# Patient Record
Sex: FEMALE | Race: WHITE | NOT HISPANIC OR LATINO | Employment: FULL TIME | URBAN - METROPOLITAN AREA
[De-identification: names, ages, dates, MRNs, and addresses within clinical notes are randomized per-mention and may not be internally consistent; named-entity substitution may affect disease eponyms.]

---

## 2017-07-17 ENCOUNTER — HOSPITAL ENCOUNTER (OUTPATIENT)
Dept: RADIOLOGY | Facility: HOSPITAL | Age: 32
Discharge: HOME | End: 2017-07-17
Attending: NURSE PRACTITIONER
Payer: COMMERCIAL

## 2017-07-17 DIAGNOSIS — M25.562 ACUTE PAIN OF LEFT KNEE: Primary | ICD-10-CM

## 2017-07-17 DIAGNOSIS — M25.562 ACUTE PAIN OF LEFT KNEE: ICD-10-CM

## 2017-07-17 PROCEDURE — 73562 X-RAY EXAM OF KNEE 3: CPT | Mod: TC,PO,LT

## 2017-07-17 PROCEDURE — 73562 X-RAY EXAM OF KNEE 3: CPT | Mod: 26,LT,, | Performed by: RADIOLOGY

## 2017-07-17 RX ORDER — NAPROXEN 500 MG/1
500 TABLET ORAL 2 TIMES DAILY WITH MEALS
Qty: 60 TABLET | Refills: 2 | Status: SHIPPED | OUTPATIENT
Start: 2017-07-17 | End: 2017-09-05

## 2017-08-07 ENCOUNTER — PATIENT MESSAGE (OUTPATIENT)
Dept: OBSTETRICS AND GYNECOLOGY | Facility: CLINIC | Age: 32
End: 2017-08-07

## 2017-08-07 DIAGNOSIS — N94.3 PMS (PREMENSTRUAL SYNDROME): ICD-10-CM

## 2017-08-08 ENCOUNTER — PATIENT MESSAGE (OUTPATIENT)
Dept: OBSTETRICS AND GYNECOLOGY | Facility: CLINIC | Age: 32
End: 2017-08-08

## 2017-08-08 RX ORDER — FLUOXETINE 10 MG/1
10 CAPSULE ORAL DAILY
Qty: 30 CAPSULE | Refills: 11 | Status: SHIPPED | OUTPATIENT
Start: 2017-08-08 | End: 2018-09-06 | Stop reason: SDUPTHER

## 2017-08-09 RX ORDER — FLUCONAZOLE 100 MG/1
200 TABLET ORAL ONCE
Qty: 2 TABLET | Refills: 1 | Status: SHIPPED | OUTPATIENT
Start: 2017-08-09 | End: 2017-08-09

## 2017-09-05 ENCOUNTER — INITIAL CONSULT (OUTPATIENT)
Dept: CARDIOLOGY | Facility: CLINIC | Age: 32
End: 2017-09-05
Payer: COMMERCIAL

## 2017-09-05 ENCOUNTER — LAB VISIT (OUTPATIENT)
Dept: LAB | Facility: HOSPITAL | Age: 32
End: 2017-09-05
Payer: COMMERCIAL

## 2017-09-05 VITALS
HEART RATE: 88 BPM | SYSTOLIC BLOOD PRESSURE: 129 MMHG | OXYGEN SATURATION: 98 % | WEIGHT: 173.06 LBS | HEIGHT: 61 IN | BODY MASS INDEX: 32.67 KG/M2 | DIASTOLIC BLOOD PRESSURE: 89 MMHG

## 2017-09-05 DIAGNOSIS — R53.83 FATIGUE, UNSPECIFIED TYPE: ICD-10-CM

## 2017-09-05 DIAGNOSIS — I34.1 MITRAL VALVE PROLAPSE: Primary | Chronic | ICD-10-CM

## 2017-09-05 DIAGNOSIS — R00.2 HEART PALPITATIONS: ICD-10-CM

## 2017-09-05 DIAGNOSIS — Z98.890 STATUS POST MITRAL VALVE ANNULOPLASTY: ICD-10-CM

## 2017-09-05 LAB
25(OH)D3+25(OH)D2 SERPL-MCNC: 10 NG/ML
ALBUMIN SERPL BCP-MCNC: 3.6 G/DL
ALP SERPL-CCNC: 64 U/L
ALT SERPL W/O P-5'-P-CCNC: 15 U/L
ANION GAP SERPL CALC-SCNC: 6 MMOL/L
AST SERPL-CCNC: 31 U/L
BASOPHILS # BLD AUTO: 0.02 K/UL
BASOPHILS NFR BLD: 0.3 %
BILIRUB SERPL-MCNC: 0.9 MG/DL
BUN SERPL-MCNC: 13 MG/DL
CALCIUM SERPL-MCNC: 8.9 MG/DL
CHLORIDE SERPL-SCNC: 104 MMOL/L
CO2 SERPL-SCNC: 28 MMOL/L
CREAT SERPL-MCNC: 0.7 MG/DL
DIFFERENTIAL METHOD: ABNORMAL
EOSINOPHIL # BLD AUTO: 0 K/UL
EOSINOPHIL NFR BLD: 0.4 %
ERYTHROCYTE [DISTWIDTH] IN BLOOD BY AUTOMATED COUNT: 14.6 %
EST. GFR  (AFRICAN AMERICAN): >60 ML/MIN/1.73 M^2
EST. GFR  (NON AFRICAN AMERICAN): >60 ML/MIN/1.73 M^2
FERRITIN SERPL-MCNC: 19 NG/ML
FOLATE SERPL-MCNC: 6.9 NG/ML
GLUCOSE SERPL-MCNC: 81 MG/DL
HCT VFR BLD AUTO: 36.9 %
HGB BLD-MCNC: 11.6 G/DL
IRON SERPL-MCNC: 57 UG/DL
LYMPHOCYTES # BLD AUTO: 1.9 K/UL
LYMPHOCYTES NFR BLD: 26.1 %
MCH RBC QN AUTO: 28.9 PG
MCHC RBC AUTO-ENTMCNC: 31.4 G/DL
MCV RBC AUTO: 92 FL
MONOCYTES # BLD AUTO: 0.4 K/UL
MONOCYTES NFR BLD: 5.4 %
NEUTROPHILS # BLD AUTO: 5 K/UL
NEUTROPHILS NFR BLD: 67.7 %
PLATELET # BLD AUTO: 349 K/UL
PMV BLD AUTO: 12.1 FL
POTASSIUM SERPL-SCNC: 4.2 MMOL/L
PROT SERPL-MCNC: 7 G/DL
RBC # BLD AUTO: 4.01 M/UL
SATURATED IRON: 14 %
SODIUM SERPL-SCNC: 138 MMOL/L
TOTAL IRON BINDING CAPACITY: 419 UG/DL
TRANSFERRIN SERPL-MCNC: 283 MG/DL
TSH SERPL DL<=0.005 MIU/L-ACNC: 0.91 UIU/ML
VIT B12 SERPL-MCNC: 287 PG/ML
WBC # BLD AUTO: 7.4 K/UL

## 2017-09-05 PROCEDURE — 80053 COMPREHEN METABOLIC PANEL: CPT

## 2017-09-05 PROCEDURE — 82728 ASSAY OF FERRITIN: CPT

## 2017-09-05 PROCEDURE — 36415 COLL VENOUS BLD VENIPUNCTURE: CPT

## 2017-09-05 PROCEDURE — 83540 ASSAY OF IRON: CPT

## 2017-09-05 PROCEDURE — 99244 OFF/OP CNSLTJ NEW/EST MOD 40: CPT | Mod: S$GLB,,, | Performed by: INTERNAL MEDICINE

## 2017-09-05 PROCEDURE — 99999 PR PBB SHADOW E&M-EST. PATIENT-LVL III: CPT | Mod: PBBFAC,,, | Performed by: INTERNAL MEDICINE

## 2017-09-05 PROCEDURE — 82306 VITAMIN D 25 HYDROXY: CPT

## 2017-09-05 PROCEDURE — 85025 COMPLETE CBC W/AUTO DIFF WBC: CPT

## 2017-09-05 PROCEDURE — 84443 ASSAY THYROID STIM HORMONE: CPT

## 2017-09-05 PROCEDURE — 82746 ASSAY OF FOLIC ACID SERUM: CPT

## 2017-09-05 PROCEDURE — 82607 VITAMIN B-12: CPT

## 2017-09-05 NOTE — LETTER
September 5, 2017      Nikhil Silva MD  [address]  [facility]  [place] [address]           [facility]  [address]  [facility] [address]  Phone: [phone]          Patient: King Dempsey   MR Number: [id number]   YOB: 1985   Date of Visit: 9/5/2017       Dear Dr. Nikhil Silva:    Thank you for referring King Dempsey to me for evaluation. Attached you will find relevant portions of my assessment and plan of care.    If you have questions, please do not hesitate to call me. I look forward to following King Dempsey along with you.    Sincerely,    Yosef Meyer MD    Enclosure  CC:  No Recipients    If you would like to receive this communication electronically, please contact [email or web address] or [phone] to request more information on [employer] access.    For providers and/or their staff who would like to refer a patient to [facility], please contact us through our one-stop-shop provider referral line, [facility], at [phone].    If you feel you have received this communication in error or would no longer like to receive these types of communications, please e-mail [email or web address]

## 2017-09-05 NOTE — PROGRESS NOTES
Interventional Cardiology Clinic Note  Reason for Visit: Consult for cardiac eval    HPI:   Ms. King Dempsey is a 32 y.o. woman with history of Mitral valve annuloplasty in 03' presents to the interventional cardiology clinic as a consult for cardiac evaluation for evaluation of her intermittent flutters and heart palpitations. She had recently seen Dr. Silva on 08/24/17 where he referred her back to the [facility] where she had been followed before. She had the aforementioned MV annuloplasty for severe mitral prolapse with MR in [place]. Her last echo in 2015 done here showed normal LVEF and RV function with a mean gradient of 8 mmHg. She also had biatrial enlargement and mild TR. A holter monitor was done in the past as well which showed NSR with a single PVC. Her rate was from  bpm. She also has a hx of vitamin D def and anemia (normal MCV) as well as Dr. Silva recommended checking these as well. In review, she has had a Holter monitor in 09, 08, 06 at [facility] it showed NSR and rare APC's.    She reports that she a feels heart flutter couple to four times a week and happens randomly (both with exertion and at rest). She also reports that she tries to cough and breath slower which she indicates does help some times. She denies any weakness or fatigue with the episodes. However, she says is fatigued chronically. She also has BLANK with climbing a fight of stairs and with moderate exertion. She can walk long distances without having dyspnea. She denies any chest pain with exertion. She denies claudication and leg swelling. She reports intermittent muscle cramping not related to exertion though. She denies any orthopnea and denies any REUBEN sx. She states that she has normal menstrual periods w/o heavy blood loss. Only this month it was particularly heavy.       ROS:    Review of Systems   Constitution: Negative for chills and fever.   HENT: Negative for congestion and ear pain.    Eyes: Negative for double vision  and pain.   Cardiovascular:        As per HPI   Respiratory: Positive for shortness of breath. Negative for cough.    Skin: Negative for rash.   Musculoskeletal: Negative for arthritis and back pain.   Gastrointestinal: Negative for abdominal pain, diarrhea and nausea.   Genitourinary: Negative for dysuria and hematuria.   Neurological: Negative for dizziness and light-headedness.     PMH:     Past Medical History:   Diagnosis Date    Abnormal Pap smear     Colposcopy    Menarche     Age of onset 11    Mitral valve prolapse      Past Surgical History:   Procedure Laterality Date     SECTION      MITRAL VALVE REPAIR       Allergies:     Review of patient's allergies indicates:   Allergen Reactions    Doxycycline      Abdomen pain severe    Tetanus vaccines and toxoid Rash     Medications:     Current Outpatient Prescriptions on File Prior to Visit   Medication Sig Dispense Refill    ciprofloxacin HCl (CIPRO) 500 MG tablet Take 1 tablet (500 mg total) by mouth every 12 (twelve) hours. 14 tablet 0    fluoxetine (PROZAC) 10 MG capsule Take 1 capsule (10 mg total) by mouth once daily. 30 capsule 11    Lactobacillus acidophilus (PROBIOTIC) 10 billion cell Cap Take 1 capsule by mouth once daily.      naproxen (NAPROSYN) 500 MG tablet Take 1 tablet (500 mg total) by mouth 2 (two) times daily with meals. 60 tablet 2     No current facility-administered medications on file prior to visit.      Social History:     Social History   Substance Use Topics    Smoking status: Never Smoker    Smokeless tobacco: Never Used    Alcohol use Yes      Comment: social     Family History:     Family History   Problem Relation Age of Onset    Heart disease Mother     Hypertension Mother     Hyperlipidemia Mother     Diabetes Mother     Hyperlipidemia Father     Hypertension Father     Breast cancer Neg Hx     Colon cancer Neg Hx     Ovarian cancer Neg Hx      Physical Exam:   /89 (BP Location: Left arm,  "Patient Position: Sitting, BP Method: Medium (Automatic))   Pulse 88   Ht 5' 1" (1.549 m)   Wt 78.5 kg (173 lb 1 oz)   SpO2 98%   BMI 32.70 kg/m²    Physical Exam   Constitutional: She is oriented to person, place, and time. She appears well-developed and well-nourished.   HENT:   Head: Normocephalic and atraumatic.   Eyes: EOM are normal. Pupils are equal, round, and reactive to light.   Neck: Normal range of motion. Neck supple. No JVD present.   Cardiovascular: Normal rate, regular rhythm and intact distal pulses.    Murmur (1/6 holsystolic murmur noted in the mid clavicular line) heard.  No carotid bruits  DP and PT's 2+   Pulmonary/Chest: Effort normal and breath sounds normal. She has no rales.   Abdominal: Soft. Bowel sounds are normal. There is no tenderness.   Musculoskeletal: Normal range of motion. She exhibits no edema or tenderness.   Neurological: She is alert and oriented to person, place, and time. She has normal reflexes.   Vitals reviewed.      Labs:     Lab Results   Component Value Date     2015    K 3.9 2015     2015    CO2 23 2015    BUN 8 2015    CREATININE 0.6 2015    GLUCOSE 77 2008    ANIONGAP 10 2015     No results found for: HGBA1C  Lab Results   Component Value Date     (H) 2011    Lab Results   Component Value Date    WBC 10.77 2015    HGB 10.1 (L) 2015    HCT 32.9 (L) 2015     (H) 2015    GRAN 8.8 (H) 2015    GRAN 82.4 (H) 2015     Lab Results   Component Value Date    CHOL 154 2015    HDL 44 10/30/2008    LDLCALC 100.4 10/30/2008    TRIG 63 10/30/2008          ImaginD echo with color flow 17:    1 - Normal left ventricular systolic function (EF 60-65%).     2 - Normal right ventricular systolic function .     3 - Biatrial enlargement.     4 - Mild tricuspid regurgitation.     5 - The mitral valve is s/p repair with a mean gradient of 8mm Hg at a " heart rate of 82 bpm.     EK17 Normal rate and rhythm, possible Right atrial enlargement which has been present since .   Assessment:     1. Mitral valve prolapse    2. Status post mitral valve annuloplasty    3. Heart palpitations    4. Fatigue, unspecified type      Ms. Dempsey could be having symptoms associated with a underlying arrhythmia that has not been caught by monitoring (>3 monitors negative). Will order a 30 day event monitor for this. For her BLANK and to evaluate her MV repair an 2D echo has been ordered. Will order the lab work listed below to complete workup for her anemia (which may be due to chronic blood loss from menstruation, note she has normal periods however).      Plan:   Mitral valve prolapse  -     2D Echo w/ Color Flow Doppler; Future    Status post mitral valve annuloplasty  -     2D Echo w/ Color Flow Doppler; Future    Heart palpitations  -     Comprehensive metabolic panel; Future; Expected date: 2017  -     TSH; Future; Expected date: 2017  -     Cardiac event monitor (30 day); Future    Fatigue, unspecified type  -     CBC auto differential; Future; Expected date: 2017  -     IRON AND TIBC; Future; Expected date: 2017  -     Vitamin D; Future; Expected date: 2017  -     Comprehensive metabolic panel; Future; Expected date: 2017  -     TSH; Future; Expected date: 2017  -     Ferritin; Future; Expected date: 2017  -     Folate and vitamin B12 as well.     Discussed with Dr. Meyer. RTC in 1 month.     Signed:  Jing Justin MD  2017 12:39 PM      I have personally seen, taken the history and examined the patient.  I agree with the housestaff whose note reflects my findings and plan as above.

## 2017-09-06 ENCOUNTER — TELEPHONE (OUTPATIENT)
Dept: CARDIOLOGY | Facility: CLINIC | Age: 32
End: 2017-09-06

## 2017-09-06 ENCOUNTER — HOSPITAL ENCOUNTER (OUTPATIENT)
Dept: CARDIOLOGY | Facility: CLINIC | Age: 32
Discharge: HOME | End: 2017-09-06
Payer: COMMERCIAL

## 2017-09-06 DIAGNOSIS — Z98.890 STATUS POST MITRAL VALVE ANNULOPLASTY: ICD-10-CM

## 2017-09-06 DIAGNOSIS — I34.1 MITRAL VALVE PROLAPSE: Chronic | ICD-10-CM

## 2017-09-06 PROCEDURE — 93306 TTE W/DOPPLER COMPLETE: CPT | Mod: S$GLB,,, | Performed by: INTERNAL MEDICINE

## 2017-09-06 NOTE — TELEPHONE ENCOUNTER
Notified patient of lab results. Encouraged increase in iron intake and Vitamin D. All questions answered.

## 2017-09-07 ENCOUNTER — CLINICAL SUPPORT (OUTPATIENT)
Dept: ELECTROPHYSIOLOGY | Facility: CLINIC | Age: 32
End: 2017-09-07
Payer: COMMERCIAL

## 2017-09-07 DIAGNOSIS — R00.2 HEART PALPITATIONS: ICD-10-CM

## 2017-09-07 LAB
ESTIMATED PA SYSTOLIC PRESSURE: 22.28
MITRAL VALVE MOBILITY: ABNORMAL
MITRAL VALVE REGURGITATION: ABNORMAL
MITRAL VALVE STENOSIS: ABNORMAL
RETIRED EF AND QEF - SEE NOTES: 60 (ref 55–65)
TRICUSPID VALVE REGURGITATION: ABNORMAL

## 2017-09-07 PROCEDURE — 93268 ECG RECORD/REVIEW: CPT | Mod: S$GLB,,, | Performed by: INTERNAL MEDICINE

## 2017-10-02 ENCOUNTER — PATIENT MESSAGE (OUTPATIENT)
Dept: OBSTETRICS AND GYNECOLOGY | Facility: CLINIC | Age: 32
End: 2017-10-02

## 2017-10-02 ENCOUNTER — OFFICE VISIT (OUTPATIENT)
Dept: OBSTETRICS AND GYNECOLOGY | Facility: CLINIC | Age: 32
End: 2017-10-02
Payer: COMMERCIAL

## 2017-10-02 VITALS
DIASTOLIC BLOOD PRESSURE: 90 MMHG | BODY MASS INDEX: 32.72 KG/M2 | SYSTOLIC BLOOD PRESSURE: 132 MMHG | HEIGHT: 61 IN | WEIGHT: 173.31 LBS

## 2017-10-02 DIAGNOSIS — R30.0 DYSURIA: Primary | ICD-10-CM

## 2017-10-02 DIAGNOSIS — T36.95XA ANTIBIOTIC-INDUCED YEAST INFECTION: ICD-10-CM

## 2017-10-02 DIAGNOSIS — B37.9 ANTIBIOTIC-INDUCED YEAST INFECTION: ICD-10-CM

## 2017-10-02 LAB
BILIRUB SERPL-MCNC: ABNORMAL MG/DL
BLOOD URINE, POC: ABNORMAL
COLOR, POC UA: ABNORMAL
GLUCOSE UR QL STRIP: ABNORMAL
KETONES UR QL STRIP: ABNORMAL
LEUKOCYTE ESTERASE URINE, POC: ABNORMAL
NITRITE, POC UA: ABNORMAL
PH, POC UA: ABNORMAL
PROTEIN, POC: POSITIVE
SPECIFIC GRAVITY, POC UA: ABNORMAL
UROBILINOGEN, POC UA: ABNORMAL

## 2017-10-02 PROCEDURE — 87088 URINE BACTERIA CULTURE: CPT

## 2017-10-02 PROCEDURE — 87077 CULTURE AEROBIC IDENTIFY: CPT | Mod: 59

## 2017-10-02 PROCEDURE — 87086 URINE CULTURE/COLONY COUNT: CPT

## 2017-10-02 PROCEDURE — 87186 SC STD MICRODIL/AGAR DIL: CPT

## 2017-10-02 PROCEDURE — 99999 PR PBB SHADOW E&M-EST. PATIENT-LVL II: CPT | Mod: PBBFAC,,,

## 2017-10-02 PROCEDURE — 99213 OFFICE O/P EST LOW 20 MIN: CPT | Mod: 25,S$GLB,, | Performed by: NURSE PRACTITIONER

## 2017-10-02 PROCEDURE — 81002 URINALYSIS NONAUTO W/O SCOPE: CPT | Mod: S$GLB,,, | Performed by: NURSE PRACTITIONER

## 2017-10-02 RX ORDER — PHENAZOPYRIDINE HYDROCHLORIDE 200 MG/1
200 TABLET, FILM COATED ORAL 3 TIMES DAILY PRN
Qty: 30 TABLET | Refills: 0 | Status: SHIPPED | OUTPATIENT
Start: 2017-10-02 | End: 2018-08-29

## 2017-10-02 RX ORDER — NITROFURANTOIN 25; 75 MG/1; MG/1
100 CAPSULE ORAL 2 TIMES DAILY
Qty: 14 CAPSULE | Refills: 0 | Status: SHIPPED | OUTPATIENT
Start: 2017-10-02 | End: 2017-10-09

## 2017-10-02 RX ORDER — FLUCONAZOLE 150 MG/1
150 TABLET ORAL ONCE
Qty: 2 TABLET | Refills: 1 | Status: SHIPPED | OUTPATIENT
Start: 2017-10-02 | End: 2017-10-02

## 2017-10-02 NOTE — PROGRESS NOTES
CC: Dysuria    HPI: Pt is a 32 y.o.  female who presents for evaluation of her UTI symptoms. The patient presents today with dysuria, frequency, and urgency for the past 1-2 days. The patient denies flank pain, fever, nausea, vomiting, and reports hematuria. She reports recurrent UTIs and the need for a Diflucan prescription with antibiotic therapy. Alleviating factors: none.     ROS:  GENERAL: Feeling unwell overall. Denies fever or chills.   SKIN: Denies rash or lesions.   HEAD: Denies head injury or headache.   NODES: Denies enlarged lymph nodes.   CHEST: Denies chest pain or shortness of breath.   CARDIOVASCULAR: Denies palpitations or left sided chest pain.   ABDOMEN: No abdominal pain, constipation, diarrhea, nausea, vomiting or rectal bleeding.   URINARY: + dysuria, + hematuria, + burning on urination.  REPRODUCTIVE: See HPI.   BREASTS: Denies pain, lumps, or nipple discharge.   HEMATOLOGIC: No easy bruisability or excessive bleeding.   MUSCULOSKELETAL: Denies joint pain or swelling, + lower back pain  NEUROLOGIC: Denies syncope or weakness.   PSYCHIATRIC: Denies depression, anxiety or mood swings.    PE:   APPEARANCE: Well nourished, well developed, White female in no acute distress.  PELVIS: Deferred  ABDOMEN: + suprapubic tenderness  MUSCULOSKELETAL: No CVA tenderness      Diagnosis:  1. Dysuria        Plan:     Orders Placed This Encounter    Urine culture    POCT URINE DIPSTICK WITHOUT MICROSCOPE    nitrofurantoin, macrocrystal-monohydrate, (MACROBID) 100 MG capsule    phenazopyridine (PYRIDIUM) 200 MG tablet     POCT Urine Dipstick, + blood and protein  Urine Culture, awaiting results  Macrobid  Diflucan  Pyridium    -UTI prevention including   a. perineal hygiene, wipe front to back,  b. drink water prior to intercourse and urinate afterwards and avoid certain positions which could increase likelyhood of UTIs,  c. establish regular bladder habits,   d. avoid vulvovaginal irritants,   e. increase  fluids and avoid caffeine and alcohol;  -signs of pylenephritis and to go to the ED if develops fever, chills, n/v, back pain, worsening dyuria, hematuria;   -pelvic rest until symptoms resolve;  -Macrobid use and potential side effects;  -A.C.S. Pap and pelvic exam guidelines (pap every 3 years), recomendations for yearly mammogram;  -to follow up with her PCP for other health maintenance.       Follow-up PRN no resolution of symptoms.      Laura Robertson, LESLEY-C

## 2017-10-02 NOTE — LETTER
October 2, 2017      [facility] [address]  [address]  [facility] [address]  Phone: [phone]  Fax: [phone]       Patient: King Dempsey   YOB: 1985  Date of Visit: 10/02/2017    To Whom It May Concern:    Vinnie Dempsey  was at [facility] on 10/02/2017. She may return to work/school on 10/4/17 with no restrictions. If you have any questions or concerns, or if I can be of further assistance, please do not hesitate to contact me.    Sincerely,    TAQUERIA Thorne

## 2017-10-05 LAB
BACTERIA UR CULT: NORMAL
BACTERIA UR CULT: NORMAL

## 2017-10-11 ENCOUNTER — TELEPHONE (OUTPATIENT)
Dept: CARDIOLOGY | Facility: CLINIC | Age: 32
End: 2017-10-11

## 2017-12-20 ENCOUNTER — OFFICE VISIT (OUTPATIENT)
Dept: URGENT CARE | Facility: CLINIC | Age: 32
End: 2017-12-20
Payer: COMMERCIAL

## 2017-12-20 ENCOUNTER — PATIENT MESSAGE (OUTPATIENT)
Dept: INTERNAL MEDICINE | Facility: CLINIC | Age: 32
End: 2017-12-20

## 2017-12-20 VITALS
TEMPERATURE: 98 F | HEART RATE: 85 BPM | DIASTOLIC BLOOD PRESSURE: 81 MMHG | WEIGHT: 173 LBS | SYSTOLIC BLOOD PRESSURE: 120 MMHG | OXYGEN SATURATION: 99 % | BODY MASS INDEX: 32.66 KG/M2 | RESPIRATION RATE: 18 BRPM | HEIGHT: 61 IN

## 2017-12-20 DIAGNOSIS — R50.9 FEVER, UNSPECIFIED FEVER CAUSE: ICD-10-CM

## 2017-12-20 DIAGNOSIS — J06.9 URI WITH COUGH AND CONGESTION: Primary | ICD-10-CM

## 2017-12-20 LAB
CTP QC/QA: YES
CTP QC/QA: YES
FLUAV AG NPH QL: NEGATIVE
FLUBV AG NPH QL: NEGATIVE
S PYO RRNA THROAT QL PROBE: NEGATIVE

## 2017-12-20 PROCEDURE — 96372 THER/PROPH/DIAG INJ SC/IM: CPT | Mod: S$GLB,,, | Performed by: EMERGENCY MEDICINE

## 2017-12-20 PROCEDURE — 87880 STREP A ASSAY W/OPTIC: CPT | Mod: QW,S$GLB,, | Performed by: NURSE PRACTITIONER

## 2017-12-20 PROCEDURE — 87804 INFLUENZA ASSAY W/OPTIC: CPT | Mod: QW,S$GLB,, | Performed by: NURSE PRACTITIONER

## 2017-12-20 PROCEDURE — 99214 OFFICE O/P EST MOD 30 MIN: CPT | Mod: 25,S$GLB,, | Performed by: NURSE PRACTITIONER

## 2017-12-20 RX ORDER — FLUTICASONE PROPIONATE 50 MCG
1 SPRAY, SUSPENSION (ML) NASAL 2 TIMES DAILY
Qty: 1 BOTTLE | Refills: 0 | Status: SHIPPED | OUTPATIENT
Start: 2017-12-20 | End: 2018-08-29

## 2017-12-20 RX ORDER — CODEINE PHOSPHATE AND GUAIFENESIN 10; 100 MG/5ML; MG/5ML
10 SOLUTION ORAL EVERY 6 HOURS PRN
Qty: 120 ML | Refills: 0 | Status: SHIPPED | OUTPATIENT
Start: 2017-12-20 | End: 2017-12-30

## 2017-12-20 RX ORDER — BENZONATATE 200 MG/1
200 CAPSULE ORAL 3 TIMES DAILY PRN
Qty: 30 CAPSULE | Refills: 0 | Status: SHIPPED | OUTPATIENT
Start: 2017-12-20 | End: 2017-12-30

## 2017-12-20 RX ORDER — AZELASTINE 1 MG/ML
1 SPRAY, METERED NASAL 2 TIMES DAILY
Qty: 30 ML | Refills: 0 | Status: SHIPPED | OUTPATIENT
Start: 2017-12-20 | End: 2018-08-29

## 2017-12-20 RX ORDER — BETAMETHASONE SODIUM PHOSPHATE AND BETAMETHASONE ACETATE 3; 3 MG/ML; MG/ML
6 INJECTION, SUSPENSION INTRA-ARTICULAR; INTRALESIONAL; INTRAMUSCULAR; SOFT TISSUE
Status: COMPLETED | OUTPATIENT
Start: 2017-12-20 | End: 2017-12-20

## 2017-12-20 RX ADMIN — BETAMETHASONE SODIUM PHOSPHATE AND BETAMETHASONE ACETATE 6 MG: 3; 3 INJECTION, SUSPENSION INTRA-ARTICULAR; INTRALESIONAL; INTRAMUSCULAR; SOFT TISSUE at 12:12

## 2017-12-20 NOTE — LETTER
December 20, 2017      [facility] [address], [place] [address]  Phone: [phone]  Fax: [phone]       Patient: King Dempsey   YOB: 1985  Date of Visit: 12/20/2017    To Whom It May Concern:    Vinnie Dempsey  was at [facility] on 12/20/2017. She may return to work/school on 12/22/17 with no restrictions. If you have any questions or concerns, or if I can be of further assistance, please do not hesitate to contact me.    Sincerely,        Phyllis Suarez, NP

## 2017-12-20 NOTE — PATIENT INSTRUCTIONS
"                                                         URI   If your condition worsens or fails to improve we recommend that you receive another evaluation at the ER immediately or contact your PCP to discuss your concerns or return here. You must understand that you've received an urgent care treatment only and that you may be released before all your medical problems are known or treated. You the patient will arrange for follouwp care as instructed.   If we discussed that I think your illness is viral, it will not respond to antibiotics and will last 10-14 days.   Flonase (fluticasone) is a nasal spray which is available over the counter and may help with your symptoms.   Zyrtec D, Claritin D or Allegra D can also help with symptoms of congestion and drainage.   If you have hypertension avoid using the "D" which is the decongestant   If you just have drainage you can take plain zyrtec, claritin or allegra   If you just have a congested feeling you can take pseudoephedrine (unless you have high blood pressure) which you have to sign for behind the counter. Do not buy the phenylephrine which is on the shelf as it is not effective   Rest and fluids are also important.   Tylenol or ibuprofen can also be used as directed for pain unless you have an allergy to them or medical condition such as stomach ulcers, kidney or liver disease or blood thinners etc for which you should not be taking these type of medications.   If you are flying in the next few days Afrin nose drops for the airplane flight upon take off and landing may help. Other than at those times refrain from using afrin.   If you were prescribed a narcotic do not drive or operate heavy machinery while taking these medications.       Viral Upper Respiratory Illness (Adult)  You have a viral upper respiratory illness (URI), which is another term for the common cold. This illness is contagious during the first few days. It is spread through the air by coughing " and sneezing. It may also be spread by direct contact (touching the sick person and then touching your own eyes, nose, or mouth). Frequent handwashing will decrease risk of spread. Most viral illnesses go away within 7 to 10 days with rest and simple home remedies. Sometimes the illness may last for several weeks. Antibiotics will not kill a virus, and they are generally not prescribed for this condition.    Home care  · If symptoms are severe, rest at home for the first 2 to 3 days. When you resume activity, don't let yourself get too tired.  · Avoid being exposed to cigarette smoke (yours or others).  · You may use acetaminophen or ibuprofen to control pain and fever, unless another medicine was prescribed. (Note: If you have chronic liver or kidney disease, have ever had a stomach ulcer or gastrointestinal bleeding, or are taking blood-thinning medicines, talk with your healthcare provider before using these medicines.) Aspirin should never be given to anyone under 18 years of age who is ill with a viral infection or fever. It may cause severe liver or brain damage.  · Your appetite may be poor, so a light diet is fine. Avoid dehydration by drinking 6 to 8 glasses of fluids per day (water, soft drinks, juices, tea, or soup). Extra fluids will help loosen secretions in the nose and lungs.  · Over-the-counter cold medicines will not shorten the length of time youre sick, but they may be helpful for the following symptoms: cough, sore throat, and nasal and sinus congestion. (Note: Do not use decongestants if you have high blood pressure.)  Follow-up care  Follow up with your healthcare provider, or as advised.  When to seek medical advice  Call your healthcare provider right away if any of these occur:  · Cough with lots of colored sputum (mucus)  · Severe headache; face, neck, or ear pain  · Difficulty swallowing due to throat pain  · Fever of 100.4°F (38°C)  Call 911, or get immediate medical care  Call  emergency services right away if any of these occur:  · Chest pain, shortness of breath, wheezing, or difficulty breathing  · Coughing up blood  · Inability to swallow due to throat pain  Date Last Reviewed: 9/13/2015  © 2590-1433 [employer]. [address] [place] [address], [place] [address]. All rights reserved. This information is not intended as a substitute for professional medical care. Always follow your healthcare professional's instructions.

## 2017-12-20 NOTE — PROGRESS NOTES
"Subjective:       Patient ID: King Dempsey is a 32 y.o. female.    Vitals:  height is 5' 1" (1.549 m) and weight is 78.5 kg (173 lb). Her oral temperature is 97.8 °F (36.6 °C). Her blood pressure is 120/81 and her pulse is 85. Her respiration is 18 and oxygen saturation is 99%.     Chief Complaint: URI    Pt c/o sore throat, sinus pressure, sneezing, congestion, and cough since Sunday.  No fever.  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  Pt taking Tylenol Cold with no relief of symptoms.  She did receive her flu shot this year.        URI    This is a new problem. The current episode started in the past 7 days. The problem has been gradually worsening. There has been no fever. Associated symptoms include congestion, coughing, sinus pain, sneezing and a sore throat. Pertinent negatives include no abdominal pain, chest pain, diarrhea, ear pain, headaches, joint pain, nausea, neck pain, plugged ear sensation, rash, rhinorrhea, swollen glands, vomiting or wheezing. Treatments tried: tylenol cold/ flu. The treatment provided mild relief.     Review of Systems   Constitution: Positive for chills, fever and malaise/fatigue.   HENT: Positive for congestion, sinus pain, sneezing and sore throat. Negative for ear pain, hoarse voice and rhinorrhea.    Eyes: Negative for discharge and redness.   Cardiovascular: Negative for chest pain, dyspnea on exertion and leg swelling.   Respiratory: Positive for cough and sputum production. Negative for shortness of breath and wheezing.    Hematologic/Lymphatic: Negative for adenopathy.   Skin: Negative for rash.   Musculoskeletal: Negative for joint pain, myalgias and neck pain.   Gastrointestinal: Negative for abdominal pain, diarrhea, nausea and vomiting.   Neurological: Negative for headaches.       Objective:      Physical Exam   Constitutional: She is oriented to person, place, and time. She appears well-developed and well-nourished. " She is cooperative.  Non-toxic appearance. She does not have a sickly appearance. She does not appear ill. No distress.   HENT:   Head: Normocephalic and atraumatic.   Right Ear: Hearing, tympanic membrane, external ear and ear canal normal.   Left Ear: Hearing, tympanic membrane, external ear and ear canal normal.   Nose: Mucosal edema present. No rhinorrhea or nasal deformity. No epistaxis. Right sinus exhibits no maxillary sinus tenderness and no frontal sinus tenderness. Left sinus exhibits no maxillary sinus tenderness and no frontal sinus tenderness.   Mouth/Throat: Uvula is midline and mucous membranes are normal. No trismus in the jaw. Normal dentition. No uvula swelling. Posterior oropharyngeal erythema present. No oropharyngeal exudate or posterior oropharyngeal edema. Tonsils are 1+ on the right. Tonsils are 1+ on the left. No tonsillar exudate.   Eyes: Conjunctivae and lids are normal. No scleral icterus.   Sclera clear bilat   Neck: Trachea normal, full passive range of motion without pain and phonation normal. Neck supple.   Cardiovascular: Normal rate, regular rhythm, normal heart sounds, intact distal pulses and normal pulses.    Pulmonary/Chest: Effort normal and breath sounds normal. No respiratory distress.   Abdominal: Soft. Normal appearance and bowel sounds are normal. She exhibits no distension. There is no tenderness.   Musculoskeletal: Normal range of motion. She exhibits no edema or deformity.   Lymphadenopathy:     She has no cervical adenopathy.   Neurological: She is alert and oriented to person, place, and time. She exhibits normal muscle tone. Coordination normal.   Skin: Skin is warm, dry and intact. She is not diaphoretic. No pallor.   Psychiatric: She has a normal mood and affect. Her speech is normal and behavior is normal. Judgment and thought content normal. Cognition and memory are normal.   Nursing note and vitals reviewed.      Results for orders placed or performed in visit  on 12/20/17   POCT rapid strep A   Result Value Ref Range    Rapid Strep A Screen Negative Negative     Acceptable Yes    POCT Influenza A/B   Result Value Ref Range    Rapid Influenza A Ag Negative Negative    Rapid Influenza B Ag Negative Negative     Acceptable Yes      Assessment:       1. URI with cough and congestion    2. Fever, unspecified fever cause        Plan:         URI with cough and congestion  -     azelastine (ASTELIN) 137 mcg (0.1 %) nasal spray; 1 spray (137 mcg total) by Nasal route 2 (two) times daily.  Dispense: 30 mL; Refill: 0  -     fluticasone (FLONASE) 50 mcg/actuation nasal spray; 1 spray by Each Nare route 2 (two) times daily.  Dispense: 1 Bottle; Refill: 0  -     benzonatate (TESSALON) 200 MG capsule; Take 1 capsule (200 mg total) by mouth 3 (three) times daily as needed for Cough.  Dispense: 30 capsule; Refill: 0  -     guaifenesin-codeine 100-10 mg/5 ml (TUSSI-ORGANIDIN NR)  mg/5 mL syrup; Take 10 mLs by mouth every 6 (six) hours as needed for Cough.  Dispense: 120 mL; Refill: 0  -     betamethasone acetate-betamethasone sodium phosphate injection 6 mg; Inject 1 mL (6 mg total) into the muscle one time.    Fever, unspecified fever cause  -     POCT rapid strep A  -     POCT Influenza A/B      Patient Instructions                                                            URI   If your condition worsens or fails to improve we recommend that you receive another evaluation at the ER immediately or contact your PCP to discuss your concerns or return here. You must understand that you've received an urgent care treatment only and that you may be released before all your medical problems are known or treated. You the patient will arrange for follouwp care as instructed.   If we discussed that I think your illness is viral, it will not respond to antibiotics and will last 10-14 days.   Flonase (fluticasone) is a nasal spray which is available over the  "counter and may help with your symptoms.   Zyrtec D, Claritin D or Allegra D can also help with symptoms of congestion and drainage.   If you have hypertension avoid using the "D" which is the decongestant   If you just have drainage you can take plain zyrtec, claritin or allegra   If you just have a congested feeling you can take pseudoephedrine (unless you have high blood pressure) which you have to sign for behind the counter. Do not buy the phenylephrine which is on the shelf as it is not effective   Rest and fluids are also important.   Tylenol or ibuprofen can also be used as directed for pain unless you have an allergy to them or medical condition such as stomach ulcers, kidney or liver disease or blood thinners etc for which you should not be taking these type of medications.   If you are flying in the next few days Afrin nose drops for the airplane flight upon take off and landing may help. Other than at those times refrain from using afrin.   If you were prescribed a narcotic do not drive or operate heavy machinery while taking these medications.       Viral Upper Respiratory Illness (Adult)  You have a viral upper respiratory illness (URI), which is another term for the common cold. This illness is contagious during the first few days. It is spread through the air by coughing and sneezing. It may also be spread by direct contact (touching the sick person and then touching your own eyes, nose, or mouth). Frequent handwashing will decrease risk of spread. Most viral illnesses go away within 7 to 10 days with rest and simple home remedies. Sometimes the illness may last for several weeks. Antibiotics will not kill a virus, and they are generally not prescribed for this condition.    Home care  · If symptoms are severe, rest at home for the first 2 to 3 days. When you resume activity, don't let yourself get too tired.  · Avoid being exposed to cigarette smoke (yours or others).  · You may use acetaminophen " or ibuprofen to control pain and fever, unless another medicine was prescribed. (Note: If you have chronic liver or kidney disease, have ever had a stomach ulcer or gastrointestinal bleeding, or are taking blood-thinning medicines, talk with your healthcare provider before using these medicines.) Aspirin should never be given to anyone under 18 years of age who is ill with a viral infection or fever. It may cause severe liver or brain damage.  · Your appetite may be poor, so a light diet is fine. Avoid dehydration by drinking 6 to 8 glasses of fluids per day (water, soft drinks, juices, tea, or soup). Extra fluids will help loosen secretions in the nose and lungs.  · Over-the-counter cold medicines will not shorten the length of time youre sick, but they may be helpful for the following symptoms: cough, sore throat, and nasal and sinus congestion. (Note: Do not use decongestants if you have high blood pressure.)  Follow-up care  Follow up with your healthcare provider, or as advised.  When to seek medical advice  Call your healthcare provider right away if any of these occur:  · Cough with lots of colored sputum (mucus)  · Severe headache; face, neck, or ear pain  · Difficulty swallowing due to throat pain  · Fever of 100.4°F (38°C)  Call 911, or get immediate medical care  Call emergency services right away if any of these occur:  · Chest pain, shortness of breath, wheezing, or difficulty breathing  · Coughing up blood  · Inability to swallow due to throat pain  Date Last Reviewed: 9/13/2015  © 1246-9988 [email or web address]. [address] [place] [address], [place] [address]. All rights reserved. This information is not intended as a substitute for professional medical care. Always follow your healthcare professional's instructions.

## 2018-03-27 ENCOUNTER — OFFICE VISIT (OUTPATIENT)
Dept: URGENT CARE | Facility: CLINIC | Age: 33
End: 2018-03-27
Payer: COMMERCIAL

## 2018-03-27 VITALS
WEIGHT: 173 LBS | BODY MASS INDEX: 32.69 KG/M2 | TEMPERATURE: 99 F | DIASTOLIC BLOOD PRESSURE: 80 MMHG | OXYGEN SATURATION: 97 % | HEART RATE: 96 BPM | SYSTOLIC BLOOD PRESSURE: 120 MMHG

## 2018-03-27 DIAGNOSIS — J02.9 SORE THROAT: ICD-10-CM

## 2018-03-27 DIAGNOSIS — J06.9 UPPER RESPIRATORY TRACT INFECTION, UNSPECIFIED TYPE: Primary | ICD-10-CM

## 2018-03-27 LAB
CTP QC/QA: YES
S PYO RRNA THROAT QL PROBE: NEGATIVE

## 2018-03-27 PROCEDURE — 87880 STREP A ASSAY W/OPTIC: CPT | Mod: QW,S$GLB,, | Performed by: FAMILY MEDICINE

## 2018-03-27 PROCEDURE — 96372 THER/PROPH/DIAG INJ SC/IM: CPT | Mod: S$GLB,,, | Performed by: FAMILY MEDICINE

## 2018-03-27 PROCEDURE — 99214 OFFICE O/P EST MOD 30 MIN: CPT | Mod: 25,S$GLB,, | Performed by: FAMILY MEDICINE

## 2018-03-27 RX ORDER — CETIRIZINE HYDROCHLORIDE 10 MG/1
10 TABLET ORAL DAILY
Qty: 30 TABLET | Refills: 2 | Status: SHIPPED | OUTPATIENT
Start: 2018-03-27 | End: 2018-08-29

## 2018-03-27 RX ORDER — BETAMETHASONE SODIUM PHOSPHATE AND BETAMETHASONE ACETATE 3; 3 MG/ML; MG/ML
6 INJECTION, SUSPENSION INTRA-ARTICULAR; INTRALESIONAL; INTRAMUSCULAR; SOFT TISSUE
Status: COMPLETED | OUTPATIENT
Start: 2018-03-27 | End: 2018-03-27

## 2018-03-27 RX ADMIN — BETAMETHASONE SODIUM PHOSPHATE AND BETAMETHASONE ACETATE 6 MG: 3; 3 INJECTION, SUSPENSION INTRA-ARTICULAR; INTRALESIONAL; INTRAMUSCULAR; SOFT TISSUE at 08:03

## 2018-03-27 NOTE — PROGRESS NOTES
Subjective:       Patient ID: King Dempsey is a 32 y.o. female.    Vitals:  weight is 78.5 kg (173 lb). Her temperature is 99 °F (37.2 °C). Her blood pressure is 120/80 and her pulse is 96. Her oxygen saturation is 97%.     Chief Complaint: URI    URI    This is a new problem. The current episode started in the past 7 days. The problem has been gradually worsening. The maximum temperature recorded prior to her arrival was 101 - 101.9 F. The fever has been present for less than 1 day. Associated symptoms include congestion and a sore throat. Pertinent negatives include no abdominal pain, chest pain, coughing, ear pain, headaches, nausea or wheezing. She has tried nothing for the symptoms.     Review of Systems   Constitution: Positive for fever. Negative for chills and malaise/fatigue.   HENT: Positive for congestion and sore throat. Negative for ear pain and hoarse voice.    Eyes: Negative for discharge and redness.   Cardiovascular: Negative for chest pain, dyspnea on exertion and leg swelling.   Respiratory: Negative for cough, shortness of breath, sputum production and wheezing.    Musculoskeletal: Negative for myalgias.   Gastrointestinal: Negative for abdominal pain and nausea.   Neurological: Negative for headaches.   All other systems reviewed and are negative.      Objective:      Physical Exam   Constitutional: She is oriented to person, place, and time. She appears well-developed and well-nourished.   HENT:   Head: Normocephalic and atraumatic.   Right Ear: Hearing, tympanic membrane, external ear and ear canal normal.   Left Ear: Hearing, external ear and ear canal normal. A middle ear effusion (minimal clear) is present.   Nose: Mucosal edema, rhinorrhea and sinus tenderness (minimal) present. Left sinus exhibits maxillary sinus tenderness (minimal).   Mouth/Throat: Posterior oropharyngeal erythema present.   Eyes: Conjunctivae and EOM are normal. Pupils are equal, round, and reactive to light.   Neck:  Normal range of motion.   Cardiovascular: Normal rate, regular rhythm and normal heart sounds.    Pulmonary/Chest: Effort normal and breath sounds normal. She has no wheezes. She has no rales.   Neurological: She is alert and oriented to person, place, and time.       Assessment:       1. Upper respiratory tract infection, unspecified type    2. Sore throat        Plan:         Upper respiratory tract infection, unspecified type  -     betamethasone acetate-betamethasone sodium phosphate injection 6 mg; Inject 1 mL (6 mg total) into the muscle one time.  -     cetirizine (ZYRTEC) 10 MG tablet; Take 1 tablet (10 mg total) by mouth once daily.  Dispense: 30 tablet; Refill: 2  -     (Magic mouthwash) 1:1:1 Benadryl 12.5mg/5ml liq, aluminum & magnesium hydroxide-simehticone (Maalox), lidocaine viscous 2%; Swish and spit 10 mLs every 4 (four) hours as needed. for mouth sores  Dispense: 180 mL; Refill: 0    Sore throat  -     POCT rapid strep A      Patient Instructions     Viral Upper Respiratory Illness (Adult)  You have a viral upper respiratory illness (URI), which is another term for the common cold. This illness is contagious during the first few days. It is spread through the air by coughing and sneezing. It may also be spread by direct contact (touching the sick person and then touching your own eyes, nose, or mouth). Frequent handwashing will decrease risk of spread. Most viral illnesses go away within 7 to 10 days with rest and simple home remedies. Sometimes the illness may last for several weeks. Antibiotics will not kill a virus, and they are generally not prescribed for this condition.    Home care  · If symptoms are severe, rest at home for the first 2 to 3 days. When you resume activity, don't let yourself get too tired.  · Avoid being exposed to cigarette smoke (yours or others).  · You may use acetaminophen or ibuprofen to control pain and fever, unless another medicine was prescribed. (Note: If you  have chronic liver or kidney disease, have ever had a stomach ulcer or gastrointestinal bleeding, or are taking blood-thinning medicines, talk with your healthcare provider before using these medicines.) Aspirin should never be given to anyone under 18 years of age who is ill with a viral infection or fever. It may cause severe liver or brain damage.  · Your appetite may be poor, so a light diet is fine. Avoid dehydration by drinking 6 to 8 glasses of fluids per day (water, soft drinks, juices, tea, or soup). Extra fluids will help loosen secretions in the nose and lungs.  · Over-the-counter cold medicines will not shorten the length of time youre sick, but they may be helpful for the following symptoms: cough, sore throat, and nasal and sinus congestion. (Note: Do not use decongestants if you have high blood pressure.)  Follow-up care  Follow up with your healthcare provider, or as advised.  When to seek medical advice  Call your healthcare provider right away if any of these occur:  · Cough with lots of colored sputum (mucus)  · Severe headache; face, neck, or ear pain  · Difficulty swallowing due to throat pain  · Fever of 100.4°F (38°C)  Call 911, or get immediate medical care  Call emergency services right away if any of these occur:  · Chest pain, shortness of breath, wheezing, or difficulty breathing  · Coughing up blood  · Inability to swallow due to throat pain  Date Last Reviewed: 9/13/2015  © 0747-5124 [email or web address]. [address] [place] [address], [place] [address]. All rights reserved. This information is not intended as a substitute for professional medical care. Always follow your healthcare professional's instructions.

## 2018-03-27 NOTE — LETTER
March 27, 2018      [facility] [address], [place] [address]  Phone: [phone]  Fax: [phone]       Patient: King Dempsey   YOB: 1985  Date of Visit: 03/27/2018    To Whom It May Concern:    Vinnie Dempsey  was at [facility] on 03/27/2018. She may return to work/school on 03/28/2018 with no restrictions. If you have any questions or concerns, or if I can be of further assistance, please do not hesitate to contact me.    Sincerely,        Umer Ruiz MD

## 2018-03-27 NOTE — PATIENT INSTRUCTIONS
Viral Upper Respiratory Illness (Adult)  You have a viral upper respiratory illness (URI), which is another term for the common cold. This illness is contagious during the first few days. It is spread through the air by coughing and sneezing. It may also be spread by direct contact (touching the sick person and then touching your own eyes, nose, or mouth). Frequent handwashing will decrease risk of spread. Most viral illnesses go away within 7 to 10 days with rest and simple home remedies. Sometimes the illness may last for several weeks. Antibiotics will not kill a virus, and they are generally not prescribed for this condition.    Home care  · If symptoms are severe, rest at home for the first 2 to 3 days. When you resume activity, don't let yourself get too tired.  · Avoid being exposed to cigarette smoke (yours or others).  · You may use acetaminophen or ibuprofen to control pain and fever, unless another medicine was prescribed. (Note: If you have chronic liver or kidney disease, have ever had a stomach ulcer or gastrointestinal bleeding, or are taking blood-thinning medicines, talk with your healthcare provider before using these medicines.) Aspirin should never be given to anyone under 18 years of age who is ill with a viral infection or fever. It may cause severe liver or brain damage.  · Your appetite may be poor, so a light diet is fine. Avoid dehydration by drinking 6 to 8 glasses of fluids per day (water, soft drinks, juices, tea, or soup). Extra fluids will help loosen secretions in the nose and lungs.  · Over-the-counter cold medicines will not shorten the length of time youre sick, but they may be helpful for the following symptoms: cough, sore throat, and nasal and sinus congestion. (Note: Do not use decongestants if you have high blood pressure.)  Follow-up care  Follow up with your healthcare provider, or as advised.  When to seek medical advice  Call your healthcare provider right away if any  of these occur:  · Cough with lots of colored sputum (mucus)  · Severe headache; face, neck, or ear pain  · Difficulty swallowing due to throat pain  · Fever of 100.4°F (38°C)  Call 911, or get immediate medical care  Call emergency services right away if any of these occur:  · Chest pain, shortness of breath, wheezing, or difficulty breathing  · Coughing up blood  · Inability to swallow due to throat pain  Date Last Reviewed: 9/13/2015  © 8187-4152 [employer]. [address] [place] [address]. All rights reserved. This information is not intended as a substitute for professional medical care. Always follow your healthcare professional's instructions.

## 2018-05-10 ENCOUNTER — OFFICE VISIT (OUTPATIENT)
Dept: URGENT CARE | Facility: CLINIC | Age: 33
End: 2018-05-10
Payer: COMMERCIAL

## 2018-05-10 VITALS
WEIGHT: 173 LBS | TEMPERATURE: 99 F | BODY MASS INDEX: 32.69 KG/M2 | OXYGEN SATURATION: 100 % | DIASTOLIC BLOOD PRESSURE: 103 MMHG | HEART RATE: 92 BPM | SYSTOLIC BLOOD PRESSURE: 140 MMHG

## 2018-05-10 DIAGNOSIS — Z11.3 SCREEN FOR STD (SEXUALLY TRANSMITTED DISEASE): Primary | ICD-10-CM

## 2018-05-10 LAB
B-HCG UR QL: NEGATIVE
BILIRUB UR QL STRIP: NEGATIVE
CTP QC/QA: YES
GLUCOSE UR QL STRIP: NEGATIVE
KETONES UR QL STRIP: NEGATIVE
LEUKOCYTE ESTERASE UR QL STRIP: NEGATIVE
PH, POC UA: 6.5 (ref 5–8)
POC BLOOD, URINE: NEGATIVE
POC NITRATES, URINE: NEGATIVE
PROT UR QL STRIP: NEGATIVE
SP GR UR STRIP: 1.01 (ref 1–1.03)
UROBILINOGEN UR STRIP-ACNC: NORMAL (ref 0.1–1.1)

## 2018-05-10 PROCEDURE — 99213 OFFICE O/P EST LOW 20 MIN: CPT | Mod: 25,S$GLB,, | Performed by: NURSE PRACTITIONER

## 2018-05-10 PROCEDURE — 81025 URINE PREGNANCY TEST: CPT | Mod: S$GLB,,, | Performed by: NURSE PRACTITIONER

## 2018-05-10 PROCEDURE — 81003 URINALYSIS AUTO W/O SCOPE: CPT | Mod: QW,S$GLB,, | Performed by: NURSE PRACTITIONER

## 2018-05-10 NOTE — PROGRESS NOTES
"Subjective:       Patient ID: King Dempsey is a 33 y.o. female.    Vitals:  weight is 78.5 kg (173 lb). Her oral temperature is 98.7 °F (37.1 °C). Her blood pressure is 140/103 (abnormal) and her pulse is 92. Her oxygen saturation is 100%.     Chief Complaint: Exposure to STD    Patient reports that she would like to be tested for STD's specifically Chlamydia and Gonorrhea. Patient reports that she has a new boyfriend who she is sexually active with. Reports that new boyfriend reports to her on yesterday that he tested positive for Chlamydia and/or Gonorrhea. Patient denies any fevers, chills, vaginal discharge or order, vaginal burning, or recent STD's. Patient reports "off and on" pelvic discomfort that's been going on for some time now and has an upcoming appointment with her GYN for the pelvic discomfort.       Exposure to STD    The patient's primary symptoms include pelvic pain (off and on). The patient's pertinent negatives include no dysuria. This is a new problem. The current episode started more than 1 month ago. The problem has been gradually worsening. The vaginal discharge was normal. Pertinent negatives include no abdominal pain or fever. She has tried nothing for the symptoms.     Review of Systems   Constitution: Negative for chills and fever.   Skin: Negative for itching.   Musculoskeletal: Negative for back pain.   Gastrointestinal: Negative for abdominal pain, nausea and vomiting.   Genitourinary: Positive for pelvic pain (off and on). Negative for dysuria, genital sores, hematuria, missed menses, non-menstrual bleeding and urgency.        Pt states that that her partner informed her that his ex partner had a std. Pt states that she just has pain during sex.       Objective:      Physical Exam   Constitutional: She is oriented to person, place, and time. She appears well-developed and well-nourished.   HENT:   Head: Normocephalic and atraumatic.   Right Ear: External ear normal.   Left Ear: " External ear normal.   Nose: Nose normal. No nasal deformity. No epistaxis.   Mouth/Throat: Oropharynx is clear and moist and mucous membranes are normal.   Eyes: Conjunctivae and lids are normal.   Neck: Trachea normal, normal range of motion and phonation normal. Neck supple.   Cardiovascular: Normal rate, regular rhythm, normal heart sounds and normal pulses.    Pulmonary/Chest: Effort normal and breath sounds normal.   Abdominal: Soft. Normal appearance and bowel sounds are normal. She exhibits no distension and no mass. There is no tenderness. There is no rigidity, no rebound, no guarding and no CVA tenderness.   No suprapubic tenderness. No flank tenderness.   Neurological: She is alert and oriented to person, place, and time.   Skin: Skin is warm, dry and intact.   Psychiatric: She has a normal mood and affect. Her speech is normal and behavior is normal. Cognition and memory are normal.   Nursing note and vitals reviewed.      Assessment:       1. Screen for STD (sexually transmitted disease)        Plan:         Screen for STD (sexually transmitted disease)  -     POCT Urinalysis, Dipstick, Automated, W/O Scope  -     POCT urine pregnancy  -     C. trachomatis/N. gonorrhoeae by AMP DNA Urine      Patient Instructions       Lab tests were performed today. You will be called with results in 3-5 days.  What Are Sexually Transmitted Diseases (STDs)?  A sexually transmitted disease (STD) is a disease that is spread during sex. (An STD can also be called STI for sexually transmitted infection.) You can become infected with an STD if you have sex with someone who has an STD. Any sex that involves the penis, vagina, anus, or mouth can spread disease. Some STDs spread through body fluids such as semen, vaginal fluid, or blood. Others spread through contact with affected skin.  Who is at risk?     Places on or in the body where STDs cause damage include reproductive organs, the rectum, and the mouth.   It doesnt  matter if youre straight or almanza, male or female, young or old. Any person who has sex can get an STD. Your risk increases if:  · You have more than one partner. The more partners you have, the greater your risk.  · Your partner has other partners. If your partner is exposed to an STD, you could be, too.  · You or your partner have had sex with other people in the past. Either of you might be carrying an STD from an earlier partner.  · You have an STD. The STD may cause sores or other health problems that increase your risk of new infections. Your risk will stay high unless you change the behaviors that put you at risk of the current infection.  Prevent future problems  Left untreated, certain STDs can lead to cancer or even death. Some can harm unborn babies whose mothers are infected. Others can cause you to not be able to have children (sterility) or can affect changes in behavior or your ability to think. You can prevent these problems with safer sex, regular checkups, and early treatment. Always use a latex condom when you have sex. Get tested if youre at risk. And get treated early if you have an STD.  Getting checked  The only sure way to know if you have an STD is to get checked by a healthcare provider. If you notice a change in how your body looks or feels, have it checked out. But keep in mind, STDs dont always show symptoms. So if youre at risk of STDs, get checked regularly. If you find you have an STD, be sure your partner gets treatment, too. If not, his or her health is at risk. And left untreated, your partner could pass the STD back to you, or on to others.  Common symptoms  Be alert to any changes in your body and your partners body. Symptoms may appear in or near the vagina, penis, rectum, mouth, or throat. They include:  · Unusual discharge  · Lumps, bumps, or rashes  · Sores that may be painful, itchy, or painless  · Itchy skin  · Burning with urination  · Pain in the pelvis, belly  (abdomen), or rectum  Even if you dont have symptoms  You may have an STD, even if you dont have symptoms. If you think you are at risk, get checked. Go to a clinic or to your healthcare provider. If your partner has an STD, you need to be tested too, even if you feel fine.  Vaccines to prevent disease  Vaccines (also called immunizations) are available to prevent hepatitis A and hepatitis B. These are two kinds of STDs. There is also a vaccine to prevent HPV. This is a virus that can be passed from person to person through sexual contact. Ask your healthcare provider whether any of these vaccines is right for you.   Date Last Reviewed: 11/1/2016  © 4978-0563 [employer]. [address] [place] [address], [place] [address]. All rights reserved. This information is not intended as a substitute for professional medical care. Always follow your healthcare professional's instructions.        If You Think You Have an STD  Treating a sexually transmitted disease (STD) early limits the problems they can cause. If you have an STD, get treated right away. Ask your partner to be tested, too. Then avoid sex until youve finished treatment and your healthcare provider says its OK to have sex again.    Follow your treatment plan  Treatment depends on the type of STD you have. Common treatments include injections and oral pills or liquids. Creams and gels can be applied to sores caused by certain STDs. Follow the tips below:  · Get new treatment for each new STD.  · Dont use old medicine, even for the same STD. Use medicines as directed.  · Dont share medicine unless instructed to do so by your healthcare provider or clinic.  Talk to your partner  If you have an STD, its your duty to tell all your recent partners so they can be tested and treated. This is one important way to prevent the disease from being spread. Telling a partner that you have an STD can be hard. You may be embarrassed, angry, or afraid. Its often  unclear who had the STD first. So try not to place blame. Your healthcare provider may offer some advice on how to begin.  Prevent future problems  Even after youve been treated, you can still be infected again. This is a common problem. It happens when a partner passes the STD back to you. To avoid this, your partner must be tested. He or she may also need treatment. After treatment, go to any scheduled follow-up visits. Then prevent future problems with safer sex. Limit your number of partners and always use a latex condom.  Diagnosing STDS  Your healthcare provider will take a health history and examine you. During your health history, you will be asked about your sex habits and health history. You may also be asked about drug use. Give honest answers. Your healthcare provider will then check your body for signs of STDs. He or she also may perform one or more of the following tests:  · Fluid is swabbed from any sores. Samples also may be taken from the vagina, penis, mouth, or rectum. The samples are then tested for STDs.  · Blood or urine samples may be taken. They are checked for viruses or bacteria that cause STDs.  · For women, cells from the cervix (where the vagina and uterus meet) are checked for signs of cancer. This is called a Pap smear. If cell changes are found, a magnifying scope may be used to take a closer look (colposcopy).   Date Last Reviewed: 12/1/2016  © 0355-6340 [employer]. [address] [place] [address], [place] [id number]. All rights reserved. This information is not intended as a substitute for professional medical care. Always follow your healthcare professional's instructions.    Please return here or go to the Emergency Department for any concerns or worsening of condition.  If you were prescribed antibiotics, please take them to completion.  If you were prescribed a narcotic medication, do not drive or operate heavy equipment or machinery while taking these medications.  Please  follow up with your primary care doctor or specialist as needed.    If you  smoke, please stop smoking.

## 2018-05-10 NOTE — PATIENT INSTRUCTIONS
Lab tests were performed today. You will be called with results in 3-5 days.  What Are Sexually Transmitted Diseases (STDs)?  A sexually transmitted disease (STD) is a disease that is spread during sex. (An STD can also be called STI for sexually transmitted infection.) You can become infected with an STD if you have sex with someone who has an STD. Any sex that involves the penis, vagina, anus, or mouth can spread disease. Some STDs spread through body fluids such as semen, vaginal fluid, or blood. Others spread through contact with affected skin.  Who is at risk?     Places on or in the body where STDs cause damage include reproductive organs, the rectum, and the mouth.   It doesnt matter if youre straight or almanza, male or female, young or old. Any person who has sex can get an STD. Your risk increases if:  · You have more than one partner. The more partners you have, the greater your risk.  · Your partner has other partners. If your partner is exposed to an STD, you could be, too.  · You or your partner have had sex with other people in the past. Either of you might be carrying an STD from an earlier partner.  · You have an STD. The STD may cause sores or other health problems that increase your risk of new infections. Your risk will stay high unless you change the behaviors that put you at risk of the current infection.  Prevent future problems  Left untreated, certain STDs can lead to cancer or even death. Some can harm unborn babies whose mothers are infected. Others can cause you to not be able to have children (sterility) or can affect changes in behavior or your ability to think. You can prevent these problems with safer sex, regular checkups, and early treatment. Always use a latex condom when you have sex. Get tested if youre at risk. And get treated early if you have an STD.  Getting checked  The only sure way to know if you have an STD is to get checked by a healthcare provider. If you notice a  change in how your body looks or feels, have it checked out. But keep in mind, STDs dont always show symptoms. So if youre at risk of STDs, get checked regularly. If you find you have an STD, be sure your partner gets treatment, too. If not, his or her health is at risk. And left untreated, your partner could pass the STD back to you, or on to others.  Common symptoms  Be alert to any changes in your body and your partners body. Symptoms may appear in or near the vagina, penis, rectum, mouth, or throat. They include:  · Unusual discharge  · Lumps, bumps, or rashes  · Sores that may be painful, itchy, or painless  · Itchy skin  · Burning with urination  · Pain in the pelvis, belly (abdomen), or rectum  Even if you dont have symptoms  You may have an STD, even if you dont have symptoms. If you think you are at risk, get checked. Go to a clinic or to your healthcare provider. If your partner has an STD, you need to be tested too, even if you feel fine.  Vaccines to prevent disease  Vaccines (also called immunizations) are available to prevent hepatitis A and hepatitis B. These are two kinds of STDs. There is also a vaccine to prevent HPV. This is a virus that can be passed from person to person through sexual contact. Ask your healthcare provider whether any of these vaccines is right for you.   Date Last Reviewed: 11/1/2016  © 6165-1647 [employer]. [address] [place] [address], [place] [address]. All rights reserved. This information is not intended as a substitute for professional medical care. Always follow your healthcare professional's instructions.        If You Think You Have an STD  Treating a sexually transmitted disease (STD) early limits the problems they can cause. If you have an STD, get treated right away. Ask your partner to be tested, too. Then avoid sex until youve finished treatment and your healthcare provider says its OK to have sex again.    Follow your treatment plan  Treatment  depends on the type of STD you have. Common treatments include injections and oral pills or liquids. Creams and gels can be applied to sores caused by certain STDs. Follow the tips below:  · Get new treatment for each new STD.  · Dont use old medicine, even for the same STD. Use medicines as directed.  · Dont share medicine unless instructed to do so by your healthcare provider or clinic.  Talk to your partner  If you have an STD, its your duty to tell all your recent partners so they can be tested and treated. This is one important way to prevent the disease from being spread. Telling a partner that you have an STD can be hard. You may be embarrassed, angry, or afraid. Its often unclear who had the STD first. So try not to place blame. Your healthcare provider may offer some advice on how to begin.  Prevent future problems  Even after youve been treated, you can still be infected again. This is a common problem. It happens when a partner passes the STD back to you. To avoid this, your partner must be tested. He or she may also need treatment. After treatment, go to any scheduled follow-up visits. Then prevent future problems with safer sex. Limit your number of partners and always use a latex condom.  Diagnosing STDS  Your healthcare provider will take a health history and examine you. During your health history, you will be asked about your sex habits and health history. You may also be asked about drug use. Give honest answers. Your healthcare provider will then check your body for signs of STDs. He or she also may perform one or more of the following tests:  · Fluid is swabbed from any sores. Samples also may be taken from the vagina, penis, mouth, or rectum. The samples are then tested for STDs.  · Blood or urine samples may be taken. They are checked for viruses or bacteria that cause STDs.  · For women, cells from the cervix (where the vagina and uterus meet) are checked for signs of cancer. This is  called a Pap smear. If cell changes are found, a magnifying scope may be used to take a closer look (colposcopy).   Date Last Reviewed: 12/1/2016  © 8982-5871 [employer]. [address] [place] [address], [place] [address]. All rights reserved. This information is not intended as a substitute for professional medical care. Always follow your healthcare professional's instructions.    Please return here or go to the Emergency Department for any concerns or worsening of condition.  If you were prescribed antibiotics, please take them to completion.  If you were prescribed a narcotic medication, do not drive or operate heavy equipment or machinery while taking these medications.  Please follow up with your primary care doctor or specialist as needed.    If you  smoke, please stop smoking.

## 2018-05-13 LAB
C TRACH RRNA SPEC QL NAA+PROBE: NEGATIVE
N GONORRHOEA RRNA SPEC QL NAA+PROBE: NEGATIVE

## 2018-05-30 ENCOUNTER — OFFICE VISIT (OUTPATIENT)
Dept: OBSTETRICS AND GYNECOLOGY | Facility: CLINIC | Age: 33
End: 2018-05-30
Payer: COMMERCIAL

## 2018-05-30 ENCOUNTER — TELEPHONE (OUTPATIENT)
Dept: OBSTETRICS AND GYNECOLOGY | Facility: CLINIC | Age: 33
End: 2018-05-30

## 2018-05-30 VITALS
DIASTOLIC BLOOD PRESSURE: 82 MMHG | BODY MASS INDEX: 33.49 KG/M2 | SYSTOLIC BLOOD PRESSURE: 122 MMHG | WEIGHT: 177.25 LBS

## 2018-05-30 DIAGNOSIS — Z12.4 PAP SMEAR FOR CERVICAL CANCER SCREENING: ICD-10-CM

## 2018-05-30 DIAGNOSIS — Z01.419 WELL WOMAN EXAM WITH ROUTINE GYNECOLOGICAL EXAM: Primary | ICD-10-CM

## 2018-05-30 DIAGNOSIS — N89.8 VAGINAL DISCHARGE: ICD-10-CM

## 2018-05-30 DIAGNOSIS — Z11.3 SCREEN FOR STD (SEXUALLY TRANSMITTED DISEASE): ICD-10-CM

## 2018-05-30 DIAGNOSIS — R10.2 PELVIC PAIN IN FEMALE: ICD-10-CM

## 2018-05-30 LAB
C TRACH DNA SPEC QL NAA+PROBE: NOT DETECTED
CANDIDA RRNA VAG QL PROBE: NEGATIVE
G VAGINALIS RRNA GENITAL QL PROBE: NEGATIVE
N GONORRHOEA DNA SPEC QL NAA+PROBE: NOT DETECTED
T VAGINALIS RRNA GENITAL QL PROBE: POSITIVE

## 2018-05-30 PROCEDURE — 87510 GARDNER VAG DNA DIR PROBE: CPT

## 2018-05-30 PROCEDURE — 99395 PREV VISIT EST AGE 18-39: CPT | Mod: S$GLB,,, | Performed by: OBSTETRICS & GYNECOLOGY

## 2018-05-30 PROCEDURE — 87480 CANDIDA DNA DIR PROBE: CPT

## 2018-05-30 PROCEDURE — 87491 CHLMYD TRACH DNA AMP PROBE: CPT

## 2018-05-30 PROCEDURE — 88175 CYTOPATH C/V AUTO FLUID REDO: CPT

## 2018-05-30 PROCEDURE — 99999 PR PBB SHADOW E&M-EST. PATIENT-LVL III: CPT | Mod: PBBFAC,,, | Performed by: OBSTETRICS & GYNECOLOGY

## 2018-05-30 NOTE — TELEPHONE ENCOUNTER
----- Message from Angelica Almanza MD sent at 5/30/2018  9:57 AM CDT -----  Please order Mirena for her

## 2018-05-30 NOTE — PROGRESS NOTES
Subjective:       Patient ID: King Dempsey is a 33 y.o. female.    Chief Complaint:  Well Woman      History of Present Illness  HPI  This 33 yr old P1 female is here for routine exam.  She is having pain with intercourse in pelvis for couple months with new partner.  Desires STD testing. No symptoms.  Not taking birth control and we discussed mirena again.  Has painful cycles and discussed this will help.  Has repaired MVP and doing well.  Daughter's name is Ricarda Tracey.  GYN & OB History  Patient's last menstrual period was 2018.   Date of Last Pap: 6/15/2015    OB History    Para Term  AB Living   1 1 1     1   SAB TAB Ectopic Multiple Live Births           1      # Outcome Date GA Lbr Syed/2nd Weight Sex Delivery Anes PTL Lv   1 Term 04/27/10   3.005 kg (6 lb 10 oz) F CS-LTranv EPI  ADWOA          Review of Systems  Review of Systems   Constitutional: Negative for chills and fever.   Respiratory: Negative for shortness of breath.    Cardiovascular: Negative for chest pain.   Gastrointestinal: Negative for abdominal pain, nausea and vomiting.   Genitourinary: Positive for dyspareunia. Negative for difficulty urinating, genital sores, menstrual problem, pelvic pain, vaginal bleeding, vaginal discharge and vaginal pain.   Skin: Negative for wound.   Hematological: Negative for adenopathy.           Objective:    Physical Exam:   Constitutional: She is oriented to person, place, and time. She appears well-developed and well-nourished.    HENT:   Head: Normocephalic.    Eyes: EOM are normal.    Neck: Normal range of motion.    Cardiovascular: Normal rate.     Pulmonary/Chest: Effort normal. She exhibits no mass and no tenderness. Right breast exhibits no inverted nipple, no mass, no skin change and no tenderness. Left breast exhibits no inverted nipple, no mass, no skin change and no tenderness.        Abdominal: Soft. She exhibits no distension. There is no tenderness.     Genitourinary: Vagina  normal and uterus normal. There is no rash, tenderness or lesion on the right labia. There is no rash, tenderness or lesion on the left labia. Uterus is not tender. Cervix is normal. Right adnexum displays no mass, no tenderness and no fullness. Left adnexum displays no mass, no tenderness and no fullness. Cervix exhibits no discharge.   Genitourinary Comments: Somewhat tender over uterus no rebound and no adnexal tenderness           Musculoskeletal: Normal range of motion.       Neurological: She is alert and oriented to person, place, and time.    Skin: Skin is warm and dry.    Psychiatric: She has a normal mood and affect.          Assessment:        1. Well woman exam with routine gynecological exam    2. Pap smear for cervical cancer screening    3. Screen for STD (sexually transmitted disease)    4. Pelvic pain in female               Plan:      Pap every 3 ys  Cultures today  Ultrasound for pelvic pain with intercourse  mirena in two weeks

## 2018-05-31 ENCOUNTER — PATIENT MESSAGE (OUTPATIENT)
Dept: OBSTETRICS AND GYNECOLOGY | Facility: CLINIC | Age: 33
End: 2018-05-31

## 2018-06-01 ENCOUNTER — PATIENT MESSAGE (OUTPATIENT)
Dept: OBSTETRICS AND GYNECOLOGY | Facility: CLINIC | Age: 33
End: 2018-06-01

## 2018-06-01 DIAGNOSIS — A59.9 TRICHOMONOSIS: Primary | ICD-10-CM

## 2018-06-01 RX ORDER — TINIDAZOLE 500 MG/1
2 TABLET ORAL DAILY
Qty: 8 TABLET | Refills: 2 | Status: SHIPPED | OUTPATIENT
Start: 2018-06-01 | End: 2018-06-03

## 2018-06-14 ENCOUNTER — PATIENT MESSAGE (OUTPATIENT)
Dept: OBSTETRICS AND GYNECOLOGY | Facility: CLINIC | Age: 33
End: 2018-06-14

## 2018-07-02 ENCOUNTER — PATIENT MESSAGE (OUTPATIENT)
Dept: OBSTETRICS AND GYNECOLOGY | Facility: CLINIC | Age: 33
End: 2018-07-02

## 2018-08-02 ENCOUNTER — PATIENT MESSAGE (OUTPATIENT)
Dept: OBSTETRICS AND GYNECOLOGY | Facility: CLINIC | Age: 33
End: 2018-08-02

## 2018-08-29 ENCOUNTER — PATIENT MESSAGE (OUTPATIENT)
Dept: OBSTETRICS AND GYNECOLOGY | Facility: CLINIC | Age: 33
End: 2018-08-29

## 2018-08-29 ENCOUNTER — PROCEDURE VISIT (OUTPATIENT)
Dept: OBSTETRICS AND GYNECOLOGY | Facility: CLINIC | Age: 33
End: 2018-08-29
Payer: COMMERCIAL

## 2018-08-29 VITALS
DIASTOLIC BLOOD PRESSURE: 88 MMHG | BODY MASS INDEX: 33.99 KG/M2 | HEIGHT: 61 IN | WEIGHT: 180 LBS | SYSTOLIC BLOOD PRESSURE: 128 MMHG

## 2018-08-29 DIAGNOSIS — Z97.5 CONTRACEPTION, DEVICE INTRAUTERINE: Primary | ICD-10-CM

## 2018-08-29 DIAGNOSIS — Z30.9 ENCOUNTER FOR CONTRACEPTIVE MANAGEMENT, UNSPECIFIED TYPE: ICD-10-CM

## 2018-08-29 DIAGNOSIS — N89.8 VAGINAL DISCHARGE: ICD-10-CM

## 2018-08-29 DIAGNOSIS — R10.2 PELVIC PAIN IN FEMALE: Primary | ICD-10-CM

## 2018-08-29 LAB
B-HCG UR QL: NEGATIVE
CTP QC/QA: YES

## 2018-08-29 PROCEDURE — 81025 URINE PREGNANCY TEST: CPT | Mod: S$GLB,,, | Performed by: OBSTETRICS & GYNECOLOGY

## 2018-08-29 PROCEDURE — 58300 INSERT INTRAUTERINE DEVICE: CPT | Mod: S$GLB,,, | Performed by: OBSTETRICS & GYNECOLOGY

## 2018-08-29 PROCEDURE — 87660 TRICHOMONAS VAGIN DIR PROBE: CPT

## 2018-08-29 RX ORDER — ACETAMINOPHEN AND CODEINE PHOSPHATE 120; 12 MG/5ML; MG/5ML
1 SOLUTION ORAL DAILY
Qty: 28 TABLET | Refills: 11 | Status: SHIPPED | OUTPATIENT
Start: 2018-08-29 | End: 2019-08-29

## 2018-08-29 NOTE — PROCEDURES
We attempted to place mirena IUD today.  With single tooth tenaculm on anterior cervix after neg preg and betadine swabbed, the os finder was unable to penetrate first the external os then after this was penetrated, the internal os was difficult to penetrate. A sound was used then and intrauterine cavity was entered but when attempted to insert the mirena , bent the device and pt declined further attempt due to pain and cramping.  All instruments were removed and hemostasis was ascertained.  Pt tolerated as well as possible but declined to continue.  Will not attempt again per pt and will instead start on micronor.

## 2018-08-30 LAB
CANDIDA RRNA VAG QL PROBE: NEGATIVE
G VAGINALIS RRNA GENITAL QL PROBE: NEGATIVE
T VAGINALIS RRNA GENITAL QL PROBE: NEGATIVE

## 2018-09-06 DIAGNOSIS — N94.3 PMS (PREMENSTRUAL SYNDROME): ICD-10-CM

## 2018-09-06 RX ORDER — FLUOXETINE 10 MG/1
10 CAPSULE ORAL DAILY
Qty: 30 CAPSULE | Refills: 11 | Status: SHIPPED | OUTPATIENT
Start: 2018-09-06 | End: 2019-07-02 | Stop reason: SDUPTHER

## 2018-09-12 ENCOUNTER — HOSPITAL ENCOUNTER (OUTPATIENT)
Dept: RADIOLOGY | Facility: OTHER | Age: 33
Discharge: HOME | End: 2018-09-12
Attending: OBSTETRICS & GYNECOLOGY
Payer: COMMERCIAL

## 2018-09-12 DIAGNOSIS — R10.2 PELVIC PAIN IN FEMALE: ICD-10-CM

## 2018-09-12 PROCEDURE — 76830 TRANSVAGINAL US NON-OB: CPT | Mod: 26,,, | Performed by: INTERNAL MEDICINE

## 2018-09-12 PROCEDURE — 76856 US EXAM PELVIC COMPLETE: CPT | Mod: TC

## 2018-09-12 PROCEDURE — 76856 US EXAM PELVIC COMPLETE: CPT | Mod: 26,,, | Performed by: INTERNAL MEDICINE

## 2018-09-13 ENCOUNTER — PATIENT MESSAGE (OUTPATIENT)
Dept: OBSTETRICS AND GYNECOLOGY | Facility: CLINIC | Age: 33
End: 2018-09-13

## 2018-09-27 ENCOUNTER — PATIENT MESSAGE (OUTPATIENT)
Dept: OBSTETRICS AND GYNECOLOGY | Facility: CLINIC | Age: 33
End: 2018-09-27

## 2018-10-01 ENCOUNTER — OFFICE VISIT (OUTPATIENT)
Dept: OBSTETRICS AND GYNECOLOGY | Facility: CLINIC | Age: 33
End: 2018-10-01
Attending: OBSTETRICS & GYNECOLOGY
Payer: COMMERCIAL

## 2018-10-01 DIAGNOSIS — N63.0 BREAST MASS IN FEMALE: Primary | ICD-10-CM

## 2018-10-01 PROCEDURE — 99213 OFFICE O/P EST LOW 20 MIN: CPT | Mod: S$GLB,,, | Performed by: OBSTETRICS & GYNECOLOGY

## 2018-10-01 NOTE — PROGRESS NOTES
Subjective:       Patient ID: King Dempsey is a 33 y.o. female.    Chief Complaint:  No chief complaint on file.      History of Present Illness  HPI  This 33 yr old P1 female is here for breast issues that is new.  I just saw her about month ago for IUD placement but was not able to put the IUD in as stenosis.  She has a history of C/S.  She was very concerned.  She had normal pap and normal ultrasound    GYN & OB History  No LMP recorded.   Date of Last Pap: 2018    OB History    Para Term  AB Living   1 1 1     1   SAB TAB Ectopic Multiple Live Births           1      # Outcome Date GA Lbr Syed/2nd Weight Sex Delivery Anes PTL Lv   1 Term 04/27/10   3.005 kg (6 lb 10 oz) F CS-LTranv EPI  ADWOA          Review of Systems  Review of Systems        Objective:   Physical Exam:        Pulmonary/Chest:                                   Assessment:        1. Breast mass in female               Plan:      Will get diagnostic mammogram and follow up with pt.

## 2018-10-03 ENCOUNTER — HOSPITAL ENCOUNTER (OUTPATIENT)
Dept: RADIOLOGY | Facility: HOSPITAL | Age: 33
Discharge: HOME | End: 2018-10-03
Attending: OBSTETRICS & GYNECOLOGY
Payer: COMMERCIAL

## 2018-10-03 VITALS — BODY MASS INDEX: 33.99 KG/M2 | WEIGHT: 180 LBS | HEIGHT: 61 IN

## 2018-10-03 DIAGNOSIS — N63.0 BREAST MASS IN FEMALE: ICD-10-CM

## 2018-10-03 PROCEDURE — 77066 DX MAMMO INCL CAD BI: CPT | Mod: 26,,, | Performed by: RADIOLOGY

## 2018-10-03 PROCEDURE — 77066 DX MAMMO INCL CAD BI: CPT | Mod: TC,PO

## 2018-10-03 PROCEDURE — G0279 TOMOSYNTHESIS, MAMMO: HCPCS | Mod: 26,,, | Performed by: RADIOLOGY

## 2018-10-03 PROCEDURE — 76642 ULTRASOUND BREAST LIMITED: CPT | Mod: TC,PO,LT

## 2018-10-03 PROCEDURE — 76642 ULTRASOUND BREAST LIMITED: CPT | Mod: 26,LT,, | Performed by: RADIOLOGY

## 2018-10-10 ENCOUNTER — PATIENT MESSAGE (OUTPATIENT)
Dept: OBSTETRICS AND GYNECOLOGY | Facility: CLINIC | Age: 33
End: 2018-10-10

## 2018-11-29 ENCOUNTER — HOSPITAL ENCOUNTER (OUTPATIENT)
Dept: RADIOLOGY | Facility: HOSPITAL | Age: 33
Discharge: HOME | End: 2018-11-29
Attending: NURSE PRACTITIONER
Payer: COMMERCIAL

## 2018-11-29 DIAGNOSIS — R05.9 COUGH IN ADULT: Primary | ICD-10-CM

## 2018-11-29 DIAGNOSIS — R05.9 COUGH IN ADULT: ICD-10-CM

## 2018-11-29 PROCEDURE — 71046 X-RAY EXAM CHEST 2 VIEWS: CPT | Mod: TC

## 2018-11-29 PROCEDURE — 71046 X-RAY EXAM CHEST 2 VIEWS: CPT | Mod: 26,,, | Performed by: RADIOLOGY

## 2018-12-06 ENCOUNTER — OFFICE VISIT (OUTPATIENT)
Dept: INTERNAL MEDICINE | Facility: CLINIC | Age: 33
End: 2018-12-06
Payer: COMMERCIAL

## 2018-12-06 VITALS
SYSTOLIC BLOOD PRESSURE: 118 MMHG | OXYGEN SATURATION: 99 % | WEIGHT: 180.31 LBS | HEART RATE: 89 BPM | TEMPERATURE: 98 F | HEIGHT: 61 IN | BODY MASS INDEX: 34.04 KG/M2 | DIASTOLIC BLOOD PRESSURE: 66 MMHG

## 2018-12-06 DIAGNOSIS — J40 BRONCHITIS: Primary | ICD-10-CM

## 2018-12-06 PROCEDURE — 3008F BODY MASS INDEX DOCD: CPT | Mod: CPTII,S$GLB,, | Performed by: INTERNAL MEDICINE

## 2018-12-06 PROCEDURE — 99213 OFFICE O/P EST LOW 20 MIN: CPT | Mod: S$GLB,,, | Performed by: INTERNAL MEDICINE

## 2018-12-06 PROCEDURE — 99999 PR PBB SHADOW E&M-EST. PATIENT-LVL III: CPT | Mod: PBBFAC,,, | Performed by: INTERNAL MEDICINE

## 2018-12-06 RX ORDER — PROMETHAZINE HYDROCHLORIDE AND CODEINE PHOSPHATE 6.25; 1 MG/5ML; MG/5ML
5 SOLUTION ORAL EVERY 4 HOURS PRN
Qty: 120 ML | Refills: 0 | Status: SHIPPED | OUTPATIENT
Start: 2018-12-06 | End: 2018-12-16

## 2018-12-06 RX ORDER — FLUCONAZOLE 150 MG/1
TABLET ORAL
Qty: 2 TABLET | Refills: 0 | Status: SHIPPED | OUTPATIENT
Start: 2018-12-06 | End: 2019-01-28 | Stop reason: ALTCHOICE

## 2018-12-06 RX ORDER — LEVOFLOXACIN 500 MG/1
500 TABLET, FILM COATED ORAL DAILY
Qty: 10 TABLET | Refills: 0 | Status: SHIPPED | OUTPATIENT
Start: 2018-12-06 | End: 2018-12-16

## 2018-12-07 NOTE — PROGRESS NOTES
Subjective:       Patient ID: King Dempsey is a 33 y.o. female.    Chief Complaint: URI    Generally dry cough for a month.  Beginning to have thick yellow sputum  No fever or chills      Review of Systems   Constitutional: Negative for activity change, chills, fatigue and fever.   HENT: Negative for congestion, ear pain, nosebleeds, postnasal drip, sinus pressure and sore throat.    Eyes: Negative.  Negative for visual disturbance.   Respiratory: Negative for cough, chest tightness, shortness of breath and wheezing.    Cardiovascular: Negative for chest pain.   Gastrointestinal: Negative for abdominal pain, diarrhea, nausea and vomiting.   Genitourinary: Negative for difficulty urinating, dysuria, frequency and urgency.   Musculoskeletal: Negative for arthralgias and neck stiffness.   Skin: Negative for rash.   Neurological: Negative for dizziness, weakness and headaches.   Psychiatric/Behavioral: Negative for sleep disturbance. The patient is not nervous/anxious.        Objective:      Physical Exam   Constitutional: She is oriented to person, place, and time. She appears well-developed and well-nourished.  Non-toxic appearance. No distress.   HENT:   Head: Normocephalic and atraumatic.   Right Ear: Tympanic membrane, external ear and ear canal normal.   Left Ear: Tympanic membrane, external ear and ear canal normal.   Eyes: EOM are normal. Pupils are equal, round, and reactive to light. No scleral icterus.   Neck: Normal range of motion. Neck supple. No thyromegaly present.   Cardiovascular: Normal rate, regular rhythm and normal heart sounds.   Pulmonary/Chest: Effort normal and breath sounds normal.   Abdominal: Soft. Bowel sounds are normal. She exhibits no mass. There is no tenderness. There is no rebound.   Musculoskeletal: Normal range of motion.   Lymphadenopathy:     She has no cervical adenopathy.   Neurological: She is alert and oriented to person, place, and time. She has normal reflexes. She displays  normal reflexes. No cranial nerve deficit. She exhibits normal muscle tone. Coordination normal.   Skin: Skin is warm and dry.   Psychiatric: She has a normal mood and affect. Her behavior is normal.       Assessment:       1. Bronchitis        Plan:   King was seen today for uri.    Diagnoses and all orders for this visit:    Bronchitis    Other orders  -     levoFLOXacin (LEVAQUIN) 500 MG tablet; Take 1 tablet (500 mg total) by mouth once daily. for 10 days  -     promethazine-codeine 6.25-10 mg/5 ml (PHENERGAN WITH CODEINE) 6.25-10 mg/5 mL syrup; Take 5 mLs by mouth every 4 (four) hours as needed.  -     fluconazole (DIFLUCAN) 150 MG Tab; 1 tab at beginning of antibiotics and 1 tab at the end

## 2019-01-02 ENCOUNTER — OFFICE VISIT (OUTPATIENT)
Dept: INTERNAL MEDICINE | Facility: CLINIC | Age: 34
End: 2019-01-02
Attending: FAMILY MEDICINE
Payer: COMMERCIAL

## 2019-01-02 VITALS
HEART RATE: 93 BPM | TEMPERATURE: 98 F | BODY MASS INDEX: 34.36 KG/M2 | OXYGEN SATURATION: 98 % | WEIGHT: 182 LBS | HEIGHT: 61 IN | DIASTOLIC BLOOD PRESSURE: 84 MMHG | SYSTOLIC BLOOD PRESSURE: 124 MMHG

## 2019-01-02 DIAGNOSIS — J06.9 UPPER RESPIRATORY TRACT INFECTION, UNSPECIFIED TYPE: Primary | ICD-10-CM

## 2019-01-02 PROCEDURE — 99999 PR PBB SHADOW E&M-EST. PATIENT-LVL III: ICD-10-PCS | Mod: PBBFAC,,, | Performed by: FAMILY MEDICINE

## 2019-01-02 PROCEDURE — 3008F PR BODY MASS INDEX (BMI) DOCUMENTED: ICD-10-PCS | Mod: CPTII,S$GLB,, | Performed by: FAMILY MEDICINE

## 2019-01-02 PROCEDURE — 99213 PR OFFICE/OUTPT VISIT, EST, LEVL III, 20-29 MIN: ICD-10-PCS | Mod: S$GLB,,, | Performed by: FAMILY MEDICINE

## 2019-01-02 PROCEDURE — 3008F BODY MASS INDEX DOCD: CPT | Mod: CPTII,S$GLB,, | Performed by: FAMILY MEDICINE

## 2019-01-02 PROCEDURE — 99213 OFFICE O/P EST LOW 20 MIN: CPT | Mod: S$GLB,,, | Performed by: FAMILY MEDICINE

## 2019-01-02 PROCEDURE — 99999 PR PBB SHADOW E&M-EST. PATIENT-LVL III: CPT | Mod: PBBFAC,,, | Performed by: FAMILY MEDICINE

## 2019-01-02 RX ORDER — CETIRIZINE HYDROCHLORIDE 10 MG/1
10 TABLET ORAL DAILY
Qty: 30 TABLET | Refills: 0 | COMMUNITY
Start: 2019-01-02 | End: 2019-09-19

## 2019-01-02 RX ORDER — FLUTICASONE PROPIONATE 50 MCG
2 SPRAY, SUSPENSION (ML) NASAL DAILY
Qty: 1 BOTTLE | Refills: 5 | Status: SHIPPED | OUTPATIENT
Start: 2019-01-02 | End: 2019-09-19

## 2019-01-02 RX ORDER — PREDNISONE 20 MG/1
40 TABLET ORAL DAILY
Qty: 10 TABLET | Refills: 0 | Status: SHIPPED | OUTPATIENT
Start: 2019-01-02 | End: 2019-01-07

## 2019-01-02 NOTE — PROGRESS NOTES
Subjective:       Patient ID: King Dempsey is a 33 y.o. female.    Chief Complaint: Generalized Body Aches (congestion, sneezing, couging)    HPI  Review of Systems   Constitutional: Negative for chills, fatigue, fever and unexpected weight change.   HENT: Positive for congestion and sinus pressure. Negative for trouble swallowing.    Eyes: Negative for redness and visual disturbance.   Respiratory: Positive for cough. Negative for chest tightness and shortness of breath.    Cardiovascular: Negative for chest pain, palpitations and leg swelling.   Gastrointestinal: Negative for abdominal pain and blood in stool.   Genitourinary: Negative for difficulty urinating, hematuria and menstrual problem.   Musculoskeletal: Negative for arthralgias, back pain, gait problem, joint swelling, myalgias and neck pain.   Skin: Negative for color change and rash.   Neurological: Negative for tremors, speech difficulty, weakness, numbness and headaches.   Hematological: Negative for adenopathy. Does not bruise/bleed easily.   Psychiatric/Behavioral: Negative for behavioral problems, confusion and sleep disturbance. The patient is not nervous/anxious.        Objective:      Physical Exam   Constitutional: She is oriented to person, place, and time. She appears well-developed and well-nourished. No distress.   HENT:   Head: Normocephalic.   Right Ear: Tympanic membrane, external ear and ear canal normal.   Left Ear: Tympanic membrane, external ear and ear canal normal.   Nose: Nose normal.   Mouth/Throat: Oropharynx is clear and moist. No oropharyngeal exudate.   Eyes: Conjunctivae are normal. Right eye exhibits no discharge. Left eye exhibits no discharge. No scleral icterus.   Neck: Normal range of motion. Neck supple. No thyromegaly present.   Cardiovascular: Normal rate, regular rhythm, normal heart sounds and intact distal pulses. Exam reveals no gallop and no friction rub.   No murmur heard.  Pulmonary/Chest: Effort normal and  breath sounds normal. No respiratory distress. She has no wheezes. She has no rales. She exhibits no tenderness.   Abdominal: Soft. There is no tenderness.   Musculoskeletal: She exhibits no edema.   Lymphadenopathy:     She has no cervical adenopathy.   Neurological: She is alert and oriented to person, place, and time.   Skin: Skin is warm and dry. No rash noted. She is not diaphoretic.   Nursing note and vitals reviewed.      Assessment:       1. Upper respiratory tract infection, unspecified type        Plan:   King was seen today for generalized body aches.    Diagnoses and all orders for this visit:    Upper respiratory tract infection, unspecified type    Other orders  -     predniSONE (DELTASONE) 20 MG tablet; Take 2 tablets (40 mg total) by mouth once daily. for 5 days  -     cetirizine (ZYRTEC) 10 MG tablet; Take 1 tablet (10 mg total) by mouth once daily.  -     fluticasone (FLONASE) 50 mcg/actuation nasal spray; 2 sprays (100 mcg total) by Each Nare route once daily.      See meds, orders, follow up, routing and instructions sections of encounter.  This is a patient who has been sick for several weeks.  She saw Dr. Nguyen, was   given Levaquin, had a chest x-ray.  This was all in early December.  The patient   has a history of mitral valve repair in 2003.  She states she currently feels   worse than she did and she is reporting symptoms back two months.  Risks and   benefits of provided treatment was discussed.  The patient agreed.      JAMESON  dd: 01/02/2019 17:23:34 (CST)  td: 01/03/2019 05:57:40 (CST)  Doc ID   #[id number]  Job ID #[id number]    CC:

## 2019-01-04 ENCOUNTER — PATIENT MESSAGE (OUTPATIENT)
Dept: INTERNAL MEDICINE | Facility: CLINIC | Age: 34
End: 2019-01-04

## 2019-01-16 ENCOUNTER — PATIENT MESSAGE (OUTPATIENT)
Dept: INTERNAL MEDICINE | Facility: CLINIC | Age: 34
End: 2019-01-16

## 2019-01-28 ENCOUNTER — OFFICE VISIT (OUTPATIENT)
Dept: INTERNAL MEDICINE | Facility: CLINIC | Age: 34
End: 2019-01-28
Attending: FAMILY MEDICINE
Payer: COMMERCIAL

## 2019-01-28 VITALS
SYSTOLIC BLOOD PRESSURE: 112 MMHG | TEMPERATURE: 98 F | HEIGHT: 61 IN | BODY MASS INDEX: 33.71 KG/M2 | DIASTOLIC BLOOD PRESSURE: 84 MMHG | HEART RATE: 86 BPM | WEIGHT: 178.56 LBS | OXYGEN SATURATION: 98 %

## 2019-01-28 DIAGNOSIS — Z98.890 STATUS POST MITRAL VALVE ANNULOPLASTY: ICD-10-CM

## 2019-01-28 DIAGNOSIS — R07.9 CHEST PAIN, UNSPECIFIED TYPE: Primary | ICD-10-CM

## 2019-01-28 DIAGNOSIS — Z00.00 ANNUAL PHYSICAL EXAM: Primary | ICD-10-CM

## 2019-01-28 DIAGNOSIS — R06.09 DOE (DYSPNEA ON EXERTION): ICD-10-CM

## 2019-01-28 DIAGNOSIS — I34.0 NON-RHEUMATIC MITRAL REGURGITATION: ICD-10-CM

## 2019-01-28 DIAGNOSIS — E55.9 VITAMIN D DEFICIENCY: ICD-10-CM

## 2019-01-28 DIAGNOSIS — I10 HYPERTENSION, ESSENTIAL: ICD-10-CM

## 2019-01-28 DIAGNOSIS — I34.1 MITRAL VALVE PROLAPSE: Chronic | ICD-10-CM

## 2019-01-28 PROCEDURE — 3079F DIAST BP 80-89 MM HG: CPT | Mod: CPTII,S$GLB,, | Performed by: FAMILY MEDICINE

## 2019-01-28 PROCEDURE — 3074F PR MOST RECENT SYSTOLIC BLOOD PRESSURE < 130 MM HG: ICD-10-PCS | Mod: CPTII,S$GLB,, | Performed by: FAMILY MEDICINE

## 2019-01-28 PROCEDURE — 99395 PR PREVENTIVE VISIT,EST,18-39: ICD-10-PCS | Mod: S$GLB,,, | Performed by: FAMILY MEDICINE

## 2019-01-28 PROCEDURE — 99395 PREV VISIT EST AGE 18-39: CPT | Mod: S$GLB,,, | Performed by: FAMILY MEDICINE

## 2019-01-28 PROCEDURE — 99999 PR PBB SHADOW E&M-EST. PATIENT-LVL III: CPT | Mod: PBBFAC,,, | Performed by: FAMILY MEDICINE

## 2019-01-28 PROCEDURE — 99999 PR PBB SHADOW E&M-EST. PATIENT-LVL III: ICD-10-PCS | Mod: PBBFAC,,, | Performed by: FAMILY MEDICINE

## 2019-01-28 PROCEDURE — 3079F PR MOST RECENT DIASTOLIC BLOOD PRESSURE 80-89 MM HG: ICD-10-PCS | Mod: CPTII,S$GLB,, | Performed by: FAMILY MEDICINE

## 2019-01-28 PROCEDURE — 3074F SYST BP LT 130 MM HG: CPT | Mod: CPTII,S$GLB,, | Performed by: FAMILY MEDICINE

## 2019-01-28 NOTE — PATIENT INSTRUCTIONS
See standing lab orders and please draw vitamin D, CBC, CMP and Lipids - today        Information about cholesterol, high blood pressure and healthy diet and activity recommendations can be found at the following links on the Internet:    http://www.nhlbi.nih.gov/health/health-topics/topics/hbc  http://www.nhlbi.nih.gov/health/educational/lose_wt/index.htm  Http://www.nhlbi.nih.gov/files/docs/public/heart/hbp_low.pdf  http://www.heart.org/HEARTORG/  http://diabetes.org/  https://www.cdc.gov/  Https://healthfinder.gov/

## 2019-01-28 NOTE — PROGRESS NOTES
Subjective:       Patient ID: King Dempsey is a 33 y.o. female.    Chief Complaint: Follow-up and Establish Care (re-est)    Established patient follows up for management of chronic medical illnesses with complaints today. Please see dictation and ROS for interval problems, specific complaints and disease management discussion.    P, S, Fm, Soc Hx's; Meds, allergies reviewed and reconciled.  Health maintenance file reviewed and addressed items due.    Established patient for an annual wellness check/physical exam and also chronic disease management. Specific complaints - see dictation and please see ROS.  P, S, Fm, Soc Hx's; Meds, allergies reviewed and reconciled.  Health maintenance file reviewed and addressed items due.      Review of Systems   Constitutional: Negative for chills, fatigue, fever and unexpected weight change.   HENT: Negative for congestion and trouble swallowing.    Eyes: Negative for redness and visual disturbance.   Respiratory: Negative for cough, chest tightness and shortness of breath.    Cardiovascular: Negative for chest pain, palpitations and leg swelling.   Gastrointestinal: Negative for abdominal pain and blood in stool.   Genitourinary: Negative for difficulty urinating, hematuria and menstrual problem.   Musculoskeletal: Negative for arthralgias, back pain, gait problem, joint swelling, myalgias and neck pain.   Skin: Negative for color change and rash.   Neurological: Negative for tremors, speech difficulty, weakness, numbness and headaches.   Hematological: Negative for adenopathy. Does not bruise/bleed easily.   Psychiatric/Behavioral: Negative for behavioral problems, confusion and sleep disturbance. The patient is not nervous/anxious.        Objective:      Physical Exam   Constitutional: She is oriented to person, place, and time. She appears well-developed and well-nourished. No distress.   HENT:   Head: Normocephalic.   Right Ear: Tympanic membrane, external ear and ear canal  normal.   Left Ear: Tympanic membrane, external ear and ear canal normal.   Nose: Nose normal.   Mouth/Throat: Oropharynx is clear and moist. No oropharyngeal exudate.   Eyes: Conjunctivae are normal. Right eye exhibits no discharge. Left eye exhibits no discharge. No scleral icterus.   Neck: Normal range of motion. Neck supple. No thyromegaly present.   Cardiovascular: Normal rate, regular rhythm and intact distal pulses. Exam reveals no gallop and no friction rub.   Murmur heard.  Pulmonary/Chest: Effort normal and breath sounds normal. No respiratory distress. She has no wheezes. She has no rales. She exhibits no tenderness.   Abdominal: Soft. There is no tenderness.   Musculoskeletal: She exhibits no edema.   Lymphadenopathy:     She has no cervical adenopathy.   Neurological: She is alert and oriented to person, place, and time.   Skin: Skin is warm and dry. No rash noted. She is not diaphoretic.   Nursing note and vitals reviewed.      Assessment:       1. Annual physical exam    2. Hypertension, essential    3. Mitral valve prolapse    4. Status post mitral valve annuloplasty    5. Vitamin D deficiency        Plan:   King was seen today for follow-up and establish care.    Diagnoses and all orders for this visit:    Annual physical exam  -     Comprehensive metabolic panel; Standing  -     Lipid panel; Standing  -     Vitamin D; Future  -     CBC Without Differential; Future    Hypertension, essential    Mitral valve prolapse    Status post mitral valve annuloplasty    Vitamin D deficiency  -     Vitamin D; Future      See meds, orders, follow up, routing and instructions sections of encounter.  This is a young lady who has hypertension.  She is here for a followup.  Her   blood pressure is much better today.  She does have a cardiologist on the [facility] who she saw for her hypertension and a history of mitral valve replacement.    Her blood pressure is a bit better.  They put on a low-salt diet.  I did    discuss exercise.  She is complaining of some insomnia.  Increase exercise   according to guidelines.  Links were provided.  Check laboratory today.  Follow   up in one year, keep an eye on her blood pressure, send me to my chart   notification.      JAMESON  dd: 01/28/2019 15:07:56 (CST)  td: 01/29/2019 08:03:04 (CST)  Doc ID   #[id number]  Job ID #[id number]    CC:

## 2019-01-29 ENCOUNTER — LAB VISIT (OUTPATIENT)
Dept: LAB | Facility: HOSPITAL | Age: 34
End: 2019-01-29
Attending: FAMILY MEDICINE
Payer: COMMERCIAL

## 2019-01-29 DIAGNOSIS — Z00.00 ANNUAL PHYSICAL EXAM: ICD-10-CM

## 2019-01-29 DIAGNOSIS — E55.9 VITAMIN D DEFICIENCY: ICD-10-CM

## 2019-01-29 LAB
25(OH)D3+25(OH)D2 SERPL-MCNC: 9 NG/ML
ALBUMIN SERPL BCP-MCNC: 3.8 G/DL
ALP SERPL-CCNC: 62 U/L
ALT SERPL W/O P-5'-P-CCNC: 21 U/L
ANION GAP SERPL CALC-SCNC: 8 MMOL/L
AST SERPL-CCNC: 42 U/L
BILIRUB SERPL-MCNC: 1.1 MG/DL
BUN SERPL-MCNC: 7 MG/DL
CALCIUM SERPL-MCNC: 9.5 MG/DL
CHLORIDE SERPL-SCNC: 108 MMOL/L
CHOLEST SERPL-MCNC: 160 MG/DL
CHOLEST/HDLC SERPL: 4 {RATIO}
CO2 SERPL-SCNC: 21 MMOL/L
CREAT SERPL-MCNC: 0.7 MG/DL
ERYTHROCYTE [DISTWIDTH] IN BLOOD BY AUTOMATED COUNT: 16 %
EST. GFR  (AFRICAN AMERICAN): >60 ML/MIN/1.73 M^2
EST. GFR  (NON AFRICAN AMERICAN): >60 ML/MIN/1.73 M^2
GLUCOSE SERPL-MCNC: 91 MG/DL
HCT VFR BLD AUTO: 39.8 %
HDLC SERPL-MCNC: 40 MG/DL
HDLC SERPL: 25 %
HGB BLD-MCNC: 12 G/DL
LDLC SERPL CALC-MCNC: 95.2 MG/DL
MCH RBC QN AUTO: 28.5 PG
MCHC RBC AUTO-ENTMCNC: 30.2 G/DL
MCV RBC AUTO: 95 FL
NONHDLC SERPL-MCNC: 120 MG/DL
PLATELET # BLD AUTO: 334 K/UL
PMV BLD AUTO: 11.4 FL
POTASSIUM SERPL-SCNC: 4.3 MMOL/L
PROT SERPL-MCNC: 7.3 G/DL
RBC # BLD AUTO: 4.21 M/UL
SODIUM SERPL-SCNC: 137 MMOL/L
TRIGL SERPL-MCNC: 124 MG/DL
WBC # BLD AUTO: 6.86 K/UL

## 2019-01-29 PROCEDURE — 80061 LIPID PANEL: CPT

## 2019-01-29 PROCEDURE — 80053 COMPREHEN METABOLIC PANEL: CPT

## 2019-01-29 PROCEDURE — 85027 COMPLETE CBC AUTOMATED: CPT

## 2019-01-29 PROCEDURE — 36415 COLL VENOUS BLD VENIPUNCTURE: CPT

## 2019-01-29 PROCEDURE — 82306 VITAMIN D 25 HYDROXY: CPT

## 2019-01-31 ENCOUNTER — PATIENT MESSAGE (OUTPATIENT)
Dept: INTERNAL MEDICINE | Facility: CLINIC | Age: 34
End: 2019-01-31

## 2019-02-04 ENCOUNTER — TELEPHONE (OUTPATIENT)
Dept: INTERNAL MEDICINE | Facility: CLINIC | Age: 34
End: 2019-02-04

## 2019-02-04 DIAGNOSIS — R94.5 ABNORMAL RESULTS OF LIVER FUNCTION STUDIES: ICD-10-CM

## 2019-02-04 DIAGNOSIS — E55.9 VITAMIN D DEFICIENCY: Primary | ICD-10-CM

## 2019-02-04 RX ORDER — ERGOCALCIFEROL 1.25 MG/1
50000 CAPSULE ORAL
Qty: 8 CAPSULE | Refills: 0 | Status: SHIPPED | OUTPATIENT
Start: 2019-02-04 | End: 2019-09-19

## 2019-02-04 NOTE — TELEPHONE ENCOUNTER
Good afternoon, Called and spoke with the patient she works at [employer] so she will just stop by and do the labs when she is available     Thank you

## 2019-02-04 NOTE — TELEPHONE ENCOUNTER
Called patient and discussed labs and or test results. Patient expressed understanding and had the opportunity to ask questions. Any questions were answered. See meds, orders, follow up and instructions sections of encounter.    Specific issues include:  Vit D  Mild elevation of AST    50K weekly x 8, then 2000 OTC daily    Carlos AST in month

## 2019-02-06 ENCOUNTER — HOSPITAL ENCOUNTER (OUTPATIENT)
Dept: CARDIOLOGY | Facility: CLINIC | Age: 34
Discharge: HOME | End: 2019-02-06
Attending: NURSE PRACTITIONER
Payer: COMMERCIAL

## 2019-02-06 VITALS
WEIGHT: 173 LBS | SYSTOLIC BLOOD PRESSURE: 126 MMHG | HEIGHT: 62 IN | HEART RATE: 68 BPM | DIASTOLIC BLOOD PRESSURE: 74 MMHG | BODY MASS INDEX: 31.83 KG/M2

## 2019-02-06 DIAGNOSIS — R06.09 DOE (DYSPNEA ON EXERTION): ICD-10-CM

## 2019-02-06 DIAGNOSIS — R07.9 CHEST PAIN, UNSPECIFIED TYPE: ICD-10-CM

## 2019-02-06 DIAGNOSIS — I34.0 NON-RHEUMATIC MITRAL REGURGITATION: ICD-10-CM

## 2019-02-06 DIAGNOSIS — Z98.890 STATUS POST MITRAL VALVE ANNULOPLASTY: ICD-10-CM

## 2019-02-06 LAB
ASCENDING AORTA: 2.51 CM
AV INDEX (PROSTH): 0.74
AV MEAN GRADIENT: 3.13 MMHG
AV PEAK GRADIENT: 5.95 MMHG
AV VALVE AREA: 1.89 CM2
AV VELOCITY RATIO: 0.69
BSA FOR ECHO PROCEDURE: 1.85 M2
CV ECHO LV RWT: 0.44 CM
CV STRESS BASE HR: 86 BPM
DIASTOLIC BLOOD PRESSURE: 81 MMHG
DOP CALC AO PEAK VEL: 1.22 M/S
DOP CALC AO VTI: 21.93 CM
DOP CALC LVOT AREA: 2.54 CM2
DOP CALC LVOT DIAMETER: 1.8 CM
DOP CALC LVOT PEAK VEL: 0.84 M/S
DOP CALC LVOT STROKE VOLUME: 41.43 CM3
DOP CALCLVOT PEAK VEL VTI: 16.29 CM
E WAVE DECELERATION TIME: 323.66 MSEC
E/A RATIO: 0.99
ECHO LV POSTERIOR WALL: 0.88 CM (ref 0.6–1.1)
FRACTIONAL SHORTENING: 41 % (ref 28–44)
INTERVENTRICULAR SEPTUM: 0.98 CM (ref 0.6–1.1)
LA MAJOR: 6.11 CM
LA MINOR: 6.08 CM
LA WIDTH: 4.05 CM
LEFT ATRIUM SIZE: 4.31 CM
LEFT ATRIUM VOLUME INDEX: 50.3 ML/M2
LEFT ATRIUM VOLUME: 90.43 CM3
LEFT INTERNAL DIMENSION IN SYSTOLE: 2.37 CM (ref 2.1–4)
LEFT VENTRICLE DIASTOLIC VOLUME INDEX: 35.94 ML/M2
LEFT VENTRICLE DIASTOLIC VOLUME: 64.6 ML
LEFT VENTRICLE MASS INDEX: 63.9 G/M2
LEFT VENTRICLE SYSTOLIC VOLUME INDEX: 10.9 ML/M2
LEFT VENTRICLE SYSTOLIC VOLUME: 19.57 ML
LEFT VENTRICULAR INTERNAL DIMENSION IN DIASTOLE: 4 CM (ref 3.5–6)
LEFT VENTRICULAR MASS: 114.78 G
MV PEAK A VEL: 1.4 M/S
MV PEAK E VEL: 1.38 M/S
OHS CV CPX 1 MINUTE RECOVERY HEART RATE: 104 BPM
OHS CV CPX 85 PERCENT MAX PREDICTED HEART RATE MALE: 150
OHS CV CPX ESTIMATED METS: 8
OHS CV CPX MAX PREDICTED HEART RATE: 176
OHS CV CPX PATIENT IS FEMALE: 1
OHS CV CPX PATIENT IS MALE: 0
OHS CV CPX PEAK DIASTOLIC BLOOD PRESSURE: 84 MMHG
OHS CV CPX PEAK HEAR RATE: 115 BPM
OHS CV CPX PEAK RATE PRESSURE PRODUCT: NORMAL
OHS CV CPX PEAK SYSTOLIC BLOOD PRESSURE: 140 MMHG
OHS CV CPX PERCENT MAX PREDICTED HEART RATE ACHIEVED: 65
OHS CV CPX PERCENT TARGET HEART RATE ACHIEVED: 76.67
OHS CV CPX RATE PRESSURE PRODUCT PRESENTING: 9718
OHS CV CPX TARGET HEART RATE: 150
PULM VEIN S/D RATIO: 1.5
PV PEAK D VEL: 0.26 M/S
PV PEAK S VEL: 0.39 M/S
RA MAJOR: 5.68 CM
RA PRESSURE: 3 MMHG
RA WIDTH: 3.6 CM
RIGHT VENTRICULAR END-DIASTOLIC DIMENSION: 3.25 CM
RV TISSUE DOPPLER FREE WALL SYSTOLIC VELOCITY 1 (APICAL 4 CHAMBER VIEW): 7.02 M/S
SINUS: 2.42 CM
STJ: 2.19 CM
STRESS ECHO POST EXERCISE DUR MIN: 5 MIN
STRESS ECHO POST EXERCISE DUR SEC: 20
SYSTOLIC BLOOD PRESSURE: 113 MMHG
TRICUSPID ANNULAR PLANE SYSTOLIC EXCURSION: 1.96 CM

## 2019-02-06 PROCEDURE — 93306 TRANSTHORACIC ECHO (TTE) COMPLETE (CUPID ONLY): ICD-10-PCS | Mod: S$GLB,,, | Performed by: INTERNAL MEDICINE

## 2019-02-06 PROCEDURE — 93015 CV STRESS TEST SUPVJ I&R: CPT | Mod: S$GLB,,, | Performed by: INTERNAL MEDICINE

## 2019-02-06 PROCEDURE — 93015 TREADMILL STRESS TEST (CUPID ONLY): ICD-10-PCS | Mod: S$GLB,,, | Performed by: INTERNAL MEDICINE

## 2019-02-06 PROCEDURE — 93306 TTE W/DOPPLER COMPLETE: CPT | Mod: S$GLB,,, | Performed by: INTERNAL MEDICINE

## 2019-05-03 ENCOUNTER — OFFICE VISIT (OUTPATIENT)
Dept: URGENT CARE | Facility: CLINIC | Age: 34
End: 2019-05-03
Payer: COMMERCIAL

## 2019-05-03 VITALS
TEMPERATURE: 98 F | HEART RATE: 91 BPM | SYSTOLIC BLOOD PRESSURE: 125 MMHG | DIASTOLIC BLOOD PRESSURE: 85 MMHG | WEIGHT: 173 LBS | BODY MASS INDEX: 31.83 KG/M2 | HEIGHT: 62 IN | OXYGEN SATURATION: 99 % | RESPIRATION RATE: 18 BRPM

## 2019-05-03 DIAGNOSIS — R05.9 COUGH: ICD-10-CM

## 2019-05-03 DIAGNOSIS — J02.9 EXUDATIVE PHARYNGITIS: Primary | ICD-10-CM

## 2019-05-03 DIAGNOSIS — J01.90 ACUTE NON-RECURRENT SINUSITIS, UNSPECIFIED LOCATION: ICD-10-CM

## 2019-05-03 DIAGNOSIS — R68.83 CHILLS: ICD-10-CM

## 2019-05-03 DIAGNOSIS — J02.9 SORE THROAT: ICD-10-CM

## 2019-05-03 PROCEDURE — 3079F DIAST BP 80-89 MM HG: CPT | Mod: CPTII,S$GLB,, | Performed by: PHYSICIAN ASSISTANT

## 2019-05-03 PROCEDURE — 3008F BODY MASS INDEX DOCD: CPT | Mod: CPTII,S$GLB,, | Performed by: PHYSICIAN ASSISTANT

## 2019-05-03 PROCEDURE — 99214 PR OFFICE/OUTPT VISIT, EST, LEVL IV, 30-39 MIN: ICD-10-PCS | Mod: S$GLB,,, | Performed by: PHYSICIAN ASSISTANT

## 2019-05-03 PROCEDURE — 87880 STREP A ASSAY W/OPTIC: CPT | Mod: QW,S$GLB,, | Performed by: PHYSICIAN ASSISTANT

## 2019-05-03 PROCEDURE — 87880 POCT RAPID STREP A: ICD-10-PCS | Mod: QW,S$GLB,, | Performed by: PHYSICIAN ASSISTANT

## 2019-05-03 PROCEDURE — 87804 INFLUENZA ASSAY W/OPTIC: CPT | Mod: QW,S$GLB,, | Performed by: PHYSICIAN ASSISTANT

## 2019-05-03 PROCEDURE — 3079F PR MOST RECENT DIASTOLIC BLOOD PRESSURE 80-89 MM HG: ICD-10-PCS | Mod: CPTII,S$GLB,, | Performed by: PHYSICIAN ASSISTANT

## 2019-05-03 PROCEDURE — 3074F PR MOST RECENT SYSTOLIC BLOOD PRESSURE < 130 MM HG: ICD-10-PCS | Mod: CPTII,S$GLB,, | Performed by: PHYSICIAN ASSISTANT

## 2019-05-03 PROCEDURE — 3008F PR BODY MASS INDEX (BMI) DOCUMENTED: ICD-10-PCS | Mod: CPTII,S$GLB,, | Performed by: PHYSICIAN ASSISTANT

## 2019-05-03 PROCEDURE — 99214 OFFICE O/P EST MOD 30 MIN: CPT | Mod: S$GLB,,, | Performed by: PHYSICIAN ASSISTANT

## 2019-05-03 PROCEDURE — 3074F SYST BP LT 130 MM HG: CPT | Mod: CPTII,S$GLB,, | Performed by: PHYSICIAN ASSISTANT

## 2019-05-03 PROCEDURE — 87804 POCT INFLUENZA A/B: ICD-10-PCS | Mod: QW,S$GLB,, | Performed by: PHYSICIAN ASSISTANT

## 2019-05-03 RX ORDER — BROMPHENIRAMINE MALEATE, PSEUDOEPHEDRINE HYDROCHLORIDE, AND DEXTROMETHORPHAN HYDROBROMIDE 2; 30; 10 MG/5ML; MG/5ML; MG/5ML
10 SYRUP ORAL EVERY 4 HOURS PRN
Qty: 200 ML | Refills: 0 | Status: SHIPPED | OUTPATIENT
Start: 2019-05-03 | End: 2019-05-13

## 2019-05-03 RX ORDER — AMOXICILLIN AND CLAVULANATE POTASSIUM 875; 125 MG/1; MG/1
1 TABLET, FILM COATED ORAL 2 TIMES DAILY
Qty: 20 TABLET | Refills: 0 | Status: SHIPPED | OUTPATIENT
Start: 2019-05-03 | End: 2019-05-13

## 2019-05-03 NOTE — LETTER
May 3, 2019      [facility] [address] [facility] [place] [address]  Phone: [phone]  Fax: [phone]       Patient: King Dempsey   YOB: 1985  Date of Visit: 05/03/2019    To Whom It May Concern:    Vinnie Dempsey  was at [facility] on 05/03/2019. She may return to work/school on 5/5/19 with no restrictions. If you have any questions or concerns, or if I can be of further assistance, please do not hesitate to contact me.    Sincerely,    Marisa Norris PA-C

## 2019-05-03 NOTE — PATIENT INSTRUCTIONS
Pharyngitis   If your condition worsens or fails to improve we recommend that you receive another evaluation at the ER immediately or contact your PCP to discuss your concerns or return here. You must understand that you've received an urgent care treatment only and that you may be released before all your medical problems are known or treated. You the patient will arrange for followup care as instructed.   The majority of all sore throats or tonsillitis are viral and antibiotics will not treat this.     - Complete the full course of antibiotics given  -  If you are female and on BCP and do take the antibiotics, use additional methods to prevent pregnancy while on the antibiotics and for one cycle after.     - Drink plenty of cool liquids while avoid any beverage or food that can irritate your throat (acidic, spicy or salty foods).  - Throw away your toothbrush now and when you complete your antibiotics.    If the strep culture was done and returns negative in 3-5 days and you are still having a sore throat, you may need to get a mono spot test done or repeated.   Tylenol or ibuprofen for pain may help as long as you are not allergic to these meds or have a medical condition such as stomach ulcers, liver or kidney disease or taking blood thinners etc that would   prevent you from using these medications.   Rest and fluids will help as well.     Sinusitis   If your condition worsens or fails to improve we recommend that you receive another evaluation at the ER immediately or contact your PCP to discuss your concerns or return here. You must understand that you've received an urgent care treatment only and that you may be released before all your medical problems are known or treated. You the patient will arrange for followup care as instructed.   -  Flonase (fluticasone) is a nasal spray which is available over the counter and may help with your symptoms   -  Zyrtec D, Claritin D or allegra D can also help with  "symptoms of congestion and drainage.   -  If you have hypertension avoid using the "D" which is the decongestant. Instead, you can use Coricidin HBP for your cold and cough symptoms.     -  If you just have drainage you can take plain Zyrtec, Claritin or Allegra   -  If you just have a congested feeling you can take pseudoephedrine (unless you have high blood pressure) which you have to sign for behind the counter. Do not buy the phenylephrine which is on the shelf as it is not effective   -  Rest and fluids are also important.   -  Tylenol or ibuprofen can also be used as directed for pain unless you have an allergy to them or medical condition such as stomach ulcers, kidney or liver disease or blood thinners etc for which you should not be taking these type of medications.           Sinusitis (No Antibiotics)    The sinuses are air-filled spaces within the bones of the face. They connect to the inside of the nose. Sinusitis is an inflammation of the tissue lining the sinus cavity. Sinus inflammation can occur during a cold. It can also be due to allergies to pollens and other particles in the air. It can cause symptoms such as sinus congestion, headache, sore throat, facial swelling and fullness. It may also cause a low-grade fever. No infection is present, and no antibiotic treatment is needed.  Home care  · Drink plenty of water, hot tea, and other liquids. This may help thin mucus. It also may promote sinus drainage.  · Heat may help soothe painful areas of the face. Use a towel soaked in hot water. Or,  the shower and direct the hot spray onto your face. Using a vaporizer along with a menthol rub at night may also help.   · An expectorant containing guaifenesin may help thin the mucus and promote drainage from the sinuses.  · Over-the-counter decongestants may be used unless a similar medicine was prescribed. Nasal sprays work the fastest. Use one that contains phenylephrine or oxymetazoline. First " blow the nose gently. Then use the spray. Do not use these medicines more often than directed on the label or symptoms may get worse. You may also use tablets containing pseudoephedrine. Avoid products that combine ingredients, because side effects may be increased. Read labels. You can also ask the pharmacist for help. (NOTE: Persons with high blood pressure should not use decongestants. They can raise blood pressure.)  · Over-the-counter antihistamines may help if allergies contributed to your sinusitis.    · Use acetaminophen or ibuprofen to control pain, unless another pain medicine was prescribed. (If you have chronic liver or kidney disease or ever had a stomach ulcer, talk with your doctor before using these medicines. Aspirin should never be used in anyone under 18 years of age who is ill with a fever. It may cause severe liver damage.)  · Use nasal rinses or irrigation as instructed by your health care provider.  · Don't smoke. This can worsen symptoms.  Follow-up care  Follow up with your healthcare provider or our staff if you are not improving within the next week.  When to seek medical advice  Call your healthcare provider if any of these occur:  · Green or yellow discharge from the nose or into the throat  · Facial pain or headache becoming more severe  · Stiff neck  · Unusual drowsiness or confusion  · Swelling of the forehead or eyelids  · Vision problems, including blurred or double vision  · Fever of 100.4ºF (38ºC) or higher, or as directed by your healthcare provider  · Seizure  · Breathing problems  · Symptoms not resolving within 10 days  Date Last Reviewed: 4/13/2015  © 5705-3199 [employer]. [address] [place] [address], [place] [address]. All rights reserved. This information is not intended as a substitute for professional medical care. Always follow your healthcare professional's instructions.        Self-Care for Sore Throats    Sore throats happen for many reasons, such as colds,  allergies, and infections caused by viruses or bacteria. In any case, your throat becomes red and sore. Your goal for self-care is to reduce your discomfort while giving your throat a chance to heal.  Moisten and soothe your throat  Tips include the following:  · Try a sip of water first thing after waking up.  · Keep your throat moist by drinking 6 or more glasses of clear liquids every day.  · Run a cool-air humidifier in your room overnight.  · Avoid cigarette smoke.   · Suck on throat lozenges, cough drops, hard candy, ice chips, or frozen fruit-juice bars. Use the sugar-free versions if your diet or medical condition requires them.  Gargle to ease irritation  Gargling every hour or 2 can ease irritation. Try gargling with 1 of these solutions:  · 1/4 teaspoon of salt in 1/2 cup of warm water  · An over-the-counter anesthetic gargle  Use medicine for more relief  Over-the-counter medicine can reduce sore throat symptoms. Ask your pharmacist if you have questions about which medicine to use:  · Ease pain with anesthetic sprays. Aspirin or an aspirin substitute also helps. Remember, never give aspirin to anyone 18 or younger, or if you are already taking blood thinners.   · For sore throats caused by allergies, try antihistamines to block the allergic reaction.  · Remember: unless a sore throat is caused by a bacterial infection, antibiotics wont help you.  Prevent future sore throats  Prevention tips include the following:  · Stop smoking or reduce contact with secondhand smoke. Smoke irritates the tender throat lining.  · Limit contact with pets and with allergy-causing substances, such as pollen and mold.  · When youre around someone with a sore throat or cold, wash your hands often to keep viruses or bacteria from spreading.  · Dont strain your vocal cords.  Call your healthcare provider  Contact your healthcare provider if you have:  · A temperature over 101°F (38.3°C)  · White spots on the throat  · Great  difficulty swallowing  · Trouble breathing  · A skin rash  · Recent exposure to someone else with strep bacteria  · Severe hoarseness and swollen glands in the neck or jaw   Date Last Reviewed: 8/1/2016  © 0820-1807 [employer]. [address] [place] [address], [place] [address]. All rights reserved. This information is not intended as a substitute for professional medical care. Always follow your healthcare professional's instructions.

## 2019-05-03 NOTE — PROGRESS NOTES
"Subjective:       Patient ID: King Dempsey is a 34 y.o. female.    Vitals:  height is 5' 2" (1.575 m) and weight is 78.5 kg (173 lb). Her temperature is 98.3 °F (36.8 °C). Her blood pressure is 125/85 and her pulse is 91. Her respiration is 18 and oxygen saturation is 99%.     Chief Complaint: Influenza    Patient reports Wednesday started with sore scratchy throat, states now she describes the pain as sharp. Reports it is painful to swallow. Also reports yesterday starting with flu like symptoms of chills. Body aches, and a temp of  at home, which was brought down with Advil. Reports sore throat is main complaint. Also reports mild nasal congestion, post nasal drip and cough. States she suffers from allergies so initially, she though that is what this was. No know exposure to strep or flu.     Influenza   This is a new problem. The current episode started in the past 7 days. The problem has been unchanged. Associated symptoms include chills, congestion (with clear to green nasal discharge), coughing and a sore throat. Pertinent negatives include no abdominal pain, chest pain, diaphoresis, fatigue, headaches, myalgias, nausea, neck pain, rash or vomiting. Treatments tried: dayquil, nyquil. The treatment provided no relief.       Constitution: Positive for chills. Negative for sweating and fatigue.   HENT: Positive for congestion (with clear to green nasal discharge), postnasal drip, sinus pressure and sore throat. Negative for ear pain, ear discharge, tinnitus, sinus pain, trouble swallowing and voice change.    Neck: Negative for neck pain and painful lymph nodes.   Cardiovascular: Negative for chest pain.   Eyes: Negative for eye redness.   Respiratory: Positive for cough and sputum production (in the mornings ). Negative for chest tightness, bloody sputum, COPD, shortness of breath, stridor, wheezing and asthma.    Gastrointestinal: Negative for abdominal pain, nausea, vomiting and diarrhea. "   Genitourinary: Negative for flank pain.   Musculoskeletal: Negative for muscle ache.   Skin: Negative for rash.   Allergic/Immunologic: Positive for seasonal allergies. Negative for asthma and sneezing.   Neurological: Negative for dizziness, light-headedness and headaches.   Hematologic/Lymphatic: Negative for swollen lymph nodes.       Objective:      Physical Exam   Constitutional: She is oriented to person, place, and time. She appears well-developed and well-nourished. She is cooperative.  Non-toxic appearance. She does not appear ill. No distress.   HENT:   Head: Normocephalic and atraumatic.   Right Ear: Hearing, external ear and ear canal normal. A middle ear effusion is present.   Left Ear: Hearing, external ear and ear canal normal. A middle ear effusion is present.   Nose: Mucosal edema and rhinorrhea present. No nasal deformity. No epistaxis. Right sinus exhibits maxillary sinus tenderness. Right sinus exhibits no frontal sinus tenderness. Left sinus exhibits maxillary sinus tenderness. Left sinus exhibits no frontal sinus tenderness.   Mouth/Throat: Uvula is midline and mucous membranes are normal. No trismus in the jaw. Normal dentition. No uvula swelling. Posterior oropharyngeal erythema present. No posterior oropharyngeal edema. Tonsils are 2+ on the right. Tonsils are 2+ on the left. Tonsillar exudate.   Eyes: Conjunctivae and lids are normal. No scleral icterus.   Sclera clear bilat   Neck: Trachea normal, full passive range of motion without pain and phonation normal. Neck supple.   Cardiovascular: Normal rate, regular rhythm, normal heart sounds, intact distal pulses and normal pulses.   Pulmonary/Chest: Effort normal and breath sounds normal. No respiratory distress. She has no wheezes.   Abdominal: Soft. Normal appearance and bowel sounds are normal. She exhibits no distension. There is no tenderness.   Musculoskeletal: Normal range of motion. She exhibits no edema or deformity.    Lymphadenopathy:     She has cervical adenopathy.   Neurological: She is alert and oriented to person, place, and time. She exhibits normal muscle tone. Coordination normal.   Skin: Skin is warm, dry and intact. She is not diaphoretic. No pallor.   Psychiatric: She has a normal mood and affect. Her speech is normal and behavior is normal. Judgment and thought content normal. Cognition and memory are normal.   Nursing note and vitals reviewed.      Assessment:       1. Exudative pharyngitis    2. Acute non-recurrent sinusitis, unspecified location    3. Cough    4. Sore throat    5. Chills        Plan:         Exudative pharyngitis  -     amoxicillin-clavulanate 875-125mg (AUGMENTIN) 875-125 mg per tablet; Take 1 tablet by mouth 2 (two) times daily. for 10 days  Dispense: 20 tablet; Refill: 0    Acute non-recurrent sinusitis, unspecified location    Cough  -     brompheniramine-pseudoeph-DM (BROMFED DM) 2-30-10 mg/5 mL Syrp; Take 10 mLs by mouth every 4 (four) hours as needed.  Dispense: 200 mL; Refill: 0    Sore throat  -     POCT Influenza A/B  -     POCT rapid strep A  -     Strep A culture, throat    Chills  -     POCT Influenza A/B      Patient Instructions     Pharyngitis   If your condition worsens or fails to improve we recommend that you receive another evaluation at the ER immediately or contact your PCP to discuss your concerns or return here. You must understand that you've received an urgent care treatment only and that you may be released before all your medical problems are known or treated. You the patient will arrange for followup care as instructed.   The majority of all sore throats or tonsillitis are viral and antibiotics will not treat this.     - Complete the full course of antibiotics given  -  If you are female and on BCP and do take the antibiotics, use additional methods to prevent pregnancy while on the antibiotics and for one cycle after.     - Drink plenty of cool liquids while avoid  "any beverage or food that can irritate your throat (acidic, spicy or salty foods).  - Throw away your toothbrush now and when you complete your antibiotics.    If the strep culture was done and returns negative in 3-5 days and you are still having a sore throat, you may need to get a mono spot test done or repeated.   Tylenol or ibuprofen for pain may help as long as you are not allergic to these meds or have a medical condition such as stomach ulcers, liver or kidney disease or taking blood thinners etc that would   prevent you from using these medications.   Rest and fluids will help as well.     Sinusitis   If your condition worsens or fails to improve we recommend that you receive another evaluation at the ER immediately or contact your PCP to discuss your concerns or return here. You must understand that you've received an urgent care treatment only and that you may be released before all your medical problems are known or treated. You the patient will arrange for followup care as instructed.   -  Flonase (fluticasone) is a nasal spray which is available over the counter and may help with your symptoms   -  Zyrtec D, Claritin D or allegra D can also help with symptoms of congestion and drainage.   -  If you have hypertension avoid using the "D" which is the decongestant. Instead, you can use Coricidin HBP for your cold and cough symptoms.     -  If you just have drainage you can take plain Zyrtec, Claritin or Allegra   -  If you just have a congested feeling you can take pseudoephedrine (unless you have high blood pressure) which you have to sign for behind the counter. Do not buy the phenylephrine which is on the shelf as it is not effective   -  Rest and fluids are also important.   -  Tylenol or ibuprofen can also be used as directed for pain unless you have an allergy to them or medical condition such as stomach ulcers, kidney or liver disease or blood thinners etc for which you should not be taking these " type of medications.           Sinusitis (No Antibiotics)    The sinuses are air-filled spaces within the bones of the face. They connect to the inside of the nose. Sinusitis is an inflammation of the tissue lining the sinus cavity. Sinus inflammation can occur during a cold. It can also be due to allergies to pollens and other particles in the air. It can cause symptoms such as sinus congestion, headache, sore throat, facial swelling and fullness. It may also cause a low-grade fever. No infection is present, and no antibiotic treatment is needed.  Home care  · Drink plenty of water, hot tea, and other liquids. This may help thin mucus. It also may promote sinus drainage.  · Heat may help soothe painful areas of the face. Use a towel soaked in hot water. Or,  the shower and direct the hot spray onto your face. Using a vaporizer along with a menthol rub at night may also help.   · An expectorant containing guaifenesin may help thin the mucus and promote drainage from the sinuses.  · Over-the-counter decongestants may be used unless a similar medicine was prescribed. Nasal sprays work the fastest. Use one that contains phenylephrine or oxymetazoline. First blow the nose gently. Then use the spray. Do not use these medicines more often than directed on the label or symptoms may get worse. You may also use tablets containing pseudoephedrine. Avoid products that combine ingredients, because side effects may be increased. Read labels. You can also ask the pharmacist for help. (NOTE: Persons with high blood pressure should not use decongestants. They can raise blood pressure.)  · Over-the-counter antihistamines may help if allergies contributed to your sinusitis.    · Use acetaminophen or ibuprofen to control pain, unless another pain medicine was prescribed. (If you have chronic liver or kidney disease or ever had a stomach ulcer, talk with your doctor before using these medicines. Aspirin should never be used in  anyone under 18 years of age who is ill with a fever. It may cause severe liver damage.)  · Use nasal rinses or irrigation as instructed by your health care provider.  · Don't smoke. This can worsen symptoms.  Follow-up care  Follow up with your healthcare provider or our staff if you are not improving within the next week.  When to seek medical advice  Call your healthcare provider if any of these occur:  · Green or yellow discharge from the nose or into the throat  · Facial pain or headache becoming more severe  · Stiff neck  · Unusual drowsiness or confusion  · Swelling of the forehead or eyelids  · Vision problems, including blurred or double vision  · Fever of 100.4ºF (38ºC) or higher, or as directed by your healthcare provider  · Seizure  · Breathing problems  · Symptoms not resolving within 10 days  Date Last Reviewed: 4/13/2015  © 3423-4317 [employer]. [address] [place] [address]. All rights reserved. This information is not intended as a substitute for professional medical care. Always follow your healthcare professional's instructions.        Self-Care for Sore Throats    Sore throats happen for many reasons, such as colds, allergies, and infections caused by viruses or bacteria. In any case, your throat becomes red and sore. Your goal for self-care is to reduce your discomfort while giving your throat a chance to heal.  Moisten and soothe your throat  Tips include the following:  · Try a sip of water first thing after waking up.  · Keep your throat moist by drinking 6 or more glasses of clear liquids every day.  · Run a cool-air humidifier in your room overnight.  · Avoid cigarette smoke.   · Suck on throat lozenges, cough drops, hard candy, ice chips, or frozen fruit-juice bars. Use the sugar-free versions if your diet or medical condition requires them.  Gargle to ease irritation  Gargling every hour or 2 can ease irritation. Try gargling with 1 of these  solutions:  · 1/4 teaspoon of salt in 1/2 cup of warm water  · An over-the-counter anesthetic gargle  Use medicine for more relief  Over-the-counter medicine can reduce sore throat symptoms. Ask your pharmacist if you have questions about which medicine to use:  · Ease pain with anesthetic sprays. Aspirin or an aspirin substitute also helps. Remember, never give aspirin to anyone 18 or younger, or if you are already taking blood thinners.   · For sore throats caused by allergies, try antihistamines to block the allergic reaction.  · Remember: unless a sore throat is caused by a bacterial infection, antibiotics wont help you.  Prevent future sore throats  Prevention tips include the following:  · Stop smoking or reduce contact with secondhand smoke. Smoke irritates the tender throat lining.  · Limit contact with pets and with allergy-causing substances, such as pollen and mold.  · When youre around someone with a sore throat or cold, wash your hands often to keep viruses or bacteria from spreading.  · Dont strain your vocal cords.  Call your healthcare provider  Contact your healthcare provider if you have:  · A temperature over 101°F (38.3°C)  · White spots on the throat  · Great difficulty swallowing  · Trouble breathing  · A skin rash  · Recent exposure to someone else with strep bacteria  · Severe hoarseness and swollen glands in the neck or jaw   Date Last Reviewed: 8/1/2016  © 4365-0512 [employer]. [address] [place] [address], [place] [address]. All rights reserved. This information is not intended as a substitute for professional medical care. Always follow your healthcare professional's instructions.

## 2019-05-06 LAB — S PYO THROAT QL CULT: NEGATIVE

## 2019-05-07 ENCOUNTER — TELEPHONE (OUTPATIENT)
Dept: URGENT CARE | Facility: CLINIC | Age: 34
End: 2019-05-07

## 2019-05-07 NOTE — TELEPHONE ENCOUNTER
Called patient and informed her the strep culture came back negative and to follow what the provider told her to do at her visit.    ---- Message from Kleber Suarez PA-C sent at 5/6/2019  3:59 PM CDT -----  Please call patient with negative strep culture results.  Thank you

## 2019-05-15 ENCOUNTER — TELEPHONE (OUTPATIENT)
Dept: URGENT CARE | Facility: CLINIC | Age: 34
End: 2019-05-15

## 2019-05-23 ENCOUNTER — OFFICE VISIT (OUTPATIENT)
Dept: INTERNAL MEDICINE | Facility: CLINIC | Age: 34
End: 2019-05-23
Payer: COMMERCIAL

## 2019-05-23 VITALS
DIASTOLIC BLOOD PRESSURE: 90 MMHG | BODY MASS INDEX: 34.72 KG/M2 | SYSTOLIC BLOOD PRESSURE: 124 MMHG | WEIGHT: 183.88 LBS | TEMPERATURE: 98 F | HEIGHT: 61 IN | HEART RATE: 84 BPM

## 2019-05-23 DIAGNOSIS — J02.9 PHARYNGITIS, UNSPECIFIED ETIOLOGY: Primary | ICD-10-CM

## 2019-05-23 PROCEDURE — 99213 OFFICE O/P EST LOW 20 MIN: CPT | Mod: S$GLB,,, | Performed by: INTERNAL MEDICINE

## 2019-05-23 PROCEDURE — 99999 PR PBB SHADOW E&M-EST. PATIENT-LVL III: CPT | Mod: PBBFAC,,, | Performed by: INTERNAL MEDICINE

## 2019-05-23 PROCEDURE — 3074F PR MOST RECENT SYSTOLIC BLOOD PRESSURE < 130 MM HG: ICD-10-PCS | Mod: CPTII,S$GLB,, | Performed by: INTERNAL MEDICINE

## 2019-05-23 PROCEDURE — 3080F PR MOST RECENT DIASTOLIC BLOOD PRESSURE >= 90 MM HG: ICD-10-PCS | Mod: CPTII,S$GLB,, | Performed by: INTERNAL MEDICINE

## 2019-05-23 PROCEDURE — 3008F BODY MASS INDEX DOCD: CPT | Mod: CPTII,S$GLB,, | Performed by: INTERNAL MEDICINE

## 2019-05-23 PROCEDURE — 3008F PR BODY MASS INDEX (BMI) DOCUMENTED: ICD-10-PCS | Mod: CPTII,S$GLB,, | Performed by: INTERNAL MEDICINE

## 2019-05-23 PROCEDURE — 3080F DIAST BP >= 90 MM HG: CPT | Mod: CPTII,S$GLB,, | Performed by: INTERNAL MEDICINE

## 2019-05-23 PROCEDURE — 3074F SYST BP LT 130 MM HG: CPT | Mod: CPTII,S$GLB,, | Performed by: INTERNAL MEDICINE

## 2019-05-23 PROCEDURE — 99213 PR OFFICE/OUTPT VISIT, EST, LEVL III, 20-29 MIN: ICD-10-PCS | Mod: S$GLB,,, | Performed by: INTERNAL MEDICINE

## 2019-05-23 PROCEDURE — 87070 CULTURE OTHR SPECIMN AEROBIC: CPT

## 2019-05-23 PROCEDURE — 99999 PR PBB SHADOW E&M-EST. PATIENT-LVL III: ICD-10-PCS | Mod: PBBFAC,,, | Performed by: INTERNAL MEDICINE

## 2019-05-23 NOTE — PROGRESS NOTES
Subjective:       Patient ID: King Dempsey is a 34 y.o. female.    Chief Complaint: Sore Throat    HPI   C/o 3 week h/o throat pain  cuulture neg.  Tired muicnex, zyrtec, etc but no persistent improvement.  No pnd.  No cold symptoms - but some allergic symptoms.  Ibuprofen helps.  Occas GERD.    Review of Systems   Constitutional: Negative for fever and unexpected weight change.   HENT: Negative for congestion and postnasal drip.    Eyes: Negative for pain, discharge and visual disturbance.   Respiratory: Negative for cough, chest tightness, shortness of breath and wheezing.    Cardiovascular: Negative for chest pain and leg swelling.   Gastrointestinal: Negative for abdominal pain, constipation, diarrhea and nausea.   Genitourinary: Negative for difficulty urinating, dysuria and hematuria.   Skin: Negative for rash.   Neurological: Negative for headaches.   Psychiatric/Behavioral: Negative for dysphoric mood and sleep disturbance. The patient is not nervous/anxious.        Objective:      Physical Exam   Constitutional: She is oriented to person, place, and time. She appears well-developed and well-nourished. No distress.   HENT:   Head: Normocephalic and atraumatic.   Mouth/Throat: Oropharynx is clear and moist. No oropharyngeal exudate.   Tm's clear   Neck: Neck supple.   Cardiovascular: Normal rate and regular rhythm.   Pulmonary/Chest: Effort normal and breath sounds normal. No respiratory distress. She has no wheezes. She has no rales.   Lymphadenopathy:     She has no cervical adenopathy.   Neurological: She is alert and oriented to person, place, and time.   Psychiatric: She has a normal mood and affect. Her behavior is normal.       Assessment:       1. Pharyngitis, unspecified etiology      2.     R/o LPR  Plan:       King was seen today for sore throat.    Diagnoses and all orders for this visit:    Pharyngitis, unspecified etiology  -     Throat culture       [facility] trial

## 2019-05-27 LAB — BACTERIA THROAT CULT: NORMAL

## 2019-06-20 ENCOUNTER — PATIENT MESSAGE (OUTPATIENT)
Dept: OBSTETRICS AND GYNECOLOGY | Facility: CLINIC | Age: 34
End: 2019-06-20

## 2019-07-02 ENCOUNTER — PATIENT MESSAGE (OUTPATIENT)
Dept: OBSTETRICS AND GYNECOLOGY | Facility: CLINIC | Age: 34
End: 2019-07-02

## 2019-07-02 DIAGNOSIS — N94.3 PMS (PREMENSTRUAL SYNDROME): ICD-10-CM

## 2019-07-03 RX ORDER — FLUOXETINE 10 MG/1
10 CAPSULE ORAL DAILY
Qty: 30 CAPSULE | Refills: 11 | Status: SHIPPED | OUTPATIENT
Start: 2019-07-03 | End: 2020-05-14 | Stop reason: SDUPTHER

## 2019-08-05 DIAGNOSIS — M25.572 ACUTE LEFT ANKLE PAIN: Primary | ICD-10-CM

## 2019-08-05 RX ORDER — NAPROXEN 500 MG/1
500 TABLET ORAL 2 TIMES DAILY WITH MEALS
Qty: 60 TABLET | Refills: 1 | Status: SHIPPED | OUTPATIENT
Start: 2019-08-05 | End: 2020-08-04

## 2019-09-09 ENCOUNTER — PATIENT MESSAGE (OUTPATIENT)
Dept: OBSTETRICS AND GYNECOLOGY | Facility: CLINIC | Age: 34
End: 2019-09-09

## 2019-09-09 ENCOUNTER — TELEPHONE (OUTPATIENT)
Dept: OBSTETRICS AND GYNECOLOGY | Facility: CLINIC | Age: 34
End: 2019-09-09

## 2019-09-09 DIAGNOSIS — Z36.9 ANTENATAL SCREENING ENCOUNTER: Primary | ICD-10-CM

## 2019-09-09 NOTE — TELEPHONE ENCOUNTER
----- Message from Mane Moreno MD sent at 9/9/2019 12:21 PM CDT -----  Regarding: New OB  Please call this patient to schedule IOB visit.  She will be expecting call.  Thanks.  ----- Message -----  From: Sarah Carter MD  Sent: 9/9/2019   9:08 AM  To: Mane Moreno MD      Bill:    The above patient is who I just talked to you about.   Thank you for seeing her for prenatal care.   Please have me as the consultant since she was a prior patient of mine for prenatal care.     Thank you  Sarah

## 2019-09-10 NOTE — TELEPHONE ENCOUNTER
----- Message from Arielle Rodriges sent at 9/10/2019  8:38 AM CDT -----  Contact: MARIEL PÉREZ   Would you like to call this patient or would you like me to call her?  ----- Message -----  From: Yvrose Young  Sent: 9/10/2019   8:32 AM  To: Arielle Rodriges      Can the clinic reply in [facility]:      Who Called: MARIEL PÉREZ      Date of Positive Preg Test: 09/08/2019     1st day of Last Menstrual Cycle: July 15, 2019     List Any Difficulties: nausea, light pelvic pain     What Number to Call Back: ext: [phone] ([facility] employee) [phone]

## 2019-09-11 ENCOUNTER — TELEPHONE (OUTPATIENT)
Dept: OBSTETRICS AND GYNECOLOGY | Facility: CLINIC | Age: 34
End: 2019-09-11

## 2019-09-11 NOTE — TELEPHONE ENCOUNTER
----- Message from Melecio Mcclellan sent at 9/11/2019  9:38 AM CDT -----  Contact: MARIEL PÉREZ [[id number]]  Name of Who is Calling: MARIEL PÉREZ [[id number]]      What is the request in detail: Would like to follow up on scheduling initial appointment. Please advise      Can the clinic reply by [facility]: yes      What Number to Call Back if not in [facility]: [phone]

## 2019-09-13 ENCOUNTER — PATIENT MESSAGE (OUTPATIENT)
Dept: OBSTETRICS AND GYNECOLOGY | Facility: CLINIC | Age: 34
End: 2019-09-13

## 2019-09-13 ENCOUNTER — LAB VISIT (OUTPATIENT)
Dept: LAB | Facility: HOSPITAL | Age: 34
End: 2019-09-13
Attending: FAMILY MEDICINE
Payer: COMMERCIAL

## 2019-09-13 ENCOUNTER — TELEPHONE (OUTPATIENT)
Dept: OBSTETRICS AND GYNECOLOGY | Facility: CLINIC | Age: 34
End: 2019-09-13

## 2019-09-13 DIAGNOSIS — O20.9 BLEEDING IN EARLY PREGNANCY: Primary | ICD-10-CM

## 2019-09-13 DIAGNOSIS — O20.9 BLEEDING IN EARLY PREGNANCY: ICD-10-CM

## 2019-09-13 DIAGNOSIS — R94.5 ABNORMAL RESULTS OF LIVER FUNCTION STUDIES: ICD-10-CM

## 2019-09-13 LAB
AST SERPL-CCNC: 32 U/L (ref 10–40)
HCG INTACT+B SERPL-ACNC: 5.9 MIU/ML
PROGEST SERPL-MCNC: 0.6 NG/ML

## 2019-09-13 PROCEDURE — 84702 CHORIONIC GONADOTROPIN TEST: CPT

## 2019-09-13 PROCEDURE — 36415 COLL VENOUS BLD VENIPUNCTURE: CPT

## 2019-09-13 PROCEDURE — 84144 ASSAY OF PROGESTERONE: CPT

## 2019-09-13 PROCEDURE — 80074 ACUTE HEPATITIS PANEL: CPT

## 2019-09-13 PROCEDURE — 84450 TRANSFERASE (AST) (SGOT): CPT

## 2019-09-13 NOTE — TELEPHONE ENCOUNTER
----- Message from Reggie Cobb sent at 9/13/2019  3:11 PM CDT -----  NEW OB PT HAS HER FIRST APPT ON 10/1 SHE IS HAVING SOME LIGHT PINK  SPOTTING NO CRAMPING [phone]

## 2019-09-13 NOTE — TELEPHONE ENCOUNTER
Patient denies active bleeding or cramping. Patient advised per Dr Moreno, pelvic rest, hydrate and with worsening or persistent symptoms she should go to the ED. Patient also advised labs drawn today and repeat on Monday. Patient verbalized understanding and will comply.

## 2019-09-14 ENCOUNTER — HOSPITAL ENCOUNTER (EMERGENCY)
Facility: OTHER | Age: 34
Discharge: HOME | End: 2019-09-14
Attending: EMERGENCY MEDICINE
Payer: COMMERCIAL

## 2019-09-14 VITALS
TEMPERATURE: 99 F | BODY MASS INDEX: 32.66 KG/M2 | DIASTOLIC BLOOD PRESSURE: 93 MMHG | WEIGHT: 173 LBS | OXYGEN SATURATION: 98 % | HEART RATE: 84 BPM | SYSTOLIC BLOOD PRESSURE: 134 MMHG | HEIGHT: 61 IN | RESPIRATION RATE: 17 BRPM

## 2019-09-14 DIAGNOSIS — O46.90 VAGINAL BLEEDING IN PREGNANCY: Primary | ICD-10-CM

## 2019-09-14 DIAGNOSIS — O20.0 THREATENED MISCARRIAGE: ICD-10-CM

## 2019-09-14 LAB
ABO + RH BLD: NORMAL
ALBUMIN SERPL BCP-MCNC: 3.9 G/DL (ref 3.5–5.2)
ALP SERPL-CCNC: 57 U/L (ref 55–135)
ALT SERPL W/O P-5'-P-CCNC: 15 U/L (ref 10–44)
ANION GAP SERPL CALC-SCNC: 8 MMOL/L (ref 8–16)
AST SERPL-CCNC: 31 U/L (ref 10–40)
BACTERIA GENITAL QL WET PREP: ABNORMAL
BASOPHILS # BLD AUTO: 0.04 K/UL (ref 0–0.2)
BASOPHILS NFR BLD: 0.4 % (ref 0–1.9)
BILIRUB SERPL-MCNC: 0.8 MG/DL (ref 0.1–1)
BUN SERPL-MCNC: 6 MG/DL (ref 6–20)
CALCIUM SERPL-MCNC: 9.6 MG/DL (ref 8.7–10.5)
CHLORIDE SERPL-SCNC: 107 MMOL/L (ref 95–110)
CLUE CELLS VAG QL WET PREP: ABNORMAL
CO2 SERPL-SCNC: 26 MMOL/L (ref 23–29)
CREAT SERPL-MCNC: 0.7 MG/DL (ref 0.5–1.4)
DIFFERENTIAL METHOD: ABNORMAL
EOSINOPHIL # BLD AUTO: 0 K/UL (ref 0–0.5)
EOSINOPHIL NFR BLD: 0.4 % (ref 0–8)
ERYTHROCYTE [DISTWIDTH] IN BLOOD BY AUTOMATED COUNT: 15.5 % (ref 11.5–14.5)
EST. GFR  (AFRICAN AMERICAN): >60 ML/MIN/1.73 M^2
EST. GFR  (NON AFRICAN AMERICAN): >60 ML/MIN/1.73 M^2
FILAMENT FUNGI VAG WET PREP-#/AREA: ABNORMAL
GLUCOSE SERPL-MCNC: 109 MG/DL (ref 70–110)
HCG INTACT+B SERPL-ACNC: 4.5 MIU/ML
HCT VFR BLD AUTO: 41.3 % (ref 37–48.5)
HGB BLD-MCNC: 12.8 G/DL (ref 12–16)
IMM GRANULOCYTES # BLD AUTO: 0.02 K/UL (ref 0–0.04)
IMM GRANULOCYTES NFR BLD AUTO: 0.2 % (ref 0–0.5)
LYMPHOCYTES # BLD AUTO: 1.8 K/UL (ref 1–4.8)
LYMPHOCYTES NFR BLD: 18.7 % (ref 18–48)
MCH RBC QN AUTO: 28.5 PG (ref 27–31)
MCHC RBC AUTO-ENTMCNC: 31 G/DL (ref 32–36)
MCV RBC AUTO: 92 FL (ref 82–98)
MONOCYTES # BLD AUTO: 0.4 K/UL (ref 0.3–1)
MONOCYTES NFR BLD: 4.6 % (ref 4–15)
NEUTROPHILS # BLD AUTO: 7.2 K/UL (ref 1.8–7.7)
NEUTROPHILS NFR BLD: 75.7 % (ref 38–73)
NRBC BLD-RTO: 0 /100 WBC
PLATELET # BLD AUTO: 378 K/UL (ref 150–350)
PMV BLD AUTO: 11.4 FL (ref 9.2–12.9)
POTASSIUM SERPL-SCNC: 4.4 MMOL/L (ref 3.5–5.1)
PROT SERPL-MCNC: 7.2 G/DL (ref 6–8.4)
RBC # BLD AUTO: 4.49 M/UL (ref 4–5.4)
SODIUM SERPL-SCNC: 141 MMOL/L (ref 136–145)
SPECIMEN SOURCE: ABNORMAL
T VAGINALIS GENITAL QL WET PREP: ABNORMAL
WBC # BLD AUTO: 9.52 K/UL (ref 3.9–12.7)
WBC #/AREA VAG WET PREP: ABNORMAL
YEAST GENITAL QL WET PREP: ABNORMAL

## 2019-09-14 PROCEDURE — 85025 COMPLETE CBC W/AUTO DIFF WBC: CPT

## 2019-09-14 PROCEDURE — 80053 COMPREHEN METABOLIC PANEL: CPT

## 2019-09-14 PROCEDURE — 84702 CHORIONIC GONADOTROPIN TEST: CPT

## 2019-09-14 PROCEDURE — 87491 CHLMYD TRACH DNA AMP PROBE: CPT

## 2019-09-14 PROCEDURE — 86901 BLOOD TYPING SEROLOGIC RH(D): CPT

## 2019-09-14 PROCEDURE — 99284 EMERGENCY DEPT VISIT MOD MDM: CPT | Mod: 25

## 2019-09-14 PROCEDURE — 36415 COLL VENOUS BLD VENIPUNCTURE: CPT

## 2019-09-14 PROCEDURE — 87210 SMEAR WET MOUNT SALINE/INK: CPT

## 2019-09-14 NOTE — ED TRIAGE NOTES
Patient presents to ER c/o vaginal bleeding that started around 5am.  Pt states she woke up this morning and notice she was bleeding.  Pt reports seeing a couple of clots.  Pt  states she had pink discharge on yesterday.  Pt states she had a positive pregnancy test last Sunday.

## 2019-09-14 NOTE — ED PROVIDER NOTES
Encounter Date: 2019       History     Chief Complaint   Patient presents with    Vaginal Bleeding     +since yesterday. pt had blood work done yesterday at OBGYN. pt reports waking up this morning and passing clots. pt denies abdominal pain. pt reports positive pregnancy test last       Patient is a 34-year-old female, , presenting to the emergency department for evaluation of vaginal bleeding in pregnancy.  The patient states that yesterday she noticed some mild spotting but that at 5:00 a.m. this morning, the bleeding progress to heavy or bright red blood.  She denies any associated cramping.  She denies any fever chills. She states that she has an appointment to be seen by OBGYN and establish prenatal care later this week.  She admits that she just recently had a positive pregnancy test about 1 week ago.  She denies any dysuria.  No other vaginal discharge. This is the extent of the patient's complaints at this time.       The history is provided by the patient.     Review of patient's allergies indicates:   Allergen Reactions    Doxycycline      Abdomen pain severe    Tetanus vaccines and toxoid Rash     Past Medical History:   Diagnosis Date    Abnormal Pap smear     Colposcopy    Menarche     Age of onset 11    Mitral valve prolapse      Past Surgical History:   Procedure Laterality Date     SECTION      MITRAL VALVE REPAIR       Family History   Problem Relation Age of Onset    Heart disease Mother     Hypertension Mother     Hyperlipidemia Mother     Diabetes Mother     Hyperlipidemia Father     Hypertension Father     Breast cancer Paternal Aunt     Colon cancer Neg Hx     Ovarian cancer Neg Hx      Social History     Tobacco Use    Smoking status: Never Smoker    Smokeless tobacco: Never Used   Substance Use Topics    Alcohol use: Yes     Comment: social    Drug use: No     Review of Systems   Constitutional: Negative for activity change, appetite change,  chills, fatigue and fever.   HENT: Negative for congestion, rhinorrhea and sore throat.    Eyes: Negative for photophobia and visual disturbance.   Respiratory: Negative for cough, shortness of breath and wheezing.    Cardiovascular: Negative for chest pain.   Gastrointestinal: Negative for abdominal pain, diarrhea, nausea and vomiting.   Genitourinary: Positive for vaginal bleeding. Negative for dysuria, hematuria and urgency.   Musculoskeletal: Negative for back pain, myalgias and neck pain.   Skin: Negative for color change and wound.   Neurological: Negative for weakness and headaches.   Psychiatric/Behavioral: Negative for agitation and confusion.       Physical Exam     Initial Vitals [09/14/19 1245]   BP Pulse Resp Temp SpO2   (!) 157/94 81 18 99.4 °F (37.4 °C) 100 %      MAP       --         Physical Exam    Nursing note and vitals reviewed.  Constitutional: She appears well-developed and well-nourished. She is not diaphoretic. She is cooperative.  Non-toxic appearance. She does not have a sickly appearance. No distress.   Well-appearing,  female accompanied by her  in the emergency room.  Speaking clearly full sentences.  No acute distress.   HENT:   Head: Normocephalic and atraumatic.   Right Ear: External ear normal.   Left Ear: External ear normal.   Nose: Nose normal.   Mouth/Throat: Oropharynx is clear and moist.   Eyes: Conjunctivae and EOM are normal.   Neck: Normal range of motion. Neck supple.   Cardiovascular: Normal rate, regular rhythm and normal heart sounds.   Pulmonary/Chest: Breath sounds normal. No respiratory distress. She has no wheezes.   Abdominal: Soft. Bowel sounds are normal. She exhibits no distension. There is no tenderness. There is no rebound and no guarding.   Genitourinary:   Genitourinary Comments: Chaperone present.  Normal appearance of the external genitalia, no skin lesions, erythema or masses. Pink vaginal mucosa. Cervix pink with no erythema, scant  vaginal bleeding noted with no active hemorrhaging or blood clots. Cervical os is closed. No CMT, adnexal tenderness.    Musculoskeletal: Normal range of motion.   Neurological: She is alert and oriented to person, place, and time.   Skin: Skin is warm.   Psychiatric: She has a normal mood and affect. Her behavior is normal. Judgment and thought content normal.         ED Course   Procedures  Labs Reviewed   CBC W/ AUTO DIFFERENTIAL - Abnormal; Notable for the following components:       Result Value    Mean Corpuscular Hemoglobin Conc 31.0 (*)     RDW 15.5 (*)     Platelets 378 (*)     Gran% 75.7 (*)     All other components within normal limits   VAGINAL SCREEN - Abnormal; Notable for the following components:    WBC - Vaginal Screen Occasional (*)     Bacteria - Vaginal Screen Occasional (*)     All other components within normal limits   C. TRACHOMATIS/N. GONORRHOEAE BY AMP DNA   COMPREHENSIVE METABOLIC PANEL   HCG, QUANTITATIVE, PREGNANCY   GROUP & RH             Medical Decision Making:   Initial Assessment:     Urgent evaluation a 34-year-old female, , presenting to the emergency room for evaluation of vaginal bleeding in pregnancy.  Patient is afebrile, nontoxic appearing, hemodynamically stable. Physical exam reveals soft, nontender abdomen.  Vital signs relatively unremarkable except for mild hypertension 157/94.  Reviewed medical record in epic.  Patient had a beta HCG done yesterday an outpatient lab that was 5.9.  Will plan for repeat labs and reassess.    Clinical Tests:   Lab Tests: Ordered and Reviewed  ED Management:    Vaginal exam reveals minimal blood in the vaginal vault, no active hemorrhaging or bleeding.  Cervical os is closed.  CBC shows no leukocytosis, normal H&H.  CMP is unremarkable.  Beta HCG today is 4.5.  Patient is a positive. At this level, no indication for ultrasound is likely not show anything.  This is concerning for possible miscarriage, discusses with the patient.  Advised  her on bleeding precautions, and urged her to obtain close follow up with OBGYN.  Also advised that I would send a message in month and she was seen here today.  Discharged in stable condition.The patient was instructed to follow up with a primary care provider in 2 days or to return to the emergency department for worsening symptoms. The treatment plan was discussed with the patient who demonstrated understanding and comfort with plan.      This note was created using [employer] Fluency Direct. There may be typographical errors secondary to dictation.                      ED Course as of Sep 14 1900   Sat Sep 14, 2019   1320 King Dempsey, 34 y.o.  presented to the ED complaining of vaginal bleeding in early pregnancy, beta hCG was 5.9 yesterday.     Patient seen and medically screened by myself in the Provider in Triage Sort system due to ED crowding.  Appropriate tests and/or medications ordered.  A medical screening exam has been performed.  The care will be assumed by myself or another provider when treatment space becomes available.  I am not assuming care of this patient at this time. 1:20 PM. CARYL LOPEZ        [AM]      ED Course User Index  [AM] Anna Robison PA-C     Clinical Impression:     1. Vaginal bleeding in pregnancy    2. Threatened miscarriage           Disposition:   Disposition: Discharged  Condition: Stable                        Christina Kaiser PA-C  09/14/19 1901

## 2019-09-14 NOTE — ED NOTES
2 Patient identifiers, allergies, and medications verified.    LOC: Patient is awake, alert and oriented X3. Pt aware of environment with an appropriate affect and speaking appropriate.    APPEARANCE: Patient resting comfortably, no acute distress noted, patient is clean and well groomed; clothing is properly fastened.    SKIN: Skin warm and dry, normal skin turgor and moist mucus membranes; no rashes or lesions present. Skin Intact, No breakdown noted.    Musculoskeletal:  Normal ROM noted; moves all extremeties well, No swelling or tenderness noted.    RESPIRATORY: Airway is open and patent, unlabored, spontaneous bilateral respirations; normal effort and rate.     CARDIAC: Normal rate and rhythm, no peripheral edema noted, capillary refill < 3 seconds.     ABDOMEN: Soft and non-tender upon palpation, no distention noted, no cramping    PULSES: 2+; symmetrical in all 4 extremities    NEUROLOGIC: PERRLA, symmetrical facial expression; normal sensation to all 4 extremities.      Patient complains of vaginal bleeding since 0500 this am; pt denies abdominal cramping at this time. Call light within reach, VSS, will continue to monitor.

## 2019-09-16 ENCOUNTER — TELEPHONE (OUTPATIENT)
Dept: OBSTETRICS AND GYNECOLOGY | Facility: CLINIC | Age: 34
End: 2019-09-16

## 2019-09-16 LAB
C TRACH DNA SPEC QL NAA+PROBE: NOT DETECTED
HAV IGM SERPL QL IA: NEGATIVE
HBV CORE IGM SERPL QL IA: NEGATIVE
HBV SURFACE AG SERPL QL IA: NEGATIVE
HCV AB SERPL QL IA: NEGATIVE
N GONORRHOEA DNA SPEC QL NAA+PROBE: NOT DETECTED

## 2019-09-16 NOTE — TELEPHONE ENCOUNTER
Called patient:    Discussed results of labs:    9/13/19 BHCG 5.9,  Progesterone .6    Because of heavy bleeding, she was seen in the ER 9/14/19 at which time BHCG was 4.5    Now, her bleeding has decreased to the flow of a light period.    Blood type A+    We discussed the suspected early SAB.    To come in for evaluation / discussion.

## 2019-09-17 ENCOUNTER — PATIENT OUTREACH (OUTPATIENT)
Dept: ADMINISTRATIVE | Facility: OTHER | Age: 34
End: 2019-09-17

## 2019-09-19 ENCOUNTER — OFFICE VISIT (OUTPATIENT)
Dept: OBSTETRICS AND GYNECOLOGY | Facility: CLINIC | Age: 34
End: 2019-09-19
Attending: OBSTETRICS & GYNECOLOGY
Payer: COMMERCIAL

## 2019-09-19 VITALS
SYSTOLIC BLOOD PRESSURE: 124 MMHG | BODY MASS INDEX: 34.28 KG/M2 | WEIGHT: 181.44 LBS | DIASTOLIC BLOOD PRESSURE: 92 MMHG

## 2019-09-19 DIAGNOSIS — O03.9 SAB (SPONTANEOUS ABORTION): Primary | ICD-10-CM

## 2019-09-19 PROCEDURE — 99999 PR PBB SHADOW E&M-EST. PATIENT-LVL II: CPT | Mod: PBBFAC,,, | Performed by: OBSTETRICS & GYNECOLOGY

## 2019-09-19 PROCEDURE — 99213 PR OFFICE/OUTPT VISIT, EST, LEVL III, 20-29 MIN: ICD-10-PCS | Mod: S$GLB,,, | Performed by: OBSTETRICS & GYNECOLOGY

## 2019-09-19 PROCEDURE — 3008F BODY MASS INDEX DOCD: CPT | Mod: CPTII,S$GLB,, | Performed by: OBSTETRICS & GYNECOLOGY

## 2019-09-19 PROCEDURE — 99213 OFFICE O/P EST LOW 20 MIN: CPT | Mod: S$GLB,,, | Performed by: OBSTETRICS & GYNECOLOGY

## 2019-09-19 PROCEDURE — 3008F PR BODY MASS INDEX (BMI) DOCUMENTED: ICD-10-PCS | Mod: CPTII,S$GLB,, | Performed by: OBSTETRICS & GYNECOLOGY

## 2019-09-19 PROCEDURE — 99999 PR PBB SHADOW E&M-EST. PATIENT-LVL II: ICD-10-PCS | Mod: PBBFAC,,, | Performed by: OBSTETRICS & GYNECOLOGY

## 2019-09-19 RX ORDER — ACETAMINOPHEN AND CODEINE PHOSPHATE 120; 12 MG/5ML; MG/5ML
1 SOLUTION ORAL
COMMUNITY
End: 2020-05-14 | Stop reason: ALTCHOICE

## 2019-09-19 RX ORDER — FLUOXETINE 10 MG/1
10 TABLET ORAL
COMMUNITY
End: 2021-03-16 | Stop reason: SDUPTHER

## 2019-09-19 NOTE — PROGRESS NOTES
Chief Complaint   Patient presents with    Threatened Miscarriage       HPI:  King Dempsey is a 34 y.o. female patient  who presents today for follow-up after a miscarriage.  LMP occurred about 19.  Menses generally occur every 28-30 days, lasting 4 days in duration.  On 19 BHCG was 5.9 with progesterone .6.  The following day, she began bleeding like a period and was seen in the ER at which time BHCG had declined to 4.5.  Over the next several days, her bleeding decreased and now she is not bleeding. No pelvic pain.  No fever.  No LMP recorded.     Blood type A+    BHCG 5.9 ---> 4.5  Progesterone .6    Pap 18: Negative      Past Medical History:   Diagnosis Date    Abnormal Pap smear     Colposcopy    Menarche     Age of onset 11    Mitral valve prolapse        Past Surgical History:   Procedure Laterality Date     SECTION      MITRAL VALVE REPAIR           ROS:  GENERAL: Feeling well overall.   SKIN: Denies rash or lesions.   HEAD: Denies head injury or headache.   NODES: Denies enlarged lymph nodes.   CHEST: Denies chest pain or shortness of breath.   CARDIOVASCULAR: Denies palpitations or left sided chest pain.   ABDOMEN: No abdominal pain, nausea, vomiting or rectal bleeding.   URINARY: No dysuria or hematuria.  REPRODUCTIVE: See HPI.   BREASTS: Denies pain, lumps, or nipple discharge.   HEMATOLOGIC: No easy bruisability or excessive bleeding.   MUSCULOSKELETAL: Denies joint pain or swelling.   NEUROLOGIC: Denies syncope or weakness.   PSYCHIATRIC: Denies depression.    PE:   (chaperone present during entire exam)  APPEARANCE: Well nourished, well developed, in no acute distress.  ABDOMEN: Soft. No tenderness or masses.   VULVA: No lesions. Normal female genitalia.  URETHRAL MEATUS: Normal size and location, no lesions, no prolapse.  URETHRA: No masses, tenderness, prolapse or scarring.  VAGINA: Moist and well rugated, no blood, no abnormal discharge.  CERVIX: No lesions and  discharge. Closed.  UTERUS: Normal size, regular shape, mobile, non-tender, bladder base nontender.  ADNEXA: No masses, tenderness or CDS nodularity.  ANUS PERINEUM: Normal.    Diagnosis:  1. SAB (spontaneous )          PLAN:    Orders Placed This Encounter    Progesterone       Patient was counseled today on her recent early SAB.  Her hormone level has decreased to negative.  We reviewed the various etiologies for miscarriages.  She is concerned about her progesterone level.  We discussed the fact that, most likely, her progesterone level was low secondary to a pregnancy that was not progressing properly, not a luteal phase deficiency.  She will have progesterone level checked on day 21 of an upcomming cycle.  She plans to attempt pregnancy again beginning next year.    To call us with start of her period to schedule day 21 progesterone   Follow-up for annual exam.    Total time of visit: 20 minutes (counseling >75% of time)

## 2019-11-11 ENCOUNTER — PATIENT MESSAGE (OUTPATIENT)
Dept: OBSTETRICS AND GYNECOLOGY | Facility: CLINIC | Age: 34
End: 2019-11-11

## 2019-12-02 ENCOUNTER — LAB VISIT (OUTPATIENT)
Dept: LAB | Facility: HOSPITAL | Age: 34
End: 2019-12-02
Attending: OBSTETRICS & GYNECOLOGY
Payer: COMMERCIAL

## 2019-12-02 DIAGNOSIS — O03.9 SAB (SPONTANEOUS ABORTION): ICD-10-CM

## 2019-12-02 LAB — PROGEST SERPL-MCNC: 8 NG/ML

## 2019-12-02 PROCEDURE — 84144 ASSAY OF PROGESTERONE: CPT

## 2019-12-02 PROCEDURE — 36415 COLL VENOUS BLD VENIPUNCTURE: CPT

## 2019-12-04 ENCOUNTER — PATIENT MESSAGE (OUTPATIENT)
Dept: OBSTETRICS AND GYNECOLOGY | Facility: CLINIC | Age: 34
End: 2019-12-04

## 2019-12-04 DIAGNOSIS — R79.89 LOW SERUM PROGESTERONE: Primary | ICD-10-CM

## 2019-12-04 NOTE — TELEPHONE ENCOUNTER
Called patient:    Discussed results of Day #21 progesterone: 8      REC: recheck with next cycle  - she will call us to schedule - orders entered.

## 2020-04-21 DIAGNOSIS — Z01.84 ANTIBODY RESPONSE EXAMINATION: ICD-10-CM

## 2020-05-13 ENCOUNTER — PATIENT MESSAGE (OUTPATIENT)
Dept: INTERNAL MEDICINE | Facility: CLINIC | Age: 35
End: 2020-05-13

## 2020-05-14 ENCOUNTER — LAB VISIT (OUTPATIENT)
Dept: LAB | Facility: HOSPITAL | Age: 35
End: 2020-05-14
Attending: FAMILY MEDICINE
Payer: COMMERCIAL

## 2020-05-14 ENCOUNTER — OFFICE VISIT (OUTPATIENT)
Dept: INTERNAL MEDICINE | Facility: CLINIC | Age: 35
End: 2020-05-14
Attending: FAMILY MEDICINE
Payer: COMMERCIAL

## 2020-05-14 VITALS
TEMPERATURE: 99 F | BODY MASS INDEX: 35.8 KG/M2 | SYSTOLIC BLOOD PRESSURE: 124 MMHG | HEART RATE: 88 BPM | DIASTOLIC BLOOD PRESSURE: 82 MMHG | OXYGEN SATURATION: 99 % | WEIGHT: 189.63 LBS | HEIGHT: 61 IN

## 2020-05-14 DIAGNOSIS — R00.1 BRADYCARDIA: ICD-10-CM

## 2020-05-14 DIAGNOSIS — Z00.00 ANNUAL PHYSICAL EXAM: ICD-10-CM

## 2020-05-14 DIAGNOSIS — H54.7 VISUAL LOSS: ICD-10-CM

## 2020-05-14 DIAGNOSIS — Z01.84 ANTIBODY RESPONSE EXAMINATION: ICD-10-CM

## 2020-05-14 DIAGNOSIS — Z98.890 STATUS POST MITRAL VALVE ANNULOPLASTY: ICD-10-CM

## 2020-05-14 DIAGNOSIS — R00.1 BRADYCARDIA: Primary | ICD-10-CM

## 2020-05-14 LAB
ALBUMIN SERPL BCP-MCNC: 4.2 G/DL (ref 3.5–5.2)
ALP SERPL-CCNC: 68 U/L (ref 55–135)
ALT SERPL W/O P-5'-P-CCNC: 12 U/L (ref 10–44)
ANION GAP SERPL CALC-SCNC: 10 MMOL/L (ref 8–16)
AST SERPL-CCNC: 27 U/L (ref 10–40)
BILIRUB SERPL-MCNC: 0.7 MG/DL (ref 0.1–1)
BUN SERPL-MCNC: 8 MG/DL (ref 6–20)
CALCIUM SERPL-MCNC: 9.7 MG/DL (ref 8.7–10.5)
CHLORIDE SERPL-SCNC: 106 MMOL/L (ref 95–110)
CHOLEST SERPL-MCNC: 191 MG/DL (ref 120–199)
CHOLEST/HDLC SERPL: 4 {RATIO} (ref 2–5)
CO2 SERPL-SCNC: 24 MMOL/L (ref 23–29)
CREAT SERPL-MCNC: 0.7 MG/DL (ref 0.5–1.4)
EST. GFR  (AFRICAN AMERICAN): >60 ML/MIN/1.73 M^2
EST. GFR  (NON AFRICAN AMERICAN): >60 ML/MIN/1.73 M^2
GLUCOSE SERPL-MCNC: 80 MG/DL (ref 70–110)
HDLC SERPL-MCNC: 48 MG/DL (ref 40–75)
HDLC SERPL: 25.1 % (ref 20–50)
LDLC SERPL CALC-MCNC: 114 MG/DL (ref 63–159)
NONHDLC SERPL-MCNC: 143 MG/DL
POTASSIUM SERPL-SCNC: 4.3 MMOL/L (ref 3.5–5.1)
PROT SERPL-MCNC: 7.7 G/DL (ref 6–8.4)
SARS-COV-2 IGG SERPLBLD QL IA.RAPID: NEGATIVE
SODIUM SERPL-SCNC: 140 MMOL/L (ref 136–145)
TRIGL SERPL-MCNC: 145 MG/DL (ref 30–150)
TSH SERPL DL<=0.005 MIU/L-ACNC: 0.76 UIU/ML (ref 0.4–4)

## 2020-05-14 PROCEDURE — 36415 COLL VENOUS BLD VENIPUNCTURE: CPT

## 2020-05-14 PROCEDURE — 80061 LIPID PANEL: CPT

## 2020-05-14 PROCEDURE — 80053 COMPREHEN METABOLIC PANEL: CPT

## 2020-05-14 PROCEDURE — 3079F PR MOST RECENT DIASTOLIC BLOOD PRESSURE 80-89 MM HG: ICD-10-PCS | Mod: CPTII,S$GLB,, | Performed by: FAMILY MEDICINE

## 2020-05-14 PROCEDURE — 99999 PR PBB SHADOW E&M-EST. PATIENT-LVL V: ICD-10-PCS | Mod: PBBFAC,,, | Performed by: FAMILY MEDICINE

## 2020-05-14 PROCEDURE — 99395 PREV VISIT EST AGE 18-39: CPT | Mod: S$GLB,,, | Performed by: FAMILY MEDICINE

## 2020-05-14 PROCEDURE — 3074F PR MOST RECENT SYSTOLIC BLOOD PRESSURE < 130 MM HG: ICD-10-PCS | Mod: CPTII,S$GLB,, | Performed by: FAMILY MEDICINE

## 2020-05-14 PROCEDURE — 99999 PR PBB SHADOW E&M-EST. PATIENT-LVL V: CPT | Mod: PBBFAC,,, | Performed by: FAMILY MEDICINE

## 2020-05-14 PROCEDURE — 3074F SYST BP LT 130 MM HG: CPT | Mod: CPTII,S$GLB,, | Performed by: FAMILY MEDICINE

## 2020-05-14 PROCEDURE — 84443 ASSAY THYROID STIM HORMONE: CPT

## 2020-05-14 PROCEDURE — 86769 SARS-COV-2 COVID-19 ANTIBODY: CPT

## 2020-05-14 PROCEDURE — 3079F DIAST BP 80-89 MM HG: CPT | Mod: CPTII,S$GLB,, | Performed by: FAMILY MEDICINE

## 2020-05-14 PROCEDURE — 99395 PR PREVENTIVE VISIT,EST,18-39: ICD-10-PCS | Mod: S$GLB,,, | Performed by: FAMILY MEDICINE

## 2020-05-14 NOTE — PATIENT INSTRUCTIONS
See Cardiology department orders and please call patient to schedule for ordered test. Thank you.    Please see referral orders and please call patient to schedule a consult with cardiology. Thank you.    Schedule lab orders for today.

## 2020-05-14 NOTE — PROGRESS NOTES
Subjective:       Patient ID: King Dempsey is a 35 y.o. female.    Chief Complaint: Follow-up    Established patient follows up for management of chronic medical illnesses with complaints today. Please see dictation and ROS for interval problems, specific complaints and disease management discussion.    P, S, Fm, Soc Hx's; Meds, allergies reviewed and reconciled.  Health maintenance file reviewed and addressed items due.      Established patient for an annual wellness check/physical exam and also chronic disease management. Specific complaints - see dictation and please see ROS.  P, S, Fm, Soc Hx's; Meds, allergies reviewed and reconciled.  Health maintenance file reviewed and addressed items due.    Concerns for bradycardia over the past few days.  Noticed incidentally with a pulse device she wears on her wrist.  Noted down into the 40s last night when she woke.  No associated syncope, near syncope.  Possible mild lightheadedness.  No chest pain, no dyspnea.  She has had a previous cardiac workup and is followed by an out of system cardiologist for previous valvular disorder.    Approximately 3 weeks ago noted loss of vision right eye transient.  Fully recovered.  Also has floaters.  Lasted 15 min then resolved.  No prior issues of this magnitude though may have had some previous possible migraine symptoms.    Shortness of Breath   This is a new problem. The current episode started yesterday. The problem occurs daily. The problem has been waxing and waning. The average episode lasts 10 minutes. Associated symptoms include claudication, coryza and headaches. Pertinent negatives include no abdominal pain, chest pain, ear pain, fever, hemoptysis, leg pain, leg swelling, neck pain, orthopnea, PND, rash, rhinorrhea, sore throat, sputum production, swollen glands, syncope, vomiting or wheezing. The symptoms are aggravated by any activity. The patient has no known risk factors for DVT/PE. She has tried rest for the  symptoms. Her past medical history is significant for allergies and CAD. There is no history of aspirin allergies, asthma, bronchiolitis, chronic lung disease, COPD, DVT, a heart failure, PE, pneumonia or a recent surgery.     Review of Systems   Constitutional: Negative for chills, fatigue, fever and unexpected weight change.   HENT: Negative for congestion, ear pain, rhinorrhea, sore throat and trouble swallowing.    Eyes: Positive for visual disturbance. Negative for redness.   Respiratory: Positive for shortness of breath. Negative for cough, hemoptysis, sputum production, chest tightness and wheezing.    Cardiovascular: Positive for claudication. Negative for chest pain, palpitations, orthopnea, leg swelling, syncope and PND.   Gastrointestinal: Negative for abdominal pain, blood in stool and vomiting.   Genitourinary: Negative for difficulty urinating, hematuria and menstrual problem.   Musculoskeletal: Negative for arthralgias, back pain, gait problem, joint swelling, myalgias and neck pain.   Skin: Negative for color change and rash.   Neurological: Positive for light-headedness and headaches. Negative for tremors, speech difficulty, weakness and numbness.   Hematological: Negative for adenopathy. Does not bruise/bleed easily.   Psychiatric/Behavioral: Negative for behavioral problems, confusion and sleep disturbance. The patient is not nervous/anxious.        Objective:      Physical Exam   Constitutional: She is oriented to person, place, and time. She appears well-developed and well-nourished.   HENT:   Head: Normocephalic and atraumatic.   Eyes: Conjunctivae are normal. No scleral icterus.   Neck: Normal range of motion. Neck supple.   Cardiovascular: Normal rate, normal heart sounds and intact distal pulses. Exam reveals no gallop and no friction rub.   No murmur heard.  Neg bruits   Pulmonary/Chest: Effort normal and breath sounds normal. No respiratory distress. She has no wheezes. She has no rales.    Abdominal: She exhibits no distension and no abdominal bruit. There is no tenderness.   Musculoskeletal: She exhibits no edema or deformity.   Neurological: She is alert and oriented to person, place, and time. She displays no tremor. No cranial nerve deficit. Coordination and gait normal.   Skin: Skin is warm and dry. No rash noted. She is not diaphoretic. No erythema.   Psychiatric: She has a normal mood and affect. Her behavior is normal. Judgment and thought content normal.   Nursing note and vitals reviewed.      Assessment:       1. Bradycardia    2. Status post mitral valve annuloplasty    3. Visual loss    4. Annual physical exam        Plan:     Medication List with Changes/Refills   Current Medications    FLUOXETINE 10 MG TAB    Take 10 mg by mouth.    NAPROXEN (NAPROSYN) 500 MG TABLET    Take 1 tablet (500 mg total) by mouth 2 (two) times daily with meals.   Discontinued Medications    FLUOXETINE 10 MG CAPSULE    Take 1 capsule (10 mg total) by mouth once daily.    NORETHINDRONE (MICRONOR) 0.35 MG TABLET    Take 1 tablet by mouth.     King was seen today for follow-up.    Diagnoses and all orders for this visit:    Bradycardia  -     Holter monitor - 48 hour; Future  -     Ambulatory referral/consult to Cardiology; Future  -     EKG 12-lead; Future  -     TSH; Future    Status post mitral valve annuloplasty  -     Ambulatory referral/consult to Cardiology; Future    Visual loss  -     Ambulatory referral/consult to Ophthalmology; Future    Annual physical exam  -     EKG 12-lead; Future  -     Lipid Panel; Future  -     Comprehensive metabolic panel; Future  -     TSH; Future      See meds, orders, follow up, routing and instructions sections of encounter and AVS. Discussed with patient and provided on AVS.    Given the acuity of patient's complaints, complexity and risk of acute or chronic disease states, and the necessity to perform aspects of an exam that cannot be accomplished 'virtually', I  consider patient's visit medically necessary. All precautions including masks, distancing, room cleansing, etc were employed.    Patient states she has an existing appointment with an ophthalmologist.  I recommend she keep that.  She is overdue for follow-up with her cardiologist.  Check EKG and 48 hr Holter monitor.  I suspect this is physiologic but will check.

## 2020-05-14 NOTE — Clinical Note
See Cardiology department orders and please call patient to schedule for ordered test. Thank you.Please see referral orders and please call patient to schedule a consult with cardiology. Thank you.Schedule lab orders for today.

## 2020-05-25 ENCOUNTER — PATIENT OUTREACH (OUTPATIENT)
Dept: ADMINISTRATIVE | Facility: OTHER | Age: 35
End: 2020-05-25

## 2020-05-27 ENCOUNTER — HOSPITAL ENCOUNTER (OUTPATIENT)
Dept: CARDIOLOGY | Facility: CLINIC | Age: 35
Discharge: HOME | End: 2020-05-27
Attending: FAMILY MEDICINE
Payer: COMMERCIAL

## 2020-05-27 ENCOUNTER — OFFICE VISIT (OUTPATIENT)
Dept: OPTOMETRY | Facility: CLINIC | Age: 35
End: 2020-05-27

## 2020-05-27 ENCOUNTER — CLINICAL SUPPORT (OUTPATIENT)
Dept: CARDIOLOGY | Facility: HOSPITAL | Age: 35
End: 2020-05-27
Attending: FAMILY MEDICINE
Payer: COMMERCIAL

## 2020-05-27 ENCOUNTER — OFFICE VISIT (OUTPATIENT)
Dept: OPTOMETRY | Facility: CLINIC | Age: 35
End: 2020-05-27
Payer: COMMERCIAL

## 2020-05-27 DIAGNOSIS — H54.7 VISUAL LOSS: ICD-10-CM

## 2020-05-27 DIAGNOSIS — R00.1 BRADYCARDIA: ICD-10-CM

## 2020-05-27 DIAGNOSIS — H53.10 SUBJECTIVE VISUAL DISTURBANCE, RIGHT EYE: ICD-10-CM

## 2020-05-27 DIAGNOSIS — Z00.00 ANNUAL PHYSICAL EXAM: ICD-10-CM

## 2020-05-27 DIAGNOSIS — Z04.9 DISEASE RULED OUT AFTER EXAMINATION: ICD-10-CM

## 2020-05-27 DIAGNOSIS — H52.03 HYPEROPIA, BILATERAL: Primary | ICD-10-CM

## 2020-05-27 DIAGNOSIS — Z46.0 FITTING AND ADJUSTMENT OF SPECTACLES AND CONTACT LENSES: Primary | ICD-10-CM

## 2020-05-27 PROCEDURE — 93005 ELECTROCARDIOGRAM TRACING: CPT | Mod: S$GLB,,, | Performed by: FAMILY MEDICINE

## 2020-05-27 PROCEDURE — 99999 PR PBB SHADOW E&M-EST. PATIENT-LVL II: ICD-10-PCS | Mod: PBBFAC,,, | Performed by: OPTOMETRIST

## 2020-05-27 PROCEDURE — 93225 XTRNL ECG REC<48 HRS REC: CPT

## 2020-05-27 PROCEDURE — 93010 EKG 12-LEAD: ICD-10-PCS | Mod: S$GLB,,, | Performed by: INTERNAL MEDICINE

## 2020-05-27 PROCEDURE — 92004 PR EYE EXAM, NEW PATIENT,COMPREHESV: ICD-10-PCS | Mod: S$GLB,,, | Performed by: OPTOMETRIST

## 2020-05-27 PROCEDURE — 92015 DETERMINE REFRACTIVE STATE: CPT | Mod: S$GLB,,, | Performed by: OPTOMETRIST

## 2020-05-27 PROCEDURE — 92004 COMPRE OPH EXAM NEW PT 1/>: CPT | Mod: S$GLB,,, | Performed by: OPTOMETRIST

## 2020-05-27 PROCEDURE — 93010 ELECTROCARDIOGRAM REPORT: CPT | Mod: S$GLB,,, | Performed by: INTERNAL MEDICINE

## 2020-05-27 PROCEDURE — 99499 UNLISTED E&M SERVICE: CPT | Mod: S$GLB,,, | Performed by: OPTOMETRIST

## 2020-05-27 PROCEDURE — 93227 XTRNL ECG REC<48 HR R&I: CPT | Mod: ,,, | Performed by: INTERNAL MEDICINE

## 2020-05-27 PROCEDURE — 93227 HOLTER MONITOR - 48 HOUR (CUPID ONLY): ICD-10-PCS | Mod: ,,, | Performed by: INTERNAL MEDICINE

## 2020-05-27 PROCEDURE — 99999 PR PBB SHADOW E&M-EST. PATIENT-LVL II: CPT | Mod: PBBFAC,,, | Performed by: OPTOMETRIST

## 2020-05-27 PROCEDURE — 99499 NO LOS: ICD-10-PCS | Mod: S$GLB,,, | Performed by: OPTOMETRIST

## 2020-05-27 PROCEDURE — 92015 PR REFRACTION: ICD-10-PCS | Mod: S$GLB,,, | Performed by: OPTOMETRIST

## 2020-05-27 PROCEDURE — 93005 EKG 12-LEAD: ICD-10-PCS | Mod: S$GLB,,, | Performed by: FAMILY MEDICINE

## 2020-05-27 NOTE — ADDENDUM NOTE
Addended by: GUILLAUME YOUNG on: 5/27/2020 01:13 PM     Modules accepted: Level of Service, SmartSet

## 2020-05-27 NOTE — PROGRESS NOTES
HPI     Last eye exam was 4/11/16 with     Pt states 3 weeks ago she lost complete vision OD for about 15-20 minutes.     Pt states that her VA slowly started to come back after the 20 minutes.   Has not happened since.  Pt does have floaters OU  Pt states occasionally her vision gets super fuzzy when she has migraines.     Patient denies diplopia, headaches, flashes, and pain.  No eye drops, and no eye sx.     Last edited by Tara Acevedo on 5/27/2020 12:55 PM. (History)            Assessment /Plan     For exam results, see Encounter Report.    Hyperopia, bilateral    Transient Visual loss  -  Subjective visual disturbance, right eye    Disease ruled out after examination   Good overall ocular health, monitor yearly

## 2020-05-29 LAB
OHS CV EVENT MONITOR DAY: 0
OHS CV HOLTER LENGTH DECIMAL HOURS: 48
OHS CV HOLTER LENGTH HOURS: 48
OHS CV HOLTER LENGTH MINUTES: 0

## 2020-06-03 ENCOUNTER — PATIENT OUTREACH (OUTPATIENT)
Dept: ADMINISTRATIVE | Facility: OTHER | Age: 35
End: 2020-06-03

## 2020-06-04 PROBLEM — R00.1 BRADYCARDIA: Status: ACTIVE | Noted: 2020-06-04

## 2020-06-04 NOTE — PROGRESS NOTES
Subjective:   Patient ID:  King Dempsey is a 35 y.o. female who presents for evaluation of VHD    HPI: Patient is here for valvular heart disease.I saw her years ago The patient has no chest pain, TIA, palpitations, syncope or pre-syncope.Patient does not exercise a lot.She has gained weight but has chronic SOB/BLANK but worse lately.        Review of Systems   Constitution: Negative for chills, decreased appetite, diaphoresis, fever, malaise/fatigue, night sweats, weight gain and weight loss.   HENT: Negative for congestion, hoarse voice, nosebleeds, sore throat and tinnitus.    Eyes: Negative for blurred vision, double vision, vision loss in left eye, vision loss in right eye, visual disturbance and visual halos.   Cardiovascular: Positive for dyspnea on exertion. Negative for chest pain, claudication, cyanosis, irregular heartbeat, leg swelling, near-syncope, orthopnea, palpitations, paroxysmal nocturnal dyspnea and syncope.   Respiratory: Positive for shortness of breath. Negative for cough, hemoptysis, sleep disturbances due to breathing, snoring, sputum production and wheezing.    Endocrine: Negative for cold intolerance, heat intolerance, polydipsia, polyphagia and polyuria.   Hematologic/Lymphatic: Negative for adenopathy and bleeding problem. Does not bruise/bleed easily.   Skin: Negative for color change, dry skin, flushing, itching, nail changes, poor wound healing, rash, skin cancer, suspicious lesions and unusual hair distribution.   Musculoskeletal: Negative for arthritis, back pain, falls, gout, joint pain, joint swelling, muscle cramps, muscle weakness, myalgias and stiffness.   Gastrointestinal: Negative for abdominal pain, anorexia, change in bowel habit, constipation, diarrhea, dysphagia, heartburn, hematemesis, hematochezia, melena and vomiting.   Genitourinary: Negative for decreased libido, dysuria, hematuria, hesitancy and urgency.   Neurological: Negative for excessive daytime sleepiness,  "dizziness, focal weakness, headaches, light-headedness, loss of balance, numbness, paresthesias, seizures, sensory change, tremors, vertigo and weakness.   Psychiatric/Behavioral: Negative for altered mental status, depression, hallucinations, memory loss, substance abuse and suicidal ideas. The patient does not have insomnia and is not nervous/anxious.    Allergic/Immunologic: Negative for environmental allergies and hives.       Objective: /87 (BP Location: Left arm, Patient Position: Sitting, BP Method: Large (Automatic))   Pulse 87   Ht 5' 1" (1.549 m)   Wt 86.8 kg (191 lb 5.8 oz)   BMI 36.16 kg/m²      Physical Exam   Constitutional: She is oriented to person, place, and time. She appears well-developed and well-nourished.   HENT:   Head: Normocephalic.   Eyes: Pupils are equal, round, and reactive to light. EOM are normal.   Neck: Normal range of motion. Normal carotid pulses, no hepatojugular reflux and no JVD present. Carotid bruit is not present. No thyromegaly present.   Cardiovascular: Normal rate, regular rhythm, normal heart sounds and intact distal pulses. Exam reveals no gallop and no friction rub.   No murmur heard.  Pulmonary/Chest: Effort normal and breath sounds normal. No tachypnea. No respiratory distress. She has no wheezes. She has no rales. She exhibits no tenderness.   Abdominal: Soft. Bowel sounds are normal. She exhibits no distension and no mass. There is no tenderness. There is no rebound and no guarding.   Musculoskeletal: Normal range of motion. She exhibits no edema or tenderness.   Lymphadenopathy:     She has no cervical adenopathy.   Neurological: She is alert and oriented to person, place, and time. No cranial nerve deficit. Coordination normal.   Skin: Skin is warm. No rash noted. No erythema.   Psychiatric: She has a normal mood and affect. Her behavior is normal. Judgment and thought content normal.       Assessment:     1. Status post mitral valve annuloplasty    2. " Heart palpitations    3. Mitral valve prolapse    4. Bradycardia    5. Vitamin D deficiency disease    6. Shortness of breath        Plan:   Discussed diet , achieving and maintaining ideal body weight, and exercise.   We reviewed meds in detail.  Reassured-Discussed goals, options plan  We have discussed the need for endocarditis prophylaxis-Amoxicillin 500 mg 4 pills 12 hour before dental.  Vit D 50,000 IU twice weekly for 6 weeks then weekly  D3 5000 IU daily           King was seen today for bradycardia and status post mitral valve annuloplasty.    Diagnoses and all orders for this visit:    Status post mitral valve annuloplasty  -     Ambulatory referral/consult to Cardiology  -     EKG 12-lead; Future; Expected date: 11/05/2021  -     amoxicillin (AMOXIL) 500 MG Tab; Take 1 tablet (500 mg total) by mouth every 12 (twelve) hours. for 10 days  -     Echo Color Flow Doppler? Yes; Future; Expected date: 06/06/2020    Heart palpitations  -     EKG 12-lead; Future; Expected date: 11/05/2021  -     Echo Color Flow Doppler? Yes; Future; Expected date: 06/06/2020    Mitral valve prolapse  -     EKG 12-lead; Future; Expected date: 11/05/2021    Bradycardia  -     Ambulatory referral/consult to Cardiology  -     EKG 12-lead; Future; Expected date: 11/05/2021    Vitamin D deficiency disease  -     Vitamin D; Standing  -     ergocalciferol (ERGOCALCIFEROL) 50,000 unit Cap; Take 1 capsule (50,000 Units total) by mouth every 7 days.    Shortness of breath  -     Echo Color Flow Doppler? Yes; Future; Expected date: 06/06/2020            Follow up in about 18 months (around 12/5/2021) for with ECG and D; Vit D 3 months.

## 2020-06-05 ENCOUNTER — OFFICE VISIT (OUTPATIENT)
Dept: CARDIOLOGY | Facility: CLINIC | Age: 35
End: 2020-06-05
Payer: COMMERCIAL

## 2020-06-05 VITALS
HEIGHT: 61 IN | HEART RATE: 87 BPM | SYSTOLIC BLOOD PRESSURE: 130 MMHG | BODY MASS INDEX: 36.13 KG/M2 | WEIGHT: 191.38 LBS | DIASTOLIC BLOOD PRESSURE: 87 MMHG

## 2020-06-05 DIAGNOSIS — R00.1 BRADYCARDIA: ICD-10-CM

## 2020-06-05 DIAGNOSIS — Z98.890 STATUS POST MITRAL VALVE ANNULOPLASTY: Primary | ICD-10-CM

## 2020-06-05 DIAGNOSIS — R00.2 HEART PALPITATIONS: ICD-10-CM

## 2020-06-05 DIAGNOSIS — E55.9 VITAMIN D DEFICIENCY DISEASE: ICD-10-CM

## 2020-06-05 DIAGNOSIS — R06.02 SHORTNESS OF BREATH: ICD-10-CM

## 2020-06-05 DIAGNOSIS — I34.1 MITRAL VALVE PROLAPSE: Chronic | ICD-10-CM

## 2020-06-05 PROCEDURE — 3075F SYST BP GE 130 - 139MM HG: CPT | Mod: CPTII,S$GLB,, | Performed by: INTERNAL MEDICINE

## 2020-06-05 PROCEDURE — 99999 PR PBB SHADOW E&M-EST. PATIENT-LVL IV: ICD-10-PCS | Mod: PBBFAC,,, | Performed by: INTERNAL MEDICINE

## 2020-06-05 PROCEDURE — 3008F BODY MASS INDEX DOCD: CPT | Mod: CPTII,S$GLB,, | Performed by: INTERNAL MEDICINE

## 2020-06-05 PROCEDURE — 3079F PR MOST RECENT DIASTOLIC BLOOD PRESSURE 80-89 MM HG: ICD-10-PCS | Mod: CPTII,S$GLB,, | Performed by: INTERNAL MEDICINE

## 2020-06-05 PROCEDURE — 99204 PR OFFICE/OUTPT VISIT, NEW, LEVL IV, 45-59 MIN: ICD-10-PCS | Mod: S$GLB,,, | Performed by: INTERNAL MEDICINE

## 2020-06-05 PROCEDURE — 99204 OFFICE O/P NEW MOD 45 MIN: CPT | Mod: S$GLB,,, | Performed by: INTERNAL MEDICINE

## 2020-06-05 PROCEDURE — 3075F PR MOST RECENT SYSTOLIC BLOOD PRESS GE 130-139MM HG: ICD-10-PCS | Mod: CPTII,S$GLB,, | Performed by: INTERNAL MEDICINE

## 2020-06-05 PROCEDURE — 3079F DIAST BP 80-89 MM HG: CPT | Mod: CPTII,S$GLB,, | Performed by: INTERNAL MEDICINE

## 2020-06-05 PROCEDURE — 99999 PR PBB SHADOW E&M-EST. PATIENT-LVL IV: CPT | Mod: PBBFAC,,, | Performed by: INTERNAL MEDICINE

## 2020-06-05 PROCEDURE — 3008F PR BODY MASS INDEX (BMI) DOCUMENTED: ICD-10-PCS | Mod: CPTII,S$GLB,, | Performed by: INTERNAL MEDICINE

## 2020-06-05 RX ORDER — AMOXICILLIN 500 MG/1
500 TABLET, FILM COATED ORAL EVERY 12 HOURS
Qty: 12 TABLET | Refills: 3 | Status: SHIPPED | OUTPATIENT
Start: 2020-06-05 | End: 2020-06-15

## 2020-06-05 RX ORDER — ERGOCALCIFEROL 1.25 MG/1
50000 CAPSULE ORAL
Qty: 24 CAPSULE | Refills: 4 | Status: SHIPPED | OUTPATIENT
Start: 2020-06-05 | End: 2021-06-25

## 2020-06-05 NOTE — PATIENT INSTRUCTIONS
Discussed diet , achieving and maintaining ideal body weight, and exercise.   We reviewed meds in detail.  Reassured-Discussed goals, options plan  We have discussed the need for endocarditis prophylaxi-Amoxicillin 500 mg 4 pills 12 hour before dental.  Vit D 50,000 IU twice weekly for 6 weeks then weekly  D3 5000 IU daily

## 2020-06-12 ENCOUNTER — HOSPITAL ENCOUNTER (OUTPATIENT)
Dept: CARDIOLOGY | Facility: HOSPITAL | Age: 35
Discharge: HOME | End: 2020-06-12
Attending: INTERNAL MEDICINE
Payer: COMMERCIAL

## 2020-06-12 VITALS
WEIGHT: 191 LBS | BODY MASS INDEX: 36.06 KG/M2 | HEART RATE: 70 BPM | SYSTOLIC BLOOD PRESSURE: 130 MMHG | DIASTOLIC BLOOD PRESSURE: 85 MMHG | HEIGHT: 61 IN

## 2020-06-12 DIAGNOSIS — R06.02 SHORTNESS OF BREATH: ICD-10-CM

## 2020-06-12 DIAGNOSIS — Z98.890 STATUS POST MITRAL VALVE ANNULOPLASTY: ICD-10-CM

## 2020-06-12 DIAGNOSIS — R00.2 HEART PALPITATIONS: ICD-10-CM

## 2020-06-12 LAB
ASCENDING AORTA: 2.44 CM
AV INDEX (PROSTH): 0.92
AV MEAN GRADIENT: 4 MMHG
AV PEAK GRADIENT: 8 MMHG
AV VALVE AREA: 2.74 CM2
AV VELOCITY RATIO: 0.86
BSA FOR ECHO PROCEDURE: 1.93 M2
CV ECHO LV RWT: 0.37 CM
DOP CALC AO PEAK VEL: 1.42 M/S
DOP CALC AO VTI: 23.14 CM
DOP CALC LVOT AREA: 3 CM2
DOP CALC LVOT DIAMETER: 1.95 CM
DOP CALC LVOT PEAK VEL: 1.22 M/S
DOP CALC LVOT STROKE VOLUME: 63.49 CM3
DOP CALCLVOT PEAK VEL VTI: 21.27 CM
E/A RATIO: 0.95
ECHO LV POSTERIOR WALL: 0.7 CM (ref 0.6–1.1)
FRACTIONAL SHORTENING: 37 % (ref 28–44)
INTERVENTRICULAR SEPTUM: 0.69 CM (ref 0.6–1.1)
IVRT: 74.22 MSEC
LA MAJOR: 6.63 CM
LA MINOR: 6.26 CM
LA WIDTH: 4.15 CM
LEFT ATRIUM SIZE: 4.56 CM
LEFT ATRIUM VOLUME INDEX: 55.9 ML/M2
LEFT ATRIUM VOLUME: 103.58 CM3
LEFT INTERNAL DIMENSION IN SYSTOLE: 2.36 CM (ref 2.1–4)
LEFT VENTRICLE DIASTOLIC VOLUME INDEX: 32.37 ML/M2
LEFT VENTRICLE DIASTOLIC VOLUME: 59.96 ML
LEFT VENTRICLE MASS INDEX: 38 G/M2
LEFT VENTRICLE SYSTOLIC VOLUME INDEX: 10.5 ML/M2
LEFT VENTRICLE SYSTOLIC VOLUME: 19.4 ML
LEFT VENTRICULAR INTERNAL DIMENSION IN DIASTOLE: 3.75 CM (ref 3.5–6)
LEFT VENTRICULAR MASS: 69.71 G
MV MEAN GRADIENT: 8 MMHG
MV PEAK A VEL: 1.64 M/S
MV PEAK E VEL: 1.56 M/S
MV STENOSIS PRESSURE HALF TIME: 81 MS
MV VALVE AREA P 1/2 METHOD: 2.72 CM2
PISA TR MAX VEL: 2.27 M/S
PULM VEIN S/D RATIO: 1.81
PV PEAK D VEL: 0.26 M/S
PV PEAK S VEL: 0.47 M/S
RA MAJOR: 5.86 CM
RA PRESSURE: 3 MMHG
RA WIDTH: 4.14 CM
RIGHT VENTRICULAR END-DIASTOLIC DIMENSION: 3.77 CM
RV TISSUE DOPPLER FREE WALL SYSTOLIC VELOCITY 1 (APICAL 4 CHAMBER VIEW): 7.14 CM/S
SINUS: 2.89 CM
STJ: 2.18 CM
TR MAX PG: 21 MMHG
TRICUSPID ANNULAR PLANE SYSTOLIC EXCURSION: 1.26 CM
TV REST PULMONARY ARTERY PRESSURE: 24 MMHG

## 2020-06-12 PROCEDURE — 93306 TTE W/DOPPLER COMPLETE: CPT | Mod: 26,,, | Performed by: INTERNAL MEDICINE

## 2020-06-12 PROCEDURE — 93306 ECHO (CUPID ONLY): ICD-10-PCS | Mod: 26,,, | Performed by: INTERNAL MEDICINE

## 2020-06-12 PROCEDURE — 93306 TTE W/DOPPLER COMPLETE: CPT

## 2020-07-07 ENCOUNTER — OFFICE VISIT (OUTPATIENT)
Dept: INTERNAL MEDICINE | Facility: CLINIC | Age: 35
End: 2020-07-07
Payer: COMMERCIAL

## 2020-07-07 VITALS
BODY MASS INDEX: 36.17 KG/M2 | OXYGEN SATURATION: 99 % | WEIGHT: 191.56 LBS | DIASTOLIC BLOOD PRESSURE: 75 MMHG | SYSTOLIC BLOOD PRESSURE: 125 MMHG | HEIGHT: 61 IN | HEART RATE: 86 BPM

## 2020-07-07 DIAGNOSIS — N30.00 ACUTE CYSTITIS WITHOUT HEMATURIA: Primary | ICD-10-CM

## 2020-07-07 DIAGNOSIS — B37.9 CANDIDA ALBICANS INFECTION: ICD-10-CM

## 2020-07-07 DIAGNOSIS — T63.441A BEE STING, ACCIDENTAL OR UNINTENTIONAL, INITIAL ENCOUNTER: ICD-10-CM

## 2020-07-07 PROCEDURE — 3008F BODY MASS INDEX DOCD: CPT | Mod: CPTII,S$GLB,, | Performed by: STUDENT IN AN ORGANIZED HEALTH CARE EDUCATION/TRAINING PROGRAM

## 2020-07-07 PROCEDURE — 99213 PR OFFICE/OUTPT VISIT, EST, LEVL III, 20-29 MIN: ICD-10-PCS | Mod: S$GLB,,, | Performed by: STUDENT IN AN ORGANIZED HEALTH CARE EDUCATION/TRAINING PROGRAM

## 2020-07-07 PROCEDURE — 99999 PR PBB SHADOW E&M-EST. PATIENT-LVL III: CPT | Mod: PBBFAC,,, | Performed by: STUDENT IN AN ORGANIZED HEALTH CARE EDUCATION/TRAINING PROGRAM

## 2020-07-07 PROCEDURE — 3078F PR MOST RECENT DIASTOLIC BLOOD PRESSURE < 80 MM HG: ICD-10-PCS | Mod: CPTII,S$GLB,, | Performed by: STUDENT IN AN ORGANIZED HEALTH CARE EDUCATION/TRAINING PROGRAM

## 2020-07-07 PROCEDURE — 3078F DIAST BP <80 MM HG: CPT | Mod: CPTII,S$GLB,, | Performed by: STUDENT IN AN ORGANIZED HEALTH CARE EDUCATION/TRAINING PROGRAM

## 2020-07-07 PROCEDURE — 3074F PR MOST RECENT SYSTOLIC BLOOD PRESSURE < 130 MM HG: ICD-10-PCS | Mod: CPTII,S$GLB,, | Performed by: STUDENT IN AN ORGANIZED HEALTH CARE EDUCATION/TRAINING PROGRAM

## 2020-07-07 PROCEDURE — 3008F PR BODY MASS INDEX (BMI) DOCUMENTED: ICD-10-PCS | Mod: CPTII,S$GLB,, | Performed by: STUDENT IN AN ORGANIZED HEALTH CARE EDUCATION/TRAINING PROGRAM

## 2020-07-07 PROCEDURE — 3074F SYST BP LT 130 MM HG: CPT | Mod: CPTII,S$GLB,, | Performed by: STUDENT IN AN ORGANIZED HEALTH CARE EDUCATION/TRAINING PROGRAM

## 2020-07-07 PROCEDURE — 99999 PR PBB SHADOW E&M-EST. PATIENT-LVL III: ICD-10-PCS | Mod: PBBFAC,,, | Performed by: STUDENT IN AN ORGANIZED HEALTH CARE EDUCATION/TRAINING PROGRAM

## 2020-07-07 PROCEDURE — 99213 OFFICE O/P EST LOW 20 MIN: CPT | Mod: S$GLB,,, | Performed by: STUDENT IN AN ORGANIZED HEALTH CARE EDUCATION/TRAINING PROGRAM

## 2020-07-07 RX ORDER — SULFAMETHOXAZOLE AND TRIMETHOPRIM 800; 160 MG/1; MG/1
1 TABLET ORAL DAILY
Qty: 3 TABLET | Refills: 0 | Status: CANCELLED | OUTPATIENT
Start: 2020-07-07 | End: 2020-07-10

## 2020-07-07 RX ORDER — PHENAZOPYRIDINE HYDROCHLORIDE 200 MG/1
200 TABLET, FILM COATED ORAL 3 TIMES DAILY PRN
Qty: 6 TABLET | Refills: 0 | Status: SHIPPED | OUTPATIENT
Start: 2020-07-07 | End: 2020-07-09

## 2020-07-07 RX ORDER — NITROFURANTOIN (MACROCRYSTALS) 100 MG/1
100 CAPSULE ORAL EVERY 12 HOURS
Qty: 10 CAPSULE | Refills: 0 | Status: SHIPPED | OUTPATIENT
Start: 2020-07-07 | End: 2020-07-12

## 2020-07-07 RX ORDER — FLUCONAZOLE 150 MG/1
150 TABLET ORAL DAILY
Qty: 1 TABLET | Refills: 0 | Status: SHIPPED | OUTPATIENT
Start: 2020-07-07 | End: 2020-07-08

## 2020-07-07 RX ORDER — HYDROCORTISONE 25 MG/G
CREAM TOPICAL 2 TIMES DAILY
Qty: 30 G | Refills: 0 | Status: SHIPPED | OUTPATIENT
Start: 2020-07-07 | End: 2020-08-18

## 2020-07-07 NOTE — PROGRESS NOTES
King Dempsey  1985        Subjective     Chief Complaint:    History of Present Illness:  Ms. King Dempsey is a 35 y.o. female who presents to clinic for Urgent Care visit for dysuria. Pt of Dr. Hansen. 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 Developed burning with urination and mild suprapubic pain yesterday. No urgency yet. States it feels like her previous UTIs, last one she had was 1 yr ago. Denies any hematuria or discharge. No flank pain. No hx of pyelonephritis or UTIs requiring hospitalization or IV abx. No fevers/chills. Not recently sexually active and finished menstrual cycle a few days ago. Denies any new soaps or baths but did change laundry detergent last week. Also states she frequently gets yeast infections when she takes antibiotics, asking for prn tablet of diflucan.     Also thinks she was stung by bee on way in to work today. Felt pinch and swatted away some insect, picked stinger out from neck. No hx of anaphylaxis is to anything. Allergic to Doxycycline and tetanus vaccine but no allergies to foods/stings. Has been stung by bee when she was younger without any systemic reaction. Denies light-headedness, swelling, dizziness, tongue swelling, or SOB. Feeling well. Mild burning around neck. Feels redness has already decreased since it happened.     Review of Systems   Constitutional: Negative for chills, diaphoresis, fever and malaise/fatigue.   HENT: Negative for sore throat.    Eyes: Negative for photophobia.   Respiratory: Negative for cough, shortness of breath and wheezing.    Cardiovascular: Negative for chest pain, palpitations and leg swelling.   Gastrointestinal: Positive for nausea. Negative for abdominal pain and diarrhea.   Genitourinary: Positive for dysuria and frequency. Negative for flank pain, hematuria and urgency.   Skin: Positive for itching.   Neurological: Negative for  dizziness, loss of consciousness, weakness and headaches.        PAST HISTORY:     Past Medical History:   Diagnosis Date    Abnormal Pap smear     Colposcopy    Menarche     Age of onset 11    Mitral valve prolapse        Past Surgical History:   Procedure Laterality Date     SECTION      MITRAL VALVE REPAIR         Family History   Problem Relation Age of Onset    Heart disease Mother     Hypertension Mother     Hyperlipidemia Mother     Diabetes Mother     Hyperlipidemia Father     Hypertension Father     Breast cancer Paternal Aunt     Colon cancer Neg Hx     Ovarian cancer Neg Hx        Social History     Socioeconomic History    Marital status: Single     Spouse name: Not on file    Number of children: Not on file    Years of education: Not on file    Highest education level: Not on file   Occupational History    Occupation: xray tech   Social Needs    Financial resource strain: Not hard at all    Food insecurity     Worry: Never true     Inability: Never true    Transportation needs     Medical: No     Non-medical: No   Tobacco Use    Smoking status: Never Smoker    Smokeless tobacco: Never Used   Substance and Sexual Activity    Alcohol use: Yes     Frequency: 2-4 times a month     Drinks per session: 3 or 4     Binge frequency: Less than monthly     Comment: social    Drug use: No    Sexual activity: Yes     Partners: Male     Birth control/protection: None   Lifestyle    Physical activity     Days per week: Patient refused     Minutes per session: 0 min    Stress: Rather much   Relationships    Social connections     Talks on phone: More than three times a week     Gets together: Not on file     Attends Zoroastrian service: Not on file     Active member of club or organization: No     Attends meetings of clubs or organizations: Patient refused     Relationship status: Not on file   Other Topics Concern    Not on file   Social History Narrative    Not on file  "      MEDICATIONS & ALLERGIES:     Current Outpatient Medications on File Prior to Visit   Medication Sig    ergocalciferol (ERGOCALCIFEROL) 50,000 unit Cap Take 1 capsule (50,000 Units total) by mouth every 7 days.    FLUoxetine 10 MG Tab Take 10 mg by mouth.    naproxen (NAPROSYN) 500 MG tablet Take 1 tablet (500 mg total) by mouth 2 (two) times daily with meals.     No current facility-administered medications on file prior to visit.        Review of patient's allergies indicates:   Allergen Reactions    Doxycycline      Abdomen pain severe    Tetanus vaccines and toxoid Rash       OBJECTIVE:     Vital Signs:  Vitals:    07/07/20 0818   BP: 125/75   BP Location: Right arm   Patient Position: Sitting   BP Method: Large (Manual)   Pulse: 86   SpO2: 99%   Weight: 86.9 kg (191 lb 9.3 oz)   Height: 5' 1" (1.549 m)       Body mass index is 36.2 kg/m².     Physical Exam:  Physical Exam   Constitutional: She is oriented to person, place, and time and well-developed, well-nourished, and in no distress. No distress.   HENT:   Head: Normocephalic and atraumatic.   Eyes: Conjunctivae and EOM are normal.   Neck: Normal range of motion. Neck supple.   Small, 1 cm area of redness around left neck. Induration improving while in room. By time appointment finished, redness/swelling almost completely resolved   Cardiovascular: Normal rate.   Pulmonary/Chest: Effort normal and breath sounds normal. No respiratory distress. She has no wheezes.   Abdominal: Soft. She exhibits no distension. There is no abdominal tenderness. There is no rebound and no guarding.   No CVA tenderness   Musculoskeletal: Normal range of motion.         General: No tenderness or edema.   Neurological: She is alert and oriented to person, place, and time. Gait normal.   Skin: Skin is warm. She is not diaphoretic.   Nursing note and vitals reviewed.         Laboratory  Lab Results   Component Value Date    WBC 9.52 09/14/2019    HGB 12.8 09/14/2019    HCT " 41.3 09/14/2019    MCV 92 09/14/2019     (H) 09/14/2019     Lab Results   Component Value Date    GLU 80 05/14/2020     05/14/2020    K 4.3 05/14/2020     05/14/2020    CO2 24 05/14/2020    BUN 8 05/14/2020    CREATININE 0.7 05/14/2020    CALCIUM 9.7 05/14/2020     Lab Results   Component Value Date    INR 1.1 06/10/2006     No results found for: HGBA1C  No results for input(s): POCTGLUCOSE in the last 72 hours.        Health Maintenance       Date Due Completion Date    TETANUS VACCINE 12/18/2012 12/18/2002    Influenza Vaccine (1) 09/01/2020 10/11/2019    Cervical Cancer Screening 05/30/2021 5/30/2018          ASSESSMENT & PLAN:   Ms. King Dempsey is a 35 y.o. female who was seen today in clinic for UTI and bee sting.     King was seen today for cystitis and insect bite.    Diagnoses and all orders for this visit:    Acute cystitis without hematuria  -     phenazopyridine (PYRIDIUM) 200 MG tablet; Take 1 tablet (200 mg total) by mouth 3 (three) times daily as needed for Pain for 3 days  -     nitrofurantoin (MACRODANTIN) 100 MG capsule; Take 1 capsule (100 mg total) by mouth every 12 (twelve) hours. for 5 days  -     Counseled on need to increase PO water intake, limit scented detergents/soaps, urinate following intercourse to try and limit future UTIs  -     If no improvement in next few days, pt will call and let me know. At that time, can do UA with culture to change abx + STD/STI testing    Candida albicans infection  -     fluconazole (DIFLUCAN) 150 MG Tab; Take 1 tablet (150 mg total) by mouth once daily. for 1 day  -     Only take prn if symptoms of yeast infection following antibiotics     Bee sting, accidental or unintentional, initial encounter  -     hydrocortisone 2.5 % cream; Apply topically 2 (two) times daily. Apply to bee sting daily as needed for 3 days  -     Redness almost completely resolved by the time appointment was over.   -     Ice, topical aloe gel as needed  -      Instructed to please seek care urgently in ED if any SOB, dyspnea, swelling of mouth/tongue, dizziness or light-headedness or other systemic symptoms develop. Pt acknowledged. No hx of anaphylaxis.     Other orders  -     Cancel: Urinalysis, Reflex to Urine Culture Urine, Clean Catch  -     Cancel: sulfamethoxazole-trimethoprim 800-160mg (BACTRIM DS) 800-160 mg Tab; Take 1 tablet by mouth once daily. for 3 days         RTC prn or if symptoms fail to improve       Darlene Louis MD  Internal Medicine PGY-2  [facility]  [address] [place] [id number] [phone]

## 2020-07-07 NOTE — PATIENT INSTRUCTIONS
Take Nitrofunatoin 100 mg twice a day for 5 days  Pyridium 3X/day as needed for pain    If symptoms do not go away or get worse, please let us know, can do urine test then    Take Diflucan 1 tablet if symptoms of yeast infection if needed after antibioitcs     Topical hydrocortisone cream as needed for bee sting, just use for 1-3 days. Please go to ER urgently if any shortness or breath, tongue swelling, trouble breathing, or dizziness.

## 2020-07-07 NOTE — LETTER
July 7, 2020      [facility] - Internal Medicine  [address]  [facility] [address]  Phone: [phone]  Fax: [phone]       Patient: King Dempsey   YOB: 1985  Date of Visit: 07/07/2020    To Whom It May Concern:    Vinnie Dempsey  was at [facility] on 07/07/2020.  If you have any questions or concerns, or if I can be of further assistance, please do not hesitate to contact me.    Sincerely,    Darlene Louis MD

## 2020-08-18 ENCOUNTER — OFFICE VISIT (OUTPATIENT)
Dept: INTERNAL MEDICINE | Facility: CLINIC | Age: 35
End: 2020-08-18
Payer: COMMERCIAL

## 2020-08-18 ENCOUNTER — HOSPITAL ENCOUNTER (OUTPATIENT)
Dept: RADIOLOGY | Facility: HOSPITAL | Age: 35
Discharge: HOME | End: 2020-08-18
Attending: FAMILY MEDICINE
Payer: COMMERCIAL

## 2020-08-18 VITALS
TEMPERATURE: 98 F | HEIGHT: 61 IN | OXYGEN SATURATION: 98 % | SYSTOLIC BLOOD PRESSURE: 110 MMHG | HEART RATE: 82 BPM | DIASTOLIC BLOOD PRESSURE: 80 MMHG | WEIGHT: 188.5 LBS | BODY MASS INDEX: 35.59 KG/M2

## 2020-08-18 DIAGNOSIS — M54.50 ACUTE BILATERAL LOW BACK PAIN WITHOUT SCIATICA: ICD-10-CM

## 2020-08-18 DIAGNOSIS — M54.50 ACUTE BILATERAL LOW BACK PAIN WITHOUT SCIATICA: Primary | ICD-10-CM

## 2020-08-18 PROCEDURE — 99999 PR PBB SHADOW E&M-EST. PATIENT-LVL IV: CPT | Mod: PBBFAC,,, | Performed by: FAMILY MEDICINE

## 2020-08-18 PROCEDURE — 3008F PR BODY MASS INDEX (BMI) DOCUMENTED: ICD-10-PCS | Mod: CPTII,S$GLB,, | Performed by: FAMILY MEDICINE

## 2020-08-18 PROCEDURE — 3079F DIAST BP 80-89 MM HG: CPT | Mod: CPTII,S$GLB,, | Performed by: FAMILY MEDICINE

## 2020-08-18 PROCEDURE — 99213 OFFICE O/P EST LOW 20 MIN: CPT | Mod: S$GLB,,, | Performed by: FAMILY MEDICINE

## 2020-08-18 PROCEDURE — 99213 PR OFFICE/OUTPT VISIT, EST, LEVL III, 20-29 MIN: ICD-10-PCS | Mod: S$GLB,,, | Performed by: FAMILY MEDICINE

## 2020-08-18 PROCEDURE — 72110 XR LUMBAR SPINE COMPLETE 5 VIEW: ICD-10-PCS | Mod: 26,,, | Performed by: RADIOLOGY

## 2020-08-18 PROCEDURE — 99999 PR PBB SHADOW E&M-EST. PATIENT-LVL IV: ICD-10-PCS | Mod: PBBFAC,,, | Performed by: FAMILY MEDICINE

## 2020-08-18 PROCEDURE — 72110 X-RAY EXAM L-2 SPINE 4/>VWS: CPT | Mod: TC

## 2020-08-18 PROCEDURE — 3079F PR MOST RECENT DIASTOLIC BLOOD PRESSURE 80-89 MM HG: ICD-10-PCS | Mod: CPTII,S$GLB,, | Performed by: FAMILY MEDICINE

## 2020-08-18 PROCEDURE — 3008F BODY MASS INDEX DOCD: CPT | Mod: CPTII,S$GLB,, | Performed by: FAMILY MEDICINE

## 2020-08-18 PROCEDURE — 3074F PR MOST RECENT SYSTOLIC BLOOD PRESSURE < 130 MM HG: ICD-10-PCS | Mod: CPTII,S$GLB,, | Performed by: FAMILY MEDICINE

## 2020-08-18 PROCEDURE — 3074F SYST BP LT 130 MM HG: CPT | Mod: CPTII,S$GLB,, | Performed by: FAMILY MEDICINE

## 2020-08-18 PROCEDURE — 72110 X-RAY EXAM L-2 SPINE 4/>VWS: CPT | Mod: 26,,, | Performed by: RADIOLOGY

## 2020-08-18 RX ORDER — TIZANIDINE 4 MG/1
4 TABLET ORAL NIGHTLY PRN
Qty: 30 TABLET | Refills: 0 | Status: SHIPPED | OUTPATIENT
Start: 2020-08-18 | End: 2020-08-28

## 2020-08-18 RX ORDER — AMOXICILLIN 500 MG/1
CAPSULE ORAL
Status: ON HOLD | COMMUNITY
Start: 2020-06-06 | End: 2023-04-28 | Stop reason: HOSPADM

## 2020-08-18 NOTE — PROGRESS NOTES
Subjective:      Patient ID: King Dempsey is a 35 y.o. female.    Chief Complaint: Back Pain      HPI:  King Dempsey is a 35 year old female with history of mitral valve prolapse s/p repair and vitamin D deficiency who presents to clinic today with a chief complaint of back pain.    Patient new to me; PCP is Dr. Mayco Hansen    States she woke up yesterday morning with cramping low back pain.  Bilateral.  Does not radiate down the lower extremities.  Improved with rest and staying still, worsened with movement and walking.  Pain has gradually worsened, sharp in quality.  Constant.  Denies any known trauma; states the only thing she can think of is that she picked up an ice chest with 3 dozen crabs in it; did not feel any pain when she did this heavy lifting.  Has naproxen at home, this has not provided relief.  Used heating pad last night without significant improvement.  Denies associated bowel/bladder incontinence, numbness/tingling.  Has had back pain in the past but not as severe as this.  Had to call in to work today.  Having difficulty with ambulation secondary to pain.      Was treated for UTI 19 with Macrobid.  Had dysuria at that time.  Denies dysuria currently.        Past Medical History:   Diagnosis Date    Abnormal Pap smear     Colposcopy    Menarche     Age of onset 11    Mitral valve prolapse        Past Surgical History:   Procedure Laterality Date     SECTION      MITRAL VALVE REPAIR         Family History   Problem Relation Age of Onset    Heart disease Mother     Hypertension Mother     Hyperlipidemia Mother     Diabetes Mother     Hyperlipidemia Father     Hypertension Father     Breast cancer Paternal Aunt     Colon cancer Neg Hx     Ovarian cancer Neg Hx        Social History     Socioeconomic History    Marital status: Single     Spouse name: Not on file    Number of children: Not on file    Years of education: Not on file    Highest education level: Not on  file   Occupational History    Occupation: xray tech   Social Needs    Financial resource strain: Not hard at all    Food insecurity     Worry: Never true     Inability: Never true    Transportation needs     Medical: No     Non-medical: No   Tobacco Use    Smoking status: Never Smoker    Smokeless tobacco: Never Used   Substance and Sexual Activity    Alcohol use: Yes     Frequency: 2-4 times a month     Drinks per session: 3 or 4     Binge frequency: Less than monthly     Comment: social    Drug use: No    Sexual activity: Yes     Partners: Male     Birth control/protection: None   Lifestyle    Physical activity     Days per week: Patient refused     Minutes per session: 0 min    Stress: Rather much   Relationships    Social connections     Talks on phone: More than three times a week     Gets together: Not on file     Attends Lutheran service: Not on file     Active member of club or organization: No     Attends meetings of clubs or organizations: Patient refused     Relationship status: Not on file   Other Topics Concern    Not on file   Social History Narrative    Not on file       Review of Systems   Constitutional: Negative for chills, fatigue and fever.   HENT: Negative for congestion, hearing loss, nosebleeds, rhinorrhea, sore throat and trouble swallowing.    Eyes: Negative for pain and visual disturbance.   Respiratory: Negative for cough, shortness of breath and wheezing.    Cardiovascular: Negative for chest pain and palpitations.   Gastrointestinal: Negative for abdominal distention, abdominal pain, constipation, diarrhea, nausea and vomiting.   Genitourinary: Negative for decreased urine volume, difficulty urinating, dysuria, hematuria and urgency.   Musculoskeletal: Positive for back pain. Negative for arthralgias and myalgias.   Skin: Negative for color change and rash.   Neurological: Negative for dizziness, tremors, weakness, light-headedness, numbness and headaches.  "  Psychiatric/Behavioral: Negative for agitation, behavioral problems and confusion. The patient is not nervous/anxious.      Objective:     Vitals:    08/18/20 1458   BP: 110/80   BP Location: Left arm   Patient Position: Sitting   BP Method: Medium (Manual)   Pulse: 82   Temp: 98.3 °F (36.8 °C)   TempSrc: Oral   SpO2: 98%   Weight: 85.5 kg (188 lb 7.9 oz)   Height: 5' 1" (1.549 m)       Physical Exam  Vitals signs and nursing note reviewed.   Constitutional:       Appearance: She is well-developed.   HENT:      Head: Normocephalic and atraumatic.      Right Ear: External ear normal.      Left Ear: External ear normal.      Nose: Nose normal.   Eyes:      Conjunctiva/sclera: Conjunctivae normal.   Neck:      Musculoskeletal: Neck supple.      Trachea: No tracheal deviation.   Cardiovascular:      Rate and Rhythm: Normal rate and regular rhythm.      Heart sounds: Normal heart sounds. No murmur. No friction rub. No gallop.    Pulmonary:      Effort: Pulmonary effort is normal. No respiratory distress.      Breath sounds: Normal breath sounds. No wheezing or rales.   Abdominal:      General: Bowel sounds are normal. There is no distension.      Palpations: Abdomen is soft.      Tenderness: There is no abdominal tenderness. There is no guarding or rebound.   Musculoskeletal:         General: No deformity.      Lumbar back: She exhibits tenderness and pain. She exhibits no bony tenderness, no swelling and no edema.   Skin:     General: Skin is warm and dry.   Neurological:      Mental Status: She is alert.   Psychiatric:         Behavior: Behavior normal.        Assessment:      1. Acute bilateral low back pain without sciatica      Plan:   King was seen today for back pain.    Diagnoses and all orders for this visit:    Acute bilateral low back pain without sciatica  -     X-Ray Lumbar Spine Complete 5 View; Future  -     Ambulatory referral/consult to [facility]; Future  -     Start tiZANidine (ZANAFLEX) 4 " MG tablet; Take 1 tablet (4 mg total) by mouth nightly as needed        -     Recommended OTC ibuprofen 800 mg PO Q8H PRN with food, ice packs/cold compresses alternating with heating pad, avoidance of heavy lifting.  Work note for today and tomorrow provided at patient request.  Check X-ray, consider PT pending results.

## 2020-08-19 ENCOUNTER — TELEPHONE (OUTPATIENT)
Dept: INTERNAL MEDICINE | Facility: CLINIC | Age: 35
End: 2020-08-19

## 2020-08-19 DIAGNOSIS — M54.50 ACUTE BILATERAL LOW BACK PAIN WITHOUT SCIATICA: Primary | ICD-10-CM

## 2020-08-21 ENCOUNTER — TELEPHONE (OUTPATIENT)
Dept: ORTHOPEDICS | Facility: CLINIC | Age: 35
End: 2020-08-21

## 2020-08-21 DIAGNOSIS — M51.36 DDD (DEGENERATIVE DISC DISEASE), LUMBAR: Primary | ICD-10-CM

## 2020-08-31 ENCOUNTER — PATIENT OUTREACH (OUTPATIENT)
Dept: ADMINISTRATIVE | Facility: OTHER | Age: 35
End: 2020-08-31

## 2020-08-31 NOTE — PROGRESS NOTES
Requested updates within Care Everywhere.  Patient's chart was reviewed for overdue ANDRES topics.  Immunizations reconciled.    Orders placed:n/a  Tasked appts:n/a  Labs Linked:n/a

## 2020-09-01 ENCOUNTER — CLINICAL SUPPORT (OUTPATIENT)
Dept: REHABILITATION | Facility: HOSPITAL | Age: 35
End: 2020-09-01
Payer: COMMERCIAL

## 2020-09-01 ENCOUNTER — HOSPITAL ENCOUNTER (OUTPATIENT)
Dept: RADIOLOGY | Facility: HOSPITAL | Age: 35
Discharge: HOME | End: 2020-09-01
Attending: PHYSICIAN ASSISTANT
Payer: COMMERCIAL

## 2020-09-01 DIAGNOSIS — M54.50 ACUTE BILATERAL LOW BACK PAIN WITHOUT SCIATICA: ICD-10-CM

## 2020-09-01 DIAGNOSIS — M54.50 LOW BACK PAIN WITHOUT SCIATICA, UNSPECIFIED BACK PAIN LATERALITY, UNSPECIFIED CHRONICITY: ICD-10-CM

## 2020-09-01 DIAGNOSIS — M62.81 MUSCLE WEAKNESS: ICD-10-CM

## 2020-09-01 DIAGNOSIS — M51.36 DDD (DEGENERATIVE DISC DISEASE), LUMBAR: ICD-10-CM

## 2020-09-01 PROCEDURE — 97110 THERAPEUTIC EXERCISES: CPT

## 2020-09-01 PROCEDURE — 97162 PT EVAL MOD COMPLEX 30 MIN: CPT

## 2020-09-01 PROCEDURE — 72120 X-RAY BEND ONLY L-S SPINE: CPT | Mod: 26,,, | Performed by: RADIOLOGY

## 2020-09-01 PROCEDURE — 72120 X-RAY BEND ONLY L-S SPINE: CPT | Mod: TC

## 2020-09-01 PROCEDURE — 72120 XR LUMBAR SPINE FLEXION AND EXTENSION ONLY: ICD-10-PCS | Mod: 26,,, | Performed by: RADIOLOGY

## 2020-09-01 NOTE — PLAN OF CARE
[facility]  Physical Therapy Initial Evaluation    Name: King Dempsey  Clinic Number: [id number]    Therapy Diagnosis:   Encounter Diagnoses   Name Primary?    Acute bilateral low back pain without sciatica     Low back pain without sciatica, unspecified back pain laterality, unspecified chronicity     Muscle weakness      Physician: Alonzo David MD    Physician Orders: PT Eval and Treat  Medical Diagnosis from Referral: M54.5 (ICD-10-CM) - Acute bilateral low back pain without sciatica  Evaluation Date: 9/1/2020  Authorization Period Expiration: 12/31/2020  Plan of Care Expiration: 12/31/2020  Visit # / Visits authorized: 1/ 30    Time In: 1735  Time Out: 1830  Total Billable Time: 55 minutes    Precautions: Standard, thoracic manipulation contraindicated due to prior cardiac surgery and rib removal.     Subjective     Date of onset: a few months ago   History of current condition - King reports: she woke up one morning with severe low back pain. She only can remember that she tried to move an ice chest that was a little heavy a day or so which caused next day soreness and then the following morning is when she was in severe pain causing her inability to walk/work. The pt stayed out of work for 2 days due to dysfunction and pain. Pain has been improving generally with rest but wants to ensure it doesn't happen again. Pt had a mitral valve prolapse in 2002 and due to procedure had a rib removed. The pt reports that she is easily winded due to her cardiac condition. Denies N+T, denies radiating pain      Imaging X-ray: pt reports findings are chronic and have been long standing prior to pain     Prior Therapy: None  Exercise Routine/Sport Participation: none due to time   Social History: Lives in apartment but moving to a house with fiance   Occupation: X-ray tech at [employer]   Prior Level of Function: unrestrcted  Current Level of Function: bending forward and back is painful,  lifting restriciton, long periods of standing/sitting restrcited, unable to sleep on stomach     Pain:  Current 4/10, worst 9/10, best 2/10   Location: bilateral low back around SIJ region R>L   Description: Aching, Dull, Tight and pressure   Aggravating Factors: see above   Easing Factors: rest    Pts goals: decrease pain and improve function.       Medical History:   Past Medical History:   Diagnosis Date    Abnormal Pap smear     Colposcopy    Menarche     Age of onset 11    Mitral valve prolapse        Surgical History:   King Dempsey  has a past surgical history that includes  section and Mitral valve repair.    Medications:   King has a current medication list which includes the following prescription(s): amoxicillin, ergocalciferol, fluoxetine, and ibuprofen.    Allergies:   Review of patient's allergies indicates:   Allergen Reactions    Doxycycline      Abdomen pain severe    Tetanus vaccines and toxoid Rash        Objective     Posture:  Sig lumbar lordosis and thoracic kyphosis, scap downwardly rotated, pes plantus B     Functional Movements:   Gait: Mod-severe reverse trendelenburg B, femoral ADD/IR B  OH Squat: inc knee flexion angle initially with poor hip hinge mechanics and poor depth   SLS: no pain 30s L; 20s R       Standing Thoracolumbar Range of Motion:    % Observation Pain   Flexion 75 Dec lumbar reversal  Y   Extension 50 Dec throacic ext  Y   Right Rotation 25 Dec thoracic, inc lumbar  Y R   Left Rotation 25 Dec thoracic inc lumbar  U   Right Sidebend 100 na n   Left Sidebend 100 na n       Hip Passive Range of Motion:    Right Left   Flexion 90 90   Extension 20 20   Internal Rotation 45 45   External Rotation 45 45       Ankle Passive Range of Motion:    Right Left   Dorsi Flexion 0   0   1st MTP Extension 50   50       Lower Extremity Strength:   Right  Left    Quadriceps: 5/5 5/5   Hamstring at 90 de/5 4/5   Hamstrings at 15 de/5 4/5   Iliopsoas (sitting): 5/5 5/5    Hip extension:  4/5 4-/5   PGM: 3/5 3/5       Special Tests:   Observation/Result   Repeated Flexion -   Repeated Extension -   Quadrant + R and L    Spring Test  + L5   Prone Hypermobility Test NT   Prone LE Extension + sig delay glute B      SIJ  Right  Left    Thigh Thrust + +   Compression + +   Distraction + +   Sacral Thrust + +      Right  Left    ROBYN - -   DENAE - -   Hip Scour - -   SLS Glute Med Test + +     MSI Observation    Bent Knee Fall Out +   Prone Knee Bend +   Alt March +     Neural Tension Testing:   Not warranted      Joint Mobility: inc mobility L5-S1, hypomobile thoracic spine     Palpation: ttp around lumbar paraspinals, glute max/med       Flexibility:   Ely's test: R - ; L -    Hamstrings: R - ; L  -   Wesley Test Right  Left    Iliopsoas 15 deg 5 deg   Rectus Femoris  70 70           CMS Impairment/Limitation/Restriction for FOTO low back Survey    Therapist reviewed FOTO scores for King Dempsey on 9/1/2020.   FOTO documents entered into [facility] - see Media section.    Limitation Score: na%  Category: Mobility       Treatment     Treatment Time In: 1815  Treatment Time Out: 1825  Total Treatment time separate from Evaluation: 10 minutes    King received therapeutic exercises to develop ROM for 10 minutes including:  Thoracic rotation sidelying 15x B   CTJUNX ext mob scoot backs 5x 5s holds   Paloff Press gtb 10x B     To add Next time:   Hand heel rocks   Clamshell on wall iso hold   Thoracic ext over FR   Bike completed for 10 min to increase ROM, endurance and decrease pain to improve tolerance to ADLs and age related activities.   Shuttle DL   Lat pull down with dowel   Hip hinge with dowel     Home Exercises and Patient Education Provided     Education provided:   - Incoorporating exercises into daily routine will be an important part of her rehabilitation   - Prognosis, activity modification, goals for therapy, role of therapy for care, exercises/HEP    Written Home Exercises  Provided: yes.  Exercises were reviewed and King was able to demonstrate them prior to the end of the session.   Pt received a written copy of exercises to perform at home. King demonstrated good  understanding of the education provided.     See EMR under patient instructions for exercises given.     Assessment     King is a 35 y.o. female referred to outpatient Physical Therapy with c/o chonric LBP with recent acute insidious onset of low back pain a month ago. The pt reports with improving symptoms but demonstrates hypermobility of SIJ due to underlying thoracic hypomobility, hip hypomobility and poor post sling/ant sling strength/stabiliy causing intermittent pain and dysfunction. The pt has an underlying cardiac condition and due to chronicity of the patient's pain will require more time in PT for optimal outcomes. Pt will benefit from skilled outpatient Physical Therapy to address the deficits stated above and in the chart below, provide pt/family education, and to maximize pt's level of independence. Pt prognosis is Good.     Plan of care discussed with patient: Yes  Pt's spiritual, cultural and educational needs considered and patient is agreeable to the plan of care and goals as stated below:       Anticipated Barriers for therapy: cardiac endurance       Medical Necessity is demonstrated by the following  History  Co-morbidities and personal factors that may impact the plan of care Co-morbidities:   recent cardiac procedure   High BMI     Personal Factors:   coping style  lifestyle     moderate   Examination  Body Structures and Functions, activity limitations and participation restrictions that may impact the plan of care Body Regions:   back  lower extremities  trunk    Body Systems:    ROM  strength  balance  gait  transfers  motor control  motor learning    Participation Restrictions:   Dec cardiac stress     Activity limitations:   Learning and applying knowledge  No deficit    General Tasks and  Commands  No deficit    Communication  No deficit    Mobility  lifting and carrying objects  walking  moving around using equipment (WC)  lifting and transfering patients at work     Self care  No deficit    Domestic Life  No deficit    Interactions/Relationships  No deficit    Life Areas  Lifting and mvoing patients at work   Recreational activities to improve health and wellness.     Community and Social Life  No deficit          high   Clinical Presentation evolving clinical presentation with changing clinical characteristics moderate   Decision Making/ Complexity Score: moderate     Goals:  Short Term Goals: 2-4 weeks  1. Pt will be compliant with HEP 50% of prescribed amount.   2. The pt to demo improvement in SLS without present of reverse trendelenburg to hold for at least 30s 3x without LOB   3.  The pt to demo improvement in glute MMT to demo improved motor control to support low back during activitiy     Long Term Goals: 14 weeks   1. Pt will be compliant with % of prescribed amount.   2. The pt to demonstrate being able to lift 10-30# box with proper technique and no c/o LBP for 24-48 hours   3. The pt to demonstrate pain free lumbar ROM to improve tolerance to ADLs     4. The pt will report full participation in ADLs and IADLs without restrictions related to Low back.     Plan   Plan of care Certification: 9/1/2020 to 12/31/2020.    Outpatient Physical Therapy 2 times weekly for 14 weeks to include the following interventions: Gait Training, Manual Therapy, Moist Heat/ Ice, Neuromuscular Re-ed, Patient Education, Therapeutic Activites and Therapeutic Exercise.     Charley Miles, PT , DPT, SCS, FAAMOMPT

## 2020-09-02 ENCOUNTER — OFFICE VISIT (OUTPATIENT)
Dept: ORTHOPEDICS | Facility: CLINIC | Age: 35
End: 2020-09-02
Payer: COMMERCIAL

## 2020-09-02 VITALS — HEIGHT: 61 IN | WEIGHT: 189.13 LBS | BODY MASS INDEX: 35.71 KG/M2

## 2020-09-02 DIAGNOSIS — M54.50 ACUTE BILATERAL LOW BACK PAIN WITHOUT SCIATICA: Primary | ICD-10-CM

## 2020-09-02 PROCEDURE — 99244 OFF/OP CNSLTJ NEW/EST MOD 40: CPT | Mod: S$GLB,,, | Performed by: PHYSICIAN ASSISTANT

## 2020-09-02 PROCEDURE — 99244 PR OFFICE CONSULTATION,LEVEL IV: ICD-10-PCS | Mod: S$GLB,,, | Performed by: PHYSICIAN ASSISTANT

## 2020-09-02 PROCEDURE — 99999 PR PBB SHADOW E&M-EST. PATIENT-LVL III: CPT | Mod: PBBFAC,,, | Performed by: PHYSICIAN ASSISTANT

## 2020-09-02 PROCEDURE — 99999 PR PBB SHADOW E&M-EST. PATIENT-LVL III: ICD-10-PCS | Mod: PBBFAC,,, | Performed by: PHYSICIAN ASSISTANT

## 2020-09-02 NOTE — PROGRESS NOTES
DATE: 2020  PATIENT: King Dempsey    Supervising Physician: Greg Espinoza M.D.    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  The pain in the back is what bothers her most.  The pain has been present for about 2 weeks.  She was having a crab boil and was lifting a bunch of heavy ice chests and the pain started the next day.  The patient describes the pain as fullness and throbbing.  The pain is worse with sitting and activity and improved by laying down.  The pain has progressively improved since onset.  There is no associated numbness and tingling. There is no subjective weakness. Prior treatments have included ibuprofen, muscle relaxers and she started PT yesterday, but no ESIs or surgery.    The patient denies myelopathic symptoms such as handwriting changes or difficulty with buttons/coins/keys. Denies perineal paresthesias, bowel/bladder dysfunction.    PAST MEDICAL/SURGICAL HISTORY:  Past Medical History:   Diagnosis Date    Abnormal Pap smear     Colposcopy    Menarche     Age of onset 11    Mitral valve prolapse      Past Surgical History:   Procedure Laterality Date     SECTION      MITRAL VALVE REPAIR         Medications:   Current Outpatient Medications on File Prior to Visit   Medication Sig Dispense Refill    amoxicillin (AMOXIL) 500 MG capsule TAKE FOUR CAPSULES BY MOUTH ONE HOUR BEFORE DENTAL WORK      ergocalciferol (ERGOCALCIFEROL) 50,000 unit Cap Take 1 capsule (50,000 Units total) by mouth every 7 days. 24 capsule 4    FLUoxetine 10 MG Tab Take 10 mg by mouth.      ibuprofen (ADVIL,MOTRIN) 800 MG tablet Take 1 tablet (800 mg total) by mouth every 8 (eight) hours as needed for Pain. 30 tablet 2     No current facility-administered medications on file prior to visit.        Social History:   Social History     Socioeconomic History     Marital status: Single     Spouse name: Not on file    Number of children: Not on file    Years of education: Not on file    Highest education level: Not on file   Occupational History    Occupation: xray tech   Social Needs    Financial resource strain: Not hard at all    Food insecurity     Worry: Never true     Inability: Never true    Transportation needs     Medical: No     Non-medical: No   Tobacco Use    Smoking status: Never Smoker    Smokeless tobacco: Never Used   Substance and Sexual Activity    Alcohol use: Yes     Frequency: 2-4 times a month     Drinks per session: 3 or 4     Binge frequency: Less than monthly     Comment: social    Drug use: No    Sexual activity: Yes     Partners: Male     Birth control/protection: None   Lifestyle    Physical activity     Days per week: Patient refused     Minutes per session: 0 min    Stress: Rather much   Relationships    Social connections     Talks on phone: More than three times a week     Gets together: Not on file     Attends Mosque service: Not on file     Active member of club or organization: No     Attends meetings of clubs or organizations: Patient refused     Relationship status: Not on file   Other Topics Concern    Not on file   Social History Narrative    Not on file       REVIEW OF SYSTEMS:  Constitution: Negative. Negative for chills, fever and night sweats.   Cardiovascular: Negative for chest pain and syncope.   Respiratory: Negative for cough and shortness of breath.   Gastrointestinal: See HPI. Negative for nausea/vomiting. Negative for abdominal pain.  Genitourinary: See HPI. Negative for discoloration or dysuria.  Skin: Negative for dry skin, itching and rash.   Hematologic/Lymphatic: Negative for bleeding problem. Does not bruise/bleed easily.   Musculoskeletal: Negative for falls and muscle weakness.   Neurological: See HPI. No seizures.   Endocrine: Negative for polydipsia, polyphagia and polyuria.  "  Allergic/Immunologic: Negative for hives and persistent infections.     EXAM:  Ht 5' 1" (1.549 m)   Wt 85.8 kg (189 lb 2.5 oz)   BMI 35.74 kg/m²     General: The patient is a very pleasant 35 y.o. female in no apparent distress, the patient is oriented to person, place and time.  Psych: Normal mood and affect  HEENT: Vision grossly intact, hearing intact to the spoken word.  Lungs: Respirations unlabored.  Gait: Normal station and gait, no difficulty with toe or heel walk.   Skin: Dorsal lumbar skin negative for rashes, lesions, hairy patches and surgical scars. There is minimal lumbar tenderness to palpation.  Range of motion: Lumbar range of motion is acceptable.  Spinal Balance: Global saggital and coronal spinal balance acceptable, not significant for scoliosis and kyphosis.  Musculoskeletal: No pain with the range of motion of the bilateral hips. No trochanteric tenderness to palpation.  Vascular: Bilateral lower extremities warm and well perfused, dorsalis pedis pulses 2+ bilaterally.  Neurological: Normal strength and tone in all major motor groups in the bilateral lower extremities. Normal sensation to light touch in the L2-S1 dermatomes bilaterally.  Deep tendon reflexes symmetric 2+ in the bilateral lower extremities.  Negative Babinski bilaterally. Straight leg raise negative bilaterally.    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      Body mass index is 35.74 kg/m².    No results found for: HGBA1C        ASSESSMENT/PLAN:    King was seen today for low-back pain.    Diagnoses and all orders for this visit:    Acute bilateral low back pain without sciatica        Today we discussed at length all of the different treatment options including anti-inflammatories, acetaminophen, rest, ice, heat, physical therapy including strengthening and stretching exercises, home exercises, ROM, aerobic " conditioning, aqua therapy, other modalities including ultrasound, massage, and dry needling, epidural steroid injections and finally surgical intervention.      The patient's pain is starting to improve.  She will continue with PT.  Continue ibuprofen and muscular relaxers as prescribed.  Follow up as needed.     This patient was referred by Dr. David for consult. A copy of this report will be sent electronically.

## 2020-10-26 ENCOUNTER — PATIENT MESSAGE (OUTPATIENT)
Dept: OBSTETRICS AND GYNECOLOGY | Facility: CLINIC | Age: 35
End: 2020-10-26

## 2020-10-26 DIAGNOSIS — Z32.01 POSITIVE PREGNANCY TEST: Primary | ICD-10-CM

## 2020-11-11 ENCOUNTER — PATIENT OUTREACH (OUTPATIENT)
Dept: ADMINISTRATIVE | Facility: OTHER | Age: 35
End: 2020-11-11

## 2020-11-13 ENCOUNTER — OFFICE VISIT (OUTPATIENT)
Dept: OBSTETRICS AND GYNECOLOGY | Facility: CLINIC | Age: 35
End: 2020-11-13
Attending: OBSTETRICS & GYNECOLOGY
Payer: COMMERCIAL

## 2020-11-13 ENCOUNTER — LAB VISIT (OUTPATIENT)
Dept: LAB | Facility: OTHER | Age: 35
End: 2020-11-13
Attending: OBSTETRICS & GYNECOLOGY
Payer: COMMERCIAL

## 2020-11-13 ENCOUNTER — PATIENT MESSAGE (OUTPATIENT)
Dept: OBSTETRICS AND GYNECOLOGY | Facility: CLINIC | Age: 35
End: 2020-11-13

## 2020-11-13 VITALS
BODY MASS INDEX: 35.75 KG/M2 | HEIGHT: 61 IN | DIASTOLIC BLOOD PRESSURE: 86 MMHG | SYSTOLIC BLOOD PRESSURE: 122 MMHG | WEIGHT: 189.38 LBS

## 2020-11-13 DIAGNOSIS — Z32.01 PREGNANCY TEST POSITIVE: ICD-10-CM

## 2020-11-13 DIAGNOSIS — N91.4 SECONDARY OLIGOMENORRHEA: Primary | ICD-10-CM

## 2020-11-13 DIAGNOSIS — N91.4 SECONDARY OLIGOMENORRHEA: ICD-10-CM

## 2020-11-13 DIAGNOSIS — Z12.4 PAP SMEAR FOR CERVICAL CANCER SCREENING: ICD-10-CM

## 2020-11-13 DIAGNOSIS — Z98.890 STATUS POST MITRAL VALVE ANNULOPLASTY: ICD-10-CM

## 2020-11-13 DIAGNOSIS — O34.219 PREVIOUS CESAREAN DELIVERY AFFECTING PREGNANCY, ANTEPARTUM: ICD-10-CM

## 2020-11-13 DIAGNOSIS — O09.521 MULTIGRAVIDA OF ADVANCED MATERNAL AGE IN FIRST TRIMESTER: ICD-10-CM

## 2020-11-13 DIAGNOSIS — Z32.01 POSITIVE PREGNANCY TEST: ICD-10-CM

## 2020-11-13 LAB
ABO + RH BLD: NORMAL
ANION GAP SERPL CALC-SCNC: 9 MMOL/L (ref 8–16)
BASOPHILS # BLD AUTO: 0.04 K/UL (ref 0–0.2)
BASOPHILS NFR BLD: 0.5 % (ref 0–1.9)
BLD GP AB SCN CELLS X3 SERPL QL: NORMAL
BUN SERPL-MCNC: 8 MG/DL (ref 6–20)
CALCIUM SERPL-MCNC: 9.1 MG/DL (ref 8.7–10.5)
CHLORIDE SERPL-SCNC: 107 MMOL/L (ref 95–110)
CO2 SERPL-SCNC: 20 MMOL/L (ref 23–29)
CREAT SERPL-MCNC: 0.6 MG/DL (ref 0.5–1.4)
DIFFERENTIAL METHOD: ABNORMAL
EOSINOPHIL # BLD AUTO: 0 K/UL (ref 0–0.5)
EOSINOPHIL NFR BLD: 0.2 % (ref 0–8)
ERYTHROCYTE [DISTWIDTH] IN BLOOD BY AUTOMATED COUNT: 13.9 % (ref 11.5–14.5)
EST. GFR  (AFRICAN AMERICAN): >60 ML/MIN/1.73 M^2
EST. GFR  (NON AFRICAN AMERICAN): >60 ML/MIN/1.73 M^2
GLUCOSE SERPL-MCNC: 94 MG/DL (ref 70–110)
HCT VFR BLD AUTO: 41.3 % (ref 37–48.5)
HGB BLD-MCNC: 13.1 G/DL (ref 12–16)
IMM GRANULOCYTES # BLD AUTO: 0.02 K/UL (ref 0–0.04)
IMM GRANULOCYTES NFR BLD AUTO: 0.2 % (ref 0–0.5)
LYMPHOCYTES # BLD AUTO: 2 K/UL (ref 1–4.8)
LYMPHOCYTES NFR BLD: 24.8 % (ref 18–48)
MCH RBC QN AUTO: 28.3 PG (ref 27–31)
MCHC RBC AUTO-ENTMCNC: 31.7 G/DL (ref 32–36)
MCV RBC AUTO: 89 FL (ref 82–98)
MONOCYTES # BLD AUTO: 0.6 K/UL (ref 0.3–1)
MONOCYTES NFR BLD: 7.2 % (ref 4–15)
NEUTROPHILS # BLD AUTO: 5.5 K/UL (ref 1.8–7.7)
NEUTROPHILS NFR BLD: 67.1 % (ref 38–73)
NRBC BLD-RTO: 0 /100 WBC
PLATELET # BLD AUTO: 302 K/UL (ref 150–350)
PMV BLD AUTO: 11.4 FL (ref 9.2–12.9)
POTASSIUM SERPL-SCNC: 3.9 MMOL/L (ref 3.5–5.1)
RBC # BLD AUTO: 4.63 M/UL (ref 4–5.4)
RPR SER QL: NORMAL
SODIUM SERPL-SCNC: 136 MMOL/L (ref 136–145)
WBC # BLD AUTO: 8.18 K/UL (ref 3.9–12.7)

## 2020-11-13 PROCEDURE — 3008F PR BODY MASS INDEX (BMI) DOCUMENTED: ICD-10-PCS | Mod: CPTII,S$GLB,, | Performed by: OBSTETRICS & GYNECOLOGY

## 2020-11-13 PROCEDURE — 76801 OB US < 14 WKS SINGLE FETUS: CPT | Mod: 26,S$GLB,, | Performed by: OBSTETRICS & GYNECOLOGY

## 2020-11-13 PROCEDURE — 86850 RBC ANTIBODY SCREEN: CPT

## 2020-11-13 PROCEDURE — 36415 COLL VENOUS BLD VENIPUNCTURE: CPT

## 2020-11-13 PROCEDURE — 87340 HEPATITIS B SURFACE AG IA: CPT

## 2020-11-13 PROCEDURE — 99999 PR PBB SHADOW E&M-EST. PATIENT-LVL III: CPT | Mod: PBBFAC,,, | Performed by: OBSTETRICS & GYNECOLOGY

## 2020-11-13 PROCEDURE — 99214 PR OFFICE/OUTPT VISIT, EST, LEVL IV, 30-39 MIN: ICD-10-PCS | Mod: S$GLB,,, | Performed by: OBSTETRICS & GYNECOLOGY

## 2020-11-13 PROCEDURE — 99214 OFFICE O/P EST MOD 30 MIN: CPT | Mod: S$GLB,,, | Performed by: OBSTETRICS & GYNECOLOGY

## 2020-11-13 PROCEDURE — 99999 PR PBB SHADOW E&M-EST. PATIENT-LVL III: ICD-10-PCS | Mod: PBBFAC,,, | Performed by: OBSTETRICS & GYNECOLOGY

## 2020-11-13 PROCEDURE — 76801 PR US, OB <14WKS, TRANSABD, SINGLE GESTATION: ICD-10-PCS | Mod: 26,S$GLB,, | Performed by: OBSTETRICS & GYNECOLOGY

## 2020-11-13 PROCEDURE — 1126F PR PAIN SEVERITY QUANTIFIED, NO PAIN PRESENT: ICD-10-PCS | Mod: S$GLB,,, | Performed by: OBSTETRICS & GYNECOLOGY

## 2020-11-13 PROCEDURE — 86592 SYPHILIS TEST NON-TREP QUAL: CPT

## 2020-11-13 PROCEDURE — 3008F BODY MASS INDEX DOCD: CPT | Mod: CPTII,S$GLB,, | Performed by: OBSTETRICS & GYNECOLOGY

## 2020-11-13 PROCEDURE — 87624 HPV HI-RISK TYP POOLED RSLT: CPT

## 2020-11-13 PROCEDURE — 87086 URINE CULTURE/COLONY COUNT: CPT

## 2020-11-13 PROCEDURE — 85025 COMPLETE CBC W/AUTO DIFF WBC: CPT

## 2020-11-13 PROCEDURE — 86762 RUBELLA ANTIBODY: CPT

## 2020-11-13 PROCEDURE — 1126F AMNT PAIN NOTED NONE PRSNT: CPT | Mod: S$GLB,,, | Performed by: OBSTETRICS & GYNECOLOGY

## 2020-11-13 PROCEDURE — 86703 HIV-1/HIV-2 1 RESULT ANTBDY: CPT

## 2020-11-13 PROCEDURE — 88175 CYTOPATH C/V AUTO FLUID REDO: CPT

## 2020-11-13 PROCEDURE — 80048 BASIC METABOLIC PNL TOTAL CA: CPT

## 2020-11-13 NOTE — PROGRESS NOTES
Chief Complaint   Patient presents with    Possible Pregnancy       HPI:  King Dempsey is a 35 y.o. year old  female who presents today reporting no menses for the past 8 weeks with a positive home UPT.  She reports fatigue and nausea, but no vomiting.  No bleeding.  No pain.  She has a history of a prior SAB 2019.  She has a history of mitral valve surgery, followed by Dr. Cordero.  Prior C/S x 1 (LTCS by operative note).  Patient's last menstrual period was 2020 (exact date).    Past Medical History:   Diagnosis Date    Abnormal Pap smear     Colposcopy    Menarche     Age of onset 11    Mitral valve prolapse        Past Surgical History:   Procedure Laterality Date     SECTION      MITRAL VALVE REPAIR         OB History        2    Para   1    Term   1            AB   1    Living   1       SAB   1    TAB        Ectopic        Multiple        Live Births   1                 ROS:  GENERAL: Reports fatigue.   SKIN: Denies rash or lesions.   HEAD: Denies head injury or headache.   NODES: Denies enlarged lymph nodes.   CHEST: Denies chest pain or shortness of breath.   CARDIOVASCULAR: Denies palpitations or left sided chest pain.   ABDOMEN: Reports nausea, but no vomiting.  No abdominal pain or rectal bleeding.   URINARY: No dysuria or hematuria.  REPRODUCTIVE: See HPI.   BREASTS: Denies pain, lumps, or nipple discharge.   HEMATOLOGIC: No easy bruisability or excessive bleeding.   MUSCULOSKELETAL: Denies joint pain or swelling.   NEUROLOGIC: Denies syncope or weakness.   PSYCHIATRIC: Denies depression.    PE:  (chaperone present during entire exam)  APPEARANCE: Well nourished, well developed, in no acute distress.  ABDOMEN: Flat. Soft. No tenderness or masses. No hernias. No CVA tenderness.  VULVA: No lesions. Normal female genitalia.  URETHRAL MEATUS: Normal size and location, no lesions, no prolapse.  URETHRA: No masses, tenderness, prolapse or scarring.  VAGINA: Moist and  well rugated, no abnormal discharge, no significant cystocele or rectocele.  CERVIX: No lesions and discharge. Closed. PAP done.  UTERUS: 8 week size, non-tender, bladder base nontender.  ADNEXA: No masses, tenderness or CDS nodularity.  ANUS PERINEUM: Normal.    UPT: positive    Diagnosis:  1. Secondary oligomenorrhea    2. Pregnancy test positive    3. Multigravida of advanced maternal age in first trimester    4. Previous  delivery affecting pregnancy, antepartum    LMP 20 at 8.1 weeks, EDC 21    PLAN:         Patient was counseled today on pregnancy: T1 talk.  IOB labs, sono.  We discussed AMA and testing options - she desires [id number] and will check her coverage.  We reviewed her history of a prior C/S and options of  vs repeat C/S - she desires another C/S.  We reviewed her history of mitral valve surgery- she will follow-up with Dr. Cordero.  She will also need MFM consult / fetal cardiac echo.    Follow-up in 4 weeks.

## 2020-11-14 LAB — BACTERIA UR CULT: NORMAL

## 2020-11-16 LAB
C TRACH RRNA SPEC QL NAA+PROBE: NEGATIVE
HBV SURFACE AG SERPL QL IA: NEGATIVE
HIV 1+2 AB+HIV1 P24 AG SERPL QL IA: NEGATIVE
N GONORRHOEA RRNA SPEC QL NAA+PROBE: NEGATIVE
RUBV IGG SER-ACNC: 21.3 IU/ML
RUBV IGG SER-IMP: REACTIVE

## 2020-11-17 ENCOUNTER — PATIENT MESSAGE (OUTPATIENT)
Dept: CARDIOLOGY | Facility: CLINIC | Age: 35
End: 2020-11-17

## 2020-11-18 LAB
HPV HR 12 DNA SPEC QL NAA+PROBE: NEGATIVE
HPV16 AG SPEC QL: NEGATIVE
HPV18 DNA SPEC QL NAA+PROBE: NEGATIVE

## 2020-12-06 ENCOUNTER — PATIENT MESSAGE (OUTPATIENT)
Dept: OBSTETRICS AND GYNECOLOGY | Facility: CLINIC | Age: 35
End: 2020-12-06

## 2020-12-07 ENCOUNTER — PATIENT MESSAGE (OUTPATIENT)
Dept: OBSTETRICS AND GYNECOLOGY | Facility: CLINIC | Age: 35
End: 2020-12-07

## 2020-12-07 ENCOUNTER — HOSPITAL ENCOUNTER (EMERGENCY)
Facility: OTHER | Age: 35
Discharge: HOME | End: 2020-12-07
Attending: EMERGENCY MEDICINE
Payer: COMMERCIAL

## 2020-12-07 VITALS
RESPIRATION RATE: 19 BRPM | SYSTOLIC BLOOD PRESSURE: 111 MMHG | OXYGEN SATURATION: 98 % | HEART RATE: 91 BPM | BODY MASS INDEX: 34.93 KG/M2 | HEIGHT: 61 IN | TEMPERATURE: 98 F | WEIGHT: 185 LBS | DIASTOLIC BLOOD PRESSURE: 72 MMHG

## 2020-12-07 DIAGNOSIS — O03.9 SPONTANEOUS MISCARRIAGE: Primary | ICD-10-CM

## 2020-12-07 LAB
ALBUMIN SERPL BCP-MCNC: 4.3 G/DL (ref 3.5–5.2)
ALP SERPL-CCNC: 53 U/L (ref 55–135)
ALT SERPL W/O P-5'-P-CCNC: 13 U/L (ref 10–44)
ANION GAP SERPL CALC-SCNC: 9 MMOL/L (ref 8–16)
AST SERPL-CCNC: 21 U/L (ref 10–40)
B-HCG UR QL: POSITIVE
BACTERIA #/AREA URNS HPF: ABNORMAL /HPF
BASOPHILS # BLD AUTO: 0.04 K/UL (ref 0–0.2)
BASOPHILS NFR BLD: 0.4 % (ref 0–1.9)
BILIRUB SERPL-MCNC: 0.7 MG/DL (ref 0.1–1)
BILIRUB UR QL STRIP: ABNORMAL
BUN SERPL-MCNC: 5 MG/DL (ref 6–20)
CALCIUM SERPL-MCNC: 9.2 MG/DL (ref 8.7–10.5)
CHLORIDE SERPL-SCNC: 106 MMOL/L (ref 95–110)
CLARITY UR: CLEAR
CO2 SERPL-SCNC: 20 MMOL/L (ref 23–29)
COLOR UR: ABNORMAL
CREAT SERPL-MCNC: 0.6 MG/DL (ref 0.5–1.4)
CTP QC/QA: YES
DIFFERENTIAL METHOD: ABNORMAL
EOSINOPHIL # BLD AUTO: 0 K/UL (ref 0–0.5)
EOSINOPHIL NFR BLD: 0.2 % (ref 0–8)
ERYTHROCYTE [DISTWIDTH] IN BLOOD BY AUTOMATED COUNT: 14.2 % (ref 11.5–14.5)
EST. GFR  (AFRICAN AMERICAN): >60 ML/MIN/1.73 M^2
EST. GFR  (NON AFRICAN AMERICAN): >60 ML/MIN/1.73 M^2
GLUCOSE SERPL-MCNC: 96 MG/DL (ref 70–110)
GLUCOSE UR QL STRIP: NEGATIVE
HCG INTACT+B SERPL-ACNC: 5572 MIU/ML
HCT VFR BLD AUTO: 43.3 % (ref 37–48.5)
HGB BLD-MCNC: 13.9 G/DL (ref 12–16)
HGB UR QL STRIP: ABNORMAL
HYALINE CASTS #/AREA URNS LPF: 0 /LPF
IMM GRANULOCYTES # BLD AUTO: 0.03 K/UL (ref 0–0.04)
IMM GRANULOCYTES NFR BLD AUTO: 0.3 % (ref 0–0.5)
KETONES UR QL STRIP: ABNORMAL
LEUKOCYTE ESTERASE UR QL STRIP: ABNORMAL
LYMPHOCYTES # BLD AUTO: 2.2 K/UL (ref 1–4.8)
LYMPHOCYTES NFR BLD: 20.5 % (ref 18–48)
MCH RBC QN AUTO: 27.8 PG (ref 27–31)
MCHC RBC AUTO-ENTMCNC: 32.1 G/DL (ref 32–36)
MCV RBC AUTO: 87 FL (ref 82–98)
MICROSCOPIC COMMENT: ABNORMAL
MONOCYTES # BLD AUTO: 0.6 K/UL (ref 0.3–1)
MONOCYTES NFR BLD: 5.1 % (ref 4–15)
NEUTROPHILS # BLD AUTO: 8.1 K/UL (ref 1.8–7.7)
NEUTROPHILS NFR BLD: 73.5 % (ref 38–73)
NITRITE UR QL STRIP: POSITIVE
NRBC BLD-RTO: 0 /100 WBC
PH UR STRIP: 6 [PH] (ref 5–8)
PLATELET # BLD AUTO: 327 K/UL (ref 150–350)
PMV BLD AUTO: 11.6 FL (ref 9.2–12.9)
POTASSIUM SERPL-SCNC: 3.8 MMOL/L (ref 3.5–5.1)
PROT SERPL-MCNC: 7.5 G/DL (ref 6–8.4)
PROT UR QL STRIP: ABNORMAL
RBC # BLD AUTO: 5 M/UL (ref 4–5.4)
RBC #/AREA URNS HPF: >100 /HPF (ref 0–4)
SODIUM SERPL-SCNC: 135 MMOL/L (ref 136–145)
SP GR UR STRIP: 1.02 (ref 1–1.03)
SQUAMOUS #/AREA URNS HPF: 2 /HPF
URN SPEC COLLECT METH UR: ABNORMAL
UROBILINOGEN UR STRIP-ACNC: 1 EU/DL
WBC # BLD AUTO: 10.95 K/UL (ref 3.9–12.7)
WBC #/AREA URNS HPF: 11 /HPF (ref 0–5)

## 2020-12-07 PROCEDURE — 85025 COMPLETE CBC W/AUTO DIFF WBC: CPT

## 2020-12-07 PROCEDURE — 81025 URINE PREGNANCY TEST: CPT | Performed by: EMERGENCY MEDICINE

## 2020-12-07 PROCEDURE — 81000 URINALYSIS NONAUTO W/SCOPE: CPT

## 2020-12-07 PROCEDURE — 96374 THER/PROPH/DIAG INJ IV PUSH: CPT

## 2020-12-07 PROCEDURE — 80053 COMPREHEN METABOLIC PANEL: CPT

## 2020-12-07 PROCEDURE — 96361 HYDRATE IV INFUSION ADD-ON: CPT

## 2020-12-07 PROCEDURE — 84702 CHORIONIC GONADOTROPIN TEST: CPT

## 2020-12-07 PROCEDURE — 96375 TX/PRO/DX INJ NEW DRUG ADDON: CPT

## 2020-12-07 PROCEDURE — 63600175 PHARM REV CODE 636 W HCPCS: Performed by: EMERGENCY MEDICINE

## 2020-12-07 PROCEDURE — 25000003 PHARM REV CODE 250: Performed by: PHYSICIAN ASSISTANT

## 2020-12-07 PROCEDURE — 63600175 PHARM REV CODE 636 W HCPCS: Performed by: PHYSICIAN ASSISTANT

## 2020-12-07 PROCEDURE — 87086 URINE CULTURE/COLONY COUNT: CPT

## 2020-12-07 PROCEDURE — 99285 EMERGENCY DEPT VISIT HI MDM: CPT | Mod: 25

## 2020-12-07 RX ORDER — ACETAMINOPHEN 500 MG
1000 TABLET ORAL
Status: COMPLETED | OUTPATIENT
Start: 2020-12-07 | End: 2020-12-07

## 2020-12-07 RX ORDER — IBUPROFEN 800 MG/1
800 TABLET ORAL EVERY 6 HOURS PRN
Qty: 30 TABLET | Refills: 0 | Status: SHIPPED | OUTPATIENT
Start: 2020-12-07 | End: 2021-03-16 | Stop reason: ALTCHOICE

## 2020-12-07 RX ORDER — KETOROLAC TROMETHAMINE 30 MG/ML
10 INJECTION, SOLUTION INTRAMUSCULAR; INTRAVENOUS
Status: COMPLETED | OUTPATIENT
Start: 2020-12-07 | End: 2020-12-07

## 2020-12-07 RX ORDER — ONDANSETRON 2 MG/ML
4 INJECTION INTRAMUSCULAR; INTRAVENOUS
Status: COMPLETED | OUTPATIENT
Start: 2020-12-07 | End: 2020-12-07

## 2020-12-07 RX ADMIN — ACETAMINOPHEN 1000 MG: 500 TABLET, FILM COATED ORAL at 11:12

## 2020-12-07 RX ADMIN — ONDANSETRON 4 MG: 2 INJECTION INTRAMUSCULAR; INTRAVENOUS at 11:12

## 2020-12-07 RX ADMIN — KETOROLAC TROMETHAMINE 10 MG: 30 INJECTION, SOLUTION INTRAMUSCULAR at 03:12

## 2020-12-07 RX ADMIN — SODIUM CHLORIDE 1000 ML: 0.9 INJECTION, SOLUTION INTRAVENOUS at 11:12

## 2020-12-07 NOTE — Clinical Note
"King Moraie" Roselyn was seen and treated in our emergency department on 12/7/2020.  She may return to work on 12/10/2020.       If you have any questions or concerns, please don't hesitate to call.      Yuri Singer II, MD"

## 2020-12-07 NOTE — ED PROVIDER NOTES
Encounter Date: 2020    SCRIBE #1 NOTE: I, Karrie Garcia, am scribing for, and in the presence of,  Dr. Singer. I have scribed the following portions of the note - Other sections scribed: the physical exam.       History     Chief Complaint   Patient presents with    Vaginal Bleeding     Spotting since Saturday night.  Today having abdominal cramping.  States still spotting and only notices blood with wiping.     Patient approximately 10 weeks pregnant.  Followed by Dr. Moreno of OBGYN.  States she had initial ultrasound approximately 4 weeks ago.  She began to have spotting approximately 48 hr ago and cramping yesterday which has increased in severity this morning.  She has small clots occasionally but still mostly spotting.  Deniesis vaginal charge.  Nausea but no vomiting or diarrhea.  No dysuria.  No other acute complaints.   1 spontaneous miscarriage    The history is provided by the patient.     Review of patient's allergies indicates:   Allergen Reactions    Doxycycline      Abdomen pain severe    Tetanus vaccines and toxoid Rash     Past Medical History:   Diagnosis Date    Abnormal Pap smear     Colposcopy    Menarche     Age of onset 11    Mitral valve prolapse      Past Surgical History:   Procedure Laterality Date     SECTION      MITRAL VALVE REPAIR       Family History   Problem Relation Age of Onset    Heart disease Mother     Hypertension Mother     Hyperlipidemia Mother     Diabetes Mother     Hyperlipidemia Father     Hypertension Father     Breast cancer Paternal Aunt     Colon cancer Neg Hx     Ovarian cancer Neg Hx      Social History     Tobacco Use    Smoking status: Never Smoker    Smokeless tobacco: Never Used   Substance Use Topics    Alcohol use: Yes     Frequency: 2-4 times a month     Drinks per session: 3 or 4     Binge frequency: Less than monthly     Comment: social    Drug use: No     Review of Systems   Constitutional: Negative.    Respiratory:  Negative.    Cardiovascular: Negative.    Gastrointestinal: Positive for nausea. Negative for constipation, diarrhea and vomiting.   Genitourinary: Positive for pelvic pain and vaginal bleeding. Negative for difficulty urinating, dysuria, flank pain, frequency, genital sores and vaginal discharge.   Musculoskeletal: Negative.    Neurological: Negative.    All other systems reviewed and are negative.      Physical Exam     Initial Vitals [12/07/20 1101]   BP Pulse Resp Temp SpO2   122/78 90 18 97.9 °F (36.6 °C) 98 %      MAP       --         Physical Exam    Nursing note and vitals reviewed.  Constitutional: She appears well-developed and well-nourished.   Uncomfortable appearing.  Not diaphoretic.   HENT:   Head: Normocephalic and atraumatic.   Eyes: Pupils are equal, round, and reactive to light.   No pallor or icterus   Neck: Normal range of motion. No JVD present.   Cardiovascular: Normal rate, regular rhythm and normal heart sounds.   Abdominal: Soft. Bowel sounds are normal. She exhibits no distension.   Suprapubic tenderness without rebound or guarding.  Bowel sounds normal.  Uterus not palpable above the pelvic brim.   Genitourinary:    Genitourinary Comments: Pelvic Exam: No external lesions. Moderate clots in vault. Cervix open to fingertip. No products seen. No adnexal mass.     Musculoskeletal: Normal range of motion.   Neurological: She is alert and oriented to person, place, and time.   Skin: Skin is warm and dry. Capillary refill takes less than 2 seconds. No rash noted.   Psychiatric: She has a normal mood and affect. Thought content normal.         ED Course   Procedures  Labs Reviewed   CBC W/ AUTO DIFFERENTIAL - Abnormal; Notable for the following components:       Result Value    Gran # (ANC) 8.1 (*)     Gran % 73.5 (*)     All other components within normal limits   COMPREHENSIVE METABOLIC PANEL - Abnormal; Notable for the following components:    Sodium 135 (*)     CO2 20 (*)     BUN 5 (*)      Alkaline Phosphatase 53 (*)     All other components within normal limits   URINALYSIS, REFLEX TO URINE CULTURE - Abnormal; Notable for the following components:    Color, UA Red (*)     Protein, UA 2+ (*)     Ketones, UA Trace (*)     Bilirubin (UA) 1+ (*)     Occult Blood UA 3+ (*)     Nitrite, UA Positive (*)     Leukocytes, UA Trace (*)     All other components within normal limits    Narrative:     Specimen Source->Urine   URINALYSIS MICROSCOPIC - Abnormal; Notable for the following components:    RBC, UA >100 (*)     WBC, UA 11 (*)     Bacteria Moderate (*)     All other components within normal limits    Narrative:     Specimen Source->Urine   POCT URINE PREGNANCY - Abnormal; Notable for the following components:    POC Preg Test, Ur Positive (*)     All other components within normal limits   CULTURE, URINE   HCG, QUANTITATIVE, PREGNANCY          Imaging Results          US OB <14 Wks TransAbd & TransVag, Single Gestation (XPD) (Final result)  Result time 12/07/20 14:58:45    Final result by Rishi Burden MD (12/07/20 14:58:45)                 Impression:      1. Abnormal location of gestational sac within the cervix.  2. Fetal heart tones are not appreciated at anticipated gestational age of 7 weeks and 5 days.  Given that cardiac activity would be expected at greater than 7 mm crown-rump length, findings are concerning for pregnancy failure.  Correlation with quantitative HCG and obstetrical would be recommended.      Electronically signed by: Rishi Burden  Date:    12/07/2020  Time:    14:58             Narrative:    EXAMINATION:  US OB <14 WEEKS, TRANSABDOM & TRANSVAG, SINGLE GESTATION (XPD)    CLINICAL HISTORY:  Vag Bleeding;    TECHNIQUE:  Transabdominal sonography of the pelvis was performed, followed by transvaginal sonography to better evaluate the uterus and ovaries.    COMPARISON:  None.    FINDINGS:  Note that examination was prematurely terminated due to reported cramping.    Uterus  measures 10.7 x 4.7 x 3.8 cm.  The gestational sac appears to extend into the cervix.    Intrauterine gestation(s): Single    Mean gestational sac diameter: 3.7 cm    Yolk sac: Visualized    Crown-rump length (CRL): 1.3 cm    Cardiac activity: No fetal heart tones obtained.    Subchorionic hemorrhage: None.    Right ovary: Not visualized.    Left ovary: Measures 4.7 x 4 x 4 cm.  Preserved arteriovenous flow.  Cyst measures 3.7 x 3.7 x 3.9 cm.    Miscellaneous: No Free Fluid.                                 Medical Decision Making:   History:   Old Medical Records: I decided to obtain old medical records.  Clinical Tests:   Lab Tests: Reviewed  Radiological Study: Reviewed            Scribe Attestation:   Scribe #1: I performed the above scribed service and the documentation accurately describes the services I performed. I attest to the accuracy of the note.    Attending Attestation:           Physician Attestation for Scribe:  Physician Attestation Statement for Scribe #1: I, Dr. Singer, reviewed documentation, as scribed by Karrie Garcia in my presence, and it is both accurate and complete.          Patient approximately 10 weeks pregnant, had prior ultrasound at her OB GYNs office.  She began having light spotting couple days ago, mild cramping yesterday but increased both today.  No fevers or shortness of breath.  On exam vital signs are stable in abdomen is benign.  She does have a moderate amount of blood in the vault and cervix is slightly open.  Beta HCG was sent to help follow progress.  Hemoglobin is stable.  This ultrasound does show abnormally lying gestational sac, fetal pole without cardiac activity, highly concerning for miscarriage in progress, and during observation the ER while awaiting ultrasound testing results the patient did have passage of more clots, possibly tissue but states the cramping is actually somewhat better now.  As we no longer feel there is a viable pregnancy she will be given  anti-inflammatories in addition to the Tylenol.  Case discussed with OBGYN agree with the plan for expectant management of spontaneous  in progress.  Patient encouraged to call for follow-up with her Ob G. return precautions discussed for the interim        ED Course as of Dec 08 1646   Mon Dec 07, 2020   1516 Discussed case with GYN on-call, who agrees with plan for expected management and follow up with Dr. Moreno.    [AC]      ED Course User Index  [AC] Karrie Garcia            Clinical Impression:     ICD-10-CM ICD-9-CM   1. Spontaneous miscarriage  O03.9 634.90                          ED Disposition Condition    Discharge Stable        ED Prescriptions     Medication Sig Dispense Start Date End Date Auth. Provider    ibuprofen (ADVIL,MOTRIN) 800 MG tablet Take 1 tablet (800 mg total) by mouth every 6 (six) hours as needed for Pain. 30 tablet 2020  Yuri Singer II, MD        Follow-up Information     Follow up With Specialties Details Why Contact Info    Mane Moreno MD Obstetrics, Obstetrics and Gynecology Call in 1 day  [address] [place] [phone]  [phone]                                         Yuri Singer II, MD  20 0716

## 2020-12-07 NOTE — ED NOTES
Pt AAOx4 and appropriate at this time. Respirations even and unlabored. No acute distress noted.  Lying in ED stretcher with  at BS. Awaiting further orders. Pt updated on POC. Bed is locked and in lowest position with side rails up x2. Call bell within reach and pt oriented to use of call bell. Pt on continuous pulse ox, and continuous BP cuff. Will continue to monitor.

## 2020-12-07 NOTE — ED NOTES
Pt LMP was 9/17/2020. Pt reporting lower midline pelvic cramping with vaginal bleeding x several days with increasing bleeding today. Denies SOB, weakness, coughing, fevers, chills. Pt AAOx4 and appropriate at this time. Respirations even and unlabored. No acute distress noted.

## 2020-12-08 ENCOUNTER — TELEPHONE (OUTPATIENT)
Dept: OBSTETRICS AND GYNECOLOGY | Facility: CLINIC | Age: 35
End: 2020-12-08

## 2020-12-08 LAB — BACTERIA UR CULT: NO GROWTH

## 2020-12-08 NOTE — TELEPHONE ENCOUNTER
I will have Dr Moreno review the ER report and let you know what he would like you to do. Stacia  ===View-only below this line===      ----- Message -----       From:King Dempsey       Sent:12/8/2020  9:46 AM CST         To:Mane Moreno MD    Subject:RE: Non-Urgent Medical    The ER Dr told me to get in touch with you guys today for follow up.  Just let me know.     Thanks  King       ----- Message -----       From:Med Assistant Limon       Sent:12/7/2020 10:41 AM CST         To:King Dempsey    Subject:RE: Non-Urgent Medical    Hi  Sure, see you soon. Hope you feel better.      ----- Message -----       From:King Dempsey       Sent:12/7/2020  9:24 AM CST         To:Mane Moreno MD    Subject:RE: Non-Urgent Medical    The cramping is hurting so maybe I should go, would it be best to come to [facility] ER?    Thanks       ----- Message -----       From:Med Assistant Limon       Sent:12/7/2020  9:15 AM CST         To:King Dempsey    Subject:RE: Non-Urgent Medical    Good morning,     So sorry to hear about your bleeding and cramping. Some cramping and spotting in the beginning of pregnancy can be normal. However if the bleeding persists, increases. Cramping intensifies this should be evaluated.  recommends reporting to your nearest emergency room to evaluate on today.        ----- Message -----       From:King Dempsey       Sent:12/7/2020  8:59 AM CST         To:Mane Moreno MD    Subject:Non-Urgent Medical    Good morning, I am still spotting since Saturday.  Seems to be a little more now and I am having some cramping along with it.  Should I  come in to be seen today?  Please let me know.     Thanks   King

## 2020-12-15 ENCOUNTER — TELEPHONE (OUTPATIENT)
Dept: OBSTETRICS AND GYNECOLOGY | Facility: CLINIC | Age: 35
End: 2020-12-15

## 2020-12-15 DIAGNOSIS — O03.9 SAB (SPONTANEOUS ABORTION): Primary | ICD-10-CM

## 2020-12-15 NOTE — TELEPHONE ENCOUNTER
Patient asked for follow up after review of her ED visit after her MAB. Unable to accept the appointment offered tomorrow but wants to know if she can do the follow up labs before seeing you.

## 2020-12-16 ENCOUNTER — PATIENT MESSAGE (OUTPATIENT)
Dept: OBSTETRICS AND GYNECOLOGY | Facility: CLINIC | Age: 35
End: 2020-12-16

## 2020-12-16 NOTE — TELEPHONE ENCOUNTER
Called patient:    Mail box full, unable to leave message.    Blood type: A+    [Seen in ER for SAB, will need serial BHCGs - order for labs signed.]

## 2020-12-17 ENCOUNTER — TELEPHONE (OUTPATIENT)
Dept: OBSTETRICS AND GYNECOLOGY | Facility: CLINIC | Age: 35
End: 2020-12-17

## 2020-12-17 ENCOUNTER — LAB VISIT (OUTPATIENT)
Dept: LAB | Facility: HOSPITAL | Age: 35
End: 2020-12-17
Attending: OBSTETRICS & GYNECOLOGY
Payer: COMMERCIAL

## 2020-12-17 DIAGNOSIS — O03.9 SAB (SPONTANEOUS ABORTION): Primary | ICD-10-CM

## 2020-12-17 DIAGNOSIS — O03.9 SAB (SPONTANEOUS ABORTION): ICD-10-CM

## 2020-12-17 LAB
BASOPHILS # BLD AUTO: 0.02 K/UL (ref 0–0.2)
BASOPHILS NFR BLD: 0.4 % (ref 0–1.9)
DIFFERENTIAL METHOD: ABNORMAL
EOSINOPHIL # BLD AUTO: 0.1 K/UL (ref 0–0.5)
EOSINOPHIL NFR BLD: 1.2 % (ref 0–8)
ERYTHROCYTE [DISTWIDTH] IN BLOOD BY AUTOMATED COUNT: 14.9 % (ref 11.5–14.5)
HCG INTACT+B SERPL-ACNC: 176 MIU/ML
HCT VFR BLD AUTO: 41.9 % (ref 37–48.5)
HGB BLD-MCNC: 13.4 G/DL (ref 12–16)
LYMPHOCYTES # BLD AUTO: 2.1 K/UL (ref 1–4.8)
LYMPHOCYTES NFR BLD: 42.6 % (ref 18–48)
MCH RBC QN AUTO: 28.3 PG (ref 27–31)
MCHC RBC AUTO-ENTMCNC: 32 G/DL (ref 32–36)
MCV RBC AUTO: 88 FL (ref 82–98)
MONOCYTES # BLD AUTO: 0.5 K/UL (ref 0.3–1)
MONOCYTES NFR BLD: 9.7 % (ref 4–15)
NEUTROPHILS # BLD AUTO: 2.3 K/UL (ref 1.8–7.7)
NEUTROPHILS NFR BLD: 46.1 % (ref 38–73)
PLATELET # BLD AUTO: 284 K/UL (ref 150–350)
PMV BLD AUTO: 10.9 FL (ref 9.2–12.9)
RBC # BLD AUTO: 4.74 M/UL (ref 4–5.4)
WBC # BLD AUTO: 4.95 K/UL (ref 3.9–12.7)

## 2020-12-17 PROCEDURE — 36415 COLL VENOUS BLD VENIPUNCTURE: CPT

## 2020-12-17 PROCEDURE — 84702 CHORIONIC GONADOTROPIN TEST: CPT

## 2020-12-17 PROCEDURE — 85025 COMPLETE CBC W/AUTO DIFF WBC: CPT

## 2020-12-17 NOTE — TELEPHONE ENCOUNTER
Called patient:    Recently S/P SAB.  Describes bleeding like a period.  No pain    Discussed results of labs:    BHC,572 ---> 176    Blood type A+    We discussed the need to follow BHCG to zero to confirm complete evacuation of uterus.    Repeat BHCG in 1-2 weeks.

## 2020-12-27 ENCOUNTER — PATIENT MESSAGE (OUTPATIENT)
Dept: OBSTETRICS AND GYNECOLOGY | Facility: CLINIC | Age: 35
End: 2020-12-27

## 2020-12-27 LAB
FINAL PATHOLOGIC DIAGNOSIS: NORMAL
Lab: NORMAL

## 2020-12-31 ENCOUNTER — LAB VISIT (OUTPATIENT)
Dept: LAB | Facility: HOSPITAL | Age: 35
End: 2020-12-31
Attending: INTERNAL MEDICINE
Payer: COMMERCIAL

## 2020-12-31 ENCOUNTER — PATIENT MESSAGE (OUTPATIENT)
Dept: CARDIOLOGY | Facility: CLINIC | Age: 35
End: 2020-12-31

## 2020-12-31 DIAGNOSIS — O03.9 SAB (SPONTANEOUS ABORTION): ICD-10-CM

## 2020-12-31 DIAGNOSIS — E55.9 VITAMIN D DEFICIENCY DISEASE: ICD-10-CM

## 2020-12-31 LAB
25(OH)D3+25(OH)D2 SERPL-MCNC: 16 NG/ML (ref 30–96)
HCG INTACT+B SERPL-ACNC: 20 MIU/ML

## 2020-12-31 PROCEDURE — 36415 COLL VENOUS BLD VENIPUNCTURE: CPT

## 2020-12-31 PROCEDURE — 82306 VITAMIN D 25 HYDROXY: CPT

## 2020-12-31 PROCEDURE — 84702 CHORIONIC GONADOTROPIN TEST: CPT

## 2021-01-04 ENCOUNTER — TELEPHONE (OUTPATIENT)
Dept: OBSTETRICS AND GYNECOLOGY | Facility: CLINIC | Age: 36
End: 2021-01-04

## 2021-01-04 ENCOUNTER — PATIENT MESSAGE (OUTPATIENT)
Dept: OBSTETRICS AND GYNECOLOGY | Facility: CLINIC | Age: 36
End: 2021-01-04

## 2021-01-04 ENCOUNTER — LAB VISIT (OUTPATIENT)
Dept: LAB | Facility: HOSPITAL | Age: 36
End: 2021-01-04
Attending: OBSTETRICS & GYNECOLOGY
Payer: COMMERCIAL

## 2021-01-04 DIAGNOSIS — R31.9 URINARY TRACT INFECTION WITH HEMATURIA, SITE UNSPECIFIED: Primary | ICD-10-CM

## 2021-01-04 DIAGNOSIS — O03.9 SAB (SPONTANEOUS ABORTION): ICD-10-CM

## 2021-01-04 DIAGNOSIS — N39.0 URINARY TRACT INFECTION WITH HEMATURIA, SITE UNSPECIFIED: Primary | ICD-10-CM

## 2021-01-04 LAB
BASOPHILS # BLD AUTO: 0.03 K/UL (ref 0–0.2)
BASOPHILS NFR BLD: 0.3 % (ref 0–1.9)
DIFFERENTIAL METHOD: ABNORMAL
EOSINOPHIL # BLD AUTO: 0.1 K/UL (ref 0–0.5)
EOSINOPHIL NFR BLD: 0.7 % (ref 0–8)
ERYTHROCYTE [DISTWIDTH] IN BLOOD BY AUTOMATED COUNT: 15.2 % (ref 11.5–14.5)
HCT VFR BLD AUTO: 40.5 % (ref 37–48.5)
HGB BLD-MCNC: 12.3 G/DL (ref 12–16)
LYMPHOCYTES # BLD AUTO: 2.4 K/UL (ref 1–4.8)
LYMPHOCYTES NFR BLD: 23.2 % (ref 18–48)
MCH RBC QN AUTO: 28.5 PG (ref 27–31)
MCHC RBC AUTO-ENTMCNC: 30.4 G/DL (ref 32–36)
MCV RBC AUTO: 94 FL (ref 82–98)
MONOCYTES # BLD AUTO: 0.8 K/UL (ref 0.3–1)
MONOCYTES NFR BLD: 7.6 % (ref 4–15)
NEUTROPHILS # BLD AUTO: 6.9 K/UL (ref 1.8–7.7)
NEUTROPHILS NFR BLD: 67.8 % (ref 38–73)
NRBC BLD-RTO: 0 /100 WBC
PLATELET # BLD AUTO: 260 K/UL (ref 150–350)
PMV BLD AUTO: 12.3 FL (ref 9.2–12.9)
RBC # BLD AUTO: 4.32 M/UL (ref 4–5.4)
WBC # BLD AUTO: 10.14 K/UL (ref 3.9–12.7)

## 2021-01-04 PROCEDURE — 36415 COLL VENOUS BLD VENIPUNCTURE: CPT

## 2021-01-04 PROCEDURE — 85025 COMPLETE CBC W/AUTO DIFF WBC: CPT

## 2021-01-05 ENCOUNTER — OFFICE VISIT (OUTPATIENT)
Dept: OBSTETRICS AND GYNECOLOGY | Facility: CLINIC | Age: 36
End: 2021-01-05
Attending: OBSTETRICS & GYNECOLOGY
Payer: COMMERCIAL

## 2021-01-05 VITALS
DIASTOLIC BLOOD PRESSURE: 72 MMHG | HEIGHT: 61 IN | SYSTOLIC BLOOD PRESSURE: 112 MMHG | BODY MASS INDEX: 35.88 KG/M2 | WEIGHT: 190.06 LBS

## 2021-01-05 DIAGNOSIS — O03.9 SAB (SPONTANEOUS ABORTION): Primary | ICD-10-CM

## 2021-01-05 DIAGNOSIS — R39.9 URINARY TRACT INFECTION SYMPTOMS: ICD-10-CM

## 2021-01-05 PROCEDURE — 1126F AMNT PAIN NOTED NONE PRSNT: CPT | Mod: S$GLB,,, | Performed by: OBSTETRICS & GYNECOLOGY

## 2021-01-05 PROCEDURE — 99213 OFFICE O/P EST LOW 20 MIN: CPT | Mod: S$GLB,,, | Performed by: OBSTETRICS & GYNECOLOGY

## 2021-01-05 PROCEDURE — 99213 PR OFFICE/OUTPT VISIT, EST, LEVL III, 20-29 MIN: ICD-10-PCS | Mod: S$GLB,,, | Performed by: OBSTETRICS & GYNECOLOGY

## 2021-01-05 PROCEDURE — 99999 PR PBB SHADOW E&M-EST. PATIENT-LVL III: ICD-10-PCS | Mod: PBBFAC,,, | Performed by: OBSTETRICS & GYNECOLOGY

## 2021-01-05 PROCEDURE — 3008F PR BODY MASS INDEX (BMI) DOCUMENTED: ICD-10-PCS | Mod: CPTII,S$GLB,, | Performed by: OBSTETRICS & GYNECOLOGY

## 2021-01-05 PROCEDURE — 3008F BODY MASS INDEX DOCD: CPT | Mod: CPTII,S$GLB,, | Performed by: OBSTETRICS & GYNECOLOGY

## 2021-01-05 PROCEDURE — 99999 PR PBB SHADOW E&M-EST. PATIENT-LVL III: CPT | Mod: PBBFAC,,, | Performed by: OBSTETRICS & GYNECOLOGY

## 2021-01-05 PROCEDURE — 1126F PR PAIN SEVERITY QUANTIFIED, NO PAIN PRESENT: ICD-10-PCS | Mod: S$GLB,,, | Performed by: OBSTETRICS & GYNECOLOGY

## 2021-01-05 RX ORDER — CIPROFLOXACIN 250 MG/1
250 TABLET, FILM COATED ORAL 2 TIMES DAILY
Qty: 14 TABLET | Refills: 0 | Status: SHIPPED | OUTPATIENT
Start: 2021-01-05 | End: 2021-01-12

## 2021-01-07 ENCOUNTER — TELEPHONE (OUTPATIENT)
Dept: OBSTETRICS AND GYNECOLOGY | Facility: CLINIC | Age: 36
End: 2021-01-07

## 2021-01-07 ENCOUNTER — LAB VISIT (OUTPATIENT)
Dept: LAB | Facility: HOSPITAL | Age: 36
End: 2021-01-07
Attending: OBSTETRICS & GYNECOLOGY
Payer: COMMERCIAL

## 2021-01-07 DIAGNOSIS — O03.9 SAB (SPONTANEOUS ABORTION): ICD-10-CM

## 2021-01-07 LAB — HCG INTACT+B SERPL-ACNC: 2.3 MIU/ML

## 2021-01-07 PROCEDURE — 36415 COLL VENOUS BLD VENIPUNCTURE: CPT

## 2021-01-07 PROCEDURE — 84702 CHORIONIC GONADOTROPIN TEST: CPT

## 2021-01-08 ENCOUNTER — PATIENT MESSAGE (OUTPATIENT)
Dept: OBSTETRICS AND GYNECOLOGY | Facility: CLINIC | Age: 36
End: 2021-01-08

## 2021-01-08 RX ORDER — FLUCONAZOLE 150 MG/1
150 TABLET ORAL ONCE
Qty: 1 TABLET | Refills: 0 | Status: SHIPPED | OUTPATIENT
Start: 2021-01-08 | End: 2021-01-08

## 2021-02-04 ENCOUNTER — PATIENT MESSAGE (OUTPATIENT)
Dept: OBSTETRICS AND GYNECOLOGY | Facility: CLINIC | Age: 36
End: 2021-02-04

## 2021-02-04 ENCOUNTER — TELEPHONE (OUTPATIENT)
Dept: OBSTETRICS AND GYNECOLOGY | Facility: CLINIC | Age: 36
End: 2021-02-04

## 2021-02-04 DIAGNOSIS — N93.9 ABNORMAL UTERINE BLEEDING: Primary | ICD-10-CM

## 2021-02-05 ENCOUNTER — PATIENT MESSAGE (OUTPATIENT)
Dept: OBSTETRICS AND GYNECOLOGY | Facility: CLINIC | Age: 36
End: 2021-02-05

## 2021-03-08 ENCOUNTER — PATIENT MESSAGE (OUTPATIENT)
Dept: OBSTETRICS AND GYNECOLOGY | Facility: CLINIC | Age: 36
End: 2021-03-08

## 2021-03-09 ENCOUNTER — PATIENT MESSAGE (OUTPATIENT)
Dept: CARDIOLOGY | Facility: CLINIC | Age: 36
End: 2021-03-09

## 2021-03-09 PROBLEM — E66.812 CLASS 2 OBESITY DUE TO EXCESS CALORIES WITH BODY MASS INDEX (BMI) OF 35.0 TO 35.9 IN ADULT: Status: ACTIVE | Noted: 2021-03-09

## 2021-03-09 PROBLEM — E66.09 CLASS 2 OBESITY DUE TO EXCESS CALORIES WITH BODY MASS INDEX (BMI) OF 35.0 TO 35.9 IN ADULT: Status: ACTIVE | Noted: 2021-03-09

## 2021-03-10 ENCOUNTER — PATIENT OUTREACH (OUTPATIENT)
Dept: ADMINISTRATIVE | Facility: OTHER | Age: 36
End: 2021-03-10

## 2021-03-10 ENCOUNTER — HOSPITAL ENCOUNTER (OUTPATIENT)
Dept: CARDIOLOGY | Facility: CLINIC | Age: 36
Discharge: HOME | End: 2021-03-10
Payer: COMMERCIAL

## 2021-03-10 ENCOUNTER — OFFICE VISIT (OUTPATIENT)
Dept: CARDIOLOGY | Facility: CLINIC | Age: 36
End: 2021-03-10
Payer: COMMERCIAL

## 2021-03-10 VITALS
DIASTOLIC BLOOD PRESSURE: 100 MMHG | WEIGHT: 194.69 LBS | BODY MASS INDEX: 36.76 KG/M2 | HEART RATE: 80 BPM | OXYGEN SATURATION: 99 % | HEIGHT: 61 IN | SYSTOLIC BLOOD PRESSURE: 142 MMHG

## 2021-03-10 DIAGNOSIS — R00.2 PALPITATIONS: Primary | ICD-10-CM

## 2021-03-10 DIAGNOSIS — R00.2 PALPITATIONS: ICD-10-CM

## 2021-03-10 DIAGNOSIS — R00.1 BRADYCARDIA: ICD-10-CM

## 2021-03-10 DIAGNOSIS — E66.01 CLASS 2 SEVERE OBESITY DUE TO EXCESS CALORIES WITH SERIOUS COMORBIDITY AND BODY MASS INDEX (BMI) OF 35.0 TO 35.9 IN ADULT: ICD-10-CM

## 2021-03-10 DIAGNOSIS — R00.2 HEART PALPITATIONS: ICD-10-CM

## 2021-03-10 DIAGNOSIS — H53.2 DIPLOPIA: ICD-10-CM

## 2021-03-10 DIAGNOSIS — E55.9 VITAMIN D DEFICIENCY DISEASE: ICD-10-CM

## 2021-03-10 DIAGNOSIS — Z98.890 STATUS POST MITRAL VALVE ANNULOPLASTY: Primary | ICD-10-CM

## 2021-03-10 PROCEDURE — 1126F PR PAIN SEVERITY QUANTIFIED, NO PAIN PRESENT: ICD-10-PCS | Mod: S$GLB,,, | Performed by: INTERNAL MEDICINE

## 2021-03-10 PROCEDURE — 99215 PR OFFICE/OUTPT VISIT, EST, LEVL V, 40-54 MIN: ICD-10-PCS | Mod: S$GLB,,, | Performed by: INTERNAL MEDICINE

## 2021-03-10 PROCEDURE — 93000 EKG 12-LEAD: ICD-10-PCS | Mod: S$GLB,,, | Performed by: INTERNAL MEDICINE

## 2021-03-10 PROCEDURE — 93000 ELECTROCARDIOGRAM COMPLETE: CPT | Mod: S$GLB,,, | Performed by: INTERNAL MEDICINE

## 2021-03-10 PROCEDURE — 3008F PR BODY MASS INDEX (BMI) DOCUMENTED: ICD-10-PCS | Mod: CPTII,S$GLB,, | Performed by: INTERNAL MEDICINE

## 2021-03-10 PROCEDURE — 99215 OFFICE O/P EST HI 40 MIN: CPT | Mod: S$GLB,,, | Performed by: INTERNAL MEDICINE

## 2021-03-10 PROCEDURE — 1126F AMNT PAIN NOTED NONE PRSNT: CPT | Mod: S$GLB,,, | Performed by: INTERNAL MEDICINE

## 2021-03-10 PROCEDURE — 99999 PR PBB SHADOW E&M-EST. PATIENT-LVL IV: ICD-10-PCS | Mod: PBBFAC,,, | Performed by: INTERNAL MEDICINE

## 2021-03-10 PROCEDURE — 99999 PR PBB SHADOW E&M-EST. PATIENT-LVL IV: CPT | Mod: PBBFAC,,, | Performed by: INTERNAL MEDICINE

## 2021-03-10 PROCEDURE — 3008F BODY MASS INDEX DOCD: CPT | Mod: CPTII,S$GLB,, | Performed by: INTERNAL MEDICINE

## 2021-03-10 RX ORDER — ASPIRIN 81 MG/1
81 TABLET ORAL DAILY
Qty: 30 TABLET | Refills: 0 | Status: SHIPPED | OUTPATIENT
Start: 2021-03-10 | End: 2021-06-11

## 2021-03-11 ENCOUNTER — PATIENT MESSAGE (OUTPATIENT)
Dept: CARDIOLOGY | Facility: CLINIC | Age: 36
End: 2021-03-11

## 2021-03-11 ENCOUNTER — PATIENT MESSAGE (OUTPATIENT)
Dept: INTERNAL MEDICINE | Facility: CLINIC | Age: 36
End: 2021-03-11

## 2021-03-11 RX ORDER — AMLODIPINE BESYLATE 5 MG/1
5 TABLET ORAL NIGHTLY
COMMUNITY
End: 2021-06-11

## 2021-03-12 ENCOUNTER — PATIENT MESSAGE (OUTPATIENT)
Dept: CARDIOLOGY | Facility: CLINIC | Age: 36
End: 2021-03-12

## 2021-03-12 ENCOUNTER — CLINICAL SUPPORT (OUTPATIENT)
Dept: CARDIOLOGY | Facility: HOSPITAL | Age: 36
End: 2021-03-12
Attending: INTERNAL MEDICINE
Payer: COMMERCIAL

## 2021-03-12 DIAGNOSIS — R00.2 HEART PALPITATIONS: ICD-10-CM

## 2021-03-12 PROCEDURE — 93226 XTRNL ECG REC<48 HR SCAN A/R: CPT

## 2021-03-12 PROCEDURE — 93227 HOLTER MONITOR - 48 HOUR (CUPID ONLY): ICD-10-PCS | Mod: ,,, | Performed by: INTERNAL MEDICINE

## 2021-03-12 PROCEDURE — 93227 XTRNL ECG REC<48 HR R&I: CPT | Mod: ,,, | Performed by: INTERNAL MEDICINE

## 2021-03-15 ENCOUNTER — PATIENT MESSAGE (OUTPATIENT)
Dept: CARDIOLOGY | Facility: CLINIC | Age: 36
End: 2021-03-15

## 2021-03-16 ENCOUNTER — LAB VISIT (OUTPATIENT)
Dept: LAB | Facility: HOSPITAL | Age: 36
End: 2021-03-16
Attending: FAMILY MEDICINE
Payer: COMMERCIAL

## 2021-03-16 ENCOUNTER — OFFICE VISIT (OUTPATIENT)
Dept: INTERNAL MEDICINE | Facility: CLINIC | Age: 36
End: 2021-03-16
Attending: FAMILY MEDICINE
Payer: COMMERCIAL

## 2021-03-16 VITALS
TEMPERATURE: 97 F | SYSTOLIC BLOOD PRESSURE: 140 MMHG | HEIGHT: 61 IN | BODY MASS INDEX: 36.27 KG/M2 | DIASTOLIC BLOOD PRESSURE: 99 MMHG | HEART RATE: 91 BPM | WEIGHT: 192.13 LBS | OXYGEN SATURATION: 99 % | RESPIRATION RATE: 18 BRPM

## 2021-03-16 DIAGNOSIS — Z00.00 ANNUAL PHYSICAL EXAM: ICD-10-CM

## 2021-03-16 DIAGNOSIS — I10 HYPERTENSION, ESSENTIAL: Primary | ICD-10-CM

## 2021-03-16 DIAGNOSIS — R00.2 HEART PALPITATIONS: ICD-10-CM

## 2021-03-16 PROCEDURE — 3008F BODY MASS INDEX DOCD: CPT | Mod: CPTII,S$GLB,, | Performed by: FAMILY MEDICINE

## 2021-03-16 PROCEDURE — 99214 PR OFFICE/OUTPT VISIT, EST, LEVL IV, 30-39 MIN: ICD-10-PCS | Mod: S$GLB,,, | Performed by: FAMILY MEDICINE

## 2021-03-16 PROCEDURE — 80048 BASIC METABOLIC PNL TOTAL CA: CPT | Performed by: FAMILY MEDICINE

## 2021-03-16 PROCEDURE — 36415 COLL VENOUS BLD VENIPUNCTURE: CPT | Performed by: FAMILY MEDICINE

## 2021-03-16 PROCEDURE — 84443 ASSAY THYROID STIM HORMONE: CPT | Performed by: FAMILY MEDICINE

## 2021-03-16 PROCEDURE — 1126F AMNT PAIN NOTED NONE PRSNT: CPT | Mod: S$GLB,,, | Performed by: FAMILY MEDICINE

## 2021-03-16 PROCEDURE — 3008F PR BODY MASS INDEX (BMI) DOCUMENTED: ICD-10-PCS | Mod: CPTII,S$GLB,, | Performed by: FAMILY MEDICINE

## 2021-03-16 PROCEDURE — 1126F PR PAIN SEVERITY QUANTIFIED, NO PAIN PRESENT: ICD-10-PCS | Mod: S$GLB,,, | Performed by: FAMILY MEDICINE

## 2021-03-16 PROCEDURE — 99214 OFFICE O/P EST MOD 30 MIN: CPT | Mod: S$GLB,,, | Performed by: FAMILY MEDICINE

## 2021-03-16 PROCEDURE — 99999 PR PBB SHADOW E&M-EST. PATIENT-LVL IV: ICD-10-PCS | Mod: PBBFAC,,, | Performed by: FAMILY MEDICINE

## 2021-03-16 PROCEDURE — 80061 LIPID PANEL: CPT | Performed by: FAMILY MEDICINE

## 2021-03-16 PROCEDURE — 99999 PR PBB SHADOW E&M-EST. PATIENT-LVL IV: CPT | Mod: PBBFAC,,, | Performed by: FAMILY MEDICINE

## 2021-03-16 RX ORDER — FLUOXETINE 10 MG/1
10 TABLET ORAL DAILY
Qty: 90 TABLET | Refills: 1 | Status: SHIPPED | OUTPATIENT
Start: 2021-03-16 | End: 2021-06-11

## 2021-03-16 RX ORDER — FLUOXETINE 10 MG/1
10 TABLET ORAL DAILY
Qty: 90 TABLET | Refills: 1 | Status: SHIPPED | OUTPATIENT
Start: 2021-03-16 | End: 2021-03-16 | Stop reason: SDUPTHER

## 2021-03-17 LAB
ANION GAP SERPL CALC-SCNC: 11 MMOL/L (ref 8–16)
BUN SERPL-MCNC: 9 MG/DL (ref 6–20)
CALCIUM SERPL-MCNC: 9.2 MG/DL (ref 8.7–10.5)
CHLORIDE SERPL-SCNC: 104 MMOL/L (ref 95–110)
CHOLEST SERPL-MCNC: 202 MG/DL (ref 120–199)
CHOLEST/HDLC SERPL: 3.9 {RATIO} (ref 2–5)
CO2 SERPL-SCNC: 23 MMOL/L (ref 23–29)
CREAT SERPL-MCNC: 0.6 MG/DL (ref 0.5–1.4)
EST. GFR  (AFRICAN AMERICAN): >60 ML/MIN/1.73 M^2
EST. GFR  (NON AFRICAN AMERICAN): >60 ML/MIN/1.73 M^2
GLUCOSE SERPL-MCNC: 77 MG/DL (ref 70–110)
HDLC SERPL-MCNC: 52 MG/DL (ref 40–75)
HDLC SERPL: 25.7 % (ref 20–50)
LDLC SERPL CALC-MCNC: 124.4 MG/DL (ref 63–159)
NONHDLC SERPL-MCNC: 150 MG/DL
POTASSIUM SERPL-SCNC: 3.9 MMOL/L (ref 3.5–5.1)
SODIUM SERPL-SCNC: 138 MMOL/L (ref 136–145)
TRIGL SERPL-MCNC: 128 MG/DL (ref 30–150)
TSH SERPL DL<=0.005 MIU/L-ACNC: 0.96 UIU/ML (ref 0.4–4)

## 2021-03-17 RX ORDER — METOPROLOL TARTRATE 25 MG/1
25 TABLET, FILM COATED ORAL 3 TIMES DAILY
COMMUNITY
End: 2021-03-17 | Stop reason: SDUPTHER

## 2021-03-18 ENCOUNTER — PATIENT MESSAGE (OUTPATIENT)
Dept: INTERNAL MEDICINE | Facility: CLINIC | Age: 36
End: 2021-03-18

## 2021-03-18 RX ORDER — METOPROLOL TARTRATE 25 MG/1
25 TABLET, FILM COATED ORAL 3 TIMES DAILY
Qty: 90 TABLET | Refills: 3 | Status: SHIPPED | OUTPATIENT
Start: 2021-03-18 | End: 2022-11-25

## 2021-03-26 ENCOUNTER — PATIENT MESSAGE (OUTPATIENT)
Dept: CARDIOLOGY | Facility: CLINIC | Age: 36
End: 2021-03-26

## 2021-04-01 RX ORDER — AMLODIPINE BESYLATE 10 MG/1
10 TABLET ORAL DAILY
COMMUNITY
End: 2021-04-01 | Stop reason: SDUPTHER

## 2021-04-01 RX ORDER — AMLODIPINE BESYLATE 10 MG/1
10 TABLET ORAL DAILY
Qty: 90 TABLET | Refills: 3 | Status: SHIPPED | OUTPATIENT
Start: 2021-04-01 | End: 2021-06-11

## 2021-04-15 ENCOUNTER — PATIENT MESSAGE (OUTPATIENT)
Dept: RESEARCH | Facility: HOSPITAL | Age: 36
End: 2021-04-15

## 2021-05-13 ENCOUNTER — PATIENT MESSAGE (OUTPATIENT)
Dept: OBSTETRICS AND GYNECOLOGY | Facility: CLINIC | Age: 36
End: 2021-05-13

## 2021-05-13 ENCOUNTER — CLINICAL SUPPORT (OUTPATIENT)
Dept: OTHER | Facility: CLINIC | Age: 36
End: 2021-05-13
Payer: COMMERCIAL

## 2021-05-13 DIAGNOSIS — Z00.8 ENCOUNTER FOR OTHER GENERAL EXAMINATION: ICD-10-CM

## 2021-05-14 ENCOUNTER — TELEPHONE (OUTPATIENT)
Dept: OBSTETRICS AND GYNECOLOGY | Facility: CLINIC | Age: 36
End: 2021-05-14

## 2021-05-14 ENCOUNTER — LAB VISIT (OUTPATIENT)
Dept: LAB | Facility: HOSPITAL | Age: 36
End: 2021-05-14
Attending: OBSTETRICS & GYNECOLOGY
Payer: COMMERCIAL

## 2021-05-14 ENCOUNTER — PATIENT MESSAGE (OUTPATIENT)
Dept: CARDIOLOGY | Facility: CLINIC | Age: 36
End: 2021-05-14

## 2021-05-14 ENCOUNTER — PATIENT MESSAGE (OUTPATIENT)
Dept: OBSTETRICS AND GYNECOLOGY | Facility: CLINIC | Age: 36
End: 2021-05-14

## 2021-05-14 VITALS — BODY MASS INDEX: 36.3 KG/M2 | HEIGHT: 61 IN

## 2021-05-14 DIAGNOSIS — Z36.9 ANTENATAL SCREENING ENCOUNTER: Primary | ICD-10-CM

## 2021-05-14 DIAGNOSIS — N91.4 SECONDARY OLIGOMENORRHEA: Primary | ICD-10-CM

## 2021-05-14 DIAGNOSIS — N91.4 SECONDARY OLIGOMENORRHEA: ICD-10-CM

## 2021-05-14 LAB
GLUCOSE SERPL-MCNC: 132 MG/DL (ref 60–140)
HCG INTACT+B SERPL-ACNC: 6372 MIU/ML
HDLC SERPL-MCNC: 36 MG/DL
POC CHOLESTEROL, LDL (DOCK): 112 MG/DL
POC CHOLESTEROL, TOTAL: 173 MG/DL
PROGEST SERPL-MCNC: 11.6 NG/ML
TRIGL SERPL-MCNC: 122 MG/DL

## 2021-05-14 PROCEDURE — 84144 ASSAY OF PROGESTERONE: CPT | Performed by: OBSTETRICS & GYNECOLOGY

## 2021-05-14 PROCEDURE — 84702 CHORIONIC GONADOTROPIN TEST: CPT | Performed by: OBSTETRICS & GYNECOLOGY

## 2021-05-14 PROCEDURE — 36415 COLL VENOUS BLD VENIPUNCTURE: CPT | Performed by: OBSTETRICS & GYNECOLOGY

## 2021-05-14 RX ORDER — NIFEDIPINE 30 MG/1
30 TABLET, FILM COATED, EXTENDED RELEASE ORAL DAILY
COMMUNITY
End: 2021-05-21 | Stop reason: SDUPTHER

## 2021-05-17 ENCOUNTER — LAB VISIT (OUTPATIENT)
Dept: LAB | Facility: HOSPITAL | Age: 36
End: 2021-05-17
Attending: OBSTETRICS & GYNECOLOGY
Payer: COMMERCIAL

## 2021-05-17 DIAGNOSIS — Z36.9 ANTENATAL SCREENING ENCOUNTER: ICD-10-CM

## 2021-05-17 LAB
HCG INTACT+B SERPL-ACNC: NORMAL MIU/ML
PROGEST SERPL-MCNC: 10.7 NG/ML

## 2021-05-17 PROCEDURE — 84144 ASSAY OF PROGESTERONE: CPT | Performed by: OBSTETRICS & GYNECOLOGY

## 2021-05-17 PROCEDURE — 84702 CHORIONIC GONADOTROPIN TEST: CPT | Performed by: OBSTETRICS & GYNECOLOGY

## 2021-05-17 PROCEDURE — 36415 COLL VENOUS BLD VENIPUNCTURE: CPT | Performed by: OBSTETRICS & GYNECOLOGY

## 2021-05-18 ENCOUNTER — TELEPHONE (OUTPATIENT)
Dept: OBSTETRICS AND GYNECOLOGY | Facility: CLINIC | Age: 36
End: 2021-05-18

## 2021-05-18 DIAGNOSIS — Z36.9 ANTENATAL SCREENING ENCOUNTER: Primary | ICD-10-CM

## 2021-05-21 ENCOUNTER — PATIENT MESSAGE (OUTPATIENT)
Dept: CARDIOLOGY | Facility: CLINIC | Age: 36
End: 2021-05-21

## 2021-05-21 RX ORDER — NIFEDIPINE 30 MG/1
30 TABLET, FILM COATED, EXTENDED RELEASE ORAL DAILY
Qty: 30 TABLET | Refills: 11 | Status: SHIPPED | OUTPATIENT
Start: 2021-05-21 | End: 2022-03-28 | Stop reason: ALTCHOICE

## 2021-05-28 ENCOUNTER — HOSPITAL ENCOUNTER (OUTPATIENT)
Dept: RADIOLOGY | Facility: OTHER | Age: 36
Discharge: HOME | End: 2021-05-28
Attending: OBSTETRICS & GYNECOLOGY
Payer: COMMERCIAL

## 2021-05-28 ENCOUNTER — TELEPHONE (OUTPATIENT)
Dept: OBSTETRICS AND GYNECOLOGY | Facility: CLINIC | Age: 36
End: 2021-05-28

## 2021-05-28 ENCOUNTER — PATIENT MESSAGE (OUTPATIENT)
Dept: OBSTETRICS AND GYNECOLOGY | Facility: CLINIC | Age: 36
End: 2021-05-28

## 2021-05-28 DIAGNOSIS — Z36.9 ANTENATAL SCREENING ENCOUNTER: ICD-10-CM

## 2021-05-28 PROCEDURE — 76801 OB US < 14 WKS SINGLE FETUS: CPT | Mod: 26,,, | Performed by: RADIOLOGY

## 2021-05-28 PROCEDURE — 76801 US OB <14 WEEKS, TRANSABDOM & TRANSVAG, SINGLE GESTATION (XPD): ICD-10-PCS | Mod: 26,,, | Performed by: RADIOLOGY

## 2021-05-28 PROCEDURE — 76817 TRANSVAGINAL US OBSTETRIC: CPT | Mod: 26,,, | Performed by: RADIOLOGY

## 2021-05-28 PROCEDURE — 76817 TRANSVAGINAL US OBSTETRIC: CPT | Mod: TC

## 2021-05-28 PROCEDURE — 76817 US OB <14 WEEKS, TRANSABDOM & TRANSVAG, SINGLE GESTATION (XPD): ICD-10-PCS | Mod: 26,,, | Performed by: RADIOLOGY

## 2021-06-03 ENCOUNTER — PATIENT MESSAGE (OUTPATIENT)
Dept: OBSTETRICS AND GYNECOLOGY | Facility: CLINIC | Age: 36
End: 2021-06-03

## 2021-06-11 ENCOUNTER — LAB VISIT (OUTPATIENT)
Dept: LAB | Facility: OTHER | Age: 36
End: 2021-06-11
Attending: OBSTETRICS & GYNECOLOGY
Payer: COMMERCIAL

## 2021-06-11 ENCOUNTER — OFFICE VISIT (OUTPATIENT)
Dept: OBSTETRICS AND GYNECOLOGY | Facility: CLINIC | Age: 36
End: 2021-06-11
Attending: OBSTETRICS & GYNECOLOGY
Payer: COMMERCIAL

## 2021-06-11 VITALS
BODY MASS INDEX: 36.37 KG/M2 | SYSTOLIC BLOOD PRESSURE: 118 MMHG | DIASTOLIC BLOOD PRESSURE: 76 MMHG | WEIGHT: 192.44 LBS

## 2021-06-11 DIAGNOSIS — Z32.01 PREGNANCY TEST POSITIVE: ICD-10-CM

## 2021-06-11 DIAGNOSIS — O09.521 MULTIGRAVIDA OF ADVANCED MATERNAL AGE IN FIRST TRIMESTER: ICD-10-CM

## 2021-06-11 DIAGNOSIS — N91.4 SECONDARY OLIGOMENORRHEA: Primary | ICD-10-CM

## 2021-06-11 DIAGNOSIS — O34.219 PREVIOUS CESAREAN DELIVERY AFFECTING PREGNANCY, ANTEPARTUM: ICD-10-CM

## 2021-06-11 DIAGNOSIS — O10.911 PRE-EXISTING HYPERTENSION AFFECTING PREGNANCY IN FIRST TRIMESTER: ICD-10-CM

## 2021-06-11 DIAGNOSIS — Z98.890 STATUS POST MITRAL VALVE ANNULOPLASTY: ICD-10-CM

## 2021-06-11 DIAGNOSIS — N91.4 SECONDARY OLIGOMENORRHEA: ICD-10-CM

## 2021-06-11 LAB
ABO + RH BLD: NORMAL
ALBUMIN SERPL BCP-MCNC: 3.7 G/DL (ref 3.5–5.2)
ALP SERPL-CCNC: 58 U/L (ref 55–135)
ALT SERPL W/O P-5'-P-CCNC: 16 U/L (ref 10–44)
ANION GAP SERPL CALC-SCNC: 10 MMOL/L (ref 8–16)
AST SERPL-CCNC: 21 U/L (ref 10–40)
BASOPHILS # BLD AUTO: 0.04 K/UL (ref 0–0.2)
BASOPHILS NFR BLD: 0.4 % (ref 0–1.9)
BILIRUB SERPL-MCNC: 0.5 MG/DL (ref 0.1–1)
BLD GP AB SCN CELLS X3 SERPL QL: NORMAL
BUN SERPL-MCNC: 8 MG/DL (ref 6–20)
CALCIUM SERPL-MCNC: 9.7 MG/DL (ref 8.7–10.5)
CHLORIDE SERPL-SCNC: 105 MMOL/L (ref 95–110)
CO2 SERPL-SCNC: 21 MMOL/L (ref 23–29)
CREAT SERPL-MCNC: 0.6 MG/DL (ref 0.5–1.4)
CREAT UR-MCNC: 261 MG/DL (ref 15–325)
DIFFERENTIAL METHOD: ABNORMAL
EOSINOPHIL # BLD AUTO: 0.1 K/UL (ref 0–0.5)
EOSINOPHIL NFR BLD: 0.8 % (ref 0–8)
ERYTHROCYTE [DISTWIDTH] IN BLOOD BY AUTOMATED COUNT: 14.7 % (ref 11.5–14.5)
EST. GFR  (AFRICAN AMERICAN): >60 ML/MIN/1.73 M^2
EST. GFR  (NON AFRICAN AMERICAN): >60 ML/MIN/1.73 M^2
GLUCOSE SERPL-MCNC: 75 MG/DL (ref 70–110)
HCT VFR BLD AUTO: 42 % (ref 37–48.5)
HGB BLD-MCNC: 13.6 G/DL (ref 12–16)
IMM GRANULOCYTES # BLD AUTO: 0.03 K/UL (ref 0–0.04)
IMM GRANULOCYTES NFR BLD AUTO: 0.3 % (ref 0–0.5)
LYMPHOCYTES # BLD AUTO: 2.3 K/UL (ref 1–4.8)
LYMPHOCYTES NFR BLD: 21.9 % (ref 18–48)
MCH RBC QN AUTO: 29.3 PG (ref 27–31)
MCHC RBC AUTO-ENTMCNC: 32.4 G/DL (ref 32–36)
MCV RBC AUTO: 91 FL (ref 82–98)
MONOCYTES # BLD AUTO: 0.8 K/UL (ref 0.3–1)
MONOCYTES NFR BLD: 7.4 % (ref 4–15)
NEUTROPHILS # BLD AUTO: 7.3 K/UL (ref 1.8–7.7)
NEUTROPHILS NFR BLD: 69.2 % (ref 38–73)
NRBC BLD-RTO: 0 /100 WBC
PLATELET # BLD AUTO: 314 K/UL (ref 150–450)
PMV BLD AUTO: 11.2 FL (ref 9.2–12.9)
POTASSIUM SERPL-SCNC: 3.9 MMOL/L (ref 3.5–5.1)
PROT SERPL-MCNC: 7.2 G/DL (ref 6–8.4)
PROT UR-MCNC: 12 MG/DL (ref 0–15)
PROT/CREAT UR: 0.05 MG/G{CREAT} (ref 0–0.2)
RBC # BLD AUTO: 4.64 M/UL (ref 4–5.4)
SODIUM SERPL-SCNC: 136 MMOL/L (ref 136–145)
WBC # BLD AUTO: 10.49 K/UL (ref 3.9–12.7)

## 2021-06-11 PROCEDURE — 86592 SYPHILIS TEST NON-TREP QUAL: CPT | Performed by: OBSTETRICS & GYNECOLOGY

## 2021-06-11 PROCEDURE — 3008F PR BODY MASS INDEX (BMI) DOCUMENTED: ICD-10-PCS | Mod: CPTII,S$GLB,, | Performed by: OBSTETRICS & GYNECOLOGY

## 2021-06-11 PROCEDURE — 99999 PR PBB SHADOW E&M-EST. PATIENT-LVL III: CPT | Mod: PBBFAC,,, | Performed by: OBSTETRICS & GYNECOLOGY

## 2021-06-11 PROCEDURE — 86703 HIV-1/HIV-2 1 RESULT ANTBDY: CPT | Performed by: OBSTETRICS & GYNECOLOGY

## 2021-06-11 PROCEDURE — 86900 BLOOD TYPING SEROLOGIC ABO: CPT | Performed by: OBSTETRICS & GYNECOLOGY

## 2021-06-11 PROCEDURE — 84156 ASSAY OF PROTEIN URINE: CPT | Performed by: OBSTETRICS & GYNECOLOGY

## 2021-06-11 PROCEDURE — 99999 PR PBB SHADOW E&M-EST. PATIENT-LVL III: ICD-10-PCS | Mod: PBBFAC,,, | Performed by: OBSTETRICS & GYNECOLOGY

## 2021-06-11 PROCEDURE — 85025 COMPLETE CBC W/AUTO DIFF WBC: CPT | Performed by: OBSTETRICS & GYNECOLOGY

## 2021-06-11 PROCEDURE — 3008F BODY MASS INDEX DOCD: CPT | Mod: CPTII,S$GLB,, | Performed by: OBSTETRICS & GYNECOLOGY

## 2021-06-11 PROCEDURE — 86762 RUBELLA ANTIBODY: CPT | Performed by: OBSTETRICS & GYNECOLOGY

## 2021-06-11 PROCEDURE — 87340 HEPATITIS B SURFACE AG IA: CPT | Performed by: OBSTETRICS & GYNECOLOGY

## 2021-06-11 PROCEDURE — 87591 N.GONORRHOEAE DNA AMP PROB: CPT | Performed by: OBSTETRICS & GYNECOLOGY

## 2021-06-11 PROCEDURE — 36415 COLL VENOUS BLD VENIPUNCTURE: CPT | Performed by: OBSTETRICS & GYNECOLOGY

## 2021-06-11 PROCEDURE — 99214 PR OFFICE/OUTPT VISIT, EST, LEVL IV, 30-39 MIN: ICD-10-PCS | Mod: S$GLB,,, | Performed by: OBSTETRICS & GYNECOLOGY

## 2021-06-11 PROCEDURE — 87491 CHLMYD TRACH DNA AMP PROBE: CPT | Performed by: OBSTETRICS & GYNECOLOGY

## 2021-06-11 PROCEDURE — 1126F PR PAIN SEVERITY QUANTIFIED, NO PAIN PRESENT: ICD-10-PCS | Mod: S$GLB,,, | Performed by: OBSTETRICS & GYNECOLOGY

## 2021-06-11 PROCEDURE — 80053 COMPREHEN METABOLIC PANEL: CPT | Performed by: OBSTETRICS & GYNECOLOGY

## 2021-06-11 PROCEDURE — 87086 URINE CULTURE/COLONY COUNT: CPT | Performed by: OBSTETRICS & GYNECOLOGY

## 2021-06-11 PROCEDURE — 1126F AMNT PAIN NOTED NONE PRSNT: CPT | Mod: S$GLB,,, | Performed by: OBSTETRICS & GYNECOLOGY

## 2021-06-11 PROCEDURE — 99214 OFFICE O/P EST MOD 30 MIN: CPT | Mod: S$GLB,,, | Performed by: OBSTETRICS & GYNECOLOGY

## 2021-06-13 ENCOUNTER — PATIENT MESSAGE (OUTPATIENT)
Dept: OBSTETRICS AND GYNECOLOGY | Facility: CLINIC | Age: 36
End: 2021-06-13

## 2021-06-13 LAB — BACTERIA UR CULT: NO GROWTH

## 2021-06-14 LAB
HBV SURFACE AG SERPL QL IA: NEGATIVE
HIV 1+2 AB+HIV1 P24 AG SERPL QL IA: NEGATIVE
RPR SER QL: NORMAL
RUBV IGG SER-ACNC: 22.1 IU/ML
RUBV IGG SER-IMP: REACTIVE

## 2021-06-16 ENCOUNTER — PATIENT MESSAGE (OUTPATIENT)
Dept: OBSTETRICS AND GYNECOLOGY | Facility: CLINIC | Age: 36
End: 2021-06-16

## 2021-06-16 LAB
C TRACH DNA SPEC QL NAA+PROBE: NOT DETECTED
N GONORRHOEA DNA SPEC QL NAA+PROBE: NOT DETECTED

## 2021-06-18 ENCOUNTER — PATIENT MESSAGE (OUTPATIENT)
Dept: OBSTETRICS AND GYNECOLOGY | Facility: CLINIC | Age: 36
End: 2021-06-18

## 2021-06-21 ENCOUNTER — PATIENT MESSAGE (OUTPATIENT)
Dept: OBSTETRICS AND GYNECOLOGY | Facility: CLINIC | Age: 36
End: 2021-06-21

## 2021-06-25 ENCOUNTER — INITIAL PRENATAL (OUTPATIENT)
Dept: OBSTETRICS AND GYNECOLOGY | Facility: CLINIC | Age: 36
End: 2021-06-25
Attending: OBSTETRICS & GYNECOLOGY
Payer: COMMERCIAL

## 2021-06-25 VITALS
DIASTOLIC BLOOD PRESSURE: 76 MMHG | BODY MASS INDEX: 36.53 KG/M2 | WEIGHT: 193.31 LBS | SYSTOLIC BLOOD PRESSURE: 100 MMHG

## 2021-06-25 DIAGNOSIS — O20.9 BLEEDING IN EARLY PREGNANCY: Primary | ICD-10-CM

## 2021-06-25 PROCEDURE — 0500F PR INITIAL PRENATAL CARE VISIT: ICD-10-PCS | Mod: S$GLB,,, | Performed by: OBSTETRICS & GYNECOLOGY

## 2021-06-25 PROCEDURE — 99999 PR PBB SHADOW E&M-EST. PATIENT-LVL II: ICD-10-PCS | Mod: PBBFAC,,, | Performed by: OBSTETRICS & GYNECOLOGY

## 2021-06-25 PROCEDURE — 0500F INITIAL PRENATAL CARE VISIT: CPT | Mod: S$GLB,,, | Performed by: OBSTETRICS & GYNECOLOGY

## 2021-06-25 PROCEDURE — 99999 PR PBB SHADOW E&M-EST. PATIENT-LVL II: CPT | Mod: PBBFAC,,, | Performed by: OBSTETRICS & GYNECOLOGY

## 2021-06-28 ENCOUNTER — HOSPITAL ENCOUNTER (OUTPATIENT)
Dept: RADIOLOGY | Facility: HOSPITAL | Age: 36
Discharge: HOME | End: 2021-06-28
Attending: OBSTETRICS & GYNECOLOGY
Payer: COMMERCIAL

## 2021-06-28 ENCOUNTER — TELEPHONE (OUTPATIENT)
Dept: OBSTETRICS AND GYNECOLOGY | Facility: CLINIC | Age: 36
End: 2021-06-28

## 2021-06-28 DIAGNOSIS — O02.1 MISSED AB: Primary | ICD-10-CM

## 2021-06-28 DIAGNOSIS — O20.9 BLEEDING IN EARLY PREGNANCY: ICD-10-CM

## 2021-06-28 PROCEDURE — 76801 US OB <14 WEEKS, TRANSABDOM & TRANSVAG, SINGLE GESTATION (XPD): ICD-10-PCS | Mod: 26,,, | Performed by: RADIOLOGY

## 2021-06-28 PROCEDURE — 76801 OB US < 14 WKS SINGLE FETUS: CPT | Mod: 26,,, | Performed by: RADIOLOGY

## 2021-06-28 PROCEDURE — 76817 US OB <14 WEEKS, TRANSABDOM & TRANSVAG, SINGLE GESTATION (XPD): ICD-10-PCS | Mod: 26,,, | Performed by: RADIOLOGY

## 2021-06-28 PROCEDURE — 76817 TRANSVAGINAL US OBSTETRIC: CPT | Mod: 26,,, | Performed by: RADIOLOGY

## 2021-06-28 PROCEDURE — 76817 TRANSVAGINAL US OBSTETRIC: CPT | Mod: TC

## 2021-06-29 ENCOUNTER — ROUTINE PRENATAL (OUTPATIENT)
Dept: OBSTETRICS AND GYNECOLOGY | Facility: CLINIC | Age: 36
End: 2021-06-29
Attending: OBSTETRICS & GYNECOLOGY
Payer: COMMERCIAL

## 2021-06-29 VITALS
WEIGHT: 193.31 LBS | DIASTOLIC BLOOD PRESSURE: 74 MMHG | SYSTOLIC BLOOD PRESSURE: 110 MMHG | BODY MASS INDEX: 36.53 KG/M2

## 2021-06-29 DIAGNOSIS — O02.1 MISSED AB: ICD-10-CM

## 2021-06-29 DIAGNOSIS — O02.1 MISSED AB: Primary | ICD-10-CM

## 2021-06-29 DIAGNOSIS — Z36.9 ANTENATAL SCREENING ENCOUNTER: ICD-10-CM

## 2021-06-29 PROCEDURE — 0502F PR SUBSEQUENT PRENATAL CARE: ICD-10-PCS | Mod: CPTII,S$GLB,, | Performed by: OBSTETRICS & GYNECOLOGY

## 2021-06-29 PROCEDURE — 99999 PR PBB SHADOW E&M-EST. PATIENT-LVL II: ICD-10-PCS | Mod: PBBFAC,,, | Performed by: OBSTETRICS & GYNECOLOGY

## 2021-06-29 PROCEDURE — 0502F SUBSEQUENT PRENATAL CARE: CPT | Mod: CPTII,S$GLB,, | Performed by: OBSTETRICS & GYNECOLOGY

## 2021-06-29 PROCEDURE — 76801 OB US < 14 WKS SINGLE FETUS: CPT | Mod: S$GLB,,, | Performed by: OBSTETRICS & GYNECOLOGY

## 2021-06-29 PROCEDURE — 99999 PR PBB SHADOW E&M-EST. PATIENT-LVL II: CPT | Mod: PBBFAC,,, | Performed by: OBSTETRICS & GYNECOLOGY

## 2021-06-29 PROCEDURE — 76801 PR US, OB <14WKS, TRANSABD, SINGLE GESTATION: ICD-10-PCS | Mod: S$GLB,,, | Performed by: OBSTETRICS & GYNECOLOGY

## 2021-06-29 RX ORDER — MISOPROSTOL 200 UG/1
400 TABLET ORAL ONCE
Qty: 2 TABLET | Refills: 0 | Status: ON HOLD | OUTPATIENT
Start: 2021-06-29 | End: 2021-07-01 | Stop reason: HOSPADM

## 2021-06-30 PROBLEM — O02.1 MISSED AB: Status: ACTIVE | Noted: 2021-06-30

## 2021-06-30 RX ORDER — DEXTROSE, SODIUM CHLORIDE, SODIUM LACTATE, POTASSIUM CHLORIDE, AND CALCIUM CHLORIDE 5; .6; .31; .03; .02 G/100ML; G/100ML; G/100ML; G/100ML; G/100ML
INJECTION, SOLUTION INTRAVENOUS CONTINUOUS
Status: CANCELLED | OUTPATIENT
Start: 2021-06-30

## 2021-07-01 ENCOUNTER — HOSPITAL ENCOUNTER (OUTPATIENT)
Facility: OTHER | Age: 36
Discharge: HOME | End: 2021-07-01
Attending: OBSTETRICS & GYNECOLOGY | Admitting: OBSTETRICS & GYNECOLOGY
Payer: COMMERCIAL

## 2021-07-01 ENCOUNTER — ANESTHESIA EVENT (OUTPATIENT)
Dept: SURGERY | Facility: OTHER | Age: 36
End: 2021-07-01
Payer: COMMERCIAL

## 2021-07-01 ENCOUNTER — ANESTHESIA (OUTPATIENT)
Dept: SURGERY | Facility: OTHER | Age: 36
End: 2021-07-01
Payer: COMMERCIAL

## 2021-07-01 VITALS
WEIGHT: 193 LBS | DIASTOLIC BLOOD PRESSURE: 73 MMHG | HEART RATE: 86 BPM | HEIGHT: 61 IN | BODY MASS INDEX: 36.44 KG/M2 | OXYGEN SATURATION: 98 % | SYSTOLIC BLOOD PRESSURE: 115 MMHG | TEMPERATURE: 98 F | RESPIRATION RATE: 18 BRPM

## 2021-07-01 DIAGNOSIS — O02.1 MISSED AB: ICD-10-CM

## 2021-07-01 DIAGNOSIS — Z98.890 S/P DILATION AND CURETTAGE: Primary | ICD-10-CM

## 2021-07-01 LAB — SARS-COV-2 RDRP RESP QL NAA+PROBE: NEGATIVE

## 2021-07-01 PROCEDURE — 36000704 HC OR TIME LEV I 1ST 15 MIN: Performed by: OBSTETRICS & GYNECOLOGY

## 2021-07-01 PROCEDURE — 71000015 HC POSTOP RECOV 1ST HR: Performed by: OBSTETRICS & GYNECOLOGY

## 2021-07-01 PROCEDURE — 37000008 HC ANESTHESIA 1ST 15 MINUTES: Performed by: OBSTETRICS & GYNECOLOGY

## 2021-07-01 PROCEDURE — 59820 CARE OF MISCARRIAGE: CPT | Mod: ,,, | Performed by: OBSTETRICS & GYNECOLOGY

## 2021-07-01 PROCEDURE — 37000009 HC ANESTHESIA EA ADD 15 MINS: Performed by: OBSTETRICS & GYNECOLOGY

## 2021-07-01 PROCEDURE — 88305 TISSUE EXAM BY PATHOLOGIST: CPT | Performed by: PATHOLOGY

## 2021-07-01 PROCEDURE — 25000003 PHARM REV CODE 250: Performed by: NURSE ANESTHETIST, CERTIFIED REGISTERED

## 2021-07-01 PROCEDURE — 63600175 PHARM REV CODE 636 W HCPCS: Performed by: NURSE ANESTHETIST, CERTIFIED REGISTERED

## 2021-07-01 PROCEDURE — 71000033 HC RECOVERY, INTIAL HOUR: Performed by: OBSTETRICS & GYNECOLOGY

## 2021-07-01 PROCEDURE — S0030 INJECTION, METRONIDAZOLE: HCPCS | Performed by: STUDENT IN AN ORGANIZED HEALTH CARE EDUCATION/TRAINING PROGRAM

## 2021-07-01 PROCEDURE — 88305 TISSUE EXAM BY PATHOLOGIST: ICD-10-PCS | Mod: 26,,, | Performed by: PATHOLOGY

## 2021-07-01 PROCEDURE — 88305 TISSUE EXAM BY PATHOLOGIST: CPT | Mod: 26,,, | Performed by: PATHOLOGY

## 2021-07-01 PROCEDURE — 25000003 PHARM REV CODE 250: Performed by: STUDENT IN AN ORGANIZED HEALTH CARE EDUCATION/TRAINING PROGRAM

## 2021-07-01 PROCEDURE — 36000705 HC OR TIME LEV I EA ADD 15 MIN: Performed by: OBSTETRICS & GYNECOLOGY

## 2021-07-01 PROCEDURE — 59820 PR SURG RX MISSED ABORTN,1ST TRI: ICD-10-PCS | Mod: ,,, | Performed by: OBSTETRICS & GYNECOLOGY

## 2021-07-01 PROCEDURE — 71000016 HC POSTOP RECOV ADDL HR: Performed by: OBSTETRICS & GYNECOLOGY

## 2021-07-01 PROCEDURE — U0002 COVID-19 LAB TEST NON-CDC: HCPCS | Performed by: OBSTETRICS & GYNECOLOGY

## 2021-07-01 RX ORDER — PROPOFOL 10 MG/ML
VIAL (ML) INTRAVENOUS
Status: DISCONTINUED | OUTPATIENT
Start: 2021-07-01 | End: 2021-07-01

## 2021-07-01 RX ORDER — OXYCODONE HYDROCHLORIDE 5 MG/1
5 TABLET ORAL
Status: DISCONTINUED | OUTPATIENT
Start: 2021-07-01 | End: 2021-07-01 | Stop reason: HOSPADM

## 2021-07-01 RX ORDER — DEXAMETHASONE SODIUM PHOSPHATE 4 MG/ML
INJECTION, SOLUTION INTRA-ARTICULAR; INTRALESIONAL; INTRAMUSCULAR; INTRAVENOUS; SOFT TISSUE
Status: DISCONTINUED | OUTPATIENT
Start: 2021-07-01 | End: 2021-07-01

## 2021-07-01 RX ORDER — ONDANSETRON 2 MG/ML
4 INJECTION INTRAMUSCULAR; INTRAVENOUS DAILY PRN
Status: DISCONTINUED | OUTPATIENT
Start: 2021-07-01 | End: 2021-07-01 | Stop reason: HOSPADM

## 2021-07-01 RX ORDER — SCOLOPAMINE TRANSDERMAL SYSTEM 1 MG/1
PATCH, EXTENDED RELEASE TRANSDERMAL
Status: DISCONTINUED | OUTPATIENT
Start: 2021-07-01 | End: 2021-07-01

## 2021-07-01 RX ORDER — IBUPROFEN 600 MG/1
600 TABLET ORAL EVERY 6 HOURS PRN
Qty: 30 TABLET | Refills: 0 | Status: SHIPPED | OUTPATIENT
Start: 2021-07-01 | End: 2022-03-28 | Stop reason: ALTCHOICE

## 2021-07-01 RX ORDER — MEPERIDINE HYDROCHLORIDE 25 MG/ML
12.5 INJECTION INTRAMUSCULAR; INTRAVENOUS; SUBCUTANEOUS ONCE AS NEEDED
Status: DISCONTINUED | OUTPATIENT
Start: 2021-07-01 | End: 2021-07-01 | Stop reason: HOSPADM

## 2021-07-01 RX ORDER — SODIUM CHLORIDE 0.9 % (FLUSH) 0.9 %
3 SYRINGE (ML) INJECTION
Status: DISCONTINUED | OUTPATIENT
Start: 2021-07-01 | End: 2021-07-01 | Stop reason: HOSPADM

## 2021-07-01 RX ORDER — HYDROMORPHONE HYDROCHLORIDE 2 MG/ML
0.4 INJECTION, SOLUTION INTRAMUSCULAR; INTRAVENOUS; SUBCUTANEOUS EVERY 5 MIN PRN
Status: DISCONTINUED | OUTPATIENT
Start: 2021-07-01 | End: 2021-07-01 | Stop reason: HOSPADM

## 2021-07-01 RX ORDER — KETOROLAC TROMETHAMINE 30 MG/ML
INJECTION, SOLUTION INTRAMUSCULAR; INTRAVENOUS
Status: DISCONTINUED | OUTPATIENT
Start: 2021-07-01 | End: 2021-07-01

## 2021-07-01 RX ORDER — ONDANSETRON HYDROCHLORIDE 2 MG/ML
INJECTION, SOLUTION INTRAMUSCULAR; INTRAVENOUS
Status: DISCONTINUED | OUTPATIENT
Start: 2021-07-01 | End: 2021-07-01

## 2021-07-01 RX ORDER — LIDOCAINE HCL/PF 100 MG/5ML
SYRINGE (ML) INTRAVENOUS
Status: DISCONTINUED | OUTPATIENT
Start: 2021-07-01 | End: 2021-07-01

## 2021-07-01 RX ORDER — DEXTROSE, SODIUM CHLORIDE, SODIUM LACTATE, POTASSIUM CHLORIDE, AND CALCIUM CHLORIDE 5; .6; .31; .03; .02 G/100ML; G/100ML; G/100ML; G/100ML; G/100ML
INJECTION, SOLUTION INTRAVENOUS CONTINUOUS
Status: DISCONTINUED | OUTPATIENT
Start: 2021-07-01 | End: 2021-07-01 | Stop reason: HOSPADM

## 2021-07-01 RX ORDER — HYDROCODONE BITARTRATE AND ACETAMINOPHEN 5; 325 MG/1; MG/1
1 TABLET ORAL EVERY 6 HOURS PRN
Qty: 10 TABLET | Refills: 0 | Status: SHIPPED | OUTPATIENT
Start: 2021-07-01 | End: 2021-07-16

## 2021-07-01 RX ORDER — METRONIDAZOLE 500 MG/100ML
500 INJECTION, SOLUTION INTRAVENOUS
Status: DISCONTINUED | OUTPATIENT
Start: 2021-07-01 | End: 2021-07-01 | Stop reason: HOSPADM

## 2021-07-01 RX ORDER — FENTANYL CITRATE 50 UG/ML
INJECTION, SOLUTION INTRAMUSCULAR; INTRAVENOUS
Status: DISCONTINUED | OUTPATIENT
Start: 2021-07-01 | End: 2021-07-01

## 2021-07-01 RX ADMIN — ONDANSETRON 4 MG: 2 INJECTION, SOLUTION INTRAMUSCULAR; INTRAVENOUS at 07:07

## 2021-07-01 RX ADMIN — LIDOCAINE HYDROCHLORIDE 40 MG: 20 INJECTION, SOLUTION INTRAVENOUS at 06:07

## 2021-07-01 RX ADMIN — FENTANYL CITRATE 50 MCG: 50 INJECTION, SOLUTION INTRAMUSCULAR; INTRAVENOUS at 06:07

## 2021-07-01 RX ADMIN — METRONIDAZOLE 500 MG: 500 SOLUTION INTRAVENOUS at 07:07

## 2021-07-01 RX ADMIN — PROPOFOL 180 MG: 10 INJECTION, EMULSION INTRAVENOUS at 06:07

## 2021-07-01 RX ADMIN — FENTANYL CITRATE 25 MCG: 50 INJECTION, SOLUTION INTRAMUSCULAR; INTRAVENOUS at 07:07

## 2021-07-01 RX ADMIN — KETOROLAC TROMETHAMINE 30 MG: 30 INJECTION, SOLUTION INTRAMUSCULAR; INTRAVENOUS at 07:07

## 2021-07-01 RX ADMIN — DEXAMETHASONE SODIUM PHOSPHATE 8 MG: 4 INJECTION, SOLUTION INTRAMUSCULAR; INTRAVENOUS at 07:07

## 2021-07-01 RX ADMIN — CARBOXYMETHYLCELLULOSE SODIUM 2 DROP: 2.5 SOLUTION/ DROPS OPHTHALMIC at 06:07

## 2021-07-01 RX ADMIN — GLYCOPYRROLATE 0.2 MG: 0.2 INJECTION, SOLUTION INTRAMUSCULAR; INTRAVITREAL at 07:07

## 2021-07-01 RX ADMIN — SCOPALAMINE 1 PATCH: 1 PATCH, EXTENDED RELEASE TRANSDERMAL at 06:07

## 2021-07-07 LAB
FINAL PATHOLOGIC DIAGNOSIS: NORMAL
GROSS: NORMAL
Lab: NORMAL

## 2021-07-16 ENCOUNTER — OFFICE VISIT (OUTPATIENT)
Dept: OBSTETRICS AND GYNECOLOGY | Facility: CLINIC | Age: 36
End: 2021-07-16
Attending: OBSTETRICS & GYNECOLOGY
Payer: COMMERCIAL

## 2021-07-16 VITALS
WEIGHT: 193.13 LBS | HEIGHT: 61 IN | BODY MASS INDEX: 36.46 KG/M2 | DIASTOLIC BLOOD PRESSURE: 78 MMHG | SYSTOLIC BLOOD PRESSURE: 106 MMHG

## 2021-07-16 DIAGNOSIS — Z98.890 POSTOPERATIVE STATE: Primary | ICD-10-CM

## 2021-07-16 PROCEDURE — 99999 PR PBB SHADOW E&M-EST. PATIENT-LVL III: ICD-10-PCS | Mod: PBBFAC,,, | Performed by: OBSTETRICS & GYNECOLOGY

## 2021-07-16 PROCEDURE — 3008F BODY MASS INDEX DOCD: CPT | Mod: CPTII,S$GLB,, | Performed by: OBSTETRICS & GYNECOLOGY

## 2021-07-16 PROCEDURE — 99024 PR POST-OP FOLLOW-UP VISIT: ICD-10-PCS | Mod: S$GLB,,, | Performed by: OBSTETRICS & GYNECOLOGY

## 2021-07-16 PROCEDURE — 3008F PR BODY MASS INDEX (BMI) DOCUMENTED: ICD-10-PCS | Mod: CPTII,S$GLB,, | Performed by: OBSTETRICS & GYNECOLOGY

## 2021-07-16 PROCEDURE — 1126F PR PAIN SEVERITY QUANTIFIED, NO PAIN PRESENT: ICD-10-PCS | Mod: S$GLB,,, | Performed by: OBSTETRICS & GYNECOLOGY

## 2021-07-16 PROCEDURE — 99024 POSTOP FOLLOW-UP VISIT: CPT | Mod: S$GLB,,, | Performed by: OBSTETRICS & GYNECOLOGY

## 2021-07-16 PROCEDURE — 99999 PR PBB SHADOW E&M-EST. PATIENT-LVL III: CPT | Mod: PBBFAC,,, | Performed by: OBSTETRICS & GYNECOLOGY

## 2021-07-16 PROCEDURE — 1126F AMNT PAIN NOTED NONE PRSNT: CPT | Mod: S$GLB,,, | Performed by: OBSTETRICS & GYNECOLOGY

## 2021-07-16 RX ORDER — NORETHINDRONE ACETATE AND ETHINYL ESTRADIOL, ETHINYL ESTRADIOL AND FERROUS FUMARATE 1MG-10(24)
1 KIT ORAL DAILY
Qty: 28 TABLET | Refills: 12 | Status: SHIPPED | OUTPATIENT
Start: 2021-07-16 | End: 2021-08-02

## 2021-07-19 ENCOUNTER — TELEPHONE (OUTPATIENT)
Dept: OBSTETRICS AND GYNECOLOGY | Facility: CLINIC | Age: 36
End: 2021-07-19

## 2021-07-28 RX ORDER — FLUOXETINE 10 MG/1
10 TABLET ORAL DAILY
Qty: 90 TABLET | Refills: 1 | Status: SHIPPED | OUTPATIENT
Start: 2021-07-28 | End: 2022-03-28 | Stop reason: SDUPTHER

## 2021-07-30 ENCOUNTER — TELEPHONE (OUTPATIENT)
Dept: OBSTETRICS AND GYNECOLOGY | Facility: CLINIC | Age: 36
End: 2021-07-30

## 2021-08-02 ENCOUNTER — PATIENT MESSAGE (OUTPATIENT)
Dept: OBSTETRICS AND GYNECOLOGY | Facility: CLINIC | Age: 36
End: 2021-08-02

## 2021-08-02 ENCOUNTER — TELEPHONE (OUTPATIENT)
Dept: OBSTETRICS AND GYNECOLOGY | Facility: CLINIC | Age: 36
End: 2021-08-02

## 2021-08-02 RX ORDER — NORETHINDRONE ACETATE AND ETHINYL ESTRADIOL .02; 1 MG/1; MG/1
1 TABLET ORAL DAILY
Qty: 21 TABLET | Refills: 12 | Status: SHIPPED | OUTPATIENT
Start: 2021-08-02 | End: 2022-09-29

## 2021-08-06 ENCOUNTER — PATIENT MESSAGE (OUTPATIENT)
Dept: ADMINISTRATIVE | Facility: OTHER | Age: 36
End: 2021-08-06

## 2021-08-10 ENCOUNTER — IMMUNIZATION (OUTPATIENT)
Dept: PRIMARY CARE CLINIC | Facility: CLINIC | Age: 36
End: 2021-08-10
Payer: COMMERCIAL

## 2021-08-10 DIAGNOSIS — Z23 NEED FOR VACCINATION: Primary | ICD-10-CM

## 2021-08-10 PROCEDURE — 0001A COVID-19, MRNA, LNP-S, PF, 30 MCG/0.3 ML DOSE VACCINE: CPT | Mod: CV19,S$GLB,, | Performed by: INTERNAL MEDICINE

## 2021-08-10 PROCEDURE — 91300 COVID-19, MRNA, LNP-S, PF, 30 MCG/0.3 ML DOSE VACCINE: CPT | Mod: S$GLB,,, | Performed by: INTERNAL MEDICINE

## 2021-08-10 PROCEDURE — 0001A COVID-19, MRNA, LNP-S, PF, 30 MCG/0.3 ML DOSE VACCINE: ICD-10-PCS | Mod: CV19,S$GLB,, | Performed by: INTERNAL MEDICINE

## 2021-08-10 PROCEDURE — 91300 COVID-19, MRNA, LNP-S, PF, 30 MCG/0.3 ML DOSE VACCINE: ICD-10-PCS | Mod: S$GLB,,, | Performed by: INTERNAL MEDICINE

## 2021-11-09 ENCOUNTER — OFFICE VISIT (OUTPATIENT)
Dept: INTERNAL MEDICINE | Facility: CLINIC | Age: 36
End: 2021-11-09
Payer: COMMERCIAL

## 2021-11-09 VITALS
OXYGEN SATURATION: 99 % | HEART RATE: 97 BPM | BODY MASS INDEX: 36.42 KG/M2 | HEIGHT: 61 IN | WEIGHT: 192.88 LBS | SYSTOLIC BLOOD PRESSURE: 116 MMHG | DIASTOLIC BLOOD PRESSURE: 82 MMHG

## 2021-11-09 DIAGNOSIS — J32.0 LEFT MAXILLARY SINUSITIS: Primary | ICD-10-CM

## 2021-11-09 PROBLEM — O34.219 PREVIOUS CESAREAN DELIVERY AFFECTING PREGNANCY, ANTEPARTUM: Status: RESOLVED | Noted: 2020-11-13 | Resolved: 2021-11-09

## 2021-11-09 PROCEDURE — 3079F DIAST BP 80-89 MM HG: CPT | Mod: CPTII,S$GLB,, | Performed by: INTERNAL MEDICINE

## 2021-11-09 PROCEDURE — 3074F PR MOST RECENT SYSTOLIC BLOOD PRESSURE < 130 MM HG: ICD-10-PCS | Mod: CPTII,S$GLB,, | Performed by: INTERNAL MEDICINE

## 2021-11-09 PROCEDURE — 3074F SYST BP LT 130 MM HG: CPT | Mod: CPTII,S$GLB,, | Performed by: INTERNAL MEDICINE

## 2021-11-09 PROCEDURE — 3079F PR MOST RECENT DIASTOLIC BLOOD PRESSURE 80-89 MM HG: ICD-10-PCS | Mod: CPTII,S$GLB,, | Performed by: INTERNAL MEDICINE

## 2021-11-09 PROCEDURE — 99999 PR PBB SHADOW E&M-EST. PATIENT-LVL III: ICD-10-PCS | Mod: PBBFAC,,, | Performed by: INTERNAL MEDICINE

## 2021-11-09 PROCEDURE — 99214 OFFICE O/P EST MOD 30 MIN: CPT | Mod: S$GLB,,, | Performed by: INTERNAL MEDICINE

## 2021-11-09 PROCEDURE — 1159F MED LIST DOCD IN RCRD: CPT | Mod: CPTII,S$GLB,, | Performed by: INTERNAL MEDICINE

## 2021-11-09 PROCEDURE — 99214 PR OFFICE/OUTPT VISIT, EST, LEVL IV, 30-39 MIN: ICD-10-PCS | Mod: S$GLB,,, | Performed by: INTERNAL MEDICINE

## 2021-11-09 PROCEDURE — 1159F PR MEDICATION LIST DOCUMENTED IN MEDICAL RECORD: ICD-10-PCS | Mod: CPTII,S$GLB,, | Performed by: INTERNAL MEDICINE

## 2021-11-09 PROCEDURE — 3008F PR BODY MASS INDEX (BMI) DOCUMENTED: ICD-10-PCS | Mod: CPTII,S$GLB,, | Performed by: INTERNAL MEDICINE

## 2021-11-09 PROCEDURE — 3008F BODY MASS INDEX DOCD: CPT | Mod: CPTII,S$GLB,, | Performed by: INTERNAL MEDICINE

## 2021-11-09 PROCEDURE — 99999 PR PBB SHADOW E&M-EST. PATIENT-LVL III: CPT | Mod: PBBFAC,,, | Performed by: INTERNAL MEDICINE

## 2021-11-09 RX ORDER — LEVOCETIRIZINE DIHYDROCHLORIDE 5 MG/1
5 TABLET, FILM COATED ORAL DAILY PRN
Qty: 90 TABLET | Refills: 1 | Status: SHIPPED | OUTPATIENT
Start: 2021-11-09 | End: 2022-11-25

## 2021-11-09 RX ORDER — FLUCONAZOLE 200 MG/1
200 TABLET ORAL DAILY
Qty: 1 TABLET | Refills: 0 | Status: SHIPPED | OUTPATIENT
Start: 2021-11-14 | End: 2021-11-15

## 2021-11-09 RX ORDER — AZITHROMYCIN 500 MG/1
500 TABLET, FILM COATED ORAL DAILY
Qty: 5 TABLET | Refills: 0 | Status: SHIPPED | OUTPATIENT
Start: 2021-11-09 | End: 2022-03-28 | Stop reason: ALTCHOICE

## 2021-11-09 RX ORDER — MONTELUKAST SODIUM 10 MG/1
10 TABLET ORAL NIGHTLY
Qty: 30 TABLET | Refills: 0 | Status: SHIPPED | OUTPATIENT
Start: 2021-11-09 | End: 2021-12-09 | Stop reason: SDUPTHER

## 2021-11-16 LAB
GENETIC COUNSELING?: YES
GENSO SPECIMEN TYPE: NORMAL
MISCELLANEOUS GENETIC TEST NAME: NORMAL
PARTENTAL OR SIBLING TESTING?: NORMAL
REFERENCE LAB: NORMAL
TEST RESULT: NORMAL

## 2021-12-08 ENCOUNTER — PATIENT MESSAGE (OUTPATIENT)
Dept: INTERNAL MEDICINE | Facility: CLINIC | Age: 36
End: 2021-12-08
Payer: COMMERCIAL

## 2021-12-08 DIAGNOSIS — J32.0 LEFT MAXILLARY SINUSITIS: ICD-10-CM

## 2021-12-09 RX ORDER — MONTELUKAST SODIUM 10 MG/1
10 TABLET ORAL NIGHTLY
Qty: 30 TABLET | Refills: 0 | Status: SHIPPED | OUTPATIENT
Start: 2021-12-09 | End: 2022-01-09

## 2022-02-08 ENCOUNTER — OFFICE VISIT (OUTPATIENT)
Dept: INTERNAL MEDICINE | Facility: CLINIC | Age: 37
End: 2022-02-08
Payer: COMMERCIAL

## 2022-02-08 VITALS
WEIGHT: 191.13 LBS | DIASTOLIC BLOOD PRESSURE: 80 MMHG | HEIGHT: 61 IN | SYSTOLIC BLOOD PRESSURE: 142 MMHG | OXYGEN SATURATION: 99 % | HEART RATE: 84 BPM | BODY MASS INDEX: 36.09 KG/M2

## 2022-02-08 DIAGNOSIS — R21 RASH: Primary | ICD-10-CM

## 2022-02-08 PROCEDURE — 1160F RVW MEDS BY RX/DR IN RCRD: CPT | Mod: CPTII,S$GLB,, | Performed by: INTERNAL MEDICINE

## 2022-02-08 PROCEDURE — 3008F PR BODY MASS INDEX (BMI) DOCUMENTED: ICD-10-PCS | Mod: CPTII,S$GLB,, | Performed by: INTERNAL MEDICINE

## 2022-02-08 PROCEDURE — 1159F MED LIST DOCD IN RCRD: CPT | Mod: CPTII,S$GLB,, | Performed by: INTERNAL MEDICINE

## 2022-02-08 PROCEDURE — 3077F PR MOST RECENT SYSTOLIC BLOOD PRESSURE >= 140 MM HG: ICD-10-PCS | Mod: CPTII,S$GLB,, | Performed by: INTERNAL MEDICINE

## 2022-02-08 PROCEDURE — 3077F SYST BP >= 140 MM HG: CPT | Mod: CPTII,S$GLB,, | Performed by: INTERNAL MEDICINE

## 2022-02-08 PROCEDURE — 3079F PR MOST RECENT DIASTOLIC BLOOD PRESSURE 80-89 MM HG: ICD-10-PCS | Mod: CPTII,S$GLB,, | Performed by: INTERNAL MEDICINE

## 2022-02-08 PROCEDURE — 99999 PR PBB SHADOW E&M-EST. PATIENT-LVL IV: CPT | Mod: PBBFAC,,, | Performed by: INTERNAL MEDICINE

## 2022-02-08 PROCEDURE — 99999 PR PBB SHADOW E&M-EST. PATIENT-LVL IV: ICD-10-PCS | Mod: PBBFAC,,, | Performed by: INTERNAL MEDICINE

## 2022-02-08 PROCEDURE — 1160F PR REVIEW ALL MEDS BY PRESCRIBER/CLIN PHARMACIST DOCUMENTED: ICD-10-PCS | Mod: CPTII,S$GLB,, | Performed by: INTERNAL MEDICINE

## 2022-02-08 PROCEDURE — 3008F BODY MASS INDEX DOCD: CPT | Mod: CPTII,S$GLB,, | Performed by: INTERNAL MEDICINE

## 2022-02-08 PROCEDURE — 3079F DIAST BP 80-89 MM HG: CPT | Mod: CPTII,S$GLB,, | Performed by: INTERNAL MEDICINE

## 2022-02-08 PROCEDURE — 99213 OFFICE O/P EST LOW 20 MIN: CPT | Mod: S$GLB,,, | Performed by: INTERNAL MEDICINE

## 2022-02-08 PROCEDURE — 1159F PR MEDICATION LIST DOCUMENTED IN MEDICAL RECORD: ICD-10-PCS | Mod: CPTII,S$GLB,, | Performed by: INTERNAL MEDICINE

## 2022-02-08 PROCEDURE — 99213 PR OFFICE/OUTPT VISIT, EST, LEVL III, 20-29 MIN: ICD-10-PCS | Mod: S$GLB,,, | Performed by: INTERNAL MEDICINE

## 2022-02-08 NOTE — PROGRESS NOTES
Subjective:       Patient ID: King Dempsey is a 36 y.o. female.    Chief Complaint: Rash (Rash under breast. Pt is concerned it may be a hookworm. )      Pt and problem are new to me.    King Dempsey is 36 y.o. female who presents for rash on left breast X 2 month.  Lesion has grown in size from 1 cm to 4cm.  (+) pruritis.  Pt have tried hydrocortisone and lotrimin without change in symptoms.        Review of Systems   Constitutional: Negative for chills and fever.   Respiratory: Negative for shortness of breath.    Cardiovascular: Negative for chest pain.   Gastrointestinal: Negative for abdominal pain, change in bowel habit, constipation, diarrhea, nausea, vomiting and change in bowel habit.         Objective:      Physical Exam  Vitals reviewed.   Constitutional:       General: She is awake.      Appearance: Normal appearance.   HENT:      Head: Normocephalic and atraumatic.      Mouth/Throat:      Mouth: Mucous membranes are moist.      Pharynx: Oropharynx is clear.   Eyes:      Extraocular Movements: Extraocular movements intact.      Conjunctiva/sclera: Conjunctivae normal.      Pupils: Pupils are equal, round, and reactive to light.   Cardiovascular:      Rate and Rhythm: Normal rate and regular rhythm.      Heart sounds: No murmur heard.  No friction rub. No gallop.    Pulmonary:      Effort: Pulmonary effort is normal.      Breath sounds: Normal breath sounds.   Abdominal:      General: Bowel sounds are normal. There is no distension.      Tenderness: There is no abdominal tenderness. There is no guarding or rebound.   Musculoskeletal:      Right lower leg: No edema.      Left lower leg: No edema.   Lymphadenopathy:      Cervical: No cervical adenopathy.   Skin:         Neurological:      Mental Status: She is alert and oriented to person, place, and time.   Psychiatric:         Mood and Affect: Mood normal.         Behavior: Behavior is cooperative.         Assessment:       1. Rash        Plan:      Pt with nonhealing rash unclear etiology.  Low clinical suspicion for hook worm given no exposure and no diarrhea.  Possible herald patch.  Will refer to dermatology for further evaluation and treatment.      King was seen today for rash.    Diagnoses and all orders for this visit:    Rash  -     Ambulatory referral/consult to Dermatology; Future

## 2022-02-11 ENCOUNTER — PATIENT MESSAGE (OUTPATIENT)
Dept: CARDIOLOGY | Facility: CLINIC | Age: 37
End: 2022-02-11
Payer: COMMERCIAL

## 2022-02-11 RX ORDER — CARVEDILOL 25 MG/1
25 TABLET ORAL 2 TIMES DAILY WITH MEALS
Qty: 60 TABLET | Refills: 11 | Status: SHIPPED | OUTPATIENT
Start: 2022-02-11 | End: 2022-10-27

## 2022-02-11 RX ORDER — TRIAMTERENE/HYDROCHLOROTHIAZID 37.5-25 MG
1 TABLET ORAL DAILY
COMMUNITY
End: 2022-02-11 | Stop reason: SDUPTHER

## 2022-02-11 RX ORDER — TRIAMTERENE/HYDROCHLOROTHIAZID 37.5-25 MG
1 TABLET ORAL DAILY
Qty: 30 TABLET | Refills: 11 | Status: SHIPPED | OUTPATIENT
Start: 2022-02-11 | End: 2022-10-27

## 2022-02-11 RX ORDER — CARVEDILOL 25 MG/1
25 TABLET ORAL 2 TIMES DAILY WITH MEALS
COMMUNITY
End: 2022-02-11 | Stop reason: SDUPTHER

## 2022-02-15 ENCOUNTER — OFFICE VISIT (OUTPATIENT)
Dept: INTERNAL MEDICINE | Facility: CLINIC | Age: 37
End: 2022-02-15
Payer: COMMERCIAL

## 2022-02-15 VITALS
BODY MASS INDEX: 35.84 KG/M2 | WEIGHT: 189.81 LBS | DIASTOLIC BLOOD PRESSURE: 80 MMHG | OXYGEN SATURATION: 98 % | HEART RATE: 72 BPM | HEIGHT: 61 IN | SYSTOLIC BLOOD PRESSURE: 120 MMHG

## 2022-02-15 DIAGNOSIS — J02.9 SORE THROAT: Primary | ICD-10-CM

## 2022-02-15 DIAGNOSIS — R09.81 CONGESTION OF NASAL SINUS: ICD-10-CM

## 2022-02-15 DIAGNOSIS — R52 BODY ACHES: ICD-10-CM

## 2022-02-15 LAB
GROUP A STREP, MOLECULAR: NEGATIVE
INFLUENZA A, MOLECULAR: NEGATIVE
INFLUENZA B, MOLECULAR: NEGATIVE
SARS-COV-2 RNA RESP QL NAA+PROBE: DETECTED
SARS-COV-2- CYCLE NUMBER: 30
SPECIMEN SOURCE: NORMAL

## 2022-02-15 PROCEDURE — 1159F PR MEDICATION LIST DOCUMENTED IN MEDICAL RECORD: ICD-10-PCS | Mod: CPTII,S$GLB,, | Performed by: INTERNAL MEDICINE

## 2022-02-15 PROCEDURE — 99999 PR PBB SHADOW E&M-EST. PATIENT-LVL IV: CPT | Mod: PBBFAC,,, | Performed by: INTERNAL MEDICINE

## 2022-02-15 PROCEDURE — 99213 OFFICE O/P EST LOW 20 MIN: CPT | Mod: S$GLB,,, | Performed by: INTERNAL MEDICINE

## 2022-02-15 PROCEDURE — U0003 INFECTIOUS AGENT DETECTION BY NUCLEIC ACID (DNA OR RNA); SEVERE ACUTE RESPIRATORY SYNDROME CORONAVIRUS 2 (SARS-COV-2) (CORONAVIRUS DISEASE [COVID-19]), AMPLIFIED PROBE TECHNIQUE, MAKING USE OF HIGH THROUGHPUT TECHNOLOGIES AS DESCRIBED BY CMS-2020-01-R: HCPCS | Performed by: INTERNAL MEDICINE

## 2022-02-15 PROCEDURE — 99213 PR OFFICE/OUTPT VISIT, EST, LEVL III, 20-29 MIN: ICD-10-PCS | Mod: S$GLB,,, | Performed by: INTERNAL MEDICINE

## 2022-02-15 PROCEDURE — 3074F PR MOST RECENT SYSTOLIC BLOOD PRESSURE < 130 MM HG: ICD-10-PCS | Mod: CPTII,S$GLB,, | Performed by: INTERNAL MEDICINE

## 2022-02-15 PROCEDURE — 3008F BODY MASS INDEX DOCD: CPT | Mod: CPTII,S$GLB,, | Performed by: INTERNAL MEDICINE

## 2022-02-15 PROCEDURE — 1160F RVW MEDS BY RX/DR IN RCRD: CPT | Mod: CPTII,S$GLB,, | Performed by: INTERNAL MEDICINE

## 2022-02-15 PROCEDURE — 3079F PR MOST RECENT DIASTOLIC BLOOD PRESSURE 80-89 MM HG: ICD-10-PCS | Mod: CPTII,S$GLB,, | Performed by: INTERNAL MEDICINE

## 2022-02-15 PROCEDURE — 3008F PR BODY MASS INDEX (BMI) DOCUMENTED: ICD-10-PCS | Mod: CPTII,S$GLB,, | Performed by: INTERNAL MEDICINE

## 2022-02-15 PROCEDURE — 99999 PR PBB SHADOW E&M-EST. PATIENT-LVL IV: ICD-10-PCS | Mod: PBBFAC,,, | Performed by: INTERNAL MEDICINE

## 2022-02-15 PROCEDURE — U0005 INFEC AGEN DETEC AMPLI PROBE: HCPCS | Performed by: INTERNAL MEDICINE

## 2022-02-15 PROCEDURE — 3079F DIAST BP 80-89 MM HG: CPT | Mod: CPTII,S$GLB,, | Performed by: INTERNAL MEDICINE

## 2022-02-15 PROCEDURE — 1160F PR REVIEW ALL MEDS BY PRESCRIBER/CLIN PHARMACIST DOCUMENTED: ICD-10-PCS | Mod: CPTII,S$GLB,, | Performed by: INTERNAL MEDICINE

## 2022-02-15 PROCEDURE — 3074F SYST BP LT 130 MM HG: CPT | Mod: CPTII,S$GLB,, | Performed by: INTERNAL MEDICINE

## 2022-02-15 PROCEDURE — 87502 INFLUENZA DNA AMP PROBE: CPT | Performed by: INTERNAL MEDICINE

## 2022-02-15 PROCEDURE — 87651 STREP A DNA AMP PROBE: CPT | Performed by: INTERNAL MEDICINE

## 2022-02-15 PROCEDURE — 1159F MED LIST DOCD IN RCRD: CPT | Mod: CPTII,S$GLB,, | Performed by: INTERNAL MEDICINE

## 2022-02-15 NOTE — PROGRESS NOTES
INTERNAL MEDICINE SAME DAY PRIMARY CARE VISIT NOTE    Subjective:     Chief Complaint: Cough, Nasal Congestion, Generalized Body Aches, and Sore Throat       Patient ID: King Dempsey is a 36 y.o. female with HTN, obesity, pt of Dr. Hansen, here today for focused same-day primary care visit.    Today, patient with complaint of nasal congestion c sore throat, cough and body aches x 1 day.  States she did at home COVID test which was negative.  States cough is mild and nonproductive.  Sore throat also feels mild but states she has had strep in the past c mild sx.  No fever but felt like she was sweating last night.  No chills but felt a little cold last night.  No sick contacts but works as x-ray tech in outpatient peds dept.      Past Medical History:  Past Medical History:   Diagnosis Date    Abnormal Pap smear     Colposcopy    Bradycardia 2020    Class 2 obesity due to excess calories with body mass index (BMI) of 35.0 to 35.9 in adult 3/9/2021    Hypertension, essential 3/16/2021    Menarche     Age of onset 11    Mitral valve prolapse     Previous  delivery affecting pregnancy, antepartum 2020    Status post mitral valve annuloplasty 2017       Home Medications:  Prior to Admission medications    Medication Sig Start Date End Date Taking? Authorizing Provider   amLODIPine (NORVASC) 5 MG tablet Take 1 tablet by mouth every night at bedtime 3/11/21  Yes Eugene Cordero MD   carvediloL (COREG) 25 MG tablet Take 1 tablet (25 mg total) by mouth 2 (two) times daily with meals. One twice a day  OR  as directed 22  Yes Eugene Cordero MD   FLUoxetine 10 MG Tab Take 1 tablet (10 mg total) by mouth once daily. 21  Yes Mayco Hansen MD   ibuprofen (ADVIL,MOTRIN) 600 MG tablet Take 1 tablet (600 mg total) by mouth every 6 (six) hours as needed for Pain. 21  Yes Key Barcenas MD   levocetirizine (XYZAL) 5 MG tablet Take 1 tablet (5 mg total) by mouth daily as  needed for Allergies. 11/9/21  Yes George Dillard MD   metoprolol tartrate (LOPRESSOR) 25 MG tablet Take 0.5 to 1 tablet (12.5 - 25 mg total) by mouth 3 (three) times daily or as directed - as needed for palpitations 3/18/21  Yes Eugene Cordero MD   NIFEdipine (ADALAT CC) 30 MG TbSR Take 1 tablet (30 mg total) by mouth once daily. 5/21/21  Yes Eugene Cordero MD   norethindrone-ethinyl estradiol (MICROGESTIN 1/20) 1-20 mg-mcg per tablet Take 1 tablet by mouth once daily. 8/2/21 8/2/22 Yes Mane Moreno MD   triamterene-hydrochlorothiazide 37.5-25 mg (MAXZIDE-25) 37.5-25 mg per tablet Take 1 tablet by mouth once daily. One tablet by mouth daily 2/11/22  Yes Eugene Cordero MD   amoxicillin (AMOXIL) 500 MG capsule TAKE FOUR CAPSULES BY MOUTH ONE HOUR BEFORE DENTAL WORK 6/6/20   Historical Provider   azithromycin (ZITHROMAX) 500 MG tablet Take 1 tablet (500 mg total) by mouth once daily.  Patient not taking: Reported on 2/15/2022 11/9/21   George Dillard MD       Allergies:  Review of patient's allergies indicates:   Allergen Reactions    Doxycycline      Abdomen pain severe    Tetanus vaccines and toxoid Rash       Social History:  Social History     Tobacco Use    Smoking status: Never Smoker    Smokeless tobacco: Never Used   Substance Use Topics    Alcohol use: Yes     Comment: social    Drug use: No         Review of Systems   Constitutional: Negative for chills, fatigue and fever.   HENT: Positive for congestion, sinus pressure and sore throat. Negative for postnasal drip, rhinorrhea, sinus pain and voice change.    Respiratory: Positive for cough. Negative for chest tightness, shortness of breath and wheezing.    Cardiovascular: Negative for chest pain.   Gastrointestinal: Negative for abdominal pain and blood in stool.   Genitourinary: Negative for dysuria and frequency.   Musculoskeletal: Positive for myalgias.           Objective:   /80 (BP Location: Right arm, Patient Position: Sitting, BP  "Method: Large (Manual))   Pulse 72   Ht 5' 1" (1.549 m)   Wt 86.1 kg (189 lb 13.1 oz)   LMP 02/07/2022   SpO2 98%   BMI 35.87 kg/m²        General: AAO x3, no apparent distress  HEENT: OP c peritonsillar erythema but no exudates.  Nasal turbinates boggy.  TM clear and gray.  No cervical LAD  CV: RRR, no m/r/g  Pulm: Lungs CTAB, no crackles, no wheezes      Labs:         Assessment/Plan     King was seen today for cough, nasal congestion, generalized body aches and sore throat.    Diagnoses and all orders for this visit:    Sore throat  Congestion of nasal sinus  Body aches  ddx includes flu, COVID, strep, or other viral etiology such as common cold.  Swabbed today for above 3  Advised to quarantine while awaiting results, should email [email or web address] since [employer] employee.  - COVID PCR  -     Influenza A & B by Molecular  -     Group A Strep, Molecular        RTC prn and with PCP as per routine.    Taylor Lynn MD  Department of Internal Medicine - [facility]  02/15/2022    "

## 2022-02-16 DIAGNOSIS — U07.1 COVID-19 VIRUS DETECTED: ICD-10-CM

## 2022-02-17 RX ORDER — AMLODIPINE BESYLATE 10 MG/1
10 TABLET ORAL DAILY
Qty: 30 TABLET | Refills: 11 | Status: SHIPPED | OUTPATIENT
Start: 2022-02-17 | End: 2022-11-25

## 2022-02-17 RX ORDER — AMLODIPINE BESYLATE 10 MG/1
10 TABLET ORAL DAILY
COMMUNITY
End: 2022-02-17

## 2022-03-28 ENCOUNTER — OFFICE VISIT (OUTPATIENT)
Dept: INTERNAL MEDICINE | Facility: CLINIC | Age: 37
End: 2022-03-28
Attending: FAMILY MEDICINE
Payer: COMMERCIAL

## 2022-03-28 ENCOUNTER — LAB VISIT (OUTPATIENT)
Dept: LAB | Facility: HOSPITAL | Age: 37
End: 2022-03-28
Attending: FAMILY MEDICINE
Payer: COMMERCIAL

## 2022-03-28 VITALS
HEIGHT: 61 IN | SYSTOLIC BLOOD PRESSURE: 120 MMHG | WEIGHT: 195 LBS | HEART RATE: 71 BPM | OXYGEN SATURATION: 99 % | DIASTOLIC BLOOD PRESSURE: 72 MMHG | BODY MASS INDEX: 36.82 KG/M2

## 2022-03-28 DIAGNOSIS — G47.00 INSOMNIA, UNSPECIFIED TYPE: ICD-10-CM

## 2022-03-28 DIAGNOSIS — Z00.00 ANNUAL PHYSICAL EXAM: Primary | ICD-10-CM

## 2022-03-28 DIAGNOSIS — Z00.00 ANNUAL PHYSICAL EXAM: ICD-10-CM

## 2022-03-28 DIAGNOSIS — I10 HYPERTENSION, ESSENTIAL: ICD-10-CM

## 2022-03-28 DIAGNOSIS — F32.A DEPRESSION, UNSPECIFIED DEPRESSION TYPE: ICD-10-CM

## 2022-03-28 DIAGNOSIS — Z98.890 STATUS POST MITRAL VALVE ANNULOPLASTY: ICD-10-CM

## 2022-03-28 PROBLEM — O02.1 MISSED AB: Status: RESOLVED | Noted: 2021-06-30 | Resolved: 2022-03-28

## 2022-03-28 LAB
ALBUMIN SERPL BCP-MCNC: 3.9 G/DL (ref 3.5–5.2)
ALP SERPL-CCNC: 71 U/L (ref 55–135)
ALT SERPL W/O P-5'-P-CCNC: 28 U/L (ref 10–44)
ANION GAP SERPL CALC-SCNC: 8 MMOL/L (ref 8–16)
AST SERPL-CCNC: 26 U/L (ref 10–40)
BILIRUB SERPL-MCNC: 0.3 MG/DL (ref 0.1–1)
BUN SERPL-MCNC: 10 MG/DL (ref 6–20)
CALCIUM SERPL-MCNC: 9.4 MG/DL (ref 8.7–10.5)
CHLORIDE SERPL-SCNC: 103 MMOL/L (ref 95–110)
CHOLEST SERPL-MCNC: 198 MG/DL (ref 120–199)
CHOLEST/HDLC SERPL: 4.7 {RATIO} (ref 2–5)
CO2 SERPL-SCNC: 25 MMOL/L (ref 23–29)
CREAT SERPL-MCNC: 0.7 MG/DL (ref 0.5–1.4)
EST. GFR  (AFRICAN AMERICAN): >60 ML/MIN/1.73 M^2
EST. GFR  (NON AFRICAN AMERICAN): >60 ML/MIN/1.73 M^2
GLUCOSE SERPL-MCNC: 104 MG/DL (ref 70–110)
HDLC SERPL-MCNC: 42 MG/DL (ref 40–75)
HDLC SERPL: 21.2 % (ref 20–50)
LDLC SERPL CALC-MCNC: 106.8 MG/DL (ref 63–159)
NONHDLC SERPL-MCNC: 156 MG/DL
POTASSIUM SERPL-SCNC: 3.9 MMOL/L (ref 3.5–5.1)
PROT SERPL-MCNC: 7.2 G/DL (ref 6–8.4)
SODIUM SERPL-SCNC: 136 MMOL/L (ref 136–145)
TRIGL SERPL-MCNC: 246 MG/DL (ref 30–150)

## 2022-03-28 PROCEDURE — 99999 PR PBB SHADOW E&M-EST. PATIENT-LVL III: CPT | Mod: PBBFAC,,, | Performed by: FAMILY MEDICINE

## 2022-03-28 PROCEDURE — 3008F PR BODY MASS INDEX (BMI) DOCUMENTED: ICD-10-PCS | Mod: CPTII,S$GLB,, | Performed by: FAMILY MEDICINE

## 2022-03-28 PROCEDURE — 3078F DIAST BP <80 MM HG: CPT | Mod: CPTII,S$GLB,, | Performed by: FAMILY MEDICINE

## 2022-03-28 PROCEDURE — 3078F PR MOST RECENT DIASTOLIC BLOOD PRESSURE < 80 MM HG: ICD-10-PCS | Mod: CPTII,S$GLB,, | Performed by: FAMILY MEDICINE

## 2022-03-28 PROCEDURE — 80061 LIPID PANEL: CPT | Performed by: FAMILY MEDICINE

## 2022-03-28 PROCEDURE — 1160F PR REVIEW ALL MEDS BY PRESCRIBER/CLIN PHARMACIST DOCUMENTED: ICD-10-PCS | Mod: CPTII,S$GLB,, | Performed by: FAMILY MEDICINE

## 2022-03-28 PROCEDURE — 3008F BODY MASS INDEX DOCD: CPT | Mod: CPTII,S$GLB,, | Performed by: FAMILY MEDICINE

## 2022-03-28 PROCEDURE — 1160F RVW MEDS BY RX/DR IN RCRD: CPT | Mod: CPTII,S$GLB,, | Performed by: FAMILY MEDICINE

## 2022-03-28 PROCEDURE — 99395 PR PREVENTIVE VISIT,EST,18-39: ICD-10-PCS | Mod: S$GLB,,, | Performed by: FAMILY MEDICINE

## 2022-03-28 PROCEDURE — 99395 PREV VISIT EST AGE 18-39: CPT | Mod: S$GLB,,, | Performed by: FAMILY MEDICINE

## 2022-03-28 PROCEDURE — 99999 PR PBB SHADOW E&M-EST. PATIENT-LVL III: ICD-10-PCS | Mod: PBBFAC,,, | Performed by: FAMILY MEDICINE

## 2022-03-28 PROCEDURE — 80053 COMPREHEN METABOLIC PANEL: CPT | Performed by: FAMILY MEDICINE

## 2022-03-28 PROCEDURE — 36415 COLL VENOUS BLD VENIPUNCTURE: CPT | Performed by: FAMILY MEDICINE

## 2022-03-28 PROCEDURE — 1159F MED LIST DOCD IN RCRD: CPT | Mod: CPTII,S$GLB,, | Performed by: FAMILY MEDICINE

## 2022-03-28 PROCEDURE — 3074F PR MOST RECENT SYSTOLIC BLOOD PRESSURE < 130 MM HG: ICD-10-PCS | Mod: CPTII,S$GLB,, | Performed by: FAMILY MEDICINE

## 2022-03-28 PROCEDURE — 3074F SYST BP LT 130 MM HG: CPT | Mod: CPTII,S$GLB,, | Performed by: FAMILY MEDICINE

## 2022-03-28 PROCEDURE — 1159F PR MEDICATION LIST DOCUMENTED IN MEDICAL RECORD: ICD-10-PCS | Mod: CPTII,S$GLB,, | Performed by: FAMILY MEDICINE

## 2022-03-28 RX ORDER — FLUOXETINE 10 MG/1
10 TABLET ORAL DAILY
Qty: 90 TABLET | Refills: 3 | Status: SHIPPED | OUTPATIENT
Start: 2022-03-28 | End: 2022-09-29

## 2022-03-28 RX ORDER — TRAZODONE HYDROCHLORIDE 50 MG/1
50 TABLET ORAL NIGHTLY PRN
Qty: 30 TABLET | Refills: 1 | Status: SHIPPED | OUTPATIENT
Start: 2022-03-28 | End: 2022-06-01 | Stop reason: SDUPTHER

## 2022-03-28 NOTE — PATIENT INSTRUCTIONS
Schedule lab orders for today.      Information about cholesterol, high blood pressure and healthy diet and activity recommendations can be found at the following links on the Internet:    http://www.nhlbi.nih.gov/health/health-topics/topics/hbc  http://www.nhlbi.nih.gov/health/educational/lose_wt/index.htm  Http://www.nhlbi.nih.gov/files/docs/public/heart/hbp_low.pdf  http://www.heart.org/HEARTORG/  http://diabetes.org/  https://www.cdc.gov/  Https://healthfinder.gov/  https://health.gov/dietaryguidelines/2015/guidelines/  https://health.gov/paguidelines/second-edition/pdf/Physical_Activity_Guidelines_2nd_edition.pdf

## 2022-03-28 NOTE — PROGRESS NOTES
Subjective:       Patient ID: King Dempsey is a 36 y.o. female.    Chief Complaint: Annual Exam    Established patient for an annual wellness check/physical exam and also chronic disease management. Specific complaints - see dictation and please see ROS.  P, S, Fm, Soc Hx's; Meds, allergies reviewed and reconciled.  Health maintenance file reviewed and addressed items due. Recent applicable lab, imaging and cardiovascular results reviewed.  Problem list items reviewed and modified or added entries (in the overview section) may not be transcribed into this encounter note due to note writer format.    11 yo daughter in counseling.     Mom doing OK, chemo for lung ca.    Poor sleep - unisom mostly, melatonin at times casues night terrors.    Review of Systems   Constitutional: Negative for chills, fatigue, fever and unexpected weight change.   HENT: Negative for congestion and trouble swallowing.    Eyes: Negative for redness and visual disturbance.   Respiratory: Negative for cough, chest tightness and shortness of breath.    Cardiovascular: Positive for leg swelling. Negative for chest pain and palpitations.   Gastrointestinal: Negative for abdominal pain and blood in stool.   Genitourinary: Negative for difficulty urinating, hematuria and menstrual problem.   Musculoskeletal: Negative for arthralgias, back pain, gait problem, joint swelling, myalgias and neck pain.   Skin: Negative for color change and rash.   Neurological: Negative for tremors, speech difficulty, weakness, numbness and headaches.   Hematological: Negative for adenopathy. Does not bruise/bleed easily.   Psychiatric/Behavioral: Positive for sleep disturbance. Negative for behavioral problems and confusion. The patient is not nervous/anxious.        Objective:      Physical Exam  Vitals and nursing note reviewed.   Constitutional:       Appearance: She is well-developed. She is not diaphoretic.   Eyes:      General: No scleral icterus.  Neck:       Thyroid: No thyromegaly.      Vascular: No JVD.      Trachea: No tracheal deviation.   Cardiovascular:      Rate and Rhythm: Normal rate.      Heart sounds: Normal heart sounds. No murmur heard.    No friction rub. No gallop.   Pulmonary:      Effort: Pulmonary effort is normal. No respiratory distress.      Breath sounds: Normal breath sounds. No wheezing or rales.   Abdominal:      General: There is no distension or abdominal bruit.      Palpations: Abdomen is soft. There is no mass.      Tenderness: There is no abdominal tenderness. There is no guarding or rebound.   Musculoskeletal:      Cervical back: Normal range of motion and neck supple.   Lymphadenopathy:      Cervical: No cervical adenopathy.   Skin:     General: Skin is warm and dry.      Findings: No erythema or rash.   Neurological:      Mental Status: She is alert and oriented to person, place, and time.      Cranial Nerves: No cranial nerve deficit.      Motor: No tremor.      Coordination: Coordination normal.      Gait: Gait normal.   Psychiatric:         Behavior: Behavior normal.         Thought Content: Thought content normal.         Judgment: Judgment normal.         Assessment:       1. Annual physical exam    2. Hypertension, essential    3. Status post mitral valve annuloplasty    4. Depression, unspecified depression type    5. Insomnia, unspecified type        Plan:     Medication List with Changes/Refills   New Medications    TRAZODONE (DESYREL) 50 MG TABLET    Take 1 tablet (50 mg total) by mouth nightly as needed for Insomnia.   Current Medications    AMLODIPINE (NORVASC) 10 MG TABLET    Take 1 tablet (10 mg total) by mouth once daily.    AMOXICILLIN (AMOXIL) 500 MG CAPSULE    TAKE FOUR CAPSULES BY MOUTH ONE HOUR BEFORE DENTAL WORK    CARVEDILOL (COREG) 25 MG TABLET    Take 1 tablet (25 mg total) by mouth 2 (two) times daily with meals. One twice a day  OR  as directed    LEVOCETIRIZINE (XYZAL) 5 MG TABLET    Take 1 tablet (5 mg total)  by mouth daily as needed for Allergies.    METOPROLOL TARTRATE (LOPRESSOR) 25 MG TABLET    Take 0.5 to 1 tablet (12.5 - 25 mg total) by mouth 3 (three) times daily or as directed - as needed for palpitations    NORETHINDRONE-ETHINYL ESTRADIOL (MICROGESTIN 1/20) 1-20 MG-MCG PER TABLET    Take 1 tablet by mouth once daily.    TRIAMTERENE-HYDROCHLOROTHIAZIDE 37.5-25 MG (MAXZIDE-25) 37.5-25 MG PER TABLET    Take 1 tablet by mouth once daily. One tablet by mouth daily   Changed and/or Refilled Medications    Modified Medication Previous Medication    FLUOXETINE 10 MG TAB FLUoxetine 10 MG Tab       Take 1 tablet (10 mg total) by mouth once daily.    Take 1 tablet (10 mg total) by mouth once daily.   Discontinued Medications    AZITHROMYCIN (ZITHROMAX) 500 MG TABLET    Take 1 tablet (500 mg total) by mouth once daily.    IBUPROFEN (ADVIL,MOTRIN) 600 MG TABLET    Take 1 tablet (600 mg total) by mouth every 6 (six) hours as needed for Pain.    NIFEDIPINE (ADALAT CC) 30 MG TBSR    Take 1 tablet (30 mg total) by mouth once daily.     King was seen today for annual exam.    Diagnoses and all orders for this visit:    Annual physical exam  -     Comprehensive Metabolic Panel; Future  -     Lipid Panel; Future    Hypertension, essential    Status post mitral valve annuloplasty    Depression, unspecified depression type  -     FLUoxetine 10 MG Tab; Take 1 tablet (10 mg total) by mouth once daily.    Insomnia, unspecified type  Comments:  QTc OK, add trazodone prn, cont prozac daily  Orders:  -     traZODone (DESYREL) 50 MG tablet; Take 1 tablet (50 mg total) by mouth nightly as needed for Insomnia.      See meds, orders, follow up, routing and instructions sections of encounter and AVS. Discussed with patient and provided on AVS.    Discussed diet and exercise and links provided on AVS for detailed information.    Mild edema, likely from norvasc.

## 2022-05-07 ENCOUNTER — PATIENT MESSAGE (OUTPATIENT)
Dept: INTERNAL MEDICINE | Facility: CLINIC | Age: 37
End: 2022-05-07

## 2022-05-07 ENCOUNTER — IMMUNIZATION (OUTPATIENT)
Dept: INTERNAL MEDICINE | Facility: CLINIC | Age: 37
End: 2022-05-07
Payer: COMMERCIAL

## 2022-05-07 DIAGNOSIS — Z23 NEED FOR VACCINATION: Primary | ICD-10-CM

## 2022-05-07 PROCEDURE — 91300 COVID-19, MRNA, LNP-S, PF, 30 MCG/0.3 ML DOSE VACCINE: CPT | Mod: PBBFAC | Performed by: INTERNAL MEDICINE

## 2022-05-09 RX ORDER — OSELTAMIVIR PHOSPHATE 75 MG/1
75 CAPSULE ORAL DAILY
Qty: 10 CAPSULE | Refills: 0 | Status: SHIPPED | OUTPATIENT
Start: 2022-05-09 | End: 2022-05-19

## 2022-06-01 DIAGNOSIS — G47.00 INSOMNIA, UNSPECIFIED TYPE: ICD-10-CM

## 2022-06-01 RX ORDER — TRAZODONE HYDROCHLORIDE 50 MG/1
50 TABLET ORAL NIGHTLY PRN
Qty: 90 TABLET | Refills: 1 | Status: SHIPPED | OUTPATIENT
Start: 2022-06-01 | End: 2022-09-29

## 2022-06-01 NOTE — TELEPHONE ENCOUNTER
No new care gaps identified.  [facility] Embedded Care Gaps. Reference number: [id number]. 6/01/2022   9:14:51 AM RADHAT

## 2022-06-01 NOTE — TELEPHONE ENCOUNTER
Refill Routing Note   Medication(s) are not appropriate for processing by [facility] for the following reason(s):      - Indication is outside of scope for [facility]    [facility] action(s):  Route       Medication Therapy Plan: Insomnia-OP  Medication reconciliation completed: No     Appointments  past 12m or future 3m with PCP    Date Provider   Last Visit   3/28/2022 Mayco Hansen MD   Next Visit   Visit date not found Mayco Hansen MD   ED visits in past 90 days: 0        Note composed:2:08 PM 06/01/2022

## 2022-06-27 ENCOUNTER — HOSPITAL ENCOUNTER (OUTPATIENT)
Dept: CARDIOLOGY | Facility: HOSPITAL | Age: 37
Discharge: HOME | End: 2022-06-27
Attending: INTERNAL MEDICINE
Payer: COMMERCIAL

## 2022-06-27 VITALS
WEIGHT: 195 LBS | BODY MASS INDEX: 36.82 KG/M2 | DIASTOLIC BLOOD PRESSURE: 80 MMHG | HEIGHT: 61 IN | HEART RATE: 76 BPM | SYSTOLIC BLOOD PRESSURE: 120 MMHG

## 2022-06-27 DIAGNOSIS — Z98.890 STATUS POST MITRAL VALVE ANNULOPLASTY: ICD-10-CM

## 2022-06-27 DIAGNOSIS — R00.2 HEART PALPITATIONS: ICD-10-CM

## 2022-06-27 DIAGNOSIS — R00.1 BRADYCARDIA: ICD-10-CM

## 2022-06-27 DIAGNOSIS — H53.2 DIPLOPIA: ICD-10-CM

## 2022-06-27 PROBLEM — M25.473 ANKLE SWELLING: Status: ACTIVE | Noted: 2022-06-27

## 2022-06-27 PROBLEM — R53.82 CHRONIC FATIGUE: Status: ACTIVE | Noted: 2022-06-27

## 2022-06-27 LAB
ASCENDING AORTA: 2.63 CM
AV INDEX (PROSTH): 0.87
AV MEAN GRADIENT: 4 MMHG
AV PEAK GRADIENT: 8 MMHG
AV VALVE AREA: 2.69 CM2
AV VELOCITY RATIO: 0.77
BSA FOR ECHO PROCEDURE: 1.95 M2
CV ECHO LV RWT: 0.36 CM
DOP CALC AO PEAK VEL: 1.41 M/S
DOP CALC AO VTI: 27.5 CM
DOP CALC LVOT AREA: 3.1 CM2
DOP CALC LVOT DIAMETER: 1.99 CM
DOP CALC LVOT PEAK VEL: 1.09 M/S
DOP CALC LVOT STROKE VOLUME: 73.99 CM3
DOP CALC MV VTI: 24.6 CM
DOP CALCLVOT PEAK VEL VTI: 23.8 CM
E WAVE DECELERATION TIME: 503 MSEC
E/A RATIO: 1.08
E/E' RATIO: 19.67 M/S
ECHO LV POSTERIOR WALL: 0.68 CM (ref 0.6–1.1)
EJECTION FRACTION: 65 %
FRACTIONAL SHORTENING: 43 % (ref 28–44)
INTERVENTRICULAR SEPTUM: 0.88 CM (ref 0.6–1.1)
IVC DIAMETER: 1.03 CM
IVRT: 82.78 MSEC
LA MAJOR: 6.08 CM
LA MINOR: 6.05 CM
LA WIDTH: 4.12 CM
LEFT ATRIUM SIZE: 4.51 CM
LEFT ATRIUM VOLUME INDEX MOD: 38.3 ML/M2
LEFT ATRIUM VOLUME INDEX: 51.2 ML/M2
LEFT ATRIUM VOLUME MOD: 71.56 CM3
LEFT ATRIUM VOLUME: 95.79 CM3
LEFT INTERNAL DIMENSION IN SYSTOLE: 2.15 CM (ref 2.1–4)
LEFT VENTRICLE DIASTOLIC VOLUME INDEX: 31.89 ML/M2
LEFT VENTRICLE DIASTOLIC VOLUME: 59.64 ML
LEFT VENTRICLE MASS INDEX: 43 G/M2
LEFT VENTRICLE SYSTOLIC VOLUME INDEX: 11.5 ML/M2
LEFT VENTRICLE SYSTOLIC VOLUME: 21.46 ML
LEFT VENTRICULAR INTERNAL DIMENSION IN DIASTOLE: 3.74 CM (ref 3.5–6)
LEFT VENTRICULAR MASS: 80.94 G
LV LATERAL E/E' RATIO: 13.62 M/S
LV SEPTAL E/E' RATIO: 35.4 M/S
LVOT MG: 3.06 MMHG
LVOT MV: 0.84 CM/S
MV A" WAVE DURATION": 7.71 MSEC
MV MEAN GRADIENT: 10 MMHG
MV PEAK A VEL: 1.64 M/S
MV PEAK E VEL: 1.77 M/S
MV PEAK GRADIENT: 3 MMHG
MV STENOSIS PRESSURE HALF TIME: 130.08 MS
MV VALVE AREA BY CONTINUITY EQUATION: 3.01 CM2
MV VALVE AREA P 1/2 METHOD: 1.69 CM2
PISA TR MAX VEL: 2.77 M/S
PULM VEIN S/D RATIO: 1.27
PV PEAK D VEL: 0.33 M/S
PV PEAK S VEL: 0.42 M/S
RA MAJOR: 5.2 CM
RA PRESSURE: 3 MMHG
RA WIDTH: 3.88 CM
RIGHT VENTRICULAR END-DIASTOLIC DIMENSION: 3.31 CM
RV TISSUE DOPPLER FREE WALL SYSTOLIC VELOCITY 1 (APICAL 4 CHAMBER VIEW): 9.71 CM/S
SINUS: 2.59 CM
STJ: 2.13 CM
TDI LATERAL: 0.13 M/S
TDI SEPTAL: 0.05 M/S
TDI: 0.09 M/S
TR MAX PG: 31 MMHG
TRICUSPID ANNULAR PLANE SYSTOLIC EXCURSION: 1.01 CM
TV REST PULMONARY ARTERY PRESSURE: 34 MMHG

## 2022-06-27 PROCEDURE — 93306 TTE W/DOPPLER COMPLETE: CPT | Mod: 26,,, | Performed by: INTERNAL MEDICINE

## 2022-06-27 PROCEDURE — 93306 ECHO (CUPID ONLY): ICD-10-PCS | Mod: 26,,, | Performed by: INTERNAL MEDICINE

## 2022-06-27 PROCEDURE — 93306 TTE W/DOPPLER COMPLETE: CPT

## 2022-06-27 NOTE — PROGRESS NOTES
Subjective:   Patient ID:  King Dempsey is a 37 y.o. female who presents for follow-up of fatigue and ankle swelling    HPI:Patient is here for valvular heart disease.She's been having fatigue and ankle swelling.    The patient has no chest pain, SOB, TIA, palpitations, syncope or pre-syncope.Patient does not exercise.        Review of Systems   Constitutional: Positive for malaise/fatigue. Negative for chills, decreased appetite, diaphoresis, fever, night sweats, weight gain and weight loss.   HENT: Negative for congestion, hoarse voice, nosebleeds, sore throat and tinnitus.    Eyes: Negative for blurred vision, double vision, vision loss in left eye, vision loss in right eye, visual disturbance and visual halos.   Cardiovascular: Positive for leg swelling. Negative for chest pain, claudication, cyanosis, dyspnea on exertion, irregular heartbeat, near-syncope, orthopnea, palpitations, paroxysmal nocturnal dyspnea and syncope.   Respiratory: Negative for cough, hemoptysis, shortness of breath, sleep disturbances due to breathing, snoring, sputum production and wheezing.    Endocrine: Negative for cold intolerance, heat intolerance, polydipsia, polyphagia and polyuria.   Hematologic/Lymphatic: Negative for adenopathy and bleeding problem. Does not bruise/bleed easily.   Skin: Negative for color change, dry skin, flushing, itching, nail changes, poor wound healing, rash, skin cancer, suspicious lesions and unusual hair distribution.   Musculoskeletal: Negative for arthritis, back pain, falls, gout, joint pain, joint swelling, muscle cramps, muscle weakness, myalgias and stiffness.   Gastrointestinal: Negative for abdominal pain, anorexia, change in bowel habit, constipation, diarrhea, dysphagia, heartburn, hematemesis, hematochezia, melena and vomiting.   Genitourinary: Negative for decreased libido, dysuria, hematuria, hesitancy and urgency.   Neurological: Negative for excessive daytime sleepiness, dizziness,  "focal weakness, headaches, light-headedness, loss of balance, numbness, paresthesias, seizures, sensory change, tremors, vertigo and weakness.   Psychiatric/Behavioral: Negative for altered mental status, depression, hallucinations, memory loss, substance abuse and suicidal ideas. The patient does not have insomnia and is not nervous/anxious.    Allergic/Immunologic: Negative for environmental allergies and hives.       Objective: /69 (BP Location: Left arm, Patient Position: Sitting, BP Method: Medium (Automatic))   Pulse 71   Ht 5' 1" (1.549 m)   Wt 84 kg (185 lb 3 oz)   BMI 34.99 kg/m²      Physical Exam  Constitutional:       Appearance: She is well-developed.   HENT:      Head: Normocephalic.   Eyes:      Pupils: Pupils are equal, round, and reactive to light.   Neck:      Thyroid: No thyromegaly.      Vascular: Normal carotid pulses. No carotid bruit, hepatojugular reflux or JVD.   Cardiovascular:      Rate and Rhythm: Normal rate and regular rhythm.      Pulses: Intact distal pulses.      Heart sounds: Normal heart sounds. No murmur heard.    No friction rub. No gallop.   Pulmonary:      Effort: Pulmonary effort is normal. No tachypnea or respiratory distress.      Breath sounds: Normal breath sounds. No wheezing or rales.   Chest:      Chest wall: No tenderness.   Abdominal:      General: Bowel sounds are normal. There is no distension.      Palpations: Abdomen is soft. There is no mass.      Tenderness: There is no abdominal tenderness. There is no guarding or rebound.   Musculoskeletal:         General: No tenderness. Normal range of motion.      Cervical back: Normal range of motion.   Lymphadenopathy:      Cervical: No cervical adenopathy.   Skin:     General: Skin is warm.      Findings: No erythema or rash.   Neurological:      Mental Status: She is alert and oriented to person, place, and time.      Cranial Nerves: No cranial nerve deficit.      Coordination: Coordination normal. "   Psychiatric:         Behavior: Behavior normal.         Thought Content: Thought content normal.         Judgment: Judgment normal.         Assessment:     1. Status post mitral valve annuloplasty    2. Mitral valve prolapse    3. Bradycardia    4. Vitamin D deficiency disease    5. Class 2 severe obesity due to excess calories with serious comorbidity and body mass index (BMI) of 35.0 to 35.9 in adult    6. Hypertension, essential    7. Chronic fatigue    8. Ankle swelling, unspecified laterality        Plan:   Discussed diet , achieving and maintaining ideal body weight, and exercise.   We reviewed meds in detail.  Reassured-Discussed goals, options, plan.  We have discussed the need for endocarditis prophylaxis.    Reduce Carvedilol to just half twice    We already adjusted other therapy    King was seen today for follow-up, results and annual exam.    Diagnoses and all orders for this visit:    Status post mitral valve annuloplasty  -     Lipid Panel; Future; Expected date: 08/29/2023  -     Comprehensive Metabolic Panel; Future; Expected date: 08/29/2023  -     TSH; Future; Expected date: 08/29/2023  -     CBC Auto Differential; Future; Expected date: 08/29/2023  -     EKG 12-lead; Future; Expected date: 08/29/2023  -     BNP; Future; Expected date: 08/29/2023    Mitral valve prolapse    Bradycardia  -     TSH; Future; Expected date: 08/29/2023    Vitamin D deficiency disease    Class 2 severe obesity due to excess calories with serious comorbidity and body mass index (BMI) of 35.0 to 35.9 in adult    Hypertension, essential  -     Lipid Panel; Future; Expected date: 08/29/2023  -     Comprehensive Metabolic Panel; Future; Expected date: 08/29/2023  -     TSH; Future; Expected date: 08/29/2023    Chronic fatigue    Ankle swelling, unspecified laterality  -     TSH; Future; Expected date: 08/29/2023  -     EKG 12-lead; Future; Expected date: 08/29/2023  -     BNP; Future; Expected date:  08/29/2023            Follow up in about 15 months (around 9/29/2023) for with ECG and labs.

## 2022-06-28 ENCOUNTER — LAB VISIT (OUTPATIENT)
Dept: LAB | Facility: HOSPITAL | Age: 37
End: 2022-06-28
Attending: INTERNAL MEDICINE
Payer: COMMERCIAL

## 2022-06-28 DIAGNOSIS — R00.1 BRADYCARDIA: ICD-10-CM

## 2022-06-28 DIAGNOSIS — Z98.890 STATUS POST MITRAL VALVE ANNULOPLASTY: ICD-10-CM

## 2022-06-28 DIAGNOSIS — R00.2 HEART PALPITATIONS: ICD-10-CM

## 2022-06-28 DIAGNOSIS — E66.01 CLASS 2 SEVERE OBESITY DUE TO EXCESS CALORIES WITH SERIOUS COMORBIDITY AND BODY MASS INDEX (BMI) OF 35.0 TO 35.9 IN ADULT: ICD-10-CM

## 2022-06-28 LAB
ALBUMIN SERPL BCP-MCNC: 4 G/DL (ref 3.5–5.2)
ALP SERPL-CCNC: 62 U/L (ref 55–135)
ALT SERPL W/O P-5'-P-CCNC: 15 U/L (ref 10–44)
ANION GAP SERPL CALC-SCNC: 12 MMOL/L (ref 8–16)
AST SERPL-CCNC: 18 U/L (ref 10–40)
BILIRUB SERPL-MCNC: 1.1 MG/DL (ref 0.1–1)
BUN SERPL-MCNC: 14 MG/DL (ref 6–20)
CALCIUM SERPL-MCNC: 9.6 MG/DL (ref 8.7–10.5)
CHLORIDE SERPL-SCNC: 103 MMOL/L (ref 95–110)
CHOLEST SERPL-MCNC: 199 MG/DL (ref 120–199)
CHOLEST/HDLC SERPL: 3.7 {RATIO} (ref 2–5)
CO2 SERPL-SCNC: 21 MMOL/L (ref 23–29)
CREAT SERPL-MCNC: 0.8 MG/DL (ref 0.5–1.4)
EST. GFR  (AFRICAN AMERICAN): >60 ML/MIN/1.73 M^2
EST. GFR  (NON AFRICAN AMERICAN): >60 ML/MIN/1.73 M^2
GLUCOSE SERPL-MCNC: 115 MG/DL (ref 70–110)
HDLC SERPL-MCNC: 54 MG/DL (ref 40–75)
HDLC SERPL: 27.1 % (ref 20–50)
LDLC SERPL CALC-MCNC: 124 MG/DL (ref 63–159)
NONHDLC SERPL-MCNC: 145 MG/DL
POTASSIUM SERPL-SCNC: 3.8 MMOL/L (ref 3.5–5.1)
PROT SERPL-MCNC: 7.5 G/DL (ref 6–8.4)
SODIUM SERPL-SCNC: 136 MMOL/L (ref 136–145)
TRIGL SERPL-MCNC: 105 MG/DL (ref 30–150)
TSH SERPL DL<=0.005 MIU/L-ACNC: 1.26 UIU/ML (ref 0.4–4)

## 2022-06-28 PROCEDURE — 80061 LIPID PANEL: CPT | Performed by: INTERNAL MEDICINE

## 2022-06-28 PROCEDURE — 36415 COLL VENOUS BLD VENIPUNCTURE: CPT | Performed by: INTERNAL MEDICINE

## 2022-06-28 PROCEDURE — 84443 ASSAY THYROID STIM HORMONE: CPT | Performed by: INTERNAL MEDICINE

## 2022-06-28 PROCEDURE — 80053 COMPREHEN METABOLIC PANEL: CPT | Performed by: INTERNAL MEDICINE

## 2022-06-29 ENCOUNTER — OFFICE VISIT (OUTPATIENT)
Dept: CARDIOLOGY | Facility: CLINIC | Age: 37
End: 2022-06-29
Payer: COMMERCIAL

## 2022-06-29 ENCOUNTER — HOSPITAL ENCOUNTER (OUTPATIENT)
Dept: CARDIOLOGY | Facility: CLINIC | Age: 37
Discharge: HOME | End: 2022-06-29
Payer: COMMERCIAL

## 2022-06-29 VITALS
HEIGHT: 61 IN | BODY MASS INDEX: 34.96 KG/M2 | SYSTOLIC BLOOD PRESSURE: 112 MMHG | WEIGHT: 185.19 LBS | HEART RATE: 71 BPM | DIASTOLIC BLOOD PRESSURE: 69 MMHG

## 2022-06-29 DIAGNOSIS — I34.1 MITRAL VALVE PROLAPSE: Chronic | ICD-10-CM

## 2022-06-29 DIAGNOSIS — R00.2 HEART PALPITATIONS: ICD-10-CM

## 2022-06-29 DIAGNOSIS — R00.1 BRADYCARDIA: ICD-10-CM

## 2022-06-29 DIAGNOSIS — R53.82 CHRONIC FATIGUE: ICD-10-CM

## 2022-06-29 DIAGNOSIS — I10 HYPERTENSION, ESSENTIAL: ICD-10-CM

## 2022-06-29 DIAGNOSIS — E66.01 CLASS 2 SEVERE OBESITY DUE TO EXCESS CALORIES WITH SERIOUS COMORBIDITY AND BODY MASS INDEX (BMI) OF 35.0 TO 35.9 IN ADULT: ICD-10-CM

## 2022-06-29 DIAGNOSIS — Z98.890 STATUS POST MITRAL VALVE ANNULOPLASTY: Primary | ICD-10-CM

## 2022-06-29 DIAGNOSIS — Z98.890 STATUS POST MITRAL VALVE ANNULOPLASTY: ICD-10-CM

## 2022-06-29 DIAGNOSIS — E55.9 VITAMIN D DEFICIENCY DISEASE: ICD-10-CM

## 2022-06-29 DIAGNOSIS — M25.473 ANKLE SWELLING, UNSPECIFIED LATERALITY: ICD-10-CM

## 2022-06-29 PROCEDURE — 99215 OFFICE O/P EST HI 40 MIN: CPT | Mod: S$GLB,,, | Performed by: INTERNAL MEDICINE

## 2022-06-29 PROCEDURE — 99999 PR PBB SHADOW E&M-EST. PATIENT-LVL IV: CPT | Mod: PBBFAC,,, | Performed by: INTERNAL MEDICINE

## 2022-06-29 PROCEDURE — 3074F PR MOST RECENT SYSTOLIC BLOOD PRESSURE < 130 MM HG: ICD-10-PCS | Mod: CPTII,S$GLB,, | Performed by: INTERNAL MEDICINE

## 2022-06-29 PROCEDURE — 3008F PR BODY MASS INDEX (BMI) DOCUMENTED: ICD-10-PCS | Mod: CPTII,S$GLB,, | Performed by: INTERNAL MEDICINE

## 2022-06-29 PROCEDURE — 1159F PR MEDICATION LIST DOCUMENTED IN MEDICAL RECORD: ICD-10-PCS | Mod: CPTII,S$GLB,, | Performed by: INTERNAL MEDICINE

## 2022-06-29 PROCEDURE — 3078F PR MOST RECENT DIASTOLIC BLOOD PRESSURE < 80 MM HG: ICD-10-PCS | Mod: CPTII,S$GLB,, | Performed by: INTERNAL MEDICINE

## 2022-06-29 PROCEDURE — 99999 PR PBB SHADOW E&M-EST. PATIENT-LVL IV: ICD-10-PCS | Mod: PBBFAC,,, | Performed by: INTERNAL MEDICINE

## 2022-06-29 PROCEDURE — 93010 ELECTROCARDIOGRAM REPORT: CPT | Mod: S$GLB,,, | Performed by: INTERNAL MEDICINE

## 2022-06-29 PROCEDURE — 3008F BODY MASS INDEX DOCD: CPT | Mod: CPTII,S$GLB,, | Performed by: INTERNAL MEDICINE

## 2022-06-29 PROCEDURE — 93005 ELECTROCARDIOGRAM TRACING: CPT | Mod: S$GLB,,, | Performed by: INTERNAL MEDICINE

## 2022-06-29 PROCEDURE — 99215 PR OFFICE/OUTPT VISIT, EST, LEVL V, 40-54 MIN: ICD-10-PCS | Mod: S$GLB,,, | Performed by: INTERNAL MEDICINE

## 2022-06-29 PROCEDURE — 93010 EKG 12-LEAD: ICD-10-PCS | Mod: S$GLB,,, | Performed by: INTERNAL MEDICINE

## 2022-06-29 PROCEDURE — 93005 EKG 12-LEAD: ICD-10-PCS | Mod: S$GLB,,, | Performed by: INTERNAL MEDICINE

## 2022-06-29 PROCEDURE — 3078F DIAST BP <80 MM HG: CPT | Mod: CPTII,S$GLB,, | Performed by: INTERNAL MEDICINE

## 2022-06-29 PROCEDURE — 3074F SYST BP LT 130 MM HG: CPT | Mod: CPTII,S$GLB,, | Performed by: INTERNAL MEDICINE

## 2022-06-29 PROCEDURE — 1159F MED LIST DOCD IN RCRD: CPT | Mod: CPTII,S$GLB,, | Performed by: INTERNAL MEDICINE

## 2022-06-29 NOTE — PATIENT INSTRUCTIONS
Discussed diet , achieving and maintaining ideal body weight, and exercise.   We reviewed meds in detail.  Reassured-Discussed goals, options, plan.  We have discussed the need for endocarditis prophylaxis.   Reduce Carvedilol to just half twice

## 2022-08-09 ENCOUNTER — PATIENT MESSAGE (OUTPATIENT)
Dept: INTERNAL MEDICINE | Facility: CLINIC | Age: 37
End: 2022-08-09
Payer: COMMERCIAL

## 2022-08-09 NOTE — TELEPHONE ENCOUNTER
Please schedule patient - same day - with any available provider (MD, CARYL, APRN).     Thank you.

## 2022-08-11 ENCOUNTER — OFFICE VISIT (OUTPATIENT)
Dept: INTERNAL MEDICINE | Facility: CLINIC | Age: 37
End: 2022-08-11
Payer: COMMERCIAL

## 2022-08-11 ENCOUNTER — HOSPITAL ENCOUNTER (OUTPATIENT)
Dept: RADIOLOGY | Facility: HOSPITAL | Age: 37
Discharge: HOME | End: 2022-08-11
Attending: PHYSICIAN ASSISTANT
Payer: COMMERCIAL

## 2022-08-11 ENCOUNTER — TELEPHONE (OUTPATIENT)
Dept: INTERNAL MEDICINE | Facility: CLINIC | Age: 37
End: 2022-08-11

## 2022-08-11 VITALS
BODY MASS INDEX: 35.29 KG/M2 | DIASTOLIC BLOOD PRESSURE: 70 MMHG | HEIGHT: 61 IN | TEMPERATURE: 98 F | WEIGHT: 186.94 LBS | HEART RATE: 69 BPM | OXYGEN SATURATION: 97 % | SYSTOLIC BLOOD PRESSURE: 120 MMHG

## 2022-08-11 DIAGNOSIS — R11.0 NAUSEA: ICD-10-CM

## 2022-08-11 DIAGNOSIS — I10 HYPERTENSION, ESSENTIAL: ICD-10-CM

## 2022-08-11 DIAGNOSIS — R10.11 RIGHT UPPER QUADRANT PAIN: Primary | ICD-10-CM

## 2022-08-11 DIAGNOSIS — R10.11 RIGHT UPPER QUADRANT PAIN: ICD-10-CM

## 2022-08-11 DIAGNOSIS — Z98.890 STATUS POST MITRAL VALVE ANNULOPLASTY: ICD-10-CM

## 2022-08-11 LAB
BILIRUB UR QL STRIP: NEGATIVE
CLARITY UR REFRACT.AUTO: CLEAR
COLOR UR AUTO: YELLOW
GLUCOSE UR QL STRIP: NEGATIVE
HGB UR QL STRIP: NEGATIVE
KETONES UR QL STRIP: NEGATIVE
LEUKOCYTE ESTERASE UR QL STRIP: NEGATIVE
NITRITE UR QL STRIP: NEGATIVE
PH UR STRIP: 7 [PH] (ref 5–8)
PROT UR QL STRIP: NEGATIVE
SP GR UR STRIP: 1.02 (ref 1–1.03)
URN SPEC COLLECT METH UR: NORMAL

## 2022-08-11 PROCEDURE — 99999 PR PBB SHADOW E&M-EST. PATIENT-LVL V: CPT | Mod: PBBFAC,,, | Performed by: PHYSICIAN ASSISTANT

## 2022-08-11 PROCEDURE — 1159F PR MEDICATION LIST DOCUMENTED IN MEDICAL RECORD: ICD-10-PCS | Mod: CPTII,S$GLB,, | Performed by: PHYSICIAN ASSISTANT

## 2022-08-11 PROCEDURE — 3008F PR BODY MASS INDEX (BMI) DOCUMENTED: ICD-10-PCS | Mod: CPTII,S$GLB,, | Performed by: PHYSICIAN ASSISTANT

## 2022-08-11 PROCEDURE — 3074F SYST BP LT 130 MM HG: CPT | Mod: CPTII,S$GLB,, | Performed by: PHYSICIAN ASSISTANT

## 2022-08-11 PROCEDURE — 76700 US EXAM ABDOM COMPLETE: CPT | Mod: 26,,, | Performed by: RADIOLOGY

## 2022-08-11 PROCEDURE — 3078F PR MOST RECENT DIASTOLIC BLOOD PRESSURE < 80 MM HG: ICD-10-PCS | Mod: CPTII,S$GLB,, | Performed by: PHYSICIAN ASSISTANT

## 2022-08-11 PROCEDURE — 76700 US ABDOMEN COMPLETE: ICD-10-PCS | Mod: 26,,, | Performed by: RADIOLOGY

## 2022-08-11 PROCEDURE — 99214 PR OFFICE/OUTPT VISIT, EST, LEVL IV, 30-39 MIN: ICD-10-PCS | Mod: S$GLB,,, | Performed by: PHYSICIAN ASSISTANT

## 2022-08-11 PROCEDURE — 99999 PR PBB SHADOW E&M-EST. PATIENT-LVL V: ICD-10-PCS | Mod: PBBFAC,,, | Performed by: PHYSICIAN ASSISTANT

## 2022-08-11 PROCEDURE — 3078F DIAST BP <80 MM HG: CPT | Mod: CPTII,S$GLB,, | Performed by: PHYSICIAN ASSISTANT

## 2022-08-11 PROCEDURE — 1160F PR REVIEW ALL MEDS BY PRESCRIBER/CLIN PHARMACIST DOCUMENTED: ICD-10-PCS | Mod: CPTII,S$GLB,, | Performed by: PHYSICIAN ASSISTANT

## 2022-08-11 PROCEDURE — 1159F MED LIST DOCD IN RCRD: CPT | Mod: CPTII,S$GLB,, | Performed by: PHYSICIAN ASSISTANT

## 2022-08-11 PROCEDURE — 99214 OFFICE O/P EST MOD 30 MIN: CPT | Mod: S$GLB,,, | Performed by: PHYSICIAN ASSISTANT

## 2022-08-11 PROCEDURE — 3074F PR MOST RECENT SYSTOLIC BLOOD PRESSURE < 130 MM HG: ICD-10-PCS | Mod: CPTII,S$GLB,, | Performed by: PHYSICIAN ASSISTANT

## 2022-08-11 PROCEDURE — 3008F BODY MASS INDEX DOCD: CPT | Mod: CPTII,S$GLB,, | Performed by: PHYSICIAN ASSISTANT

## 2022-08-11 PROCEDURE — 81003 URINALYSIS AUTO W/O SCOPE: CPT | Performed by: PHYSICIAN ASSISTANT

## 2022-08-11 PROCEDURE — 76700 US EXAM ABDOM COMPLETE: CPT | Mod: TC

## 2022-08-11 PROCEDURE — 1160F RVW MEDS BY RX/DR IN RCRD: CPT | Mod: CPTII,S$GLB,, | Performed by: PHYSICIAN ASSISTANT

## 2022-08-11 PROCEDURE — 87086 URINE CULTURE/COLONY COUNT: CPT | Performed by: PHYSICIAN ASSISTANT

## 2022-08-11 NOTE — PROGRESS NOTES
Subjective:       Patient ID: King Dempsey is a 37 y.o. female.        Chief Complaint: Flank Pain    King Dempsey is an established patient of Mayco Dodson MD here today for urgent care visit.    She is an x-ray tech in peds    Right side pain, not flank area, for a month, intermittent, dull, 3-4/10, seems to be occurring more frequently  Occurs off/on throughout the day, not necessarily with eating but not sure  Urination is completely normal  Now with nausea for the past week  No vomiting  2 episodes of uncontrollable diarrhea x 1 day, occurred 1 additional time, no blood in the stool or black tarry stool  No fever, chill, sweats, body aches  No chest pain or shortness of breath    HTN tx with amlodipine 10 mg 1/2 tablet daily, triamterene hct 37.5-25 mg 1/2 tablet daily, carvedilol 25 mg prn  BP Readings from Last 5 Encounters:  22 : 120/70  22 : 112/69  22 : 120/80  22 : 120/72  02/15/22 : 120/80     Depression and anxiety tx with fluoxetine 10 mg daily, mother  2 months ago, daughter doing better, 3 miscarriages in past 2 years         Review of Systems   Constitutional: Negative for chills, diaphoresis, fatigue and fever.   HENT: Negative for congestion and sore throat.    Eyes: Negative for visual disturbance.   Respiratory: Negative for cough, chest tightness and shortness of breath.    Cardiovascular: Negative for chest pain, palpitations and leg swelling.   Gastrointestinal: Positive for abdominal pain, diarrhea and nausea. Negative for blood in stool, constipation and vomiting.   Genitourinary: Negative for dysuria, frequency, hematuria and urgency.   Musculoskeletal: Negative for arthralgias and back pain.   Skin: Negative for rash.   Neurological: Negative for dizziness, syncope, weakness and headaches.   Psychiatric/Behavioral: Negative for dysphoric mood and sleep disturbance. The patient is not nervous/anxious.        Objective:      Physical Exam  Vitals  and nursing note reviewed.   Constitutional:       Appearance: Normal appearance. She is well-developed.   HENT:      Head: Normocephalic.      Right Ear: External ear normal.      Left Ear: External ear normal.   Eyes:      Pupils: Pupils are equal, round, and reactive to light.   Cardiovascular:      Rate and Rhythm: Normal rate and regular rhythm.      Heart sounds: Normal heart sounds. No murmur heard.    No friction rub. No gallop.   Pulmonary:      Effort: Pulmonary effort is normal. No respiratory distress.      Breath sounds: Normal breath sounds.   Abdominal:      Palpations: Abdomen is soft.      Tenderness: There is abdominal tenderness in the right upper quadrant.   Skin:     General: Skin is warm and dry.   Neurological:      Mental Status: She is alert.         Assessment:       1. Right upper quadrant pain    2. Nausea    3. Status post mitral valve annuloplasty    4. Hypertension, essential        Plan:       King was seen today for flank pain.    Diagnoses and all orders for this visit:    Right upper quadrant pain  -     CBC Auto Differential; Future  -     Comprehensive Metabolic Panel; Future  -     Urinalysis  -     Urine culture  -     Lipase; Future  -     US Abdomen Complete; Future    Nausea  -     CBC Auto Differential; Future  -     Comprehensive Metabolic Panel; Future  -     Lipase; Future  -     US Abdomen Complete; Future    Status post mitral valve annuloplasty    Hypertension, essential - stable and controlled  BP Readings from Last 5 Encounters:   08/11/22 120/70   06/29/22 112/69   06/27/22 120/80   03/28/22 120/72   02/15/22 120/80      Labs, urine, and US today  If unrevealing consider CT scan    Pt has been given instructions populated from [employer] database and has verbalized understanding of the after visit summary and information contained wherein.    Follow up with a primary care provider. May go to ER for acute shortness of breath, lightheadedness, fever, or any other  "emergent complaints or changes in condition.    "This note will be shared with the patient"    Future Appointments   Date Time Provider Department Center   8/11/2022  9:10 AM LAB, APPOINTMENT [facility] Misael Quigley PCW   8/11/2022  2:45 PM [facility] [id number] MASTER [facility]                 "

## 2022-08-11 NOTE — TELEPHONE ENCOUNTER
Please call patient  Let her know that the ultrasound was unrevealing and did not show any issues with the gallbladder  Let's proceed with ordering a CT scan for further evaluation  Please schedule

## 2022-08-11 NOTE — TELEPHONE ENCOUNTER
Called and discussed test results and recommendations with the pt. The pt expressed understanding.     Pt scheduled for CT

## 2022-08-12 ENCOUNTER — HOSPITAL ENCOUNTER (OUTPATIENT)
Dept: RADIOLOGY | Facility: HOSPITAL | Age: 37
Discharge: HOME | End: 2022-08-12
Attending: PHYSICIAN ASSISTANT
Payer: COMMERCIAL

## 2022-08-12 DIAGNOSIS — R11.0 NAUSEA: ICD-10-CM

## 2022-08-12 DIAGNOSIS — R10.11 RIGHT UPPER QUADRANT PAIN: ICD-10-CM

## 2022-08-12 LAB — BACTERIA UR CULT: NO GROWTH

## 2022-08-12 PROCEDURE — 25500020 PHARM REV CODE 255: Performed by: PHYSICIAN ASSISTANT

## 2022-08-12 PROCEDURE — 74177 CT ABD & PELVIS W/CONTRAST: CPT | Mod: 26,,, | Performed by: STUDENT IN AN ORGANIZED HEALTH CARE EDUCATION/TRAINING PROGRAM

## 2022-08-12 PROCEDURE — 74177 CT ABDOMEN PELVIS WITH CONTRAST: ICD-10-PCS | Mod: 26,,, | Performed by: STUDENT IN AN ORGANIZED HEALTH CARE EDUCATION/TRAINING PROGRAM

## 2022-08-12 PROCEDURE — 74177 CT ABD & PELVIS W/CONTRAST: CPT | Mod: TC

## 2022-08-12 RX ADMIN — IOHEXOL 100 ML: 350 INJECTION, SOLUTION INTRAVENOUS at 05:08

## 2022-08-30 ENCOUNTER — PATIENT MESSAGE (OUTPATIENT)
Dept: OBSTETRICS AND GYNECOLOGY | Facility: CLINIC | Age: 37
End: 2022-08-30
Payer: COMMERCIAL

## 2022-08-30 ENCOUNTER — TELEPHONE (OUTPATIENT)
Dept: OBSTETRICS AND GYNECOLOGY | Facility: CLINIC | Age: 37
End: 2022-08-30
Payer: COMMERCIAL

## 2022-08-30 DIAGNOSIS — Z34.90 PREGNANCY: Primary | ICD-10-CM

## 2022-08-30 NOTE — TELEPHONE ENCOUNTER
I will send the message to our OB coordinator to get you scheduled. Stacia  ===View-only below this line===      ----- Message -----       From:King Dempsey       Sent:8/30/2022  7:58 AM CDT         To:Mane Moreno MD    Subject:pregnant    Good morning, I took a pregnancy test last night because I have been more tired and nauseated and it came back positive.  I think I am only about 3 weeks because I took a UPT on august 11th prior to my CT scan and it was negative.  My LMP was the last week of July/early August.     Thanks  King

## 2022-09-29 ENCOUNTER — PATIENT MESSAGE (OUTPATIENT)
Dept: OBSTETRICS AND GYNECOLOGY | Facility: CLINIC | Age: 37
End: 2022-09-29

## 2022-09-29 ENCOUNTER — HOSPITAL ENCOUNTER (OUTPATIENT)
Dept: PERINATAL CARE | Facility: OTHER | Age: 37
Discharge: HOME | End: 2022-09-29
Attending: OBSTETRICS & GYNECOLOGY
Payer: COMMERCIAL

## 2022-09-29 ENCOUNTER — OFFICE VISIT (OUTPATIENT)
Dept: OBSTETRICS AND GYNECOLOGY | Facility: CLINIC | Age: 37
End: 2022-09-29
Attending: OBSTETRICS & GYNECOLOGY
Payer: COMMERCIAL

## 2022-09-29 VITALS
SYSTOLIC BLOOD PRESSURE: 112 MMHG | HEIGHT: 61 IN | WEIGHT: 189.63 LBS | DIASTOLIC BLOOD PRESSURE: 82 MMHG | BODY MASS INDEX: 35.8 KG/M2

## 2022-09-29 DIAGNOSIS — Z34.90 PREGNANCY: ICD-10-CM

## 2022-09-29 DIAGNOSIS — Z32.01 PREGNANCY TEST POSITIVE: ICD-10-CM

## 2022-09-29 DIAGNOSIS — I34.1: ICD-10-CM

## 2022-09-29 DIAGNOSIS — N91.4 SECONDARY OLIGOMENORRHEA: Primary | ICD-10-CM

## 2022-09-29 DIAGNOSIS — O09.521 MULTIGRAVIDA OF ADVANCED MATERNAL AGE IN FIRST TRIMESTER: ICD-10-CM

## 2022-09-29 DIAGNOSIS — O34.219 PREVIOUS CESAREAN DELIVERY AFFECTING PREGNANCY, ANTEPARTUM: ICD-10-CM

## 2022-09-29 DIAGNOSIS — O99.411: ICD-10-CM

## 2022-09-29 PROCEDURE — 76801 OB US < 14 WKS SINGLE FETUS: CPT

## 2022-09-29 PROCEDURE — 88175 CYTOPATH C/V AUTO FLUID REDO: CPT | Performed by: OBSTETRICS & GYNECOLOGY

## 2022-09-29 PROCEDURE — 3074F PR MOST RECENT SYSTOLIC BLOOD PRESSURE < 130 MM HG: ICD-10-PCS | Mod: CPTII,S$GLB,, | Performed by: OBSTETRICS & GYNECOLOGY

## 2022-09-29 PROCEDURE — 3074F SYST BP LT 130 MM HG: CPT | Mod: CPTII,S$GLB,, | Performed by: OBSTETRICS & GYNECOLOGY

## 2022-09-29 PROCEDURE — 3008F PR BODY MASS INDEX (BMI) DOCUMENTED: ICD-10-PCS | Mod: CPTII,S$GLB,, | Performed by: OBSTETRICS & GYNECOLOGY

## 2022-09-29 PROCEDURE — 3079F PR MOST RECENT DIASTOLIC BLOOD PRESSURE 80-89 MM HG: ICD-10-PCS | Mod: CPTII,S$GLB,, | Performed by: OBSTETRICS & GYNECOLOGY

## 2022-09-29 PROCEDURE — 87591 N.GONORRHOEAE DNA AMP PROB: CPT | Performed by: OBSTETRICS & GYNECOLOGY

## 2022-09-29 PROCEDURE — 3079F DIAST BP 80-89 MM HG: CPT | Mod: CPTII,S$GLB,, | Performed by: OBSTETRICS & GYNECOLOGY

## 2022-09-29 PROCEDURE — 99999 PR PBB SHADOW E&M-EST. PATIENT-LVL III: ICD-10-PCS | Mod: PBBFAC,,, | Performed by: OBSTETRICS & GYNECOLOGY

## 2022-09-29 PROCEDURE — 99214 OFFICE O/P EST MOD 30 MIN: CPT | Mod: S$GLB,,, | Performed by: OBSTETRICS & GYNECOLOGY

## 2022-09-29 PROCEDURE — 99214 PR OFFICE/OUTPT VISIT, EST, LEVL IV, 30-39 MIN: ICD-10-PCS | Mod: S$GLB,,, | Performed by: OBSTETRICS & GYNECOLOGY

## 2022-09-29 PROCEDURE — 99999 PR PBB SHADOW E&M-EST. PATIENT-LVL III: CPT | Mod: PBBFAC,,, | Performed by: OBSTETRICS & GYNECOLOGY

## 2022-09-29 PROCEDURE — 87491 CHLMYD TRACH DNA AMP PROBE: CPT | Performed by: OBSTETRICS & GYNECOLOGY

## 2022-09-29 PROCEDURE — 76801 US OB/GYN PROCEDURE (VIEWPOINT): ICD-10-PCS | Mod: 26,,, | Performed by: OBSTETRICS & GYNECOLOGY

## 2022-09-29 PROCEDURE — 87624 HPV HI-RISK TYP POOLED RSLT: CPT | Performed by: OBSTETRICS & GYNECOLOGY

## 2022-09-29 PROCEDURE — 3008F BODY MASS INDEX DOCD: CPT | Mod: CPTII,S$GLB,, | Performed by: OBSTETRICS & GYNECOLOGY

## 2022-09-29 PROCEDURE — 87086 URINE CULTURE/COLONY COUNT: CPT | Performed by: OBSTETRICS & GYNECOLOGY

## 2022-09-29 PROCEDURE — 76801 OB US < 14 WKS SINGLE FETUS: CPT | Mod: 26,,, | Performed by: OBSTETRICS & GYNECOLOGY

## 2022-09-29 NOTE — PROGRESS NOTES
Chief Complaint   Patient presents with    Oligomenorrhea       HPI:  King Botello is a 37 y.o. year old  female who presents today reporting no menses for the past 8 weeks with a positive home UPT.  She reports fatigue and nausea, but no vomiting.  No bleeding.  No pain.  S/P C/S  with Dr. Carter.  She has a history of MVP with repair.  Patient's last menstrual period was 2022 (exact date).    Past Medical History:   Diagnosis Date    Abnormal Pap smear     Colposcopy    Bradycardia 2020    Class 2 obesity due to excess calories with body mass index (BMI) of 35.0 to 35.9 in adult 3/9/2021    Hypertension, essential 3/16/2021    Menarche     Age of onset 11    Missed ab 2021    Mitral valve prolapse     Previous  delivery affecting pregnancy, antepartum 2020    S/P suction D&C 2021    Status post mitral valve annuloplasty 2017       Past Surgical History:   Procedure Laterality Date     SECTION      DILATION AND CURETTAGE OF UTERUS USING SUCTION N/A 2021    Procedure: DILATION AND CURETTAGE, UTERUS, USING SUCTION;  Surgeon: Mane Moreno MD;  Location: [facility] OR;  Service: OB/GYN;  Laterality: N/A;    MITRAL VALVE REPAIR         OB History          3    Para   1    Term   1            AB   1    Living   1         SAB   1    IAB        Ectopic        Multiple        Live Births   1                 ROS:  GENERAL: Reports fatigue.   SKIN: Denies rash or lesions.   HEAD: Denies head injury or headache.   NODES: Denies enlarged lymph nodes.   CHEST: Denies chest pain or shortness of breath.   CARDIOVASCULAR: Denies palpitations or left sided chest pain.   ABDOMEN: Reports nausea, but no vomiting.  No abdominal pain or rectal bleeding.   URINARY: No dysuria or hematuria.  REPRODUCTIVE: See HPI.   BREASTS: Denies pain, lumps, or nipple discharge.   HEMATOLOGIC: No easy bruisability or excessive bleeding.   MUSCULOSKELETAL: Denies joint pain  or swelling.   NEUROLOGIC: Denies syncope or weakness.   PSYCHIATRIC: Denies depression.    PE:  (chaperone present during entire exam)  APPEARANCE: Well nourished, well developed, in no acute distress.  BREASTS: Symmetrical, no skin changes or visible lesions. No palpable masses, nipple discharge or adenopathy bilaterally.  ABDOMEN: Flat. Soft. No tenderness or masses. No CVA tenderness.  VULVA: No lesions. Normal female genitalia.  URETHRAL MEATUS: Normal size and location, no lesions, no prolapse.  URETHRA: No masses, tenderness, prolapse or scarring.  VAGINA: Moist and well rugated, no abnormal discharge, no significant cystocele or rectocele.  CERVIX: No lesions and discharge. Closed. PAP done.  UTERUS: 8 week size, non-tender, bladder base nontender.  ADNEXA: No masses, tenderness or CDS nodularity.  ANUS PERINEUM: Normal.    UPT: positive    Diagnosis:  1. Secondary oligomenorrhea    2. Pregnancy test positive    3. Multigravida of advanced maternal age in first trimester    4. Previous  delivery affecting pregnancy, antepartum    5. Maternal mitral valve prolapse complicating pregnancy in first trimester    LMP 22 at 8.3 weeks, EDC 23    PLAN:    Orders Placed This Encounter    Urine culture    C. trachomatis/N. gonorrhoeae by AMP DNA [facility]; Cervix    HPV High Risk Genotypes, PCR    Hepatitis C Antibody    HIV 1/2 Ag/Ab (4th Gen)    RPR    Hepatitis B surface antigen    Rubella antibody, IgG    CBC auto differential    Basic metabolic panel    Ambulatory referral/consult to Perinatology    Type & Screen    Liquid-Based Pap Smear, Screening    US MFM Procedure ([facility])       Patient was counseled today on pregnancy: T1 talk.  IOB labs, sono.  We discussed AMA and increased risks, including testing options.  She is leaning towards [id number].  We reviewed her prior history of C/S and options of  vs repeat C/S - she desires another C/S.  We discussed her history of a repaired MVP - will  arrange MFM consult    Follow-up in 4 weeks.

## 2022-09-30 ENCOUNTER — PATIENT MESSAGE (OUTPATIENT)
Dept: OBSTETRICS AND GYNECOLOGY | Facility: CLINIC | Age: 37
End: 2022-09-30
Payer: COMMERCIAL

## 2022-09-30 ENCOUNTER — PATIENT MESSAGE (OUTPATIENT)
Dept: CARDIOLOGY | Facility: CLINIC | Age: 37
End: 2022-09-30
Payer: COMMERCIAL

## 2022-09-30 LAB
C TRACH DNA SPEC QL NAA+PROBE: NOT DETECTED
N GONORRHOEA DNA SPEC QL NAA+PROBE: NOT DETECTED

## 2022-10-01 LAB — BACTERIA UR CULT: NO GROWTH

## 2022-10-06 ENCOUNTER — PATIENT MESSAGE (OUTPATIENT)
Dept: OBSTETRICS AND GYNECOLOGY | Facility: CLINIC | Age: 37
End: 2022-10-06
Payer: COMMERCIAL

## 2022-10-06 LAB
FINAL PATHOLOGIC DIAGNOSIS: NORMAL
HPV HR 12 DNA SPEC QL NAA+PROBE: NEGATIVE
HPV16 AG SPEC QL: NEGATIVE
HPV18 DNA SPEC QL NAA+PROBE: NEGATIVE
Lab: NORMAL

## 2022-10-06 RX ORDER — LABETALOL 100 MG/1
100 TABLET, FILM COATED ORAL 2 TIMES DAILY
Qty: 180 TABLET | Refills: 3 | Status: ON HOLD | OUTPATIENT
Start: 2022-10-06 | End: 2023-04-28 | Stop reason: HOSPADM

## 2022-10-06 RX ORDER — LABETALOL 100 MG/1
100 TABLET, FILM COATED ORAL 2 TIMES DAILY
COMMUNITY
End: 2022-10-06 | Stop reason: SDUPTHER

## 2022-10-11 ENCOUNTER — PATIENT MESSAGE (OUTPATIENT)
Dept: MATERNAL FETAL MEDICINE | Facility: CLINIC | Age: 37
End: 2022-10-11
Payer: COMMERCIAL

## 2022-10-11 ENCOUNTER — PATIENT MESSAGE (OUTPATIENT)
Dept: OBSTETRICS AND GYNECOLOGY | Facility: CLINIC | Age: 37
End: 2022-10-11
Payer: COMMERCIAL

## 2022-10-21 ENCOUNTER — TELEPHONE (OUTPATIENT)
Dept: OBSTETRICS AND GYNECOLOGY | Facility: CLINIC | Age: 37
End: 2022-10-21
Payer: COMMERCIAL

## 2022-10-27 ENCOUNTER — PATIENT MESSAGE (OUTPATIENT)
Dept: ADMINISTRATIVE | Facility: OTHER | Age: 37
End: 2022-10-27
Payer: COMMERCIAL

## 2022-10-27 ENCOUNTER — INITIAL PRENATAL (OUTPATIENT)
Dept: OBSTETRICS AND GYNECOLOGY | Facility: CLINIC | Age: 37
End: 2022-10-27
Attending: OBSTETRICS & GYNECOLOGY
Payer: COMMERCIAL

## 2022-10-27 VITALS — DIASTOLIC BLOOD PRESSURE: 76 MMHG | BODY MASS INDEX: 35.7 KG/M2 | WEIGHT: 188.94 LBS | SYSTOLIC BLOOD PRESSURE: 108 MMHG

## 2022-10-27 DIAGNOSIS — Z3A.12 PREGNANCY WITH 12 COMPLETED WEEKS GESTATION: ICD-10-CM

## 2022-10-27 DIAGNOSIS — O09.521 MULTIGRAVIDA OF ADVANCED MATERNAL AGE IN FIRST TRIMESTER: Primary | ICD-10-CM

## 2022-10-27 DIAGNOSIS — O34.219 PREVIOUS CESAREAN DELIVERY AFFECTING PREGNANCY, ANTEPARTUM: ICD-10-CM

## 2022-10-27 PROCEDURE — 0500F INITIAL PRENATAL CARE VISIT: CPT | Mod: CPTII,S$GLB,, | Performed by: OBSTETRICS & GYNECOLOGY

## 2022-10-27 PROCEDURE — 99999 PR PBB SHADOW E&M-EST. PATIENT-LVL III: ICD-10-PCS | Mod: PBBFAC,,, | Performed by: OBSTETRICS & GYNECOLOGY

## 2022-10-27 PROCEDURE — 99999 PR PBB SHADOW E&M-EST. PATIENT-LVL III: CPT | Mod: PBBFAC,,, | Performed by: OBSTETRICS & GYNECOLOGY

## 2022-10-27 PROCEDURE — 0500F PR INITIAL PRENATAL CARE VISIT: ICD-10-PCS | Mod: CPTII,S$GLB,, | Performed by: OBSTETRICS & GYNECOLOGY

## 2022-10-27 NOTE — PROGRESS NOTES
IOB visit.  Still with fatigue and some nausea.  No bleeding or cramping.  +FHTs with doppler.  Reviewed IOB labs.  History of hypertension of Norvasc with normal BP.  Prior C/S x 1, for repeat C/S.  Aware of negative [id number], girl.  [facility] sono 11/23/22.  Connected Mom ordered.  Return 4 weeks.

## 2022-11-18 ENCOUNTER — PATIENT MESSAGE (OUTPATIENT)
Dept: MATERNAL FETAL MEDICINE | Facility: CLINIC | Age: 37
End: 2022-11-18
Payer: COMMERCIAL

## 2022-11-23 ENCOUNTER — PROCEDURE VISIT (OUTPATIENT)
Dept: MATERNAL FETAL MEDICINE | Facility: CLINIC | Age: 37
End: 2022-11-23
Payer: COMMERCIAL

## 2022-11-23 ENCOUNTER — OFFICE VISIT (OUTPATIENT)
Dept: MATERNAL FETAL MEDICINE | Facility: CLINIC | Age: 37
End: 2022-11-23
Attending: OBSTETRICS & GYNECOLOGY
Payer: COMMERCIAL

## 2022-11-23 VITALS
DIASTOLIC BLOOD PRESSURE: 78 MMHG | HEIGHT: 61 IN | WEIGHT: 192 LBS | BODY MASS INDEX: 36.25 KG/M2 | SYSTOLIC BLOOD PRESSURE: 118 MMHG

## 2022-11-23 DIAGNOSIS — O99.210 OBESITY IN PREGNANCY: ICD-10-CM

## 2022-11-23 DIAGNOSIS — O09.521 MULTIGRAVIDA OF ADVANCED MATERNAL AGE IN FIRST TRIMESTER: Primary | ICD-10-CM

## 2022-11-23 DIAGNOSIS — Z98.890 STATUS POST MITRAL VALVE ANNULOPLASTY: ICD-10-CM

## 2022-11-23 DIAGNOSIS — O10.912 PRE-EXISTING HYPERTENSION DURING PREGNANCY IN SECOND TRIMESTER, UNSPECIFIED PRE-EXISTING HYPERTENSION TYPE: ICD-10-CM

## 2022-11-23 DIAGNOSIS — O34.219 PREVIOUS CESAREAN DELIVERY AFFECTING PREGNANCY, ANTEPARTUM: ICD-10-CM

## 2022-11-23 DIAGNOSIS — I34.1: ICD-10-CM

## 2022-11-23 DIAGNOSIS — O10.919 CHRONIC HYPERTENSION AFFECTING PREGNANCY: ICD-10-CM

## 2022-11-23 DIAGNOSIS — Z3A.16 16 WEEKS GESTATION OF PREGNANCY: Primary | ICD-10-CM

## 2022-11-23 DIAGNOSIS — O09.522 MULTIGRAVIDA OF ADVANCED MATERNAL AGE IN SECOND TRIMESTER: ICD-10-CM

## 2022-11-23 DIAGNOSIS — O99.411: ICD-10-CM

## 2022-11-23 DIAGNOSIS — O09.521 MULTIGRAVIDA OF ADVANCED MATERNAL AGE IN FIRST TRIMESTER: ICD-10-CM

## 2022-11-23 PROCEDURE — 99999 PR PBB SHADOW E&M-EST. PATIENT-LVL III: CPT | Mod: PBBFAC,,, | Performed by: OBSTETRICS & GYNECOLOGY

## 2022-11-23 PROCEDURE — 99999 PR PBB SHADOW E&M-EST. PATIENT-LVL III: ICD-10-PCS | Mod: PBBFAC,,, | Performed by: OBSTETRICS & GYNECOLOGY

## 2022-11-23 PROCEDURE — 3078F DIAST BP <80 MM HG: CPT | Mod: CPTII,S$GLB,, | Performed by: OBSTETRICS & GYNECOLOGY

## 2022-11-23 PROCEDURE — 3078F PR MOST RECENT DIASTOLIC BLOOD PRESSURE < 80 MM HG: ICD-10-PCS | Mod: CPTII,S$GLB,, | Performed by: OBSTETRICS & GYNECOLOGY

## 2022-11-23 PROCEDURE — 99215 OFFICE O/P EST HI 40 MIN: CPT | Mod: 25,S$GLB,, | Performed by: OBSTETRICS & GYNECOLOGY

## 2022-11-23 PROCEDURE — 3074F PR MOST RECENT SYSTOLIC BLOOD PRESSURE < 130 MM HG: ICD-10-PCS | Mod: CPTII,S$GLB,, | Performed by: OBSTETRICS & GYNECOLOGY

## 2022-11-23 PROCEDURE — 3074F SYST BP LT 130 MM HG: CPT | Mod: CPTII,S$GLB,, | Performed by: OBSTETRICS & GYNECOLOGY

## 2022-11-23 PROCEDURE — 99215 PR OFFICE/OUTPT VISIT, EST, LEVL V, 40-54 MIN: ICD-10-PCS | Mod: 25,S$GLB,, | Performed by: OBSTETRICS & GYNECOLOGY

## 2022-11-23 PROCEDURE — 3008F BODY MASS INDEX DOCD: CPT | Mod: CPTII,S$GLB,, | Performed by: OBSTETRICS & GYNECOLOGY

## 2022-11-23 PROCEDURE — 76815 OB US LIMITED FETUS(S): CPT | Mod: S$GLB,,, | Performed by: OBSTETRICS & GYNECOLOGY

## 2022-11-23 PROCEDURE — 76815 PR  US,PREGNANT UTERUS,LIMITED, 1/> FETUSES: ICD-10-PCS | Mod: S$GLB,,, | Performed by: OBSTETRICS & GYNECOLOGY

## 2022-11-23 PROCEDURE — 3008F PR BODY MASS INDEX (BMI) DOCUMENTED: ICD-10-PCS | Mod: CPTII,S$GLB,, | Performed by: OBSTETRICS & GYNECOLOGY

## 2022-11-25 ENCOUNTER — ROUTINE PRENATAL (OUTPATIENT)
Dept: OBSTETRICS AND GYNECOLOGY | Facility: CLINIC | Age: 37
End: 2022-11-25
Attending: OBSTETRICS & GYNECOLOGY
Payer: COMMERCIAL

## 2022-11-25 VITALS
SYSTOLIC BLOOD PRESSURE: 122 MMHG | WEIGHT: 191.81 LBS | DIASTOLIC BLOOD PRESSURE: 80 MMHG | BODY MASS INDEX: 36.24 KG/M2

## 2022-11-25 DIAGNOSIS — O34.219 PREVIOUS CESAREAN DELIVERY AFFECTING PREGNANCY, ANTEPARTUM: ICD-10-CM

## 2022-11-25 DIAGNOSIS — O10.912 PRE-EXISTING HYPERTENSION AFFECTING PREGNANCY IN SECOND TRIMESTER: ICD-10-CM

## 2022-11-25 DIAGNOSIS — O09.521 MULTIGRAVIDA OF ADVANCED MATERNAL AGE IN FIRST TRIMESTER: Primary | ICD-10-CM

## 2022-11-25 DIAGNOSIS — O09.522 MULTIGRAVIDA OF ADVANCED MATERNAL AGE IN SECOND TRIMESTER: ICD-10-CM

## 2022-11-25 LAB
CREAT UR-MCNC: 47 MG/DL (ref 15–325)
PROT UR-MCNC: <7 MG/DL (ref 0–15)
PROT/CREAT UR: NORMAL MG/G{CREAT} (ref 0–0.2)

## 2022-11-25 PROCEDURE — 0502F PR SUBSEQUENT PRENATAL CARE: ICD-10-PCS | Mod: CPTII,S$GLB,, | Performed by: OBSTETRICS & GYNECOLOGY

## 2022-11-25 PROCEDURE — 99999 PR PBB SHADOW E&M-EST. PATIENT-LVL II: CPT | Mod: PBBFAC,,, | Performed by: OBSTETRICS & GYNECOLOGY

## 2022-11-25 PROCEDURE — 84156 ASSAY OF PROTEIN URINE: CPT | Performed by: OBSTETRICS & GYNECOLOGY

## 2022-11-25 PROCEDURE — 0502F SUBSEQUENT PRENATAL CARE: CPT | Mod: CPTII,S$GLB,, | Performed by: OBSTETRICS & GYNECOLOGY

## 2022-11-25 PROCEDURE — 99999 PR PBB SHADOW E&M-EST. PATIENT-LVL II: ICD-10-PCS | Mod: PBBFAC,,, | Performed by: OBSTETRICS & GYNECOLOGY

## 2022-11-25 RX ORDER — ASPIRIN 81 MG/1
81 TABLET ORAL DAILY
Refills: 0 | Status: ON HOLD
Start: 2022-11-25 | End: 2023-04-28 | Stop reason: HOSPADM

## 2022-11-25 NOTE — PROGRESS NOTES
Feels better with more energy.  No bleeding or cramping.  +FHTs with doppler.  CHTN with BP well controlled on Labetalol.  Taking baby aspirin daily.  [facility] sono 12/14/22.  Check p/c ratio, CMP. Return 4 weeks.

## 2022-11-29 ENCOUNTER — PATIENT MESSAGE (OUTPATIENT)
Dept: OBSTETRICS AND GYNECOLOGY | Facility: CLINIC | Age: 37
End: 2022-11-29
Payer: COMMERCIAL

## 2022-11-29 ENCOUNTER — TELEPHONE (OUTPATIENT)
Dept: OBSTETRICS AND GYNECOLOGY | Facility: CLINIC | Age: 37
End: 2022-11-29
Payer: COMMERCIAL

## 2022-11-29 ENCOUNTER — HOSPITAL ENCOUNTER (EMERGENCY)
Facility: OTHER | Age: 37
Discharge: HOME | End: 2022-11-29
Attending: OBSTETRICS & GYNECOLOGY
Payer: COMMERCIAL

## 2022-11-29 VITALS
TEMPERATURE: 98 F | HEART RATE: 96 BPM | DIASTOLIC BLOOD PRESSURE: 66 MMHG | OXYGEN SATURATION: 99 % | RESPIRATION RATE: 18 BRPM | SYSTOLIC BLOOD PRESSURE: 112 MMHG
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
## 2022-11-29 DIAGNOSIS — Z3A.17 17 WEEKS GESTATION OF PREGNANCY: ICD-10-CM

## 2022-11-29 PROCEDURE — 99283 PR EMERGENCY DEPT VISIT,LEVEL III: ICD-10-PCS | Mod: ,,, | Performed by: OBSTETRICS & GYNECOLOGY

## 2022-11-29 PROCEDURE — 99284 EMERGENCY DEPT VISIT MOD MDM: CPT | Mod: 25

## 2022-11-29 PROCEDURE — 99283 EMERGENCY DEPT VISIT LOW MDM: CPT | Mod: ,,, | Performed by: OBSTETRICS & GYNECOLOGY

## 2022-11-29 NOTE — ED PROVIDER NOTES
Encounter Date: 2022       History     Chief Complaint   Patient presents with    Fall     At 1400 today. Did not hit belly.     HPI  King Botello is a 37 y.o. [id number] at 17w2d presents after tripping and falling onto hands and knees at 1400. She did not hit her abdomen or head. Patient denies VB, LOF, contractions. Otherwise asymptomatic.    This IUP is complicated by AMA, h/o CS x1 (desires repeat CS), CHTN (labetalol), h/o MVP (s/p repair), obesity (BMI 36).      Review of patient's allergies indicates:   Allergen Reactions    Doxycycline      Abdomen pain severe    Tetanus vaccines and toxoid Rash     Past Medical History:   Diagnosis Date    Abnormal Pap smear     Colposcopy    Bradycardia 2020    Class 2 obesity due to excess calories with body mass index (BMI) of 35.0 to 35.9 in adult 3/9/2021    Hypertension, essential 3/16/2021    Menarche     Age of onset 11    Missed ab 2021    Mitral valve prolapse     Previous  delivery affecting pregnancy, antepartum 2020    S/P suction D&C 2021    Status post mitral valve annuloplasty 2017     Past Surgical History:   Procedure Laterality Date     SECTION      DILATION AND CURETTAGE OF UTERUS USING SUCTION N/A 2021    Procedure: DILATION AND CURETTAGE, UTERUS, USING SUCTION;  Surgeon: Mane Moreno MD;  Location: [facility];  Service: OB/GYN;  Laterality: N/A;    MITRAL VALVE REPAIR       Family History   Problem Relation Age of Onset    Heart disease Mother     Hypertension Mother     Hyperlipidemia Mother     Diabetes Mother     Cancer Mother     Hyperlipidemia Father     Hypertension Father     Breast cancer Paternal Aunt     Colon cancer Neg Hx     Ovarian cancer Neg Hx      Social History     Tobacco Use    Smoking status: Never    Smokeless tobacco: Never   Substance Use Topics    Alcohol use: Not Currently     Comment: not since +UPT    Drug use: No     Review of Systems   Constitutional:  Negative for  chills and fever.   Respiratory:  Negative for cough and shortness of breath.    Cardiovascular:  Negative for chest pain.   Gastrointestinal:  Negative for abdominal pain, diarrhea, nausea and vomiting.   Genitourinary:  Negative for difficulty urinating, pelvic pain, vaginal bleeding and vaginal discharge.   Musculoskeletal:  Negative for myalgias.   Skin:  Negative for wound.   Neurological:  Negative for dizziness and headaches.   Psychiatric/Behavioral:  Negative for confusion.      Physical Exam     Initial Vitals [22 1725]   BP Pulse Resp Temp SpO2   112/66 96 18 98.2 °F (36.8 °C) 99 %      MAP       --         Physical Exam    Constitutional: She appears well-developed and well-nourished. She is not diaphoretic. No distress.   HENT:   Head: Normocephalic and atraumatic.   No head bruising or injuries.   Eyes: EOM are normal.   Neck:   Normal range of motion.  Cardiovascular:  Normal rate.           Pulmonary/Chest: No respiratory distress.   Abdominal: Abdomen is soft. There is no abdominal tenderness.   Gravid uterus. No abdominal bruising or trauma noted.    Musculoskeletal:         General: Normal range of motion.      Cervical back: Normal range of motion.      Comments: Mild bilateral knee bruising, mildly tender to palpation, normal ROM, no concern for fractures. Right thumb distal MCP mild bruising, tender to palpation, mild swelling, normal ROM, no concern for fracture.      Neurological: She is alert and oriented to person, place, and time.   Skin: Skin is warm and dry.   Psychiatric: She has a normal mood and affect. Her behavior is normal. Judgment and thought content normal.       ED Course   Procedures  Labs Reviewed - No data to display       Imaging Results    None          Medications - No data to display  Medical Decision Making:    at 17w2d presenting after fall, no abdominal trauma  VSS, no acute distress  No vaginal bleeding or LOF  No abdominal bruising on exam  Patient  given ice for bruised thumb/knees  FHT 157bpm  [place] without contractions  Reassurance provided to patient  Patient discharged in stable condition  Strict ED return precautions discussed with patient  All questions answered      Cata Tong MD   OB/GYN PGY1  [facility]            Attending Attestation:   Physician Attestation Statement for Resident:  As the supervising MD   Physician Attestation Statement: I have personally seen and examined this patient.   I agree with the above history.  -:   As the supervising MD I agree with the above PE.     As the supervising MD I agree with the above treatment, course, plan, and disposition.   I was personally present during the critical portions of the procedure(s) performed by the resident and was immediately available in the ED to provide services and assistance as needed during the entire procedure.                             Clinical Impression:   Final diagnoses:  [W19.XXXA] Fall, initial encounter (Primary)  [Z3A.17] 17 weeks gestation of pregnancy             Cata Tong MD  Resident  11/29/22 [id number]       Angelica Patten MD  11/29/22 [id number]

## 2022-11-29 NOTE — TELEPHONE ENCOUNTER
Patient states she tripped over a patient's foot and fell today. Patient denies bleeding or cramping. Patient does not think she hit her abdomen mostly fell on her knees. Patient still is very concerned and wanted to let Dr Moreno know to see if he could advise her.

## 2022-11-29 NOTE — TELEPHONE ENCOUNTER
Called patient:    Pregnancy at 17.2 weeks  Reports tripping and falling to floor onto hands / knees.  Did not hit her abdomen but was jolted.  Denies bleeding and cramping / CTX.    She is concerned and would like evaluation.    REC:  To ALINE

## 2022-11-29 NOTE — DISCHARGE INSTRUCTIONS
Call clinic [phone] or L & D after hours at [phone] for vaginal bleeding, leakage of fluids, regular contractions every 5 mins for 2 hours, decreased fetal movements ( 10 kicks in 2 hours), headache not relieved by Tylenol, blurry vision, or temp of 100.4 or greater.  Begin doing fetal kick counts, at least 10 movements in 2 hours starting at 28 weeks gestation.  Keep next clinic appointment

## 2022-11-29 NOTE — TELEPHONE ENCOUNTER
----- Message from Reggie Cobb sent at 11/29/2022  3:40 PM CST -----  PLEASE CALL  17 WKS OB PT SHE FALL TODAY [phone]

## 2022-11-30 ENCOUNTER — OFFICE VISIT (OUTPATIENT)
Dept: URGENT CARE | Facility: CLINIC | Age: 37
End: 2022-11-30
Payer: COMMERCIAL

## 2022-11-30 VITALS
RESPIRATION RATE: 18 BRPM | HEART RATE: 89 BPM | DIASTOLIC BLOOD PRESSURE: 70 MMHG | TEMPERATURE: 99 F | BODY MASS INDEX: 36.25 KG/M2 | WEIGHT: 192 LBS | SYSTOLIC BLOOD PRESSURE: 102 MMHG | OXYGEN SATURATION: 98 % | HEIGHT: 61 IN

## 2022-11-30 DIAGNOSIS — S80.00XA CONTUSION OF KNEE, UNSPECIFIED LATERALITY, INITIAL ENCOUNTER: Primary | ICD-10-CM

## 2022-11-30 DIAGNOSIS — S60.211A CONTUSION OF RIGHT WRIST, INITIAL ENCOUNTER: ICD-10-CM

## 2022-11-30 DIAGNOSIS — Z34.90 PREGNANCY, UNSPECIFIED GESTATIONAL AGE: ICD-10-CM
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
## 2022-11-30 LAB
CTP QC/QA: YES
POC 10 PANEL DRUG SCREEN: NEGATIVE

## 2022-11-30 PROCEDURE — 80305 DRUG TEST PRSMV DIR OPT OBS: CPT | Mod: S$GLB,,, | Performed by: EMERGENCY MEDICINE

## 2022-11-30 PROCEDURE — 80305 POCT RAPID DRUG SCREEN 10 PANEL: ICD-10-PCS | Mod: S$GLB,,, | Performed by: EMERGENCY MEDICINE

## 2022-11-30 PROCEDURE — 99213 PR OFFICE/OUTPT VISIT, EST, LEVL III, 20-29 MIN: ICD-10-PCS | Mod: S$GLB,,, | Performed by: EMERGENCY MEDICINE

## 2022-11-30 PROCEDURE — 99213 OFFICE O/P EST LOW 20 MIN: CPT | Mod: S$GLB,,, | Performed by: EMERGENCY MEDICINE

## 2022-11-30 NOTE — LETTER
[facility]  [address] [address]  Phone: [phone]  Fax: [phone]  [facility] [employer] [facility]: [email or web address]    Pt Name: King Botello  Injury Date: 11/29/2022   Employee ID: [id number] Date of First Treatment: 11/30/2022   Company: [employer]      Appointment Time: 08:10 AM Arrived: 8:24 AM    Provider: Angus Wiley MD Time Out: 9:31 AM      Office Treatment:   1. Contusion of knee, unspecified laterality, initial encounter    2. Contusion of right wrist, initial encounter    3. Fall, initial encounter    4. Pregnancy, unspecified gestational age          Patient Instructions: Attention not to aggravate affected area (FOLLOW UP WITH OBGYN AS PLANNED)      Restrictions: Regular Duty, Discharged from Occupational Health     Return as needed. JOHN

## 2022-11-30 NOTE — PROGRESS NOTES
Subjective:       Patient ID: King Botello is a 37 y.o. female.    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 While rushing to take x-rays of a 2yr old , she tripped over the child's Mothers foot and fell to the floor on her knees. She went to her OB's ER to be evaluated - high risk pregnancy protocol. Pain score today 5/10 with complaints of intermittent Throbbing/Aching pain with standing/walking, Tenderness to the Touch, bruising, Scrape. 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  She is 17 and half weeks pregnant and has already been seen evaluated with took a dynamic monitoring by her high risk OB Dr. Moreno.  She took Tylenol and feels fine with no pain and normal range of motion of bilateral hands wrists and elbows as well as feet ankle and knees.  No swelling no bruising and no symptoms.  She states she was sore a little bit yesterday however feels much improved today. Will allow to work regular duty without restrictions.    Fall  Pertinent negatives include no abdominal pain, fever, hematuria, nausea, numbness or vomiting.   ROS    Constitution: Negative for chills, fatigue and fever.   HENT:  Negative for ear pain, sinus pain and sore throat.    Neck: Negative for neck pain and neck stiffness.   Cardiovascular:  Negative for chest pain, palpitations and sob on exertion.   Eyes:  Negative for eye pain and vision loss.   Respiratory:  Negative for cough, shortness of breath and asthma.    Gastrointestinal:  Negative for abdominal pain, nausea, vomiting and diarrhea.   Genitourinary:  Negative for dysuria, frequency and hematuria.   Musculoskeletal:  Positive for pain, trauma, joint pain, joint swelling and muscle ache. Negative for abnormal ROM of joint, back pain and muscle  cramps.   Skin:  Positive for abrasion and bruising. Negative for rash and wound.   Allergic/Immunologic: Negative for seasonal allergies and asthma.   Neurological:  Negative for dizziness, light-headedness, altered mental status, numbness and tingling.   Psychiatric/Behavioral:  Negative for altered mental status and confusion.       Objective:      Physical Exam  Vitals and nursing note reviewed.   Constitutional:       General: She is not in acute distress.     Appearance: Normal appearance. She is well-developed. She is not ill-appearing, toxic-appearing or diaphoretic.   HENT:      Head: Normocephalic and atraumatic.      Jaw: No trismus.      Right Ear: Hearing, tympanic membrane, ear canal and external ear normal.      Left Ear: Hearing, tympanic membrane, ear canal and external ear normal.      Nose: Nose normal. No nasal deformity, mucosal edema or rhinorrhea.      Right Sinus: No maxillary sinus tenderness or frontal sinus tenderness.      Left Sinus: No maxillary sinus tenderness or frontal sinus tenderness.      Mouth/Throat:      Dentition: Normal dentition.      Pharynx: Uvula midline. No posterior oropharyngeal erythema or uvula swelling.   Eyes:      General: Lids are normal. No scleral icterus.        Right eye: No discharge.         Left eye: No discharge.      Conjunctiva/sclera: Conjunctivae normal.      Comments: Sclera clear bilat   Neck:      Trachea: Trachea and phonation normal.   Cardiovascular:      Rate and Rhythm: Normal rate and regular rhythm.      Pulses: Normal pulses.      Heart sounds: Normal heart sounds.   Pulmonary:      Effort: Pulmonary effort is normal. No respiratory distress.      Breath sounds: Normal breath sounds.   Abdominal:      General: Bowel sounds are normal. There is no distension.      Palpations: Abdomen is soft. There is no mass or pulsatile mass.      Tenderness: There is no abdominal tenderness.      Comments: PREGNANT, NO TENDERNESS   Musculoskeletal:          General: No deformity. Normal range of motion.      Cervical back: Full passive range of motion without pain, normal range of motion and neck supple.      Comments: NORMAL RANGE OF MOTION OF BILATERAL HANDS WRISTS AND KNEES AND LOWER EXTREMITIES.  NO SWELLING NO BRUISING NOTED.   Skin:     General: Skin is warm and dry.      Coloration: Skin is not pale.   Neurological:      Mental Status: She is alert and oriented to person, place, and time.      Motor: No abnormal muscle tone.      Coordination: Coordination normal.   Psychiatric:         Speech: Speech normal.         Behavior: Behavior normal. Behavior is cooperative.         Thought Content: Thought content normal.         Judgment: Judgment normal.       Assessment:       1. Contusion of knee, unspecified laterality, initial encounter    2. Contusion of right wrist, initial encounter    3. Fall, initial encounter    4. 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  She is 17 and half weeks pregnant and has already been seen evaluated with took a dynamic monitoring by her high risk OB Dr. Moreno.  She took Tylenol and feels fine with no pain and normal range of motion of bilateral hands wrists and elbows as well as feet ankle and knees.  No swelling no bruising and no symptoms.  She states she was sore a little bit yesterday however feels much improved today. Will allow to work regular duty without restrictions.     Patient Instructions: Attention not to aggravate affected area (FOLLOW UP WITH OBGYN AS PLANNED)   Restrictions: Regular Duty, Discharged from Occupational Health  Follow up if symptoms worsen or fail to improve.

## 2022-11-30 NOTE — PROGRESS NOTES
See ultrasound report for details of ultrasound evaluation, consultation, and recommendations.

## 2022-12-01 ENCOUNTER — TELEPHONE (OUTPATIENT)
Dept: OBSTETRICS AND GYNECOLOGY | Facility: CLINIC | Age: 37
End: 2022-12-01
Payer: COMMERCIAL

## 2022-12-01 NOTE — TELEPHONE ENCOUNTER
"----- Message from Katie Kothari MA sent at 12/1/2022  7:59 AM CST -----  Regarding: [employer] form  Hi Stacia,     We previously completed this patient forms for continuous leave following her delivery.  She is now requesting intermittent leave for "pregnancy related issues"  and appointments. When I asked her about what issues she might be having she advised it was for any future episodes she may have related to pregnancy.  I tried to explain to the patient that the intermittent leave for episodes is based on current issues and we have to estimate episodes on dx and past occurrences.  Patient became argumentative.  Please advise if ok to amend forms for appointments and if patient does have any current issues please let me know how often she will need intermittent leave for these issues.  If this is not appropriate let me know and I will notify patient.    Katie Kothari  HIM   [facility]  Tele: [phone]  Fax: [phone]  Email: [email or web address]      "

## 2022-12-13 ENCOUNTER — TELEPHONE (OUTPATIENT)
Dept: INTENSIVE CARE | Facility: OTHER | Age: 37
End: 2022-12-13
Payer: COMMERCIAL

## 2022-12-13 ENCOUNTER — PATIENT MESSAGE (OUTPATIENT)
Dept: MATERNAL FETAL MEDICINE | Facility: CLINIC | Age: 37
End: 2022-12-13
Payer: COMMERCIAL

## 2022-12-13 NOTE — TELEPHONE ENCOUNTER
PRIME  Sponsor: [employer]  Study: Prematurity Risk Assessment combined with Clinical Interventions for Improving  OutcoMEs  IRB/Protocol #: [id number]   Principle Investigator/ Sub-Investigator: Valentin Lopez MD  Site Number: [id number]  Location: [facility]    I spoke to this pt, giving her a brief description of the study.  She said she would think about it.  I emailed her the consent and my contact information

## 2022-12-14 ENCOUNTER — OFFICE VISIT (OUTPATIENT)
Dept: MATERNAL FETAL MEDICINE | Facility: CLINIC | Age: 37
End: 2022-12-14
Attending: OBSTETRICS & GYNECOLOGY
Payer: COMMERCIAL

## 2022-12-14 ENCOUNTER — PATIENT MESSAGE (OUTPATIENT)
Dept: CARDIOLOGY | Facility: CLINIC | Age: 37
End: 2022-12-14
Payer: COMMERCIAL

## 2022-12-14 ENCOUNTER — PROCEDURE VISIT (OUTPATIENT)
Dept: MATERNAL FETAL MEDICINE | Facility: CLINIC | Age: 37
End: 2022-12-14
Payer: COMMERCIAL

## 2022-12-14 VITALS
SYSTOLIC BLOOD PRESSURE: 112 MMHG | WEIGHT: 193.13 LBS | DIASTOLIC BLOOD PRESSURE: 64 MMHG | BODY MASS INDEX: 36.46 KG/M2 | HEIGHT: 61 IN

## 2022-12-14 DIAGNOSIS — I34.1 MITRAL VALVE PROLAPSE: Chronic | ICD-10-CM

## 2022-12-14 DIAGNOSIS — O10.919 CHRONIC HYPERTENSION AFFECTING PREGNANCY: ICD-10-CM

## 2022-12-14 DIAGNOSIS — O99.210 OBESITY IN PREGNANCY: ICD-10-CM

## 2022-12-14 DIAGNOSIS — Z36.3 ANTENATAL SCREENING FOR MALFORMATION USING ULTRASONICS: ICD-10-CM

## 2022-12-14 DIAGNOSIS — Z86.79 HX OF MITRAL VALVE PROLAPSE: ICD-10-CM

## 2022-12-14 DIAGNOSIS — Z3A.19 19 WEEKS GESTATION OF PREGNANCY: ICD-10-CM

## 2022-12-14 DIAGNOSIS — O09.521 MULTIGRAVIDA OF ADVANCED MATERNAL AGE IN FIRST TRIMESTER: ICD-10-CM

## 2022-12-14 DIAGNOSIS — Z36.89 ENCOUNTER FOR ULTRASOUND TO ASSESS FETAL GROWTH: Primary | ICD-10-CM

## 2022-12-14 DIAGNOSIS — O10.912 PRE-EXISTING HYPERTENSION DURING PREGNANCY IN SECOND TRIMESTER, UNSPECIFIED PRE-EXISTING HYPERTENSION TYPE: ICD-10-CM

## 2022-12-14 PROCEDURE — 3008F PR BODY MASS INDEX (BMI) DOCUMENTED: ICD-10-PCS | Mod: CPTII,S$GLB,, | Performed by: OBSTETRICS & GYNECOLOGY

## 2022-12-14 PROCEDURE — 3074F PR MOST RECENT SYSTOLIC BLOOD PRESSURE < 130 MM HG: ICD-10-PCS | Mod: CPTII,S$GLB,, | Performed by: OBSTETRICS & GYNECOLOGY

## 2022-12-14 PROCEDURE — 3078F PR MOST RECENT DIASTOLIC BLOOD PRESSURE < 80 MM HG: ICD-10-PCS | Mod: CPTII,S$GLB,, | Performed by: OBSTETRICS & GYNECOLOGY

## 2022-12-14 PROCEDURE — 76811 PR US, OB FETAL EVAL & EXAM, TRANSABDOM,FIRST GESTATION: ICD-10-PCS | Mod: S$GLB,,, | Performed by: OBSTETRICS & GYNECOLOGY

## 2022-12-14 PROCEDURE — 99999 PR PBB SHADOW E&M-EST. PATIENT-LVL III: ICD-10-PCS | Mod: PBBFAC,,, | Performed by: OBSTETRICS & GYNECOLOGY

## 2022-12-14 PROCEDURE — 3078F DIAST BP <80 MM HG: CPT | Mod: CPTII,S$GLB,, | Performed by: OBSTETRICS & GYNECOLOGY

## 2022-12-14 PROCEDURE — 1159F MED LIST DOCD IN RCRD: CPT | Mod: CPTII,S$GLB,, | Performed by: OBSTETRICS & GYNECOLOGY

## 2022-12-14 PROCEDURE — 3008F BODY MASS INDEX DOCD: CPT | Mod: CPTII,S$GLB,, | Performed by: OBSTETRICS & GYNECOLOGY

## 2022-12-14 PROCEDURE — 99213 OFFICE O/P EST LOW 20 MIN: CPT | Mod: 25,S$GLB,, | Performed by: OBSTETRICS & GYNECOLOGY

## 2022-12-14 PROCEDURE — 1159F PR MEDICATION LIST DOCUMENTED IN MEDICAL RECORD: ICD-10-PCS | Mod: CPTII,S$GLB,, | Performed by: OBSTETRICS & GYNECOLOGY

## 2022-12-14 PROCEDURE — 3074F SYST BP LT 130 MM HG: CPT | Mod: CPTII,S$GLB,, | Performed by: OBSTETRICS & GYNECOLOGY

## 2022-12-14 PROCEDURE — 99213 PR OFFICE/OUTPT VISIT, EST, LEVL III, 20-29 MIN: ICD-10-PCS | Mod: 25,S$GLB,, | Performed by: OBSTETRICS & GYNECOLOGY

## 2022-12-14 PROCEDURE — 76811 OB US DETAILED SNGL FETUS: CPT | Mod: S$GLB,,, | Performed by: OBSTETRICS & GYNECOLOGY

## 2022-12-14 PROCEDURE — 99999 PR PBB SHADOW E&M-EST. PATIENT-LVL III: CPT | Mod: PBBFAC,,, | Performed by: OBSTETRICS & GYNECOLOGY

## 2022-12-22 ENCOUNTER — ROUTINE PRENATAL (OUTPATIENT)
Dept: OBSTETRICS AND GYNECOLOGY | Facility: CLINIC | Age: 37
End: 2022-12-22
Attending: OBSTETRICS & GYNECOLOGY
Payer: COMMERCIAL

## 2022-12-22 VITALS
DIASTOLIC BLOOD PRESSURE: 68 MMHG | WEIGHT: 193.81 LBS | SYSTOLIC BLOOD PRESSURE: 118 MMHG | BODY MASS INDEX: 37.22 KG/M2

## 2022-12-22 DIAGNOSIS — Z3A.20 PREGNANCY WITH 20 COMPLETED WEEKS GESTATION: ICD-10-CM

## 2022-12-22 DIAGNOSIS — O09.521 MULTIGRAVIDA OF ADVANCED MATERNAL AGE IN FIRST TRIMESTER: Primary | ICD-10-CM

## 2022-12-22 DIAGNOSIS — O10.912 PRE-EXISTING HYPERTENSION AFFECTING PREGNANCY IN SECOND TRIMESTER: ICD-10-CM

## 2022-12-22 PROCEDURE — 0502F SUBSEQUENT PRENATAL CARE: CPT | Mod: CPTII,S$GLB,, | Performed by: OBSTETRICS & GYNECOLOGY

## 2022-12-22 PROCEDURE — 0502F PR SUBSEQUENT PRENATAL CARE: ICD-10-PCS | Mod: CPTII,S$GLB,, | Performed by: OBSTETRICS & GYNECOLOGY

## 2022-12-22 PROCEDURE — 99999 PR PBB SHADOW E&M-EST. PATIENT-LVL III: CPT | Mod: PBBFAC,,, | Performed by: OBSTETRICS & GYNECOLOGY

## 2022-12-22 PROCEDURE — 99999 PR PBB SHADOW E&M-EST. PATIENT-LVL III: ICD-10-PCS | Mod: PBBFAC,,, | Performed by: OBSTETRICS & GYNECOLOGY

## 2022-12-22 NOTE — PROGRESS NOTES
Reports +FM.  No bleeding or cramping.  +FHTs with doppler.  Reviewed 16 and 19 week [facility] sonos - discussed recommendations.  She is currently on baby aspirin daily.  BP well controlled on Labetalol.  Fetal echo 1/11/22.  She plans to contact cardiology for follow-up.  [facility] sono 1/20/22.  Form for closer parking location signed.  Return 4 weeks.

## 2023-01-04 ENCOUNTER — PATIENT MESSAGE (OUTPATIENT)
Dept: MATERNAL FETAL MEDICINE | Facility: CLINIC | Age: 38
End: 2023-01-04
Payer: COMMERCIAL

## 2023-01-09 ENCOUNTER — PATIENT MESSAGE (OUTPATIENT)
Dept: CARDIOLOGY | Facility: CLINIC | Age: 38
End: 2023-01-09
Payer: COMMERCIAL

## 2023-01-09 ENCOUNTER — TELEPHONE (OUTPATIENT)
Dept: CARDIOLOGY | Facility: CLINIC | Age: 38
End: 2023-01-09
Payer: COMMERCIAL

## 2023-01-09 DIAGNOSIS — Z98.890 STATUS POST MITRAL VALVE ANNULOPLASTY: ICD-10-CM

## 2023-01-09 DIAGNOSIS — I10 HYPERTENSION, ESSENTIAL: ICD-10-CM

## 2023-01-09 DIAGNOSIS — I34.1 MITRAL VALVE PROLAPSE: Primary | Chronic | ICD-10-CM

## 2023-01-10 ENCOUNTER — OFFICE VISIT (OUTPATIENT)
Dept: PEDIATRIC CARDIOLOGY | Facility: CLINIC | Age: 38
End: 2023-01-10
Attending: PEDIATRICS
Payer: COMMERCIAL

## 2023-01-10 ENCOUNTER — CLINICAL SUPPORT (OUTPATIENT)
Dept: PEDIATRIC CARDIOLOGY | Facility: CLINIC | Age: 38
End: 2023-01-10
Attending: OBSTETRICS & GYNECOLOGY
Payer: COMMERCIAL

## 2023-01-10 VITALS
HEART RATE: 90 BPM | BODY MASS INDEX: 36.71 KG/M2 | HEIGHT: 61 IN | SYSTOLIC BLOOD PRESSURE: 115 MMHG | HEIGHT: 61 IN | WEIGHT: 194.44 LBS | HEART RATE: 90 BPM | DIASTOLIC BLOOD PRESSURE: 70 MMHG | BODY MASS INDEX: 36.71 KG/M2 | WEIGHT: 194.44 LBS | DIASTOLIC BLOOD PRESSURE: 70 MMHG | SYSTOLIC BLOOD PRESSURE: 115 MMHG

## 2023-01-10 DIAGNOSIS — Z98.890 STATUS POST MITRAL VALVE ANNULOPLASTY: ICD-10-CM

## 2023-01-10 DIAGNOSIS — E66.01 CLASS 2 SEVERE OBESITY DUE TO EXCESS CALORIES WITH SERIOUS COMORBIDITY AND BODY MASS INDEX (BMI) OF 35.0 TO 35.9 IN ADULT: ICD-10-CM

## 2023-01-10 DIAGNOSIS — I34.1 MITRAL VALVE PROLAPSE: Chronic | ICD-10-CM

## 2023-01-10 DIAGNOSIS — I34.1 MITRAL VALVE PROLAPSE: Primary | Chronic | ICD-10-CM

## 2023-01-10 PROCEDURE — 99203 PR OFFICE/OUTPT VISIT, NEW, LEVL III, 30-44 MIN: ICD-10-PCS | Mod: 25,S$GLB,, | Performed by: PEDIATRICS

## 2023-01-10 PROCEDURE — 93325 PR DOPPLER COLOR FLOW VELOCITY MAP: ICD-10-PCS | Mod: S$GLB,,, | Performed by: PEDIATRICS

## 2023-01-10 PROCEDURE — 76825 ECHO EXAM OF FETAL HEART: CPT | Mod: S$GLB,,, | Performed by: PEDIATRICS

## 2023-01-10 PROCEDURE — 99203 OFFICE O/P NEW LOW 30 MIN: CPT | Mod: 25,S$GLB,, | Performed by: PEDIATRICS

## 2023-01-10 PROCEDURE — 99999 PR PBB SHADOW E&M-EST. PATIENT-LVL II: ICD-10-PCS | Mod: PBBFAC,,,

## 2023-01-10 PROCEDURE — 76827 ECHO EXAM OF FETAL HEART: CPT | Mod: S$GLB,,, | Performed by: PEDIATRICS

## 2023-01-10 PROCEDURE — 93325 DOPPLER ECHO COLOR FLOW MAPG: CPT | Mod: S$GLB,,, | Performed by: PEDIATRICS

## 2023-01-10 PROCEDURE — 99999 PR PBB SHADOW E&M-EST. PATIENT-LVL II: CPT | Mod: PBBFAC,,,

## 2023-01-10 PROCEDURE — 76825 PR  SO2 FETAL HEART: ICD-10-PCS | Mod: S$GLB,,, | Performed by: PEDIATRICS

## 2023-01-10 PROCEDURE — 99999 PR PBB SHADOW E&M-EST. PATIENT-LVL III: ICD-10-PCS | Mod: PBBFAC,,, | Performed by: PEDIATRICS

## 2023-01-10 PROCEDURE — 99999 PR PBB SHADOW E&M-EST. PATIENT-LVL III: CPT | Mod: PBBFAC,,, | Performed by: PEDIATRICS

## 2023-01-10 PROCEDURE — 76827 PR  SO2 FETAL HEART DOPPLER: ICD-10-PCS | Mod: S$GLB,,, | Performed by: PEDIATRICS

## 2023-01-10 NOTE — PROGRESS NOTES
[facility] Maternal Fetal Medicine ([place]) Fetal Cardiology Clinic    Today, I had the pleasure of evaluating King Botello who is now 37 y.o. and carrying her fifth pregnancy at 23 2/7 weeks gestation with an ROBERT of 23. She was referred for evaluation of the fetal heart due to maternal history of mitral valve prolapse s/p valvuloplasty and poor views of the fetal heart on MFM evaluation.      She is carrying a female fetus, to be named Jen.  Pregnancy thus far has been otherwise complicated by maternal hypertension, obesity and AMA.    Obstetric History:    .  Her OB history is otherwise unremarkable.     Past Medical History:   Diagnosis Date    Abnormal Pap smear     Colposcopy    Bradycardia 2020    Class 2 obesity due to excess calories with body mass index (BMI) of 35.0 to 35.9 in adult 3/9/2021    Hypertension, essential 3/16/2021    Menarche     Age of onset 11    Missed ab 2021    Mitral valve prolapse     Previous  delivery affecting pregnancy, antepartum 2020    S/P suction D&C 2021    Status post mitral valve annuloplasty 2017         Current Outpatient Medications:     aspirin (ECOTRIN) 81 MG EC tablet, Take 1 tablet (81 mg total) by mouth once daily., Disp: , Rfl: 0    labetaloL (NORMODYNE) 100 MG tablet, Take 1 tablet (100 mg total) by mouth 2 (two) times daily., Disp: 180 tablet, Rfl: 3    prenatal vits62/FA/om3/dha/epa (PRENATAL GUMMY ORAL), Take by mouth., Disp: , Rfl:     amoxicillin (AMOXIL) 500 MG capsule, TAKE FOUR CAPSULES BY MOUTH ONE HOUR BEFORE DENTAL WORK, Disp: , Rfl:      Review of patient's allergies indicates:   Allergen Reactions    Doxycycline      Abdomen pain severe    Tetanus vaccines and toxoid Rash       Family History: Patient with MVP s/p valvuloplasty, no additional congenital heart disease, early coronary artery disease, sudden unexplained death, connective tissues disorders, genetic syndromes, multiple miscarriages or other  congenital anomalies.    Social History: Ms. King Botello is  to the father of the baby.  The father of the baby is involved.     FETAL ECHOCARDIOGRAM (summary):  Fetal echo at 23 2/7wga, ROBERT 23.   Technically difficult fetal echocardiogram.   Normal four chamber view and proximal outflow tracts.   No significant valvar dysfunction.   In images supplemented with color Doppler, the aortic arch appears patent.  In images supplemented with color Doppler, the fetal ductal arch is patent.  There is no pericardial effusion.  (Full report in electronic medical record)    Impression:  Single active female fetus at 23 2/7 wga.  Technically difficult fetal echocardiogram with no cardiac disease identified.     Maternal history of mitral valve prolapse and mitral regurgitation s/p valvuloplasty. The fetus's mitral valve is functioning well. Would not be able to determine MVP on fetal echo, but there is no significant mitral stenosis or insufficiency.     I discussed with her that fetal echocardiography is insufficiently sensitive to rule out all septal defects, anomalies of pulmonary and systemic veins, arch anomalies, and some valvar abnormalities, nor can it ensure that the ductus arteriosus and foramen ovale will spontaneously close.     Recommendations:  Location, timing, and mode of delivery will be determined by the obstetrical team.  She does not require further follow-up in the fetal echocardiography clinic, but I would be happy to see her again if additional questions or concerns arise.    Should there be any concerns about the baby's heart after birth, a post-lydia echocardiogram and cardiology consultation are recommended.         Phyllis Claudio MD, MSCI  Pediatric Cardiology  Pediatric Echocardiography, Fetal Echocardiography, Cardiac MRI  [facility] [address] [place]  [address]  Phone [phone], Fax [phone]      The above information was  discussed in detail including the use of diagrams, with 30 minutes of total face to face time, with greater than 50% with counseling and coordination of care.  The discussion of the diagnosis and treatment options is as described above.

## 2023-01-10 NOTE — TELEPHONE ENCOUNTER
Called pt - She scheduled Echo and EKG for 2/3 PM. Will call her if any cancels with Dr Cordero come up ( she has appt in March).

## 2023-01-15 ENCOUNTER — PATIENT MESSAGE (OUTPATIENT)
Dept: OTHER | Facility: OTHER | Age: 38
End: 2023-01-15
Payer: COMMERCIAL

## 2023-01-19 ENCOUNTER — PATIENT MESSAGE (OUTPATIENT)
Dept: MATERNAL FETAL MEDICINE | Facility: CLINIC | Age: 38
End: 2023-01-19
Payer: COMMERCIAL

## 2023-01-20 ENCOUNTER — PROCEDURE VISIT (OUTPATIENT)
Dept: MATERNAL FETAL MEDICINE | Facility: CLINIC | Age: 38
End: 2023-01-20
Payer: COMMERCIAL

## 2023-01-20 ENCOUNTER — OFFICE VISIT (OUTPATIENT)
Dept: MATERNAL FETAL MEDICINE | Facility: CLINIC | Age: 38
End: 2023-01-20
Attending: OBSTETRICS & GYNECOLOGY
Payer: COMMERCIAL

## 2023-01-20 ENCOUNTER — ROUTINE PRENATAL (OUTPATIENT)
Dept: OBSTETRICS AND GYNECOLOGY | Facility: CLINIC | Age: 38
End: 2023-01-20
Attending: OBSTETRICS & GYNECOLOGY
Payer: COMMERCIAL

## 2023-01-20 VITALS — SYSTOLIC BLOOD PRESSURE: 116 MMHG | DIASTOLIC BLOOD PRESSURE: 70 MMHG | BODY MASS INDEX: 37.25 KG/M2 | WEIGHT: 194 LBS

## 2023-01-20 VITALS
BODY MASS INDEX: 36.67 KG/M2 | SYSTOLIC BLOOD PRESSURE: 122 MMHG | HEIGHT: 61 IN | DIASTOLIC BLOOD PRESSURE: 76 MMHG | WEIGHT: 194.25 LBS

## 2023-01-20 DIAGNOSIS — Z98.890 STATUS POST MITRAL VALVE ANNULOPLASTY: Primary | ICD-10-CM

## 2023-01-20 DIAGNOSIS — Z36.89 ENCOUNTER FOR ULTRASOUND TO ASSESS FETAL GROWTH: Primary | ICD-10-CM

## 2023-01-20 DIAGNOSIS — Z3A.24 PREGNANCY WITH 24 COMPLETED WEEKS GESTATION: ICD-10-CM

## 2023-01-20 DIAGNOSIS — I34.1 MITRAL VALVE PROLAPSE: Chronic | ICD-10-CM

## 2023-01-20 DIAGNOSIS — R00.1 BRADYCARDIA: ICD-10-CM

## 2023-01-20 DIAGNOSIS — Z36.89 ENCOUNTER FOR ULTRASOUND TO ASSESS FETAL GROWTH: ICD-10-CM

## 2023-01-20 DIAGNOSIS — I10 HYPERTENSION, ESSENTIAL: ICD-10-CM

## 2023-01-20 DIAGNOSIS — Z3A.24 24 WEEKS GESTATION OF PREGNANCY: ICD-10-CM

## 2023-01-20 DIAGNOSIS — O10.012 PRE-EXISTING ESSENTIAL HYPERTENSION DURING PREGNANCY IN SECOND TRIMESTER: ICD-10-CM

## 2023-01-20 DIAGNOSIS — O09.521 MULTIGRAVIDA OF ADVANCED MATERNAL AGE IN FIRST TRIMESTER: Primary | ICD-10-CM

## 2023-01-20 DIAGNOSIS — Z36.2 ENCOUNTER FOR FOLLOW-UP ULTRASOUND OF FETAL ANATOMY: ICD-10-CM

## 2023-01-20 DIAGNOSIS — O10.912 PRE-EXISTING HYPERTENSION AFFECTING PREGNANCY IN SECOND TRIMESTER: ICD-10-CM

## 2023-01-20 DIAGNOSIS — Z98.890 S/P MITRAL VALVE REPAIR: ICD-10-CM

## 2023-01-20 PROCEDURE — 0502F PR SUBSEQUENT PRENATAL CARE: ICD-10-PCS | Mod: CPTII,S$GLB,, | Performed by: OBSTETRICS & GYNECOLOGY

## 2023-01-20 PROCEDURE — 99999 PR PBB SHADOW E&M-EST. PATIENT-LVL III: ICD-10-PCS | Mod: PBBFAC,,, | Performed by: OBSTETRICS & GYNECOLOGY

## 2023-01-20 PROCEDURE — 3008F BODY MASS INDEX DOCD: CPT | Mod: CPTII,S$GLB,, | Performed by: OBSTETRICS & GYNECOLOGY

## 2023-01-20 PROCEDURE — 0502F SUBSEQUENT PRENATAL CARE: CPT | Mod: CPTII,S$GLB,, | Performed by: OBSTETRICS & GYNECOLOGY

## 2023-01-20 PROCEDURE — 99999 PR PBB SHADOW E&M-EST. PATIENT-LVL III: CPT | Mod: PBBFAC,,, | Performed by: OBSTETRICS & GYNECOLOGY

## 2023-01-20 PROCEDURE — 3074F PR MOST RECENT SYSTOLIC BLOOD PRESSURE < 130 MM HG: ICD-10-PCS | Mod: CPTII,S$GLB,, | Performed by: OBSTETRICS & GYNECOLOGY

## 2023-01-20 PROCEDURE — 99215 OFFICE O/P EST HI 40 MIN: CPT | Mod: 25,S$GLB,, | Performed by: OBSTETRICS & GYNECOLOGY

## 2023-01-20 PROCEDURE — 99215 PR OFFICE/OUTPT VISIT, EST, LEVL V, 40-54 MIN: ICD-10-PCS | Mod: 25,S$GLB,, | Performed by: OBSTETRICS & GYNECOLOGY

## 2023-01-20 PROCEDURE — 3074F SYST BP LT 130 MM HG: CPT | Mod: CPTII,S$GLB,, | Performed by: OBSTETRICS & GYNECOLOGY

## 2023-01-20 PROCEDURE — 76816 OB US FOLLOW-UP PER FETUS: CPT | Mod: S$GLB,,, | Performed by: OBSTETRICS & GYNECOLOGY

## 2023-01-20 PROCEDURE — 76816 PR  US,PREGNANT UTERUS,F/U,TRANSABD APP: ICD-10-PCS | Mod: S$GLB,,, | Performed by: OBSTETRICS & GYNECOLOGY

## 2023-01-20 PROCEDURE — 3078F DIAST BP <80 MM HG: CPT | Mod: CPTII,S$GLB,, | Performed by: OBSTETRICS & GYNECOLOGY

## 2023-01-20 PROCEDURE — 3078F PR MOST RECENT DIASTOLIC BLOOD PRESSURE < 80 MM HG: ICD-10-PCS | Mod: CPTII,S$GLB,, | Performed by: OBSTETRICS & GYNECOLOGY

## 2023-01-20 PROCEDURE — 3008F PR BODY MASS INDEX (BMI) DOCUMENTED: ICD-10-PCS | Mod: CPTII,S$GLB,, | Performed by: OBSTETRICS & GYNECOLOGY

## 2023-01-20 NOTE — PROGRESS NOTES
No complaints.  Reports FM.  No bleeding or cramping.  BP continues to be well controlled on Labetalol.  She requests tubal ligation at the time of her C/S-  she understands that this is a permanent, irreversible method of sterilization.  NINA villegas today- report pending.  Cardiology appt 1/30/23.  DM screen, CBC, OB check in 3 weeks.

## 2023-01-24 PROBLEM — O10.012 PRE-EXISTING ESSENTIAL HYPERTENSION DURING PREGNANCY IN SECOND TRIMESTER: Status: ACTIVE | Noted: 2023-01-24

## 2023-01-25 NOTE — ASSESSMENT & PLAN NOTE
Patient without complaints  /76 mmhg    Meds:  Labetalol 100 mg bid   Aspirin 81 mg daily     Patient reports she has not seen Cardiology as of today. I counseled patient that I will reach out to Dr. Cordero (patient's Cardiologist) to see if he is able to see her for f/u evaluation/status of her cardiac/hypertension condition.

## 2023-01-25 NOTE — PROGRESS NOTES
"MATERNAL-FETAL MEDICINE   FOLLOW UP NOTE      SUBJECTIVE:     Ms. Miss King Botello is a 37 y.o.  female with IUP at 24w5d who is seen in consultation by MFM for evaluation and management of:  Problem   Pre-Existing Essential Hypertension During Pregnancy in Second Trimester   S/P Mitral Valve Repair       Patient accompanied by her     No changes to medical, surgical, family, social, or obstetric history.    Interval history since last M visit: no problems reported     Epic and Viewpoint charts reviewed     Medications:  Current Outpatient Medications   Medication Instructions    amoxicillin (AMOXIL) 500 MG capsule TAKE FOUR CAPSULES BY MOUTH ONE HOUR BEFORE DENTAL WORK    aspirin (ECOTRIN) 81 mg, Oral, Daily    labetaloL (NORMODYNE) 100 mg, Oral, 2 times daily    prenatal vits62/FA/om3/dha/epa (PRENATAL GUMMY ORAL) Oral         Objective:   /76 (BP Location: Left arm, Patient Position: Sitting)   Ht 5' 0.51" (1.537 m)   Wt 88.1 kg (194 lb 3.6 oz)   LMP 2022 (Exact Date)   BMI 37.30 kg/m²     Ultrasound performed. See viewpoint for full ultrasound report.  1. 24w 5d caicedo intrauterine pregnancy  2. Interval fetal growth wnl (EFW = 768 gms at 53% and AC 45%)  3. No fetal structural abnormalities appreciated for organs evaluated today: see above for details   4. Amniotic fluid volume wnl per MVP 4.2 cm    Labs:  22: urine protein/cr ratio too low to calculate     ASSESSMENT/PLAN:     37 y.o.  female with IUP at 24w5d gestation      Pre-existing essential hypertension during pregnancy in second trimester  Patient without complaints  /76 mmhg    Meds:  Labetalol 100 mg bid   Aspirin 81 mg daily     Patient reports she has not seen Cardiology as of today. I counseled patient that I will reach out to Dr. Cordero (patient's Cardiologist) to see if he is able to see her for f/u evaluation/status of her cardiac/hypertension condition.     Patient counseled on today's " ultrasound evaluation including interval fetal growth wnl, no fetal structural abnormalities appreciated but fetal anatomical survey remains incomplete, and amniotic fluid volume wnl. She was counseled on recommendation for f/u ultrasound in 4 wks with return MFM visit.     Recommendations:  -Maternal EKG and echocardiogram on 02-03-23    -ultrasound and RT MFM MD visit on 02-08-23    -Appt with Dr. Cordero on 02-14-23    -Recommend CMP for baseline: ordering/facilitation per Dr. Moreno     -Will see if Anesthesia antepartum consult needed with patient's     About  35-40 minutes of total time spent on the encounter, which includes face to face time and non-face to face time preparing to see the patient (eg, review of tests), obtaining and/or reviewing separately obtained history, documenting clinical information in the electronic or other health record, independently interpreting results (not separately reported) and communicating results to the patient/family/caregiver, or care coordination (not separately reported).      Sarah Carter  Maternal-Fetal Medicine    Electronically Signed by Sarah Carter January 24, 2023

## 2023-01-27 ENCOUNTER — PATIENT MESSAGE (OUTPATIENT)
Dept: OBSTETRICS AND GYNECOLOGY | Facility: CLINIC | Age: 38
End: 2023-01-27
Payer: COMMERCIAL

## 2023-01-29 ENCOUNTER — PATIENT MESSAGE (OUTPATIENT)
Dept: OTHER | Facility: OTHER | Age: 38
End: 2023-01-29
Payer: COMMERCIAL

## 2023-01-30 ENCOUNTER — PATIENT MESSAGE (OUTPATIENT)
Dept: OBSTETRICS AND GYNECOLOGY | Facility: CLINIC | Age: 38
End: 2023-01-30
Payer: COMMERCIAL

## 2023-02-03 ENCOUNTER — HOSPITAL ENCOUNTER (OUTPATIENT)
Dept: CARDIOLOGY | Facility: HOSPITAL | Age: 38
Discharge: HOME | End: 2023-02-03
Attending: INTERNAL MEDICINE
Payer: COMMERCIAL

## 2023-02-03 VITALS
HEIGHT: 60 IN | SYSTOLIC BLOOD PRESSURE: 120 MMHG | BODY MASS INDEX: 38.09 KG/M2 | DIASTOLIC BLOOD PRESSURE: 70 MMHG | HEART RATE: 80 BPM | WEIGHT: 194 LBS

## 2023-02-03 DIAGNOSIS — Z98.890 STATUS POST MITRAL VALVE ANNULOPLASTY: ICD-10-CM

## 2023-02-03 DIAGNOSIS — I34.1 MITRAL VALVE PROLAPSE: ICD-10-CM

## 2023-02-03 DIAGNOSIS — I10 HYPERTENSION, ESSENTIAL: ICD-10-CM

## 2023-02-03 LAB
ASCENDING AORTA: 2.76 CM
AV INDEX (PROSTH): 0.86
AV MEAN GRADIENT: 5 MMHG
AV PEAK GRADIENT: 10 MMHG
AV VALVE AREA: 2.68 CM2
AV VELOCITY RATIO: 0.81
BSA FOR ECHO PROCEDURE: 1.93 M2
CV ECHO LV RWT: 0.46 CM
DOP CALC AO PEAK VEL: 1.56 M/S
DOP CALC AO VTI: 25.44 CM
DOP CALC LVOT AREA: 3.1 CM2
DOP CALC LVOT DIAMETER: 1.99 CM
DOP CALC LVOT PEAK VEL: 1.26 M/S
DOP CALC LVOT STROKE VOLUME: 68.11 CM3
DOP CALC MV VTI: 53.98 CM
DOP CALCLVOT PEAK VEL VTI: 21.91 CM
E/E' RATIO: 21.53 M/S
ECHO LV POSTERIOR WALL: 0.85 CM (ref 0.6–1.1)
EJECTION FRACTION: 60 %
FRACTIONAL SHORTENING: 35 % (ref 28–44)
HR MV ECHO: 104 BPM
INTERVENTRICULAR SEPTUM: 1 CM (ref 0.6–1.1)
IVRT: 37.11 MSEC
LA MAJOR: 5.78 CM
LA MINOR: 5.98 CM
LA WIDTH: 5.18 CM
LEFT ATRIUM SIZE: 4.64 CM
LEFT ATRIUM VOLUME INDEX MOD: 64.1 ML/M2
LEFT ATRIUM VOLUME INDEX: 65.3 ML/M2
LEFT ATRIUM VOLUME MOD: 117.92 CM3
LEFT ATRIUM VOLUME: 120.09 CM3
LEFT INTERNAL DIMENSION IN SYSTOLE: 2.39 CM (ref 2.1–4)
LEFT VENTRICLE DIASTOLIC VOLUME INDEX: 31.01 ML/M2
LEFT VENTRICLE DIASTOLIC VOLUME: 57.06 ML
LEFT VENTRICLE MASS INDEX: 54 G/M2
LEFT VENTRICLE SYSTOLIC VOLUME INDEX: 10.8 ML/M2
LEFT VENTRICLE SYSTOLIC VOLUME: 19.91 ML
LEFT VENTRICULAR INTERNAL DIMENSION IN DIASTOLE: 3.67 CM (ref 3.5–6)
LEFT VENTRICULAR MASS: 99.41 G
LV LATERAL E/E' RATIO: 18.3 M/S
LV SEPTAL E/E' RATIO: 26.14 M/S
MV A" WAVE DURATION": 14.56 MSEC
MV MEAN GRADIENT: 12 MMHG
MV PEAK E VEL: 1.83 M/S
MV PEAK GRADIENT: 20 MMHG
MV VALVE AREA BY CONTINUITY EQUATION: 1.26 CM2
PISA TR MAX VEL: 2.66 M/S
PULM VEIN S/D RATIO: 1.21
PV PEAK D VEL: 0.24 M/S
PV PEAK S VEL: 0.29 M/S
RA MAJOR: 4.91 CM
RA PRESSURE: 3 MMHG
RA WIDTH: 3.51 CM
RIGHT VENTRICULAR END-DIASTOLIC DIMENSION: 3.72 CM
RV TISSUE DOPPLER FREE WALL SYSTOLIC VELOCITY 1 (APICAL 4 CHAMBER VIEW): 8.98 CM/S
SINUS: 2.82 CM
STJ: 2.4 CM
TDI LATERAL: 0.1 M/S
TDI SEPTAL: 0.07 M/S
TDI: 0.09 M/S
TR MAX PG: 28 MMHG
TRICUSPID ANNULAR PLANE SYSTOLIC EXCURSION: 2.02 CM
TV REST PULMONARY ARTERY PRESSURE: 31 MMHG

## 2023-02-03 PROCEDURE — 93306 TTE W/DOPPLER COMPLETE: CPT | Mod: 26,,, | Performed by: INTERNAL MEDICINE

## 2023-02-03 PROCEDURE — 93306 TTE W/DOPPLER COMPLETE: CPT

## 2023-02-03 PROCEDURE — 93306 ECHO (CUPID ONLY): ICD-10-PCS | Mod: 26,,, | Performed by: INTERNAL MEDICINE

## 2023-02-07 ENCOUNTER — PATIENT MESSAGE (OUTPATIENT)
Dept: MATERNAL FETAL MEDICINE | Facility: CLINIC | Age: 38
End: 2023-02-07
Payer: COMMERCIAL

## 2023-02-07 NOTE — PROGRESS NOTES
Results of recent Echo  given to patient by phone and through [facility]. Pt has appt with Dr Cordero 2/14/23.

## 2023-02-08 ENCOUNTER — OFFICE VISIT (OUTPATIENT)
Dept: MATERNAL FETAL MEDICINE | Facility: CLINIC | Age: 38
End: 2023-02-08
Attending: OBSTETRICS & GYNECOLOGY
Payer: COMMERCIAL

## 2023-02-08 ENCOUNTER — PROCEDURE VISIT (OUTPATIENT)
Dept: MATERNAL FETAL MEDICINE | Facility: CLINIC | Age: 38
End: 2023-02-08
Payer: COMMERCIAL

## 2023-02-08 VITALS
SYSTOLIC BLOOD PRESSURE: 105 MMHG | HEIGHT: 60 IN | DIASTOLIC BLOOD PRESSURE: 72 MMHG | BODY MASS INDEX: 37.69 KG/M2 | WEIGHT: 192 LBS

## 2023-02-08 DIAGNOSIS — O10.012 PRE-EXISTING ESSENTIAL HYPERTENSION DURING PREGNANCY IN SECOND TRIMESTER: ICD-10-CM

## 2023-02-08 DIAGNOSIS — Z36.89 ENCOUNTER FOR ULTRASOUND TO ASSESS FETAL GROWTH: ICD-10-CM

## 2023-02-08 DIAGNOSIS — Z36.4 ULTRASOUND FOR ANTENATAL SCREENING FOR FETAL GROWTH RESTRICTION: ICD-10-CM

## 2023-02-08 DIAGNOSIS — Z98.890 S/P MITRAL VALVE REPAIR: ICD-10-CM

## 2023-02-08 DIAGNOSIS — I34.1 MITRAL VALVE PROLAPSE: Chronic | ICD-10-CM

## 2023-02-08 DIAGNOSIS — O09.522 MULTIGRAVIDA OF ADVANCED MATERNAL AGE IN SECOND TRIMESTER: ICD-10-CM

## 2023-02-08 DIAGNOSIS — O99.210 OBESITY IN PREGNANCY: Primary | ICD-10-CM

## 2023-02-08 PROCEDURE — 1159F PR MEDICATION LIST DOCUMENTED IN MEDICAL RECORD: ICD-10-PCS | Mod: CPTII,S$GLB,, | Performed by: OBSTETRICS & GYNECOLOGY

## 2023-02-08 PROCEDURE — 99214 PR OFFICE/OUTPT VISIT, EST, LEVL IV, 30-39 MIN: ICD-10-PCS | Mod: 25,S$GLB,, | Performed by: OBSTETRICS & GYNECOLOGY

## 2023-02-08 PROCEDURE — 3078F DIAST BP <80 MM HG: CPT | Mod: CPTII,S$GLB,, | Performed by: OBSTETRICS & GYNECOLOGY

## 2023-02-08 PROCEDURE — 76816 PR  US,PREGNANT UTERUS,F/U,TRANSABD APP: ICD-10-PCS | Mod: S$GLB,,, | Performed by: OBSTETRICS & GYNECOLOGY

## 2023-02-08 PROCEDURE — 99999 PR PBB SHADOW E&M-EST. PATIENT-LVL III: ICD-10-PCS | Mod: PBBFAC,,, | Performed by: OBSTETRICS & GYNECOLOGY

## 2023-02-08 PROCEDURE — 3008F PR BODY MASS INDEX (BMI) DOCUMENTED: ICD-10-PCS | Mod: CPTII,S$GLB,, | Performed by: OBSTETRICS & GYNECOLOGY

## 2023-02-08 PROCEDURE — 76816 OB US FOLLOW-UP PER FETUS: CPT | Mod: S$GLB,,, | Performed by: OBSTETRICS & GYNECOLOGY

## 2023-02-08 PROCEDURE — 99999 PR PBB SHADOW E&M-EST. PATIENT-LVL III: CPT | Mod: PBBFAC,,, | Performed by: OBSTETRICS & GYNECOLOGY

## 2023-02-08 PROCEDURE — 3008F BODY MASS INDEX DOCD: CPT | Mod: CPTII,S$GLB,, | Performed by: OBSTETRICS & GYNECOLOGY

## 2023-02-08 PROCEDURE — 3078F PR MOST RECENT DIASTOLIC BLOOD PRESSURE < 80 MM HG: ICD-10-PCS | Mod: CPTII,S$GLB,, | Performed by: OBSTETRICS & GYNECOLOGY

## 2023-02-08 PROCEDURE — 99214 OFFICE O/P EST MOD 30 MIN: CPT | Mod: 25,S$GLB,, | Performed by: OBSTETRICS & GYNECOLOGY

## 2023-02-08 PROCEDURE — 3074F PR MOST RECENT SYSTOLIC BLOOD PRESSURE < 130 MM HG: ICD-10-PCS | Mod: CPTII,S$GLB,, | Performed by: OBSTETRICS & GYNECOLOGY

## 2023-02-08 PROCEDURE — 3074F SYST BP LT 130 MM HG: CPT | Mod: CPTII,S$GLB,, | Performed by: OBSTETRICS & GYNECOLOGY

## 2023-02-08 PROCEDURE — 1159F MED LIST DOCD IN RCRD: CPT | Mod: CPTII,S$GLB,, | Performed by: OBSTETRICS & GYNECOLOGY

## 2023-02-08 NOTE — PROGRESS NOTES
MATERNAL-FETAL MEDICINE   FOLLOW UP NOTE      SUBJECTIVE:     Ms. Miss King Botello is a 37 y.o.  female with IUP at 27w3d who is seen in consultation by MFNADIA for evaluation and management of:  Problem   Pre-Existing Essential Hypertension During Pregnancy in Second Trimester   S/P Mitral Valve Repair       Previous notes reviewed.   No changes to medical, surgical, family, social, or obstetric history.      Medications:  Current Outpatient Medications   Medication Instructions    amoxicillin (AMOXIL) 500 MG capsule TAKE FOUR CAPSULES BY MOUTH ONE HOUR BEFORE DENTAL WORK    aspirin (ECOTRIN) 81 mg, Oral, Daily    labetaloL (NORMODYNE) 100 mg, Oral, 2 times daily    prenatal vits62/FA/om3/dha/epa (PRENATAL GUMMY ORAL) Oral         Objective:   /72 (BP Location: Left arm, Patient Position: Sitting, BP Method: Large (Automatic))   Ht 5' (1.524 m)   Wt 87.1 kg (192 lb 0.3 oz)   LMP 2022 (Exact Date)   BMI 37.50 kg/m²     Ultrasound performed. See viewpoint for full ultrasound report.  1. 27w 3d caicedo intrauterine pregnancy  2. Interval fetal growth wnl (EFW = 1070 gms at 45% and AC 45%)  3. No fetal structural abnormalities appreciated for organs evaluated today:            cord insertion appears wnl; abdominal wall suboptimal secondary to fetal position   4. Breech presentation   5. Amniotic fluid volume wnl per MVP 5.0 cm    Significant labs/imaging:  TTE: 23   The left ventricle is normal in size with normal systolic function. The estimated ejection fraction is 60%.  Normal right ventricular size with normal right ventricular systolic function.  Indeterminate left ventricular diastolic function.  Severe left atrial enlargement.  The mitral valve is s/p repair with a mean diastolic gradient of at least 12mm Hg at a heart rate of 104 bpm. Peak gradient is near 20mm Hg.  Mild tricuspid regurgitation.  The estimated PA systolic pressure is 31 mmHg. This may be an underestimation given  the mitral valve gradients.  Normal central venous pressure (3 mmHg).  On the prior exam, with a heart rate of 76 bpm, the mean gradient across the mitral valve was actually near 10mm Hg. Valve area estimated near 1.8 cm2 by PHT.    ASSESSMENT/PLAN:     37 y.o.  female with IUP at 27w3d gestation     S/P mitral valve repair  Patient without complaints for cardiac concern.     /72 mmhg     MEDS:  Labetalol 100 mg bid  Aspirin 81 mg daily     Plan:  -EKG and appt with Cardiology (Dr. Cordero) on 23  -Kenisha with Cardiology Dr. Cordero on 23  -continue present dosage of Labetalol     Pre-existing essential hypertension during pregnancy in second trimester  Meds:  Labetalol 100 mg bid   Aspirin 81 mg daily     Continue present dosage of meds.     Patient counseled on today's ultrasound evaluation including fetal growth wnl, no fetal structural abnormalities appreciated but suboptimal for abdominal wall, amniotic fluid volume wnl, and placenta with lake. Patient counseled on recommendation for f/u ultrasound with RT MFM MD visit in 4-5 wks.     Patient reports some heartburn symptoms. We reviewed diet with avoidance of fried/spicy/greasy food, we discussed adjustment in timing of meals and I counseled her to discuss with Dr. Moreno possible addition of Pepcid. She was counseled she could take 1-2 Tums before the meal with the most risk for heartburn (ex. Dinner) to see if relief. Patient's questions were answered.     We reviewed her upcoming appts      FOLLOW UP:   -recommend f/u in 4-5 wks for RT MFM MD visit and ultrasound for fetal growth  -Cardiology appts with Dr. Cordero scheduled for 23 with EKG and for 23: appreciate Dr. Cordero following patient closely during pregnancy  -Next week she has her glucose screen test       About 30 minutes of total time spent on the encounter, which includes face to face time and non-face to face time preparing to see the patient (eg, review of tests),  obtaining and/or reviewing separately obtained history, documenting clinical information in the electronic or other health record, independently interpreting results (not separately reported) and communicating results to the patient/family/caregiver, or care coordination (not separately reported).      Sarah Carter  Maternal-Fetal Medicine    Electronically Signed by Sarah Carter February 8, 2023

## 2023-02-08 NOTE — ASSESSMENT & PLAN NOTE
Patient without complaints for cardiac concern.     /72 mmhg     MEDS:  Labetalol 100 mg bid  Aspirin 81 mg daily     Plan:  -EKG and appt with Cardiology (Dr. Cordero) on 02-14-23  -Kenisha with Cardiology Dr. Cordero on 03-14-23  -continue present dosage of Labetalol

## 2023-02-12 ENCOUNTER — PATIENT MESSAGE (OUTPATIENT)
Dept: OTHER | Facility: OTHER | Age: 38
End: 2023-02-12
Payer: COMMERCIAL

## 2023-02-12 PROBLEM — I34.2 NONRHEUMATIC MITRAL VALVE STENOSIS: Status: ACTIVE | Noted: 2023-02-12

## 2023-02-13 ENCOUNTER — TELEPHONE (OUTPATIENT)
Dept: OBSTETRICS AND GYNECOLOGY | Facility: CLINIC | Age: 38
End: 2023-02-13
Payer: COMMERCIAL

## 2023-02-13 ENCOUNTER — PATIENT MESSAGE (OUTPATIENT)
Dept: OBSTETRICS AND GYNECOLOGY | Facility: CLINIC | Age: 38
End: 2023-02-13
Payer: COMMERCIAL

## 2023-02-13 ENCOUNTER — HOSPITAL ENCOUNTER (EMERGENCY)
Facility: OTHER | Age: 38
Discharge: HOME | End: 2023-02-13
Attending: OBSTETRICS & GYNECOLOGY
Payer: COMMERCIAL

## 2023-02-13 VITALS
OXYGEN SATURATION: 96 % | TEMPERATURE: 99 F | SYSTOLIC BLOOD PRESSURE: 122 MMHG | DIASTOLIC BLOOD PRESSURE: 80 MMHG | RESPIRATION RATE: 16 BRPM | HEART RATE: 93 BPM

## 2023-02-13 DIAGNOSIS — Z3A.28 28 WEEKS GESTATION OF PREGNANCY: ICD-10-CM

## 2023-02-13 DIAGNOSIS — M79.604 RIGHT LEG PAIN: Primary | ICD-10-CM

## 2023-02-13 LAB
ALBUMIN SERPL BCP-MCNC: 2.9 G/DL (ref 3.5–5.2)
ALP SERPL-CCNC: 61 U/L (ref 55–135)
ALT SERPL W/O P-5'-P-CCNC: 15 U/L (ref 10–44)
ANION GAP SERPL CALC-SCNC: 8 MMOL/L (ref 8–16)
AST SERPL-CCNC: 22 U/L (ref 10–40)
BILIRUB SERPL-MCNC: 0.4 MG/DL (ref 0.1–1)
BUN SERPL-MCNC: 6 MG/DL (ref 6–20)
CALCIUM SERPL-MCNC: 9.1 MG/DL (ref 8.7–10.5)
CHLORIDE SERPL-SCNC: 108 MMOL/L (ref 95–110)
CO2 SERPL-SCNC: 21 MMOL/L (ref 23–29)
CREAT SERPL-MCNC: 0.5 MG/DL (ref 0.5–1.4)
EST. GFR  (NO RACE VARIABLE): >60 ML/MIN/1.73 M^2
GLUCOSE SERPL-MCNC: 91 MG/DL (ref 70–110)
MAGNESIUM SERPL-MCNC: 1.5 MG/DL (ref 1.6–2.6)
POTASSIUM SERPL-SCNC: 3.8 MMOL/L (ref 3.5–5.1)
PROT SERPL-MCNC: 6.2 G/DL (ref 6–8.4)
SODIUM SERPL-SCNC: 137 MMOL/L (ref 136–145)

## 2023-02-13 PROCEDURE — 99284 EMERGENCY DEPT VISIT MOD MDM: CPT | Mod: 25,,, | Performed by: OBSTETRICS & GYNECOLOGY

## 2023-02-13 PROCEDURE — 59025 FETAL NON-STRESS TEST: CPT | Mod: 26,,, | Performed by: OBSTETRICS & GYNECOLOGY

## 2023-02-13 PROCEDURE — 83735 ASSAY OF MAGNESIUM: CPT

## 2023-02-13 PROCEDURE — 59025 PR FETAL 2N-STRESS TEST: ICD-10-PCS | Mod: 26,,, | Performed by: OBSTETRICS & GYNECOLOGY

## 2023-02-13 PROCEDURE — 99284 PR EMERGENCY DEPT VISIT,LEVEL IV: ICD-10-PCS | Mod: 25,,, | Performed by: OBSTETRICS & GYNECOLOGY

## 2023-02-13 PROCEDURE — 80053 COMPREHEN METABOLIC PANEL: CPT

## 2023-02-13 PROCEDURE — 59025 FETAL NON-STRESS TEST: CPT

## 2023-02-13 PROCEDURE — 99284 EMERGENCY DEPT VISIT MOD MDM: CPT | Mod: 25

## 2023-02-13 NOTE — ED TRIAGE NOTES
Pt presents to [facility] c/o right leg cramping.  Dr. Huffman notified of pt's arrival.  No history of DVT, per patient.

## 2023-02-13 NOTE — TELEPHONE ENCOUNTER
Spoke with the patient and advised per NP, to go to the OB ER for right calf pain for 3 days. Patient verbalized understanding and will comply.

## 2023-02-13 NOTE — PROGRESS NOTES
Subjective:   Patient ID:  King Botello is a 37 y.o. female who presents for follow-up of MVD and pregnancy    HPI: She has MVD and pregnant 28 weeks, The patient has no chest pain, SOB, TIA, palpitations, syncope or pre-syncope.Patient does not exercise.        Review of Systems   Constitutional: Negative for chills, decreased appetite, diaphoresis, fever, malaise/fatigue, night sweats, weight gain and weight loss.   HENT:  Negative for congestion, hoarse voice, nosebleeds, sore throat and tinnitus.    Eyes:  Negative for blurred vision, double vision, vision loss in left eye, vision loss in right eye, visual disturbance and visual halos.   Cardiovascular:  Negative for chest pain, claudication, cyanosis, dyspnea on exertion, irregular heartbeat, leg swelling, near-syncope, orthopnea, palpitations, paroxysmal nocturnal dyspnea and syncope.   Respiratory:  Negative for cough, hemoptysis, shortness of breath, sleep disturbances due to breathing, snoring, sputum production and wheezing.    Endocrine: Negative for cold intolerance, heat intolerance, polydipsia, polyphagia and polyuria.   Hematologic/Lymphatic: Negative for adenopathy and bleeding problem. Does not bruise/bleed easily.   Skin:  Negative for color change, dry skin, flushing, itching, nail changes, poor wound healing, rash, skin cancer, suspicious lesions and unusual hair distribution.   Musculoskeletal:  Negative for arthritis, back pain, falls, gout, joint pain, joint swelling, muscle cramps, muscle weakness, myalgias and stiffness.   Gastrointestinal:  Negative for abdominal pain, anorexia, change in bowel habit, constipation, diarrhea, dysphagia, heartburn, hematemesis, hematochezia, melena and vomiting.   Genitourinary:  Negative for decreased libido, dysuria, hematuria, hesitancy and urgency.   Neurological:  Negative for excessive daytime sleepiness, dizziness, focal weakness, headaches, light-headedness, loss of balance, numbness,  paresthesias, seizures, sensory change, tremors, vertigo and weakness.   Psychiatric/Behavioral:  Negative for altered mental status, depression, hallucinations, memory loss, substance abuse and suicidal ideas. The patient does not have insomnia and is not nervous/anxious.    Allergic/Immunologic: Negative for environmental allergies and hives.     Objective: /66 (BP Location: Left arm, Patient Position: Sitting, BP Method: Medium (Automatic))   Pulse 91   Ht 5' (1.524 m)   Wt 87.5 kg (192 lb 14.4 oz)   LMP 08/01/2022 (Exact Date)   SpO2 97%   BMI 37.67 kg/m²      Physical Exam  Constitutional:       Appearance: She is well-developed.   HENT:      Head: Normocephalic.   Eyes:      Pupils: Pupils are equal, round, and reactive to light.   Neck:      Thyroid: No thyromegaly.      Vascular: Normal carotid pulses. No carotid bruit, hepatojugular reflux or JVD.   Cardiovascular:      Rate and Rhythm: Normal rate and regular rhythm.      Pulses: Intact distal pulses.      Heart sounds: Murmur heard.   Diastolic murmur is present with a grade of 1/4.     No friction rub. No gallop.   Pulmonary:      Effort: Pulmonary effort is normal. No tachypnea or respiratory distress.      Breath sounds: Normal breath sounds. No wheezing or rales.   Chest:      Chest wall: No tenderness.   Abdominal:      General: Bowel sounds are normal. There is no distension.      Palpations: Abdomen is soft. There is no mass.      Tenderness: There is no abdominal tenderness. There is no guarding or rebound.   Musculoskeletal:         General: No tenderness. Normal range of motion.      Cervical back: Normal range of motion.   Lymphadenopathy:      Cervical: No cervical adenopathy.   Skin:     General: Skin is warm.      Findings: No erythema or rash.   Neurological:      Mental Status: She is alert and oriented to person, place, and time.      Cranial Nerves: No cranial nerve deficit.      Coordination: Coordination normal.    Psychiatric:         Behavior: Behavior normal.         Thought Content: Thought content normal.         Judgment: Judgment normal.   Reviewed recent Echo and Raul notes    Assessment:     1. S/P mitral valve repair    2. Class 2 severe obesity due to excess calories with serious comorbidity and body mass index (BMI) of 35.0 to 35.9 in adult    3. Hypertension, essential    4. Pre-existing essential hypertension during pregnancy in second trimester    5. Chronic fatigue    6. Nonrheumatic mitral valve stenosis        Plan:   Discussed diet , achieving and maintaining ideal body weight, and exercise.   We reviewed meds in detail.  Reassured-Discussed goals, options, plan.  We have discussed the need for endocarditis prophylaxis.  We discussed that gradients higher but MVA may not be different gradients may be higher due to increased volume of pregnancy and higher HR.     Re check Echo when pregnancy over; during pregnancy may need diuretic if mode edema or SOB      King was seen today for establish care and follow-up.    Diagnoses and all orders for this visit:    S/P mitral valve repair    Class 2 severe obesity due to excess calories with serious comorbidity and body mass index (BMI) of 35.0 to 35.9 in adult    Hypertension, essential    Pre-existing essential hypertension during pregnancy in second trimester    Chronic fatigue    Nonrheumatic mitral valve stenosis            Follow up for She is scheduled to see me again in March she says.

## 2023-02-13 NOTE — ED PROVIDER NOTES
Encounter Date: 2023       History     Chief Complaint   Patient presents with    Leg Pain     Right leg is cramping       HPI  King Botello is a 37 y.o. [id number] at 28w1d presents complaining of muscle cramping in right leg. Patient reports having bad muscle cramps this pregnancy that usually self resolve. However, this muscle cramp has lasted about 3 days, it woke her from sleep. She denies any swelling in either leg. Still able to walk. Denies taking anything at home for the pain. Her  tried to massage the pain away yesterday and told her he felt a knot in the leg.     This IUP is complicated by h/o csX1, AMA, cHTN (labetalol 100 BID, aspirin), h/o of MVP with repair.  Patient denies contractions, denies vaginal bleeding, denies LOF.   Fetal Movement: normal.     Review of patient's allergies indicates:   Allergen Reactions    Doxycycline      Abdomen pain severe    Tetanus vaccines and toxoid Rash     Past Medical History:   Diagnosis Date    Abnormal Pap smear     Colposcopy    Bradycardia 2020    Class 2 obesity due to excess calories with body mass index (BMI) of 35.0 to 35.9 in adult 3/9/2021    Hypertension, essential 3/16/2021    Menarche     Age of onset 11    Missed ab 2021    Mitral valve prolapse     Previous  delivery affecting pregnancy, antepartum 2020    S/P suction D&C 2021    Status post mitral valve annuloplasty 2017     Past Surgical History:   Procedure Laterality Date     SECTION      DILATION AND CURETTAGE OF UTERUS USING SUCTION N/A 2021    Procedure: DILATION AND CURETTAGE, UTERUS, USING SUCTION;  Surgeon: Mane Moreno MD;  Location: [facility] OR;  Service: OB/GYN;  Laterality: N/A;    MITRAL VALVE REPAIR       Family History   Problem Relation Age of Onset    Heart disease Mother     Hypertension Mother     Hyperlipidemia Mother     Diabetes Mother     Cancer Mother     Hyperlipidemia Father     Hypertension Father     Breast  cancer Paternal Aunt     Colon cancer Neg Hx     Ovarian cancer Neg Hx      Social History     Tobacco Use    Smoking status: Never    Smokeless tobacco: Never   Substance Use Topics    Alcohol use: Not Currently     Comment: not since +UPT    Drug use: No     Review of Systems   Constitutional:  Negative for fever.   HENT:  Negative for sore throat.    Respiratory:  Negative for shortness of breath.    Cardiovascular:  Negative for chest pain.   Gastrointestinal:  Negative for diarrhea.   Genitourinary:  Negative for difficulty urinating.   Musculoskeletal:  Positive for myalgias (right calf pain x 3 days). Negative for arthralgias.   Skin:  Negative for color change.   Neurological:  Negative for light-headedness and headaches.   Psychiatric/Behavioral:  Negative for confusion.    All other systems reviewed and are negative.    Physical Exam     Initial Vitals   BP Pulse Resp Temp SpO2   02/13/23 1646 02/13/23 1645 02/13/23 1646 02/13/23 1646 02/13/23 1645   111/66 98 16 98.5 °F (36.9 °C) 97 %      MAP       --                Physical Exam    Vitals reviewed.  Constitutional: She appears well-developed and well-nourished. No distress.   HENT:   Head: Normocephalic and atraumatic.   Eyes: EOM are normal.   Cardiovascular:  Normal rate.           Pulmonary/Chest: She has no wheezes.   Abdominal: Abdomen is soft. There is no abdominal tenderness.   Musculoskeletal:         General: Normal range of motion.      Comments: No lower extremity pitting edema bilaterally. Negative Aiden's sign bilaterally. Right gastrocnemius tender to palpation.     Neurological: She is alert and oriented to person, place, and time.   Skin: Skin is warm, dry and intact. No rash noted.   Psychiatric: She has a normal mood and affect. Her speech is normal and behavior is normal.       ED Course   Fetal non-stress test    Date/Time: 2/13/2023 6:25 PM  Performed by: Cata Tong MD  Authorized by: Sharon Olvera MD     Nonstress Test:      Variability:  6-25 BPM    Decelerations:  None    Accelerations:  15 bpm    Baseline:  135    Contractions:  Not present  Labs Reviewed   COMPREHENSIVE METABOLIC PANEL - Abnormal; Notable for the following components:       Result Value    CO2 21 (*)     Albumin 2.9 (*)     All other components within normal limits   MAGNESIUM - Abnormal; Notable for the following components:    Magnesium 1.5 (*)     All other components within normal limits          Imaging Results    None          Medications - No data to display  Medical Decision Making:   Initial Assessment:   36 yo  at 28w1d presenting with 3 day history of right calf pain  Differential Diagnosis:   DVT  Pulled muscle  Clinical Tests:   Lab Tests: Ordered and Reviewed  The following lab test(s) were unremarkable: CBC and CMP       <> Summary of Lab: unremarkable  Radiological Study: Ordered and Reviewed  ED Management:   at 28w1d with leg pain  VSS and wnl, no acute distress  Exam benign, low suspicion for DVT  CMP, Mag wnl  LE doppler ultrasound negative  Most likely etiology benign leg cramping  Supportive care measures and adequate hydration discussed with patient  NST R&R and appropriate for GA  [place] without contractions  Strict ED return precautions discussed with patient  All questions answered  Patient discharged in stable condition      Cata Tong MD   OB/GYN PGY1  [facility]              Attending Attestation:   Physician Attestation Statement for Resident:  As the supervising MD   Physician Attestation Statement: I have personally seen and examined this patient.   I agree with the above history.  -:   As the supervising MD I agree with the above PE.     As the supervising MD I agree with the above treatment, course, plan, and disposition.   -: I agree with the above edited resident note. Pt seen and examined.   Lower extremity dopplers neg for DVT  Electrolytes significant for very mildly low Mg at 1.5, otherwise  unremarkable  Pt reassured of benign calf pain, advised to use tylenol, massage, warm compress, stay well hydrated.   Stable for discharge home. All questions answered.    Sharon Olvera MD  OB Hospitalist  2/13/2023    I was personally present during the critical portions of the procedure(s) performed by the resident and was immediately available in the ED to provide services and assistance as needed during the entire procedure.  I have reviewed and agree with the residents interpretation of the following: lab data.  I have reviewed the following: old records at this facility.                           Clinical Impression:   Final diagnoses:  [Z3A.28] 28 weeks gestation of pregnancy  [M79.604] Right leg pain (Primary)               Cata Tong MD  Resident  02/13/23 1825       Sharon Olvera MD  02/13/23 [id number]

## 2023-02-14 ENCOUNTER — OFFICE VISIT (OUTPATIENT)
Dept: CARDIOLOGY | Facility: CLINIC | Age: 38
End: 2023-02-14
Payer: COMMERCIAL

## 2023-02-14 ENCOUNTER — HOSPITAL ENCOUNTER (OUTPATIENT)
Dept: CARDIOLOGY | Facility: CLINIC | Age: 38
Discharge: HOME | End: 2023-02-14
Payer: COMMERCIAL

## 2023-02-14 VITALS
HEIGHT: 60 IN | DIASTOLIC BLOOD PRESSURE: 66 MMHG | WEIGHT: 192.88 LBS | SYSTOLIC BLOOD PRESSURE: 117 MMHG | OXYGEN SATURATION: 97 % | HEART RATE: 91 BPM | BODY MASS INDEX: 37.87 KG/M2

## 2023-02-14 DIAGNOSIS — R53.82 CHRONIC FATIGUE: ICD-10-CM

## 2023-02-14 DIAGNOSIS — I10 HYPERTENSION, ESSENTIAL: ICD-10-CM

## 2023-02-14 DIAGNOSIS — R00.1 BRADYCARDIA: ICD-10-CM

## 2023-02-14 DIAGNOSIS — E66.01 CLASS 2 SEVERE OBESITY DUE TO EXCESS CALORIES WITH SERIOUS COMORBIDITY AND BODY MASS INDEX (BMI) OF 35.0 TO 35.9 IN ADULT: ICD-10-CM

## 2023-02-14 DIAGNOSIS — Z98.890 S/P MITRAL VALVE REPAIR: Primary | ICD-10-CM

## 2023-02-14 DIAGNOSIS — O10.012 PRE-EXISTING ESSENTIAL HYPERTENSION DURING PREGNANCY IN SECOND TRIMESTER: ICD-10-CM

## 2023-02-14 DIAGNOSIS — I34.2 NONRHEUMATIC MITRAL VALVE STENOSIS: ICD-10-CM

## 2023-02-14 DIAGNOSIS — Z98.890 STATUS POST MITRAL VALVE ANNULOPLASTY: ICD-10-CM

## 2023-02-14 PROCEDURE — 99999 PR PBB SHADOW E&M-EST. PATIENT-LVL IV: CPT | Mod: PBBFAC,,, | Performed by: INTERNAL MEDICINE

## 2023-02-14 PROCEDURE — 3078F DIAST BP <80 MM HG: CPT | Mod: CPTII,S$GLB,, | Performed by: INTERNAL MEDICINE

## 2023-02-14 PROCEDURE — 99214 PR OFFICE/OUTPT VISIT, EST, LEVL IV, 30-39 MIN: ICD-10-PCS | Mod: S$GLB,,, | Performed by: INTERNAL MEDICINE

## 2023-02-14 PROCEDURE — 93000 EKG 12-LEAD: ICD-10-PCS | Mod: S$GLB,,, | Performed by: INTERNAL MEDICINE

## 2023-02-14 PROCEDURE — 3008F BODY MASS INDEX DOCD: CPT | Mod: CPTII,S$GLB,, | Performed by: INTERNAL MEDICINE

## 2023-02-14 PROCEDURE — 99999 PR PBB SHADOW E&M-EST. PATIENT-LVL IV: ICD-10-PCS | Mod: PBBFAC,,, | Performed by: INTERNAL MEDICINE

## 2023-02-14 PROCEDURE — 99214 OFFICE O/P EST MOD 30 MIN: CPT | Mod: S$GLB,,, | Performed by: INTERNAL MEDICINE

## 2023-02-14 PROCEDURE — 3008F PR BODY MASS INDEX (BMI) DOCUMENTED: ICD-10-PCS | Mod: CPTII,S$GLB,, | Performed by: INTERNAL MEDICINE

## 2023-02-14 PROCEDURE — 3078F PR MOST RECENT DIASTOLIC BLOOD PRESSURE < 80 MM HG: ICD-10-PCS | Mod: CPTII,S$GLB,, | Performed by: INTERNAL MEDICINE

## 2023-02-14 PROCEDURE — 1159F MED LIST DOCD IN RCRD: CPT | Mod: CPTII,S$GLB,, | Performed by: INTERNAL MEDICINE

## 2023-02-14 PROCEDURE — 93000 ELECTROCARDIOGRAM COMPLETE: CPT | Mod: S$GLB,,, | Performed by: INTERNAL MEDICINE

## 2023-02-14 PROCEDURE — 3074F PR MOST RECENT SYSTOLIC BLOOD PRESSURE < 130 MM HG: ICD-10-PCS | Mod: CPTII,S$GLB,, | Performed by: INTERNAL MEDICINE

## 2023-02-14 PROCEDURE — 3074F SYST BP LT 130 MM HG: CPT | Mod: CPTII,S$GLB,, | Performed by: INTERNAL MEDICINE

## 2023-02-14 PROCEDURE — 1159F PR MEDICATION LIST DOCUMENTED IN MEDICAL RECORD: ICD-10-PCS | Mod: CPTII,S$GLB,, | Performed by: INTERNAL MEDICINE

## 2023-02-14 NOTE — PATIENT INSTRUCTIONS
Discussed diet , achieving and maintaining ideal body weight, and exercise.   We reviewed meds in detail.  Reassured-Discussed goals, options, plan.  We have discussed the need for endocarditis prophylaxis.  We discussed that gradients higher but MVA may not be different gradients may be higher due to increased volume of pregnancy and higher HR.     Re check Echo when pregnancy over; during pregnancy may need diuretic if mode edema or SOB

## 2023-02-14 NOTE — DISCHARGE INSTRUCTIONS
Contact your primary OB or after hours at [phone] if you experience any of the following:    Contractions every 7-10 minutes for 1 or more hours.   A sudden gush or constant leaking of fluid.  Heavy vaginal bleeding.   If you experience a constant headache, blurry vision, pain underneath your right rib, or sudden swelling of your hands, feet, and face.   If you are 28 weeks pregnant or greater, you can measure kick counts with a goal of 10 or more movements within 2 hours.     Remember to stay hydrated; drink 8-10 bottles of water a day.     Maintain all follow-up appointments.

## 2023-02-17 ENCOUNTER — PATIENT MESSAGE (OUTPATIENT)
Dept: OBSTETRICS AND GYNECOLOGY | Facility: CLINIC | Age: 38
End: 2023-02-17

## 2023-02-17 ENCOUNTER — ROUTINE PRENATAL (OUTPATIENT)
Dept: OBSTETRICS AND GYNECOLOGY | Facility: CLINIC | Age: 38
End: 2023-02-17
Attending: OBSTETRICS & GYNECOLOGY
Payer: COMMERCIAL

## 2023-02-17 ENCOUNTER — TELEPHONE (OUTPATIENT)
Dept: OBSTETRICS AND GYNECOLOGY | Facility: CLINIC | Age: 38
End: 2023-02-17

## 2023-02-17 ENCOUNTER — LAB VISIT (OUTPATIENT)
Dept: LAB | Facility: OTHER | Age: 38
End: 2023-02-17
Attending: OBSTETRICS & GYNECOLOGY
Payer: COMMERCIAL

## 2023-02-17 VITALS — SYSTOLIC BLOOD PRESSURE: 104 MMHG | WEIGHT: 193.56 LBS | BODY MASS INDEX: 37.8 KG/M2 | DIASTOLIC BLOOD PRESSURE: 74 MMHG

## 2023-02-17 DIAGNOSIS — Z3A.24 PREGNANCY WITH 24 COMPLETED WEEKS GESTATION: ICD-10-CM

## 2023-02-17 DIAGNOSIS — Z3A.28 PREGNANCY WITH 28 COMPLETED WEEKS GESTATION: ICD-10-CM

## 2023-02-17 DIAGNOSIS — O10.912 PRE-EXISTING HYPERTENSION AFFECTING PREGNANCY IN SECOND TRIMESTER: ICD-10-CM

## 2023-02-17 DIAGNOSIS — O09.521 MULTIGRAVIDA OF ADVANCED MATERNAL AGE IN FIRST TRIMESTER: ICD-10-CM

## 2023-02-17 DIAGNOSIS — O09.521 MULTIGRAVIDA OF ADVANCED MATERNAL AGE IN FIRST TRIMESTER: Primary | ICD-10-CM

## 2023-02-17 DIAGNOSIS — O99.810 ABNORMAL GLUCOSE AFFECTING PREGNANCY: Primary | ICD-10-CM

## 2023-02-17 LAB
BASOPHILS # BLD AUTO: 0.02 K/UL (ref 0–0.2)
BASOPHILS NFR BLD: 0.2 % (ref 0–1.9)
DIFFERENTIAL METHOD: ABNORMAL
EOSINOPHIL # BLD AUTO: 0.1 K/UL (ref 0–0.5)
EOSINOPHIL NFR BLD: 0.4 % (ref 0–8)
ERYTHROCYTE [DISTWIDTH] IN BLOOD BY AUTOMATED COUNT: 14.3 % (ref 11.5–14.5)
GLUCOSE SERPL-MCNC: 154 MG/DL (ref 70–140)
HCT VFR BLD AUTO: 35.8 % (ref 37–48.5)
HGB BLD-MCNC: 11.9 G/DL (ref 12–16)
IMM GRANULOCYTES # BLD AUTO: 0.05 K/UL (ref 0–0.04)
IMM GRANULOCYTES NFR BLD AUTO: 0.4 % (ref 0–0.5)
LYMPHOCYTES # BLD AUTO: 1.8 K/UL (ref 1–4.8)
LYMPHOCYTES NFR BLD: 15.5 % (ref 18–48)
MCH RBC QN AUTO: 30.8 PG (ref 27–31)
MCHC RBC AUTO-ENTMCNC: 33.2 G/DL (ref 32–36)
MCV RBC AUTO: 93 FL (ref 82–98)
MONOCYTES # BLD AUTO: 0.4 K/UL (ref 0.3–1)
MONOCYTES NFR BLD: 3.7 % (ref 4–15)
NEUTROPHILS # BLD AUTO: 9 K/UL (ref 1.8–7.7)
NEUTROPHILS NFR BLD: 79.8 % (ref 38–73)
NRBC BLD-RTO: 0 /100 WBC
PLATELET # BLD AUTO: 249 K/UL (ref 150–450)
PMV BLD AUTO: 10.4 FL (ref 9.2–12.9)
RBC # BLD AUTO: 3.86 M/UL (ref 4–5.4)
WBC # BLD AUTO: 11.34 K/UL (ref 3.9–12.7)

## 2023-02-17 PROCEDURE — 99999 PR PBB SHADOW E&M-EST. PATIENT-LVL II: CPT | Mod: PBBFAC,,, | Performed by: OBSTETRICS & GYNECOLOGY

## 2023-02-17 PROCEDURE — 0502F PR SUBSEQUENT PRENATAL CARE: ICD-10-PCS | Mod: CPTII,S$GLB,, | Performed by: OBSTETRICS & GYNECOLOGY

## 2023-02-17 PROCEDURE — 36415 COLL VENOUS BLD VENIPUNCTURE: CPT | Performed by: OBSTETRICS & GYNECOLOGY

## 2023-02-17 PROCEDURE — 99999 PR PBB SHADOW E&M-EST. PATIENT-LVL II: ICD-10-PCS | Mod: PBBFAC,,, | Performed by: OBSTETRICS & GYNECOLOGY

## 2023-02-17 PROCEDURE — 82950 GLUCOSE TEST: CPT | Performed by: OBSTETRICS & GYNECOLOGY

## 2023-02-17 PROCEDURE — 85025 COMPLETE CBC W/AUTO DIFF WBC: CPT | Performed by: OBSTETRICS & GYNECOLOGY

## 2023-02-17 PROCEDURE — 0502F SUBSEQUENT PRENATAL CARE: CPT | Mod: CPTII,S$GLB,, | Performed by: OBSTETRICS & GYNECOLOGY

## 2023-02-17 NOTE — PROGRESS NOTES
Reports good FM.  Denies bleeding and cramping.  +FHTs with doppler.  BP today on Labetalol: 104/74.  Seen in [facility] 2/13/23 for evaluation of calf pain, now resolved.  Reviewed 27 week [facility] sono with EFW 45%.  DM screen, CBC today.  To begin weekly testing at 32 weeks.  [facility] sono 3/8/23.  Rx Breast pump.  Return 2 weeks.

## 2023-02-17 NOTE — TELEPHONE ENCOUNTER
Called patient:    Discussed results of DM screen: 154    Need 3 hour GTT - instructions reviewed- scheduled for 2/23/23.    Discussed mild anemia: to add in iron every other day.

## 2023-02-20 ENCOUNTER — LAB VISIT (OUTPATIENT)
Dept: LAB | Facility: OTHER | Age: 38
End: 2023-02-20
Attending: OBSTETRICS & GYNECOLOGY
Payer: COMMERCIAL

## 2023-02-20 ENCOUNTER — PATIENT MESSAGE (OUTPATIENT)
Dept: OBSTETRICS AND GYNECOLOGY | Facility: CLINIC | Age: 38
End: 2023-02-20
Payer: COMMERCIAL

## 2023-02-20 DIAGNOSIS — O99.810 ABNORMAL GLUCOSE AFFECTING PREGNANCY: ICD-10-CM

## 2023-02-20 LAB
GLUCOSE SERPL-MCNC: 114 MG/DL
GLUCOSE SERPL-MCNC: 131 MG/DL
GLUCOSE SERPL-MCNC: 187 MG/DL
GLUCOSE SERPL-MCNC: 82 MG/DL (ref 70–110)

## 2023-02-20 PROCEDURE — 82952 GTT-ADDED SAMPLES: CPT | Performed by: OBSTETRICS & GYNECOLOGY

## 2023-02-20 PROCEDURE — 36415 COLL VENOUS BLD VENIPUNCTURE: CPT | Performed by: OBSTETRICS & GYNECOLOGY

## 2023-02-20 PROCEDURE — 82951 GLUCOSE TOLERANCE TEST (GTT): CPT | Performed by: OBSTETRICS & GYNECOLOGY

## 2023-02-26 ENCOUNTER — PATIENT MESSAGE (OUTPATIENT)
Dept: OTHER | Facility: OTHER | Age: 38
End: 2023-02-26
Payer: COMMERCIAL

## 2023-03-03 ENCOUNTER — ROUTINE PRENATAL (OUTPATIENT)
Dept: OBSTETRICS AND GYNECOLOGY | Facility: CLINIC | Age: 38
End: 2023-03-03
Attending: OBSTETRICS & GYNECOLOGY
Payer: COMMERCIAL

## 2023-03-03 ENCOUNTER — PATIENT MESSAGE (OUTPATIENT)
Dept: MATERNAL FETAL MEDICINE | Facility: CLINIC | Age: 38
End: 2023-03-03
Payer: COMMERCIAL

## 2023-03-03 VITALS — WEIGHT: 194 LBS | SYSTOLIC BLOOD PRESSURE: 110 MMHG | DIASTOLIC BLOOD PRESSURE: 80 MMHG | BODY MASS INDEX: 37.89 KG/M2

## 2023-03-03 DIAGNOSIS — O09.523 MULTIGRAVIDA OF ADVANCED MATERNAL AGE IN THIRD TRIMESTER: Primary | ICD-10-CM

## 2023-03-03 PROCEDURE — 0502F PR SUBSEQUENT PRENATAL CARE: ICD-10-PCS | Mod: CPTII,S$GLB,, | Performed by: OBSTETRICS & GYNECOLOGY

## 2023-03-03 PROCEDURE — 0502F SUBSEQUENT PRENATAL CARE: CPT | Mod: CPTII,S$GLB,, | Performed by: OBSTETRICS & GYNECOLOGY

## 2023-03-03 PROCEDURE — 99999 PR PBB SHADOW E&M-EST. PATIENT-LVL II: ICD-10-PCS | Mod: PBBFAC,,, | Performed by: OBSTETRICS & GYNECOLOGY

## 2023-03-03 PROCEDURE — 99999 PR PBB SHADOW E&M-EST. PATIENT-LVL II: CPT | Mod: PBBFAC,,, | Performed by: OBSTETRICS & GYNECOLOGY

## 2023-03-03 NOTE — PROGRESS NOTES
Good FM.  Denies bleeding and CTX.  BP continues to be well controlled on Labetalol: 110/80.  Discussed one elevated value on 3 hour GTT - to reduce carb / sugar intake.  Follow-up [facility] sono 3/8/23.  To begin fetal monitoring at 32 weeks.  Return 2 weeks.  [facility] bid.

## 2023-03-05 ENCOUNTER — HOSPITAL ENCOUNTER (EMERGENCY)
Facility: OTHER | Age: 38
Discharge: HOME | End: 2023-03-05
Attending: OBSTETRICS & GYNECOLOGY
Payer: COMMERCIAL

## 2023-03-05 VITALS
HEART RATE: 94 BPM | SYSTOLIC BLOOD PRESSURE: 111 MMHG | TEMPERATURE: 98 F | OXYGEN SATURATION: 96 % | DIASTOLIC BLOOD PRESSURE: 68 MMHG | RESPIRATION RATE: 16 BRPM

## 2023-03-05 DIAGNOSIS — Z3A.31 31 WEEKS GESTATION OF PREGNANCY: ICD-10-CM

## 2023-03-05 DIAGNOSIS — N93.9 VAGINAL SPOTTING: Primary | ICD-10-CM

## 2023-03-05 PROCEDURE — 59025 FETAL NON-STRESS TEST: CPT | Mod: 26,,, | Performed by: OBSTETRICS & GYNECOLOGY

## 2023-03-05 PROCEDURE — 99284 PR EMERGENCY DEPT VISIT,LEVEL IV: ICD-10-PCS | Mod: 25,,, | Performed by: OBSTETRICS & GYNECOLOGY

## 2023-03-05 PROCEDURE — 59025 PR FETAL 2N-STRESS TEST: ICD-10-PCS | Mod: 26,,, | Performed by: OBSTETRICS & GYNECOLOGY

## 2023-03-05 PROCEDURE — 99284 EMERGENCY DEPT VISIT MOD MDM: CPT | Mod: 25,,, | Performed by: OBSTETRICS & GYNECOLOGY

## 2023-03-05 PROCEDURE — 59025 FETAL NON-STRESS TEST: CPT

## 2023-03-05 PROCEDURE — 99284 EMERGENCY DEPT VISIT MOD MDM: CPT | Mod: 25

## 2023-03-05 NOTE — DISCHARGE INSTRUCTIONS
Contact your primary OB or after hours at [phone] if you experience any of the following:    Contractions every 7-10 minutes for 1 or more hours.   A sudden gush or constant leaking of fluid.  Heavy vaginal bleeding.   If you experience a constant headache, blurry vision, pain underneath your right rib, or sudden swelling of your hands, feet, and face.   If you are 28 weeks pregnant or greater, you can measure kick counts with a goal of 10 or more movements within 2 hours.     Remember to stay hydrated; drink 8-10 bottles of water a day.     Maintain all follow-up appointments.

## 2023-03-05 NOTE — ED PROVIDER NOTES
Encounter Date: 3/5/2023       History     Chief Complaint   Patient presents with    Vaginal Bleeding     spotting     HPI    King Botello is a 37 y.o. [id number] at 31w0d presents complaining of vaginal spotting. Reports woke this AM and noticed a small amount of bright red blood on the toilet tissue when she wiped. She denies any bleeding on her underwear or in the toilet. She denies any cramping or leakage of fluid. Reports she had intercourse last night with no bleeding immediately postcoital.    This IUP is complicated by [place], undesired fertility, history of LTCS, cHTN, anemia.  Patient denies contractions, reports vaginal bleeding, denies LOF.   Fetal Movement: normal.     Review of patient's allergies indicates:   Allergen Reactions    Doxycycline      Abdomen pain severe    Tetanus vaccines and toxoid Rash     Past Medical History:   Diagnosis Date    Abnormal Pap smear     Colposcopy    Bradycardia 2020    Class 2 obesity due to excess calories with body mass index (BMI) of 35.0 to 35.9 in adult 3/9/2021    Hypertension, essential 3/16/2021    Menarche     Age of onset 11    Missed ab 2021    Mitral valve prolapse     Previous  delivery affecting pregnancy, antepartum 2020    S/P suction D&C 2021    Status post mitral valve annuloplasty 2017     Past Surgical History:   Procedure Laterality Date     SECTION      DILATION AND CURETTAGE OF UTERUS USING SUCTION N/A 2021    Procedure: DILATION AND CURETTAGE, UTERUS, USING SUCTION;  Surgeon: Mane Moreno MD;  Location: [facility];  Service: OB/GYN;  Laterality: N/A;    MITRAL VALVE REPAIR       Family History   Problem Relation Age of Onset    Heart disease Mother     Hypertension Mother     Hyperlipidemia Mother     Diabetes Mother     Cancer Mother     Hyperlipidemia Father     Hypertension Father     Breast cancer Paternal Aunt     Colon cancer Neg Hx     Ovarian cancer Neg Hx      Social History     Tobacco  Use    Smoking status: Never    Smokeless tobacco: Never   Substance Use Topics    Alcohol use: Not Currently     Comment: not since +UPT    Drug use: No     Review of Systems   Constitutional:  Negative for fever.   Respiratory:  Negative for shortness of breath.    Cardiovascular:  Negative for chest pain.   Gastrointestinal:  Negative for abdominal pain.   Genitourinary:  Positive for vaginal bleeding.   Neurological:  Negative for headaches.   Psychiatric/Behavioral:  Negative for agitation.      Physical Exam     Initial Vitals [03/05/23 0808]   BP Pulse Resp Temp SpO2   114/72 91 16 98.3 °F (36.8 °C) 97 %      MAP       --         Physical Exam    Nursing note and vitals reviewed.  Constitutional: She appears well-developed and well-nourished.   HENT:   Head: Normocephalic and atraumatic.   Eyes: Conjunctivae and EOM are normal. Pupils are equal, round, and reactive to light.   Neck: Neck supple.   Normal range of motion.  Cardiovascular:  Normal rate.           Pulmonary/Chest: No respiratory distress.   Genitourinary:    Vagina normal.     Musculoskeletal:         General: Normal range of motion.      Cervical back: Normal range of motion and neck supple.     Neurological: She is alert and oriented to person, place, and time. She has normal strength and normal reflexes.   Skin: Skin is warm and dry.   Psychiatric: She has a normal mood and affect. Her behavior is normal. Judgment and thought content normal.     OB LABOR EXAM:   Pre-Term Labor: No.   Membranes ruptured: No.   Method: Sterile vaginal exam per MD and Sterile speculum exam per MD.   Vaginal Bleeding: none present.     Dilatation: 0.   Station: -5.   Effacement: 40%.   Amniotic Fluid Color: no fluid.     Comments: No blood in the vault or on the cervix. No bleeding or leakage of fluid with valsalva.      ED Course   Obtain Fetal nonstress test (NST)    Date/Time: 3/5/2023 8:40 AM  Performed by: Phylicia Gilbert MD  Authorized by: Phylicia AKERS  MD [place]     Nonstress Test:     Variability:  6-25 BPM    Decelerations:  None    Accelerations:  15 bpm    Baseline:  140    Uterine Irritability: No      Contractions:  Not present  Biophysical Profile:     Nonstress Test Interpretation: reactive      Overall Impression:  Reassuring  Post-procedure:     Patient tolerance:  Patient tolerated the procedure well with no immediate complications  Labs Reviewed - No data to display       Imaging Results    None          Medications - No data to display  Medical Decision Making:   ED Management:  VSS   NST RR   SVE cl/th/hi  SSE: no evidence of bleeding or old blood     Discharged home in stable condition. Has follow up with MFM later this week. Strict ALINE return precautions reviewed.   Other:   I have discussed this case with another health care provider.          Attending Attestation:   Physician Attestation Statement for Resident:  As the supervising MD   Physician Attestation Statement: I have personally seen and examined this patient.   I agree with the above history.  -:   As the supervising MD I agree with the above PE.     As the supervising MD I agree with the above treatment, course, plan, and disposition.   -:   NST  I independently reviewed the fetal non-stress test with the following interpretation:  140 BPM baseline  Variability: moderate  Accelerations: present  Decelerations: absent  Contractions: none  Category 1    Clinical Interpretation:reactive    Patient evaluated and found to be stable, agree with resident's assessment and plan.  No active bleeding, bleeding after intercourse. No contractions on monitor. Cat 1, reactive tracing.  Rh+.  Reassurance provided.  R/o for PTL with closed cervix.  Bleeding precautions reviewed.    I was personally present during the critical portions of the procedure(s) performed by the resident and was immediately available in the ED to provide services and assistance as needed during the entire procedure.             140                 Clinical Impression:   Final diagnoses:  [N93.9] Vaginal spotting (Primary)  [Z3A.31] 31 weeks gestation of pregnancy        ED Disposition Condition    Discharge Stable          ED Prescriptions    None       Follow-up Information    None          Chery Bella MD  03/05/23 [id number]

## 2023-03-06 ENCOUNTER — PATIENT MESSAGE (OUTPATIENT)
Dept: OBSTETRICS AND GYNECOLOGY | Facility: CLINIC | Age: 38
End: 2023-03-06
Payer: COMMERCIAL

## 2023-03-07 ENCOUNTER — ROUTINE PRENATAL (OUTPATIENT)
Dept: OBSTETRICS AND GYNECOLOGY | Facility: CLINIC | Age: 38
End: 2023-03-07
Attending: OBSTETRICS & GYNECOLOGY
Payer: COMMERCIAL

## 2023-03-07 ENCOUNTER — PATIENT MESSAGE (OUTPATIENT)
Dept: MATERNAL FETAL MEDICINE | Facility: CLINIC | Age: 38
End: 2023-03-07
Payer: COMMERCIAL

## 2023-03-07 VITALS
DIASTOLIC BLOOD PRESSURE: 76 MMHG | WEIGHT: 192.88 LBS | SYSTOLIC BLOOD PRESSURE: 110 MMHG | BODY MASS INDEX: 37.67 KG/M2

## 2023-03-07 DIAGNOSIS — O09.523 MULTIGRAVIDA OF ADVANCED MATERNAL AGE IN THIRD TRIMESTER: Primary | ICD-10-CM

## 2023-03-07 DIAGNOSIS — N94.9 ROUND LIGAMENT PAIN: ICD-10-CM

## 2023-03-07 DIAGNOSIS — Z3A.31 PREGNANCY WITH 31 COMPLETED WEEKS GESTATION: ICD-10-CM

## 2023-03-07 DIAGNOSIS — O10.912 PRE-EXISTING HYPERTENSION AFFECTING PREGNANCY IN SECOND TRIMESTER: ICD-10-CM

## 2023-03-07 PROCEDURE — 99999 PR PBB SHADOW E&M-EST. PATIENT-LVL III: ICD-10-PCS | Mod: PBBFAC,,, | Performed by: OBSTETRICS & GYNECOLOGY

## 2023-03-07 PROCEDURE — 99999 PR PBB SHADOW E&M-EST. PATIENT-LVL III: CPT | Mod: PBBFAC,,, | Performed by: OBSTETRICS & GYNECOLOGY

## 2023-03-07 PROCEDURE — 0502F SUBSEQUENT PRENATAL CARE: CPT | Mod: CPTII,S$GLB,, | Performed by: OBSTETRICS & GYNECOLOGY

## 2023-03-07 PROCEDURE — 0502F PR SUBSEQUENT PRENATAL CARE: ICD-10-PCS | Mod: CPTII,S$GLB,, | Performed by: OBSTETRICS & GYNECOLOGY

## 2023-03-07 NOTE — PROGRESS NOTES
Reports good FM.  Reports constant ache in LLQ for past several days, radiating into groin, seems better today- consistent with round ligament discomfort.  Abdomen soft, gravid, non-tender.  Cx: closed / thick / posterior.  Seen on L&D 3/5 with spotting which resolved shortly afterwards.  BP continues to be well controlled.  M sono / consult tomorrow.  Weekly fetal monitoring.  Return 2 weeks.  [facility] bid.  Received note for work.

## 2023-03-08 ENCOUNTER — OFFICE VISIT (OUTPATIENT)
Dept: MATERNAL FETAL MEDICINE | Facility: CLINIC | Age: 38
End: 2023-03-08
Attending: OBSTETRICS & GYNECOLOGY
Payer: COMMERCIAL

## 2023-03-08 ENCOUNTER — PROCEDURE VISIT (OUTPATIENT)
Dept: MATERNAL FETAL MEDICINE | Facility: CLINIC | Age: 38
End: 2023-03-08
Payer: COMMERCIAL

## 2023-03-08 VITALS
BODY MASS INDEX: 37.53 KG/M2 | HEIGHT: 60 IN | WEIGHT: 191.13 LBS | SYSTOLIC BLOOD PRESSURE: 112 MMHG | DIASTOLIC BLOOD PRESSURE: 75 MMHG

## 2023-03-08 DIAGNOSIS — O99.210 OBESITY IN PREGNANCY: ICD-10-CM

## 2023-03-08 DIAGNOSIS — O99.891 CURRENT MATERNAL CONDITION AFFECTING PREGNANCY: Primary | ICD-10-CM

## 2023-03-08 DIAGNOSIS — O10.013 PRE-EXISTING ESSENTIAL HYPERTENSION DURING PREGNANCY IN THIRD TRIMESTER: ICD-10-CM

## 2023-03-08 DIAGNOSIS — I34.1 MITRAL VALVE PROLAPSE: Chronic | ICD-10-CM

## 2023-03-08 DIAGNOSIS — Z3A.31 31 WEEKS GESTATION OF PREGNANCY: ICD-10-CM

## 2023-03-08 DIAGNOSIS — Z36.89 ENCOUNTER FOR ULTRASOUND TO ASSESS FETAL GROWTH: Primary | ICD-10-CM

## 2023-03-08 DIAGNOSIS — Z36.89 ENCOUNTER FOR ULTRASOUND TO ASSESS FETAL GROWTH: ICD-10-CM

## 2023-03-08 DIAGNOSIS — Z98.890 S/P MITRAL VALVE REPAIR: ICD-10-CM

## 2023-03-08 DIAGNOSIS — O10.019 PRE-EXISTING ESSENTIAL HYPERTENSION DURING PREGNANCY, ANTEPARTUM: ICD-10-CM

## 2023-03-08 PROCEDURE — 3078F DIAST BP <80 MM HG: CPT | Mod: CPTII,S$GLB,, | Performed by: OBSTETRICS & GYNECOLOGY

## 2023-03-08 PROCEDURE — 99215 OFFICE O/P EST HI 40 MIN: CPT | Mod: 25,S$GLB,, | Performed by: OBSTETRICS & GYNECOLOGY

## 2023-03-08 PROCEDURE — 3078F PR MOST RECENT DIASTOLIC BLOOD PRESSURE < 80 MM HG: ICD-10-PCS | Mod: CPTII,S$GLB,, | Performed by: OBSTETRICS & GYNECOLOGY

## 2023-03-08 PROCEDURE — 76816 OB US FOLLOW-UP PER FETUS: CPT | Mod: S$GLB,,, | Performed by: OBSTETRICS & GYNECOLOGY

## 2023-03-08 PROCEDURE — 3074F SYST BP LT 130 MM HG: CPT | Mod: CPTII,S$GLB,, | Performed by: OBSTETRICS & GYNECOLOGY

## 2023-03-08 PROCEDURE — 99215 PR OFFICE/OUTPT VISIT, EST, LEVL V, 40-54 MIN: ICD-10-PCS | Mod: 25,S$GLB,, | Performed by: OBSTETRICS & GYNECOLOGY

## 2023-03-08 PROCEDURE — 3008F PR BODY MASS INDEX (BMI) DOCUMENTED: ICD-10-PCS | Mod: CPTII,S$GLB,, | Performed by: OBSTETRICS & GYNECOLOGY

## 2023-03-08 PROCEDURE — 1159F PR MEDICATION LIST DOCUMENTED IN MEDICAL RECORD: ICD-10-PCS | Mod: CPTII,S$GLB,, | Performed by: OBSTETRICS & GYNECOLOGY

## 2023-03-08 PROCEDURE — 3074F PR MOST RECENT SYSTOLIC BLOOD PRESSURE < 130 MM HG: ICD-10-PCS | Mod: CPTII,S$GLB,, | Performed by: OBSTETRICS & GYNECOLOGY

## 2023-03-08 PROCEDURE — 3008F BODY MASS INDEX DOCD: CPT | Mod: CPTII,S$GLB,, | Performed by: OBSTETRICS & GYNECOLOGY

## 2023-03-08 PROCEDURE — 99999 PR PBB SHADOW E&M-EST. PATIENT-LVL III: ICD-10-PCS | Mod: PBBFAC,,, | Performed by: OBSTETRICS & GYNECOLOGY

## 2023-03-08 PROCEDURE — 99999 PR PBB SHADOW E&M-EST. PATIENT-LVL III: CPT | Mod: PBBFAC,,, | Performed by: OBSTETRICS & GYNECOLOGY

## 2023-03-08 PROCEDURE — 1159F MED LIST DOCD IN RCRD: CPT | Mod: CPTII,S$GLB,, | Performed by: OBSTETRICS & GYNECOLOGY

## 2023-03-08 PROCEDURE — 76816 PR  US,PREGNANT UTERUS,F/U,TRANSABD APP: ICD-10-PCS | Mod: S$GLB,,, | Performed by: OBSTETRICS & GYNECOLOGY

## 2023-03-08 NOTE — ASSESSMENT & PLAN NOTE
/75 mmhg and wt 86.7 kg    Meds: Labetalol 100 mg bid, Aspirin 81 mg daily     Recs:  -continue with close monitoring of patient's BP's for adjustment in antihypertensive medication as needed: goal is to have BP's < 140/90 mhg    -recommend f/u ultrasound and RT MFM MD visit in 4 wks: interval fetal growth, BPP - MFM staff will assist patient in scheduling appt     -recommend prenatal testing starting at 32 wks getation: Primary OB to order/facilitate

## 2023-03-08 NOTE — ASSESSMENT & PLAN NOTE
Patient without complaints of SOB, chest pain     Meds: Labetalol 100mg bid and low dose aspirin daily     Plan:  Follow up visit with Cardiology (Dr. Cordero) on 03-14-23 to assess patient's cardiac status in third trimester/recommendations for delivery

## 2023-03-08 NOTE — PROGRESS NOTES
MATERNAL-FETAL MEDICINE   FOLLOW UP NOTE      SUBJECTIVE:     Ms. Miss King Botello is a 37 y.o.  female with IUP at 31w3d who is seen in consultation by MFM for evaluation and management of:  Problem   Pre-Existing Essential Hypertension During Pregnancy, Antepartum   S/P Mitral Valve Repair       Previous notes reviewed.   No changes to medical, surgical, family, social, or obstetric history.    Interval history since last MFM visit: patient had recent vaginal spotting and was evaluated in ALINE - no concerns. Has since resolved   Reports some lower groin/pelvic pain - s/p visit with Dr. Moreno and believed to be round ligament pain.     Medications:  Current Outpatient Medications   Medication Instructions    amoxicillin (AMOXIL) 500 MG capsule TAKE FOUR CAPSULES BY MOUTH ONE HOUR BEFORE DENTAL WORK    aspirin (ECOTRIN) 81 mg, Oral, Daily    labetaloL (NORMODYNE) 100 mg, Oral, 2 times daily    prenatal vits62/FA/om3/dha/epa (PRENATAL GUMMY ORAL) Oral         Objective:   /75 (BP Location: Left arm, Patient Position: Sitting, BP Method: Large (Automatic))   Ht 5' (1.524 m)   Wt 86.7 kg (191 lb 2.2 oz)   LMP 2022 (Exact Date)   BMI 37.33 kg/m²     Ultrasound performed. See viewpoint for full ultrasound report.  1. 31w 3d caicedo intrauterine pregnancy  2. Interval fetal wnl (EFW = 1806 gms at 33% and AC 48%)  3. No fetal structural abnormalities appreciated for organs evaluated today:                    abdominal wall remains suboptimal secondary to fetal position: cord insertion prev seen wnl  4. Amniotic fluid volume wnl per MVP 4.0 cm     Patient counseled on today's ultrasound evaluation as noted above and recommendations for f/u ultrasound as well as fetal testing.       ASSESSMENT/PLAN:     37 y.o.  female with IUP at 31w3d gestation    Patient accompanied by significant other     S/P mitral valve repair  Patient without complaints of SOB, chest pain     Meds: Labetalol  100mg bid and low dose aspirin daily     Plan:  Follow up visit with Cardiology (Dr. Cordero) on 03-14-23 to assess patient's cardiac status in third trimester/recommendations for delivery     Pre-existing essential hypertension during pregnancy, antepartum  /75 mmhg and wt 86.7 kg    Meds: Labetalol 100 mg bid, Aspirin 81 mg daily     Patient counseled on today's BP, medications, and recommendations including continued close monitoring of BP's for persistent less than 140/90 mmhg and adjustment in medications as clinically indicated. She was counseled on f/u ultrasound for fetal growth, fetal testing starting at 32 wks gestation and approximated timing of delivery based on medical conditions.     Recs:  -continue with close monitoring of patient's BP's for adjustment in antihypertensive medication as needed: goal is to have BP's < 140/90 mhg    -recommend f/u ultrasound and RT MFM MD visit in 4 wks: interval fetal growth, BPP - MFM staff will assist patient in scheduling appt     -recommend prenatal testing starting at 32 wks getation: Primary OB to order/facilitate     -Timing of delivery: below are gene    Delivery timing:  No medications, no comorbid conditions: 39 0/7 - 39 6/7 weeks gestation  No medications, comorbid conditions: 38 0/7 - 38 6/7 weeks gestation  Controlled on single agent, no comorbid conditions*: 38 0/7 - 38 6/7 weeks gestation  Controlled on single agent, comorbid conditions*: 37 0/7 - 38 6/7 weeks gestation  Uncontrolled or requiring ? 2 medications: 36 0/7 - 37 6/7 weeks gestation  *Comorbid conditions include BMI >= 40, diabetes, and complex medical condition associated with placental dysfunction (ie lupus or other vascular disease)  Delivery may be recommended earlier pending results of fetal growth ultrasounds, AFV assessment, or antepartum testing results.     I spoke to Dr. Lillie Shaw (Ob Anesthesia) concerning anesthesia consult and I spoke to Dr. Moreno about today's MFM  visit and recommendations.     About 40 minutes of total time spent on the encounter, which includes face to face time and non-face to face time preparing to see the patient (eg, review of tests), obtaining and/or reviewing separately obtained history, documenting clinical information in the electronic or other health record, independently interpreting results (not separately reported) and communicating results to the patient/family/caregiver, or care coordination (not separately reported).      Sarah Carter  Maternal-Fetal Medicine    Electronically Signed by Sarah Carter March 8, 2023

## 2023-03-12 ENCOUNTER — TELEPHONE (OUTPATIENT)
Dept: OBSTETRICS AND GYNECOLOGY | Facility: CLINIC | Age: 38
End: 2023-03-12
Payer: COMMERCIAL

## 2023-03-12 ENCOUNTER — PATIENT MESSAGE (OUTPATIENT)
Dept: OTHER | Facility: OTHER | Age: 38
End: 2023-03-12
Payer: COMMERCIAL

## 2023-03-12 DIAGNOSIS — O09.523 MULTIGRAVIDA OF ADVANCED MATERNAL AGE IN THIRD TRIMESTER: Primary | ICD-10-CM

## 2023-03-12 DIAGNOSIS — O10.912 PRE-EXISTING HYPERTENSION AFFECTING PREGNANCY IN SECOND TRIMESTER: ICD-10-CM

## 2023-03-12 DIAGNOSIS — O34.219 PREVIOUS CESAREAN DELIVERY AFFECTING PREGNANCY, ANTEPARTUM: ICD-10-CM

## 2023-03-13 NOTE — PROGRESS NOTES
Subjective:   Patient ID:  King Botello is a 37 y.o. female who presents for follow-up of high risk pregnancy    HPI:  Patient is here for valvular heart disease.  The patient has no chest pain, TIA, palpitations, syncope or pre-syncope.,Patient does not exercise but still active.Tired more easily and more winded but still can walk from parking lot         Review of Systems   Constitutional: Negative for chills, decreased appetite, diaphoresis, fever, malaise/fatigue, night sweats, weight gain and weight loss.   HENT:  Negative for congestion, hoarse voice, nosebleeds, sore throat and tinnitus.    Eyes:  Negative for blurred vision, double vision, vision loss in left eye, vision loss in right eye, visual disturbance and visual halos.   Cardiovascular:  Positive for dyspnea on exertion. Negative for chest pain, claudication, cyanosis, irregular heartbeat, leg swelling, near-syncope, orthopnea, palpitations, paroxysmal nocturnal dyspnea and syncope.   Respiratory:  Positive for shortness of breath. Negative for cough, hemoptysis, sleep disturbances due to breathing, snoring, sputum production and wheezing.    Endocrine: Negative for cold intolerance, heat intolerance, polydipsia, polyphagia and polyuria.   Hematologic/Lymphatic: Negative for adenopathy and bleeding problem. Does not bruise/bleed easily.   Skin:  Negative for color change, dry skin, flushing, itching, nail changes, poor wound healing, rash, skin cancer, suspicious lesions and unusual hair distribution.   Musculoskeletal:  Negative for arthritis, back pain, falls, gout, joint pain, joint swelling, muscle cramps, muscle weakness, myalgias and stiffness.   Gastrointestinal:  Negative for abdominal pain, anorexia, change in bowel habit, constipation, diarrhea, dysphagia, heartburn, hematemesis, hematochezia, melena and vomiting.   Genitourinary:  Negative for decreased libido, dysuria, hematuria, hesitancy and urgency.   Neurological:  Negative for  excessive daytime sleepiness, dizziness, focal weakness, headaches, light-headedness, loss of balance, numbness, paresthesias, seizures, sensory change, tremors, vertigo and weakness.   Psychiatric/Behavioral:  Negative for altered mental status, depression, hallucinations, memory loss, substance abuse and suicidal ideas. The patient does not have insomnia and is not nervous/anxious.    Allergic/Immunologic: Negative for environmental allergies and hives.     Objective: /79 (BP Location: Left arm, Patient Position: Sitting, BP Method: Medium (Automatic))   Pulse 99   Ht 5' (1.524 m)   Wt 88.5 kg (195 lb 1.7 oz)   LMP 08/01/2022 (Exact Date)   SpO2 97%   BMI 38.10 kg/m²      Physical Exam  Constitutional:       Appearance: She is well-developed.   HENT:      Head: Normocephalic.   Eyes:      Pupils: Pupils are equal, round, and reactive to light.   Neck:      Thyroid: No thyromegaly.      Vascular: Normal carotid pulses. No carotid bruit, hepatojugular reflux or JVD.   Cardiovascular:      Rate and Rhythm: Normal rate and regular rhythm.      Pulses: Intact distal pulses.      Heart sounds: Murmur heard.   Low-pitched rumbling crescendo presystolic murmur is present with a grade of 1/4 at the apex.     No friction rub. No gallop.   Pulmonary:      Effort: Pulmonary effort is normal. No tachypnea or respiratory distress.      Breath sounds: Normal breath sounds. No wheezing or rales.   Chest:      Chest wall: No tenderness.   Abdominal:      General: Bowel sounds are normal. There is no distension.      Palpations: Abdomen is soft. There is no mass.      Tenderness: There is no abdominal tenderness. There is no guarding or rebound.   Musculoskeletal:         General: No tenderness. Normal range of motion.      Cervical back: Normal range of motion.   Lymphadenopathy:      Cervical: No cervical adenopathy.   Skin:     General: Skin is warm.      Findings: No erythema or rash.   Neurological:      Mental  Status: She is alert and oriented to person, place, and time.      Cranial Nerves: No cranial nerve deficit.      Coordination: Coordination normal.   Psychiatric:         Behavior: Behavior normal.         Thought Content: Thought content normal.         Judgment: Judgment normal.       Assessment:     1. S/P mitral valve repair    2. Nonrheumatic mitral valve stenosis    3. Pre-existing essential hypertension during pregnancy, antepartum    4. Hypertension, essential    5. Class 2 severe obesity due to excess calories with serious comorbidity and body mass index (BMI) of 35.0 to 35.9 in adult    6. Vitamin D deficiency disease        Plan:   Discussed diet , achieving and maintaining ideal body weight, and exercise.   We reviewed meds in detail.  Reassured-Discussed goals, options, plan.  May need to give IV Lasix at time of delivery depending on symptoms etc and call cardiology if needed    King was seen today for establish care and follow-up.    Diagnoses and all orders for this visit:    S/P mitral valve repair  -     Echo; Future; Expected date: 06/27/2023    Nonrheumatic mitral valve stenosis  -     Echo; Future; Expected date: 06/27/2023    Pre-existing essential hypertension during pregnancy, antepartum    Hypertension, essential    Class 2 severe obesity due to excess calories with serious comorbidity and body mass index (BMI) of 35.0 to 35.9 in adult  -     Echo; Future; Expected date: 06/27/2023    Vitamin D deficiency disease            Follow up in about 4 months (around 7/14/2023) for with MEI Cordero to read.

## 2023-03-14 ENCOUNTER — OFFICE VISIT (OUTPATIENT)
Dept: CARDIOLOGY | Facility: CLINIC | Age: 38
End: 2023-03-14
Payer: COMMERCIAL

## 2023-03-14 VITALS
OXYGEN SATURATION: 97 % | HEIGHT: 60 IN | SYSTOLIC BLOOD PRESSURE: 128 MMHG | BODY MASS INDEX: 38.31 KG/M2 | HEART RATE: 99 BPM | DIASTOLIC BLOOD PRESSURE: 79 MMHG | WEIGHT: 195.13 LBS

## 2023-03-14 DIAGNOSIS — Z98.890 S/P MITRAL VALVE REPAIR: Primary | ICD-10-CM

## 2023-03-14 DIAGNOSIS — E66.01 CLASS 2 SEVERE OBESITY DUE TO EXCESS CALORIES WITH SERIOUS COMORBIDITY AND BODY MASS INDEX (BMI) OF 35.0 TO 35.9 IN ADULT: ICD-10-CM

## 2023-03-14 DIAGNOSIS — E55.9 VITAMIN D DEFICIENCY DISEASE: ICD-10-CM

## 2023-03-14 DIAGNOSIS — I10 HYPERTENSION, ESSENTIAL: ICD-10-CM

## 2023-03-14 DIAGNOSIS — I34.2 NONRHEUMATIC MITRAL VALVE STENOSIS: ICD-10-CM

## 2023-03-14 DIAGNOSIS — O10.019 PRE-EXISTING ESSENTIAL HYPERTENSION DURING PREGNANCY, ANTEPARTUM: ICD-10-CM

## 2023-03-14 PROCEDURE — 3008F PR BODY MASS INDEX (BMI) DOCUMENTED: ICD-10-PCS | Mod: CPTII,S$GLB,, | Performed by: INTERNAL MEDICINE

## 2023-03-14 PROCEDURE — 3074F SYST BP LT 130 MM HG: CPT | Mod: CPTII,S$GLB,, | Performed by: INTERNAL MEDICINE

## 2023-03-14 PROCEDURE — 3078F DIAST BP <80 MM HG: CPT | Mod: CPTII,S$GLB,, | Performed by: INTERNAL MEDICINE

## 2023-03-14 PROCEDURE — 99215 OFFICE O/P EST HI 40 MIN: CPT | Mod: S$GLB,,, | Performed by: INTERNAL MEDICINE

## 2023-03-14 PROCEDURE — 1159F MED LIST DOCD IN RCRD: CPT | Mod: CPTII,S$GLB,, | Performed by: INTERNAL MEDICINE

## 2023-03-14 PROCEDURE — 99999 PR PBB SHADOW E&M-EST. PATIENT-LVL IV: CPT | Mod: PBBFAC,,, | Performed by: INTERNAL MEDICINE

## 2023-03-14 PROCEDURE — 3074F PR MOST RECENT SYSTOLIC BLOOD PRESSURE < 130 MM HG: ICD-10-PCS | Mod: CPTII,S$GLB,, | Performed by: INTERNAL MEDICINE

## 2023-03-14 PROCEDURE — 99215 PR OFFICE/OUTPT VISIT, EST, LEVL V, 40-54 MIN: ICD-10-PCS | Mod: S$GLB,,, | Performed by: INTERNAL MEDICINE

## 2023-03-14 PROCEDURE — 1159F PR MEDICATION LIST DOCUMENTED IN MEDICAL RECORD: ICD-10-PCS | Mod: CPTII,S$GLB,, | Performed by: INTERNAL MEDICINE

## 2023-03-14 PROCEDURE — 99999 PR PBB SHADOW E&M-EST. PATIENT-LVL IV: ICD-10-PCS | Mod: PBBFAC,,, | Performed by: INTERNAL MEDICINE

## 2023-03-14 PROCEDURE — 3008F BODY MASS INDEX DOCD: CPT | Mod: CPTII,S$GLB,, | Performed by: INTERNAL MEDICINE

## 2023-03-14 PROCEDURE — 3078F PR MOST RECENT DIASTOLIC BLOOD PRESSURE < 80 MM HG: ICD-10-PCS | Mod: CPTII,S$GLB,, | Performed by: INTERNAL MEDICINE

## 2023-03-14 NOTE — PATIENT INSTRUCTIONS
Discussed diet , achieving and maintaining ideal body weight, and exercise.   We reviewed meds in detail.  Reassured-Discussed goals, options, plan.  May need to give IV Lasix at time of delivery depending on symptoms etc and call cardiology if needed

## 2023-03-17 ENCOUNTER — ROUTINE PRENATAL (OUTPATIENT)
Dept: OBSTETRICS AND GYNECOLOGY | Facility: CLINIC | Age: 38
End: 2023-03-17
Attending: OBSTETRICS & GYNECOLOGY
Payer: COMMERCIAL

## 2023-03-17 ENCOUNTER — HOSPITAL ENCOUNTER (OUTPATIENT)
Dept: PERINATAL CARE | Facility: OTHER | Age: 38
Discharge: HOME | End: 2023-03-17
Attending: OBSTETRICS & GYNECOLOGY
Payer: COMMERCIAL

## 2023-03-17 VITALS
BODY MASS INDEX: 37.85 KG/M2 | SYSTOLIC BLOOD PRESSURE: 112 MMHG | DIASTOLIC BLOOD PRESSURE: 74 MMHG | WEIGHT: 193.81 LBS

## 2023-03-17 DIAGNOSIS — Z3A.32 PREGNANCY WITH 32 COMPLETED WEEKS GESTATION: ICD-10-CM

## 2023-03-17 DIAGNOSIS — O34.219 PREVIOUS CESAREAN DELIVERY AFFECTING PREGNANCY, ANTEPARTUM: ICD-10-CM

## 2023-03-17 DIAGNOSIS — O09.523 MULTIGRAVIDA OF ADVANCED MATERNAL AGE IN THIRD TRIMESTER: Primary | ICD-10-CM

## 2023-03-17 DIAGNOSIS — O09.523 MULTIGRAVIDA OF ADVANCED MATERNAL AGE IN THIRD TRIMESTER: ICD-10-CM

## 2023-03-17 DIAGNOSIS — O10.912 PRE-EXISTING HYPERTENSION AFFECTING PREGNANCY IN SECOND TRIMESTER: ICD-10-CM

## 2023-03-17 PROCEDURE — 59025 FETAL NON-STRESS TEST: CPT | Mod: 26,,, | Performed by: OBSTETRICS & GYNECOLOGY

## 2023-03-17 PROCEDURE — 76815 OB US LIMITED FETUS(S): CPT

## 2023-03-17 PROCEDURE — 99999 PR PBB SHADOW E&M-EST. PATIENT-LVL III: CPT | Mod: PBBFAC,,, | Performed by: OBSTETRICS & GYNECOLOGY

## 2023-03-17 PROCEDURE — 59025 PRENATAL TESTING - NST/AFI: ICD-10-PCS | Mod: 26,,, | Performed by: OBSTETRICS & GYNECOLOGY

## 2023-03-17 PROCEDURE — 76815 PRENATAL TESTING - NST/AFI: ICD-10-PCS | Mod: 26,,, | Performed by: OBSTETRICS & GYNECOLOGY

## 2023-03-17 PROCEDURE — 99999 PR PBB SHADOW E&M-EST. PATIENT-LVL III: ICD-10-PCS | Mod: PBBFAC,,, | Performed by: OBSTETRICS & GYNECOLOGY

## 2023-03-17 PROCEDURE — 76815 OB US LIMITED FETUS(S): CPT | Mod: 26,,, | Performed by: OBSTETRICS & GYNECOLOGY

## 2023-03-17 PROCEDURE — 0502F PR SUBSEQUENT PRENATAL CARE: ICD-10-PCS | Mod: CPTII,S$GLB,, | Performed by: OBSTETRICS & GYNECOLOGY

## 2023-03-17 PROCEDURE — 0502F SUBSEQUENT PRENATAL CARE: CPT | Mod: CPTII,S$GLB,, | Performed by: OBSTETRICS & GYNECOLOGY

## 2023-03-17 PROCEDURE — 59025 FETAL NON-STRESS TEST: CPT

## 2023-03-17 NOTE — PROGRESS NOTES
Reports good FM.  Ernesto CTX.  No bleeding.  Describes ?leakage of fluid.  JULIANN normal today at PNT.  Spec: no pooling, no ferning.  BP well controlled on Labetalol.  Continue twice weekly testing.  Return 2 weeks.  C bid.

## 2023-03-22 ENCOUNTER — TELEPHONE (OUTPATIENT)
Dept: OBSTETRICS AND GYNECOLOGY | Facility: CLINIC | Age: 38
End: 2023-03-22
Payer: COMMERCIAL

## 2023-03-22 DIAGNOSIS — O09.523 MULTIGRAVIDA OF ADVANCED MATERNAL AGE IN THIRD TRIMESTER: Primary | ICD-10-CM

## 2023-03-22 DIAGNOSIS — O34.219 PREVIOUS CESAREAN DELIVERY AFFECTING PREGNANCY, ANTEPARTUM: ICD-10-CM

## 2023-03-22 DIAGNOSIS — O10.912 PRE-EXISTING HYPERTENSION AFFECTING PREGNANCY IN SECOND TRIMESTER: ICD-10-CM

## 2023-03-22 NOTE — TELEPHONE ENCOUNTER
After discussion with Dr. Carter with M, recommendation for delivery in the 38th week.    Case request for  submitted

## 2023-03-23 ENCOUNTER — PATIENT MESSAGE (OUTPATIENT)
Dept: OBSTETRICS AND GYNECOLOGY | Facility: CLINIC | Age: 38
End: 2023-03-23
Payer: COMMERCIAL

## 2023-03-24 ENCOUNTER — HOSPITAL ENCOUNTER (OUTPATIENT)
Dept: PERINATAL CARE | Facility: OTHER | Age: 38
Discharge: HOME | End: 2023-03-24
Attending: OBSTETRICS & GYNECOLOGY
Payer: COMMERCIAL

## 2023-03-24 DIAGNOSIS — O09.523 MULTIGRAVIDA OF ADVANCED MATERNAL AGE IN THIRD TRIMESTER: ICD-10-CM

## 2023-03-24 PROCEDURE — 59025 PRENATAL TESTING - NST/AFI: ICD-10-PCS | Mod: 26,,, | Performed by: OBSTETRICS & GYNECOLOGY

## 2023-03-24 PROCEDURE — 59025 FETAL NON-STRESS TEST: CPT

## 2023-03-24 PROCEDURE — 76815 OB US LIMITED FETUS(S): CPT | Mod: 26,,, | Performed by: OBSTETRICS & GYNECOLOGY

## 2023-03-24 PROCEDURE — 59025 FETAL NON-STRESS TEST: CPT | Mod: 26,,, | Performed by: OBSTETRICS & GYNECOLOGY

## 2023-03-24 PROCEDURE — 76815 OB US LIMITED FETUS(S): CPT

## 2023-03-24 PROCEDURE — 76815 PRENATAL TESTING - NST/AFI: ICD-10-PCS | Mod: 26,,, | Performed by: OBSTETRICS & GYNECOLOGY

## 2023-03-29 ENCOUNTER — PATIENT MESSAGE (OUTPATIENT)
Dept: OBSTETRICS AND GYNECOLOGY | Facility: CLINIC | Age: 38
End: 2023-03-29
Payer: COMMERCIAL

## 2023-03-31 ENCOUNTER — HOSPITAL ENCOUNTER (OUTPATIENT)
Dept: PERINATAL CARE | Facility: OTHER | Age: 38
Discharge: HOME | End: 2023-03-31
Attending: OBSTETRICS & GYNECOLOGY
Payer: COMMERCIAL

## 2023-03-31 ENCOUNTER — ROUTINE PRENATAL (OUTPATIENT)
Dept: OBSTETRICS AND GYNECOLOGY | Facility: CLINIC | Age: 38
End: 2023-03-31
Attending: OBSTETRICS & GYNECOLOGY
Payer: COMMERCIAL

## 2023-03-31 VITALS — BODY MASS INDEX: 37.89 KG/M2 | DIASTOLIC BLOOD PRESSURE: 72 MMHG | WEIGHT: 194 LBS | SYSTOLIC BLOOD PRESSURE: 114 MMHG

## 2023-03-31 DIAGNOSIS — O09.523 MULTIGRAVIDA OF ADVANCED MATERNAL AGE IN THIRD TRIMESTER: Primary | ICD-10-CM

## 2023-03-31 DIAGNOSIS — Z3A.34 PREGNANCY WITH 34 COMPLETED WEEKS GESTATION: ICD-10-CM

## 2023-03-31 DIAGNOSIS — O34.219 PREVIOUS CESAREAN DELIVERY AFFECTING PREGNANCY, ANTEPARTUM: ICD-10-CM

## 2023-03-31 DIAGNOSIS — O09.523 MULTIGRAVIDA OF ADVANCED MATERNAL AGE IN THIRD TRIMESTER: ICD-10-CM

## 2023-03-31 DIAGNOSIS — O10.912 PRE-EXISTING HYPERTENSION AFFECTING PREGNANCY IN SECOND TRIMESTER: ICD-10-CM

## 2023-03-31 PROCEDURE — 59025 FETAL NON-STRESS TEST: CPT

## 2023-03-31 PROCEDURE — 0502F PR SUBSEQUENT PRENATAL CARE: ICD-10-PCS | Mod: CPTII,S$GLB,, | Performed by: OBSTETRICS & GYNECOLOGY

## 2023-03-31 PROCEDURE — 59025 PRENATAL TESTING - NST/AFI: ICD-10-PCS | Mod: 26,,, | Performed by: OBSTETRICS & GYNECOLOGY

## 2023-03-31 PROCEDURE — 59025 FETAL NON-STRESS TEST: CPT | Mod: 26,,, | Performed by: OBSTETRICS & GYNECOLOGY

## 2023-03-31 PROCEDURE — 76815 OB US LIMITED FETUS(S): CPT

## 2023-03-31 PROCEDURE — 99999 PR PBB SHADOW E&M-EST. PATIENT-LVL III: CPT | Mod: PBBFAC,,, | Performed by: OBSTETRICS & GYNECOLOGY

## 2023-03-31 PROCEDURE — 99999 PR PBB SHADOW E&M-EST. PATIENT-LVL III: ICD-10-PCS | Mod: PBBFAC,,, | Performed by: OBSTETRICS & GYNECOLOGY

## 2023-03-31 PROCEDURE — 76815 OB US LIMITED FETUS(S): CPT | Mod: 26,,, | Performed by: OBSTETRICS & GYNECOLOGY

## 2023-03-31 PROCEDURE — 76815 PRENATAL TESTING - NST/AFI: ICD-10-PCS | Mod: 26,,, | Performed by: OBSTETRICS & GYNECOLOGY

## 2023-03-31 PROCEDURE — 0502F SUBSEQUENT PRENATAL CARE: CPT | Mod: CPTII,S$GLB,, | Performed by: OBSTETRICS & GYNECOLOGY

## 2023-03-31 NOTE — PROGRESS NOTES
Reports appropriate FM.  Denies bleeding and CTX.  Notes joint / low back discomfort as well as fatigue at work.  BP today 114/72.   NST/JULIANN today.  Scheduled for repeat C/S 4/24/23.  Continue weekly testing.  Return 1 week.  [facility] bid.

## 2023-04-02 ENCOUNTER — PATIENT MESSAGE (OUTPATIENT)
Dept: OTHER | Facility: OTHER | Age: 38
End: 2023-04-02
Payer: COMMERCIAL

## 2023-04-04 ENCOUNTER — PATIENT MESSAGE (OUTPATIENT)
Dept: MATERNAL FETAL MEDICINE | Facility: CLINIC | Age: 38
End: 2023-04-04
Payer: COMMERCIAL

## 2023-04-05 ENCOUNTER — OFFICE VISIT (OUTPATIENT)
Dept: MATERNAL FETAL MEDICINE | Facility: CLINIC | Age: 38
End: 2023-04-05
Attending: OBSTETRICS & GYNECOLOGY
Payer: COMMERCIAL

## 2023-04-05 ENCOUNTER — PROCEDURE VISIT (OUTPATIENT)
Dept: MATERNAL FETAL MEDICINE | Facility: CLINIC | Age: 38
End: 2023-04-05
Payer: COMMERCIAL

## 2023-04-05 ENCOUNTER — ROUTINE PRENATAL (OUTPATIENT)
Dept: OBSTETRICS AND GYNECOLOGY | Facility: CLINIC | Age: 38
End: 2023-04-05
Payer: COMMERCIAL

## 2023-04-05 ENCOUNTER — OFFICE VISIT (OUTPATIENT)
Dept: ANESTHESIOLOGY | Facility: OTHER | Age: 38
End: 2023-04-05
Attending: OBSTETRICS & GYNECOLOGY
Payer: COMMERCIAL

## 2023-04-05 ENCOUNTER — HOSPITAL ENCOUNTER (OUTPATIENT)
Dept: PERINATAL CARE | Facility: OTHER | Age: 38
Discharge: HOME | End: 2023-04-05
Attending: OBSTETRICS & GYNECOLOGY
Payer: COMMERCIAL

## 2023-04-05 VITALS
DIASTOLIC BLOOD PRESSURE: 82 MMHG | WEIGHT: 194.44 LBS | SYSTOLIC BLOOD PRESSURE: 108 MMHG | BODY MASS INDEX: 37.98 KG/M2

## 2023-04-05 VITALS
HEIGHT: 60 IN | BODY MASS INDEX: 38.05 KG/M2 | WEIGHT: 193.81 LBS | SYSTOLIC BLOOD PRESSURE: 128 MMHG | DIASTOLIC BLOOD PRESSURE: 82 MMHG

## 2023-04-05 DIAGNOSIS — O09.523 MULTIGRAVIDA OF ADVANCED MATERNAL AGE IN THIRD TRIMESTER: Primary | ICD-10-CM

## 2023-04-05 DIAGNOSIS — Z36.9 ENCOUNTER FOR FETAL ULTRASOUND: Primary | ICD-10-CM

## 2023-04-05 DIAGNOSIS — Z98.890 S/P MITRAL VALVE REPAIR: ICD-10-CM

## 2023-04-05 DIAGNOSIS — O10.912 PRE-EXISTING HYPERTENSION AFFECTING PREGNANCY IN SECOND TRIMESTER: ICD-10-CM

## 2023-04-05 DIAGNOSIS — Z3A.35 35 WEEKS GESTATION OF PREGNANCY: ICD-10-CM

## 2023-04-05 DIAGNOSIS — Z36.4 ULTRASOUND FOR ANTENATAL SCREENING FOR FETAL GROWTH RESTRICTION: ICD-10-CM

## 2023-04-05 DIAGNOSIS — Z36.89 ENCOUNTER FOR ULTRASOUND TO ASSESS FETAL GROWTH: ICD-10-CM

## 2023-04-05 DIAGNOSIS — O09.523 MULTIGRAVIDA OF ADVANCED MATERNAL AGE IN THIRD TRIMESTER: ICD-10-CM

## 2023-04-05 DIAGNOSIS — O99.413 CARDIAC CONDITION AFFECTING PREGNANCY IN THIRD TRIMESTER, ANTEPARTUM: Primary | ICD-10-CM

## 2023-04-05 DIAGNOSIS — O34.219 PREVIOUS CESAREAN DELIVERY AFFECTING PREGNANCY, ANTEPARTUM: ICD-10-CM

## 2023-04-05 DIAGNOSIS — I51.9 CARDIAC CONDITION AFFECTING PREGNANCY IN THIRD TRIMESTER, ANTEPARTUM: Primary | ICD-10-CM

## 2023-04-05 DIAGNOSIS — Z36.2 ENCOUNTER FOR FOLLOW-UP ULTRASOUND OF FETAL ANATOMY: ICD-10-CM

## 2023-04-05 PROCEDURE — 99999 PR PBB SHADOW E&M-EST. PATIENT-LVL III: ICD-10-PCS | Mod: PBBFAC,,, | Performed by: OBSTETRICS & GYNECOLOGY

## 2023-04-05 PROCEDURE — 76816 OB US FOLLOW-UP PER FETUS: CPT | Mod: S$GLB,,, | Performed by: OBSTETRICS & GYNECOLOGY

## 2023-04-05 PROCEDURE — 3079F PR MOST RECENT DIASTOLIC BLOOD PRESSURE 80-89 MM HG: ICD-10-PCS | Mod: CPTII,S$GLB,, | Performed by: OBSTETRICS & GYNECOLOGY

## 2023-04-05 PROCEDURE — 76816 PR  US,PREGNANT UTERUS,F/U,TRANSABD APP: ICD-10-PCS | Mod: S$GLB,,, | Performed by: OBSTETRICS & GYNECOLOGY

## 2023-04-05 PROCEDURE — 76819 PR US, OB, FETAL BIOPHYSICAL, W/O NST: ICD-10-PCS | Mod: S$GLB,,, | Performed by: OBSTETRICS & GYNECOLOGY

## 2023-04-05 PROCEDURE — 99999 PR PBB SHADOW E&M-EST. PATIENT-LVL II: CPT | Mod: PBBFAC,,, | Performed by: OBSTETRICS & GYNECOLOGY

## 2023-04-05 PROCEDURE — 99999 PR PBB SHADOW E&M-EST. PATIENT-LVL II: ICD-10-PCS | Mod: PBBFAC,,, | Performed by: OBSTETRICS & GYNECOLOGY

## 2023-04-05 PROCEDURE — 3079F DIAST BP 80-89 MM HG: CPT | Mod: CPTII,S$GLB,, | Performed by: OBSTETRICS & GYNECOLOGY

## 2023-04-05 PROCEDURE — 99999 PR PBB SHADOW E&M-EST. PATIENT-LVL III: CPT | Mod: PBBFAC,,, | Performed by: OBSTETRICS & GYNECOLOGY

## 2023-04-05 PROCEDURE — 76819 FETAL BIOPHYS PROFIL W/O NST: CPT | Mod: S$GLB,,, | Performed by: OBSTETRICS & GYNECOLOGY

## 2023-04-05 PROCEDURE — 3074F SYST BP LT 130 MM HG: CPT | Mod: CPTII,S$GLB,, | Performed by: OBSTETRICS & GYNECOLOGY

## 2023-04-05 PROCEDURE — 59025 FETAL NON-STRESS TEST: CPT

## 2023-04-05 PROCEDURE — 3008F PR BODY MASS INDEX (BMI) DOCUMENTED: ICD-10-PCS | Mod: CPTII,S$GLB,, | Performed by: OBSTETRICS & GYNECOLOGY

## 2023-04-05 PROCEDURE — 3008F BODY MASS INDEX DOCD: CPT | Mod: CPTII,S$GLB,, | Performed by: OBSTETRICS & GYNECOLOGY

## 2023-04-05 PROCEDURE — 99214 OFFICE O/P EST MOD 30 MIN: CPT | Mod: 25,S$GLB,, | Performed by: OBSTETRICS & GYNECOLOGY

## 2023-04-05 PROCEDURE — 0502F SUBSEQUENT PRENATAL CARE: CPT | Mod: CPTII,S$GLB,, | Performed by: OBSTETRICS & GYNECOLOGY

## 2023-04-05 PROCEDURE — 0502F PR SUBSEQUENT PRENATAL CARE: ICD-10-PCS | Mod: CPTII,S$GLB,, | Performed by: OBSTETRICS & GYNECOLOGY

## 2023-04-05 PROCEDURE — 59025 PRENATAL TESTING - NST/AFI: ICD-10-PCS | Mod: 26,,, | Performed by: OBSTETRICS & GYNECOLOGY

## 2023-04-05 PROCEDURE — 3074F PR MOST RECENT SYSTOLIC BLOOD PRESSURE < 130 MM HG: ICD-10-PCS | Mod: CPTII,S$GLB,, | Performed by: OBSTETRICS & GYNECOLOGY

## 2023-04-05 PROCEDURE — 99214 PR OFFICE/OUTPT VISIT, EST, LEVL IV, 30-39 MIN: ICD-10-PCS | Mod: 25,S$GLB,, | Performed by: OBSTETRICS & GYNECOLOGY

## 2023-04-05 PROCEDURE — 59025 FETAL NON-STRESS TEST: CPT | Mod: 26,,, | Performed by: OBSTETRICS & GYNECOLOGY

## 2023-04-05 NOTE — PROGRESS NOTES
Reports appropriate FM.  Denies bleeding.  Reports occasional cramping.  BP continues to be well controlled: 108/82.  With work, she has noted significant fatigue, low back discomfort, and pelvic pressure.  [facility] sono / consult today - Dr. Carter's recommendation is to increase to twice weekly fetal monitoring next week.  Precautions / warning signs reviewed. Repeat C/S 4/24/23.  Return 1 week.  [facility] bid.

## 2023-04-05 NOTE — CONSULTS
Consults    Outpatient OB Anesthesia Consult      Date and Time: 2023 11:29 AM     Request from: Dr. Moreno    CC requesting physician or midwife: Dr. Moreno    Reason for Consult: Anesthetic recommendations for delivery    Initial Consult?: Yes    Chief Complaint: history of mitral valve repair    HPI: Patient is a 37 y.o. year old woman, G 5 P 1 presenting with history of mitral valve repair.     Past medical history:    Past Medical History:   Diagnosis Date    Abnormal Pap smear     Colposcopy    Bradycardia 2020    Class 2 obesity due to excess calories with body mass index (BMI) of 35.0 to 35.9 in adult 3/9/2021    Hypertension, essential 3/16/2021    Menarche     Age of onset 11    Missed ab 2021    Mitral valve prolapse     Previous  delivery affecting pregnancy, antepartum 2020    S/P suction D&C 2021    Status post mitral valve annuloplasty 2017       Past surgical history:    Past Surgical History:   Procedure Laterality Date     SECTION      DILATION AND CURETTAGE OF UTERUS USING SUCTION N/A 2021    Procedure: DILATION AND CURETTAGE, UTERUS, USING SUCTION;  Surgeon: Mane Moreno MD;  Location: [facility];  Service: OB/GYN;  Laterality: N/A;    MITRAL VALVE REPAIR         Family history:    Family History   Problem Relation Age of Onset    Heart disease Mother     Hypertension Mother     Hyperlipidemia Mother     Diabetes Mother     Cancer Mother     Hyperlipidemia Father     Hypertension Father     Breast cancer Paternal Aunt     Colon cancer Neg Hx     Ovarian cancer Neg Hx        Social History:    Social History     Socioeconomic History    Marital status:    Occupational History    Occupation: xray tech   Tobacco Use    Smoking status: Never    Smokeless tobacco: Never   Substance and Sexual Activity    Alcohol use: Not Currently     Comment: not since +UPT    Drug use: No    Sexual activity: Yes     Partners: Male     Birth  control/protection: None     Social Determinants of Health     Financial Resource Strain: Low Risk     Difficulty of Paying Living Expenses: Not hard at all   Food Insecurity: No Food Insecurity    Worried About Running Out of Food in the Last Year: Never true    Ran Out of Food in the Last Year: Never true   Transportation Needs: No Transportation Needs    Lack of Transportation (Medical): No    Lack of Transportation (Non-Medical): No   Physical Activity: Unknown    Days of Exercise per Week: Patient refused    Minutes of Exercise per Session: 10 min   Stress: Stress Concern Present    Feeling of Stress : To some extent   Social Connections: Unknown    Frequency of Communication with Friends and Family: More than three times a week    Frequency of Social Gatherings with Friends and Family: Twice a week    Active Member of Clubs or Organizations: Yes    Attends Club or Organization Meetings: More than 4 times per year    Marital Status:    Housing Stability: Low Risk     Unable to Pay for Housing in the Last Year: No    Number of Places Lived in the Last Year: 1    Unstable Housing in the Last Year: No       Medication:    Current Outpatient Medications on File Prior to Visit   Medication Sig Dispense Refill    amoxicillin (AMOXIL) 500 MG capsule TAKE FOUR CAPSULES BY MOUTH ONE HOUR BEFORE DENTAL WORK      aspirin (ECOTRIN) 81 MG EC tablet Take 1 tablet (81 mg total) by mouth once daily.  0    labetaloL (NORMODYNE) 100 MG tablet Take 1 tablet (100 mg total) by mouth 2 (two) times daily. 180 tablet 3    prenatal vits62/FA/om3/dha/epa (PRENATAL GUMMY ORAL) Take by mouth.       No current facility-administered medications on file prior to visit.       Allergies:    Doxycycline and Tetanus vaccines and toxoid    Family or personal history of anesthetic complications: No    Diagnostic Studies: I have reviewed the following relevant findings as noted below:  CBC:  Lab Results   Component Value Date    WBC 11.34  02/17/2023    HGB 11.9 (L) 02/17/2023    HCT 35.8 (L) 02/17/2023    MCV 93 02/17/2023     02/17/2023      CMP:  Sodium   Date Value Ref Range Status   02/13/2023 137 136 - 145 mmol/L Final     Potassium   Date Value Ref Range Status   02/13/2023 3.8 3.5 - 5.1 mmol/L Final     Chloride   Date Value Ref Range Status   02/13/2023 108 95 - 110 mmol/L Final     CO2   Date Value Ref Range Status   02/13/2023 21 (L) 23 - 29 mmol/L Final     Glucose   Date Value Ref Range Status   02/13/2023 91 70 - 110 mg/dL Final     BUN   Date Value Ref Range Status   02/13/2023 6 6 - 20 mg/dL Final     Creatinine   Date Value Ref Range Status   02/13/2023 0.5 0.5 - 1.4 mg/dL Final     Calcium   Date Value Ref Range Status   02/13/2023 9.1 8.7 - 10.5 mg/dL Final     Total Protein   Date Value Ref Range Status   02/13/2023 6.2 6.0 - 8.4 g/dL Final     Albumin   Date Value Ref Range Status   02/13/2023 2.9 (L) 3.5 - 5.2 g/dL Final     Total Bilirubin   Date Value Ref Range Status   02/13/2023 0.4 0.1 - 1.0 mg/dL Final     Comment:     For infants and newborns, interpretation of results should be based  on gestational age, weight and in agreement with clinical  observations.    Premature Infant recommended reference ranges:  Up to 24 hours.............<8.0 mg/dL  Up to 48 hours............<12.0 mg/dL  3-5 days..................<15.0 mg/dL  6-29 days.................<15.0 mg/dL       Alkaline Phosphatase   Date Value Ref Range Status   02/13/2023 61 55 - 135 U/L Final     AST   Date Value Ref Range Status   02/13/2023 22 10 - 40 U/L Final     ALT   Date Value Ref Range Status   02/13/2023 15 10 - 44 U/L Final     Anion Gap   Date Value Ref Range Status   02/13/2023 8 8 - 16 mmol/L Final     eGFR if    Date Value Ref Range Status   06/28/2022 >60.0 >60 mL/min/1.73 m^2 Final     eGFR if non    Date Value Ref Range Status   06/28/2022 >60.0 >60 mL/min/1.73 m^2 Final     Comment:     Calculation used to  obtain the estimated glomerular filtration  rate (eGFR) is the CKD-EPI equation.        BMP:   Lab Results   Component Value Date     02/13/2023    K 3.8 02/13/2023     02/13/2023    CO2 21 (L) 02/13/2023    BUN 6 02/13/2023    CREATININE 0.5 02/13/2023    CALCIUM 9.1 02/13/2023    ANIONGAP 8 02/13/2023    ESTGFRAFRICA >60.0 06/28/2022    EGFRNONAA >60.0 06/28/2022     Coagulation studies:   Lab Results   Component Value Date    INR 1.1 06/10/2006    APTT 27.2 06/10/2006     EKG:   Normal sinus rhythm   Possible Left atrial enlargement   Borderline Abnormal ECG   When compared with ECG of 29-JUN-2022 09:29,   No significant change was found   Confirmed by Lizbeth George MDma ([id number]) on 2/14/2023 12:32:26 PM     Echo:    Summary from 2/3/23    The left ventricle is normal in size with normal systolic function. The estimated ejection fraction is 60%.  Normal right ventricular size with normal right ventricular systolic function.  Indeterminate left ventricular diastolic function.  Severe left atrial enlargement.  The mitral valve is s/p repair with a mean diastolic gradient of at least 12mm Hg at a heart rate of 104 bpm. Peak gradient is near 20mm Hg.  Mild tricuspid regurgitation.  The estimated PA systolic pressure is 31 mmHg. This may be an underestimation given the mitral valve gradients.  Normal central venous pressure (3 mmHg).  On the prior exam, with a heart rate of 76 bpm, the mean gradient across the mitral valve was actually near 10mm Hg. Valve area estimated near 1.8 cm2 by PHT.    Review of Systems:   Constitutional: Negative for fever and chills; well appearing female  Eyes: no visual changes  Ear, nose, mouth and throat: no loose or broken teeth  Cardiovascular: no chest pain  Respiratory: shortness of breath with exertion  Gastrointestinal: no nausea or vomiting  Genitourinary: no dysuria  Musculoskeletal: no joint pain  Skin: warm and dry, no rashes  Neurologic: no seizures  Psychiatric: no  "anxiety or depression  Endocrinology: no heat or cold intolerance  Hematologic: does not bruise or bleed easily      Physical Exam:  Vitals: see epic flowsheet  Constitutional: alert, no distress  Eyes: normal lids, pupils symmetric  Ear, nose, mouth and throat: Mallampati I, normal thyromental distance, normal lips, teeth, gums and tongue   Cardiovascular: tachycardia, no murmurs  Respiratory: normal effort, no wheezes  Musculoskeletal: normal gait, normal range of motion  Skin: no rashes, no induration  Neurologic: normal reflexes and sensation  Psychiatric: oriented to person, place, time; normal affect    ASA Score: 3    Diagnosis: history of mitral valve repair    Assessment and Plan:  Mrs. Botello is a 37 year old  with a history of 1 prior elective C section and a mitral valve repair for mitral stenosis in  at the age of 17. She had 1 elective primary C section in  with no complications. She had a spinal anesthetic for that surgery and did well. She feels tired and has normal pregnancy complaints but is not experiencing chest pain, increased shortness of breath or palpitations. She has seen Dr. Cordero from cardiology. From his note: "May need to give IV Lasix at time of delivery depending on symptoms etc and call cardiology if needed"  We discussed the plan, including a spinal/epidural. We discussed that she likely does not need an arterial line or ICU stay after delivery. She did not have any problems and did not go to the ICU after her first C section. She is planning for a repeat .    Complexity: Moderate    Entertained and answered all questions to the patient's satisfaction.      Cathryn Kimball MD      "

## 2023-04-09 ENCOUNTER — HOSPITAL ENCOUNTER (EMERGENCY)
Facility: OTHER | Age: 38
Discharge: HOME | End: 2023-04-09
Payer: COMMERCIAL

## 2023-04-09 VITALS
TEMPERATURE: 98 F | RESPIRATION RATE: 16 BRPM | DIASTOLIC BLOOD PRESSURE: 76 MMHG | HEART RATE: 99 BPM | OXYGEN SATURATION: 97 % | SYSTOLIC BLOOD PRESSURE: 128 MMHG

## 2023-04-09 DIAGNOSIS — Z3A.36 36 WEEKS GESTATION OF PREGNANCY: ICD-10-CM

## 2023-04-09 DIAGNOSIS — O36.8120 DECREASED FETAL MOVEMENTS IN SECOND TRIMESTER, SINGLE OR UNSPECIFIED FETUS: Primary | ICD-10-CM

## 2023-04-09 PROCEDURE — 59025 PR FETAL 2N-STRESS TEST: ICD-10-PCS | Mod: 26,,, | Performed by: OBSTETRICS & GYNECOLOGY

## 2023-04-09 PROCEDURE — 76815 PR  US,PREGNANT UTERUS,LIMITED, 1/> FETUSES: ICD-10-PCS | Mod: 26,,, | Performed by: OBSTETRICS & GYNECOLOGY

## 2023-04-09 PROCEDURE — 59025 FETAL NON-STRESS TEST: CPT | Mod: 26,,, | Performed by: OBSTETRICS & GYNECOLOGY

## 2023-04-09 PROCEDURE — 59025 FETAL NON-STRESS TEST: CPT

## 2023-04-09 PROCEDURE — 76815 OB US LIMITED FETUS(S): CPT | Mod: 26,,, | Performed by: OBSTETRICS & GYNECOLOGY

## 2023-04-09 PROCEDURE — 99283 EMERGENCY DEPT VISIT LOW MDM: CPT | Mod: 25,,, | Performed by: OBSTETRICS & GYNECOLOGY

## 2023-04-09 PROCEDURE — 99283 PR EMERGENCY DEPT VISIT,LEVEL III: ICD-10-PCS | Mod: 25,,, | Performed by: OBSTETRICS & GYNECOLOGY

## 2023-04-09 PROCEDURE — 99284 EMERGENCY DEPT VISIT MOD MDM: CPT | Mod: 25

## 2023-04-10 NOTE — ED PROVIDER NOTES
Encounter Date: 2023       History     Chief Complaint   Patient presents with    Decreased Fetal Movement     HPI  King Botello is a 37 y.o. [id number] at 36w0d presents complaining of decreased fetal movement. Patient reports doing kick counts today and getting less than 10 in 2 hours.    This IUP is complicated by cHTN (labetalol 100mg BID), h/o Csx1, h/o of Mvp with repair, AMA, and undesired fertility (Desires tubal).  Patient denies contractions, denies vaginal bleeding, denies LOF.   Fetal Movement: normal.     Review of patient's allergies indicates:   Allergen Reactions    Doxycycline      Abdomen pain severe    Tetanus vaccines and toxoid Rash     Past Medical History:   Diagnosis Date    Abnormal Pap smear     Colposcopy    Bradycardia 2020    Class 2 obesity due to excess calories with body mass index (BMI) of 35.0 to 35.9 in adult 3/9/2021    Hypertension, essential 3/16/2021    Menarche     Age of onset 11    Missed ab 2021    Mitral valve prolapse     Previous  delivery affecting pregnancy, antepartum 2020    S/P suction D&C 2021    Status post mitral valve annuloplasty 2017     Past Surgical History:   Procedure Laterality Date     SECTION      DILATION AND CURETTAGE OF UTERUS USING SUCTION N/A 2021    Procedure: DILATION AND CURETTAGE, UTERUS, USING SUCTION;  Surgeon: Mane Moreno MD;  Location: [facility];  Service: OB/GYN;  Laterality: N/A;    MITRAL VALVE REPAIR       Family History   Problem Relation Age of Onset    Heart disease Mother     Hypertension Mother     Hyperlipidemia Mother     Diabetes Mother     Cancer Mother     Hyperlipidemia Father     Hypertension Father     Breast cancer Paternal Aunt     Colon cancer Neg Hx     Ovarian cancer Neg Hx      Social History     Tobacco Use    Smoking status: Never    Smokeless tobacco: Never   Substance Use Topics    Alcohol use: Not Currently     Comment: not since +UPT    Drug use: No      Review of Systems   Constitutional:  Negative for fever.   HENT:  Negative for nosebleeds and sore throat.    Respiratory:  Negative for shortness of breath.    Cardiovascular:  Negative for chest pain.   Gastrointestinal:  Negative for diarrhea.   Genitourinary:  Negative for difficulty urinating, dysuria, pelvic pain and vaginal bleeding.   Skin:  Negative for color change.   Neurological:  Negative for light-headedness and headaches.   Psychiatric/Behavioral:  Negative for confusion.    All other systems reviewed and are negative.    Physical Exam     Initial Vitals   BP Pulse Resp Temp SpO2   04/09/23 1911 04/09/23 1910 04/09/23 1911 04/09/23 1911 04/09/23 1912   139/82 101 16 98 °F (36.7 °C) 97 %      MAP       --                Physical Exam    Constitutional: She appears well-developed and well-nourished. No distress.   HENT:   Head: Normocephalic and atraumatic.   Eyes: EOM are normal.   Cardiovascular:  Normal rate.           Pulmonary/Chest: She has no wheezes.   Abdominal: Abdomen is soft. There is no abdominal tenderness.   Genitourinary:    Genitourinary Comments: Pelvic exam deferred     Musculoskeletal:         General: Normal range of motion.     Neurological: She is alert and oriented to person, place, and time.   Skin: Skin is warm, dry and intact. No rash noted.   Psychiatric: She has a normal mood and affect. Her speech is normal and behavior is normal.       ED Course   Fetal non-stress test    Date/Time: 4/9/2023 7:25 PM  Performed by: Meche Huffman MD  Authorized by: Meche Huffman MD     Nonstress Test:     Variability:  6-25 BPM    Decelerations:  None    Accelerations:  15 bpm    Baseline:  130    Uterine Irritability: No      Contractions:  Not present  Biophysical Profile:     MVP (Maximal Vertical Pocket):  3    Nonstress Test Interpretation: reactive      Overall Impression:  Reassuring  Post-procedure:     Patient tolerance:  Patient tolerated the procedure well with no  immediate complications  Labs Reviewed - No data to display       Imaging Results    None            Medications - No data to display  Medical Decision Making:   ED Management:  37 y.o.  at 36w0d here for decreased fetal movement   - VSS, afebrile   - NST reactive and reassuring as above   - [place] quiet   - MVP 3.01 with numerous gross movements on BSUS  - Return precautions discussed  - Routine prenatal care per primary OB          Attending Attestation:   Physician Attestation Statement for Resident:  As the supervising MD   Physician Attestation Statement: I have personally seen and examined this patient.   I agree with the above history.  -:   As the supervising MD I agree with the above PE.     As the supervising MD I agree with the above treatment, course, plan, and disposition.   I was personally present during the critical portions of the procedure(s) performed by the resident and was immediately available in the ED to provide services and assistance as needed during the entire procedure.                             Clinical Impression:   Final diagnoses:  [O36.8120] Decreased fetal movements in second trimester, single or unspecified fetus (Primary)  [Z3A.36] 36 weeks gestation of pregnancy        ED Disposition Condition    Discharge Stable          ED Prescriptions    None       Follow-up Information    None          Meche Huffman MD  Resident  23 [id number]       Angelica Patten MD  [phone]

## 2023-04-11 ENCOUNTER — PATIENT MESSAGE (OUTPATIENT)
Dept: OBSTETRICS AND GYNECOLOGY | Facility: CLINIC | Age: 38
End: 2023-04-11
Payer: COMMERCIAL

## 2023-04-11 ENCOUNTER — HOSPITAL ENCOUNTER (OUTPATIENT)
Dept: PERINATAL CARE | Facility: OTHER | Age: 38
Discharge: HOME | End: 2023-04-11
Attending: OBSTETRICS & GYNECOLOGY
Payer: COMMERCIAL

## 2023-04-11 DIAGNOSIS — O09.523 MULTIGRAVIDA OF ADVANCED MATERNAL AGE IN THIRD TRIMESTER: ICD-10-CM

## 2023-04-11 PROCEDURE — 59025 FETAL NON-STRESS TEST: CPT

## 2023-04-11 PROCEDURE — 59025 FETAL NON-STRESS TEST: CPT | Mod: 26,,, | Performed by: OBSTETRICS & GYNECOLOGY

## 2023-04-11 PROCEDURE — 59025 PRENATAL TESTING - NST/AFI: ICD-10-PCS | Mod: 26,,, | Performed by: OBSTETRICS & GYNECOLOGY

## 2023-04-13 ENCOUNTER — PATIENT MESSAGE (OUTPATIENT)
Dept: MATERNAL FETAL MEDICINE | Facility: CLINIC | Age: 38
End: 2023-04-13
Payer: COMMERCIAL

## 2023-04-14 ENCOUNTER — PROCEDURE VISIT (OUTPATIENT)
Dept: MATERNAL FETAL MEDICINE | Facility: CLINIC | Age: 38
End: 2023-04-14
Payer: COMMERCIAL

## 2023-04-14 ENCOUNTER — HOSPITAL ENCOUNTER (OUTPATIENT)
Dept: PERINATAL CARE | Facility: OTHER | Age: 38
Discharge: HOME | End: 2023-04-14
Attending: OBSTETRICS & GYNECOLOGY
Payer: COMMERCIAL

## 2023-04-14 ENCOUNTER — OFFICE VISIT (OUTPATIENT)
Dept: MATERNAL FETAL MEDICINE | Facility: CLINIC | Age: 38
End: 2023-04-14
Attending: OBSTETRICS & GYNECOLOGY
Payer: COMMERCIAL

## 2023-04-14 ENCOUNTER — ROUTINE PRENATAL (OUTPATIENT)
Dept: OBSTETRICS AND GYNECOLOGY | Facility: CLINIC | Age: 38
End: 2023-04-14
Attending: OBSTETRICS & GYNECOLOGY
Payer: COMMERCIAL

## 2023-04-14 VITALS
SYSTOLIC BLOOD PRESSURE: 116 MMHG | DIASTOLIC BLOOD PRESSURE: 82 MMHG | BODY MASS INDEX: 38.32 KG/M2 | WEIGHT: 196.19 LBS

## 2023-04-14 VITALS
BODY MASS INDEX: 38.15 KG/M2 | SYSTOLIC BLOOD PRESSURE: 120 MMHG | DIASTOLIC BLOOD PRESSURE: 78 MMHG | WEIGHT: 195.31 LBS

## 2023-04-14 DIAGNOSIS — O10.912 PRE-EXISTING HYPERTENSION AFFECTING PREGNANCY IN SECOND TRIMESTER: ICD-10-CM

## 2023-04-14 DIAGNOSIS — O09.523 MULTIGRAVIDA OF ADVANCED MATERNAL AGE IN THIRD TRIMESTER: Primary | ICD-10-CM

## 2023-04-14 DIAGNOSIS — O09.523 MULTIGRAVIDA OF ADVANCED MATERNAL AGE IN THIRD TRIMESTER: ICD-10-CM

## 2023-04-14 DIAGNOSIS — Z98.890 S/P MITRAL VALVE REPAIR: ICD-10-CM

## 2023-04-14 DIAGNOSIS — O99.210 OBESITY IN PREGNANCY: Primary | ICD-10-CM

## 2023-04-14 DIAGNOSIS — O10.019 PRE-EXISTING ESSENTIAL HYPERTENSION DURING PREGNANCY, ANTEPARTUM: ICD-10-CM

## 2023-04-14 DIAGNOSIS — Z3A.36 PREGNANCY WITH 36 COMPLETED WEEKS GESTATION: ICD-10-CM

## 2023-04-14 DIAGNOSIS — Z36.9 ENCOUNTER FOR FETAL ULTRASOUND: ICD-10-CM

## 2023-04-14 DIAGNOSIS — Z3A.36 36 WEEKS GESTATION OF PREGNANCY: ICD-10-CM

## 2023-04-14 PROCEDURE — 3074F SYST BP LT 130 MM HG: CPT | Mod: CPTII,S$GLB,, | Performed by: OBSTETRICS & GYNECOLOGY

## 2023-04-14 PROCEDURE — 3078F DIAST BP <80 MM HG: CPT | Mod: CPTII,S$GLB,, | Performed by: OBSTETRICS & GYNECOLOGY

## 2023-04-14 PROCEDURE — 99999 PR PBB SHADOW E&M-EST. PATIENT-LVL III: CPT | Mod: PBBFAC,,, | Performed by: OBSTETRICS & GYNECOLOGY

## 2023-04-14 PROCEDURE — 99214 OFFICE O/P EST MOD 30 MIN: CPT | Mod: 25,S$GLB,, | Performed by: OBSTETRICS & GYNECOLOGY

## 2023-04-14 PROCEDURE — 76819 FETAL BIOPHYS PROFIL W/O NST: CPT | Mod: 26,S$GLB,, | Performed by: OBSTETRICS & GYNECOLOGY

## 2023-04-14 PROCEDURE — 76819 PR US, OB, FETAL BIOPHYSICAL, W/O NST: ICD-10-PCS | Mod: 26,S$GLB,, | Performed by: OBSTETRICS & GYNECOLOGY

## 2023-04-14 PROCEDURE — 59025 FETAL NON-STRESS TEST: CPT

## 2023-04-14 PROCEDURE — 99214 PR OFFICE/OUTPT VISIT, EST, LEVL IV, 30-39 MIN: ICD-10-PCS | Mod: 25,S$GLB,, | Performed by: OBSTETRICS & GYNECOLOGY

## 2023-04-14 PROCEDURE — 3008F PR BODY MASS INDEX (BMI) DOCUMENTED: ICD-10-PCS | Mod: CPTII,S$GLB,, | Performed by: OBSTETRICS & GYNECOLOGY

## 2023-04-14 PROCEDURE — 0502F SUBSEQUENT PRENATAL CARE: CPT | Mod: CPTII,S$GLB,, | Performed by: OBSTETRICS & GYNECOLOGY

## 2023-04-14 PROCEDURE — 99999 PR PBB SHADOW E&M-EST. PATIENT-LVL III: ICD-10-PCS | Mod: PBBFAC,,, | Performed by: OBSTETRICS & GYNECOLOGY

## 2023-04-14 PROCEDURE — 87147 CULTURE TYPE IMMUNOLOGIC: CPT | Performed by: OBSTETRICS & GYNECOLOGY

## 2023-04-14 PROCEDURE — 59025 PRENATAL TESTING - NST/AFI: ICD-10-PCS | Mod: 26,,, | Performed by: OBSTETRICS & GYNECOLOGY

## 2023-04-14 PROCEDURE — 3078F PR MOST RECENT DIASTOLIC BLOOD PRESSURE < 80 MM HG: ICD-10-PCS | Mod: CPTII,S$GLB,, | Performed by: OBSTETRICS & GYNECOLOGY

## 2023-04-14 PROCEDURE — 3074F PR MOST RECENT SYSTOLIC BLOOD PRESSURE < 130 MM HG: ICD-10-PCS | Mod: CPTII,S$GLB,, | Performed by: OBSTETRICS & GYNECOLOGY

## 2023-04-14 PROCEDURE — 59025 FETAL NON-STRESS TEST: CPT | Mod: 26,,, | Performed by: OBSTETRICS & GYNECOLOGY

## 2023-04-14 PROCEDURE — 0502F PR SUBSEQUENT PRENATAL CARE: ICD-10-PCS | Mod: CPTII,S$GLB,, | Performed by: OBSTETRICS & GYNECOLOGY

## 2023-04-14 PROCEDURE — 87081 CULTURE SCREEN ONLY: CPT | Performed by: OBSTETRICS & GYNECOLOGY

## 2023-04-14 PROCEDURE — 3008F BODY MASS INDEX DOCD: CPT | Mod: CPTII,S$GLB,, | Performed by: OBSTETRICS & GYNECOLOGY

## 2023-04-14 NOTE — PROGRESS NOTES
Reports appropriate FM this afternoon.  Denies bleeding and CTX.  GBBS performed.  Cx: FT / thick / posterior.  BP continues to be well controlled on Labetalol.  No proteinuria.  BPP / NST today.  Reports extreme low back pain and fatigue with work- she now feels that she can no longer perform her work duties - given note to begin maternity leave.  Scheduled for repeat C/S with BTL 4/24/23.  T3 labs today.  Continue twice weekly fetal monitoring.  Return 1 week.  FMC bid emphasized.

## 2023-04-16 NOTE — ASSESSMENT & PLAN NOTE
S/p visit with Cardiology Dr. Cordero on 03-14-23  Recommendations from Dr. Cordero: patient may need Lasix at time of delivery if symptomatic and recommends Cardiology consultation at time of delivery if needed

## 2023-04-16 NOTE — PROGRESS NOTES
MATERNAL-FETAL MEDICINE   FOLLOW UP NOTE      SUBJECTIVE:     Ms. Miss King Botello is a 37 y.o.  female with IUP at 36w5d who is seen in consultation by M for evaluation and management of:  Problem   Pre-Existing Essential Hypertension During Pregnancy, Antepartum   S/P Mitral Valve Repair       Previous notes reviewed.   No changes to medical, surgical, family, social, or obstetric history.    Medications:  Current Outpatient Medications   Medication Instructions    amoxicillin (AMOXIL) 500 MG capsule TAKE FOUR CAPSULES BY MOUTH ONE HOUR BEFORE DENTAL WORK    aspirin (ECOTRIN) 81 mg, Oral, Daily    labetaloL (NORMODYNE) 100 mg, Oral, 2 times daily    prenatal vits62/FA/om3/dha/epa (PRENATAL GUMMY ORAL) Oral         Objective:   /78 (BP Location: Left arm, Patient Position: Sitting)   Wt 88.6 kg (195 lb 5.2 oz)   LMP 2022 (Exact Date)   BMI 38.15 kg/m²     Ultrasound performed. See viewpoint for full ultrasound report.  BPP without NST = 8/8  Amniotic fluid volume wnl per MVP 4.2 cm    Patient counseled on today's ultrasound evaluation and recommendation to continue with fetal testing until delivery (twice a week): ordering/facilitation per Dr. Moreno     ASSESSMENT/PLAN:     37 y.o.  female with IUP at 36w5d gestation     Pre-existing essential hypertension during pregnancy, antepartum  /78 mmhg  MEds: Labetalol 100 mg bid, aspirin 81 mg daily     S/P mitral valve repair  S/p visit with Cardiology Dr. Cordero on 23  Recommendations from Dr. Cordero: patient may need Lasix at time of delivery if symptomatic and recommends Cardiology consultation at time of delivery if needed     Patient given fetal movement . Patient's questions were answered. She reports will discuss with Dr. Moreno today about maternity leave starting at this time.     Of note: after MFM visit, I discussed case with one of my [facility] colleagues (Dr. Lopez) including patient's complaints' of  decreased fetal movement (fetal testing reassuring). Recommendation is fetal movement , continue with fetal testing, and delivery at 38 wks gestation (earlier as clinically indicate)    I discussed today's MFM visit with Dr. Moreno since he has an appt with patient today: prenatal care     FOLLOW UP:   -fetal movement    -recommend continue with twice a week fetal testing in prenatal testing center   -consider OB anesthesia consult antepartum.   -scheduled for repeat C/S - BTL on 04-24-23 (earlier as clinically indicated)    25 minutes of total time spent on the encounter, which includes face to face time and non-face to face time preparing to see the patient (eg, review of tests), obtaining and/or reviewing separately obtained history, documenting clinical information in the electronic or other health record, independently interpreting results (not separately reported) and communicating results to the patient/family/caregiver, or care coordination (not separately reported).      Sarah Carter  Maternal-Fetal Medicine    Electronically Signed by Sarah Carter April 15, 2023

## 2023-04-17 LAB — BACTERIA SPEC AEROBE CULT: ABNORMAL

## 2023-04-18 ENCOUNTER — HOSPITAL ENCOUNTER (OUTPATIENT)
Dept: PERINATAL CARE | Facility: OTHER | Age: 38
Discharge: HOME | End: 2023-04-18
Attending: OBSTETRICS & GYNECOLOGY
Payer: COMMERCIAL

## 2023-04-18 DIAGNOSIS — O09.523 MULTIGRAVIDA OF ADVANCED MATERNAL AGE IN THIRD TRIMESTER: ICD-10-CM

## 2023-04-18 PROCEDURE — 76815 PRENATAL TESTING - NST/AFI: ICD-10-PCS | Mod: 26,,, | Performed by: OBSTETRICS & GYNECOLOGY

## 2023-04-18 PROCEDURE — 59025 FETAL NON-STRESS TEST: CPT

## 2023-04-18 PROCEDURE — 76815 OB US LIMITED FETUS(S): CPT | Mod: 26,,, | Performed by: OBSTETRICS & GYNECOLOGY

## 2023-04-18 PROCEDURE — 59025 FETAL NON-STRESS TEST: CPT | Mod: 26,,, | Performed by: OBSTETRICS & GYNECOLOGY

## 2023-04-18 PROCEDURE — 59025 PRENATAL TESTING - NST/AFI: ICD-10-PCS | Mod: 26,,, | Performed by: OBSTETRICS & GYNECOLOGY

## 2023-04-18 PROCEDURE — 76815 OB US LIMITED FETUS(S): CPT

## 2023-04-21 ENCOUNTER — CLINICAL SUPPORT (OUTPATIENT)
Dept: OBSTETRICS AND GYNECOLOGY | Facility: CLINIC | Age: 38
End: 2023-04-21
Payer: COMMERCIAL

## 2023-04-21 ENCOUNTER — HOSPITAL ENCOUNTER (OUTPATIENT)
Dept: PERINATAL CARE | Facility: OTHER | Age: 38
Discharge: HOME | End: 2023-04-21
Attending: OBSTETRICS & GYNECOLOGY
Payer: COMMERCIAL

## 2023-04-21 ENCOUNTER — ANESTHESIA EVENT (OUTPATIENT)
Dept: OBSTETRICS AND GYNECOLOGY | Facility: OTHER | Age: 38
End: 2023-04-21
Payer: COMMERCIAL

## 2023-04-21 ENCOUNTER — ROUTINE PRENATAL (OUTPATIENT)
Dept: OBSTETRICS AND GYNECOLOGY | Facility: CLINIC | Age: 38
End: 2023-04-21
Attending: OBSTETRICS & GYNECOLOGY
Payer: COMMERCIAL

## 2023-04-21 VITALS
SYSTOLIC BLOOD PRESSURE: 110 MMHG | DIASTOLIC BLOOD PRESSURE: 68 MMHG | WEIGHT: 195.31 LBS | BODY MASS INDEX: 38.15 KG/M2

## 2023-04-21 DIAGNOSIS — Z23 NEED FOR TDAP VACCINATION: Primary | ICD-10-CM

## 2023-04-21 DIAGNOSIS — O09.523 MULTIGRAVIDA OF ADVANCED MATERNAL AGE IN THIRD TRIMESTER: Primary | ICD-10-CM

## 2023-04-21 DIAGNOSIS — Z3A.37 PREGNANCY WITH 37 WEEKS COMPLETED GESTATION: ICD-10-CM

## 2023-04-21 DIAGNOSIS — O10.912 PRE-EXISTING HYPERTENSION AFFECTING PREGNANCY IN SECOND TRIMESTER: ICD-10-CM

## 2023-04-21 DIAGNOSIS — O09.523 MULTIGRAVIDA OF ADVANCED MATERNAL AGE IN THIRD TRIMESTER: ICD-10-CM

## 2023-04-21 PROCEDURE — 99999 PR PBB SHADOW E&M-EST. PATIENT-LVL III: CPT | Mod: PBBFAC,,, | Performed by: OBSTETRICS & GYNECOLOGY

## 2023-04-21 PROCEDURE — 59025 FETAL NON-STRESS TEST: CPT

## 2023-04-21 PROCEDURE — 0502F PR SUBSEQUENT PRENATAL CARE: ICD-10-PCS | Mod: CPTII,S$GLB,, | Performed by: OBSTETRICS & GYNECOLOGY

## 2023-04-21 PROCEDURE — 99999 PR PBB SHADOW E&M-EST. PATIENT-LVL I: CPT | Mod: PBBFAC,,,

## 2023-04-21 PROCEDURE — 59025 FETAL NON-STRESS TEST: CPT | Mod: 26,,, | Performed by: OBSTETRICS & GYNECOLOGY

## 2023-04-21 PROCEDURE — 99999 PR PBB SHADOW E&M-EST. PATIENT-LVL I: ICD-10-PCS | Mod: PBBFAC,,,

## 2023-04-21 PROCEDURE — 90471 IMMUNIZATION ADMIN: CPT | Mod: S$GLB,,, | Performed by: OBSTETRICS & GYNECOLOGY

## 2023-04-21 PROCEDURE — 59025 PRENATAL TESTING - NST/AFI: ICD-10-PCS | Mod: 26,,, | Performed by: OBSTETRICS & GYNECOLOGY

## 2023-04-21 PROCEDURE — 90715 TDAP VACCINE GREATER THAN OR EQUAL TO 7YO IM: ICD-10-PCS | Mod: S$GLB,,, | Performed by: OBSTETRICS & GYNECOLOGY

## 2023-04-21 PROCEDURE — 90715 TDAP VACCINE 7 YRS/> IM: CPT | Mod: S$GLB,,, | Performed by: OBSTETRICS & GYNECOLOGY

## 2023-04-21 PROCEDURE — 90471 TDAP VACCINE GREATER THAN OR EQUAL TO 7YO IM: ICD-10-PCS | Mod: S$GLB,,, | Performed by: OBSTETRICS & GYNECOLOGY

## 2023-04-21 PROCEDURE — 0502F SUBSEQUENT PRENATAL CARE: CPT | Mod: CPTII,S$GLB,, | Performed by: OBSTETRICS & GYNECOLOGY

## 2023-04-21 PROCEDURE — 99999 PR PBB SHADOW E&M-EST. PATIENT-LVL III: ICD-10-PCS | Mod: PBBFAC,,, | Performed by: OBSTETRICS & GYNECOLOGY

## 2023-04-21 NOTE — PROGRESS NOTES
No complaints.  Reports good FM this week.  Denies bleeding and CTX.  Cx: unchanged- FT / soft / posterior.  BP continues to be well controlled on Labetalol: 110/68.  Discussed GBBS positive status.  Scheduled for repeat C/S, BTL on 4/24/23 at 7am.  She is sure that she wants tubal at the time of C/S.   Fetal monitoring today.  Pre-op instructions reviewed.  Labor precautions.  FMC bid.

## 2023-04-23 NOTE — H&P
HISTORY AND PHYSICAL                                                OBSTETRICS          Subjective:       King Botello is a 37 y.o.  female with IUP at 38w1d weeks gestation, with a history of chronic hypertension, who presents for a scheduled repeat C/S with BTL.  Her pregnancy has been remarkable for AMA with a negative [id number], previous C/S, chronic hypertension on Labetalol, and history of MVP with repair.      This IUP is complicated by   Advanced maternal age  Previous C/S   CHTN - Labetalol 100 bid  History of MVP with repair     One elevated value GTT  Undesired Fertility   GBBS positive        PMHx:   Past Medical History:   Diagnosis Date    Abnormal Pap smear     Colposcopy    Bradycardia 2020    Class 2 obesity due to excess calories with body mass index (BMI) of 35.0 to 35.9 in adult 3/9/2021    Hypertension, essential 3/16/2021    Menarche     Age of onset 11    Missed ab 2021    Mitral valve prolapse     Previous  delivery affecting pregnancy, antepartum 2020    S/P suction D&C 2021    Status post mitral valve annuloplasty 2017       PSHx:   Past Surgical History:   Procedure Laterality Date     SECTION      DILATION AND CURETTAGE OF UTERUS USING SUCTION N/A 2021    Procedure: DILATION AND CURETTAGE, UTERUS, USING SUCTION;  Surgeon: Mane Moreno MD;  Location: [facility];  Service: OB/GYN;  Laterality: N/A;    MITRAL VALVE REPAIR         All:   Review of patient's allergies indicates:   Allergen Reactions    Doxycycline      Abdomen pain severe       Meds:   No medications prior to admission.       SH:   Social History     Socioeconomic History    Marital status:    Occupational History    Occupation: xray tech   Tobacco Use    Smoking status: Never    Smokeless tobacco: Never   Substance and Sexual Activity    Alcohol use: Not Currently     Comment: not since +UPT    Drug use: No    Sexual activity: Yes     Partners: Male      Birth control/protection: None     Social Determinants of Health     Financial Resource Strain: Low Risk     Difficulty of Paying Living Expenses: Not hard at all   Food Insecurity: No Food Insecurity    Worried About Running Out of Food in the Last Year: Never true    Ran Out of Food in the Last Year: Never true   Transportation Needs: No Transportation Needs    Lack of Transportation (Medical): No    Lack of Transportation (Non-Medical): No   Physical Activity: Unknown    Days of Exercise per Week: Patient refused    Minutes of Exercise per Session: 10 min   Stress: Stress Concern Present    Feeling of Stress : To some extent   Social Connections: Unknown    Frequency of Communication with Friends and Family: More than three times a week    Frequency of Social Gatherings with Friends and Family: Twice a week    Active Member of Clubs or Organizations: Yes    Attends Club or Organization Meetings: More than 4 times per year    Marital Status:    Housing Stability: Low Risk     Unable to Pay for Housing in the Last Year: No    Number of Places Lived in the Last Year: 1    Unstable Housing in the Last Year: No       FH:   Family History   Problem Relation Age of Onset    Heart disease Mother     Hypertension Mother     Hyperlipidemia Mother     Diabetes Mother     Cancer Mother     Hyperlipidemia Father     Hypertension Father     Breast cancer Paternal Aunt     Colon cancer Neg Hx     Ovarian cancer Neg Hx        OBHx:   OB History    Para Term  AB Living   5 1 1 0 3 1   SAB IAB Ectopic Multiple Live Births   3 0 0 0 1      # Outcome Date GA Lbr Ysed/2nd Weight Sex Delivery Anes PTL Lv   5 Current            4 2019 4w0d          3 Term 04/27/10 39w0d  3.005 kg (6 lb 10 oz) F CS-LTranv EPI  ADWOA      Birth Comments: Elective C/S      Name: Ricarda Stevenson SAB            1 SAB                Objective:       LMP 2022 (Exact Date)       General:   alert, appears stated age and cooperative, no  apparent distress   HENT:  normocephalic, atraumatic   Eyes:  extraocular movements and conjunctivae normal   Neck:  supple, range of motion normal, no thyromegaly   Lungs:   no respiratory distress   Heart:   regular rate   Abdomen:  soft, non-tender, non-distended but gravid, no rebound or guarding    Extremities negative edema, negative erythema               Cervix:     Dilation: .5    Effacement: 30    Station:  -4    Consistency: medium    Position: middle       EFW by Leopold's: 6.5 lbs    Recent Growth Scan: 35 weeks with EFW 23%    Lab Review  Blood Type A POS  GBBS: positive  Rubella: Immune  RPR: negative  HIV: negative  HepB: negative       Assessment:     38w1d weeks gestation with chronic hypertension, prior C/S, AMA, MVP repair, and undesired fertility for repeat C/S, BTL         Plan:      Risks, benefits, alternatives and possible complications have been discussed in detail with the patient.   - Consents signed and to chart  - Admit to Labor and Delivery unit  - Epidural per Anesthesia  - Draw CBC, T&S    Post-Partum Hemorrhage risk - medium

## 2023-04-24 ENCOUNTER — ANESTHESIA (OUTPATIENT)
Dept: OBSTETRICS AND GYNECOLOGY | Facility: OTHER | Age: 38
End: 2023-04-24
Payer: COMMERCIAL

## 2023-04-24 ENCOUNTER — HOSPITAL ENCOUNTER (INPATIENT)
Facility: OTHER | Age: 38
LOS: 4 days | Discharge: HOME | End: 2023-04-28
Attending: OBSTETRICS & GYNECOLOGY | Admitting: OBSTETRICS & GYNECOLOGY
Payer: COMMERCIAL

## 2023-04-24 DIAGNOSIS — Z98.891 S/P CESAREAN SECTION: ICD-10-CM

## 2023-04-24 DIAGNOSIS — I34.2 NONRHEUMATIC MITRAL VALVE STENOSIS: ICD-10-CM

## 2023-04-24 DIAGNOSIS — O10.019 PRE-EXISTING ESSENTIAL HYPERTENSION DURING PREGNANCY, ANTEPARTUM: Primary | ICD-10-CM

## 2023-04-24 DIAGNOSIS — Z3A.38 38 WEEKS GESTATION OF PREGNANCY: ICD-10-CM

## 2023-04-24 DIAGNOSIS — Z98.891 HISTORY OF CESAREAN DELIVERY: ICD-10-CM

## 2023-04-24 LAB
ABO + RH BLD: NORMAL
ALBUMIN SERPL BCP-MCNC: 2.9 G/DL (ref 3.5–5.2)
ALP SERPL-CCNC: 137 U/L (ref 55–135)
ALT SERPL W/O P-5'-P-CCNC: 11 U/L (ref 10–44)
ANION GAP SERPL CALC-SCNC: 6 MMOL/L (ref 8–16)
ANION GAP SERPL CALC-SCNC: 7 MMOL/L (ref 8–16)
AST SERPL-CCNC: 25 U/L (ref 10–40)
BASOPHILS # BLD AUTO: 0.03 K/UL (ref 0–0.2)
BASOPHILS # BLD AUTO: 0.03 K/UL (ref 0–0.2)
BASOPHILS # BLD AUTO: 0.04 K/UL (ref 0–0.2)
BASOPHILS NFR BLD: 0.2 % (ref 0–1.9)
BASOPHILS NFR BLD: 0.2 % (ref 0–1.9)
BASOPHILS NFR BLD: 0.3 % (ref 0–1.9)
BILIRUB SERPL-MCNC: 0.5 MG/DL (ref 0.1–1)
BLD GP AB SCN CELLS X3 SERPL QL: NORMAL
BUN SERPL-MCNC: 8 MG/DL (ref 6–20)
BUN SERPL-MCNC: 8 MG/DL (ref 6–20)
CALCIUM SERPL-MCNC: 8.9 MG/DL (ref 8.7–10.5)
CALCIUM SERPL-MCNC: 9 MG/DL (ref 8.7–10.5)
CHLORIDE SERPL-SCNC: 105 MMOL/L (ref 95–110)
CHLORIDE SERPL-SCNC: 107 MMOL/L (ref 95–110)
CO2 SERPL-SCNC: 21 MMOL/L (ref 23–29)
CO2 SERPL-SCNC: 24 MMOL/L (ref 23–29)
CREAT SERPL-MCNC: 0.6 MG/DL (ref 0.5–1.4)
CREAT SERPL-MCNC: 0.6 MG/DL (ref 0.5–1.4)
DIFFERENTIAL METHOD: ABNORMAL
EOSINOPHIL # BLD AUTO: 0 K/UL (ref 0–0.5)
EOSINOPHIL # BLD AUTO: 0 K/UL (ref 0–0.5)
EOSINOPHIL # BLD AUTO: 0.1 K/UL (ref 0–0.5)
EOSINOPHIL NFR BLD: 0.1 % (ref 0–8)
EOSINOPHIL NFR BLD: 0.2 % (ref 0–8)
EOSINOPHIL NFR BLD: 0.6 % (ref 0–8)
ERYTHROCYTE [DISTWIDTH] IN BLOOD BY AUTOMATED COUNT: 14.3 % (ref 11.5–14.5)
ERYTHROCYTE [DISTWIDTH] IN BLOOD BY AUTOMATED COUNT: 14.4 % (ref 11.5–14.5)
ERYTHROCYTE [DISTWIDTH] IN BLOOD BY AUTOMATED COUNT: 14.6 % (ref 11.5–14.5)
EST. GFR  (NO RACE VARIABLE): >60 ML/MIN/1.73 M^2
EST. GFR  (NO RACE VARIABLE): >60 ML/MIN/1.73 M^2
GLUCOSE SERPL-MCNC: 92 MG/DL (ref 70–110)
GLUCOSE SERPL-MCNC: 95 MG/DL (ref 70–110)
HCT VFR BLD AUTO: 26.6 % (ref 37–48.5)
HCT VFR BLD AUTO: 31.4 % (ref 37–48.5)
HCT VFR BLD AUTO: 37.1 % (ref 37–48.5)
HGB BLD-MCNC: 10.3 G/DL (ref 12–16)
HGB BLD-MCNC: 12 G/DL (ref 12–16)
HGB BLD-MCNC: 8.7 G/DL (ref 12–16)
IMM GRANULOCYTES # BLD AUTO: 0.07 K/UL (ref 0–0.04)
IMM GRANULOCYTES # BLD AUTO: 0.07 K/UL (ref 0–0.04)
IMM GRANULOCYTES # BLD AUTO: 0.09 K/UL (ref 0–0.04)
IMM GRANULOCYTES NFR BLD AUTO: 0.4 % (ref 0–0.5)
IMM GRANULOCYTES NFR BLD AUTO: 0.5 % (ref 0–0.5)
IMM GRANULOCYTES NFR BLD AUTO: 0.7 % (ref 0–0.5)
LYMPHOCYTES # BLD AUTO: 1.4 K/UL (ref 1–4.8)
LYMPHOCYTES # BLD AUTO: 1.9 K/UL (ref 1–4.8)
LYMPHOCYTES # BLD AUTO: 1.9 K/UL (ref 1–4.8)
LYMPHOCYTES NFR BLD: 11.8 % (ref 18–48)
LYMPHOCYTES NFR BLD: 17.9 % (ref 18–48)
LYMPHOCYTES NFR BLD: 6.9 % (ref 18–48)
MCH RBC QN AUTO: 30.2 PG (ref 27–31)
MCH RBC QN AUTO: 30.9 PG (ref 27–31)
MCH RBC QN AUTO: 31.4 PG (ref 27–31)
MCHC RBC AUTO-ENTMCNC: 32.3 G/DL (ref 32–36)
MCHC RBC AUTO-ENTMCNC: 32.7 G/DL (ref 32–36)
MCHC RBC AUTO-ENTMCNC: 32.8 G/DL (ref 32–36)
MCV RBC AUTO: 93 FL (ref 82–98)
MCV RBC AUTO: 94 FL (ref 82–98)
MCV RBC AUTO: 96 FL (ref 82–98)
MONOCYTES # BLD AUTO: 0.6 K/UL (ref 0.3–1)
MONOCYTES # BLD AUTO: 1 K/UL (ref 0.3–1)
MONOCYTES # BLD AUTO: 1.1 K/UL (ref 0.3–1)
MONOCYTES NFR BLD: 4.9 % (ref 4–15)
MONOCYTES NFR BLD: 6.1 % (ref 4–15)
MONOCYTES NFR BLD: 6.7 % (ref 4–15)
NEUTROPHILS # BLD AUTO: 12.8 K/UL (ref 1.8–7.7)
NEUTROPHILS # BLD AUTO: 17.3 K/UL (ref 1.8–7.7)
NEUTROPHILS # BLD AUTO: 7.9 K/UL (ref 1.8–7.7)
NEUTROPHILS NFR BLD: 74.4 % (ref 38–73)
NEUTROPHILS NFR BLD: 80.8 % (ref 38–73)
NEUTROPHILS NFR BLD: 87.3 % (ref 38–73)
NRBC BLD-RTO: 0 /100 WBC
PLATELET # BLD AUTO: 214 K/UL (ref 150–450)
PLATELET # BLD AUTO: 235 K/UL (ref 150–450)
PLATELET # BLD AUTO: 263 K/UL (ref 150–450)
PMV BLD AUTO: 10.5 FL (ref 9.2–12.9)
PMV BLD AUTO: 11 FL (ref 9.2–12.9)
PMV BLD AUTO: 11.1 FL (ref 9.2–12.9)
POTASSIUM SERPL-SCNC: 3.9 MMOL/L (ref 3.5–5.1)
POTASSIUM SERPL-SCNC: 4.6 MMOL/L (ref 3.5–5.1)
PROT SERPL-MCNC: 6.6 G/DL (ref 6–8.4)
RBC # BLD AUTO: 2.82 M/UL (ref 4–5.4)
RBC # BLD AUTO: 3.28 M/UL (ref 4–5.4)
RBC # BLD AUTO: 3.98 M/UL (ref 4–5.4)
SODIUM SERPL-SCNC: 135 MMOL/L (ref 136–145)
SODIUM SERPL-SCNC: 135 MMOL/L (ref 136–145)
SPECIMEN OUTDATE: NORMAL
WBC # BLD AUTO: 10.53 K/UL (ref 3.9–12.7)
WBC # BLD AUTO: 15.8 K/UL (ref 3.9–12.7)
WBC # BLD AUTO: 19.77 K/UL (ref 3.9–12.7)

## 2023-04-24 PROCEDURE — 36415 COLL VENOUS BLD VENIPUNCTURE: CPT | Performed by: OBSTETRICS & GYNECOLOGY

## 2023-04-24 PROCEDURE — 59510 CESAREAN DELIVERY: CPT | Mod: AT,,, | Performed by: OBSTETRICS & GYNECOLOGY

## 2023-04-24 PROCEDURE — 88305 TISSUE EXAM BY PATHOLOGIST: ICD-10-PCS | Mod: 26,,, | Performed by: STUDENT IN AN ORGANIZED HEALTH CARE EDUCATION/TRAINING PROGRAM

## 2023-04-24 PROCEDURE — 71000033 HC RECOVERY, INTIAL HOUR: Performed by: OBSTETRICS & GYNECOLOGY

## 2023-04-24 PROCEDURE — 59514 CESAREAN DELIVERY ONLY: CPT | Mod: ,,, | Performed by: ANESTHESIOLOGY

## 2023-04-24 PROCEDURE — 63600175 PHARM REV CODE 636 W HCPCS: Performed by: OBSTETRICS & GYNECOLOGY

## 2023-04-24 PROCEDURE — 85025 COMPLETE CBC W/AUTO DIFF WBC: CPT

## 2023-04-24 PROCEDURE — 63600175 PHARM REV CODE 636 W HCPCS

## 2023-04-24 PROCEDURE — 80048 BASIC METABOLIC PNL TOTAL CA: CPT | Mod: XB | Performed by: OBSTETRICS & GYNECOLOGY

## 2023-04-24 PROCEDURE — 25000003 PHARM REV CODE 250: Performed by: STUDENT IN AN ORGANIZED HEALTH CARE EDUCATION/TRAINING PROGRAM

## 2023-04-24 PROCEDURE — 71000039 HC RECOVERY, EACH ADD'L HOUR: Performed by: OBSTETRICS & GYNECOLOGY

## 2023-04-24 PROCEDURE — 85025 COMPLETE CBC W/AUTO DIFF WBC: CPT | Mod: 91 | Performed by: OBSTETRICS & GYNECOLOGY

## 2023-04-24 PROCEDURE — 25000003 PHARM REV CODE 250

## 2023-04-24 PROCEDURE — 25000003 PHARM REV CODE 250: Performed by: OBSTETRICS & GYNECOLOGY

## 2023-04-24 PROCEDURE — 37000008 HC ANESTHESIA 1ST 15 MINUTES: Performed by: OBSTETRICS & GYNECOLOGY

## 2023-04-24 PROCEDURE — 27201108 HC SURGICEL

## 2023-04-24 PROCEDURE — 88305 TISSUE EXAM BY PATHOLOGIST: CPT | Mod: 26,,, | Performed by: STUDENT IN AN ORGANIZED HEALTH CARE EDUCATION/TRAINING PROGRAM

## 2023-04-24 PROCEDURE — 51702 INSERT TEMP BLADDER CATH: CPT

## 2023-04-24 PROCEDURE — 63600175 PHARM REV CODE 636 W HCPCS: Performed by: STUDENT IN AN ORGANIZED HEALTH CARE EDUCATION/TRAINING PROGRAM

## 2023-04-24 PROCEDURE — 85025 COMPLETE CBC W/AUTO DIFF WBC: CPT | Mod: 91 | Performed by: STUDENT IN AN ORGANIZED HEALTH CARE EDUCATION/TRAINING PROGRAM

## 2023-04-24 PROCEDURE — 58925 REMOVAL OF OVARIAN CYST(S): CPT | Mod: 59,,, | Performed by: OBSTETRICS & GYNECOLOGY

## 2023-04-24 PROCEDURE — 88302 PR  SURG PATH,LEVEL II: ICD-10-PCS | Mod: 26,,, | Performed by: STUDENT IN AN ORGANIZED HEALTH CARE EDUCATION/TRAINING PROGRAM

## 2023-04-24 PROCEDURE — 80053 COMPREHEN METABOLIC PANEL: CPT | Performed by: STUDENT IN AN ORGANIZED HEALTH CARE EDUCATION/TRAINING PROGRAM

## 2023-04-24 PROCEDURE — 59510 PR FULL ROUT OBSTE CARE,CESAREAN DELIV: ICD-10-PCS | Mod: AT,,, | Performed by: OBSTETRICS & GYNECOLOGY

## 2023-04-24 PROCEDURE — 36004725 HC OB OR TIME LEV III - EA ADD 15 MIN: Performed by: OBSTETRICS & GYNECOLOGY

## 2023-04-24 PROCEDURE — 58611 LIGATE OVIDUCT(S) ADD-ON: CPT | Mod: ,,, | Performed by: OBSTETRICS & GYNECOLOGY

## 2023-04-24 PROCEDURE — 88305 TISSUE EXAM BY PATHOLOGIST: CPT | Performed by: STUDENT IN AN ORGANIZED HEALTH CARE EDUCATION/TRAINING PROGRAM

## 2023-04-24 PROCEDURE — 88302 TISSUE EXAM BY PATHOLOGIST: CPT | Mod: 59 | Performed by: STUDENT IN AN ORGANIZED HEALTH CARE EDUCATION/TRAINING PROGRAM

## 2023-04-24 PROCEDURE — 37000009 HC ANESTHESIA EA ADD 15 MINS: Performed by: OBSTETRICS & GYNECOLOGY

## 2023-04-24 PROCEDURE — 88302 TISSUE EXAM BY PATHOLOGIST: CPT | Mod: 26,,, | Performed by: STUDENT IN AN ORGANIZED HEALTH CARE EDUCATION/TRAINING PROGRAM

## 2023-04-24 PROCEDURE — 86900 BLOOD TYPING SEROLOGIC ABO: CPT

## 2023-04-24 PROCEDURE — 11000001 HC ACUTE MED/SURG PRIVATE ROOM

## 2023-04-24 PROCEDURE — 36004724 HC OB OR TIME LEV III - 1ST 15 MIN: Performed by: OBSTETRICS & GYNECOLOGY

## 2023-04-24 PROCEDURE — 58925 PR REMOVAL OF OVARIAN CYST(S): ICD-10-PCS | Mod: 59,,, | Performed by: OBSTETRICS & GYNECOLOGY

## 2023-04-24 PROCEDURE — 58611 PR LIGATION,FALLOPIAN TUBE W/C-SECTION: ICD-10-PCS | Mod: ,,, | Performed by: OBSTETRICS & GYNECOLOGY

## 2023-04-24 PROCEDURE — 36415 COLL VENOUS BLD VENIPUNCTURE: CPT | Performed by: STUDENT IN AN ORGANIZED HEALTH CARE EDUCATION/TRAINING PROGRAM

## 2023-04-24 PROCEDURE — 72100002 HC LABOR CARE, 1ST 8 HOURS

## 2023-04-24 PROCEDURE — 59514 PRA REAN DELIVERY ONLY: ICD-10-PCS | Mod: ,,, | Performed by: ANESTHESIOLOGY

## 2023-04-24 RX ORDER — FAMOTIDINE 10 MG/ML
20 INJECTION INTRAVENOUS
Status: DISCONTINUED | OUTPATIENT
Start: 2023-04-24 | End: 2023-04-28 | Stop reason: HOSPADM

## 2023-04-24 RX ORDER — ONDANSETRON 8 MG/1
8 TABLET, ORALLY DISINTEGRATING ORAL EVERY 8 HOURS PRN
Status: DISCONTINUED | OUTPATIENT
Start: 2023-04-24 | End: 2023-04-28 | Stop reason: HOSPADM

## 2023-04-24 RX ORDER — PRENATAL WITH FERROUS FUM AND FOLIC ACID 3080; 920; 120; 400; 22; 1.84; 3; 20; 10; 1; 12; 200; 27; 25; 2 [IU]/1; [IU]/1; MG/1; [IU]/1; MG/1; MG/1; MG/1; MG/1; MG/1; MG/1; UG/1; MG/1; MG/1; MG/1; MG/1
1 TABLET ORAL DAILY
Status: DISCONTINUED | OUTPATIENT
Start: 2023-04-24 | End: 2023-04-28 | Stop reason: HOSPADM

## 2023-04-24 RX ORDER — OXYCODONE HYDROCHLORIDE 5 MG/1
10 TABLET ORAL EVERY 4 HOURS PRN
Status: DISCONTINUED | OUTPATIENT
Start: 2023-04-24 | End: 2023-04-28 | Stop reason: HOSPADM

## 2023-04-24 RX ORDER — ACETAMINOPHEN 500 MG
1000 TABLET ORAL ONCE
Status: COMPLETED | OUTPATIENT
Start: 2023-04-24 | End: 2023-04-24

## 2023-04-24 RX ORDER — CEFAZOLIN SODIUM 2 G/50ML
2 SOLUTION INTRAVENOUS
Status: CANCELLED | OUTPATIENT
Start: 2023-04-24

## 2023-04-24 RX ORDER — LABETALOL 100 MG/1
100 TABLET, FILM COATED ORAL 2 TIMES DAILY
Status: DISCONTINUED | OUTPATIENT
Start: 2023-04-24 | End: 2023-04-27

## 2023-04-24 RX ORDER — ACETAMINOPHEN 500 MG
1000 TABLET ORAL
Status: CANCELLED | OUTPATIENT
Start: 2023-04-24

## 2023-04-24 RX ORDER — MISOPROSTOL 200 UG/1
800 TABLET ORAL
Status: DISCONTINUED | OUTPATIENT
Start: 2023-04-24 | End: 2023-04-28 | Stop reason: HOSPADM

## 2023-04-24 RX ORDER — OXYTOCIN/RINGER'S LACTATE 30/500 ML
95 PLASTIC BAG, INJECTION (ML) INTRAVENOUS ONCE
Status: DISCONTINUED | OUTPATIENT
Start: 2023-04-24 | End: 2023-04-28 | Stop reason: HOSPADM

## 2023-04-24 RX ORDER — SODIUM CHLORIDE, SODIUM LACTATE, POTASSIUM CHLORIDE, CALCIUM CHLORIDE 600; 310; 30; 20 MG/100ML; MG/100ML; MG/100ML; MG/100ML
INJECTION, SOLUTION INTRAVENOUS CONTINUOUS
Status: CANCELLED | OUTPATIENT
Start: 2023-04-24

## 2023-04-24 RX ORDER — IBUPROFEN 400 MG/1
800 TABLET ORAL
Status: DISCONTINUED | OUTPATIENT
Start: 2023-04-25 | End: 2023-04-24

## 2023-04-24 RX ORDER — CARBOPROST TROMETHAMINE 250 UG/ML
250 INJECTION, SOLUTION INTRAMUSCULAR
Status: DISCONTINUED | OUTPATIENT
Start: 2023-04-24 | End: 2023-04-28 | Stop reason: HOSPADM

## 2023-04-24 RX ORDER — SODIUM CITRATE AND CITRIC ACID MONOHYDRATE 334; 500 MG/5ML; MG/5ML
30 SOLUTION ORAL
Status: DISCONTINUED | OUTPATIENT
Start: 2023-04-24 | End: 2023-04-28 | Stop reason: HOSPADM

## 2023-04-24 RX ORDER — DEXAMETHASONE SODIUM PHOSPHATE 4 MG/ML
INJECTION, SOLUTION INTRA-ARTICULAR; INTRALESIONAL; INTRAMUSCULAR; INTRAVENOUS; SOFT TISSUE
Status: DISCONTINUED | OUTPATIENT
Start: 2023-04-24 | End: 2023-04-24

## 2023-04-24 RX ORDER — AMOXICILLIN 250 MG
1 CAPSULE ORAL NIGHTLY PRN
Status: DISCONTINUED | OUTPATIENT
Start: 2023-04-24 | End: 2023-04-28 | Stop reason: HOSPADM

## 2023-04-24 RX ORDER — KETOROLAC TROMETHAMINE 30 MG/ML
30 INJECTION, SOLUTION INTRAMUSCULAR; INTRAVENOUS
Status: COMPLETED | OUTPATIENT
Start: 2023-04-24 | End: 2023-04-25

## 2023-04-24 RX ORDER — OXYCODONE HYDROCHLORIDE 5 MG/1
5 TABLET ORAL EVERY 4 HOURS PRN
Status: DISCONTINUED | OUTPATIENT
Start: 2023-04-24 | End: 2023-04-28 | Stop reason: HOSPADM

## 2023-04-24 RX ORDER — ACETAMINOPHEN 325 MG/1
650 TABLET ORAL
Status: DISCONTINUED | OUTPATIENT
Start: 2023-04-24 | End: 2023-04-28 | Stop reason: HOSPADM

## 2023-04-24 RX ORDER — OXYTOCIN/RINGER'S LACTATE 30/500 ML
334 PLASTIC BAG, INJECTION (ML) INTRAVENOUS ONCE
Status: DISCONTINUED | OUTPATIENT
Start: 2023-04-24 | End: 2023-04-28 | Stop reason: HOSPADM

## 2023-04-24 RX ORDER — DOCUSATE SODIUM 100 MG/1
200 CAPSULE, LIQUID FILLED ORAL 2 TIMES DAILY
Status: DISCONTINUED | OUTPATIENT
Start: 2023-04-24 | End: 2023-04-28 | Stop reason: HOSPADM

## 2023-04-24 RX ORDER — DIPHENHYDRAMINE HYDROCHLORIDE 50 MG/ML
12.5 INJECTION INTRAMUSCULAR; INTRAVENOUS
Status: DISCONTINUED | OUTPATIENT
Start: 2023-04-24 | End: 2023-04-28 | Stop reason: HOSPADM

## 2023-04-24 RX ORDER — KETOROLAC TROMETHAMINE 30 MG/ML
30 INJECTION, SOLUTION INTRAMUSCULAR; INTRAVENOUS
Status: DISCONTINUED | OUTPATIENT
Start: 2023-04-24 | End: 2023-04-24

## 2023-04-24 RX ORDER — PROCHLORPERAZINE EDISYLATE 5 MG/ML
5 INJECTION INTRAMUSCULAR; INTRAVENOUS EVERY 6 HOURS PRN
Status: DISCONTINUED | OUTPATIENT
Start: 2023-04-24 | End: 2023-04-28 | Stop reason: HOSPADM

## 2023-04-24 RX ORDER — SODIUM CITRATE AND CITRIC ACID MONOHYDRATE 334; 500 MG/5ML; MG/5ML
30 SOLUTION ORAL
Status: CANCELLED | OUTPATIENT
Start: 2023-04-24

## 2023-04-24 RX ORDER — SIMETHICONE 80 MG
1 TABLET,CHEWABLE ORAL EVERY 6 HOURS PRN
Status: DISCONTINUED | OUTPATIENT
Start: 2023-04-24 | End: 2023-04-28 | Stop reason: HOSPADM

## 2023-04-24 RX ORDER — MISOPROSTOL 200 UG/1
800 TABLET ORAL ONCE AS NEEDED
Status: DISCONTINUED | OUTPATIENT
Start: 2023-04-24 | End: 2023-04-28 | Stop reason: HOSPADM

## 2023-04-24 RX ORDER — SODIUM CHLORIDE, SODIUM LACTATE, POTASSIUM CHLORIDE, CALCIUM CHLORIDE 600; 310; 30; 20 MG/100ML; MG/100ML; MG/100ML; MG/100ML
INJECTION, SOLUTION INTRAVENOUS CONTINUOUS PRN
Status: DISCONTINUED | OUTPATIENT
Start: 2023-04-24 | End: 2023-04-24

## 2023-04-24 RX ORDER — METHYLERGONOVINE MALEATE 0.2 MG/ML
200 INJECTION INTRAVENOUS
Status: DISCONTINUED | OUTPATIENT
Start: 2023-04-24 | End: 2023-04-28 | Stop reason: HOSPADM

## 2023-04-24 RX ORDER — CEFAZOLIN SODIUM 2 G/50ML
2 SOLUTION INTRAVENOUS
Status: DISCONTINUED | OUTPATIENT
Start: 2023-04-24 | End: 2023-04-28 | Stop reason: HOSPADM

## 2023-04-24 RX ORDER — ONDANSETRON 2 MG/ML
INJECTION INTRAMUSCULAR; INTRAVENOUS
Status: DISCONTINUED | OUTPATIENT
Start: 2023-04-24 | End: 2023-04-24

## 2023-04-24 RX ORDER — FENTANYL CITRATE 50 UG/ML
INJECTION, SOLUTION INTRAMUSCULAR; INTRAVENOUS
Status: DISCONTINUED | OUTPATIENT
Start: 2023-04-24 | End: 2023-04-24

## 2023-04-24 RX ORDER — KETOROLAC TROMETHAMINE 30 MG/ML
INJECTION, SOLUTION INTRAMUSCULAR; INTRAVENOUS
Status: DISCONTINUED | OUTPATIENT
Start: 2023-04-24 | End: 2023-04-24

## 2023-04-24 RX ORDER — ONDANSETRON 2 MG/ML
4 INJECTION INTRAMUSCULAR; INTRAVENOUS EVERY 6 HOURS PRN
Status: DISCONTINUED | OUTPATIENT
Start: 2023-04-24 | End: 2023-04-28 | Stop reason: HOSPADM

## 2023-04-24 RX ORDER — HYDROCORTISONE 25 MG/G
CREAM TOPICAL 3 TIMES DAILY PRN
Status: DISCONTINUED | OUTPATIENT
Start: 2023-04-24 | End: 2023-04-28 | Stop reason: HOSPADM

## 2023-04-24 RX ORDER — PHENYLEPHRINE HYDROCHLORIDE 10 MG/ML
INJECTION INTRAVENOUS CONTINUOUS PRN
Status: DISCONTINUED | OUTPATIENT
Start: 2023-04-24 | End: 2023-04-24

## 2023-04-24 RX ORDER — OXYTOCIN/RINGER'S LACTATE 30/500 ML
95 PLASTIC BAG, INJECTION (ML) INTRAVENOUS CONTINUOUS
Status: DISCONTINUED | OUTPATIENT
Start: 2023-04-24 | End: 2023-04-28 | Stop reason: HOSPADM

## 2023-04-24 RX ORDER — METHYLERGONOVINE MALEATE 0.2 MG/ML
200 INJECTION INTRAVENOUS ONCE AS NEEDED
Status: DISCONTINUED | OUTPATIENT
Start: 2023-04-24 | End: 2023-04-28 | Stop reason: HOSPADM

## 2023-04-24 RX ORDER — NALBUPHINE HYDROCHLORIDE 10 MG/ML
5 INJECTION, SOLUTION INTRAMUSCULAR; INTRAVENOUS; SUBCUTANEOUS ONCE AS NEEDED
Status: DISCONTINUED | OUTPATIENT
Start: 2023-04-24 | End: 2023-04-28 | Stop reason: HOSPADM

## 2023-04-24 RX ORDER — DIPHENHYDRAMINE HCL 25 MG
25 CAPSULE ORAL EVERY 6 HOURS PRN
Status: DISCONTINUED | OUTPATIENT
Start: 2023-04-24 | End: 2023-04-28 | Stop reason: HOSPADM

## 2023-04-24 RX ORDER — SODIUM CHLORIDE, SODIUM LACTATE, POTASSIUM CHLORIDE, CALCIUM CHLORIDE 600; 310; 30; 20 MG/100ML; MG/100ML; MG/100ML; MG/100ML
INJECTION, SOLUTION INTRAVENOUS CONTINUOUS
Status: DISCONTINUED | OUTPATIENT
Start: 2023-04-24 | End: 2023-04-28 | Stop reason: HOSPADM

## 2023-04-24 RX ORDER — IBUPROFEN 400 MG/1
800 TABLET ORAL
Status: DISCONTINUED | OUTPATIENT
Start: 2023-04-25 | End: 2023-04-28 | Stop reason: HOSPADM

## 2023-04-24 RX ORDER — PHENYLEPHRINE HCL IN 0.9% NACL 1 MG/10 ML
SYRINGE (ML) INTRAVENOUS
Status: DISCONTINUED | OUTPATIENT
Start: 2023-04-24 | End: 2023-04-24

## 2023-04-24 RX ORDER — OXYTOCIN/RINGER'S LACTATE 30/500 ML
PLASTIC BAG, INJECTION (ML) INTRAVENOUS
Status: DISCONTINUED | OUTPATIENT
Start: 2023-04-24 | End: 2023-04-24

## 2023-04-24 RX ORDER — MORPHINE SULFATE 0.5 MG/ML
INJECTION, SOLUTION EPIDURAL; INTRATHECAL; INTRAVENOUS
Status: DISCONTINUED | OUTPATIENT
Start: 2023-04-24 | End: 2023-04-24

## 2023-04-24 RX ORDER — FAMOTIDINE 10 MG/ML
20 INJECTION INTRAVENOUS
Status: CANCELLED | OUTPATIENT
Start: 2023-04-24

## 2023-04-24 RX ORDER — TRANEXAMIC ACID 10 MG/ML
1000 INJECTION, SOLUTION INTRAVENOUS ONCE AS NEEDED
Status: DISCONTINUED | OUTPATIENT
Start: 2023-04-24 | End: 2023-04-28 | Stop reason: HOSPADM

## 2023-04-24 RX ORDER — BUPIVACAINE HYDROCHLORIDE 7.5 MG/ML
INJECTION, SOLUTION INTRASPINAL
Status: DISCONTINUED | OUTPATIENT
Start: 2023-04-24 | End: 2023-04-24

## 2023-04-24 RX ADMIN — DOCUSATE SODIUM 200 MG: 100 CAPSULE, LIQUID FILLED ORAL at 09:04

## 2023-04-24 RX ADMIN — Medication 100 MCG: at 06:04

## 2023-04-24 RX ADMIN — ACETAMINOPHEN 650 MG: 325 TABLET, FILM COATED ORAL at 07:04

## 2023-04-24 RX ADMIN — KETOROLAC TROMETHAMINE 30 MG: 30 INJECTION, SOLUTION INTRAMUSCULAR; INTRAVENOUS at 07:04

## 2023-04-24 RX ADMIN — Medication 3 UNITS: at 07:04

## 2023-04-24 RX ADMIN — DEXTROSE 2 G: 50 INJECTION, SOLUTION INTRAVENOUS at 06:04

## 2023-04-24 RX ADMIN — SODIUM CHLORIDE, SODIUM LACTATE, POTASSIUM CHLORIDE, AND CALCIUM CHLORIDE: 600; 310; 30; 20 INJECTION, SOLUTION INTRAVENOUS at 06:04

## 2023-04-24 RX ADMIN — Medication 95 MILLI-UNITS/MIN: at 09:04

## 2023-04-24 RX ADMIN — DEXAMETHASONE SODIUM PHOSPHATE 4 MG: 4 INJECTION, SOLUTION INTRA-ARTICULAR; INTRALESIONAL; INTRAMUSCULAR; INTRAVENOUS; SOFT TISSUE at 06:04

## 2023-04-24 RX ADMIN — KETOROLAC TROMETHAMINE 30 MG: 30 INJECTION, SOLUTION INTRAMUSCULAR; INTRAVENOUS at 01:04

## 2023-04-24 RX ADMIN — FENTANYL CITRATE 10 MCG: 50 INJECTION, SOLUTION INTRAMUSCULAR; INTRAVENOUS at 06:04

## 2023-04-24 RX ADMIN — SODIUM CHLORIDE, POTASSIUM CHLORIDE, SODIUM LACTATE AND CALCIUM CHLORIDE: 600; 310; 30; 20 INJECTION, SOLUTION INTRAVENOUS at 05:04

## 2023-04-24 RX ADMIN — Medication 100 MCG: at 07:04

## 2023-04-24 RX ADMIN — PHENYLEPHRINE HYDROCHLORIDE 0.5 MCG/KG/MIN: 10 INJECTION INTRAVENOUS at 06:04

## 2023-04-24 RX ADMIN — ACETAMINOPHEN 1000 MG: 500 TABLET, FILM COATED ORAL at 06:04

## 2023-04-24 RX ADMIN — ACETAMINOPHEN 650 MG: 325 TABLET, FILM COATED ORAL at 01:04

## 2023-04-24 RX ADMIN — SODIUM CHLORIDE, SODIUM LACTATE, POTASSIUM CHLORIDE, AND CALCIUM CHLORIDE: 600; 310; 30; 20 INJECTION, SOLUTION INTRAVENOUS at 08:04

## 2023-04-24 RX ADMIN — BUPIVACAINE HYDROCHLORIDE IN DEXTROSE 1.6 ML: 7.5 INJECTION, SOLUTION SUBARACHNOID at 06:04

## 2023-04-24 RX ADMIN — OXYCODONE HYDROCHLORIDE 5 MG: 5 TABLET ORAL at 11:04

## 2023-04-24 RX ADMIN — SODIUM CITRATE AND CITRIC ACID MONOHYDRATE 30 ML: 500; 334 SOLUTION ORAL at 06:04

## 2023-04-24 RX ADMIN — FAMOTIDINE 20 MG: 10 INJECTION, SOLUTION INTRAVENOUS at 06:04

## 2023-04-24 RX ADMIN — SODIUM CHLORIDE, POTASSIUM CHLORIDE, SODIUM LACTATE AND CALCIUM CHLORIDE 500 ML: 600; 310; 30; 20 INJECTION, SOLUTION INTRAVENOUS at 02:04

## 2023-04-24 RX ADMIN — Medication 3 UNITS: at 08:04

## 2023-04-24 RX ADMIN — ONDANSETRON 4 MG: 2 INJECTION INTRAMUSCULAR; INTRAVENOUS at 06:04

## 2023-04-24 RX ADMIN — Medication 0.1 MG: at 06:04

## 2023-04-24 RX ADMIN — OXYCODONE HYDROCHLORIDE 5 MG: 5 TABLET ORAL at 10:04

## 2023-04-24 NOTE — ANESTHESIA PROCEDURE NOTES
Spinal    Diagnosis: IUP  Patient location during procedure: OR  Start time: 4/24/2023 6:36 AM  Timeout: 4/24/2023 6:35 AM  End time: 4/24/2023 6:40 AM    Staffing  Authorizing Provider: Chris Gleason Jr., MD  Performing Provider: Lacy Almodovar MD    Preanesthetic Checklist  Completed: patient identified, IV checked, site marked, risks and benefits discussed, surgical consent, monitors and equipment checked, pre-op evaluation and timeout performed  Spinal Block  Patient position: sitting  Prep: ChloraPrep  Patient monitoring: heart rate, cardiac monitor, continuous pulse ox and frequent blood pressure checks  Approach: midline  Location: L4-5  Injection technique: single shot  CSF Fluid: clear free-flowing CSF  Needle  Needle type: pencil-tip   Needle gauge: 25 G  Needle length: 3.5 in  Additional Documentation: incremental injection, negative aspiration for heme and no paresthesia on injection  Needle localization: anatomical landmarks  Assessment  Sensory level: T5   Dermatomal levels determined by pinch or prick  Ease of block: easy  Patient's tolerance of the procedure: comfortable throughout block and no complaints

## 2023-04-24 NOTE — INTERVAL H&P NOTE
The patient has been examined and the H&P has been reviewed:    I concur with the findings and no changes have occurred since H&P was written.    Surgery risks, benefits and alternative options discussed and understood by patient/family.    FHT:150 baseline, +accel, -decel, mod BTBV  Fetal presentation: cephalic    Active Hospital Problems    Diagnosis  POA    *38 weeks gestation of pregnancy [Z3A.38]  Not Applicable      Resolved Hospital Problems   No resolved problems to display.     Plan: To the OR for repeat LTCS. Consents signed and all questions answered.    Nydia Lu MD PGY-1  Obstetrics and Gynecology

## 2023-04-24 NOTE — PLAN OF CARE
VSS. Patient has post-operative wilkins in place. Fundus firm without massage, midline, with moderate rubra noted. Pain controlled with PRN pain medications. Safety maintained, bed low and in a locked position. Significant other at bedside. Breastfeeding every 2-3 hours with moderate assistance and in response to infant cues. No further concerns at this time, will continue to monitor.

## 2023-04-24 NOTE — L&D DELIVERY NOTE
DATE OF PROCEDURE: DATE 23     PREOPERATIVE DIAGNOSES:  1. Pregnancy at 38.1 weeks' gestation.  2. History prior C-sections.  3. Advanced maternal age  4. Chronic hypertension on medication  5. Undesired fertility  6. History of mitral valve repair       POSTOPERATIVE DIAGNOSES  1. Pregnancy at 38.1 weeks' gestation.  2. History of prior C-sections.  3. Advanced maternal age  4. Chronic hypertension on medication  5. Undesired fertility  6. History of mitral valve repair  7. Viable infant.  8. Severe pelvic adhesions  9. Left ovarian cyst     PROCEDURES PERFORMED:  1. Repeat low transverse  section.  2. Bilateral tubal ligation  3. Severe pelvic adhesions  4. Left ovarian cystectomy       SURGEON:  THOMAS Moreno MD     ASSISTANT:  CARYN Carter MD (qualified resident not available)     ANESTHESIA:  Spinal.     INDICATIONS: King Botello is a 37 y.o. year old [id number] 38.1 weeks gestation who presents today to Labor and Delivery for a scheduled repeat  with bilateral tubal ligation.  She has chronic hypertension with BP well controlled on medication.  Her history is remarkable for having a prior  and desires permanent, irreversible sterilization.  She has a history of mitral valve annuloplasty, followed by Dr. Cordero at .  Throughout the pregnancy, she has been seen by [facility] with serial consults and ultrasounds.  We had reviewed all risks and benefits of the planned procedure.  Questions have been answered,   and consent was signed and witnessed.     PROCEDURE IN DETAIL:  Having obtained adequate informed consent, the patient was taken to the Operating Room.  She was placed on the operating table.  After an   adequate level of spinal anesthesia was obtained, her abdomen was prepped and draped in the usual sterile manner.  A Soares catheter had already been inserted into the bladder.   Her old Pfannenstiel skin incision was then excised and the incision carried down sharply through the she  ultrasound procedure subcutaneous tissue to the fascia.  The fascia was incised bilaterally.  The fascia, which was densely adherent to the rectus muscles, was then dissected superiorly and inferiorly off the rectus muscles, which were divided in the midline.  Peritoneum was identified and entered.  It was noted the uterus was densely scarred / adherent to the anterior abdominal wall. The uterus was bluntly and sharply freed to Estrace from the anterior abdominal wall to allow adequate exposure to the lower uterine segment for delivery.  The bladder flap was noted to be well down on her uterus.  We made a low transverse uterine incision which was carried out bilaterally with finger fracture technique.  Membranes were ruptured with recovery of clear fluid.  A viable female infant was delivered from the vertex presentation.  The cord was clamped and cut and the infant handed off to the nurses in attendance.  Cord blood was obtained.  The placenta was allowed to   expel spontaneously.  Omentum was adherent to the anterior uterus and fundus was was removed with clamp, cut, tie technique allowing the uterus to be exteriorized.  The uterine cavity was wiped clean with a moist lap.  The uterine incision was then closed with running locking #1 chromic stitch.  Several areas of oozing along the hysterotomy were made hemostatic with figure of eight #1 chromic stitches. The left ovary was remarkable for a simple appearing 4 cm cyst.  The ovary was wrapped with a lap and a superficial incision made in the capsule of the ovary.  Using hemostats, this cyst was carefully dissected from the ovarian issue.  The cyst was removed intact without any leakage.  The ovary was closed and made hemostatic with 2-0 vicryl.  Attention was turned to the left tube which was traced to the fimbriated end.  A 2-3 cm segment in the mid-portion of the tube was grasped with a lin Mclean tube with 2-0 chromic.  This knuckle of tube was excised.   "The tubal ostia were hemostatic and well .  A similar procedure was carried out on the right tube.  The right ovary was normal in appearance.   The uterus was then replaced into the abdominal cavity.  Inspection of the tubal ligation locations, left cystectomy site, as well as the uterine incision were all completely hemostatic.  Lateral gutters were irrigated, all clots and debris removed. We placed surgicell on the hysterotomy and left ovary. Satisfied with hemostasis, we now turned to closing the abdominal wall.  The muscles were closed with running 2-0 Chromic.  Inspection   subfascially noted all areas to be dry.  The fascia was then reapproximated with a #1 PDS suture.  Two sutures were used, each starting in the angle meeting in the midline.  Subcutaneous tissue was irrigated, made hemostatic with   electrocautery.  Subcutaneous tissue was then reapproximated with interrupted 2-0 Vicryls. Finally, the skin was closed with a running 4-0 Monocryl stitch.  It   was hemostatic.  A pressure dressing was placed atop the incision.  Estimated blood loss was 1215 cc.  There were no complications.  All counts were correct.  She had clear urine throughout the entire procedure.    Mane Moreno MD     Delivery Information for Girl King Botello    Birth information:  YOB: 2024   Time of birth: 7:13 AM   Sex: female   Head Delivery Date/Time: 2023  7:13 AM   Delivery type: , Low Transverse   Gestational Age: 38w1d    Delivery Providers    Delivering clinician: Mane Moreno MD   Provider Role    Sarah Carter MD Assisting Surgeon    Shereen Alaniz RN Circulator    [facility]    Jolie Clark RN Registered Nurse    Matthew Kc RN Registered Nurse              Measurements    Weight: 2890 g  Weight (lbs): 6 lb 5.9 oz  Length: 47.6 cm  Length (in): 18.75"  Head circumference: 34.3 cm  Chest circumference: 31.8 cm         Apgars  "   Living status: Living  Apgars:  1 min.:  5 min.:  10 min.:  15 min.:  20 min.:    Skin color:  1  1       Heart rate:  2  2       Reflex irritability:  2  2       Muscle tone:  2  2       Respiratory effort:  2  2       Total:  9  9       Apgars assigned by: JANICE GARCIA         Operative Delivery    Forceps attempted?: No  Vacuum extractor attempted?: No         Shoulder Dystocia    Shoulder dystocia present?: No           Presentation    Presentation: Vertex  Position: Left Occiput Anterior           Interventions/Resuscitation    Method: Bulb Suctioning, Tactile Stimulation       Cord    Vessels: 3 vessels  Complications: None  Delayed Cord Clamping?: No  Cord Clamped Date/Time: 2023  7:13 AM  Cord Blood Disposition: Sent with Baby  Gases Sent?: No  Stem Cell Collection (by MD): No       Placenta    Placenta delivery date/time: 2023 0714  Placenta removal: Manual removal  Placenta appearance: Intact  Placenta disposition: Discarded           Labor Events:       labor: No     Labor Onset Date/Time:         Dilation Complete Date/Time:         Start Pushing Date/Time:       Rupture Date/Time:              Rupture type:            Fluid Amount:         Fluid Color:          steroids: None     Antibiotics given for GBS: No     Induction:       Indications for induction:        Augmentation:       Indications for augmentation:       Labor complications: None     Additional complications:          Cervical ripening:                     Delivery:      Episiotomy:       Indication for Episiotomy:       Perineal Lacerations:   Repaired:      Periurethral Laceration:   Repaired:     Labial Laceration:   Repaired:     Sulcus Laceration:   Repaired:     Vaginal Laceration:   Repaired:     Cervical Laceration:   Repaired:     Repair suture:       Repair # of packets:       Last Value - EBL - Nursing (mL):       Sum - EBL - Nursing (mL): 0     Last Value - EBL - Anesthesia (mL):        Calculated  QBL (mL): 250        Vaginal Sweep Performed:       Surgicount Correct:         Other providers:       Anesthesia    Method: Spinal          Details (if applicable):  Trial of Labor No    Categorization: Repeat    Priority: Routine   Indications for : Prior Uterine Surgery   Incision Type: low transverse     Additional  information:  Forceps:    Vacuum:    Breech:    Observed anomalies    Other (Comments):

## 2023-04-24 NOTE — PROGRESS NOTES
Urine output was 25cc over the last hour and a half. Dr. Gilbert notified - no new orders placed. Per MD to call if urine output remains low over the next couple of hours.

## 2023-04-24 NOTE — TRANSFER OF CARE
"Anesthesia Transfer of Care Note    Patient: King Botello    Procedure(s) Performed: Procedure(s) (LRB):   SECTION, WITH TUBAL LIGATION (Bilateral)    Patient location: PACU    Anesthesia Type: spinal    Transport from OR: Transported from OR on room air with adequate spontaneous ventilation    Post pain: adequate analgesia    Post assessment: no apparent anesthetic complications and tolerated procedure well    Post vital signs: stable    Level of consciousness: awake, alert and oriented    Nausea/Vomiting: no nausea/vomiting    Complications: none    Transfer of care protocol was followed      Last vitals:   Visit Vitals  /71   Pulse 98   Temp 36.9 °C (98.4 °F) (Oral)   Resp 18   Ht 5' 1" (1.549 m)   Wt 88.9 kg (196 lb)   LMP 2022 (Exact Date)   SpO2 95%   Breastfeeding Yes   BMI 37.03 kg/m²     "

## 2023-04-24 NOTE — OPERATIVE NOTE ADDENDUM
Certification of Assistant at Surgery       Surgery Date: 2023     Participating Surgeons:  Surgeon(s) and Role:     * Mane Moreno MD - Primary     * Sarah Carter MD - Assisting    Procedures:  Procedure(s) (LRB):   SECTION, WITH TUBAL LIGATION (Bilateral)        (Tubal ligation part of the procedure performed by Dr. Moreno)     Assistant Surgeon's Certification of Necessity:  I understand that section 1842 (b) (6) (d) of the Social Security Act generally prohibits Medicare Part B reasonable charge payment for the services of assistants at surgery in teaching hospitals when qualified residents are available to furnish such services. I certify that the services for which payment is claimed were medically necessary, and that no qualified resident was available to perform the services. I further understand that these services are subject to post-payment review by the Medicare carrier.      Sarah Carter MD    2023  2:29 PM

## 2023-04-24 NOTE — BRIEF OP NOTE
[facility] - Labor & Delivery  Brief Operative Note    Surgery Date: 2023     Surgeon(s) and Role:     * Lydia Moreno MD - Primary     * Sarah Carter MD - Assisting        Pre-op Diagnosis:  Multigravida of advanced maternal age in third trimester [O09.523]  Pre-existing hypertension affecting pregnancy in second trimester [O10.912]  Previous  delivery affecting pregnancy, antepartum [O34.219]  Undesired fertility  History of mitral valve replacement    Post-op Diagnosis:  Post-Op Diagnosis Codes:     * Multigravida of advanced maternal age in third trimester [O09.523]     * Pre-existing hypertension affecting pregnancy in second trimester [O10.912]     * Previous  delivery affecting pregnancy, antepartum [O34.219]  Undesired fertility  History of mitral valve replacement  Severe pelvic adhesions  Left ovarian cyst    Procedure(s) (LRB):   SECTION, WITH TUBAL LIGATION (Bilateral)  Lysis of adhesions  Left ovarian cystectomy    Anesthesia: Spinal/Epidural    Operative Findings:   Severe pelvic adhesions  Uterus adherent to anterior abdominal wall  Left ovarian cyst    Estimated Blood Loss: 1215 cc         Specimens:   Specimen (24h ago, onward)       Start     Ordered    23 0811  Specimen to Pathology, Surgery Gynecology and Obstetrics  Once        Comments: Pre-op Diagnosis: Multigravida of advanced maternal age in third trimester [O09.523]Pre-existing hypertension affecting pregnancy in second trimester [O10.912]Previous  delivery affecting pregnancy, antepartum [O34.219]Procedure(s): SECTION, WITH TUBAL LIGATION Number of specimens: 1Name of specimens: left ovarian cyst     References:    Click here for ordering Quick Tip   Question Answer Comment   Procedure Type: Gynecology and Obstetrics    Specimen Class: Routine/Screening    Which provider would you like to cc? LYDIA MORENO    Release to patient Immediate        23 0813    23 0805   Specimen to Pathology, Surgery Gynecology and Obstetrics  Once        Comments: Pre-op Diagnosis: Multigravida of advanced maternal age in third trimester [O09.523]Pre-existing hypertension affecting pregnancy in second trimester [O10.912]Previous  delivery affecting pregnancy, antepartum [O34.219]Procedure(s): SECTION, WITH TUBAL LIGATION Number of specimens: 2Name of specimens: Right and Left Fallopian tubes     References:    Click here for ordering Quick Tip   Question Answer Comment   Procedure Type: Gynecology and Obstetrics    Specimen Class: Routine/Screening    Which provider would you like to cc? LYDIA VERGARA    Release to patient Immediate        23 0892

## 2023-04-24 NOTE — ANESTHESIA PREPROCEDURE EVALUATION
2023  [facility]  Anesthesia Pre-Operative Evaluation         Patient Name: King Botello  YOB: 1985  MRN: [id number]    2023      King Botello is a 37 y.o. female  @ 38w1d who presents for a repeat C/S.  Prior C/S with nausea..  Patient has a PMH of GERD, HTN, AMA, and MVP s/p repair (not on a/c).       OB History    Para Term  AB Living   5 1 1   3 1   SAB IAB Ectopic Multiple Live Births   3       1      # Outcome Date GA Lbr Syed/2nd Weight Sex Delivery Anes PTL Lv   5 Current            4 SAB 2019 4w0d          3 Term 04/27/10 39w0d  3.005 kg (6 lb 10 oz) F CS-LTranv EPI  ADWOA      Birth Comments: Elective C/S   2 SAB            1 SAB                Review of patient's allergies indicates:   Allergen Reactions    Doxycycline      Abdomen pain severe       Wt Readings from Last 1 Encounters:   23 0500 88.9 kg (196 lb)       BP Readings from Last 3 Encounters:   23 110/68   23 116/82   23 120/78       Patient Active Problem List   Diagnosis    Mitral valve prolapse    S/P mitral valve repair    Bradycardia    Vitamin D deficiency disease    Low back pain    Class 2 obesity due to excess calories with body mass index (BMI) of 35.0 to 35.9 in adult    Hypertension, essential    Chronic fatigue    Ankle swelling    Pregnancy    Pre-existing essential hypertension during pregnancy, antepartum    Nonrheumatic mitral valve stenosis    38 weeks gestation of pregnancy       Past Surgical History:   Procedure Laterality Date     SECTION      DILATION AND CURETTAGE OF UTERUS USING SUCTION N/A 2021    Procedure: DILATION AND CURETTAGE, UTERUS, USING SUCTION;  Surgeon: Mane Moreno MD;  Location: [facility] OR;  Service: OB/GYN;  Laterality: N/A;    MITRAL VALVE REPAIR         Social  History     Socioeconomic History    Marital status:    Occupational History    Occupation: xray tech   Tobacco Use    Smoking status: Never    Smokeless tobacco: Never   Substance and Sexual Activity    Alcohol use: Not Currently     Comment: not since +UPT    Drug use: No    Sexual activity: Yes     Partners: Male     Birth control/protection: None     Social Determinants of Health     Financial Resource Strain: Low Risk     Difficulty of Paying Living Expenses: Not hard at all   Food Insecurity: No Food Insecurity    Worried About Running Out of Food in the Last Year: Never true    Ran Out of Food in the Last Year: Never true   Transportation Needs: No Transportation Needs    Lack of Transportation (Medical): No    Lack of Transportation (Non-Medical): No   Physical Activity: Unknown    Days of Exercise per Week: Patient refused    Minutes of Exercise per Session: 10 min   Stress: Stress Concern Present    Feeling of Stress : To some extent   Social Connections: Unknown    Frequency of Communication with Friends and Family: More than three times a week    Frequency of Social Gatherings with Friends and Family: Twice a week    Active Member of Clubs or Organizations: Yes    Attends Club or Organization Meetings: More than 4 times per year    Marital Status:    Housing Stability: Low Risk     Unable to Pay for Housing in the Last Year: No    Number of Places Lived in the Last Year: 1    Unstable Housing in the Last Year: No         Chemistry        Component Value Date/Time     02/13/2023 1701    K 3.8 02/13/2023 1701     02/13/2023 1701    CO2 21 (L) 02/13/2023 1701    BUN 6 02/13/2023 1701    CREATININE 0.5 02/13/2023 1701    GLU 91 02/13/2023 1701        Component Value Date/Time    CALCIUM 9.1 02/13/2023 1701    ALKPHOS 61 02/13/2023 1701    AST 22 02/13/2023 1701    ALT 15 02/13/2023 1701    BILITOT 0.4 02/13/2023 1701    ESTGFRAFRICA >60.0 06/28/2022 0743     EGFRNONAA >60.0 06/28/2022 0743            Lab Results   Component Value Date    WBC 10.76 04/14/2023    HGB 12.4 04/14/2023    HCT 37.9 04/14/2023    MCV 93 04/14/2023     04/14/2023       No results for input(s): PT, INR, PROTIME, APTT in the last 72 hours.            Pre-op Assessment    I have reviewed the Patient Summary Reports.     I have reviewed the Nursing Notes. I have reviewed the NPO Status.   I have reviewed the Medications.     Review of Systems  Anesthesia Hx:  No problems with previous Anesthesia  History of prior surgery of interest to airway management or planning: Denies Family Hx of Anesthesia complications.   Denies Personal Hx of Anesthesia complications.   EENT/Dental:EENT/Dental Normal   Cardiovascular:   Hypertension Valvular problems/Murmurs, MVP    Pulmonary:  Pulmonary Normal    Hepatic/GI:  Hepatic/GI Normal    Neurological:  Neurology Normal    Endocrine:  Endocrine Normal  Obesity / BMI > 30  Psych:  Psychiatric Normal           Physical Exam  General: Well nourished, Cooperative, Alert and Oriented    Airway:  Mallampati: II   Mouth Opening: Normal  TM Distance: Normal  Tongue: Normal  Neck ROM: Normal ROM    Dental:  Intact        Anesthesia Plan  Type of Anesthesia, risks & benefits discussed:    Anesthesia Type: Spinal, CSE  Intra-op Monitoring Plan: Standard ASA Monitors  Post Op Pain Control Plan: multimodal analgesia and IV/PO Opioids PRN  Induction:  IV  Informed Consent: Informed consent signed with the Patient and all parties understand the risks and agree with anesthesia plan.  All questions answered.   ASA Score: 3  Day of Surgery Review of History & Physical: H&P Update referred to the surgeon/provider.    Ready For Surgery From Anesthesia Perspective.     .

## 2023-04-24 NOTE — LACTATION NOTE
Lactation visited pt. Max breastfeeding assistance given. Baby rooted, opened wide and would klatch for a few seconds with non-nutritive sucked, having difficulty staying latched. After several attempts and breast compression, baby began to nutritively sucked with swallows noted. Breastfeeding basics reviewed. Pt encouraged to keep the baby skin to skin as often as possible. Pt encouraged to feed the baby 8 or more times in 24hrs on cue until content.    04/24/23 1324   Maternal Assessment   Breast Shape Bilateral:;pendulous;round   Breast Density Bilateral:;soft   Areola Bilateral:;elastic   Nipples Bilateral:;everted;short   Maternal Infant Feeding   Maternal Emotional State assist needed   Infant Positioning clutch/football   Signs of Milk Transfer audible swallow;infant jaw motion present   Nipple Shape After Feeding, Left everted round   Nipple Shape After Feeding, Right everted round   Latch Assistance yes

## 2023-04-25 LAB
BASOPHILS # BLD AUTO: 0.02 K/UL (ref 0–0.2)
BASOPHILS NFR BLD: 0.2 % (ref 0–1.9)
DIFFERENTIAL METHOD: ABNORMAL
EOSINOPHIL # BLD AUTO: 0 K/UL (ref 0–0.5)
EOSINOPHIL NFR BLD: 0.3 % (ref 0–8)
ERYTHROCYTE [DISTWIDTH] IN BLOOD BY AUTOMATED COUNT: 14.7 % (ref 11.5–14.5)
HCT VFR BLD AUTO: 25.3 % (ref 37–48.5)
HGB BLD-MCNC: 8.2 G/DL (ref 12–16)
IMM GRANULOCYTES # BLD AUTO: 0.04 K/UL (ref 0–0.04)
IMM GRANULOCYTES NFR BLD AUTO: 0.4 % (ref 0–0.5)
LYMPHOCYTES # BLD AUTO: 1.6 K/UL (ref 1–4.8)
LYMPHOCYTES NFR BLD: 15.2 % (ref 18–48)
MCH RBC QN AUTO: 30.5 PG (ref 27–31)
MCHC RBC AUTO-ENTMCNC: 32.4 G/DL (ref 32–36)
MCV RBC AUTO: 94 FL (ref 82–98)
MONOCYTES # BLD AUTO: 0.7 K/UL (ref 0.3–1)
MONOCYTES NFR BLD: 6.6 % (ref 4–15)
NEUTROPHILS # BLD AUTO: 8.4 K/UL (ref 1.8–7.7)
NEUTROPHILS NFR BLD: 77.3 % (ref 38–73)
NRBC BLD-RTO: 0 /100 WBC
PLATELET # BLD AUTO: 207 K/UL (ref 150–450)
PMV BLD AUTO: 11 FL (ref 9.2–12.9)
RBC # BLD AUTO: 2.69 M/UL (ref 4–5.4)
WBC # BLD AUTO: 10.82 K/UL (ref 3.9–12.7)

## 2023-04-25 PROCEDURE — 85025 COMPLETE CBC W/AUTO DIFF WBC: CPT | Performed by: OBSTETRICS & GYNECOLOGY

## 2023-04-25 PROCEDURE — 11000001 HC ACUTE MED/SURG PRIVATE ROOM

## 2023-04-25 PROCEDURE — 99024 PR POST-OP FOLLOW-UP VISIT: ICD-10-PCS | Mod: ,,, | Performed by: OBSTETRICS & GYNECOLOGY

## 2023-04-25 PROCEDURE — 25000003 PHARM REV CODE 250: Performed by: STUDENT IN AN ORGANIZED HEALTH CARE EDUCATION/TRAINING PROGRAM

## 2023-04-25 PROCEDURE — 63600175 PHARM REV CODE 636 W HCPCS: Performed by: OBSTETRICS & GYNECOLOGY

## 2023-04-25 PROCEDURE — 99223 PR INITIAL HOSPITAL CARE,LEVL III: ICD-10-PCS | Mod: ,,, | Performed by: INTERNAL MEDICINE

## 2023-04-25 PROCEDURE — 99223 1ST HOSP IP/OBS HIGH 75: CPT | Mod: ,,, | Performed by: INTERNAL MEDICINE

## 2023-04-25 PROCEDURE — 36415 COLL VENOUS BLD VENIPUNCTURE: CPT | Performed by: OBSTETRICS & GYNECOLOGY

## 2023-04-25 PROCEDURE — 25000003 PHARM REV CODE 250: Performed by: OBSTETRICS & GYNECOLOGY

## 2023-04-25 PROCEDURE — 99024 POSTOP FOLLOW-UP VISIT: CPT | Mod: ,,, | Performed by: OBSTETRICS & GYNECOLOGY

## 2023-04-25 RX ADMIN — OXYCODONE HYDROCHLORIDE 10 MG: 5 TABLET ORAL at 05:04

## 2023-04-25 RX ADMIN — ONDANSETRON 8 MG: 8 TABLET, ORALLY DISINTEGRATING ORAL at 09:04

## 2023-04-25 RX ADMIN — IBUPROFEN 800 MG: 400 TABLET ORAL at 04:04

## 2023-04-25 RX ADMIN — OXYCODONE HYDROCHLORIDE 10 MG: 5 TABLET ORAL at 09:04

## 2023-04-25 RX ADMIN — ACETAMINOPHEN 650 MG: 325 TABLET, FILM COATED ORAL at 12:04

## 2023-04-25 RX ADMIN — OXYCODONE HYDROCHLORIDE 10 MG: 5 TABLET ORAL at 12:04

## 2023-04-25 RX ADMIN — OXYCODONE HYDROCHLORIDE 10 MG: 5 TABLET ORAL at 08:04

## 2023-04-25 RX ADMIN — ACETAMINOPHEN 650 MG: 325 TABLET, FILM COATED ORAL at 01:04

## 2023-04-25 RX ADMIN — KETOROLAC TROMETHAMINE 30 MG: 30 INJECTION, SOLUTION INTRAMUSCULAR; INTRAVENOUS at 01:04

## 2023-04-25 RX ADMIN — DOCUSATE SODIUM 200 MG: 100 CAPSULE, LIQUID FILLED ORAL at 08:04

## 2023-04-25 RX ADMIN — DOCUSATE SODIUM 200 MG: 100 CAPSULE, LIQUID FILLED ORAL at 09:04

## 2023-04-25 RX ADMIN — ACETAMINOPHEN 650 MG: 325 TABLET, FILM COATED ORAL at 06:04

## 2023-04-25 RX ADMIN — ACETAMINOPHEN 650 MG: 325 TABLET, FILM COATED ORAL at 07:04

## 2023-04-25 RX ADMIN — IBUPROFEN 800 MG: 400 TABLET ORAL at 07:04

## 2023-04-25 RX ADMIN — IBUPROFEN 800 MG: 400 TABLET ORAL at 11:04

## 2023-04-25 NOTE — CARE UPDATE
MD at bedside to evaluate patient. Patient states she is overall doing well. She denies chest pain, SOB, abdominal pain. Patient laying comfortably in no distress. Abdomen soft, non-distended, appropriately tender without rebound or guarding. Soares in place draining concentrated urine. Minimal vaginal bleeding.     Temp:  [97.6 °F (36.4 °C)-98.4 °F (36.9 °C)] 97.7 °F (36.5 °C)  Pulse:  [] 119  Resp:  [16-18] 16  SpO2:  [94 %-100 %] 98 %  BP: ()/(53-91) 104/74    Recent Labs   Lab 04/24/23  0529 04/24/23  1635 04/24/23  2128   WBC 10.53 19.77* 15.80*   HGB 12.0 10.3* 8.7*   HCT 37.1 31.4* 26.6*   MCV 93 96 94    235 214        Plan: Repeat CBC. PM Labetalol held.    Nydia Lu MD PGY-1  Obstetrics and Gynecology

## 2023-04-25 NOTE — CARE UPDATE
S:  Patient doing well, resting in bed and eating lunch. Denies complaints at this time. States she felt a little bad when she first stood up this morning to urinate, but every other time she has ambulated she has felt ok. She denies shortness of breath. Overall feeling well.    O:  Temp:  [97.7 °F (36.5 °C)-98.4 °F (36.9 °C)] 97.9 °F (36.6 °C)  Pulse:  [] 113  Resp:  [16-20] 20  SpO2:  [95 %-98 %] 97 %  BP: ()/(53-74) 101/69  General: Well-appearing, in no acute distress  Resp: Non-labored respirations, lungs CTA in all lung fields  CV: Tachycardic, regular rhythm, small amount of swelling in feet but no pitting edema in calves       A/P:  - Persistent tachycardic since 0800 this morning ranging from 113-119bpm; blood pressure on lower side so Labetalol continues to be held.   - H/H 8/25, dropped from 12/37  - Discussed with Dr. Moreno who will reach out to cardiology who will provide recommendations on whether to transfuse patient given hx of heart valve repair & persistent tachycardia, appreciate recs    Brenda Alanis M.D.  OB/GYN PGY-4

## 2023-04-25 NOTE — NURSING
RN notified anesthesia resident of patients . RN ordered to monitor for any changes and reassess in 1 hour.

## 2023-04-25 NOTE — PROGRESS NOTES
POSTPARTUM PROGRESS NOTE    Subjective:     PPD/POD#: 1   Procedure: Repeat LTCS with BTL   EGA: 38w1d   N/V: No   F/C: No   Abd Pain: Mild, well-controlled with oral pain medication   Lochia: Mild   Voiding: Yes, via wilkins   Ambulating: No   Bowel fnc: No   Breastfeeding: Yes   Contraception: BTL with C/S   Circumcision: N/A, female     Objective:      Temp:  [97.6 °F (36.4 °C)-98.4 °F (36.9 °C)] 98.4 °F (36.9 °C)  Pulse:  [] 103  Resp:  [16-18] 17  SpO2:  [94 %-100 %] 98 %  BP: ()/(53-91) 91/58    Lung: Normal respiratory effort   Abdomen: Soft, appropriately tender   Uterus: Firm, no fundal tenderness   Incision: Bandage in place without shadowing   : Deferred   Extremities: Bilateral trace edema     Lab Review    Recent Labs   Lab 04/24/23  0529 04/24/23  1635   * 135*   K 3.9 4.6    105   CO2 21* 24   BUN 8 8   CREATININE 0.6 0.6   GLU 95 92   PROT 6.6  --    BILITOT 0.5  --    ALKPHOS 137*  --    ALT 11  --    AST 25  --          Recent Labs   Lab 04/24/23  0529 04/24/23  1635 04/24/23  2128   WBC 10.53 19.77* 15.80*   HGB 12.0 10.3* 8.7*   HCT 37.1 31.4* 26.6*   MCV 93 96 94    235 214           I/O    Intake/Output Summary (Last 24 hours) at 4/25/2023 0611  Last data filed at 4/25/2023 0340  Gross per 24 hour   Intake 1000 ml   Output 2928 ml   Net -1928 ml          Assessment and Plan:   Postpartum care:  - Patient doing well.  - Continue routine management and advances.    cHTN  - BP as above  - asymptomatic  - preE labs as above  - UOP: 0.4 cc/kg/hr  - Mag: not indicated  - Hypertensive agent continue labetalol 100 mg BID     H/o MVP, Repaired   - Cards consult placed per Dr. Raul ELLISON  - Encourage ambulation    David Méndez MD  OBGYN PGY-1

## 2023-04-25 NOTE — PLAN OF CARE
Encouraged to feed 8 or more times on cue in 24 hours, skin to skin promoted, hydrogel use promoted. Call as needed for assistance

## 2023-04-25 NOTE — ANESTHESIA POSTPROCEDURE EVALUATION
Anesthesia Post Evaluation    Patient: King Botello    Procedure(s) Performed: Procedure(s) (LRB):   SECTION, WITH TUBAL LIGATION (Bilateral)    Final Anesthesia Type: spinal      Patient location during evaluation: med/surg floor  Patient participation: Yes- Able to Participate  Level of consciousness: awake and alert  Post-procedure vital signs: reviewed and stable  Pain management: adequate  Airway patency: patent  REUBEN mitigation strategies: Multimodal analgesia and Use of major conduction anesthesia (spinal/epidural) or peripheral nerve block  PONV status at discharge: No PONV  Anesthetic complications: no      Cardiovascular status: blood pressure returned to baseline and hemodynamically stable  Respiratory status: unassisted, spontaneous ventilation and room air  Hydration status: euvolemic  Follow-up not needed.          Vitals Value Taken Time   /70 23 0811   Temp 36.7 °C (98.1 °F) 23 0811   Pulse 113 23 1000   Resp 20 23 0854   SpO2 98 % 23 0811         Event Time   Out of Recovery 11:28:00         Pain/Katerine Score: Pain Rating Prior to Med Admin: 7 (2023  8:54 AM)

## 2023-04-25 NOTE — CONSULTS
[facility]    Date of admission:  2023     Reason for Consult:    Mitral valve repair    HPI:    This 37-year-old female underwent repair of her mitral valve with an annuloplasty ring 20 years ago.  Her most recent echocardiogram has shown some degree of mitral stenosis.  She underwent delivery by  section yesterday.  Today she is been out of bed.  She has no syncope or presyncope.  No palpitations.  But during routine vital signs, heart rate has been noted to be high.  No paroxysmal nocturnal dyspnea or orthopnea.    Medications  Current Facility-Administered Medications   Medication Dose Route Frequency Provider Last Rate Last Admin    acetaminophen tablet 650 mg  650 mg Oral Q6H Phylicia Gilbert MD   650 mg at 23 1237    carboprost injection 250 mcg  250 mcg Intramuscular Q15 Min PRN Phylicia Gilbert MD        carboprost injection 250 mcg  250 mcg Intramuscular On Call Procedure Nydia Lu MD        cefazolin (ANCEF) 2 gram in dextrose 5% 50 mL IVPB (premix)  2 g Intravenous On Call Procedure Nydia Lu MD        diphenhydrAMINE capsule 25 mg  25 mg Oral Q6H PRN Phylicia Gilbert MD        diphenhydrAMINE injection 12.5 mg  12.5 mg Intravenous Q20 Min PRN Phylicia Gilbert MD        docusate sodium capsule 200 mg  200 mg Oral BID Phylicia Gilbert MD   200 mg at 23 0854    famotidine (PF) injection 20 mg  20 mg Intravenous On Call Procedure Nydia Lu MD   20 mg at 23 0610    hydrocortisone 2.5 % rectal cream   Rectal TID PRN Phylicia Gilbert MD        ibuprofen tablet 800 mg  800 mg Oral Q8H Mane Moreno MD   800 mg at 23 1607    labetaloL tablet 100 mg  100 mg Oral BID Nydia Lu MD        lactated ringers bolus 1,000 mL  1,000 mL Intravenous PRN Nydia Lu MD        lactated ringers infusion   Intravenous Continuous Phylicia Gilbert MD        lactated ringers infusion   Intravenous Continuous Nydia  MD Aly 125 mL/hr at 04/24/23 0528 New Bag at 04/24/23 0528    lanolin cream   Topical (Top) PRN Phylicia Gilbert MD        measles, mumps and rubella vaccine 1,000-12,500 TCID50/0.5 mL injection 0.5 mL  0.5 mL Subcutaneous vaccine x 1 dose Phylicia Gilbert MD        methylergonovine injection 200 mcg  200 mcg Intramuscular Once PRN Phylicia Gilbert MD        methylergonovine injection 200 mcg  200 mcg Intramuscular On Call Procedure Nydia Lu MD        miSOPROStoL tablet 800 mcg  800 mcg Oral Once PRN Phylicia Gilbert MD        miSOPROStoL tablet 800 mcg  800 mcg Rectal Once PRN Phylicia Gilbert MD        miSOPROStoL tablet 800 mcg  800 mcg Rectal On Call Procedure Nydia Lu MD        nalbuphine injection 5 mg  5 mg Intravenous Once PRN Phylicia Gilbert MD        ondansetron disintegrating tablet 8 mg  8 mg Oral Q8H PRN Phylicia Gilbert MD   8 mg at 04/25/23 0958    ondansetron injection 4 mg  4 mg Intravenous Q6H PRN Phylicia Gilbert MD        oxyCODONE immediate release tablet 10 mg  10 mg Oral Q4H PRN Phylicia Gilbert MD   10 mg at 04/25/23 1251    oxyCODONE immediate release tablet 5 mg  5 mg Oral Q4H PRN Phylicia Gilbert MD   5 mg at 04/24/23 2211    oxytocin 30 units in 500 mL lactated ringers infusion (non-titrating)  95 min-units/min Intravenous Continuous Phylicia Gilbert MD 95 mL/hr at 04/24/23 0901 95 min-units/min at 04/24/23 0901    oxytocin 30 units in 500 mL lactated ringers infusion (non-titrating)  334 min-units/min Intravenous Once Nydia Lu MD        oxytocin 30 units in 500 mL lactated ringers infusion (non-titrating)  95 min-units/min Intravenous Once Nydia Lu MD        prenatal vitamin oral tablet  1 tablet Oral Daily Phylicia Gilbert MD        prochlorperazine injection Soln 5 mg  5 mg Intravenous Q6H PRN Phylicia Gilbert MD        senna-docusate 8.6-50 mg per tablet 1 tablet  1 tablet Oral Nightly PRN Phylicia Gilbert MD         simethicone chewable tablet 80 mg  1 tablet Oral Q6H PRN Phylicia Gilbert MD        sodium citrate-citric acid 500-334 mg/5 ml solution 30 mL  30 mL Oral On Call Procedure Nydia Lu MD   30 mL at 23 0610    Tdap (BOOSTRIX) vaccine injection 0.5 mL  0.5 mL Intramuscular vaccine x 1 dose Phylicia Gilbert MD        tranexamic acid in NaCl,iso-os IVPB 1,000 mg  1,000 mg Intravenous Once PRN Phylicia Gilbert MD          Prior to Admission medications    Medication Sig Start Date End Date Taking? Authorizing Provider   amoxicillin (AMOXIL) 500 MG capsule TAKE FOUR CAPSULES BY MOUTH ONE HOUR BEFORE DENTAL WORK 20   Historical Provider   aspirin (ECOTRIN) 81 MG EC tablet Take 1 tablet (81 mg total) by mouth once daily. 22  Mane Moreno MD   labetaloL (NORMODYNE) 100 MG tablet Take 1 tablet (100 mg total) by mouth 2 (two) times daily. 10/6/22   Eugene Cordero MD   prenatal vits62/FA/om3/dha/epa (PRENATAL GUMMY ORAL) Take by mouth.    Historical Provider       History  Past Medical History:   Diagnosis Date    Abnormal Pap smear     Colposcopy    Bradycardia 2020    Class 2 obesity due to excess calories with body mass index (BMI) of 35.0 to 35.9 in adult 3/9/2021    Hypertension, essential 3/16/2021    Menarche     Age of onset 11    Missed ab 2021    Mitral valve prolapse     Previous  delivery affecting pregnancy, antepartum 2020    S/P suction D&C 2021    Status post mitral valve annuloplasty 2017     Past Surgical History:   Procedure Laterality Date     SECTION       SECTION WITH TUBAL LIGATION Bilateral 2023    Procedure:  SECTION, WITH TUBAL LIGATION;  Surgeon: Mane Moreno MD;  Location: [facility] L&D;  Service: OB/GYN;  Laterality: Bilateral;    DILATION AND CURETTAGE OF UTERUS USING SUCTION N/A 2021    Procedure: DILATION AND CURETTAGE, UTERUS, USING SUCTION;  Surgeon: Mane Moreno MD;  Location: [facility]  OR;  Service: OB/GYN;  Laterality: N/A;    MITRAL VALVE REPAIR       Social History     Socioeconomic History    Marital status:    Occupational History    Occupation: xray tech   Tobacco Use    Smoking status: Never    Smokeless tobacco: Never   Substance and Sexual Activity    Alcohol use: Not Currently     Comment: not since +UPT    Drug use: No    Sexual activity: Yes     Partners: Male     Birth control/protection: None     Social Determinants of Health     Financial Resource Strain: Low Risk     Difficulty of Paying Living Expenses: Not hard at all   Food Insecurity: No Food Insecurity    Worried About Running Out of Food in the Last Year: Never true    Ran Out of Food in the Last Year: Never true   Transportation Needs: No Transportation Needs    Lack of Transportation (Medical): No    Lack of Transportation (Non-Medical): No   Physical Activity: Unknown    Days of Exercise per Week: Patient refused    Minutes of Exercise per Session: 10 min   Stress: Stress Concern Present    Feeling of Stress : To some extent   Social Connections: Unknown    Frequency of Communication with Friends and Family: More than three times a week    Frequency of Social Gatherings with Friends and Family: Twice a week    Active Member of Clubs or Organizations: Yes    Attends Club or Organization Meetings: More than 4 times per year    Marital Status:    Housing Stability: Low Risk     Unable to Pay for Housing in the Last Year: No    Number of Places Lived in the Last Year: 1    Unstable Housing in the Last Year: No     Family History   Problem Relation Age of Onset    Heart disease Mother     Hypertension Mother     Hyperlipidemia Mother     Diabetes Mother     Cancer Mother     Hyperlipidemia Father     Hypertension Father     Breast cancer Paternal Aunt     Colon cancer Neg Hx     Ovarian cancer Neg Hx         Allergies  Review of patient's allergies indicates:   Allergen Reactions    Doxycycline      Abdomen pain  severe       Review of Systems   Review of Systems   Constitutional: Negative for malaise/fatigue.   Cardiovascular:  Negative for chest pain, dyspnea on exertion, irregular heartbeat, leg swelling, near-syncope, orthopnea, palpitations, paroxysmal nocturnal dyspnea and syncope.   Respiratory:  Negative for cough, shortness of breath and wheezing.    Hematologic/Lymphatic: Negative for bleeding problem.   Gastrointestinal:  Negative for nausea and vomiting.   Neurological:  Negative for dizziness.     Physical Exam    Temp:  [97.9 °F (36.6 °C)-98.1 °F (36.7 °C)]   Pulse:  [112-119]   Resp:  [18-20]   BP: (101-111)/(69-73)   SpO2:  [97 %-98 %]    Wt Readings from Last 1 Encounters:   04/24/23 88.9 kg (196 lb)     Physical Exam  Vitals and nursing note reviewed.   Constitutional:       General: She is not in acute distress.     Appearance: She is not toxic-appearing or diaphoretic.   HENT:      Head: Normocephalic and atraumatic.      Mouth/Throat:      Lips: Pink.      Mouth: Mucous membranes are moist.   Eyes:      General: No scleral icterus.     Conjunctiva/sclera: Conjunctivae normal.   Neck:      Thyroid: No thyromegaly.      Vascular: No carotid bruit, hepatojugular reflux or JVD.      Trachea: Trachea normal.   Cardiovascular:      Rate and Rhythm: Normal rate and regular rhythm. No extrasystoles are present.     Chest Wall: PMI is not displaced.      Pulses:           Carotid pulses are 2+ on the right side and 2+ on the left side.       Radial pulses are 2+ on the right side and 2+ on the left side.        Dorsalis pedis pulses are 2+ on the right side and 2+ on the left side.        Posterior tibial pulses are 2+ on the right side and 2+ on the left side.      Heart sounds: S1 normal and S2 normal. No murmur heard.    No friction rub. No S3 or S4 sounds.   Pulmonary:      Effort: Pulmonary effort is normal. No accessory muscle usage or respiratory distress.      Breath sounds: Normal breath sounds and air  entry. No decreased breath sounds, wheezing, rhonchi or rales.   Abdominal:      General: Bowel sounds are normal. There is no distension or abdominal bruit.      Palpations: Abdomen is soft. There is no hepatomegaly, splenomegaly or pulsatile mass.      Tenderness: There is no abdominal tenderness.   Musculoskeletal:         General: No tenderness or deformity.      Right lower leg: No edema.      Left lower leg: No edema.   Skin:     General: Skin is warm and dry.      Capillary Refill: Capillary refill takes less than 2 seconds.      Coloration: Skin is not cyanotic or pale.      Nails: There is no clubbing.   Neurological:      General: No focal deficit present.      Mental Status: She is alert and oriented to person, place, and time.   Psychiatric:         Attention and Perception: Attention normal.         Mood and Affect: Mood normal.         Speech: Speech normal.         Behavior: Behavior normal. Behavior is cooperative.       EKG  Sinus rhythm.  Normal    Echocardiogram 2/3/2023  The left ventricle is normal in size with normal systolic function. The estimated ejection fraction is 60%.  Normal right ventricular size with normal right ventricular systolic function.  Indeterminate left ventricular diastolic function.  Severe left atrial enlargement.  The mitral valve is s/p repair with a mean diastolic gradient of at least 12mm Hg at a heart rate of 104 bpm. Peak gradient is near 20mm Hg.  Mild tricuspid regurgitation.  The estimated PA systolic pressure is 31 mmHg. This may be an underestimation given the mitral valve gradients.  Normal central venous pressure (3 mmHg).  On the prior exam, with a heart rate of 76 bpm, the mean gradient across the mitral valve was actually near 10mm Hg. Valve area estimated near 1.8 cm2 by PHT    Labs  Recent Results (from the past 72 hour(s))   Comprehensive metabolic panel    Collection Time: 04/24/23  5:29 AM   Result Value Ref Range    Sodium 135 (L) 136 - 145 mmol/L     Potassium 3.9 3.5 - 5.1 mmol/L    Chloride 107 95 - 110 mmol/L    CO2 21 (L) 23 - 29 mmol/L    Glucose 95 70 - 110 mg/dL    BUN 8 6 - 20 mg/dL    Creatinine 0.6 0.5 - 1.4 mg/dL    Calcium 9.0 8.7 - 10.5 mg/dL    Total Protein 6.6 6.0 - 8.4 g/dL    Albumin 2.9 (L) 3.5 - 5.2 g/dL    Total Bilirubin 0.5 0.1 - 1.0 mg/dL    Alkaline Phosphatase 137 (H) 55 - 135 U/L    AST 25 10 - 40 U/L    ALT 11 10 - 44 U/L    Anion Gap 7 (L) 8 - 16 mmol/L    eGFR >60 >60 mL/min/1.73 m^2   CBC auto differential    Collection Time: 04/24/23  5:29 AM   Result Value Ref Range    WBC 10.53 3.90 - 12.70 K/uL    RBC 3.98 (L) 4.00 - 5.40 M/uL    Hemoglobin 12.0 12.0 - 16.0 g/dL    Hematocrit 37.1 37.0 - 48.5 %    MCV 93 82 - 98 fL    MCH 30.2 27.0 - 31.0 pg    MCHC 32.3 32.0 - 36.0 g/dL    RDW 14.4 11.5 - 14.5 %    Platelets 263 150 - 450 K/uL    MPV 11.1 9.2 - 12.9 fL    Immature Granulocytes 0.7 (H) 0.0 - 0.5 %    Gran # (ANC) 7.9 (H) 1.8 - 7.7 K/uL    Immature Grans (Abs) 0.07 (H) 0.00 - 0.04 K/uL    Lymph # 1.9 1.0 - 4.8 K/uL    Mono # 0.6 0.3 - 1.0 K/uL    Eos # 0.1 0.0 - 0.5 K/uL    Baso # 0.03 0.00 - 0.20 K/uL    nRBC 0 0 /100 WBC    Gran % 74.4 (H) 38.0 - 73.0 %    Lymph % 17.9 (L) 18.0 - 48.0 %    Mono % 6.1 4.0 - 15.0 %    Eosinophil % 0.6 0.0 - 8.0 %    Basophil % 0.3 0.0 - 1.9 %    Differential Method Automated    Type & Screen    Collection Time: 04/24/23  5:29 AM   Result Value Ref Range    Group & Rh A POS     Indirect Kip NEG     Specimen Outdate 04/27/2023 23:59    CBC auto differential    Collection Time: 04/24/23  4:35 PM   Result Value Ref Range    WBC 19.77 (H) 3.90 - 12.70 K/uL    RBC 3.28 (L) 4.00 - 5.40 M/uL    Hemoglobin 10.3 (L) 12.0 - 16.0 g/dL    Hematocrit 31.4 (L) 37.0 - 48.5 %    MCV 96 82 - 98 fL    MCH 31.4 (H) 27.0 - 31.0 pg    MCHC 32.8 32.0 - 36.0 g/dL    RDW 14.6 (H) 11.5 - 14.5 %    Platelets 235 150 - 450 K/uL    MPV 11.0 9.2 - 12.9 fL    Immature Granulocytes 0.5 0.0 - 0.5 %    Gran # (ANC) 17.3 (H)  1.8 - 7.7 K/uL    Immature Grans (Abs) 0.09 (H) 0.00 - 0.04 K/uL    Lymph # 1.4 1.0 - 4.8 K/uL    Mono # 1.0 0.3 - 1.0 K/uL    Eos # 0.0 0.0 - 0.5 K/uL    Baso # 0.04 0.00 - 0.20 K/uL    nRBC 0 0 /100 WBC    Gran % 87.3 (H) 38.0 - 73.0 %    Lymph % 6.9 (L) 18.0 - 48.0 %    Mono % 4.9 4.0 - 15.0 %    Eosinophil % 0.2 0.0 - 8.0 %    Basophil % 0.2 0.0 - 1.9 %    Differential Method Automated    Basic metabolic panel    Collection Time: 04/24/23  4:35 PM   Result Value Ref Range    Sodium 135 (L) 136 - 145 mmol/L    Potassium 4.6 3.5 - 5.1 mmol/L    Chloride 105 95 - 110 mmol/L    CO2 24 23 - 29 mmol/L    Glucose 92 70 - 110 mg/dL    BUN 8 6 - 20 mg/dL    Creatinine 0.6 0.5 - 1.4 mg/dL    Calcium 8.9 8.7 - 10.5 mg/dL    Anion Gap 6 (L) 8 - 16 mmol/L    eGFR >60 >60 mL/min/1.73 m^2   CBC auto differential    Collection Time: 04/24/23  9:28 PM   Result Value Ref Range    WBC 15.80 (H) 3.90 - 12.70 K/uL    RBC 2.82 (L) 4.00 - 5.40 M/uL    Hemoglobin 8.7 (L) 12.0 - 16.0 g/dL    Hematocrit 26.6 (L) 37.0 - 48.5 %    MCV 94 82 - 98 fL    MCH 30.9 27.0 - 31.0 pg    MCHC 32.7 32.0 - 36.0 g/dL    RDW 14.3 11.5 - 14.5 %    Platelets 214 150 - 450 K/uL    MPV 10.5 9.2 - 12.9 fL    Immature Granulocytes 0.4 0.0 - 0.5 %    Gran # (ANC) 12.8 (H) 1.8 - 7.7 K/uL    Immature Grans (Abs) 0.07 (H) 0.00 - 0.04 K/uL    Lymph # 1.9 1.0 - 4.8 K/uL    Mono # 1.1 (H) 0.3 - 1.0 K/uL    Eos # 0.0 0.0 - 0.5 K/uL    Baso # 0.03 0.00 - 0.20 K/uL    nRBC 0 0 /100 WBC    Gran % 80.8 (H) 38.0 - 73.0 %    Lymph % 11.8 (L) 18.0 - 48.0 %    Mono % 6.7 4.0 - 15.0 %    Eosinophil % 0.1 0.0 - 8.0 %    Basophil % 0.2 0.0 - 1.9 %    Differential Method Automated    CBC auto differential    Collection Time: 04/25/23  7:32 AM   Result Value Ref Range    WBC 10.82 3.90 - 12.70 K/uL    RBC 2.69 (L) 4.00 - 5.40 M/uL    Hemoglobin 8.2 (L) 12.0 - 16.0 g/dL    Hematocrit 25.3 (L) 37.0 - 48.5 %    MCV 94 82 - 98 fL    MCH 30.5 27.0 - 31.0 pg    MCHC 32.4 32.0 - 36.0  g/dL    RDW 14.7 (H) 11.5 - 14.5 %    Platelets 207 150 - 450 K/uL    MPV 11.0 9.2 - 12.9 fL    Immature Granulocytes 0.4 0.0 - 0.5 %    Gran # (ANC) 8.4 (H) 1.8 - 7.7 K/uL    Immature Grans (Abs) 0.04 0.00 - 0.04 K/uL    Lymph # 1.6 1.0 - 4.8 K/uL    Mono # 0.7 0.3 - 1.0 K/uL    Eos # 0.0 0.0 - 0.5 K/uL    Baso # 0.02 0.00 - 0.20 K/uL    nRBC 0 0 /100 WBC    Gran % 77.3 (H) 38.0 - 73.0 %    Lymph % 15.2 (L) 18.0 - 48.0 %    Mono % 6.6 4.0 - 15.0 %    Eosinophil % 0.3 0.0 - 8.0 %    Basophil % 0.2 0.0 - 1.9 %    Differential Method Automated         Imaging  No results found.    Assessment    Repaired mitral valve   Has some degree of mitral stenosis based on last echocardiogram.    Plan and Discussion    Volume status looks good.  She has tolerated surgery hemodynamically well.  No need to change usual transfusion parameters based on her cardiac status.  Will follow along and make additional recommendations as needed.      Lamin Soto MD

## 2023-04-25 NOTE — LACTATION NOTE
04/25/23 1115   Maternal Assessment   Breast Shape Bilateral:;pendulous;round   Breast Density Bilateral:;soft   Areola Bilateral:;elastic   Nipples Bilateral:;everted;short;graspable   Maternal Infant Feeding   Maternal Emotional State assist needed;anxious   Infant Positioning clutch/football   Signs of Milk Transfer audible swallow;infant jaw motion present;suck/swallow ratio   Pain with Feeding no   Comfort Measures Before/During Feeding maternal position adjusted;infant position adjusted   Comfort Measures Following Feeding other (see comments)  (infant continuing feed upon IBCLC exit)   Nipple Shape After Feeding, Left round     Latch assist provided in L football hold. Maternal and infant positioning adjusted, infant eager and latched with minimal assistance. Suck/swallow/breathing coordinated. BF education handout reviewed for day 2 and day 3 of life, diaper counts, breast care. Given hydrogels and education provided on use and care. All questions answered, extension on whiteboard.

## 2023-04-26 PROCEDURE — 11000001 HC ACUTE MED/SURG PRIVATE ROOM

## 2023-04-26 PROCEDURE — 25000003 PHARM REV CODE 250: Performed by: OBSTETRICS & GYNECOLOGY

## 2023-04-26 PROCEDURE — 25000003 PHARM REV CODE 250: Performed by: STUDENT IN AN ORGANIZED HEALTH CARE EDUCATION/TRAINING PROGRAM

## 2023-04-26 PROCEDURE — 99233 PR SUBSEQUENT HOSPITAL CARE,LEVL III: ICD-10-PCS | Mod: ,,, | Performed by: INTERNAL MEDICINE

## 2023-04-26 PROCEDURE — 99233 SBSQ HOSP IP/OBS HIGH 50: CPT | Mod: ,,, | Performed by: INTERNAL MEDICINE

## 2023-04-26 RX ADMIN — OXYCODONE HYDROCHLORIDE 10 MG: 5 TABLET ORAL at 02:04

## 2023-04-26 RX ADMIN — ACETAMINOPHEN 650 MG: 325 TABLET, FILM COATED ORAL at 06:04

## 2023-04-26 RX ADMIN — OXYCODONE HYDROCHLORIDE 10 MG: 5 TABLET ORAL at 01:04

## 2023-04-26 RX ADMIN — OXYCODONE HYDROCHLORIDE 10 MG: 5 TABLET ORAL at 05:04

## 2023-04-26 RX ADMIN — ACETAMINOPHEN 650 MG: 325 TABLET, FILM COATED ORAL at 08:04

## 2023-04-26 RX ADMIN — IBUPROFEN 800 MG: 400 TABLET ORAL at 11:04

## 2023-04-26 RX ADMIN — OXYCODONE HYDROCHLORIDE 10 MG: 5 TABLET ORAL at 10:04

## 2023-04-26 RX ADMIN — IBUPROFEN 800 MG: 400 TABLET ORAL at 02:04

## 2023-04-26 RX ADMIN — OXYCODONE HYDROCHLORIDE 10 MG: 5 TABLET ORAL at 11:04

## 2023-04-26 RX ADMIN — DOCUSATE SODIUM 200 MG: 100 CAPSULE, LIQUID FILLED ORAL at 07:04

## 2023-04-26 RX ADMIN — ACETAMINOPHEN 650 MG: 325 TABLET, FILM COATED ORAL at 01:04

## 2023-04-26 RX ADMIN — PRENATAL VIT W/ FE FUMARATE-FA TAB 27-0.8 MG 1 TABLET: 27-0.8 TAB at 08:04

## 2023-04-26 RX ADMIN — DOCUSATE SODIUM 200 MG: 100 CAPSULE, LIQUID FILLED ORAL at 08:04

## 2023-04-26 RX ADMIN — IBUPROFEN 800 MG: 400 TABLET ORAL at 07:04

## 2023-04-26 RX ADMIN — OXYCODONE HYDROCHLORIDE 10 MG: 5 TABLET ORAL at 06:04

## 2023-04-26 NOTE — PROGRESS NOTES
[facility]    Admission date:  4/24/2023     Assessment    Repaired mitral valve   Has some degree of mitral stenosis based on last echocardiogram.    Plan and Discussion    Volume status continues to look good.  Discussed that with her mitral valve, she may need diuretics as she mobilizes fluids.  Discussed being vigilant as an outpatient after discharge.    Subjective    Feels well.  Pain controlled.  Felt her heart racing when she 1st got out of bed yesterday.  No orthostasis.  No exertional dyspnea.  No paroxysmal nocturnal dyspnea or orthopnea.    Medications  Current Facility-Administered Medications   Medication Dose Route Frequency Provider Last Rate Last Admin    acetaminophen tablet 650 mg  650 mg Oral Q6H Phylicia Gilbert MD   650 mg at 04/26/23 0819    carboprost injection 250 mcg  250 mcg Intramuscular Q15 Min PRN Phylicia Gilbert MD        carboprost injection 250 mcg  250 mcg Intramuscular On Call Procedure Nydia Lu MD        cefazolin (ANCEF) 2 gram in dextrose 5% 50 mL IVPB (premix)  2 g Intravenous On Call Procedure Nydia Lu MD        diphenhydrAMINE capsule 25 mg  25 mg Oral Q6H PRN Phylicia Gilbert MD        diphenhydrAMINE injection 12.5 mg  12.5 mg Intravenous Q20 Min PRN Phylicia Gilbert MD        docusate sodium capsule 200 mg  200 mg Oral BID Phylicia Gilbert MD   200 mg at 04/26/23 0819    famotidine (PF) injection 20 mg  20 mg Intravenous On Call Procedure Nydia Lu MD   20 mg at 04/24/23 0610    hydrocortisone 2.5 % rectal cream   Rectal TID PRN Phylicia Gilbert MD        ibuprofen tablet 800 mg  800 mg Oral Q8H Mane Moreno MD   800 mg at 04/26/23 0707    labetaloL tablet 100 mg  100 mg Oral BID Nydia Lu MD        lactated ringers bolus 1,000 mL  1,000 mL Intravenous PRN Nydia Lu MD        lactated ringers infusion   Intravenous Continuous Phylicia Gilbert MD        lactated ringers infusion   Intravenous  Continuous Nydia Lu  mL/hr at 04/24/23 0528 New Bag at 04/24/23 0528    lanolin cream   Topical (Top) PRN Phylicia Gilbert MD        measles, mumps and rubella vaccine 1,000-12,500 TCID50/0.5 mL injection 0.5 mL  0.5 mL Subcutaneous vaccine x 1 dose Phylicia Gilbert MD        methylergonovine injection 200 mcg  200 mcg Intramuscular Once PRN Phylicia Gilbert MD        methylergonovine injection 200 mcg  200 mcg Intramuscular On Call Procedure Nydia Lu MD        miSOPROStoL tablet 800 mcg  800 mcg Oral Once PRN Phylicia Gilbert MD        miSOPROStoL tablet 800 mcg  800 mcg Rectal Once PRN Phylicia Gilbert MD        miSOPROStoL tablet 800 mcg  800 mcg Rectal On Call Procedure Nydia Lu MD        nalbuphine injection 5 mg  5 mg Intravenous Once PRN Phylicia Gilbert MD        ondansetron disintegrating tablet 8 mg  8 mg Oral Q8H PRN Phylicia Gilbert MD   8 mg at 04/25/23 0958    ondansetron injection 4 mg  4 mg Intravenous Q6H PRN Phylicia Gilbert MD        oxyCODONE immediate release tablet 10 mg  10 mg Oral Q4H PRN Phylicia Gilbert MD   10 mg at 04/26/23 0553    oxyCODONE immediate release tablet 5 mg  5 mg Oral Q4H PRN Phylicia Gilbert MD   5 mg at 04/24/23 2211    oxytocin 30 units in 500 mL lactated ringers infusion (non-titrating)  95 min-units/min Intravenous Continuous Phylicia Gilbert MD 95 mL/hr at 04/24/23 0901 95 min-units/min at 04/24/23 0901    oxytocin 30 units in 500 mL lactated ringers infusion (non-titrating)  334 min-units/min Intravenous Once Nydia Lu MD        oxytocin 30 units in 500 mL lactated ringers infusion (non-titrating)  95 min-units/min Intravenous Once Nydia Lu MD        prenatal vitamin oral tablet  1 tablet Oral Daily Phylicia Gilbert MD   1 tablet at 04/26/23 0819    prochlorperazine injection Soln 5 mg  5 mg Intravenous Q6H PRN Phylicia Gilbert MD        senna-docusate 8.6-50 mg per tablet 1 tablet  1 tablet  Oral Nightly PRN Phylicia Gilbert MD        simethicone chewable tablet 80 mg  1 tablet Oral Q6H PRN Phylicia Gilbert MD        sodium citrate-citric acid 500-334 mg/5 ml solution 30 mL  30 mL Oral On Call Procedure Nydia Lu MD   30 mL at 04/24/23 0610    Tdap (BOOSTRIX) vaccine injection 0.5 mL  0.5 mL Intramuscular vaccine x 1 dose Phylicia Gilbert MD        tranexamic acid in NaCl,iso-os IVPB 1,000 mg  1,000 mg Intravenous Once PRN Phylicia Gilbert MD            Physical Exam    Temp:  [97.9 °F (36.6 °C)-99.5 °F (37.5 °C)]   Pulse:  [112-124]   Resp:  [16-20]   BP: (101-116)/(63-78)   SpO2:  [94 %-99 %]    Wt Readings from Last 3 Encounters:   04/24/23 88.9 kg (196 lb)   04/21/23 88.6 kg (195 lb 5.2 oz)   04/14/23 89 kg (196 lb 3.4 oz)     Physical Exam  Constitutional:       General: She is not in acute distress.  Neck:      Vascular: No hepatojugular reflux or JVD.   Cardiovascular:      Rate and Rhythm: Normal rate and regular rhythm.      Heart sounds: S1 normal and S2 normal. No murmur heard.    No S3 or S4 sounds.   Pulmonary:      Effort: Pulmonary effort is normal.      Breath sounds: Normal breath sounds and air entry.   Abdominal:      General: Bowel sounds are normal.      Palpations: Abdomen is soft. There is no hepatomegaly.      Tenderness: There is no abdominal tenderness.   Musculoskeletal:      Right lower leg: No edema.      Left lower leg: No edema.   Skin:     Coloration: Skin is not pale.   Neurological:      Mental Status: She is alert.   Psychiatric:         Behavior: Behavior is cooperative.              Lamin Soto MD

## 2023-04-26 NOTE — PLAN OF CARE
"Plan of care:  Mother will nurse baby on cue " 8 or more in 24" but not longer than 3 hrs between feedings and lengthen feedings until content; will achieve a deep, comfortable latch and observe for signs of milk transfer; will hand express and spoon fed baby EBM pc; will monitor baby's 24hr diaper counts; will apply lanolin to nipples pc; will call for assistance prn.   "

## 2023-04-26 NOTE — PROGRESS NOTES
POSTPARTUM PROGRESS NOTE    Subjective:     PPD/POD#: 2   Procedure: Repeat LTCS with BTL   EGA: 38w1d   N/V: No   F/C: No   Abd Pain: Mild, well-controlled with oral pain medication   Lochia: Mild   Voiding: Yes   Ambulating: Yes   Bowel fnc: Yes   Breastfeeding: Yes   Contraception: BTL with C/S   Circumcision: N/A, female     Objective:      Temp:  [97.9 °F (36.6 °C)-98.4 °F (36.9 °C)] 98.1 °F (36.7 °C)  Pulse:  [112-119] 116  Resp:  [16-20] 16  SpO2:  [96 %-99 %] 96 %  BP: (101-113)/(69-78) 112/70    Lung: Normal respiratory effort   Abdomen: Soft, appropriately tender   Uterus: Firm, no fundal tenderness   Incision: Clean, dry, and intact.  No erythema, induration, or drainage.   : Deferred   Extremities: Bilateral trace edema     Lab Review    Recent Labs   Lab 04/24/23  0529 04/24/23  1635   * 135*   K 3.9 4.6    105   CO2 21* 24   BUN 8 8   CREATININE 0.6 0.6   GLU 95 92   PROT 6.6  --    BILITOT 0.5  --    ALKPHOS 137*  --    ALT 11  --    AST 25  --          Recent Labs   Lab 04/24/23  1635 04/24/23  2128 04/25/23  0732   WBC 19.77* 15.80* 10.82   HGB 10.3* 8.7* 8.2*   HCT 31.4* 26.6* 25.3*   MCV 96 94 94    214 207           I/O    Intake/Output Summary (Last 24 hours) at 4/26/2023 0621  Last data filed at 4/25/2023 2330  Gross per 24 hour   Intake --   Output 1625 ml   Net -1625 ml          Assessment and Plan:   Postpartum care:  - Patient doing well.  - Continue routine management and advances.    cHTN  - BP as above  - asymptomatic  - preE labs as above  - UOP: 0.4 cc/kg/hr  - Mag: not indicated  - Hypertensive agent continue labetalol 100 mg BID, recently held due to low BP    H/o MVP, Repaired   - S/p cardiology consult  - Did not recommend transfusion indicated based on cardiac status    AMA  - Encourage ambulation      David Ménedz MD  OBGYN PGY-1

## 2023-04-26 NOTE — LACTATION NOTE
"   04/26/23 1015   Maternal Assessment   Breast Shape Bilateral:;pendulous   Breast Density Bilateral:;soft   Areola Bilateral:;elastic   Nipples Bilateral:;everted;graspable   Left Nipple Symptoms tender   Right Nipple Symptoms tender;scabbed   Maternal Infant Feeding   Maternal Emotional State relaxed;anxious   Infant Positioning clutch/football   Signs of Milk Transfer infant jaw motion present   Pain with Feeding no   Comfort Measures Before/During Feeding infant position adjusted;maternal position adjusted;suction broken using finger   Comfort Measures Following Feeding air-drying encouraged;expressed milk applied  (patient put bra on, applied lanolin to nipples pc)   Nipple Shape After Feeding, Left round   Nipple Shape After Feeding, Right round   Latch Assistance yes  (to position baby close to breast and to get deep latch c wide gape)   Breast Pumping   Breast Pumping hand expression utilized     Visited patient in room, baby lying next to L breast, patient states baby just finished nursing.  States baby "cluster" fed all night, states she is leaving the baby on each breast for 20 min per feeding.   Basic education and Guide reviewed while hands on assistance provided.   Moderate positioning and latch assistance provided, R and L breasts, football position, deep latches achieved, baby actively sucking.  Patient states she can feel he baby "pulling", occasional wide pauses noted.   Nipples round when baby removed from breasts.  Patient able to independently position and attach baby to both breasts after several attempts.  Assisted patient to hand express 1ml colostrum, at the bedside for use at next feeding.   Patient put bra on to support weight of breasts, applied lanolin to nipples pc.  "

## 2023-04-27 LAB
BASOPHILS # BLD AUTO: 0.07 K/UL (ref 0–0.2)
BASOPHILS NFR BLD: 0.3 % (ref 0–1.9)
DIFFERENTIAL METHOD: ABNORMAL
EOSINOPHIL # BLD AUTO: 0.2 K/UL (ref 0–0.5)
EOSINOPHIL NFR BLD: 0.7 % (ref 0–8)
ERYTHROCYTE [DISTWIDTH] IN BLOOD BY AUTOMATED COUNT: 14.8 % (ref 11.5–14.5)
HCT VFR BLD AUTO: 30.5 % (ref 37–48.5)
HGB BLD-MCNC: 8.9 G/DL (ref 12–16)
IMM GRANULOCYTES # BLD AUTO: 0.18 K/UL (ref 0–0.04)
IMM GRANULOCYTES NFR BLD AUTO: 0.8 % (ref 0–0.5)
LYMPHOCYTES # BLD AUTO: 1 K/UL (ref 1–4.8)
LYMPHOCYTES NFR BLD: 4.7 % (ref 18–48)
MCH RBC QN AUTO: 31 PG (ref 27–31)
MCHC RBC AUTO-ENTMCNC: 29.2 G/DL (ref 32–36)
MCV RBC AUTO: 106 FL (ref 82–98)
MONOCYTES # BLD AUTO: 1.6 K/UL (ref 0.3–1)
MONOCYTES NFR BLD: 7.2 % (ref 4–15)
NEUTROPHILS # BLD AUTO: 19 K/UL (ref 1.8–7.7)
NEUTROPHILS NFR BLD: 86.3 % (ref 38–73)
NRBC BLD-RTO: 0 /100 WBC
PLATELET # BLD AUTO: 253 K/UL (ref 150–450)
PMV BLD AUTO: 10.6 FL (ref 9.2–12.9)
RBC # BLD AUTO: 2.87 M/UL (ref 4–5.4)
WBC # BLD AUTO: 22.05 K/UL (ref 3.9–12.7)

## 2023-04-27 PROCEDURE — 85025 COMPLETE CBC W/AUTO DIFF WBC: CPT | Performed by: GENERAL PRACTICE

## 2023-04-27 PROCEDURE — 99024 PR POST-OP FOLLOW-UP VISIT: ICD-10-PCS | Mod: ,,, | Performed by: OBSTETRICS & GYNECOLOGY

## 2023-04-27 PROCEDURE — 25000003 PHARM REV CODE 250

## 2023-04-27 PROCEDURE — 99232 PR SUBSEQUENT HOSPITAL CARE,LEVL II: ICD-10-PCS | Mod: ,,, | Performed by: INTERNAL MEDICINE

## 2023-04-27 PROCEDURE — 25000003 PHARM REV CODE 250: Performed by: STUDENT IN AN ORGANIZED HEALTH CARE EDUCATION/TRAINING PROGRAM

## 2023-04-27 PROCEDURE — 99232 SBSQ HOSP IP/OBS MODERATE 35: CPT | Mod: ,,, | Performed by: INTERNAL MEDICINE

## 2023-04-27 PROCEDURE — 36415 COLL VENOUS BLD VENIPUNCTURE: CPT | Performed by: GENERAL PRACTICE

## 2023-04-27 PROCEDURE — 25000003 PHARM REV CODE 250: Performed by: OBSTETRICS & GYNECOLOGY

## 2023-04-27 PROCEDURE — 99024 POSTOP FOLLOW-UP VISIT: CPT | Mod: ,,, | Performed by: OBSTETRICS & GYNECOLOGY

## 2023-04-27 PROCEDURE — 11000001 HC ACUTE MED/SURG PRIVATE ROOM

## 2023-04-27 RX ADMIN — OXYCODONE HYDROCHLORIDE 10 MG: 5 TABLET ORAL at 03:04

## 2023-04-27 RX ADMIN — OXYCODONE HYDROCHLORIDE 10 MG: 5 TABLET ORAL at 06:04

## 2023-04-27 RX ADMIN — OXYCODONE HYDROCHLORIDE 10 MG: 5 TABLET ORAL at 05:04

## 2023-04-27 RX ADMIN — IBUPROFEN 800 MG: 400 TABLET ORAL at 10:04

## 2023-04-27 RX ADMIN — METOPROLOL SUCCINATE 25 MG: 25 TABLET, EXTENDED RELEASE ORAL at 03:04

## 2023-04-27 RX ADMIN — IBUPROFEN 800 MG: 400 TABLET ORAL at 06:04

## 2023-04-27 RX ADMIN — PRENATAL VIT W/ FE FUMARATE-FA TAB 27-0.8 MG 1 TABLET: 27-0.8 TAB at 09:04

## 2023-04-27 RX ADMIN — ACETAMINOPHEN 650 MG: 325 TABLET, FILM COATED ORAL at 12:04

## 2023-04-27 RX ADMIN — DOCUSATE SODIUM 200 MG: 100 CAPSULE, LIQUID FILLED ORAL at 09:04

## 2023-04-27 RX ADMIN — IBUPROFEN 800 MG: 400 TABLET ORAL at 03:04

## 2023-04-27 RX ADMIN — ACETAMINOPHEN 650 MG: 325 TABLET, FILM COATED ORAL at 06:04

## 2023-04-27 RX ADMIN — ACETAMINOPHEN 650 MG: 325 TABLET, FILM COATED ORAL at 01:04

## 2023-04-27 RX ADMIN — OXYCODONE HYDROCHLORIDE 10 MG: 5 TABLET ORAL at 12:04

## 2023-04-27 RX ADMIN — ACETAMINOPHEN 650 MG: 325 TABLET, FILM COATED ORAL at 05:04

## 2023-04-27 NOTE — PLAN OF CARE
VSS. Pain controlled with scheduled and PRN oral pain medication. Breastfeeding with successful latch sessions of 10-15min on each breast, without RN assistance. Fundus firm and midline with light lochia rubra upon assessment. LTV dressing removed. LTV incision clean & dry with sutures intact. Voiding spontaneously without difficulty and with adequate output. Passing gas.

## 2023-04-27 NOTE — PROGRESS NOTES
POSTPARTUM PROGRESS NOTE    Subjective:     PPD/POD#: 3   Procedure: Repeat LTCS with BTL   EGA: 38w1d   N/V: No   F/C: No   Abd Pain: Mild, well-controlled with oral pain medication   Lochia: Mild   Voiding: Yes   Ambulating: Yes   Bowel fnc: Yes   Breastfeeding: Yes   Contraception: BTL with C/S   Circumcision: N/A, female     Objective:      Temp:  [98.3 °F (36.8 °C)-99.8 °F (37.7 °C)] 99.5 °F (37.5 °C)  Pulse:  [110-126] 120  Resp:  [17-18] 18  SpO2:  [92 %-99 %] 92 %  BP: (105-128)/(58-71) 128/71    Lung: Normal respiratory effort   Abdomen: Soft, appropriately tender   Uterus: Firm, no fundal tenderness   Incision: Clean, dry, and intact.  No erythema, induration, or drainage.   : Deferred   Extremities: Bilateral trace edema     Lab Review    Recent Labs   Lab 04/24/23  0529 04/24/23  1635   * 135*   K 3.9 4.6    105   CO2 21* 24   BUN 8 8   CREATININE 0.6 0.6   GLU 95 92   PROT 6.6  --    BILITOT 0.5  --    ALKPHOS 137*  --    ALT 11  --    AST 25  --          Recent Labs   Lab 04/24/23  1635 04/24/23  2128 04/25/23  0732   WBC 19.77* 15.80* 10.82   HGB 10.3* 8.7* 8.2*   HCT 31.4* 26.6* 25.3*   MCV 96 94 94    214 207           I/O  No intake or output data in the 24 hours ending 04/27/23 0804       Assessment and Plan:   Postpartum care:  - Patient doing well.  - Continue routine management and advances.    cHTN  - BP as above  - asymptomatic  - preE labs as above  - UOP: 0.4 cc/kg/hr  - Mag: not indicated  - Hypertensive agent continue labetalol 100 mg BID, recently held due to low BP    H/o MVP, Repaired   - S/p cardiology consult  - Did not recommend transfusion indicated based on cardiac status  - Reocmmend    AMA  - Encourage ambulation    David Méndez MD  OBGYN PGY-1

## 2023-04-27 NOTE — PROGRESS NOTES
[facility]    Admission date:  4/24/2023     Assessment    Repaired mitral valve with mitral stenosis  Has tolerated hemodynamically.    Plan and Discussion    Volume status still looks good.  Sinus tachycardia continues.  Could this be some degree of beta-blocker withdrawal after cessation of labetalol?  Would consider starting a low-dose of metoprolol XL 25 mg daily.  Her blood pressure is now borderline but should tolerate metoprolol better than labetalol.    Subjective    Feels flushed.  Still having postoperative pain control issues.  Says she is going to see how the day starts before deciding about discharge in the afternoon.    Medications  Current Facility-Administered Medications   Medication Dose Route Frequency Provider Last Rate Last Admin    acetaminophen tablet 650 mg  650 mg Oral Q6H Phylicia Gilbert MD   650 mg at 04/27/23 0646    carboprost injection 250 mcg  250 mcg Intramuscular Q15 Min PRN Phylicia Gilbert MD        carboprost injection 250 mcg  250 mcg Intramuscular On Call Procedure Nydia Lu MD        cefazolin (ANCEF) 2 gram in dextrose 5% 50 mL IVPB (premix)  2 g Intravenous On Call Procedure Nydia Lu MD        diphenhydrAMINE capsule 25 mg  25 mg Oral Q6H PRN Phylicia Gilbert MD        diphenhydrAMINE injection 12.5 mg  12.5 mg Intravenous Q20 Min PRN Phylicia Gilbert MD        docusate sodium capsule 200 mg  200 mg Oral BID Phylicia Gilbert MD   200 mg at 04/27/23 0912    famotidine (PF) injection 20 mg  20 mg Intravenous On Call Procedure Nydia Lu MD   20 mg at 04/24/23 0610    hydrocortisone 2.5 % rectal cream   Rectal TID PRN Phylicia Gilbert MD        ibuprofen tablet 800 mg  800 mg Oral Q8H Mane Moreno MD   800 mg at 04/27/23 0646    lactated ringers bolus 1,000 mL  1,000 mL Intravenous PRN Nydia Lu MD        lactated ringers infusion   Intravenous Continuous Phylicia iGlbert MD        lactated ringers infusion    Intravenous Continuous Nydia Lu  mL/hr at 04/24/23 0528 New Bag at 04/24/23 0528    lanolin cream   Topical (Top) PRN Phylicia Gilbert MD        measles, mumps and rubella vaccine 1,000-12,500 TCID50/0.5 mL injection 0.5 mL  0.5 mL Subcutaneous vaccine x 1 dose Phylicia Gilbert MD        methylergonovine injection 200 mcg  200 mcg Intramuscular Once PRN Phylicia Gilbert MD        methylergonovine injection 200 mcg  200 mcg Intramuscular On Call Procedure Nydia Lu MD        miSOPROStoL tablet 800 mcg  800 mcg Oral Once PRN Phylicia Gilbert MD        miSOPROStoL tablet 800 mcg  800 mcg Rectal Once PRN Phylicia Gilbert MD        miSOPROStoL tablet 800 mcg  800 mcg Rectal On Call Procedure Nydia Lu MD        nalbuphine injection 5 mg  5 mg Intravenous Once PRN Phylicia Glibert MD        ondansetron disintegrating tablet 8 mg  8 mg Oral Q8H PRN Phylicia Gilbert MD   8 mg at 04/25/23 0958    ondansetron injection 4 mg  4 mg Intravenous Q6H PRN Phylicia Gilbert MD        oxyCODONE immediate release tablet 10 mg  10 mg Oral Q4H PRN Phylicia Gilbert MD   10 mg at 04/27/23 0645    oxyCODONE immediate release tablet 5 mg  5 mg Oral Q4H PRN Phylicia Gilbert MD   5 mg at 04/24/23 2211    oxytocin 30 units in 500 mL lactated ringers infusion (non-titrating)  95 min-units/min Intravenous Continuous Phylicia Gilbert MD 95 mL/hr at 04/24/23 0901 95 min-units/min at 04/24/23 0901    oxytocin 30 units in 500 mL lactated ringers infusion (non-titrating)  334 min-units/min Intravenous Once Nydia Lu MD        oxytocin 30 units in 500 mL lactated ringers infusion (non-titrating)  95 min-units/min Intravenous Once Nydia Lu MD        prenatal vitamin oral tablet  1 tablet Oral Daily Phylicia Gilbert MD   1 tablet at 04/27/23 0913    prochlorperazine injection Soln 5 mg  5 mg Intravenous Q6H PRN Phylicia Gilbert MD        senna-docusate 8.6-50 mg per tablet 1 tablet   1 tablet Oral Nightly PRN Phylicia Gilbert MD        simethicone chewable tablet 80 mg  1 tablet Oral Q6H PRN Phylicia Gilbert MD        sodium citrate-citric acid 500-334 mg/5 ml solution 30 mL  30 mL Oral On Call Procedure Nydia Lu MD   30 mL at 04/24/23 0610    Tdap (BOOSTRIX) vaccine injection 0.5 mL  0.5 mL Intramuscular vaccine x 1 dose Phylicia Gilbert MD        tranexamic acid in NaCl,iso-os IVPB 1,000 mg  1,000 mg Intravenous Once PRN Phylicia Gilbert MD            Physical Exam    Temp:  [98.3 °F (36.8 °C)-99.8 °F (37.7 °C)]   Pulse:  [110-126]   Resp:  [17-18]   BP: (105-128)/(58-71)   SpO2:  [92 %-99 %]    Wt Readings from Last 3 Encounters:   04/24/23 88.9 kg (196 lb)   04/21/23 88.6 kg (195 lb 5.2 oz)   04/14/23 89 kg (196 lb 3.4 oz)     Physical Exam  Constitutional:       General: She is not in acute distress.  Neck:      Vascular: No hepatojugular reflux or JVD.   Cardiovascular:      Rate and Rhythm: Regular rhythm. Tachycardia present.      Heart sounds: S1 normal and S2 normal. No murmur heard.    No S3 or S4 sounds.   Pulmonary:      Effort: Pulmonary effort is normal.      Breath sounds: Normal breath sounds and air entry.   Abdominal:      General: Bowel sounds are normal.      Palpations: Abdomen is soft. There is no hepatomegaly.      Tenderness: There is no abdominal tenderness.   Musculoskeletal:      Right lower leg: No edema.      Left lower leg: No edema.   Skin:     Coloration: Skin is not pale.   Neurological:      Mental Status: She is alert.   Psychiatric:         Behavior: Behavior is cooperative.         Labs  Recent Results (from the past 24 hour(s))   CBC auto differential    Collection Time: 04/27/23  8:22 AM   Result Value Ref Range    WBC 22.05 (H) 3.90 - 12.70 K/uL    RBC 2.87 (L) 4.00 - 5.40 M/uL    Hemoglobin 8.9 (L) 12.0 - 16.0 g/dL    Hematocrit 30.5 (L) 37.0 - 48.5 %     (H) 82 - 98 fL    MCH 31.0 27.0 - 31.0 pg    MCHC 29.2 (L) 32.0 - 36.0 g/dL     RDW 14.8 (H) 11.5 - 14.5 %    Platelets 253 150 - 450 K/uL    MPV 10.6 9.2 - 12.9 fL    Immature Granulocytes 0.8 (H) 0.0 - 0.5 %    Gran # (ANC) 19.0 (H) 1.8 - 7.7 K/uL    Immature Grans (Abs) 0.18 (H) 0.00 - 0.04 K/uL    Lymph # 1.0 1.0 - 4.8 K/uL    Mono # 1.6 (H) 0.3 - 1.0 K/uL    Eos # 0.2 0.0 - 0.5 K/uL    Baso # 0.07 0.00 - 0.20 K/uL    nRBC 0 0 /100 WBC    Gran % 86.3 (H) 38.0 - 73.0 %    Lymph % 4.7 (L) 18.0 - 48.0 %    Mono % 7.2 4.0 - 15.0 %    Eosinophil % 0.7 0.0 - 8.0 %    Basophil % 0.3 0.0 - 1.9 %    Differential Method Automated              Lamin Soto MD

## 2023-04-27 NOTE — LACTATION NOTE
04/27/23 0955   Maternal Assessment   Breast Shape Bilateral:;pendulous   Breast Density Bilateral:;full   Areola Bilateral:;elastic   Nipples Bilateral:;everted;short   Left Nipple Symptoms scabbed   Right Nipple Symptoms scabbed   Maternal Infant Feeding   Maternal Emotional State independent   Infant Positioning clutch/football   Signs of Milk Transfer audible swallow;infant jaw motion present;breasts soften with feeding   Pain with Feeding no   Comfort Measures Following Feeding air-drying encouraged;expressed milk applied;other (see comments)  (lanolin)   Nipple Shape After Feeding, Left round   Latch Assistance no   Breast Pumping   Breast Pumping hand expression utilized   Community Referrals   Community Referrals outpatient lactation program;pediatric care provider;support group     Observed mother position baby in football and latch to R and L breasts. Baby latched well with wide gape and nursed vigorously with frequent audible swallows. Mother reports that she is feeling more confident with nursing and that her nipples are healing and not tender anymore. Milk is in and breasts are full but not engorged. Softer after nursing. Breastfeeding discharge instructions given. LC number on the board to call as needed.

## 2023-04-28 VITALS
RESPIRATION RATE: 18 BRPM | HEART RATE: 108 BPM | DIASTOLIC BLOOD PRESSURE: 63 MMHG | HEIGHT: 61 IN | TEMPERATURE: 98 F | WEIGHT: 196 LBS | BODY MASS INDEX: 37 KG/M2 | OXYGEN SATURATION: 100 % | SYSTOLIC BLOOD PRESSURE: 108 MMHG

## 2023-04-28 DIAGNOSIS — I10 HYPERTENSION, ESSENTIAL: ICD-10-CM

## 2023-04-28 DIAGNOSIS — Z98.890 S/P MITRAL VALVE REPAIR: ICD-10-CM

## 2023-04-28 DIAGNOSIS — I34.2 NONRHEUMATIC MITRAL VALVE STENOSIS: Primary | ICD-10-CM

## 2023-04-28 DIAGNOSIS — Z98.891 S/P CESAREAN SECTION: ICD-10-CM

## 2023-04-28 PROBLEM — Z3A.38 38 WEEKS GESTATION OF PREGNANCY: Status: RESOLVED | Noted: 2023-04-24 | Resolved: 2023-04-28

## 2023-04-28 PROCEDURE — 63600175 PHARM REV CODE 636 W HCPCS: Performed by: INTERNAL MEDICINE

## 2023-04-28 PROCEDURE — 25000003 PHARM REV CODE 250

## 2023-04-28 PROCEDURE — 25000003 PHARM REV CODE 250: Performed by: OBSTETRICS & GYNECOLOGY

## 2023-04-28 PROCEDURE — 99232 SBSQ HOSP IP/OBS MODERATE 35: CPT | Mod: ,,, | Performed by: INTERNAL MEDICINE

## 2023-04-28 PROCEDURE — 25000003 PHARM REV CODE 250: Performed by: STUDENT IN AN ORGANIZED HEALTH CARE EDUCATION/TRAINING PROGRAM

## 2023-04-28 PROCEDURE — 99232 PR SUBSEQUENT HOSPITAL CARE,LEVL II: ICD-10-PCS | Mod: ,,, | Performed by: INTERNAL MEDICINE

## 2023-04-28 RX ORDER — LOPERAMIDE HYDROCHLORIDE 2 MG/1
2 CAPSULE ORAL 4 TIMES DAILY PRN
Status: DISCONTINUED | OUTPATIENT
Start: 2023-04-28 | End: 2023-04-28 | Stop reason: HOSPADM

## 2023-04-28 RX ORDER — HYDROCODONE BITARTRATE AND ACETAMINOPHEN 5; 325 MG/1; MG/1
1 TABLET ORAL EVERY 6 HOURS PRN
Qty: 15 TABLET | Refills: 0 | Status: ON HOLD | OUTPATIENT
Start: 2023-04-28 | End: 2023-05-04 | Stop reason: HOSPADM

## 2023-04-28 RX ORDER — METOPROLOL SUCCINATE 25 MG/1
25 TABLET, EXTENDED RELEASE ORAL 2 TIMES DAILY
Qty: 60 TABLET | Refills: 11 | Status: SHIPPED | OUTPATIENT
Start: 2023-04-28 | End: 2023-07-26

## 2023-04-28 RX ORDER — DOCUSATE SODIUM 100 MG/1
100 CAPSULE, LIQUID FILLED ORAL 2 TIMES DAILY
Qty: 30 CAPSULE | Refills: 0 | Status: SHIPPED | OUTPATIENT
Start: 2023-04-28 | End: 2023-07-26

## 2023-04-28 RX ORDER — FUROSEMIDE 10 MG/ML
20 INJECTION INTRAMUSCULAR; INTRAVENOUS ONCE
Status: COMPLETED | OUTPATIENT
Start: 2023-04-28 | End: 2023-04-28

## 2023-04-28 RX ORDER — IBUPROFEN 600 MG/1
600 TABLET ORAL EVERY 6 HOURS PRN
Qty: 30 TABLET | Refills: 1 | Status: SHIPPED | OUTPATIENT
Start: 2023-04-28 | End: 2023-07-26

## 2023-04-28 RX ADMIN — OXYCODONE HYDROCHLORIDE 10 MG: 5 TABLET ORAL at 11:04

## 2023-04-28 RX ADMIN — IBUPROFEN 800 MG: 400 TABLET ORAL at 06:04

## 2023-04-28 RX ADMIN — OXYCODONE HYDROCHLORIDE 10 MG: 5 TABLET ORAL at 04:04

## 2023-04-28 RX ADMIN — ACETAMINOPHEN 650 MG: 325 TABLET, FILM COATED ORAL at 09:04

## 2023-04-28 RX ADMIN — PRENATAL VIT W/ FE FUMARATE-FA TAB 27-0.8 MG 1 TABLET: 27-0.8 TAB at 08:04

## 2023-04-28 RX ADMIN — METOPROLOL SUCCINATE 25 MG: 25 TABLET, EXTENDED RELEASE ORAL at 08:04

## 2023-04-28 RX ADMIN — ACETAMINOPHEN 650 MG: 325 TABLET, FILM COATED ORAL at 12:04

## 2023-04-28 RX ADMIN — LOPERAMIDE HYDROCHLORIDE 2 MG: 2 CAPSULE ORAL at 08:04

## 2023-04-28 RX ADMIN — FUROSEMIDE 20 MG: 10 INJECTION, SOLUTION INTRAMUSCULAR; INTRAVENOUS at 09:04

## 2023-04-28 NOTE — DISCHARGE SUMMARY
Delivery Discharge Summary  Obstetrics      Primary OB Clinician: Mane Moreno MD     Admission date: 2023  Discharge date: 2023    Disposition: To home, self care    Discharge Diagnosis List:  Patient Active Problem List   Diagnosis    Mitral valve prolapse    S/P mitral valve repair    Bradycardia    Vitamin D deficiency disease    Low back pain    Class 2 obesity due to excess calories with body mass index (BMI) of 35.0 to 35.9 in adult    Hypertension, essential    Chronic fatigue    Ankle swelling    Pregnancy    Pre-existing essential hypertension during pregnancy, antepartum    Nonrheumatic mitral valve stenosis    S/P  section       Procedure: Planned repeat  section.    Hospital Course:  King Botello is a 37 y.o. now , POD #4 who was admitted on 2023 at 38w1d for scheduled repeat C/S with BTL. IUP complicated by  AMA with a negative [id number], previous C/S, chronic hypertension on Labetalol, and history of MVP with repair.   Patient was subsequently admitted to labor and delivery unit with signed consents.     Planned  section for h/o csX1 was performed without complications.    Please see delivery note for further details. Her postpartum course was complicated by persistent tachycardia. Per cardiology recs, patient was started on metoprolol 25g BID. On discharge day, patient's pain is controlled with oral pain medications. Pt is tolerating ambulation without SOB or CP, and regular diet without N/V. Reports lochia is mild. Denies any HA, vision changes, F/C, LE swelling. Denies any breast pain/soreness.    Pt in stable condition and ready for discharge. She has been instructed to start and/or continue medications and follow up with her obstetrics provider as listed below.    Pertinent studies:  CBC  Recent Labs   Lab 23  2128 23  0732 23  0822   WBC 15.80* 10.82 22.05*   HGB 8.7* 8.2* 8.9*   HCT 26.6* 25.3* 30.5*   MCV 94 94 106*     207 253          Immunization History   Administered Date(s) Administered    COVID-19, MRNA, LN-S, PF (Pfizer) (Purple Cap) 08/10/2021, 2021, 2022    Influenza 10/26/2018, 10/11/2019    Influenza - Quadrivalent - PF *Preferred* (6 months and older) 2016, 10/09/2017, 10/05/2018, 2019, 2020, 2021    Influenza A (H1N1) 2009 Monovalent - IM - PF 10/23/2009    MMR 2007, 2007    Meningococcal Conjugate (MCV4P) 2008    Td (ADULT) 2002    Tdap 2023        Delivery:    Episiotomy:     Lacerations:     Repair suture:     Repair # of packets:     Blood loss (ml):       Birth information:  YOB: 2024   Time of birth: 7:13 AM   Sex: female   Delivery type: , Low Transverse   Gestational Age: 38w1d    Delivery Clinician:      Other providers:       Additional  information:  Forceps:    Vacuum:    Breech:    Observed anomalies      Living?:           APGARS  One minute Five minutes Ten minutes   Skin color:         Heart rate:         Grimace:         Muscle tone:         Breathing:         Totals: 9  9        Placenta: Delivered:       appearance    Patient Instructions:   Current Discharge Medication List        START taking these medications    Details   docusate sodium (COLACE) 100 MG capsule Take 1 capsule (100 mg total) by mouth 2 (two) times daily.  Qty: 30 capsule, Refills: 0      HYDROcodone-acetaminophen (NORCO) 5-325 mg per tablet Take 1 tablet by mouth every 6 (six) hours as needed for Pain.  Qty: 15 tablet, Refills: 0    Comments: Quantity prescribed more than 7 day supply? No      ibuprofen (ADVIL,MOTRIN) 600 MG tablet Take 1 tablet (600 mg total) by mouth every 6 (six) hours as needed for Pain.  Qty: 30 tablet, Refills: 1      metoprolol succinate (TOPROL-XL) 25 MG 24 hr tablet Take 1 tablet (25 mg total) by mouth 2 (two) times daily.  Qty: 60 tablet, Refills: 11    Comments: .           STOP taking these medications        amoxicillin (AMOXIL) 500 MG capsule Comments:   Reason for Stopping:         aspirin (ECOTRIN) 81 MG EC tablet Comments:   Reason for Stopping:         labetaloL (NORMODYNE) 100 MG tablet Comments:   Reason for Stopping:         prenatal [id number]/dha/epa (PRENATAL GUMMY ORAL) Comments:   Reason for Stopping:               Discharge Procedure Orders   Diet Adult Regular     Lifting restrictions   Order Comments: No lifting anything larger than baby for 6 weeks     No driving until:   Order Comments: No driving while taking narcotics     Pelvic Rest   Order Comments: Until seen in clinic     Notify your health care provider if you experience any of the following:  temperature >100.4     Notify your health care provider if you experience any of the following:  persistent nausea and vomiting or diarrhea     Notify your health care provider if you experience any of the following:  severe uncontrolled pain     Notify your health care provider if you experience any of the following:  redness, tenderness, or signs of infection (pain, swelling, redness, odor or green/yellow discharge around incision site)     Notify your health care provider if you experience any of the following:  difficulty breathing or increased cough     Notify your health care provider if you experience any of the following:  severe persistent headache     Notify your health care provider if you experience any of the following:  persistent dizziness, light-headedness, or visual disturbances     Activity as tolerated        Follow-up Information       Mane Moreno MD Follow up in 6 week(s).    Specialties: Obstetrics, Obstetrics and Gynecology  Why: Post partum check  Contact information:  [address] [address]  [phone]                              Meche Huffman MD PGY-1  Obstetrics and Gynecology  [facility]

## 2023-04-28 NOTE — PLAN OF CARE
VSS, No issues during shift.  Discharge education given to patient. Patient verbalized understanding.   Follow up with OB in 6 weeks. Awaiting transport for discharge. Will continue to monitor. Nava Mercer RN

## 2023-04-28 NOTE — PROGRESS NOTES
POSTPARTUM PROGRESS NOTE    Subjective:     PPD/POD#: 4   Procedure: Repeat LTCS with BTL   EGA: 38w1d   N/V: No   F/C: No   Abd Pain: Mild, well-controlled with oral pain medication   Lochia: Mild   Voiding: Yes   Ambulating: Yes   Bowel fnc: Yes   Breastfeeding: Yes   Contraception: BTL with C/S   Circumcision: N/A, female     Objective:      Temp:  [98 °F (36.7 °C)-99.9 °F (37.7 °C)] 98 °F (36.7 °C)  Pulse:  [] 99  Resp:  [18] 18  SpO2:  [92 %-98 %] 97 %  BP: (100-129)/(56-71) 113/71    Lung: Normal respiratory effort   Abdomen: Soft, appropriately tender   Uterus: Firm, no fundal tenderness   Incision: Clean, dry, and intact.  No erythema, induration, or drainage.   : Deferred   Extremities: Bilateral trace edema     Lab Review    Recent Labs   Lab 04/24/23  0529 04/24/23  1635   * 135*   K 3.9 4.6    105   CO2 21* 24   BUN 8 8   CREATININE 0.6 0.6   GLU 95 92   PROT 6.6  --    BILITOT 0.5  --    ALKPHOS 137*  --    ALT 11  --    AST 25  --          Recent Labs   Lab 04/24/23  2128 04/25/23  0732 04/27/23  0822   WBC 15.80* 10.82 22.05*   HGB 8.7* 8.2* 8.9*   HCT 26.6* 25.3* 30.5*   MCV 94 94 106*    207 253           I/O  No intake or output data in the 24 hours ending 04/28/23 0634       Assessment and Plan:   Postpartum care:  - Patient doing well.  - Continue routine management and advances.    cHTN  - BP as above  - asymptomatic  - preE labs as above  - UOP: 0.4 cc/kg/hr  - Mag: not indicated  - Hypertensive agent continue labetalol 100 mg BID, recently held due to low BP    H/o MVP, Repaired   - S/p cardiology consult  - Metoprolol 25mg per cardiology   - Did not recommend transfusion indicated based on cardiac status    AMA  - Encourage ambulation    Meche Huffman MD PGY-1  Obstetrics and Gynecology  [facility]

## 2023-04-28 NOTE — LACTATION NOTE
04/28/23 0830   Maternal Assessment   Breast Shape Right:;pendulous   Breast Density Right:;filling;soft   Areola Right:;elastic   Nipples Right:;graspable;everted   Left Nipple Symptoms tender;scabbed   Right Nipple Symptoms tender;scabbed   Maternal Infant Feeding   Maternal Emotional State independent;relaxed   Infant Positioning clutch/football   Signs of Milk Transfer infant jaw motion present;audible swallow   Pain with Feeding yes   Pain Location nipples, bilateral   Pain Description soreness   Comfort Measures Before/During Feeding maternal position adjusted;infant position adjusted   Latch Assistance no   Community Referrals   Community Referrals outpatient lactation program;support group;pediatric care provider     Visited patient in room, discharge education reviewed.  Mother able to independently position and attach baby to R breast, football position, deep latch achieved, baby actively sucking c frequent wide pauses and audible swallows noted.  Continues to nurse.

## 2023-04-28 NOTE — PROGRESS NOTES
[facility]    Admission date:  4/24/2023     Assessment    Repaired mitral valve with mitral stenosis  Continues to do well.    Plan and Discussion    Metoprolol started yesterday.  Blood pressure has tolerated.  Heart rate is come down some.  Neck veins are up a little bit today but no symptoms of heart failure.  Will give a single dose of Lasix this morning to help mobilize some fluid prior to discharge.    Subjective    Feels well.  Has been up and ambulatory without orthostasis or dyspnea.    Medications  Current Facility-Administered Medications   Medication Dose Route Frequency Provider Last Rate Last Admin    acetaminophen tablet 650 mg  650 mg Oral Q6H Phylicia Gilbert MD   650 mg at 04/28/23 0052    carboprost injection 250 mcg  250 mcg Intramuscular Q15 Min PRN Phylicia Gilbert MD        carboprost injection 250 mcg  250 mcg Intramuscular On Call Procedure Nydia Lu MD        cefazolin (ANCEF) 2 gram in dextrose 5% 50 mL IVPB (premix)  2 g Intravenous On Call Procedure Nydia Lu MD        diphenhydrAMINE capsule 25 mg  25 mg Oral Q6H PRN Phylicia Gilbert MD        diphenhydrAMINE injection 12.5 mg  12.5 mg Intravenous Q20 Min PRN Phylicia Gilbert MD        docusate sodium capsule 200 mg  200 mg Oral BID Phylicia Gilbert MD   200 mg at 04/27/23 0912    famotidine (PF) injection 20 mg  20 mg Intravenous On Call Procedure Nydia Lu MD   20 mg at 04/24/23 0610    furosemide injection 20 mg  20 mg Intravenous Once Lamin Soto MD        hydrocortisone 2.5 % rectal cream   Rectal TID PRN Phylicia Gilbert MD        ibuprofen tablet 800 mg  800 mg Oral Q8H Mane Moreno MD   800 mg at 04/28/23 0613    lactated ringers bolus 1,000 mL  1,000 mL Intravenous PRN Nydia Lu MD        lactated ringers infusion   Intravenous Continuous Phylicia Gilbert MD        lactated ringers infusion   Intravenous Continuous Nydia Lu  mL/hr at 04/24/23  0528 New Bag at 04/24/23 0528    lanolin cream   Topical (Top) PRN Phylicia Gilbert MD        loperamide capsule 2 mg  2 mg Oral QID PRN Meche Huffman MD        measles, mumps and rubella vaccine 1,000-12,500 TCID50/0.5 mL injection 0.5 mL  0.5 mL Subcutaneous vaccine x 1 dose Phylicia Gilbert MD        methylergonovine injection 200 mcg  200 mcg Intramuscular Once PRN Phylicia Gilbert MD        methylergonovine injection 200 mcg  200 mcg Intramuscular On Call Procedure Nydia Lu MD        metoprolol succinate (TOPROL-XL) 24 hr split tablet 25 mg  25 mg Oral Daily Meche Huffman MD   25 mg at 04/27/23 1530    miSOPROStoL tablet 800 mcg  800 mcg Oral Once PRN Phylicia Gilbert MD        miSOPROStoL tablet 800 mcg  800 mcg Rectal Once PRN Phylicia Gilbert MD        miSOPROStoL tablet 800 mcg  800 mcg Rectal On Call Procedure Nydia Lu MD        nalbuphine injection 5 mg  5 mg Intravenous Once PRN Phylicia Gilbert MD        ondansetron disintegrating tablet 8 mg  8 mg Oral Q8H PRN Phylicia Gilbert MD   8 mg at 04/25/23 0958    ondansetron injection 4 mg  4 mg Intravenous Q6H PRN Phylicia Gilbert MD        oxyCODONE immediate release tablet 10 mg  10 mg Oral Q4H PRN Phylicia Gilbert MD   10 mg at 04/28/23 0434    oxyCODONE immediate release tablet 5 mg  5 mg Oral Q4H PRN Phylicia Gilbert MD   5 mg at 04/24/23 2211    oxytocin 30 units in 500 mL lactated ringers infusion (non-titrating)  95 min-units/min Intravenous Continuous Phylicia Gilbert MD 95 mL/hr at 04/24/23 0901 95 imn-units/min at 04/24/23 0901    oxytocin 30 units in 500 mL lactated ringers infusion (non-titrating)  334 min-units/min Intravenous Once Nydia Lu MD        oxytocin 30 units in 500 mL lactated ringers infusion (non-titrating)  95 min-units/min Intravenous Once Nydia Lu MD        prenatal vitamin oral tablet  1 tablet Oral Daily Phylicia Gilbert MD   1 tablet at 04/27/23 0905     prochlorperazine injection Soln 5 mg  5 mg Intravenous Q6H PRN Phylicia Gilbert MD        senna-docusate 8.6-50 mg per tablet 1 tablet  1 tablet Oral Nightly PRN Phylicia Gilbert MD        simethicone chewable tablet 80 mg  1 tablet Oral Q6H PRN Phylicia Gilbert MD        sodium citrate-citric acid 500-334 mg/5 ml solution 30 mL  30 mL Oral On Call Procedure Nydia Lu MD   30 mL at 04/24/23 0610    Tdap (BOOSTRIX) vaccine injection 0.5 mL  0.5 mL Intramuscular vaccine x 1 dose Phylicia Gilbert MD        tranexamic acid in NaCl,iso-os IVPB 1,000 mg  1,000 mg Intravenous Once PRN Phylicia Gilbert MD            Physical Exam    Temp:  [98 °F (36.7 °C)-99.9 °F (37.7 °C)]   Pulse:  []   Resp:  [18]   BP: (100-129)/(56-71)   SpO2:  [96 %-98 %]    Wt Readings from Last 3 Encounters:   04/24/23 88.9 kg (196 lb)   04/21/23 88.6 kg (195 lb 5.2 oz)   04/14/23 89 kg (196 lb 3.4 oz)     Physical Exam  Constitutional:       General: She is not in acute distress.  Neck:      Vascular: JVD present.   Cardiovascular:      Rate and Rhythm: Normal rate and regular rhythm.      Heart sounds: S1 normal and S2 normal. No murmur heard.    No S3 or S4 sounds.   Pulmonary:      Effort: Pulmonary effort is normal.      Breath sounds: Normal breath sounds and air entry.   Abdominal:      General: Bowel sounds are normal.      Palpations: Abdomen is soft. There is no hepatomegaly.      Tenderness: There is no abdominal tenderness.   Musculoskeletal:      Right lower leg: No edema.      Left lower leg: No edema.   Skin:     Coloration: Skin is not pale.   Neurological:      Mental Status: She is alert.   Psychiatric:         Behavior: Behavior is cooperative.       Labs  Recent Results (from the past 24 hour(s))   CBC auto differential    Collection Time: 04/27/23  8:22 AM   Result Value Ref Range    WBC 22.05 (H) 3.90 - 12.70 K/uL    RBC 2.87 (L) 4.00 - 5.40 M/uL    Hemoglobin 8.9 (L) 12.0 - 16.0 g/dL    Hematocrit 30.5 (L)  37.0 - 48.5 %     (H) 82 - 98 fL    MCH 31.0 27.0 - 31.0 pg    MCHC 29.2 (L) 32.0 - 36.0 g/dL    RDW 14.8 (H) 11.5 - 14.5 %    Platelets 253 150 - 450 K/uL    MPV 10.6 9.2 - 12.9 fL    Immature Granulocytes 0.8 (H) 0.0 - 0.5 %    Gran # (ANC) 19.0 (H) 1.8 - 7.7 K/uL    Immature Grans (Abs) 0.18 (H) 0.00 - 0.04 K/uL    Lymph # 1.0 1.0 - 4.8 K/uL    Mono # 1.6 (H) 0.3 - 1.0 K/uL    Eos # 0.2 0.0 - 0.5 K/uL    Baso # 0.07 0.00 - 0.20 K/uL    nRBC 0 0 /100 WBC    Gran % 86.3 (H) 38.0 - 73.0 %    Lymph % 4.7 (L) 18.0 - 48.0 %    Mono % 7.2 4.0 - 15.0 %    Eosinophil % 0.7 0.0 - 8.0 %    Basophil % 0.3 0.0 - 1.9 %    Differential Method Automated              Lamin Soto MD

## 2023-04-28 NOTE — LACTATION NOTE
"Plan of care:  Mother will nurse baby on cue " 8 or more in 24" and lengthen feedings until content; will achieve a deep, comfortable latch and observe for signs of milk transfer; will apply lanolin to nipples pc; will monitor baby's 24hr diaper counts; will refer to the Guide and call the Warmline for information and assistance prn.   "

## 2023-05-01 LAB
FINAL PATHOLOGIC DIAGNOSIS: NORMAL
Lab: NORMAL

## 2023-05-02 ENCOUNTER — PATIENT MESSAGE (OUTPATIENT)
Dept: OBSTETRICS AND GYNECOLOGY | Facility: CLINIC | Age: 38
End: 2023-05-02
Payer: COMMERCIAL

## 2023-05-03 ENCOUNTER — HOSPITAL ENCOUNTER (OUTPATIENT)
Facility: OTHER | Age: 38
Discharge: HOME | End: 2023-05-04
Attending: OBSTETRICS & GYNECOLOGY | Admitting: OBSTETRICS & GYNECOLOGY
Payer: COMMERCIAL

## 2023-05-03 DIAGNOSIS — I50.33 HEART FAILURE, DIASTOLIC, ACUTE ON CHRONIC: Primary | ICD-10-CM

## 2023-05-03 DIAGNOSIS — I05.0 MITRAL VALVE STENOSIS, UNSPECIFIED ETIOLOGY: ICD-10-CM

## 2023-05-03 DIAGNOSIS — R07.9 CHEST PAIN: ICD-10-CM

## 2023-05-03 DIAGNOSIS — R06.02 SOB (SHORTNESS OF BREATH): ICD-10-CM

## 2023-05-03 DIAGNOSIS — I50.31 ACUTE DIASTOLIC HEART FAILURE: ICD-10-CM

## 2023-05-03 LAB
ALBUMIN SERPL BCP-MCNC: 2.5 G/DL (ref 3.5–5.2)
ALP SERPL-CCNC: 85 U/L (ref 55–135)
ALT SERPL W/O P-5'-P-CCNC: 17 U/L (ref 10–44)
ANION GAP SERPL CALC-SCNC: 9 MMOL/L (ref 8–16)
ANISOCYTOSIS BLD QL SMEAR: SLIGHT
ASCENDING AORTA: 2.73 CM
AST SERPL-CCNC: 20 U/L (ref 10–40)
AV INDEX (PROSTH): 0.86
AV MEAN GRADIENT: 4 MMHG
AV PEAK GRADIENT: 7 MMHG
AV VALVE AREA: 2.75 CM2
AV VELOCITY RATIO: 0.94
BASOPHILS NFR BLD: 0 % (ref 0–1.9)
BILIRUB SERPL-MCNC: 0.7 MG/DL (ref 0.1–1)
BILIRUB SERPL-MCNC: NORMAL MG/DL
BLOOD URINE, POC: NORMAL
BNP SERPL-MCNC: 465 PG/ML (ref 0–99)
BSA FOR ECHO PROCEDURE: 1.96 M2
BUN SERPL-MCNC: 6 MG/DL (ref 6–20)
CALCIUM SERPL-MCNC: 8.9 MG/DL (ref 8.7–10.5)
CHLORIDE SERPL-SCNC: 107 MMOL/L (ref 95–110)
CO2 SERPL-SCNC: 25 MMOL/L (ref 23–29)
COLOR, POC UA: NORMAL
CREAT SERPL-MCNC: 0.6 MG/DL (ref 0.5–1.4)
CV ECHO LV RWT: 0.32 CM
DIFFERENTIAL METHOD: ABNORMAL
DOP CALC AO PEAK VEL: 1.3 M/S
DOP CALC AO VTI: 30 CM
DOP CALC LVOT AREA: 3.2 CM2
DOP CALC LVOT DIAMETER: 2.02 CM
DOP CALC LVOT PEAK VEL: 1.22 M/S
DOP CALC LVOT STROKE VOLUME: 82.64 CM3
DOP CALC MV VTI: 87.3 CM
DOP CALCLVOT PEAK VEL VTI: 25.8 CM
ECHO LV POSTERIOR WALL: 0.71 CM (ref 0.6–1.1)
EJECTION FRACTION: 60 %
EOSINOPHIL NFR BLD: 0 % (ref 0–8)
ERYTHROCYTE [DISTWIDTH] IN BLOOD BY AUTOMATED COUNT: 14.8 % (ref 11.5–14.5)
EST. GFR  (NO RACE VARIABLE): >60 ML/MIN/1.73 M^2
FINAL PATHOLOGIC DIAGNOSIS: NORMAL
FRACTIONAL SHORTENING: 41 % (ref 28–44)
GLUCOSE SERPL-MCNC: 85 MG/DL (ref 70–110)
GLUCOSE UR QL STRIP: NORMAL
HCT VFR BLD AUTO: 27.5 % (ref 37–48.5)
HGB BLD-MCNC: 8.7 G/DL (ref 12–16)
IMM GRANULOCYTES # BLD AUTO: ABNORMAL K/UL (ref 0–0.04)
IMM GRANULOCYTES NFR BLD AUTO: ABNORMAL % (ref 0–0.5)
INTERVENTRICULAR SEPTUM: 0.74 CM (ref 0.6–1.1)
IVC DIAMETER: 1.65 CM
KETONES UR QL STRIP: NORMAL
LA MAJOR: 5.64 CM
LA MINOR: 6.94 CM
LA WIDTH: 5 CM
LEFT ATRIUM SIZE: 5.08 CM
LEFT ATRIUM VOLUME INDEX MOD: 53.5 ML/M2
LEFT ATRIUM VOLUME INDEX: 71.8 ML/M2
LEFT ATRIUM VOLUME MOD: 100 CM3
LEFT ATRIUM VOLUME: 134.35 CM3
LEFT INTERNAL DIMENSION IN SYSTOLE: 2.57 CM (ref 2.1–4)
LEFT VENTRICLE DIASTOLIC VOLUME INDEX: 46.48 ML/M2
LEFT VENTRICLE DIASTOLIC VOLUME: 86.92 ML
LEFT VENTRICLE MASS INDEX: 51 G/M2
LEFT VENTRICLE SYSTOLIC VOLUME INDEX: 12.7 ML/M2
LEFT VENTRICLE SYSTOLIC VOLUME: 23.81 ML
LEFT VENTRICULAR INTERNAL DIMENSION IN DIASTOLE: 4.38 CM (ref 3.5–6)
LEFT VENTRICULAR MASS: 95.55 G
LEUKOCYTE ESTERASE URINE, POC: 1
LVOT MG: 2.78 MMHG
LVOT MV: 0.76 CM/S
LYMPHOCYTES NFR BLD: 15 % (ref 18–48)
Lab: NORMAL
MCH RBC QN AUTO: 30.3 PG (ref 27–31)
MCHC RBC AUTO-ENTMCNC: 31.6 G/DL (ref 32–36)
MCV RBC AUTO: 96 FL (ref 82–98)
MONOCYTES NFR BLD: 8 % (ref 4–15)
MR PISA EROA: 0.09 CM2
MV MEAN GRADIENT: 16 MMHG
MV PEAK GRADIENT: 32 MMHG
MV VALVE AREA BY CONTINUITY EQUATION: 0.95 CM2
MYELOCYTES NFR BLD MANUAL: 2 %
NEUTROPHILS NFR BLD: 73 % (ref 38–73)
NEUTS BAND NFR BLD MANUAL: 2 %
NITRITE, POC UA: NORMAL
NRBC BLD-RTO: 1 /100 WBC
PH, POC UA: NORMAL
PISA MRMAX VEL: 5.16 M/S
PISA RADIUS: 0.43 CM
PISA TR MAX VEL: 3.1 M/S
PISA VN NYQUIST MS: 0.42 M/S
PISA VN NYQUIST: 0.42 M/S
PLATELET # BLD AUTO: 446 K/UL (ref 150–450)
PLATELET BLD QL SMEAR: ABNORMAL
PMV BLD AUTO: 9.5 FL (ref 9.2–12.9)
POLYCHROMASIA BLD QL SMEAR: ABNORMAL
POTASSIUM SERPL-SCNC: 3.6 MMOL/L (ref 3.5–5.1)
PROT SERPL-MCNC: 6 G/DL (ref 6–8.4)
PROTEIN, POC: NORMAL
PV MV: 0.7 M/S
PV PEAK VELOCITY: 1.16 CM/S
RA MAJOR: 4.78 CM
RA PRESSURE: 3 MMHG
RA WIDTH: 4.8 CM
RBC # BLD AUTO: 2.87 M/UL (ref 4–5.4)
SINUS: 2.4 CM
SODIUM SERPL-SCNC: 141 MMOL/L (ref 136–145)
SPECIFIC GRAVITY, POC UA: NORMAL
STJ: 2.29 CM
TDI LATERAL: 0.07 M/S
TDI SEPTAL: 0.06 M/S
TDI: 0.07 M/S
TR MAX PG: 38 MMHG
TRICUSPID ANNULAR PLANE SYSTOLIC EXCURSION: 1.5 CM
TROPONIN I SERPL DL<=0.01 NG/ML-MCNC: 0.01 NG/ML (ref 0–0.03)
TV REST PULMONARY ARTERY PRESSURE: 41 MMHG
UROBILINOGEN, POC UA: NORMAL
WBC # BLD AUTO: 15.23 K/UL (ref 3.9–12.7)

## 2023-05-03 PROCEDURE — 25000003 PHARM REV CODE 250

## 2023-05-03 PROCEDURE — G0378 HOSPITAL OBSERVATION PER HR: HCPCS

## 2023-05-03 PROCEDURE — 85027 COMPLETE CBC AUTOMATED: CPT

## 2023-05-03 PROCEDURE — 99285 EMERGENCY DEPT VISIT HI MDM: CPT | Mod: 24,,, | Performed by: OBSTETRICS & GYNECOLOGY

## 2023-05-03 PROCEDURE — 93010 EKG 12-LEAD: ICD-10-PCS | Mod: ,,, | Performed by: INTERNAL MEDICINE

## 2023-05-03 PROCEDURE — 63600175 PHARM REV CODE 636 W HCPCS

## 2023-05-03 PROCEDURE — 93005 ELECTROCARDIOGRAM TRACING: CPT

## 2023-05-03 PROCEDURE — 84484 ASSAY OF TROPONIN QUANT: CPT | Performed by: OBSTETRICS & GYNECOLOGY

## 2023-05-03 PROCEDURE — 83880 ASSAY OF NATRIURETIC PEPTIDE: CPT

## 2023-05-03 PROCEDURE — 99214 OFFICE O/P EST MOD 30 MIN: CPT | Mod: 25,,, | Performed by: INTERNAL MEDICINE

## 2023-05-03 PROCEDURE — 99214 PR OFFICE/OUTPT VISIT, EST, LEVL IV, 30-39 MIN: ICD-10-PCS | Mod: 25,,, | Performed by: INTERNAL MEDICINE

## 2023-05-03 PROCEDURE — 99285 PR EMERGENCY DEPT VISIT,LEVEL V: ICD-10-PCS | Mod: 24,,, | Performed by: OBSTETRICS & GYNECOLOGY

## 2023-05-03 PROCEDURE — 85007 BL SMEAR W/DIFF WBC COUNT: CPT

## 2023-05-03 PROCEDURE — 96374 THER/PROPH/DIAG INJ IV PUSH: CPT

## 2023-05-03 PROCEDURE — 99285 EMERGENCY DEPT VISIT HI MDM: CPT | Mod: 25

## 2023-05-03 PROCEDURE — 11000001 HC ACUTE MED/SURG PRIVATE ROOM

## 2023-05-03 PROCEDURE — 93010 ELECTROCARDIOGRAM REPORT: CPT | Mod: ,,, | Performed by: INTERNAL MEDICINE

## 2023-05-03 PROCEDURE — 80053 COMPREHEN METABOLIC PANEL: CPT

## 2023-05-03 RX ORDER — ADHESIVE BANDAGE
30 BANDAGE TOPICAL 2 TIMES DAILY PRN
Status: CANCELLED | OUTPATIENT
Start: 2023-05-04

## 2023-05-03 RX ORDER — AMOXICILLIN 250 MG
1 CAPSULE ORAL NIGHTLY PRN
Status: CANCELLED | OUTPATIENT
Start: 2023-05-03

## 2023-05-03 RX ORDER — FUROSEMIDE 10 MG/ML
20 INJECTION INTRAMUSCULAR; INTRAVENOUS
Status: DISCONTINUED | OUTPATIENT
Start: 2023-05-04 | End: 2023-05-04 | Stop reason: HOSPADM

## 2023-05-03 RX ORDER — POTASSIUM CHLORIDE 7.45 MG/ML
10 INJECTION INTRAVENOUS
Status: DISCONTINUED | OUTPATIENT
Start: 2023-05-03 | End: 2023-05-03

## 2023-05-03 RX ORDER — DOCUSATE SODIUM 100 MG/1
200 CAPSULE, LIQUID FILLED ORAL 2 TIMES DAILY
Status: CANCELLED | OUTPATIENT
Start: 2023-05-03

## 2023-05-03 RX ORDER — METOPROLOL SUCCINATE 25 MG/1
25 TABLET, EXTENDED RELEASE ORAL ONCE
Status: COMPLETED | OUTPATIENT
Start: 2023-05-03 | End: 2023-05-03

## 2023-05-03 RX ORDER — OXYCODONE AND ACETAMINOPHEN 10; 325 MG/1; MG/1
1 TABLET ORAL EVERY 4 HOURS PRN
Status: CANCELLED | OUTPATIENT
Start: 2023-05-03

## 2023-05-03 RX ORDER — POTASSIUM CHLORIDE 7.45 MG/ML
20 INJECTION INTRAVENOUS
Status: DISCONTINUED | OUTPATIENT
Start: 2023-05-03 | End: 2023-05-03

## 2023-05-03 RX ORDER — FUROSEMIDE 10 MG/ML
20 INJECTION INTRAMUSCULAR; INTRAVENOUS
Status: DISCONTINUED | OUTPATIENT
Start: 2023-05-03 | End: 2023-05-03

## 2023-05-03 RX ORDER — SODIUM CHLORIDE 0.9 % (FLUSH) 0.9 %
10 SYRINGE (ML) INJECTION
Status: DISCONTINUED | OUTPATIENT
Start: 2023-05-03 | End: 2023-05-04 | Stop reason: HOSPADM

## 2023-05-03 RX ORDER — FUROSEMIDE 10 MG/ML
20 INJECTION INTRAMUSCULAR; INTRAVENOUS ONCE
Status: COMPLETED | OUTPATIENT
Start: 2023-05-03 | End: 2023-05-03

## 2023-05-03 RX ORDER — PRENATAL WITH FERROUS FUM AND FOLIC ACID 3080; 920; 120; 400; 22; 1.84; 3; 20; 10; 1; 12; 200; 27; 25; 2 [IU]/1; [IU]/1; MG/1; [IU]/1; MG/1; MG/1; MG/1; MG/1; MG/1; MG/1; UG/1; MG/1; MG/1; MG/1; MG/1
1 TABLET ORAL DAILY
Status: CANCELLED | OUTPATIENT
Start: 2023-05-04

## 2023-05-03 RX ORDER — PROCHLORPERAZINE EDISYLATE 5 MG/ML
5 INJECTION INTRAMUSCULAR; INTRAVENOUS EVERY 6 HOURS PRN
Status: DISCONTINUED | OUTPATIENT
Start: 2023-05-03 | End: 2023-05-04 | Stop reason: HOSPADM

## 2023-05-03 RX ORDER — CHLORHEXIDINE GLUCONATE ORAL RINSE 1.2 MG/ML
10 SOLUTION DENTAL 2 TIMES DAILY
Status: CANCELLED | OUTPATIENT
Start: 2023-05-03 | End: 2023-05-08

## 2023-05-03 RX ORDER — IBUPROFEN 600 MG/1
600 TABLET ORAL EVERY 6 HOURS PRN
Status: DISCONTINUED | OUTPATIENT
Start: 2023-05-03 | End: 2023-05-04 | Stop reason: HOSPADM

## 2023-05-03 RX ORDER — IBUPROFEN 600 MG/1
600 TABLET ORAL EVERY 6 HOURS
Status: CANCELLED | OUTPATIENT
Start: 2023-05-03

## 2023-05-03 RX ORDER — DIPHENHYDRAMINE HCL 25 MG
25 CAPSULE ORAL EVERY 4 HOURS PRN
Status: CANCELLED | OUTPATIENT
Start: 2023-05-03

## 2023-05-03 RX ORDER — BISACODYL 10 MG
10 SUPPOSITORY, RECTAL RECTAL ONCE AS NEEDED
Status: CANCELLED | OUTPATIENT
Start: 2023-05-03 | End: 2034-09-29

## 2023-05-03 RX ORDER — POTASSIUM CHLORIDE 20 MEQ/1
20 TABLET, EXTENDED RELEASE ORAL ONCE
Status: DISCONTINUED | OUTPATIENT
Start: 2023-05-03 | End: 2023-05-03

## 2023-05-03 RX ORDER — POTASSIUM CHLORIDE 20 MEQ/1
20 TABLET, EXTENDED RELEASE ORAL 2 TIMES DAILY
Status: DISCONTINUED | OUTPATIENT
Start: 2023-05-03 | End: 2023-05-03

## 2023-05-03 RX ORDER — OXYCODONE AND ACETAMINOPHEN 5; 325 MG/1; MG/1
1 TABLET ORAL EVERY 4 HOURS PRN
Status: CANCELLED | OUTPATIENT
Start: 2023-05-03

## 2023-05-03 RX ORDER — SIMETHICONE 80 MG
1 TABLET,CHEWABLE ORAL EVERY 6 HOURS PRN
Status: CANCELLED | OUTPATIENT
Start: 2023-05-03

## 2023-05-03 RX ORDER — ONDANSETRON 8 MG/1
8 TABLET, ORALLY DISINTEGRATING ORAL EVERY 8 HOURS PRN
Status: DISCONTINUED | OUTPATIENT
Start: 2023-05-03 | End: 2023-05-04 | Stop reason: HOSPADM

## 2023-05-03 RX ADMIN — METOPROLOL SUCCINATE 25 MG: 25 TABLET, EXTENDED RELEASE ORAL at 09:05

## 2023-05-03 RX ADMIN — FUROSEMIDE 20 MG: 10 INJECTION, SOLUTION INTRAMUSCULAR; INTRAVENOUS at 02:05

## 2023-05-03 RX ADMIN — METOPROLOL SUCCINATE 25 MG: 25 TABLET, EXTENDED RELEASE ORAL at 10:05

## 2023-05-03 RX ADMIN — POTASSIUM CHLORIDE 20 MEQ: 2 INJECTION, SOLUTION, CONCENTRATE INTRAVENOUS at 04:05

## 2023-05-03 RX ADMIN — IBUPROFEN 600 MG: 600 TABLET, FILM COATED ORAL at 07:05

## 2023-05-03 NOTE — HPI
King Botello is a 38 y.o. [id number] now POD#9 s/p rLTCS with BTL, presented to the [facility] complaining of shortness of breath that began yesterday morning, resolved on its own throughout the day, and then re-occurred this morning (5/3). Patient reports awakening this AM with SOB. She has not been able to lie flat to sleep since delivery, although she reports this is mainly due to her post op pain and soreness. She denies fever, chills, N/V/D, chest pain, syncope, headaches, vision changes, RUQ pain, edema, dysuria, hematuria, cough, congestion.      Her delivery was complicated by cHTN (labetalol 100mg held postpartum due to lower BP, eventually discontinued), H/o mitral valve prolapse (repaired), and AMA. She had persistent tachycardia (100-110s bpm) post op but Hher H/H remained stable around .      She was seen by cardiology postpartum who recommended discontinuation of labetalol 100 BID and initiation of metoprolol XL 25mg. She follows with Dr. Cordero for cardiology outpatient-she has follow up scheduled on 5/10 and a repeat Echo was recommended 3 months postpartum.    In the [facility], her work up for her SOB was significant for BNP of 465, CXR with central vascular congestion, and an Echocardiogram demonstrating severe left atrial enlargement, moderate mitral valve regurgitation, and an EF of 60%.  Cardiology was consulted and evaluated the patient in the ED. Admission recommended for IV lasix 20mg BID, and KCL 20mEq PO BID.

## 2023-05-03 NOTE — ASSESSMENT & PLAN NOTE
-BP: (116-140)/(72-94) 118/79 on admit  -Mild range BPs on admit   -CBC, CMP on admit WNL  -Denies Pre-E ROS  -Previous labetalol 100 BID held for lower BP postpartum and on admit  --Consider re-starting if 2 or more Bps >/= 150/100

## 2023-05-03 NOTE — ASSESSMENT & PLAN NOTE
-POD#9 s/p rLTCS + BTL   -Has met all post op milestones  -Incision clean, dry, intact  -PRN pain meds ordered  -Breast pump supplied

## 2023-05-03 NOTE — ASSESSMENT & PLAN NOTE
2003: [place], [place]: Mitral valve repair with ring for MVP with severe MR.    On admit 5/3/23:  Presents SOB in HF. . CXR-Central vascular congestion   Echo: Normal left ventricular size and systolic function. EF 60%. Severely enlarged LA. S/p MV repair. Moderate MR. MS - PG 32 mmHg - MG 16 mmHg at HR 77 bpm. SPAP 41 mmHg.  Metoprolol 25mg BID at home (initiated post partum)    Plan:  -Cardiology consulted and following, appreciate assistance  -Admitted for diuresis    Suggest furosemide 20 mg IV Q12 with KCl 20 mEq Q12 for a few doses.   Will need furosemide 20 mg Q24 with KCl 20 mEq Q24 for maybe 5 days after  discharge  -AM BNP, BMP ordered  -Telemetry ordered

## 2023-05-03 NOTE — SUBJECTIVE & OBJECTIVE
Interval History    Worsening SOB over the past two days. BLANK  Denies fever, chills, N/V/D, dysuria, hematuria. Denies cough, congestion. Denies Pre-E ROS. No HA, chest pain, vision changes.   Reports post op soreness but denies pain.   No abnormal vaginal discharge or bleeding.    OB History    Para Term  AB Living   5 2 2 0 3 2   SAB IAB Ectopic Multiple Live Births   3 0 0 0 2      # Outcome Date GA Lbr Syed/2nd Weight Sex Delivery Anes PTL Lv   5 Term 23 38w1d  2.89 kg (6 lb 5.9 oz) F CS-LTranv Spinal N AWDOA      Name: MOUSTAPHA,GIRL MARIEL      Apgar1: 9  Apgar5: 9   4 SAB 2019 4w0d          3 Term 04/27/10 39w0d  3.005 kg (6 lb 10 oz) F CS-LTranv EPI  ADWOA      Birth Comments: Elective C/S      Name: Ricarda   2 SAB            1 SAB              Past Medical History:   Diagnosis Date    Abnormal Pap smear     Colposcopy    Bradycardia 2020    Class 2 obesity due to excess calories with body mass index (BMI) of 35.0 to 35.9 in adult 3/9/2021    Hypertension, essential 3/16/2021    Menarche     Age of onset 11    Missed ab 2021    Mitral valve prolapse     Previous  delivery affecting pregnancy, antepartum 2020    S/P suction D&C 2021    Status post mitral valve annuloplasty 2017     Past Surgical History:   Procedure Laterality Date     SECTION       SECTION WITH TUBAL LIGATION Bilateral 2023    Procedure:  SECTION, WITH TUBAL LIGATION;  Surgeon: Mane Moreno MD;  Location: [facility] L&D;  Service: OB/GYN;  Laterality: Bilateral;    DILATION AND CURETTAGE OF UTERUS USING SUCTION N/A 2021    Procedure: DILATION AND CURETTAGE, UTERUS, USING SUCTION;  Surgeon: Mane Moreno MD;  Location: [facility] OR;  Service: OB/GYN;  Laterality: N/A;    MITRAL VALVE REPAIR         (Not in a hospital admission)      Review of patient's allergies indicates:   Allergen Reactions    Doxycycline      Abdomen pain severe        Family History        Problem Relation (Age of Onset)    Breast cancer Paternal Aunt    Cancer Mother    Diabetes Mother    Heart disease Mother    Hyperlipidemia Mother, Father    Hypertension Mother, Father          Tobacco Use    Smoking status: Never    Smokeless tobacco: Never   Substance and Sexual Activity    Alcohol use: Not Currently     Comment: not since +UPT    Drug use: No    Sexual activity: Yes     Partners: Male     Birth control/protection: None     Review of Systems   Constitutional:  Negative for chills, diaphoresis, fatigue and fever.   HENT:  Negative for nasal congestion.    Respiratory:  Positive for shortness of breath. Negative for cough and wheezing.    Cardiovascular:  Negative for chest pain, palpitations and leg swelling.   Gastrointestinal:  Negative for abdominal pain, constipation, diarrhea, nausea and vomiting.   Genitourinary:  Negative for dysuria, pelvic pain, vaginal bleeding, vaginal discharge and vaginal pain.   Musculoskeletal:  Negative for arthralgias and leg pain.   Neurological:  Negative for seizures and headaches.    Objective:     Vital Signs (Most Recent):  Temp: 98.4 °F (36.9 °C) (05/03/23 0921)  Pulse: 92 (05/03/23 1428)  Resp: 18 (05/03/23 0921)  BP: 118/79 (05/03/23 1421)  SpO2: 99 % (05/03/23 1425) Vital Signs (24h Range):  Temp:  [98.4 °F (36.9 °C)] 98.4 °F (36.9 °C)  Pulse:  [75-93] 92  Resp:  [18] 18  SpO2:  [95 %-99 %] 99 %  BP: (116-140)/(72-94) 118/79     Weight: 88.9 kg (196 lb)  Body mass index is 37.03 kg/m².    Patient's last menstrual period was 08/01/2022 (exact date).    Physical Exam:   Constitutional: She is oriented to person, place, and time. She appears well-developed and well-nourished. No distress.    HENT:   Head: Atraumatic.      Cardiovascular:  Normal rate.             Pulmonary/Chest: Effort normal. No respiratory distress. She has no wheezes. She has no rales.   Decreased breath sounds of right lower lung field        Abdominal: Soft. She exhibits abdominal  incision. There is no abdominal tenderness. There is no rebound and no guarding.   Incision clean, dry, intact             Musculoskeletal: Normal range of motion. No edema.       Neurological: She is alert and oriented to person, place, and time.    Skin: Skin is warm and dry. No erythema.    Psychiatric: She has a normal mood and affect. Her behavior is normal.     Laboratory:  Recent Lab Results         05/03/23  1439   05/03/23  1300   05/03/23  1031   05/03/23  0955        Color, UA             Bilirubin, POC             Spec Grav UA             pH, UA             Protein, POC             Urobilinogen, UA             Nitrite, UA             Albumin       2.5       Alkaline Phosphatase       85       ALT       17       Anion Gap       9       Aniso       Slight       Ascending aorta   2.73           Ao peak jacinta   1.30           Ao VTI   30.0           AST       20       AV valve area   2.75           AV mean gradient   4           AV index (prosthetic)   0.86           AV peak gradient   7           AV Velocity Ratio   0.94           Bands       2.0       Basophil %       0.0       BILIRUBIN TOTAL       0.7  Comment: For infants and newborns, interpretation of results should be based  on gestational age, weight and in agreement with clinical  observations.    Premature Infant recommended reference ranges:  Up to 24 hours.............<8.0 mg/dL  Up to 48 hours............<12.0 mg/dL  3-5 days..................<15.0 mg/dL  6-29 days.................<15.0 mg/dL         BNP       465  Comment: Values of less than 100 pg/ml are consistent with non-CHF populations.       BSA   1.96           BUN       6       Calcium       8.9       Chloride       107       CO2       25       Creatinine       0.6       Left Ventricle Relative Wall Thickness   0.32           Differential Method       Manual       eGFR       >60       EF   60           Eosinophil %       0.0       FS   41           Glucose       85       Glucose, UA              Gran %       73.0       Hematocrit       27.5       Hemoglobin       8.7       Immature Grans (Abs)       CANCELED  Comment: Mild elevation in immature granulocytes is non specific and   can be seen in a variety of conditions including stress response,   acute inflammation, trauma and pregnancy. Correlation with other   laboratory and clinical findings is essential.    Result canceled by the ancillary.         Immature Granulocytes       CANCELED  Comment: Result canceled by the ancillary.       IVC diameter   1.65           IVSd   0.74           Ketones, UA             LA WIDTH   5.00           Left Atrium Major Axis   5.64           Left Atrium Minor Axis   6.94           LA size   5.08           LA volume   134.35              100.00           LA vol index   71.8           LA Volume Index (Mod)   53.5           LVOT area   3.2           LV EDV BP   86.92           LV Diastolic Volume Index   46.48           LVIDd   4.38           LVIDs   2.57           LV mass   95.55           LV Mass Index   51           Left Ventricular Outflow Tract Mean Gradient   2.78           Left Ventricular Outflow Tract Mean Velocity   0.76           LVOT diameter   2.02           LVOT peak jacinta   1.22           LVOT stroke volume   82.64           LVOT peak VTI   25.80           LV ESV BP   23.81           LV Systolic Volume Index   12.7           Lymph %       15.0       MCH       30.3       MCHC       31.6       MCV       96       Mean e'   0.07           Mono %       8.0       MPV       9.5       Mr max jacinta   5.16           MR PISA EROA   0.09           MV valve area by continuity eq   0.95           MV mean gradient   16           MV peak gradient   32           MV VTI   87.3           Myelocytes       2.0       nRBC       1       Radius   0.43           Platelet Estimate       Appears normal       Platelets       446       Poly       Occasional       Potassium       3.6       PROTEIN TOTAL       6.0       Pulmonary Valve  Mean Velocity   0.70           PV PEAK VELOCITY   1.16           Posterior Wall   0.71           Right Atrial Pressure (from IVC)   3           RA Major Axis   4.78           RA Width   4.80           RBC       2.87       Blood, UA             RDW       14.8       Sinus   2.40           Sodium       141       STJ   2.29           TAPSE   1.50           TDI SEPTAL   0.06           TDI LATERAL   0.07           Triscuspid Valve Regurgitation Peak Gradient   38           TR Max Anuj   3.10           Troponin I 0.014  Comment: The reference interval for Troponin I represents the 99th percentile   cutoff   for our facility and is consistent with 3rd generation assay   performance.               TV rest pulmonary artery pressure   41           Vn Nyquist   0.42           Vn Nyquist MS   0.42           WBC, UA     1         WBC       15.23             I have personallly reviewed all pertinent lab results from the last 24 hours.    Diagnostic Results:  X-Ray: Reviewed  FINDINGS:  Cardiac silhouette is unchanged noting mitral valvular prosthesis.  There is some central vascular congestion.  No consolidation.  No large effusion or pneumothorax.  No acute osseous process.     Impression:  Central vascular congestion.    5/3/2023: Echo:   The left ventricle is normal in size with normal systolic function.  The estimated ejection fraction is 60%.  The left ventricular global longitudinal strain is -15%.  Indeterminate left ventricular diastolic function.  Severe left atrial enlargement.  Normal right ventricular size with normal right ventricular systolic function.  Moderate right atrial enlargement.  The mitral valve is s/p repair.  Moderate mitral regurgitation.  There is severe mitral stenosis.  The mean diastolic gradient across the mitral valve is 16 mmHg at a heart rate of 77 bpm.  Moderate tricuspid regurgitation.  There is mild pulmonary hypertension.  The estimated PA systolic pressure is 41 mmHg.  Normal central venous  pressure (3 mmHg).

## 2023-05-03 NOTE — H&P
[facility] Emergency Dept (Obstetrics)  Obstetrics & Gynecology  History & Physical    Patient Name: King Botello  MRN: [id number]  Admission Date: 5/3/2023  Primary Care Provider: Mayco Dodson MD    Subjective:     Chief Complaint/Reason for Admission: Shortness of Breath    History of Present Illness:  King Botello is a 38 y.o. [id number] now POD#9 s/p rLTCS with BTL, presented to the [facility] complaining of shortness of breath that began yesterday morning, resolved on its own throughout the day, and then re-occurred this morning (5/3). Patient reports awakening this AM with SOB. She has not been able to lie flat to sleep since delivery, although she reports this is mainly due to her post op pain and soreness. She denies fever, chills, N/V/D, chest pain, syncope, headaches, vision changes, RUQ pain, edema, dysuria, hematuria, cough, congestion.      Her delivery was complicated by cHTN (labetalol 100mg held postpartum due to lower BP, eventually discontinued), H/o mitral valve prolapse (repaired), and AMA. She had persistent tachycardia (100-110s bpm) post op but Hher H/H remained stable around .      She was seen by cardiology postpartum who recommended discontinuation of labetalol 100 BID and initiation of metoprolol XL 25mg. She follows with Dr. Cordero for cardiology outpatient-she has follow up scheduled on 5/10 and a repeat Echo was recommended 3 months postpartum.    In the [facility], her work up for her SOB was significant for BNP of 465, CXR with central vascular congestion, and an Echocardiogram demonstrating severe left atrial enlargement, moderate mitral valve regurgitation, and an EF of 60%.  Cardiology was consulted and evaluated the patient in the ED. Admission recommended for IV lasix 20mg BID, and KCL 20mEq PO BID.       Interval History    Worsening SOB over the past two days. BLANK  Denies fever, chills, N/V/D, dysuria, hematuria. Denies cough, congestion. Denies Pre-E ROS. No HA, chest pain,  vision changes.   Reports post op soreness but denies pain.   No abnormal vaginal discharge or bleeding.    OB History    Para Term  AB Living   5 2 2 0 3 2   SAB IAB Ectopic Multiple Live Births   3 0 0 0 2      # Outcome Date GA Lbr Syed/2nd Weight Sex Delivery Anes PTL Lv   5 Term 23 38w1d  2.89 kg (6 lb 5.9 oz) F CS-LTranv Spinal N ADWOA      Name: MOUSTAPHA,GIRL MARIEL      Apgar1: 9  Apgar5: 9   4 SAB 2019 4w0d          3 Term 04/27/10 39w0d  3.005 kg (6 lb 10 oz) F CS-LTranv EPI  ADWOA      Birth Comments: Elective C/S      Name: Ricarda Stevenson SAB            1 SAB              Past Medical History:   Diagnosis Date    Abnormal Pap smear     Colposcopy    Bradycardia 2020    Class 2 obesity due to excess calories with body mass index (BMI) of 35.0 to 35.9 in adult 3/9/2021    Hypertension, essential 3/16/2021    Menarche     Age of onset 11    Missed ab 2021    Mitral valve prolapse     Previous  delivery affecting pregnancy, antepartum 2020    S/P suction D&C 2021    Status post mitral valve annuloplasty 2017     Past Surgical History:   Procedure Laterality Date     SECTION       SECTION WITH TUBAL LIGATION Bilateral 2023    Procedure:  SECTION, WITH TUBAL LIGATION;  Surgeon: Mane Moreno MD;  Location: [facility] L&D;  Service: OB/GYN;  Laterality: Bilateral;    DILATION AND CURETTAGE OF UTERUS USING SUCTION N/A 2021    Procedure: DILATION AND CURETTAGE, UTERUS, USING SUCTION;  Surgeon: Mane Moreno MD;  Location: [facility] OR;  Service: OB/GYN;  Laterality: N/A;    MITRAL VALVE REPAIR         (Not in a hospital admission)      Review of patient's allergies indicates:   Allergen Reactions    Doxycycline      Abdomen pain severe        Family History       Problem Relation (Age of Onset)    Breast cancer Paternal Aunt    Cancer Mother    Diabetes Mother    Heart disease Mother    Hyperlipidemia Mother, Father    Hypertension  Mother, Father          Tobacco Use    Smoking status: Never    Smokeless tobacco: Never   Substance and Sexual Activity    Alcohol use: Not Currently     Comment: not since +UPT    Drug use: No    Sexual activity: Yes     Partners: Male     Birth control/protection: None     Review of Systems   Constitutional:  Negative for chills, diaphoresis, fatigue and fever.   HENT:  Negative for nasal congestion.    Respiratory:  Positive for shortness of breath. Negative for cough and wheezing.    Cardiovascular:  Negative for chest pain, palpitations and leg swelling.   Gastrointestinal:  Negative for abdominal pain, constipation, diarrhea, nausea and vomiting.   Genitourinary:  Negative for dysuria, pelvic pain, vaginal bleeding, vaginal discharge and vaginal pain.   Musculoskeletal:  Negative for arthralgias and leg pain.   Neurological:  Negative for seizures and headaches.    Objective:     Vital Signs (Most Recent):  Temp: 98.4 °F (36.9 °C) (05/03/23 0921)  Pulse: 92 (05/03/23 1428)  Resp: 18 (05/03/23 0921)  BP: 118/79 (05/03/23 1421)  SpO2: 99 % (05/03/23 1425) Vital Signs (24h Range):  Temp:  [98.4 °F (36.9 °C)] 98.4 °F (36.9 °C)  Pulse:  [75-93] 92  Resp:  [18] 18  SpO2:  [95 %-99 %] 99 %  BP: (116-140)/(72-94) 118/79     Weight: 88.9 kg (196 lb)  Body mass index is 37.03 kg/m².    Patient's last menstrual period was 08/01/2022 (exact date).    Physical Exam:   Constitutional: She is oriented to person, place, and time. She appears well-developed and well-nourished. No distress.    HENT:   Head: Atraumatic.      Cardiovascular:  Normal rate.             Pulmonary/Chest: Effort normal. No respiratory distress. She has no wheezes. She has no rales.   Decreased breath sounds of right lower lung field        Abdominal: Soft. She exhibits abdominal incision. There is no abdominal tenderness. There is no rebound and no guarding.   Incision clean, dry, intact             Musculoskeletal: Normal range of motion. No edema.        Neurological: She is alert and oriented to person, place, and time.    Skin: Skin is warm and dry. No erythema.    Psychiatric: She has a normal mood and affect. Her behavior is normal.     Laboratory:  Recent Lab Results         05/03/23  1439   05/03/23  1300   05/03/23  1031   05/03/23  0955        Color, UA             Bilirubin, POC             Spec Grav UA             pH, UA             Protein, POC             Urobilinogen, UA             Nitrite, UA             Albumin       2.5       Alkaline Phosphatase       85       ALT       17       Anion Gap       9       Aniso       Slight       Ascending aorta   2.73           Ao peak jacinta   1.30           Ao VTI   30.0           AST       20       AV valve area   2.75           AV mean gradient   4           AV index (prosthetic)   0.86           AV peak gradient   7           AV Velocity Ratio   0.94           Bands       2.0       Basophil %       0.0       BILIRUBIN TOTAL       0.7  Comment: For infants and newborns, interpretation of results should be based  on gestational age, weight and in agreement with clinical  observations.    Premature Infant recommended reference ranges:  Up to 24 hours.............<8.0 mg/dL  Up to 48 hours............<12.0 mg/dL  3-5 days..................<15.0 mg/dL  6-29 days.................<15.0 mg/dL         BNP       465  Comment: Values of less than 100 pg/ml are consistent with non-CHF populations.       BSA   1.96           BUN       6       Calcium       8.9       Chloride       107       CO2       25       Creatinine       0.6       Left Ventricle Relative Wall Thickness   0.32           Differential Method       Manual       eGFR       >60       EF   60           Eosinophil %       0.0       FS   41           Glucose       85       Glucose, UA             Gran %       73.0       Hematocrit       27.5       Hemoglobin       8.7       Immature Grans (Abs)       CANCELED  Comment: Mild elevation in immature granulocytes  is non specific and   can be seen in a variety of conditions including stress response,   acute inflammation, trauma and pregnancy. Correlation with other   laboratory and clinical findings is essential.    Result canceled by the ancillary.         Immature Granulocytes       CANCELED  Comment: Result canceled by the ancillary.       IVC diameter   1.65           IVSd   0.74           Ketones, UA             LA WIDTH   5.00           Left Atrium Major Axis   5.64           Left Atrium Minor Axis   6.94           LA size   5.08           LA volume   134.35              100.00           LA vol index   71.8           LA Volume Index (Mod)   53.5           LVOT area   3.2           LV EDV BP   86.92           LV Diastolic Volume Index   46.48           LVIDd   4.38           LVIDs   2.57           LV mass   95.55           LV Mass Index   51           Left Ventricular Outflow Tract Mean Gradient   2.78           Left Ventricular Outflow Tract Mean Velocity   0.76           LVOT diameter   2.02           LVOT peak jacinta   1.22           LVOT stroke volume   82.64           LVOT peak VTI   25.80           LV ESV BP   23.81           LV Systolic Volume Index   12.7           Lymph %       15.0       MCH       30.3       MCHC       31.6       MCV       96       Mean e'   0.07           Mono %       8.0       MPV       9.5       Mr max jacinta   5.16           MR PISA EROA   0.09           MV valve area by continuity eq   0.95           MV mean gradient   16           MV peak gradient   32           MV VTI   87.3           Myelocytes       2.0       nRBC       1       Radius   0.43           Platelet Estimate       Appears normal       Platelets       446       Poly       Occasional       Potassium       3.6       PROTEIN TOTAL       6.0       Pulmonary Valve Mean Velocity   0.70           PV PEAK VELOCITY   1.16           Posterior Wall   0.71           Right Atrial Pressure (from IVC)   3           RA Major Axis   4.78            "RA Width   4.80           RBC       2.87       Blood, UA             RDW       14.8       Sinus   2.40           Sodium       141       STJ   2.29           TAPSE   1.50           TDI SEPTAL   0.06           TDI LATERAL   0.07           Triscuspid Valve Regurgitation Peak Gradient   38           TR Max Anuj   3.10           Troponin I 0.014  Comment: The reference interval for Troponin I represents the 99th percentile   cutoff   for our facility and is consistent with 3rd generation assay   performance.               TV rest pulmonary artery pressure   41           Vn Nyquist   0.42           Vn Nyquist MS   0.42           WBC, UA     1         WBC       15.23             I have personallly reviewed all pertinent lab results from the last 24 hours.    Diagnostic Results:  X-Ray: Reviewed  FINDINGS:  Cardiac silhouette is unchanged noting mitral valvular prosthesis.  There is some central vascular congestion.  No consolidation.  No large effusion or pneumothorax.  No acute osseous process.     Impression:  Central vascular congestion.    5/3/2023: Echo:   The left ventricle is normal in size with normal systolic function.  The estimated ejection fraction is 60%.  The left ventricular global longitudinal strain is -15%.  Indeterminate left ventricular diastolic function.  Severe left atrial enlargement.  Normal right ventricular size with normal right ventricular systolic function.  Moderate right atrial enlargement.  The mitral valve is s/p repair.  Moderate mitral regurgitation.  There is severe mitral stenosis.  The mean diastolic gradient across the mitral valve is 16 mmHg at a heart rate of 77 bpm.  Moderate tricuspid regurgitation.  There is mild pulmonary hypertension.  The estimated PA systolic pressure is 41 mmHg.  Normal central venous pressure (3 mmHg).    Assessment/Plan:     Cardiac/Vascular  * Heart failure, diastolic, acute on chronic  -Due to mitral stenosis, see "s/p mitral valve repair" " problem    Nonrheumatic mitral valve stenosis  -See s/p mitral valve repair    Hypertension, essential  -BP: (116-140)/(72-94) 118/79 on admit  -Mild range BPs on admit   -CBC, CMP on admit WNL  -Denies Pre-E ROS  -Previous labetalol 100 BID held for lower BP postpartum and on admit  --Consider re-starting if 2 or more Bps >/= 150/100      S/P mitral valve repair  2003: [place]: Mitral valve repair with ring for MVP with severe MR.    On admit 5/3/23:  Presents SOB in HF. . CXR-Central vascular congestion   Echo: Normal left ventricular size and systolic function. EF 60%. Severely enlarged LA. S/p MV repair. Moderate MR. MS - PG 32 mmHg - MG 16 mmHg at HR 77 bpm. SPAP 41 mmHg.  Metoprolol 25mg BID at home (initiated post partum)    Plan:  -Cardiology consulted and following, appreciate assistance  -Admitted for diuresis    Suggest furosemide 20 mg IV Q12 with KCl 20 mEq Q12 for a few doses.   Will need furosemide 20 mg Q24 with KCl 20 mEq Q24 for maybe 5 days after  discharge  -AM BNP, BMP ordered  -Telemetry ordered                 Obstetric  S/P  section  -POD#9 s/p rLTCS + BTL   -Has met all post op milestones  -Incision clean, dry, intact  -PRN pain meds ordered  -Breast pump supplied        Maria C Villatoro MD  Obstetrics & Gynecology  [facility] - Emergency Dept (Obstetrics)      Patient seen and examined. 38 year old  admitted PPD #9 for acute SOB with history of MVP s/p repair. Patient had tachycardia postpartum and her Labetalol was switched to Metoprolol.    Patient reports shortness of breath improved this morning.  Echo completed.    Temp:  [98 °F (36.7 °C)-98.4 °F (36.9 °C)] 98.4 °F (36.9 °C)  Pulse:  [75-95] 85  Resp:  [16-18] 18  SpO2:  [95 %-99 %] 99 %  BP: (113-140)/(72-94) 121/73    On physical exam, no signs of pitting edema, however BNP elevated as well as CXR concerning for pulmonary edema.    Output: 1750 cc over 8 hours not including output in OB ED    Cardiology  consulted. We appreciate assistance. Plan for 3 doses of IV lasix 20 mg daily, last one this afternoon.    DC home on Lasix and KCL PO with plans for outpatient BMP and BNP on 5/8 with follow up with Cardiologist Dr. Cordero on 5/10.    Patient will call his office to inquire about earlier appointment as well.    Okay to dc home    Terrance Phillip MD  PGY 7  Maternal Fetal Medicine  [facility]

## 2023-05-03 NOTE — HOSPITAL COURSE
05/03/2023 HD#1. Admitted for IV lasix, PO KCL in setting of CC of SOB with history of mitral valve repair. , CXR with central vascular congestion, Echo with severe left atrial enlargement and moderate mitral valve regurgitation. Telemetry ordered.   05/04/2023 HD#2. NAEON. Pt reports improvement in symptoms, less SOB. S/p 2 doses of IV lasix and Kcl. UOP adequate. Cardiology following, appreciate assistance. Plan for discharge home today with PO lasix, Kcl, and follow up with cardiology outpatient.     Pt is now s/p 3 doses of IV lasix and PO Kcl. Stable for discharge home.  Discharged home on 20mg laxis PO and Kcl daily.   BNP and BMP scheduled for 5/8. Will follow up with Dr. Cordero 5/10/23. Follow up with primary Ob, Dr. Moreno.   Return precautions given. All questions answered.

## 2023-05-03 NOTE — ED PROVIDER NOTES
Encounter Date: 5/3/2023       History     Chief Complaint   Patient presents with    Shortness of Breath     HPI  King Botello is a 38 y.o. [id number] now POD#9 s/p rLTCS with BTL, presents complaining of shortness of breath that began yesterday morning, resolved on its own throughout the day, and then re-occurred this morning. Patient reports awakening this AM with SOB. She has not been able to lie flat to sleep since delivery, although she reports this is mainly due to her post op pain.   Compliant with her metoprolol BID per Cardiology (was discharged home on metoprolol 25 BID and her labetalol for her cHTN was discontinued)     Her delivery was complicated by cHTN (labetalol 100mg held postpartum due to lower BP, eventually discontinued), H/o mitral valve prolapse (repaired), and AMA. Her H/H remained stable.    She was seen by cardiology postpartum who recommended discontinuation of labetalol 100 BID and initiation of metoprolol XL 25mg.     Has a follow up appt with Cardiology, Dr. Cordero on 5/10.       Review of patient's allergies indicates:   Allergen Reactions    Doxycycline      Abdomen pain severe     Past Medical History:   Diagnosis Date    Abnormal Pap smear     Colposcopy    Bradycardia 2020    Class 2 obesity due to excess calories with body mass index (BMI) of 35.0 to 35.9 in adult 3/9/2021    Hypertension, essential 3/16/2021    Menarche     Age of onset 11    Missed ab 2021    Mitral valve prolapse     Previous  delivery affecting pregnancy, antepartum 2020    S/P suction D&C 2021    Status post mitral valve annuloplasty 2017     Past Surgical History:   Procedure Laterality Date     SECTION       SECTION WITH TUBAL LIGATION Bilateral 2023    Procedure:  SECTION, WITH TUBAL LIGATION;  Surgeon: Mane Moreno MD;  Location: [facility];  Service: OB/GYN;  Laterality: Bilateral;    DILATION AND CURETTAGE OF UTERUS USING SUCTION N/A  7/1/2021    Procedure: DILATION AND CURETTAGE, UTERUS, USING SUCTION;  Surgeon: Mane Moreno MD;  Location: [facility];  Service: OB/GYN;  Laterality: N/A;    MITRAL VALVE REPAIR       Family History   Problem Relation Age of Onset    Heart disease Mother     Hypertension Mother     Hyperlipidemia Mother     Diabetes Mother     Cancer Mother     Hyperlipidemia Father     Hypertension Father     Breast cancer Paternal Aunt     Colon cancer Neg Hx     Ovarian cancer Neg Hx      Social History     Tobacco Use    Smoking status: Never    Smokeless tobacco: Never   Substance Use Topics    Alcohol use: Not Currently     Comment: not since +UPT    Drug use: No     Review of Systems   Constitutional:  Negative for chills, diaphoresis, fatigue and fever.   HENT:  Negative for sinus pressure and sinus pain.    Respiratory:  Positive for shortness of breath. Negative for cough, chest tightness and wheezing.    Cardiovascular:  Negative for chest pain, palpitations and leg swelling.   Gastrointestinal:  Positive for abdominal pain. Negative for constipation, diarrhea, nausea and vomiting.        Post op soreness   Genitourinary:  Negative for dysuria, hematuria, vaginal bleeding and vaginal discharge.   Musculoskeletal:  Negative for gait problem, joint swelling and neck pain.   Skin:  Negative for rash.   Neurological:  Negative for dizziness, syncope, speech difficulty, weakness and headaches.     Physical Exam     Initial Vitals   BP Pulse Resp Temp SpO2   05/03/23 0929 05/03/23 0920 05/03/23 0921 05/03/23 0921 05/03/23 0921   131/79 82 18 98.4 °F (36.9 °C) 97 %      MAP       --                Physical Exam    Nursing note and vitals reviewed.  Constitutional: She appears well-developed and well-nourished. No distress.   HENT:   Head: Atraumatic.   Nose: Nose normal.   Eyes: Conjunctivae are normal.   Neck:   Normal range of motion.  Cardiovascular:  Normal rate.           Pulmonary/Chest: No respiratory distress. She  has no wheezes. She has no rhonchi. She has no rales. She exhibits no tenderness.   Diminished breath sounds of right lower lung field   Abdominal: Abdomen is soft. There is no abdominal tenderness. There is no rebound and no guarding.   Musculoskeletal:         General: No edema. Normal range of motion.      Cervical back: Normal range of motion.     Neurological: She is alert and oriented to person, place, and time.   Skin: Skin is warm and dry.   Psychiatric: She has a normal mood and affect. Her behavior is normal. Judgment and thought content normal.       ED Course   Procedures  Labs Reviewed   CBC W/ AUTO DIFFERENTIAL - Abnormal; Notable for the following components:       Result Value    WBC 15.23 (*)     RBC 2.87 (*)     Hemoglobin 8.7 (*)     Hematocrit 27.5 (*)     MCHC 31.6 (*)     RDW 14.8 (*)     nRBC 1 (*)     Lymph % 15.0 (*)     All other components within normal limits   COMPREHENSIVE METABOLIC PANEL - Abnormal; Notable for the following components:    Albumin 2.5 (*)     All other components within normal limits   B-TYPE NATRIURETIC PEPTIDE - Abnormal; Notable for the following components:     (*)     All other components within normal limits   TROPONIN I   POCT URINALYSIS, DIPSTICK OR TABLET REAGENT, AUTOMATED, WITH MICROSCOP          Imaging Results              X-Ray Chest AP Portable (Final result)  Result time 05/03/23 10:28:35      Final result by Thierno Hay MD (05/03/23 10:28:35)                   Impression:      Central vascular congestion.      Electronically signed by: Thierno Hay MD  Date:    05/03/2023  Time:    10:28               Narrative:    EXAMINATION:  XR CHEST AP PORTABLE    CLINICAL HISTORY:  Shortness of breath    TECHNIQUE:  Single frontal view of the chest was performed.    COMPARISON:  11/29/2018    FINDINGS:  Cardiac silhouette is unchanged noting mitral valvular prosthesis.  There is some central vascular congestion.  No consolidation.  No large  effusion or pneumothorax.  No acute osseous process.                                       Medications   furosemide injection 20 mg (has no administration in time range)   potassium chloride 10 mEq in 100 mL IVPB (has no administration in time range)   metoprolol succinate (TOPROL-XL) 24 hr tablet 25 mg (25 mg Oral Given 5/3/23 1050)     Medical Decision Making:   ED Management:  VSS and WNL  Physical exam reassuring   Pre-E labs (CBC and CMP) WNL  CXR Central vascular congestion    Echo with severe mitral stenosis and LA enlargement   Cardiology consulted, recommended admission for diuresis   IV lasix and PO Kcl ordered  Full H&P to follow      Other:   I discussed test(s) with the performing physician.  I have discussed this case with another health care provider.          Attending Attestation:   Physician Attestation Statement for Resident:  As the supervising MD   Physician Attestation Statement: I have personally seen and examined this patient.   I agree with the above history.  -:   As the supervising MD I agree with the above PE.     As the supervising MD I agree with the above treatment, course, plan, and disposition.   I was personally present during the critical portions of the procedure(s) performed by the resident and was immediately available in the ED to provide services and assistance as needed during the entire procedure.  I have reviewed and agree with the residents interpretation of the following: EKG and lab data.  I have reviewed the following: old records at this facility and old EKGs.            Attending ED Notes:   I have personally seen and examined the patient. I agree with the resident's note . Questions welcomed and answered to patient satisfaction.      @ @GA@ presenting for shortness of breath without dyspnea or palpitations, diagnosed with mitral valve stenosis and diastolic heart failure  See workup above  Admit to M service, cardiology following, appreciate  recommendations.       Sangita Eng MD  5/3/2023 9:13 PM                     Clinical Impression:   Final diagnoses:  [R06.02] SOB (shortness of breath)  [I05.0] Mitral valve stenosis, unspecified etiology (Primary)  [I50.31] Acute diastolic heart failure  [Z39.2] Postpartum state               Meche Huffman MD  Resident  05/03/23 [id number]       Sangita Eng MD  05/03/23 [id number]

## 2023-05-03 NOTE — CONSULTS
[facility] Emergency Dept (Obstetrics)  Cardiology  Consult Note    Patient Name: King Botello  MRN: [id number]  Admission Date: 5/3/2023  Hospital Length of Stay: 0 days  Code Status: Prior   Attending Provider: Sangita Eng*   Consulting Provider: Tamela Evans MD  Primary Care Physician: Mayco Dodson MD  Principal Problem:Heart failure, diastolic, acute on chronic    Patient information was obtained from patient, past medical records, ER records, and primary team.     Inpatient consult to Cardiology  Consult performed by: Tamela Evans MD  Consult ordered by: Maria C Villatoro MD  Reason for consult: Mitral stenosis and HF.      Subjective:     Chief Complaint:  Shortness of breath.      HPI:     King Botello is a 38 y.o.female with myxomatous mitral valve disease. She underwent mitral valve repair at age 17 due to severe mitral regurgitation in [place] in . In  she had an uncomplicated pregnancy. She had a C section. She the last few years she says she becomes short of breath after climbing 1-2 flight of stairs. She has had regular cardiology follow up and echocardiogram have revealed mitral stenosis. She delivered her second child on 2023. Following the deliver the labetalol was discontinued and she began metoprolol. She received furosemide iv. She was released on 2023.    The last few days she has become short of breath. She presents in mild heart failure on 5/3/2023. She is being admitted.    Past Medical History:   Diagnosis Date    Abnormal Pap smear     Colposcopy    Bradycardia 2020    Class 2 obesity due to excess calories with body mass index (BMI) of 35.0 to 35.9 in adult 3/9/2021    Hypertension, essential 3/16/2021    Menarche     Age of onset 11    Missed ab 2021    Mitral valve prolapse     Previous  delivery affecting pregnancy, antepartum 2020    S/P suction D&C 2021    Status post mitral valve annuloplasty 2017        Past Surgical History:   Procedure Laterality Date     SECTION       SECTION WITH TUBAL LIGATION Bilateral 2023    Procedure:  SECTION, WITH TUBAL LIGATION;  Surgeon: Mane Moreno MD;  Location: [facility] L&D;  Service: OB/GYN;  Laterality: Bilateral;    DILATION AND CURETTAGE OF UTERUS USING SUCTION N/A 2021    Procedure: DILATION AND CURETTAGE, UTERUS, USING SUCTION;  Surgeon: Mane Moreno MD;  Location: [facility] OR;  Service: OB/GYN;  Laterality: N/A;    MITRAL VALVE REPAIR         Review of patient's allergies indicates:   Allergen Reactions    Doxycycline      Abdomen pain severe       No current facility-administered medications on file prior to encounter.     Current Outpatient Medications on File Prior to Encounter   Medication Sig    docusate sodium (COLACE) 100 MG capsule Take 1 capsule (100 mg total) by mouth 2 (two) times daily.    HYDROcodone-acetaminophen (NORCO) 5-325 mg per tablet Take 1 tablet by mouth every 6 (six) hours as needed for Pain.    ibuprofen (ADVIL,MOTRIN) 600 MG tablet Take 1 tablet (600 mg total) by mouth every 6 (six) hours as needed for Pain.    metoprolol succinate (TOPROL-XL) 25 MG 24 hr tablet Take 1 tablet (25 mg total) by mouth 2 (two) times daily.     Family History       Problem Relation (Age of Onset)    Breast cancer Paternal Aunt    Cancer Mother    Diabetes Mother    Heart disease Mother    Hyperlipidemia Mother, Father    Hypertension Mother, Father          Tobacco Use    Smoking status: Never    Smokeless tobacco: Never   Substance and Sexual Activity    Alcohol use: Not Currently     Comment: not since +UPT    Drug use: No    Sexual activity: Yes     Partners: Male     Birth control/protection: None     Review of Systems   Constitutional: Negative for chills, fever and malaise/fatigue.   HENT:  Negative for nosebleeds and tinnitus.    Eyes:  Negative for double vision, vision loss in left eye and vision loss in right eye.    Cardiovascular:  Positive for dyspnea on exertion. Negative for chest pain, claudication, irregular heartbeat, leg swelling, near-syncope, orthopnea, palpitations, paroxysmal nocturnal dyspnea and syncope.   Respiratory:  Positive for shortness of breath. Negative for cough, hemoptysis and wheezing.    Endocrine: Negative for cold intolerance and heat intolerance.   Hematologic/Lymphatic: Negative for bleeding problem. Does not bruise/bleed easily.   Skin:  Negative for color change and rash.   Musculoskeletal:  Negative for back pain, falls, muscle weakness and myalgias.   Gastrointestinal:  Negative for abdominal pain, diarrhea, dysphagia, heartburn, hematemesis, hematochezia, hemorrhoids, jaundice, melena, nausea and vomiting.   Genitourinary:  Negative for dysuria and hematuria.   Neurological:  Negative for dizziness, focal weakness, headaches, light-headedness, loss of balance, numbness, tremors, vertigo and weakness.   Psychiatric/Behavioral:  Negative for altered mental status, depression and memory loss. The patient is not nervous/anxious.    Allergic/Immunologic: Negative for hives and persistent infections.   Objective:     Vital Signs (Most Recent):  Temp: 98.4 °F (36.9 °C) (05/03/23 0921)  Pulse: 92 (05/03/23 1428)  Resp: 18 (05/03/23 0921)  BP: 118/79 (05/03/23 1421)  SpO2: 99 % (05/03/23 1425) Vital Signs (24h Range):  Temp:  [98.4 °F (36.9 °C)] 98.4 °F (36.9 °C)  Pulse:  [75-93] 92  Resp:  [18] 18  SpO2:  [95 %-99 %] 99 %  BP: (116-140)/(72-94) 118/79     Weight: 88.9 kg (196 lb)  Body mass index is 37.03 kg/m².    SpO2: 99 %       No intake or output data in the 24 hours ending 05/03/23 1504    Lines/Drains/Airways       Peripheral Intravenous Line  Duration                  Peripheral IV - Single Lumen 05/03/23 1430 20 G Left;Posterior Hand <1 day                    Physical Exam  Constitutional:       General: She is not in acute distress.     Appearance: Normal appearance. She is  well-developed. She is not toxic-appearing or diaphoretic.   HENT:      Head: Normocephalic and atraumatic.      Nose: Nose normal.   Eyes:      General:         Right eye: No discharge.         Left eye: No discharge.      Conjunctiva/sclera:      Right eye: Right conjunctiva is not injected.      Left eye: Left conjunctiva is not injected.      Pupils: Pupils are equal.      Right eye: Pupil is round.      Left eye: Pupil is round.   Neck:      Thyroid: No thyromegaly.      Vascular: No carotid bruit or JVD.   Cardiovascular:      Rate and Rhythm: Normal rate and regular rhythm. No extrasystoles are present.     Chest Wall: PMI is not displaced.      Pulses:           Radial pulses are 2+ on the right side and 2+ on the left side.        Femoral pulses are 2+ on the right side and 2+ on the left side.       Dorsalis pedis pulses are 2+ on the right side and 2+ on the left side.        Posterior tibial pulses are 2+ on the right side and 2+ on the left side.      Heart sounds: S1 normal and S2 normal. Murmur heard.   High-pitched blowing holosystolic murmur is present with a grade of 1/6 at the apex.   Low-pitched rumbling plateau holodiastolic murmur is present with a grade of 2/4 at the apex.     No gallop.   Pulmonary:      Effort: Pulmonary effort is normal. Respiratory distress: .okla.      Breath sounds: Examination of the right-lower field reveals rales. Examination of the left-lower field reveals rales. Rales present.   Abdominal:      Palpations: Abdomen is soft.      Tenderness: There is no abdominal tenderness.   Musculoskeletal:      Cervical back: Neck supple.      Right lower leg: Normal. No swelling. No edema.      Left lower leg: Normal. No swelling. No edema.   Lymphadenopathy:      Head:      Right side of head: No submandibular adenopathy.      Left side of head: No submandibular adenopathy.      Cervical: No cervical adenopathy.   Skin:     General: Skin is warm and dry.      Findings: No rash.       Nails: There is no clubbing.   Neurological:      General: No focal deficit present.      Mental Status: She is alert and oriented to person, place, and time. She is not disoriented.      Cranial Nerves: No cranial nerve deficit.   Psychiatric:         Attention and Perception: Attention normal.         Mood and Affect: Mood and affect normal.         Speech: Speech normal.         Behavior: Behavior normal.         Thought Content: Thought content normal.         Cognition and Memory: Cognition and memory normal.         Judgment: Judgment normal.     Current Medications:     potassium chloride  10 mEq Intravenous Q1H     Current Laboratory Results:    Recent Results (from the past 24 hour(s))   CBC auto differential    Collection Time: 05/03/23  9:55 AM   Result Value Ref Range    WBC 15.23 (H) 3.90 - 12.70 K/uL    RBC 2.87 (L) 4.00 - 5.40 M/uL    Hemoglobin 8.7 (L) 12.0 - 16.0 g/dL    Hematocrit 27.5 (L) 37.0 - 48.5 %    MCV 96 82 - 98 fL    MCH 30.3 27.0 - 31.0 pg    MCHC 31.6 (L) 32.0 - 36.0 g/dL    RDW 14.8 (H) 11.5 - 14.5 %    Platelets 446 150 - 450 K/uL    MPV 9.5 9.2 - 12.9 fL    Immature Granulocytes CANCELED 0.0 - 0.5 %    Immature Grans (Abs) CANCELED 0.00 - 0.04 K/uL    nRBC 1 (A) 0 /100 WBC    Gran % 73.0 38.0 - 73.0 %    Lymph % 15.0 (L) 18.0 - 48.0 %    Mono % 8.0 4.0 - 15.0 %    Eosinophil % 0.0 0.0 - 8.0 %    Basophil % 0.0 0.0 - 1.9 %    Bands 2.0 %    Myelocytes 2.0 %    Platelet Estimate Appears normal     Aniso Slight     Poly Occasional     Differential Method Manual    Comprehensive metabolic panel    Collection Time: 05/03/23  9:55 AM   Result Value Ref Range    Sodium 141 136 - 145 mmol/L    Potassium 3.6 3.5 - 5.1 mmol/L    Chloride 107 95 - 110 mmol/L    CO2 25 23 - 29 mmol/L    Glucose 85 70 - 110 mg/dL    BUN 6 6 - 20 mg/dL    Creatinine 0.6 0.5 - 1.4 mg/dL    Calcium 8.9 8.7 - 10.5 mg/dL    Total Protein 6.0 6.0 - 8.4 g/dL    Albumin 2.5 (L) 3.5 - 5.2 g/dL    Total Bilirubin 0.7  0.1 - 1.0 mg/dL    Alkaline Phosphatase 85 55 - 135 U/L    AST 20 10 - 40 U/L    ALT 17 10 - 44 U/L    Anion Gap 9 8 - 16 mmol/L    eGFR >60 >60 mL/min/1.73 m^2   Brain natriuretic peptide    Collection Time: 05/03/23  9:55 AM   Result Value Ref Range     (H) 0 - 99 pg/mL   POCT urinalysis, dipstick or tablet reag    Collection Time: 05/03/23 10:31 AM   Result Value Ref Range    Color, UA      Spec Grav UA      pH, UA      WBC, UA 1     Nitrite, UA      Protein, POC      Glucose, UA      Ketones, UA      Urobilinogen, UA      Bilirubin, POC      Blood, UA     Echo    Collection Time: 05/03/23  1:00 PM   Result Value Ref Range    TDI SEPTAL 0.06 m/s    LA WIDTH 5.00 cm    IVC diameter 1.65 cm    Left Ventricular Outflow Tract Mean Velocity 0.76 cm/s    Left Ventricular Outflow Tract Mean Gradient 2.78 mmHg    Pulmonary Valve Mean Velocity 0.70 m/s    TDI LATERAL 0.07 m/s    PV PEAK VELOCITY 1.16 cm/s    LVIDd 4.38 3.5 - 6.0 cm    IVS 0.74 0.6 - 1.1 cm    Posterior Wall 0.71 0.6 - 1.1 cm    LVIDs 2.57 2.1 - 4.0 cm    FS 41 28 - 44 %    LA volume 134.35 cm3    STJ 2.29 cm    Ascending aorta 2.73 cm    LV mass 95.55 g    LA size 5.08 cm    TAPSE 1.50 cm    Left Ventricle Relative Wall Thickness 0.32 cm    AV mean gradient 4 mmHg    AV valve area 2.75 cm2    AV Velocity Ratio 0.94     AV index (prosthetic) 0.86     MV mean gradient 16 mmHg    MV valve area by continuity eq 0.95 cm2    Mean e' 0.07 m/s    LVOT diameter 2.02 cm    LVOT area 3.2 cm2    LVOT peak anuj 1.22 m/s    LVOT peak VTI 25.80 cm    Ao peak anuj 1.30 m/s    Ao VTI 30.0 cm    Vn Nyquist 0.42 m/s    Radius 0.43 cm    Mr max anuj 5.16 m/s    LVOT stroke volume 82.64 cm3    AV peak gradient 7 mmHg    MV peak gradient 32 mmHg    Vn Nyquist MS 0.42 m/s    MR PISA EROA 0.09 cm2    TR Max Anuj 3.10 m/s    MV VTI 87.3 cm    LV Systolic Volume 23.81 mL    LV Diastolic Volume 86.92 mL    RA Major Axis 4.78 cm    Left Atrium Minor Axis 6.94 cm    Left Atrium  Major Axis 5.64 cm    Triscuspid Valve Regurgitation Peak Gradient 38 mmHg    LA volume (mod) 100.00 cm3    BSA 1.96 m2    Sinus 2.40 cm    LV Systolic Volume Index 12.7 mL/m2    LV Diastolic Volume Index 46.48 mL/m2    LA Volume Index 71.8 mL/m2    LV Mass Index 51 g/m2    LA Volume Index (Mod) 53.5 mL/m2    RA Width 4.80 cm    Right Atrial Pressure (from IVC) 3 mmHg    EF 60 %    TV rest pulmonary artery pressure 41 mmHg   Troponin I    Collection Time: 05/03/23  2:39 PM   Result Value Ref Range    Troponin I 0.014 0.000 - 0.026 ng/mL     Current Imaging Results:    X-Ray Chest AP Portable   Final Result      Central vascular congestion.         Electronically signed by: Thierno Hay MD   Date:    05/03/2023   Time:    10:28          2/3/2023: Echo:    The left ventricle is normal in size with normal systolic function. The estimated ejection fraction is 60%.  Normal right ventricular size with normal right ventricular systolic function.  Indeterminate left ventricular diastolic function.  Severe left atrial enlargement.  The mitral valve is s/p repair with a mean diastolic gradient of at least 12mm Hg at a heart rate of 104 bpm. Peak gradient is near 20mm Hg.  Mild tricuspid regurgitation.  The estimated PA systolic pressure is 31 mmHg. This may be an underestimation given the mitral valve gradients.  Normal central venous pressure (3 mmHg).  On the prior exam, with a heart rate of 76 bpm, the mean gradient across the mitral valve was actually near 10mm Hg. Valve area estimated near 1.8 cm2 by PHT.      5/3/2023: Echo:     The left ventricle is normal in size with normal systolic function.  The estimated ejection fraction is 60%.  The left ventricular global longitudinal strain is -15%.  Indeterminate left ventricular diastolic function.  Severe left atrial enlargement.  Normal right ventricular size with normal right ventricular systolic function.  Moderate right atrial enlargement.  The mitral valve is s/p  repair.  Moderate mitral regurgitation.  There is severe mitral stenosis.  The mean diastolic gradient across the mitral valve is 16 mmHg at a heart rate of 77 bpm.  Moderate tricuspid regurgitation.  There is mild pulmonary hypertension.  The estimated PA systolic pressure is 41 mmHg.  Normal central venous pressure (3 mmHg).    Assessment and Plan:     Active Diagnoses:    Diagnosis Date Noted POA    PRINCIPAL PROBLEM:  Heart failure, diastolic, acute on chronic [I50.33] 2023 Yes    S/P  section [Z98.891] 2023 Not Applicable    Nonrheumatic mitral valve stenosis [I34.2] 2023 Yes    Hypertension, essential [I10] 2021 Yes    S/P mitral valve repair [Z98.890] 2017 Not Applicable      Problems Resolved During this Admission:     Assessment and Plan:    Mitral Valve Disease   2003: [facility] [place]: Mitral valve repair with ring for MVP with severe MR.  5/3/2023: Echo:Normal left ventricular size and systolic function. EF 60%. Severely enlarged LA. S/p MV repair. Moderate MR. MS - PG 32 mmHg - MG 16 mmHg at HR 77 bpm. SPAP 41 mmHg.  5/3/2023: Presents SOB in HF. . CXR HF.  At home on metoprolol 25 mg Q24.  Needs diuresis.  Suggest furosemide 20 mg iv Q12 with KCl 20 mEq Q12 for a few doses.  Probably will need furosemide 20 mg Q24 with KCl 20 mEq Q24 for maybe 5 days after discharge.    2. Heart Failure, Diastolic, Acute on Chronic   Due to MS.   As above.       VTE Risk Mitigation (From admission, onward)      None            Thank you for your consult.     I will follow-up with patient. Please contact us if you have any additional questions.    Tamela Evans MD  Cardiology   [facility] - Emergency Dept (Obstetrics)      Copy:    Dr. Eugene Cordero.

## 2023-05-04 ENCOUNTER — PATIENT MESSAGE (OUTPATIENT)
Dept: CARDIOLOGY | Facility: CLINIC | Age: 38
End: 2023-05-04
Payer: COMMERCIAL

## 2023-05-04 VITALS
DIASTOLIC BLOOD PRESSURE: 82 MMHG | HEART RATE: 82 BPM | RESPIRATION RATE: 16 BRPM | SYSTOLIC BLOOD PRESSURE: 124 MMHG | WEIGHT: 192.88 LBS | BODY MASS INDEX: 36.42 KG/M2 | OXYGEN SATURATION: 98 % | HEIGHT: 61 IN | TEMPERATURE: 99 F

## 2023-05-04 LAB
ANION GAP SERPL CALC-SCNC: 10 MMOL/L (ref 8–16)
BNP SERPL-MCNC: 424 PG/ML (ref 0–99)
BUN SERPL-MCNC: 6 MG/DL (ref 6–20)
CALCIUM SERPL-MCNC: 9.2 MG/DL (ref 8.7–10.5)
CHLORIDE SERPL-SCNC: 105 MMOL/L (ref 95–110)
CO2 SERPL-SCNC: 28 MMOL/L (ref 23–29)
CREAT SERPL-MCNC: 0.6 MG/DL (ref 0.5–1.4)
EST. GFR  (NO RACE VARIABLE): >60 ML/MIN/1.73 M^2
GLUCOSE SERPL-MCNC: 103 MG/DL (ref 70–110)
POTASSIUM SERPL-SCNC: 3.6 MMOL/L (ref 3.5–5.1)
SODIUM SERPL-SCNC: 143 MMOL/L (ref 136–145)

## 2023-05-04 PROCEDURE — 63600175 PHARM REV CODE 636 W HCPCS

## 2023-05-04 PROCEDURE — 36415 COLL VENOUS BLD VENIPUNCTURE: CPT

## 2023-05-04 PROCEDURE — 99233 PR SUBSEQUENT HOSPITAL CARE,LEVL III: ICD-10-PCS | Mod: ,,, | Performed by: INTERNAL MEDICINE

## 2023-05-04 PROCEDURE — 25000003 PHARM REV CODE 250

## 2023-05-04 PROCEDURE — G0378 HOSPITAL OBSERVATION PER HR: HCPCS

## 2023-05-04 PROCEDURE — 99238 PR HOSPITAL DISCHARGE DAY,<30 MIN: ICD-10-PCS | Mod: 24,,, | Performed by: OBSTETRICS & GYNECOLOGY

## 2023-05-04 PROCEDURE — 83880 ASSAY OF NATRIURETIC PEPTIDE: CPT

## 2023-05-04 PROCEDURE — 80048 BASIC METABOLIC PNL TOTAL CA: CPT

## 2023-05-04 PROCEDURE — 99233 SBSQ HOSP IP/OBS HIGH 50: CPT | Mod: ,,, | Performed by: INTERNAL MEDICINE

## 2023-05-04 PROCEDURE — A4217 STERILE WATER/SALINE, 500 ML: HCPCS

## 2023-05-04 PROCEDURE — 99238 HOSP IP/OBS DSCHRG MGMT 30/<: CPT | Mod: 24,,, | Performed by: OBSTETRICS & GYNECOLOGY

## 2023-05-04 RX ORDER — POTASSIUM CHLORIDE 20 MEQ/15ML
20 SOLUTION ORAL DAILY
Qty: 600 ML | Refills: 0 | Status: SHIPPED | OUTPATIENT
Start: 2023-05-04 | End: 2023-06-13

## 2023-05-04 RX ORDER — FUROSEMIDE 20 MG/1
20 TABLET ORAL DAILY
Qty: 30 TABLET | Refills: 1 | Status: SHIPPED | OUTPATIENT
Start: 2023-05-04 | End: 2023-06-15 | Stop reason: SDUPTHER

## 2023-05-04 RX ADMIN — IBUPROFEN 600 MG: 600 TABLET, FILM COATED ORAL at 08:05

## 2023-05-04 RX ADMIN — METOPROLOL SUCCINATE 25 MG: 25 TABLET, EXTENDED RELEASE ORAL at 09:05

## 2023-05-04 RX ADMIN — POTASSIUM CHLORIDE 20 MEQ: 2 INJECTION, SOLUTION, CONCENTRATE INTRAVENOUS at 09:05

## 2023-05-04 RX ADMIN — FUROSEMIDE 20 MG: 10 INJECTION, SOLUTION INTRAMUSCULAR; INTRAVENOUS at 03:05

## 2023-05-04 NOTE — SUBJECTIVE & OBJECTIVE
Interval History  Reports improvement in symptoms overall. Less SOB, less BLANK.  Denies fever, chills, N/V/D, dysuria, hematuria. Denies cough, congestion. Denies Pre-E ROS. No HA, chest pain, vision changes.   Reports post op soreness but denies pain.   No abnormal vaginal discharge or bleeding.    OB History    Para Term  AB Living   5 2 2 0 3 2   SAB IAB Ectopic Multiple Live Births   3 0 0 0 2      # Outcome Date GA Lbr Syed/2nd Weight Sex Delivery Anes PTL Lv   5 Term 23 38w1d  2.89 kg (6 lb 5.9 oz) F CS-LTranv Spinal N ADWOA      Name: MOUSTAPHA,GIRL MARIEL      Apgar1: 9  Apgar5: 9   4 SAB 2019 4w0d          3 Term 04/27/10 39w0d  3.005 kg (6 lb 10 oz) F CS-LTranv EPI  ADWOA      Birth Comments: Elective C/S      Name: Ricarda   2 SAB            1 SAB              Past Medical History:   Diagnosis Date    Abnormal Pap smear     Colposcopy    Bradycardia 2020    Class 2 obesity due to excess calories with body mass index (BMI) of 35.0 to 35.9 in adult 3/9/2021    Hypertension, essential 3/16/2021    Menarche     Age of onset 11    Missed ab 2021    Mitral valve prolapse     Previous  delivery affecting pregnancy, antepartum 2020    S/P suction D&C 2021    Status post mitral valve annuloplasty 2017     Past Surgical History:   Procedure Laterality Date     SECTION       SECTION WITH TUBAL LIGATION Bilateral 2023    Procedure:  SECTION, WITH TUBAL LIGATION;  Surgeon: Mane Moreno MD;  Location: [facility] L&D;  Service: OB/GYN;  Laterality: Bilateral;    DILATION AND CURETTAGE OF UTERUS USING SUCTION N/A 2021    Procedure: DILATION AND CURETTAGE, UTERUS, USING SUCTION;  Surgeon: Mane Moreno MD;  Location: [facility] OR;  Service: OB/GYN;  Laterality: N/A;    MITRAL VALVE REPAIR         PTA Medications   Medication Sig    docusate sodium (COLACE) 100 MG capsule Take 1 capsule (100 mg total) by mouth 2 (two) times daily.     HYDROcodone-acetaminophen (NORCO) 5-325 mg per tablet Take 1 tablet by mouth every 6 (six) hours as needed for Pain.    ibuprofen (ADVIL,MOTRIN) 600 MG tablet Take 1 tablet (600 mg total) by mouth every 6 (six) hours as needed for Pain.    metoprolol succinate (TOPROL-XL) 25 MG 24 hr tablet Take 1 tablet (25 mg total) by mouth 2 (two) times daily.         Review of patient's allergies indicates:   Allergen Reactions    Doxycycline      Abdomen pain severe        Family History       Problem Relation (Age of Onset)    Breast cancer Paternal Aunt    Cancer Mother    Diabetes Mother    Heart disease Mother    Hyperlipidemia Mother, Father    Hypertension Mother, Father          Tobacco Use    Smoking status: Never    Smokeless tobacco: Never   Substance and Sexual Activity    Alcohol use: Not Currently     Comment: not since +UPT    Drug use: No    Sexual activity: Yes     Partners: Male     Birth control/protection: None     Review of Systems   Constitutional:  Negative for chills, diaphoresis, fatigue and fever.   HENT:  Negative for nasal congestion.    Respiratory:  Positive for shortness of breath. Negative for cough and wheezing.    Cardiovascular:  Negative for chest pain, palpitations and leg swelling.   Gastrointestinal:  Negative for abdominal pain, constipation, diarrhea, nausea and vomiting.   Genitourinary:  Negative for dysuria, pelvic pain, vaginal bleeding, vaginal discharge and vaginal pain.   Musculoskeletal:  Negative for arthralgias and leg pain.   Neurological:  Negative for seizures and headaches.    Objective:     Vital Signs (Most Recent):  Temp: 98.4 °F (36.9 °C) (05/04/23 0800)  Pulse: 85 (05/04/23 0800)  Resp: 18 (05/04/23 0800)  BP: 121/73 (05/04/23 0918)  SpO2: 99 % (05/04/23 0800) Vital Signs (24h Range):  Temp:  [98 °F (36.7 °C)-98.4 °F (36.9 °C)] 98.4 °F (36.9 °C)  Pulse:  [75-95] 85  Resp:  [16-18] 18  SpO2:  [95 %-99 %] 99 %  BP: (113-140)/(72-94) 121/73     Weight: 88.9 kg (196  lb)  Body mass index is 37.03 kg/m².    Patient's last menstrual period was 08/01/2022 (exact date).    Physical Exam:   Constitutional: She is oriented to person, place, and time. She appears well-developed and well-nourished. No distress.    HENT:   Head: Atraumatic.      Cardiovascular:  Normal rate.             Pulmonary/Chest: Effort normal. No respiratory distress. She has no wheezes. She has no rales.   Decreased breath sounds of right lower lung field        Abdominal: Soft. She exhibits abdominal incision. There is no abdominal tenderness. There is no rebound and no guarding.   Incision clean, dry, intact             Musculoskeletal: Normal range of motion. No edema.       Neurological: She is alert and oriented to person, place, and time.    Skin: Skin is warm and dry. No erythema.    Psychiatric: She has a normal mood and affect. Her behavior is normal.     Laboratory:  Recent Lab Results         05/04/23  0507   05/03/23  1439   05/03/23  1300   05/03/23  1031        Color, UA             Bilirubin, POC             Spec Grav UA             pH, UA             Protein, POC             Urobilinogen, UA             Nitrite, UA             Anion Gap 10             Ascending aorta     2.73         Ao peak jacinta     1.30         Ao VTI     30.0         AV valve area     2.75         AV mean gradient     4         AV index (prosthetic)     0.86         AV peak gradient     7         AV Velocity Ratio     0.94           Comment: Values of less than 100 pg/ml are consistent with non-CHF populations.             BSA     1.96         BUN 6             Calcium 9.2             Chloride 105             CO2 28             Creatinine 0.6             Left Ventricle Relative Wall Thickness     0.32         eGFR >60             EF     60         FS     41         Glucose 103             Glucose, UA             IVC diameter     1.65         IVSd     0.74         Ketones, UA             LA WIDTH     5.00         Left  Atrium Major Axis     5.64         Left Atrium Minor Axis     6.94         LA size     5.08         LA volume     134.35              100.00         LA vol index     71.8         LA Volume Index (Mod)     53.5         LVOT area     3.2         LV EDV BP     86.92         LV Diastolic Volume Index     46.48         LVIDd     4.38         LVIDs     2.57         LV mass     95.55         LV Mass Index     51         Left Ventricular Outflow Tract Mean Gradient     2.78         Left Ventricular Outflow Tract Mean Velocity     0.76         LVOT diameter     2.02         LVOT peak anuj     1.22         LVOT stroke volume     82.64         LVOT peak VTI     25.80         LV ESV BP     23.81         LV Systolic Volume Index     12.7         Mean e'     0.07         Mr max anuj     5.16         MR PISA EROA     0.09         MV valve area by continuity eq     0.95         MV mean gradient     16         MV peak gradient     32         MV VTI     87.3         Radius     0.43         Potassium 3.6             Pulmonary Valve Mean Velocity     0.70         PV PEAK VELOCITY     1.16         Posterior Wall     0.71         Right Atrial Pressure (from IVC)     3         RA Major Axis     4.78         RA Width     4.80         Blood, UA             Sinus     2.40         Sodium 143             STJ     2.29         TAPSE     1.50         TDI SEPTAL     0.06         TDI LATERAL     0.07         Triscuspid Valve Regurgitation Peak Gradient     38         TR Max Anuj     3.10         Troponin I   0.014  Comment: The reference interval for Troponin I represents the 99th percentile   cutoff   for our facility and is consistent with 3rd generation assay   performance.             TV rest pulmonary artery pressure     41         Vn Nyquist     0.42         Vn Nyquist MS     0.42         WBC, UA       1             I have personallly reviewed all pertinent lab results from the last 24 hours.    Diagnostic Results:  X-Ray:  Reviewed  FINDINGS:  Cardiac silhouette is unchanged noting mitral valvular prosthesis.  There is some central vascular congestion.  No consolidation.  No large effusion or pneumothorax.  No acute osseous process.     Impression:  Central vascular congestion.    5/3/2023: Echo:   The left ventricle is normal in size with normal systolic function.  The estimated ejection fraction is 60%.  The left ventricular global longitudinal strain is -15%.  Indeterminate left ventricular diastolic function.  Severe left atrial enlargement.  Normal right ventricular size with normal right ventricular systolic function.  Moderate right atrial enlargement.  The mitral valve is s/p repair.  Moderate mitral regurgitation.  There is severe mitral stenosis.  The mean diastolic gradient across the mitral valve is 16 mmHg at a heart rate of 77 bpm.  Moderate tricuspid regurgitation.  There is mild pulmonary hypertension.  The estimated PA systolic pressure is 41 mmHg.  Normal central venous pressure (3 mmHg).

## 2023-05-04 NOTE — PROGRESS NOTES
[facility]  Cardiology  Progress Note    Patient Name: King Botello  MRN: [id number]  Admission Date: 5/3/2023  Hospital Length of Stay: 1 days  Code Status: Full Code   Attending Physician: Soni Esposito MD   Primary Care Physician: Mayco Dodson MD  Expected Discharge Date:   Principal Problem:Heart failure, diastolic, acute on chronic    Subjective:     Brief HPI:    King Botello is a 38 y.o.female with myxomatous mitral valve disease. She underwent mitral valve repair at age 17 due to severe mitral regurgitation in [place] in 2003. In 2010 she had an uncomplicated pregnancy. She had a C section. She the last few years she says she becomes short of breath after climbing 1-2 flight of stairs. She has had regular cardiology follow up and echocardiogram have revealed mitral stenosis. She delivered her second child on 4/24/2023. Following the delivery the labetalol was discontinued and she began metoprolol. She received furosemide intravenously. She was released on 4/28/2023.     The last few days she has become short of breath. She presents in mild heart failure on 5/3/2023. She is being admitted.    Hospital Course:     Diuresis.    Interval History:    Breathing easier after diuresis.    Review of Systems   Constitutional: Negative for chills, fever and malaise/fatigue.   HENT:  Negative for nosebleeds.    Eyes:  Negative for vision loss in left eye and vision loss in right eye.   Cardiovascular:  Negative for chest pain, leg swelling, orthopnea, palpitations and paroxysmal nocturnal dyspnea.   Respiratory:  Negative for cough, hemoptysis, shortness of breath, sputum production and wheezing.    Hematologic/Lymphatic: Negative for bleeding problem. Does not bruise/bleed easily.   Skin:  Negative for color change and rash.   Musculoskeletal:  Negative for muscle weakness and myalgias.   Gastrointestinal:  Negative for abdominal pain, heartburn, hematemesis, hematochezia,  melena, nausea and vomiting.   Genitourinary:  Negative for hematuria.   Neurological:  Negative for dizziness, focal weakness, headaches, light-headedness, vertigo and weakness.   Psychiatric/Behavioral:  Negative for altered mental status. The patient is not nervous/anxious.    Allergic/Immunologic: Negative for persistent infections.   Objective:     Vital Signs (Most Recent):  Temp: 98.4 °F (36.9 °C) (05/04/23 0800)  Pulse: 85 (05/04/23 0800)  Resp: 18 (05/04/23 0800)  BP: 121/73 (05/04/23 0800)  SpO2: 99 % (05/04/23 0800) Vital Signs (24h Range):  Temp:  [98 °F (36.7 °C)-98.4 °F (36.9 °C)] 98.4 °F (36.9 °C)  Pulse:  [75-95] 85  Resp:  [16-18] 18  SpO2:  [95 %-99 %] 99 %  BP: (113-140)/(72-94) 121/73     Weight: 88.9 kg (196 lb)  Body mass index is 37.03 kg/m².    SpO2: 99 %         Intake/Output Summary (Last 24 hours) at 5/4/2023 0849  Last data filed at 5/4/2023 0555  Gross per 24 hour   Intake --   Output 1750 ml   Net -1750 ml       Lines/Drains/Airways       Peripheral Intravenous Line  Duration                  Peripheral IV - Single Lumen 05/03/23 1430 20 G Left;Posterior Hand <1 day                    Physical Exam  Constitutional:       General: She is not in acute distress.     Appearance: Normal appearance. She is well-developed. She is not ill-appearing or toxic-appearing.   Eyes:      Conjunctiva/sclera:      Right eye: Right conjunctiva is not injected. No hemorrhage.     Left eye: Left conjunctiva is not injected. No hemorrhage.  Neck:      Vascular: No JVD.   Cardiovascular:      Rate and Rhythm: Normal rate and regular rhythm.      Heart sounds: S1 normal and S2 normal. Murmur heard.   High-pitched blowing holosystolic murmur is present with a grade of 2/6 at the apex.   Low-pitched rumbling plateau presystolic murmur is present with a grade of 2/4 at the lower left sternal border and apex.     No gallop.   Pulmonary:      Effort: Pulmonary effort is normal.      Breath sounds: Normal breath  sounds.   Chest:      Chest wall: No swelling or tenderness.   Abdominal:      Tenderness: There is no abdominal tenderness.      Comments: S/p C section   Musculoskeletal:      Right ankle: No swelling.      Left ankle: No swelling.   Skin:     General: Skin is warm and dry.      Findings: No rash.   Neurological:      General: No focal deficit present.      Mental Status: She is alert and oriented to person, place, and time. She is not disoriented.   Psychiatric:         Attention and Perception: Attention and perception normal.         Mood and Affect: Mood and affect normal.         Speech: Speech normal.         Behavior: Behavior normal.         Thought Content: Thought content normal.         Cognition and Memory: Cognition and memory normal.         Judgment: Judgment normal.       Current Medications:     furosemide (LASIX) injection  20 mg Intravenous Q12H    metoprolol succinate  25 mg Oral BID    potassium chloride  20 mEq Oral BID     Current Laboratory Results:    Recent Results (from the past 24 hour(s))   CBC auto differential    Collection Time: 05/03/23  9:55 AM   Result Value Ref Range    WBC 15.23 (H) 3.90 - 12.70 K/uL    RBC 2.87 (L) 4.00 - 5.40 M/uL    Hemoglobin 8.7 (L) 12.0 - 16.0 g/dL    Hematocrit 27.5 (L) 37.0 - 48.5 %    MCV 96 82 - 98 fL    MCH 30.3 27.0 - 31.0 pg    MCHC 31.6 (L) 32.0 - 36.0 g/dL    RDW 14.8 (H) 11.5 - 14.5 %    Platelets 446 150 - 450 K/uL    MPV 9.5 9.2 - 12.9 fL    Immature Granulocytes CANCELED 0.0 - 0.5 %    Immature Grans (Abs) CANCELED 0.00 - 0.04 K/uL    nRBC 1 (A) 0 /100 WBC    Gran % 73.0 38.0 - 73.0 %    Lymph % 15.0 (L) 18.0 - 48.0 %    Mono % 8.0 4.0 - 15.0 %    Eosinophil % 0.0 0.0 - 8.0 %    Basophil % 0.0 0.0 - 1.9 %    Bands 2.0 %    Myelocytes 2.0 %    Platelet Estimate Appears normal     Aniso Slight     Poly Occasional     Differential Method Manual    Comprehensive metabolic panel    Collection Time: 05/03/23  9:55 AM   Result Value Ref Range     Sodium 141 136 - 145 mmol/L    Potassium 3.6 3.5 - 5.1 mmol/L    Chloride 107 95 - 110 mmol/L    CO2 25 23 - 29 mmol/L    Glucose 85 70 - 110 mg/dL    BUN 6 6 - 20 mg/dL    Creatinine 0.6 0.5 - 1.4 mg/dL    Calcium 8.9 8.7 - 10.5 mg/dL    Total Protein 6.0 6.0 - 8.4 g/dL    Albumin 2.5 (L) 3.5 - 5.2 g/dL    Total Bilirubin 0.7 0.1 - 1.0 mg/dL    Alkaline Phosphatase 85 55 - 135 U/L    AST 20 10 - 40 U/L    ALT 17 10 - 44 U/L    Anion Gap 9 8 - 16 mmol/L    eGFR >60 >60 mL/min/1.73 m^2   Brain natriuretic peptide    Collection Time: 05/03/23  9:55 AM   Result Value Ref Range     (H) 0 - 99 pg/mL   POCT urinalysis, dipstick or tablet reag    Collection Time: 05/03/23 10:31 AM   Result Value Ref Range    Color, UA      Spec Grav UA      pH, UA      WBC, UA 1     Nitrite, UA      Protein, POC      Glucose, UA      Ketones, UA      Urobilinogen, UA      Bilirubin, POC      Blood, UA     Echo    Collection Time: 05/03/23  1:00 PM   Result Value Ref Range    TDI SEPTAL 0.06 m/s    LA WIDTH 5.00 cm    IVC diameter 1.65 cm    Left Ventricular Outflow Tract Mean Velocity 0.76 cm/s    Left Ventricular Outflow Tract Mean Gradient 2.78 mmHg    Pulmonary Valve Mean Velocity 0.70 m/s    TDI LATERAL 0.07 m/s    PV PEAK VELOCITY 1.16 cm/s    LVIDd 4.38 3.5 - 6.0 cm    IVS 0.74 0.6 - 1.1 cm    Posterior Wall 0.71 0.6 - 1.1 cm    LVIDs 2.57 2.1 - 4.0 cm    FS 41 28 - 44 %    LA volume 134.35 cm3    STJ 2.29 cm    Ascending aorta 2.73 cm    LV mass 95.55 g    LA size 5.08 cm    TAPSE 1.50 cm    Left Ventricle Relative Wall Thickness 0.32 cm    AV mean gradient 4 mmHg    AV valve area 2.75 cm2    AV Velocity Ratio 0.94     AV index (prosthetic) 0.86     MV mean gradient 16 mmHg    MV valve area by continuity eq 0.95 cm2    Mean e' 0.07 m/s    LVOT diameter 2.02 cm    LVOT area 3.2 cm2    LVOT peak jacinta 1.22 m/s    LVOT peak VTI 25.80 cm    Ao peak jacinta 1.30 m/s    Ao VTI 30.0 cm    Vn Nyquist 0.42 m/s    Radius 0.43 cm    Mr max  anuj 5.16 m/s    LVOT stroke volume 82.64 cm3    AV peak gradient 7 mmHg    MV peak gradient 32 mmHg    Vn Nyquist MS 0.42 m/s    MR PISA EROA 0.09 cm2    TR Max Anuj 3.10 m/s    MV VTI 87.3 cm    LV Systolic Volume 23.81 mL    LV Diastolic Volume 86.92 mL    RA Major Axis 4.78 cm    Left Atrium Minor Axis 6.94 cm    Left Atrium Major Axis 5.64 cm    Triscuspid Valve Regurgitation Peak Gradient 38 mmHg    LA volume (mod) 100.00 cm3    BSA 1.96 m2    Sinus 2.40 cm    LV Systolic Volume Index 12.7 mL/m2    LV Diastolic Volume Index 46.48 mL/m2    LA Volume Index 71.8 mL/m2    LV Mass Index 51 g/m2    LA Volume Index (Mod) 53.5 mL/m2    RA Width 4.80 cm    Right Atrial Pressure (from IVC) 3 mmHg    EF 60 %    TV rest pulmonary artery pressure 41 mmHg   Troponin I    Collection Time: 05/03/23  2:39 PM   Result Value Ref Range    Troponin I 0.014 0.000 - 0.026 ng/mL   Basic metabolic panel    Collection Time: 05/04/23  5:07 AM   Result Value Ref Range    Sodium 143 136 - 145 mmol/L    Potassium 3.6 3.5 - 5.1 mmol/L    Chloride 105 95 - 110 mmol/L    CO2 28 23 - 29 mmol/L    Glucose 103 70 - 110 mg/dL    BUN 6 6 - 20 mg/dL    Creatinine 0.6 0.5 - 1.4 mg/dL    Calcium 9.2 8.7 - 10.5 mg/dL    Anion Gap 10 8 - 16 mmol/L    eGFR >60 >60 mL/min/1.73 m^2   Brain natriuretic peptide    Collection Time: 05/04/23  5:07 AM   Result Value Ref Range     (H) 0 - 99 pg/mL     Current Imaging Results:    X-Ray Chest AP Portable   Final Result      Central vascular congestion.         Electronically signed by: Thierno Hay MD   Date:    05/03/2023   Time:    10:28        2/3/2023: Echo:     The left ventricle is normal in size with normal systolic function. The estimated ejection fraction is 60%.  Normal right ventricular size with normal right ventricular systolic function.  Indeterminate left ventricular diastolic function.  Severe left atrial enlargement.  The mitral valve is s/p repair with a mean diastolic gradient of at  least 12mm Hg at a heart rate of 104 bpm. Peak gradient is near 20mm Hg.  Mild tricuspid regurgitation.  The estimated PA systolic pressure is 31 mmHg. This may be an underestimation given the mitral valve gradients.  Normal central venous pressure (3 mmHg).  On the prior exam, with a heart rate of 76 bpm, the mean gradient across the mitral valve was actually near 10mm Hg. Valve area estimated near 1.8 cm2 by PHT.        5/3/2023: Echo:      The left ventricle is normal in size with normal systolic function.  The estimated ejection fraction is 60%.  The left ventricular global longitudinal strain is -15%.  Indeterminate left ventricular diastolic function.  Severe left atrial enlargement.  Normal right ventricular size with normal right ventricular systolic function.  Moderate right atrial enlargement.  The mitral valve is s/p repair.  Moderate mitral regurgitation.  There is severe mitral stenosis.  The mean diastolic gradient across the mitral valve is 16 mmHg at a heart rate of 77 bpm.  Moderate tricuspid regurgitation.  There is mild pulmonary hypertension.  The estimated PA systolic pressure is 41 mmHg.  Normal central venous pressure (3 mmHg).      Assessment and Plan:     Problem List:    Active Diagnoses:    Diagnosis Date Noted POA    PRINCIPAL PROBLEM:  Heart failure, diastolic, acute on chronic [I50.33] 2023 Yes    S/P  section [Z98.891] 2023 Not Applicable    Nonrheumatic mitral valve stenosis [I34.2] 2023 Yes    Hypertension, essential [I10] 2021 Yes    S/P mitral valve repair [Z98.890] 2017 Not Applicable      Problems Resolved During this Admission:     Assessment and Plan:     Mitral Valve Disease   2003: [place], [place]: Mitral valve repair with ring for MVP with severe MR.  5/3/2023: Echo:Normal left ventricular size and systolic function. EF 60%. Severely enlarged LA. S/p MV repair. Moderate MR. MS - PG 32 mmHg - MG 16 mmHg at HR 77 bpm. SPAP 41  mmHg.  5/3/2023: Presented SOB in HF. . CXR HF.  On metoprolol 25 mg Q24.  On furosemide 20 mg iv Q12 with KCl 20 mEq Q12.  Improving with diuresis.  5/4/2023: . K 3.6.   Suggest one more dose of of furosemide iv.  In am begin furosemide 20 mg Q24 with KCl 20 mEq Q24.  5/8/2023: Do BMP and BNP - please arrange for labs to go to Dr. Eugene Cordero.  Follow up with Dr. Eugene Cordero next week.     2. Heart Failure, Diastolic, Acute on Chronic              Due to MS.              As above.        VTE Risk Mitigation (From admission, onward)           Ordered     IP VTE HIGH RISK PATIENT  Once         05/03/23 1538     Place sequential compression device  Until discontinued         05/03/23 1538                    Tamela Evans MD  Cardiology  [facility])

## 2023-05-04 NOTE — ASSESSMENT & PLAN NOTE
2003: [place], [place]: Mitral valve repair with ring for MVP with severe MR.    On admit 5/3/23:  Presents SOB in HF. . CXR-Central vascular congestion   Echo: Normal left ventricular size and systolic function. EF 60%. Severely enlarged LA. S/p MV repair. Moderate MR. MS - PG 32 mmHg - MG 16 mmHg at HR 77 bpm. SPAP 41 mmHg.  Metoprolol 25mg BID at home (initiated post partum)    Plan:  -Cardiology consulted and following, appreciate assistance  Per cardiology:  5/3/2023: Presented SOB in HF. . CXR HF.  On metoprolol 25 mg Q24.  On furosemide 20 mg iv Q12 with KCl 20 mEq Q12.  Improving with diuresis.  5/4/2023: . K 3.6.   Suggest one more dose of of furosemide iv.  In am begin furosemide 20 mg Q24 with KCl 20 mEq Q24.  5/8/2023: Do BMP and BNP - please arrange for labs to go to Dr. Eugene Cordero.  Follow up with Dr. Eugene Cordero next week.  -Admitted for diuresis, now s/p two doses of IV lasix and two KCl. Plan for one more dose of each and then discharge home on PO lasix, Kcl as above  -Spoke to patient about reaching out to her cardiologist Dr. Cordero, confirms understanding. Current follow up scheduled for 5/10

## 2023-05-04 NOTE — PROGRESS NOTES
[facility]  Obstetrics & Gynecology  Progress Note    Patient Name: King Botello  MRN: [id number]  Admission Date: 5/3/2023  Primary Care Provider: Mayco Dodson MD  Principal Problem: Heart failure, diastolic, acute on chronic    Subjective:     HPI:  King Botello is a 38 y.o. [id number] now POD#9 s/p rLTCS with BTL, presented to the [facility] complaining of shortness of breath that began yesterday morning, resolved on its own throughout the day, and then re-occurred this morning (5/3). Patient reports awakening this AM with SOB. She has not been able to lie flat to sleep since delivery, although she reports this is mainly due to her post op pain and soreness. She denies fever, chills, N/V/D, chest pain, syncope, headaches, vision changes, RUQ pain, edema, dysuria, hematuria, cough, congestion.      Her delivery was complicated by cHTN (labetalol 100mg held postpartum due to lower BP, eventually discontinued), H/o mitral valve prolapse (repaired), and AMA. She had persistent tachycardia (100-110s bpm) post op but Hher H/H remained stable around .      She was seen by cardiology postpartum who recommended discontinuation of labetalol 100 BID and initiation of metoprolol XL 25mg. She follows with Dr. Cordero for cardiology outpatient-she has follow up scheduled on 5/10 and a repeat Echo was recommended 3 months postpartum.    In the [facility], her work up for her SOB was significant for BNP of 465, CXR with central vascular congestion, and an Echocardiogram demonstrating severe left atrial enlargement, moderate mitral valve regurgitation, and an EF of 60%.  Cardiology was consulted and evaluated the patient in the ED. Admission recommended for IV lasix 20mg BID, and KCL 20mEq PO BID.       Interval History  Reports improvement in symptoms overall. Less SOB, less BLANK.  Denies fever, chills, N/V/D, dysuria, hematuria. Denies cough, congestion. Denies Pre-E ROS. No HA, chest pain, vision  changes.   Reports post op soreness but denies pain.   No abnormal vaginal discharge or bleeding.    OB History    Para Term  AB Living   5 2 2 0 3 2   SAB IAB Ectopic Multiple Live Births   3 0 0 0 2      # Outcome Date GA Lbr Syed/2nd Weight Sex Delivery Anes PTL Lv   5 Term 23 38w1d  2.89 kg (6 lb 5.9 oz) F CS-LTranv Spinal N ADWOA      Name: MOUSTAPHA,GIRL MARIEL      Apgar1: 9  Apgar5: 9   4 SAB 2019 4w0d          3 Term 04/27/10 39w0d  3.005 kg (6 lb 10 oz) F CS-LTranv EPI  ADWOA      Birth Comments: Elective C/S      Name: Ricarda Stevenson SAB            1 SAB              Past Medical History:   Diagnosis Date    Abnormal Pap smear     Colposcopy    Bradycardia 2020    Class 2 obesity due to excess calories with body mass index (BMI) of 35.0 to 35.9 in adult 3/9/2021    Hypertension, essential 3/16/2021    Menarche     Age of onset 11    Missed ab 2021    Mitral valve prolapse     Previous  delivery affecting pregnancy, antepartum 2020    S/P suction D&C 2021    Status post mitral valve annuloplasty 2017     Past Surgical History:   Procedure Laterality Date     SECTION       SECTION WITH TUBAL LIGATION Bilateral 2023    Procedure:  SECTION, WITH TUBAL LIGATION;  Surgeon: Mane Moreno MD;  Location: [facility];  Service: OB/GYN;  Laterality: Bilateral;    DILATION AND CURETTAGE OF UTERUS USING SUCTION N/A 2021    Procedure: DILATION AND CURETTAGE, UTERUS, USING SUCTION;  Surgeon: Mane Moreno MD;  Location: [facility];  Service: OB/GYN;  Laterality: N/A;    MITRAL VALVE REPAIR         PTA Medications   Medication Sig    docusate sodium (COLACE) 100 MG capsule Take 1 capsule (100 mg total) by mouth 2 (two) times daily.    HYDROcodone-acetaminophen (NORCO) 5-325 mg per tablet Take 1 tablet by mouth every 6 (six) hours as needed for Pain.    ibuprofen (ADVIL,MOTRIN) 600 MG tablet Take 1 tablet (600 mg total) by mouth every 6 (six)  hours as needed for Pain.    metoprolol succinate (TOPROL-XL) 25 MG 24 hr tablet Take 1 tablet (25 mg total) by mouth 2 (two) times daily.         Review of patient's allergies indicates:   Allergen Reactions    Doxycycline      Abdomen pain severe        Family History       Problem Relation (Age of Onset)    Breast cancer Paternal Aunt    Cancer Mother    Diabetes Mother    Heart disease Mother    Hyperlipidemia Mother, Father    Hypertension Mother, Father          Tobacco Use    Smoking status: Never    Smokeless tobacco: Never   Substance and Sexual Activity    Alcohol use: Not Currently     Comment: not since +UPT    Drug use: No    Sexual activity: Yes     Partners: Male     Birth control/protection: None     Review of Systems   Constitutional:  Negative for chills, diaphoresis, fatigue and fever.   HENT:  Negative for nasal congestion.    Respiratory:  Positive for shortness of breath. Negative for cough and wheezing.    Cardiovascular:  Negative for chest pain, palpitations and leg swelling.   Gastrointestinal:  Negative for abdominal pain, constipation, diarrhea, nausea and vomiting.   Genitourinary:  Negative for dysuria, pelvic pain, vaginal bleeding, vaginal discharge and vaginal pain.   Musculoskeletal:  Negative for arthralgias and leg pain.   Neurological:  Negative for seizures and headaches.    Objective:     Vital Signs (Most Recent):  Temp: 98.4 °F (36.9 °C) (05/04/23 0800)  Pulse: 85 (05/04/23 0800)  Resp: 18 (05/04/23 0800)  BP: 121/73 (05/04/23 0918)  SpO2: 99 % (05/04/23 0800) Vital Signs (24h Range):  Temp:  [98 °F (36.7 °C)-98.4 °F (36.9 °C)] 98.4 °F (36.9 °C)  Pulse:  [75-95] 85  Resp:  [16-18] 18  SpO2:  [95 %-99 %] 99 %  BP: (113-140)/(72-94) 121/73     Weight: 88.9 kg (196 lb)  Body mass index is 37.03 kg/m².    Patient's last menstrual period was 08/01/2022 (exact date).    Physical Exam:   Constitutional: She is oriented to person, place, and time. She appears well-developed and  well-nourished. No distress.    HENT:   Head: Atraumatic.      Cardiovascular:  Normal rate.             Pulmonary/Chest: Effort normal. No respiratory distress. She has no wheezes. She has no rales.   Decreased breath sounds of right lower lung field        Abdominal: Soft. She exhibits abdominal incision. There is no abdominal tenderness. There is no rebound and no guarding.   Incision clean, dry, intact             Musculoskeletal: Normal range of motion. No edema.       Neurological: She is alert and oriented to person, place, and time.    Skin: Skin is warm and dry. No erythema.    Psychiatric: She has a normal mood and affect. Her behavior is normal.     Laboratory:  Recent Lab Results         05/04/23  0507   05/03/23  1439   05/03/23  1300   05/03/23  1031        Color, UA             Bilirubin, POC             Spec Grav UA             pH, UA             Protein, POC             Urobilinogen, UA             Nitrite, UA             Anion Gap 10             Ascending aorta     2.73         Ao peak jacinta     1.30         Ao VTI     30.0         AV valve area     2.75         AV mean gradient     4         AV index (prosthetic)     0.86         AV peak gradient     7         AV Velocity Ratio     0.94           Comment: Values of less than 100 pg/ml are consistent with non-CHF populations.             BSA     1.96         BUN 6             Calcium 9.2             Chloride 105             CO2 28             Creatinine 0.6             Left Ventricle Relative Wall Thickness     0.32         eGFR >60             EF     60         FS     41         Glucose 103             Glucose, UA             IVC diameter     1.65         IVSd     0.74         Ketones, UA             LA WIDTH     5.00         Left Atrium Major Axis     5.64         Left Atrium Minor Axis     6.94         LA size     5.08         LA volume     134.35              100.00         LA vol index     71.8         LA Volume Index (Mod)     53.5          LVOT area     3.2         LV EDV BP     86.92         LV Diastolic Volume Index     46.48         LVIDd     4.38         LVIDs     2.57         LV mass     95.55         LV Mass Index     51         Left Ventricular Outflow Tract Mean Gradient     2.78         Left Ventricular Outflow Tract Mean Velocity     0.76         LVOT diameter     2.02         LVOT peak anuj     1.22         LVOT stroke volume     82.64         LVOT peak VTI     25.80         LV ESV BP     23.81         LV Systolic Volume Index     12.7         Mean e'     0.07         Mr max anuj     5.16         MR PISA EROA     0.09         MV valve area by continuity eq     0.95         MV mean gradient     16         MV peak gradient     32         MV VTI     87.3         Radius     0.43         Potassium 3.6             Pulmonary Valve Mean Velocity     0.70         PV PEAK VELOCITY     1.16         Posterior Wall     0.71         Right Atrial Pressure (from IVC)     3         RA Major Axis     4.78         RA Width     4.80         Blood, UA             Sinus     2.40         Sodium 143             STJ     2.29         TAPSE     1.50         TDI SEPTAL     0.06         TDI LATERAL     0.07         Triscuspid Valve Regurgitation Peak Gradient     38         TR Max Anuj     3.10         Troponin I   0.014  Comment: The reference interval for Troponin I represents the 99th percentile   cutoff   for our facility and is consistent with 3rd generation assay   performance.             TV rest pulmonary artery pressure     41         Vn Nyquist     0.42         Vn Nyquist MS     0.42         WBC, UA       1             I have personallly reviewed all pertinent lab results from the last 24 hours.    Diagnostic Results:  X-Ray: Reviewed  FINDINGS:  Cardiac silhouette is unchanged noting mitral valvular prosthesis.  There is some central vascular congestion.  No consolidation.  No large effusion or pneumothorax.  No acute osseous process.    "  Impression:  Central vascular congestion.    5/3/2023: Echo:   The left ventricle is normal in size with normal systolic function.  The estimated ejection fraction is 60%.  The left ventricular global longitudinal strain is -15%.  Indeterminate left ventricular diastolic function.  Severe left atrial enlargement.  Normal right ventricular size with normal right ventricular systolic function.  Moderate right atrial enlargement.  The mitral valve is s/p repair.  Moderate mitral regurgitation.  There is severe mitral stenosis.  The mean diastolic gradient across the mitral valve is 16 mmHg at a heart rate of 77 bpm.  Moderate tricuspid regurgitation.  There is mild pulmonary hypertension.  The estimated PA systolic pressure is 41 mmHg.  Normal central venous pressure (3 mmHg).    Assessment/Plan:     * Heart failure, diastolic, acute on chronic  -Due to mitral stenosis, see "s/p mitral valve repair" problem    S/P  section  -POD#10 s/p rLTCS + BTL   -Has met all post op milestones  -Incision clean, dry, intact  -PRN pain meds ordered  -Breast pump supplied    Nonrheumatic mitral valve stenosis  -See s/p mitral valve repair    Hypertension, essential  -BP: (116-140)/(72-94) 118/79 on admit  -Mild range BPs on admit   -CBC, CMP on admit WNL  -Denies Pre-E ROS  -Previous labetalol 100 BID held for lower BP postpartum and on admit  --Consider re-starting if 2 or more Bps >/= 150/100      S/P mitral valve repair  2003: [place], [place]: Mitral valve repair with ring for MVP with severe MR.    On admit 5/3/23:  Presents SOB in HF. . CXR-Central vascular congestion   Echo: Normal left ventricular size and systolic function. EF 60%. Severely enlarged LA. S/p MV repair. Moderate MR. MS - PG 32 mmHg - MG 16 mmHg at HR 77 bpm. SPAP 41 mmHg.  Metoprolol 25mg BID at home (initiated post partum)    Plan:  -Cardiology consulted and following, appreciate assistance  Per cardiology:  5/3/2023: Presented SOB in HF. BNP " 465. CXR HF.  On metoprolol 25 mg Q24.  On furosemide 20 mg iv Q12 with KCl 20 mEq Q12.  Improving with diuresis.  5/4/2023: . K 3.6.   Suggest one more dose of of furosemide iv.  In am begin furosemide 20 mg Q24 with KCl 20 mEq Q24.  5/8/2023: Do BMP and BNP - please arrange for labs to go to Dr. Eugene Cordero.  Follow up with Dr. Eugene Cordero next week.  -Admitted for diuresis, now s/p two doses of IV lasix and two KCl. Plan for one more dose of each and then discharge home on PO lasix, Kcl as above  -Spoke to patient about reaching out to her cardiologist Dr. Cordero, confirms understanding. Current follow up scheduled for 5/10                    Maria C Villatoro MD  Obstetrics & Gynecology  [facility] ([place])      Patient seen and examined. See H and P for full outline of plan    Terrance Phillip MD  PGY 7  Maternal Fetal Medicine  [facility]

## 2023-05-04 NOTE — PROGRESS NOTES
[facility]  Obstetrics & Gynecology  Progress Note    Patient Name: King Botello  MRN: [id number]  Admission Date: 5/3/2023  Primary Care Provider: Mayco Dodson MD  Principal Problem: Heart failure, diastolic, acute on chronic    Subjective:     HPI:  King Botello is a 38 y.o. [id number] now POD#9 s/p rLTCS with BTL, presented to the [facility] complaining of shortness of breath that began yesterday morning, resolved on its own throughout the day, and then re-occurred this morning (5/3). Patient reports awakening this AM with SOB. She has not been able to lie flat to sleep since delivery, although she reports this is mainly due to her post op pain and soreness. She denies fever, chills, N/V/D, chest pain, syncope, headaches, vision changes, RUQ pain, edema, dysuria, hematuria, cough, congestion.      Her delivery was complicated by cHTN (labetalol 100mg held postpartum due to lower BP, eventually discontinued), H/o mitral valve prolapse (repaired), and AMA. She had persistent tachycardia (100-110s bpm) post op but Hher H/H remained stable around .      She was seen by cardiology postpartum who recommended discontinuation of labetalol 100 BID and initiation of metoprolol XL 25mg. She follows with Dr. Cordero for cardiology outpatient-she has follow up scheduled on 5/10 and a repeat Echo was recommended 3 months postpartum.    In the [facility], her work up for her SOB was significant for BNP of 465, CXR with central vascular congestion, and an Echocardiogram demonstrating severe left atrial enlargement, moderate mitral valve regurgitation, and an EF of 60%.  Cardiology was consulted and evaluated the patient in the ED. Admission recommended for IV lasix 20mg BID, and KCL 20mEq PO BID.       No new subjective & objective note has been filed under this hospital service since the last note was generated.    Assessment/Plan:     * Heart failure, diastolic, acute on chronic  -Due to mitral  "stenosis, see "s/p mitral valve repair" problem    S/P  section  -POD#10 s/p rLTCS + BTL   -Has met all post op milestones  -Incision clean, dry, intact  -PRN pain meds ordered  -Breast pump supplied    Nonrheumatic mitral valve stenosis  -See s/p mitral valve repair    Hypertension, essential  -BP: (116-140)/(72-94) 118/79 on admit  -Mild range BPs on admit   -CBC, CMP on admit WNL  -Denies Pre-E ROS  -Previous labetalol 100 BID held for lower BP postpartum and on admit  --Consider re-starting if 2 or more Bps >/= 150/100      S/P mitral valve repair  2003: [place], [place]: Mitral valve repair with ring for MVP with severe MR.    On admit 5/3/23:  Presents SOB in HF. . CXR-Central vascular congestion   Echo: Normal left ventricular size and systolic function. EF 60%. Severely enlarged LA. S/p MV repair. Moderate MR. MS - PG 32 mmHg - MG 16 mmHg at HR 77 bpm. SPAP 41 mmHg.  Metoprolol 25mg BID at home (initiated post partum)    Plan:  -Cardiology consulted and following, appreciate assistance  Per cardiology:  5/3/2023: Presented SOB in HF. . CXR HF.  On metoprolol 25 mg Q24.  On furosemide 20 mg iv Q12 with KCl 20 mEq Q12.  Improving with diuresis.  2023: . K 3.6.   Suggest one more dose of of furosemide iv.  In am begin furosemide 20 mg Q24 with KCl 20 mEq Q24.  2023: Do BMP and BNP - please arrange for labs to go to Dr. Eugene Cordero.  Follow up with Dr. Eugene Cordero next week.  -Admitted for diuresis, now s/p two doses of IV lasix and two KCl. Plan for one more dose of each and then discharge home on PO lasix, Kcl as above  -Spoke to patient about reaching out to her cardiologist Dr. Cordero, confirms understanding. Current follow up scheduled for 5/10                  Maria C Villatoro MD  Obstetrics & Gynecology  [facility] ([place])  "

## 2023-05-04 NOTE — ASSESSMENT & PLAN NOTE
-POD#10 s/p rLTCS + BTL   -Has met all post op milestones  -Incision clean, dry, intact  -PRN pain meds ordered  -Breast pump supplied

## 2023-05-04 NOTE — PROGRESS NOTES
Tele called RN and asked for tele order. MD notified and stated that tele was no longer needed for patient. Message passed on to tele and leads disconnected.

## 2023-05-04 NOTE — PLAN OF CARE
Pt admitted to [facility] on yesterday due to SOB. Sw reviewed pt's chart and met with pt in room 600. Pt was alert, oriented, and easily engaged. Pt appeared to be coping well. Sw introduced self and explained the role of the discharge planner. Pt's  daughter and  were present as well.    Pt voiced that she anticipates discharge today. Sw does not anticipate any discharge needs as pt's  will transport her home. Sw available should any discharge needs arise.       23 1113   Discharge Planning   Assessment Type Discharge Planning Brief Assessment   Resource/Environmental Concerns none   Support Systems Spouse/significant other   Equipment Currently Used at Home none   Current Living Arrangements home   Patient/Family Anticipates Transition to home   Patient/Family Anticipated Services at Transition none   DME Needed Upon Discharge  none   Discharge Plan A Home with family   Physical Activity   On average, how many days per week do you engage in moderate to strenuous exercise (like a brisk walk)? 0 days   On average, how many minutes do you engage in exercise at this level? 0 min   Financial Resource Strain   How hard is it for you to pay for the very basics like food, housing, medical care, and heating? Not hard   Housing Stability   In the last 12 months, was there a time when you were not able to pay the mortgage or rent on time? N   In the last 12 months, how many places have you lived? 1   In the last 12 months, was there a time when you did not have a steady place to sleep or slept in a shelter (including now)? N   Transportation Needs   In the past 12 months, has lack of transportation kept you from medical appointments or from getting medications? no   In the past 12 months, has lack of transportation kept you from meetings, work, or from getting things needed for daily living? No   Food Insecurity   Within the past 12 months, you worried that your food would run out before you got the  money to buy more. Never true   Within the past 12 months, the food you bought just didn't last and you didn't have money to get more. Never true   Stress   Do you feel stress - tense, restless, nervous, or anxious, or unable to sleep at night because your mind is troubled all the time - these days? Not at all   Social Connections   In a typical week, how many times do you talk on the phone with family, friends, or neighbors? More than 3   How often do you get together with friends or relatives? Once   How often do you attend Confucianism or Spiritism services? More than 4   Do you belong to any clubs or organizations such as Confucianism groups, unions, fraternal or athletic groups, or school groups? No   How often do you attend meetings of the clubs or organizations you belong to? Never   Are you , , , , never , or living with a partner?    Alcohol Use   Q1: How often do you have a drink containing alcohol? Never   Q2: How many drinks containing alcohol do you have on a typical day when you are drinking? None   Q3: How often do you have six or more drinks on one occasion? Never

## 2023-05-05 ENCOUNTER — PATIENT MESSAGE (OUTPATIENT)
Dept: OBSTETRICS AND GYNECOLOGY | Facility: OTHER | Age: 38
End: 2023-05-05
Payer: COMMERCIAL

## 2023-05-05 ENCOUNTER — TELEPHONE (OUTPATIENT)
Dept: OBSTETRICS AND GYNECOLOGY | Facility: CLINIC | Age: 38
End: 2023-05-05
Payer: COMMERCIAL

## 2023-05-05 ENCOUNTER — PATIENT MESSAGE (OUTPATIENT)
Dept: OBSTETRICS AND GYNECOLOGY | Facility: CLINIC | Age: 38
End: 2023-05-05
Payer: COMMERCIAL

## 2023-05-05 NOTE — TELEPHONE ENCOUNTER
King BLAIR Staff (supporting Mane Moreno MD) 53 minutes ago (2:25 PM)       That's good        You  King Botello 1 hour ago (1:46 PM)     DI  Next Friday at 2:00 work?       King BLAIR Staff (supporting Mane Moreno MD) 3 hours ago (11:46 AM)       Good morning.  Dr Moreno wants to see me in 1-2 weeks.  Just wanted to make sure he told you bc I need an appointment made.      Thanks   King

## 2023-05-08 ENCOUNTER — LAB VISIT (OUTPATIENT)
Dept: LAB | Facility: HOSPITAL | Age: 38
End: 2023-05-08
Payer: COMMERCIAL

## 2023-05-08 ENCOUNTER — PATIENT MESSAGE (OUTPATIENT)
Dept: CARDIOLOGY | Facility: CLINIC | Age: 38
End: 2023-05-08
Payer: COMMERCIAL

## 2023-05-08 DIAGNOSIS — I50.33 HEART FAILURE, DIASTOLIC, ACUTE ON CHRONIC: ICD-10-CM

## 2023-05-08 LAB
ANION GAP SERPL CALC-SCNC: 10 MMOL/L (ref 8–16)
BNP SERPL-MCNC: 338 PG/ML (ref 0–99)
BUN SERPL-MCNC: 6 MG/DL (ref 6–20)
CALCIUM SERPL-MCNC: 9.2 MG/DL (ref 8.7–10.5)
CHLORIDE SERPL-SCNC: 105 MMOL/L (ref 95–110)
CO2 SERPL-SCNC: 26 MMOL/L (ref 23–29)
CREAT SERPL-MCNC: 0.7 MG/DL (ref 0.5–1.4)
EST. GFR  (NO RACE VARIABLE): >60 ML/MIN/1.73 M^2
GLUCOSE SERPL-MCNC: 109 MG/DL (ref 70–110)
POTASSIUM SERPL-SCNC: 4.7 MMOL/L (ref 3.5–5.1)
SODIUM SERPL-SCNC: 141 MMOL/L (ref 136–145)

## 2023-05-08 PROCEDURE — 83880 ASSAY OF NATRIURETIC PEPTIDE: CPT

## 2023-05-08 PROCEDURE — 36415 COLL VENOUS BLD VENIPUNCTURE: CPT

## 2023-05-08 PROCEDURE — 80048 BASIC METABOLIC PNL TOTAL CA: CPT

## 2023-05-09 ENCOUNTER — PATIENT MESSAGE (OUTPATIENT)
Dept: CARDIOLOGY | Facility: CLINIC | Age: 38
End: 2023-05-09
Payer: COMMERCIAL

## 2023-05-09 ENCOUNTER — TELEPHONE (OUTPATIENT)
Dept: CARDIOLOGY | Facility: CLINIC | Age: 38
End: 2023-05-09
Payer: COMMERCIAL

## 2023-05-09 NOTE — TELEPHONE ENCOUNTER
Msg from Dr Cordero below sent to pt through [facility].    Eugene Cordero MD   If she is taking Furosimide 20 mg , increase to 40 alt with 20,CJL

## 2023-05-09 NOTE — TELEPHONE ENCOUNTER
----- Message from Eugene Cordero MD sent at 5/8/2023  8:48 PM CDT -----  If she is taking Furosimide 20 mg , increase to 40 alt with 20,CJL

## 2023-05-09 NOTE — PROGRESS NOTES
Subjective:   Patient ID:  King Botello is a 38 y.o. female who presents for follow-up of CHF    HPI:  Patient is here for congestive heart failure post delivery.  The patient has no chest pain, TIA, palpitations, syncope or pre-syncope.SOB/BLANK with fluid accumulation. Patient does not exercise.          Review of Systems   Constitutional: Negative for chills, decreased appetite, diaphoresis, fever, malaise/fatigue, night sweats, weight gain and weight loss.   HENT:  Negative for congestion, hoarse voice, nosebleeds, sore throat and tinnitus.    Eyes:  Negative for blurred vision, double vision, vision loss in left eye, vision loss in right eye, visual disturbance and visual halos.   Cardiovascular:  Positive for dyspnea on exertion. Negative for chest pain, claudication, cyanosis, irregular heartbeat, leg swelling, near-syncope, orthopnea, palpitations, paroxysmal nocturnal dyspnea and syncope.   Respiratory:  Positive for shortness of breath. Negative for cough, hemoptysis, sleep disturbances due to breathing, snoring, sputum production and wheezing.    Endocrine: Negative for cold intolerance, heat intolerance, polydipsia, polyphagia and polyuria.   Hematologic/Lymphatic: Negative for adenopathy and bleeding problem. Does not bruise/bleed easily.   Skin:  Negative for color change, dry skin, flushing, itching, nail changes, poor wound healing, rash, skin cancer, suspicious lesions and unusual hair distribution.   Musculoskeletal:  Negative for arthritis, back pain, falls, gout, joint pain, joint swelling, muscle cramps, muscle weakness, myalgias and stiffness.   Gastrointestinal:  Negative for abdominal pain, anorexia, change in bowel habit, constipation, diarrhea, dysphagia, heartburn, hematemesis, hematochezia, melena and vomiting.   Genitourinary:  Negative for decreased libido, dysuria, hematuria, hesitancy and urgency.   Neurological:  Negative for excessive daytime sleepiness, dizziness, focal  "weakness, headaches, light-headedness, loss of balance, numbness, paresthesias, seizures, sensory change, tremors, vertigo and weakness.   Psychiatric/Behavioral:  Negative for altered mental status, depression, hallucinations, memory loss, substance abuse and suicidal ideas. The patient does not have insomnia and is not nervous/anxious.    Allergic/Immunologic: Negative for environmental allergies and hives.     Objective: /77 (BP Location: Left arm, Patient Position: Sitting, BP Method: Medium (Automatic))   Pulse 93   Ht 5' 1" (1.549 m)   Wt 80.3 kg (177 lb 0.5 oz)   LMP 08/01/2022 (Exact Date)   SpO2 100%   BMI 33.45 kg/m²      Physical Exam  Constitutional:       Appearance: She is well-developed.   HENT:      Head: Normocephalic.   Eyes:      Pupils: Pupils are equal, round, and reactive to light.   Neck:      Thyroid: No thyromegaly.      Vascular: Normal carotid pulses. No carotid bruit, hepatojugular reflux or JVD.   Cardiovascular:      Rate and Rhythm: Normal rate and regular rhythm.      Pulses: Intact distal pulses.      Heart sounds: Normal heart sounds. No murmur heard.    No friction rub. No gallop.   Pulmonary:      Effort: Pulmonary effort is normal. No tachypnea or respiratory distress.      Breath sounds: Normal breath sounds. No wheezing or rales.   Chest:      Chest wall: No tenderness.   Abdominal:      General: Bowel sounds are normal. There is no distension.      Palpations: Abdomen is soft. There is no mass.      Tenderness: There is no abdominal tenderness. There is no guarding or rebound.   Musculoskeletal:         General: No tenderness. Normal range of motion.      Cervical back: Normal range of motion.   Lymphadenopathy:      Cervical: No cervical adenopathy.   Skin:     General: Skin is warm.      Findings: No erythema or rash.   Neurological:      Mental Status: She is alert and oriented to person, place, and time.      Cranial Nerves: No cranial nerve deficit.      " Coordination: Coordination normal.   Psychiatric:         Behavior: Behavior normal.         Thought Content: Thought content normal.         Judgment: Judgment normal.       Assessment:     1. Heart failure, diastolic, acute on chronic    2. Nonrheumatic mitral valve stenosis    3. Pre-existing essential hypertension during pregnancy, antepartum    4. Class 2 severe obesity due to excess calories with serious comorbidity and body mass index (BMI) of 35.0 to 35.9 in adult    5. S/P mitral valve repair        Plan:   Discussed diet , achieving and maintaining ideal body weight, and exercise.   We reviewed meds in detail.  Reassured-Discussed goals, options, plan.  Contacted her yesterday about increase in diuretic.  May later need another valve procedure    King was seen today for establish care and hospital follow up.    Diagnoses and all orders for this visit:    Heart failure, diastolic, acute on chronic  -     Basic Metabolic Panel; Standing  -     BNP; Standing    Nonrheumatic mitral valve stenosis  -     Basic Metabolic Panel; Standing  -     BNP; Standing    Pre-existing essential hypertension during pregnancy, antepartum    Class 2 severe obesity due to excess calories with serious comorbidity and body mass index (BMI) of 35.0 to 35.9 in adult    S/P mitral valve repair  -     Basic Metabolic Panel; Standing  -     BNP; Standing            Follow up in about 3 months (around 8/10/2023) for with labs ; labs 2 weeks and labs 6 weeks ( 6 weeks with NP).

## 2023-05-10 ENCOUNTER — HOSPITAL ENCOUNTER (OUTPATIENT)
Dept: CARDIOLOGY | Facility: CLINIC | Age: 38
Discharge: HOME | End: 2023-05-10
Payer: COMMERCIAL

## 2023-05-10 ENCOUNTER — OFFICE VISIT (OUTPATIENT)
Dept: CARDIOLOGY | Facility: CLINIC | Age: 38
End: 2023-05-10
Payer: COMMERCIAL

## 2023-05-10 VITALS
DIASTOLIC BLOOD PRESSURE: 77 MMHG | HEART RATE: 93 BPM | OXYGEN SATURATION: 100 % | SYSTOLIC BLOOD PRESSURE: 111 MMHG | BODY MASS INDEX: 33.42 KG/M2 | HEIGHT: 61 IN | WEIGHT: 177 LBS

## 2023-05-10 DIAGNOSIS — Z98.890 S/P MITRAL VALVE REPAIR: ICD-10-CM

## 2023-05-10 DIAGNOSIS — I10 HYPERTENSION, ESSENTIAL: ICD-10-CM

## 2023-05-10 DIAGNOSIS — E66.01 CLASS 2 SEVERE OBESITY DUE TO EXCESS CALORIES WITH SERIOUS COMORBIDITY AND BODY MASS INDEX (BMI) OF 35.0 TO 35.9 IN ADULT: ICD-10-CM

## 2023-05-10 DIAGNOSIS — O10.019 PRE-EXISTING ESSENTIAL HYPERTENSION DURING PREGNANCY, ANTEPARTUM: ICD-10-CM

## 2023-05-10 DIAGNOSIS — Z98.891 S/P CESAREAN SECTION: ICD-10-CM

## 2023-05-10 DIAGNOSIS — I50.33 HEART FAILURE, DIASTOLIC, ACUTE ON CHRONIC: Primary | ICD-10-CM

## 2023-05-10 DIAGNOSIS — I34.2 NONRHEUMATIC MITRAL VALVE STENOSIS: ICD-10-CM

## 2023-05-10 PROCEDURE — 3078F DIAST BP <80 MM HG: CPT | Mod: CPTII,S$GLB,, | Performed by: INTERNAL MEDICINE

## 2023-05-10 PROCEDURE — 99999 PR PBB SHADOW E&M-EST. PATIENT-LVL IV: ICD-10-PCS | Mod: PBBFAC,,, | Performed by: INTERNAL MEDICINE

## 2023-05-10 PROCEDURE — 1159F MED LIST DOCD IN RCRD: CPT | Mod: CPTII,S$GLB,, | Performed by: INTERNAL MEDICINE

## 2023-05-10 PROCEDURE — 93010 EKG 12-LEAD: ICD-10-PCS | Mod: S$GLB,,, | Performed by: INTERNAL MEDICINE

## 2023-05-10 PROCEDURE — 1160F RVW MEDS BY RX/DR IN RCRD: CPT | Mod: CPTII,S$GLB,, | Performed by: INTERNAL MEDICINE

## 2023-05-10 PROCEDURE — 1111F PR DISCHARGE MEDS RECONCILED W/ CURRENT OUTPATIENT MED LIST: ICD-10-PCS | Mod: CPTII,S$GLB,, | Performed by: INTERNAL MEDICINE

## 2023-05-10 PROCEDURE — 1160F PR REVIEW ALL MEDS BY PRESCRIBER/CLIN PHARMACIST DOCUMENTED: ICD-10-PCS | Mod: CPTII,S$GLB,, | Performed by: INTERNAL MEDICINE

## 2023-05-10 PROCEDURE — 99215 PR OFFICE/OUTPT VISIT, EST, LEVL V, 40-54 MIN: ICD-10-PCS | Mod: S$GLB,,, | Performed by: INTERNAL MEDICINE

## 2023-05-10 PROCEDURE — 3008F BODY MASS INDEX DOCD: CPT | Mod: CPTII,S$GLB,, | Performed by: INTERNAL MEDICINE

## 2023-05-10 PROCEDURE — 93005 ELECTROCARDIOGRAM TRACING: CPT | Mod: S$GLB,,, | Performed by: INTERNAL MEDICINE

## 2023-05-10 PROCEDURE — 99999 PR PBB SHADOW E&M-EST. PATIENT-LVL IV: CPT | Mod: PBBFAC,,, | Performed by: INTERNAL MEDICINE

## 2023-05-10 PROCEDURE — 99215 OFFICE O/P EST HI 40 MIN: CPT | Mod: S$GLB,,, | Performed by: INTERNAL MEDICINE

## 2023-05-10 PROCEDURE — 93010 ELECTROCARDIOGRAM REPORT: CPT | Mod: S$GLB,,, | Performed by: INTERNAL MEDICINE

## 2023-05-10 PROCEDURE — 3074F PR MOST RECENT SYSTOLIC BLOOD PRESSURE < 130 MM HG: ICD-10-PCS | Mod: CPTII,S$GLB,, | Performed by: INTERNAL MEDICINE

## 2023-05-10 PROCEDURE — 1111F DSCHRG MED/CURRENT MED MERGE: CPT | Mod: CPTII,S$GLB,, | Performed by: INTERNAL MEDICINE

## 2023-05-10 PROCEDURE — 93005 EKG 12-LEAD: ICD-10-PCS | Mod: S$GLB,,, | Performed by: INTERNAL MEDICINE

## 2023-05-10 PROCEDURE — 3008F PR BODY MASS INDEX (BMI) DOCUMENTED: ICD-10-PCS | Mod: CPTII,S$GLB,, | Performed by: INTERNAL MEDICINE

## 2023-05-10 PROCEDURE — 1159F PR MEDICATION LIST DOCUMENTED IN MEDICAL RECORD: ICD-10-PCS | Mod: CPTII,S$GLB,, | Performed by: INTERNAL MEDICINE

## 2023-05-10 PROCEDURE — 3078F PR MOST RECENT DIASTOLIC BLOOD PRESSURE < 80 MM HG: ICD-10-PCS | Mod: CPTII,S$GLB,, | Performed by: INTERNAL MEDICINE

## 2023-05-10 PROCEDURE — 3074F SYST BP LT 130 MM HG: CPT | Mod: CPTII,S$GLB,, | Performed by: INTERNAL MEDICINE

## 2023-05-10 NOTE — PATIENT INSTRUCTIONS
Discussed diet , achieving and maintaining ideal body weight, and exercise.   We reviewed meds in detail.  Reassured-Discussed goals, options, plan.  Contacted her yesterday about increase in diuretic.  May later need another valve procedure

## 2023-05-12 ENCOUNTER — POSTPARTUM VISIT (OUTPATIENT)
Dept: OBSTETRICS AND GYNECOLOGY | Facility: CLINIC | Age: 38
End: 2023-05-12
Attending: OBSTETRICS & GYNECOLOGY
Payer: COMMERCIAL

## 2023-05-12 VITALS
HEIGHT: 61 IN | SYSTOLIC BLOOD PRESSURE: 110 MMHG | DIASTOLIC BLOOD PRESSURE: 72 MMHG | BODY MASS INDEX: 33.3 KG/M2 | WEIGHT: 176.38 LBS

## 2023-05-12 PROCEDURE — 99024 PR POST-OP FOLLOW-UP VISIT: ICD-10-PCS | Mod: S$GLB,,, | Performed by: OBSTETRICS & GYNECOLOGY

## 2023-05-12 PROCEDURE — 99999 PR PBB SHADOW E&M-EST. PATIENT-LVL III: ICD-10-PCS | Mod: PBBFAC,,, | Performed by: OBSTETRICS & GYNECOLOGY

## 2023-05-12 PROCEDURE — 99024 POSTOP FOLLOW-UP VISIT: CPT | Mod: S$GLB,,, | Performed by: OBSTETRICS & GYNECOLOGY

## 2023-05-12 PROCEDURE — 99999 PR PBB SHADOW E&M-EST. PATIENT-LVL III: CPT | Mod: PBBFAC,,, | Performed by: OBSTETRICS & GYNECOLOGY

## 2023-05-12 NOTE — PROGRESS NOTES
"CC: INCISION CHECK    HPI:  King Botello is a 38 y.o. female  who presents for an incision check.  She is 2.5 weeks S/P a repeat  with BTL and left ovarian cystectomy on 23.  She has a history of a prior mitral valve repair and was followed by MFM and Cardiology during the pregnancy.  She was admitted on postop day #9 with fluid overload and received diuresis.  She has recently been seen by her cardiologist Dr. Cordero and is taking Lasix.  Currently, she denies shortness of breath and chest pain.  Only mild incisional discomfort with certain movements.  No fever.   No bowel / bladder complaints.  She is breast feeding.    23  A.  FALLOPIAN TUBE, RIGHT, TUBAL LIGATION:   - Exhibiting the usual normal luminal/muscular wall histology. No significant pathological changes.   (Complete cross section)   B.  FALLOPIAN TUBE, LEFT, TUBAL LIGATION:   - Exhibiting the usual normal luminal/muscular wall histology. No significant pathological changes.   (Complete cross section)   LEFT OVARY, CYST, CYSTECTOMY:   - Benign ovarian cyst lined by bland, predominately denuded epithelial lining, see comment.   - Negative for malignancy.   COMMENT:   The differential includes serous cystadenoma and peritoneal cyst.    22 Pap: Negative, HPV: Negative    Delivery Date: 23  Delivery MD: Dr. Moreno / Dr. Carter    Pregnancy was complicated by:  History of MVP with repair  CHTN  AMA  Prior C/S    /72   Ht 5' 1" (1.549 m)   Wt 80 kg (176 lb 5.9 oz)   LMP 2022 (Exact Date)   Breastfeeding No   BMI 33.32 kg/m²     PHYSICAL EXAM:  ABDOMEN:  Soft, non-tender, non-distended  INCISION: Healing well, intact, no drainage, no erythema, no sign of infection.  Stitch trimmed.    IMP:  Doing well 2.5 S/P C/S  Instructions / precautions / caden signs reviewed    PLAN:  Follow-up with Dr. Cordero    Return: 3 weeks for PP check    This note was created with voice recognition software.  Grammatical, " syntax and spelling errors may be inevitable.

## 2023-05-15 ENCOUNTER — PATIENT MESSAGE (OUTPATIENT)
Dept: PERINATAL CARE | Facility: OTHER | Age: 38
End: 2023-05-15
Payer: COMMERCIAL

## 2023-05-24 ENCOUNTER — PATIENT MESSAGE (OUTPATIENT)
Dept: CARDIOLOGY | Facility: CLINIC | Age: 38
End: 2023-05-24
Payer: COMMERCIAL

## 2023-05-24 ENCOUNTER — LAB VISIT (OUTPATIENT)
Dept: LAB | Facility: HOSPITAL | Age: 38
End: 2023-05-24
Attending: INTERNAL MEDICINE
Payer: COMMERCIAL

## 2023-05-24 DIAGNOSIS — I50.33 HEART FAILURE, DIASTOLIC, ACUTE ON CHRONIC: ICD-10-CM

## 2023-05-24 DIAGNOSIS — I34.2 NONRHEUMATIC MITRAL VALVE STENOSIS: ICD-10-CM

## 2023-05-24 DIAGNOSIS — Z98.890 S/P MITRAL VALVE REPAIR: ICD-10-CM

## 2023-05-24 LAB
ANION GAP SERPL CALC-SCNC: 7 MMOL/L (ref 8–16)
BNP SERPL-MCNC: 193 PG/ML (ref 0–99)
BUN SERPL-MCNC: 7 MG/DL (ref 6–20)
CALCIUM SERPL-MCNC: 9.4 MG/DL (ref 8.7–10.5)
CHLORIDE SERPL-SCNC: 105 MMOL/L (ref 95–110)
CO2 SERPL-SCNC: 26 MMOL/L (ref 23–29)
CREAT SERPL-MCNC: 0.6 MG/DL (ref 0.5–1.4)
EST. GFR  (NO RACE VARIABLE): >60 ML/MIN/1.73 M^2
GLUCOSE SERPL-MCNC: 84 MG/DL (ref 70–110)
POTASSIUM SERPL-SCNC: 4.4 MMOL/L (ref 3.5–5.1)
SODIUM SERPL-SCNC: 138 MMOL/L (ref 136–145)

## 2023-05-24 PROCEDURE — 83880 ASSAY OF NATRIURETIC PEPTIDE: CPT | Performed by: INTERNAL MEDICINE

## 2023-05-24 PROCEDURE — 80048 BASIC METABOLIC PNL TOTAL CA: CPT | Performed by: INTERNAL MEDICINE

## 2023-05-24 PROCEDURE — 36415 COLL VENOUS BLD VENIPUNCTURE: CPT | Performed by: INTERNAL MEDICINE

## 2023-05-24 NOTE — PROGRESS NOTES
Your results look fine and do not require any change in treatment. BNP much better but still mild high -we should probably slightly increase diuretic -what is current dose ?    Please contact me if you have any additional concerns.    Sincerely,    Eugene Cordero

## 2023-05-26 NOTE — DISCHARGE SUMMARY
[facility])  Obstetrics & Gynecology  Discharge Summary    Patient Name: King Botello  MRN: [id number]  Admission Date: 5/3/2023  Hospital Length of Stay: 0 days  Discharge Date and Time:  2023 8:30 PM  Attending Physician: No att. providers found   Discharging Provider: Maria C Villatoro MD  Primary Care Provider: Mayco Dodson MD    HPI:  King Botello is a 38 y.o. [id number] now POD#9 s/p rLTCS with BTL, presented to the [facility] complaining of shortness of breath that began yesterday morning, resolved on its own throughout the day, and then re-occurred this morning (5/3). Patient reports awakening this AM with SOB. She has not been able to lie flat to sleep since delivery, although she reports this is mainly due to her post op pain and soreness. She denies fever, chills, N/V/D, chest pain, syncope, headaches, vision changes, RUQ pain, edema, dysuria, hematuria, cough, congestion.      Her delivery was complicated by cHTN (labetalol 100mg held postpartum due to lower BP, eventually discontinued), H/o mitral valve prolapse (repaired), and AMA. She had persistent tachycardia (100-110s bpm) post op but Hher H/H remained stable around .      She was seen by cardiology postpartum who recommended discontinuation of labetalol 100 BID and initiation of metoprolol XL 25mg. She follows with Dr. Cordero for cardiology outpatient-she has follow up scheduled on 5/10 and a repeat Echo was recommended 3 months postpartum.    In the [facility], her work up for her SOB was significant for BNP of 465, CXR with central vascular congestion, and an Echocardiogram demonstrating severe left atrial enlargement, moderate mitral valve regurgitation, and an EF of 60%.  Cardiology was consulted and evaluated the patient in the ED. Admission recommended for IV lasix 20mg BID, and KCL 20mEq PO BID.       Hospital Course:  2023 HD#1. Admitted for IV lasix, PO KCL in setting of CC of SOB with history of mitral  valve repair. , CXR with central vascular congestion, Echo with severe left atrial enlargement and moderate mitral valve regurgitation. Telemetry ordered.   2023 HD#2. NAEON. Pt reports improvement in symptoms, less SOB. S/p 2 doses of IV lasix and Kcl. UOP adequate. Cardiology following, appreciate assistance. Plan for discharge home today with PO lasix, Kcl, and follow up with cardiology outpatient.     Pt is now s/p 3 doses of IV lasix and PO Kcl. Stable for discharge home.  Discharged home on 20mg laxis PO and Kcl daily.   BNP and BMP scheduled for . Will follow up with Dr. Cordero 5/10/23. Follow up with primary Ob, Dr. Moreno.   Return precautions given. All questions answered.      Goals of Care Treatment Preferences:  Code Status: Full Code      * No surgery found *     Consults (From admission, onward)          Status Ordering Provider     Inpatient consult to Cardiology  Once        Provider:  MD Antoni Amaya EMMA            Significant Diagnostic Studies: N/A      Pending Diagnostic Studies:       None          Final Active Diagnoses:    Diagnosis Date Noted POA    PRINCIPAL PROBLEM:  Heart failure, diastolic, acute on chronic [I50.33] 2023 Yes    S/P  section [Z98.891] 2023 Not Applicable    Nonrheumatic mitral valve stenosis [I34.2] 2023 Yes    Hypertension, essential [I10] 2021 Yes    S/P mitral valve repair [Z98.890] 2017 Not Applicable      Problems Resolved During this Admission:        Discharged Condition: good    Disposition: Home or Self Care    Follow Up:   Follow-up Information       Eugene Cordero MD Follow up on 2023.    Specialty: Cardiology  Why: F/U acute on chronic HF, PP readmission  Contact information:  [address] [facility] [address]  [phone]               Mane Moreno MD Follow up in 1 week(s).    Specialties: Obstetrics, Obstetrics and Gynecology  Why: PP follow up  Contact  information:  [address] [address]  [phone]                           Patient Instructions:      B-TYPE NATRIURETIC PEPTIDE   Standing Status: Future Number of Occurrences: 1 Standing Exp. Date: 07/02/24     BASIC METABOLIC PANEL   Standing Status: Future Number of Occurrences: 1 Standing Exp. Date: 07/02/24     Diet Adult Regular     Keep surgical extremity elevated     Ice to affected area     Lifting restrictions     Other restrictions (specify):   Order Comments: Notify MD if bleeding 1 pad/hour for 2 consecutive hours.     No driving until:   Order Comments: Do not drive while taking narcotics.     Pelvic Rest   Order Comments: Pelvic rest until cleared by MD. Nothing in vagina till cleared by MD including tampons, douching, intercourse     No dressing needed     Notify your health care provider if you experience any of the following:  temperature >100.4     Notify your health care provider if you experience any of the following:  persistent nausea and vomiting or diarrhea     Notify your health care provider if you experience any of the following:  severe uncontrolled pain     Notify your health care provider if you experience any of the following:  redness, tenderness, or signs of infection (pain, swelling, redness, odor or green/yellow discharge around incision site)     Notify your health care provider if you experience any of the following:  difficulty breathing or increased cough     Notify your health care provider if you experience any of the following:  severe persistent headache     Notify your health care provider if you experience any of the following:  worsening rash     Notify your health care provider if you experience any of the following:  persistent dizziness, light-headedness, or visual disturbances     Notify your health care provider if you experience any of the following:  increased confusion or weakness     Notify your health care provider if you experience  any of the following:   Order Comments: Notify MD if bleeding 1 pad/hour for 2 consecutive hours.     Activity as tolerated     Weight bearing restrictions (specify):     Medications:  Reconciled Home Medications:      Medication List        START taking these medications      furosemide 20 MG tablet  Commonly known as: LASIX  Take 1 tablet (20 mg total) by mouth once daily.     potassium chloride 10% 20 mEq/15 mL oral solution  Commonly known as: KAYCIEL  Take 15 mLs (20 mEq total) by mouth once daily.            CONTINUE taking these medications      docusate sodium 100 MG capsule  Commonly known as: COLACE  Take 1 capsule (100 mg total) by mouth 2 (two) times daily.     ibuprofen 600 MG tablet  Commonly known as: ADVIL,MOTRIN  Take 1 tablet (600 mg total) by mouth every 6 (six) hours as needed for Pain.     metoprolol succinate 25 MG 24 hr tablet  Commonly known as: TOPROL-XL  Take 1 tablet (25 mg total) by mouth 2 (two) times daily.            STOP taking these medications      HYDROcodone-acetaminophen 5-325 mg per tablet  Commonly known as: JENNIE Villatoro MD  Obstetrics & Gynecology  [facility] Attending:  I have seen the patient, have evaluated her, and agree with Dr. Villatoro's assessment. Patient responded well to diuresis.  She will have close outpatient follow up.  Soni Esposito MD

## 2023-06-02 ENCOUNTER — POSTPARTUM VISIT (OUTPATIENT)
Dept: OBSTETRICS AND GYNECOLOGY | Facility: CLINIC | Age: 38
End: 2023-06-02
Attending: OBSTETRICS & GYNECOLOGY
Payer: COMMERCIAL

## 2023-06-02 VITALS
SYSTOLIC BLOOD PRESSURE: 116 MMHG | DIASTOLIC BLOOD PRESSURE: 80 MMHG | WEIGHT: 176.56 LBS | BODY MASS INDEX: 33.37 KG/M2

## 2023-06-02 PROCEDURE — 99999 PR PBB SHADOW E&M-EST. PATIENT-LVL III: ICD-10-PCS | Mod: PBBFAC,,, | Performed by: OBSTETRICS & GYNECOLOGY

## 2023-06-02 PROCEDURE — 99999 PR PBB SHADOW E&M-EST. PATIENT-LVL III: CPT | Mod: PBBFAC,,, | Performed by: OBSTETRICS & GYNECOLOGY

## 2023-06-02 PROCEDURE — 0503F PR POSTPARTUM CARE VISIT: ICD-10-PCS | Mod: CPTII,S$GLB,, | Performed by: OBSTETRICS & GYNECOLOGY

## 2023-06-02 PROCEDURE — 0503F POSTPARTUM CARE VISIT: CPT | Mod: CPTII,S$GLB,, | Performed by: OBSTETRICS & GYNECOLOGY

## 2023-06-02 NOTE — PROGRESS NOTES
CC: Post-partum follow-up    King Botello is a 38 y.o. female  who presents for post-partum visit.  She is 6 weeks S/P a repeat C/S, BTL, and left ovarian cystectomy on 23.  She has a history of a prior mitral valve repair and was followed by MFM and Cardiology during the pregnancy.  She was admitted on postop day #9 with fluid overload and received diuresis.  Now, she and the baby are doing well.  No pain.  No fever.   No bowel / bladder complaints.  No SOB or CP.  BP is well controlled: 116/80.  Bottle feeding.    Delivery Date: 23  Delivery MD: Dr. Moreno, Dr. Carter  Breast Feeding: NO  Depression: NO  Contraception: bilateral tubal ligation    Pregnancy was complicated by:  History of MVP with repair  CHTN  AMA  Prior C/S    23  A.  FALLOPIAN TUBE, RIGHT, TUBAL LIGATION:   - Exhibiting the usual normal luminal/muscular wall histology. No significant pathological changes.   (Complete cross section)   B.  FALLOPIAN TUBE, LEFT, TUBAL LIGATION:   - Exhibiting the usual normal luminal/muscular wall histology. No significant pathological changes.   (Complete cross section)   LEFT OVARY, CYST, CYSTECTOMY:   - Benign ovarian cyst lined by bland, predominately denuded epithelial lining, see comment.   - Negative for malignancy.   COMMENT:   The differential includes serous cystadenoma and peritoneal cyst.     22 Pap: Negative, HPV: Negative      /80   Wt 80.1 kg (176 lb 9.4 oz)   LMP 2022 (Exact Date)   BMI 33.37 kg/m²     ROS:  GENERAL: No fever, chills, fatigability.  VULVAR: No pain, no lesions and no itching.  VAGINAL: No relaxation, no itching, no discharge, no abnormal bleeding and no lesions.  ABDOMEN: No abdominal pain. Denies nausea. Denies vomiting. No diarrhea. No constipation  BREAST: Denies pain. No lumps.  URINARY: No incontinence, no nocturia, no frequency and no dysuria.  CARDIOVASCULAR: No chest pain. No shortness of breath. No leg cramps.  NEUROLOGICAL:  No headaches. No vision changes.    PHYSICAL EXAM:  Exam chaperoned by nurse  ABDOMEN:  Soft, non-tender, non-distended  INCISION: Well healed.  No sign of infection  VULVA:  Normal, no lesions  CERVIX:  Without lesions, polyps or tenderness.  UTERUS:  Normal size, shape, consistency, no mass or tenderness.  ADNEXA:  Normal in size without mass or tenderness    IMP:  Doing well 6 weeks S/P repeat C/S, BTL, ovarian cystectomy.  Instructions / precautions reviewed  Contraceptive counseling      PLAN:  May resume normal activities  Return for annual exam

## 2023-06-15 ENCOUNTER — PATIENT MESSAGE (OUTPATIENT)
Dept: CARDIOLOGY | Facility: CLINIC | Age: 38
End: 2023-06-15
Payer: COMMERCIAL

## 2023-06-15 RX ORDER — FUROSEMIDE 20 MG/1
20 TABLET ORAL DAILY
Qty: 30 TABLET | Refills: 11 | Status: SHIPPED | OUTPATIENT
Start: 2023-06-15 | End: 2023-08-17 | Stop reason: SDUPTHER

## 2023-06-19 ENCOUNTER — LAB VISIT (OUTPATIENT)
Dept: LAB | Facility: HOSPITAL | Age: 38
End: 2023-06-19
Attending: INTERNAL MEDICINE
Payer: COMMERCIAL

## 2023-06-19 DIAGNOSIS — Z98.890 S/P MITRAL VALVE REPAIR: ICD-10-CM

## 2023-06-19 DIAGNOSIS — I50.33 HEART FAILURE, DIASTOLIC, ACUTE ON CHRONIC: ICD-10-CM

## 2023-06-19 DIAGNOSIS — I34.2 NONRHEUMATIC MITRAL VALVE STENOSIS: ICD-10-CM

## 2023-06-19 LAB
ANION GAP SERPL CALC-SCNC: 9 MMOL/L (ref 8–16)
BNP SERPL-MCNC: 221 PG/ML (ref 0–99)
BUN SERPL-MCNC: 11 MG/DL (ref 6–20)
CALCIUM SERPL-MCNC: 9.8 MG/DL (ref 8.7–10.5)
CHLORIDE SERPL-SCNC: 108 MMOL/L (ref 95–110)
CO2 SERPL-SCNC: 22 MMOL/L (ref 23–29)
CREAT SERPL-MCNC: 0.7 MG/DL (ref 0.5–1.4)
EST. GFR  (NO RACE VARIABLE): >60 ML/MIN/1.73 M^2
GLUCOSE SERPL-MCNC: 99 MG/DL (ref 70–110)
POTASSIUM SERPL-SCNC: 4.4 MMOL/L (ref 3.5–5.1)
SODIUM SERPL-SCNC: 139 MMOL/L (ref 136–145)

## 2023-06-19 PROCEDURE — 83880 ASSAY OF NATRIURETIC PEPTIDE: CPT | Performed by: INTERNAL MEDICINE

## 2023-06-19 PROCEDURE — 80048 BASIC METABOLIC PNL TOTAL CA: CPT | Performed by: INTERNAL MEDICINE

## 2023-06-19 PROCEDURE — 36415 COLL VENOUS BLD VENIPUNCTURE: CPT | Performed by: INTERNAL MEDICINE

## 2023-06-20 NOTE — PROGRESS NOTES
Increase diuretic to 3 more doses of the Furosimide per week and get chem 7 and BNP in 10 weeks    Please contact me if you have any additional concerns.    Sincerely,    Eugene Cordero

## 2023-06-30 ENCOUNTER — PATIENT MESSAGE (OUTPATIENT)
Dept: OBSTETRICS AND GYNECOLOGY | Facility: CLINIC | Age: 38
End: 2023-06-30
Payer: COMMERCIAL

## 2023-07-12 ENCOUNTER — PATIENT MESSAGE (OUTPATIENT)
Dept: CARDIOLOGY | Facility: CLINIC | Age: 38
End: 2023-07-12
Payer: COMMERCIAL

## 2023-07-13 ENCOUNTER — PATIENT MESSAGE (OUTPATIENT)
Dept: OBSTETRICS AND GYNECOLOGY | Facility: CLINIC | Age: 38
End: 2023-07-13
Payer: COMMERCIAL

## 2023-07-18 ENCOUNTER — HOSPITAL ENCOUNTER (OUTPATIENT)
Dept: CARDIOLOGY | Facility: HOSPITAL | Age: 38
Discharge: HOME | End: 2023-07-18
Attending: INTERNAL MEDICINE
Payer: COMMERCIAL

## 2023-07-18 VITALS
SYSTOLIC BLOOD PRESSURE: 118 MMHG | WEIGHT: 176 LBS | HEART RATE: 75 BPM | BODY MASS INDEX: 33.23 KG/M2 | HEIGHT: 61 IN | DIASTOLIC BLOOD PRESSURE: 72 MMHG

## 2023-07-18 DIAGNOSIS — E66.01 CLASS 2 SEVERE OBESITY DUE TO EXCESS CALORIES WITH SERIOUS COMORBIDITY AND BODY MASS INDEX (BMI) OF 35.0 TO 35.9 IN ADULT: ICD-10-CM

## 2023-07-18 DIAGNOSIS — Z98.890 S/P MITRAL VALVE REPAIR: ICD-10-CM

## 2023-07-18 DIAGNOSIS — I34.2 NONRHEUMATIC MITRAL VALVE STENOSIS: ICD-10-CM

## 2023-07-18 LAB
ASCENDING AORTA: 2.07 CM
AV INDEX (PROSTH): 0.82
AV MEAN GRADIENT: 3 MMHG
AV PEAK GRADIENT: 6 MMHG
AV VALVE AREA: 2.9 CM2
AV VELOCITY RATIO: 0.78
BSA FOR ECHO PROCEDURE: 1.85 M2
CV ECHO LV RWT: 0.36 CM
DOP CALC AO PEAK VEL: 1.27 M/S
DOP CALC AO VTI: 24.21 CM
DOP CALC LVOT AREA: 3.5 CM2
DOP CALC LVOT DIAMETER: 2.12 CM
DOP CALC LVOT PEAK VEL: 0.99 M/S
DOP CALC LVOT STROKE VOLUME: 70.1 CM3
DOP CALC MV VTI: 81.39 CM
DOP CALCLVOT PEAK VEL VTI: 19.87 CM
E WAVE DECELERATION TIME: 483.74 MSEC
E/A RATIO: 1.24
E/E' RATIO: 22.78 M/S
ECHO LV POSTERIOR WALL: 0.78 CM (ref 0.6–1.1)
EJECTION FRACTION: 65 %
FRACTIONAL SHORTENING: 36 % (ref 28–44)
INTERVENTRICULAR SEPTUM: 0.75 CM (ref 0.6–1.1)
LA MAJOR: 6.56 CM
LA MINOR: 6.78 CM
LA WIDTH: 4.62 CM
LEFT ATRIUM SIZE: 5.51 CM
LEFT ATRIUM VOLUME INDEX MOD: 62 ML/M2
LEFT ATRIUM VOLUME INDEX: 80.6 ML/M2
LEFT ATRIUM VOLUME MOD: 110.91 CM3
LEFT ATRIUM VOLUME: 144.28 CM3
LEFT INTERNAL DIMENSION IN SYSTOLE: 2.75 CM (ref 2.1–4)
LEFT VENTRICLE DIASTOLIC VOLUME INDEX: 45.94 ML/M2
LEFT VENTRICLE DIASTOLIC VOLUME: 82.23 ML
LEFT VENTRICLE MASS INDEX: 55 G/M2
LEFT VENTRICLE SYSTOLIC VOLUME INDEX: 15.7 ML/M2
LEFT VENTRICLE SYSTOLIC VOLUME: 28.16 ML
LEFT VENTRICULAR INTERNAL DIMENSION IN DIASTOLE: 4.28 CM (ref 3.5–6)
LEFT VENTRICULAR MASS: 98.54 G
LV LATERAL E/E' RATIO: 15.77 M/S
LV SEPTAL E/E' RATIO: 41 M/S
MV MEAN GRADIENT: 12 MMHG
MV PEAK A VEL: 1.65 M/S
MV PEAK E VEL: 2.05 M/S
MV PEAK GRADIENT: 20 MMHG
MV STENOSIS PRESSURE HALF TIME: 140.28 MS
MV VALVE AREA BY CONTINUITY EQUATION: 0.86 CM2
MV VALVE AREA P 1/2 METHOD: 1.57 CM2
PISA TR MAX VEL: 2.81 M/S
RA MAJOR: 5.93 CM
RA PRESSURE: 3 MMHG
RA WIDTH: 4.36 CM
RIGHT VENTRICULAR END-DIASTOLIC DIMENSION: 4.07 CM
SINUS: 2.9 CM
STJ: 2.1 CM
TDI LATERAL: 0.13 M/S
TDI SEPTAL: 0.05 M/S
TDI: 0.09 M/S
TR MAX PG: 32 MMHG
TRICUSPID ANNULAR PLANE SYSTOLIC EXCURSION: 1.59 CM
TV REST PULMONARY ARTERY PRESSURE: 35 MMHG

## 2023-07-18 PROCEDURE — 93306 TTE W/DOPPLER COMPLETE: CPT | Mod: 26,,, | Performed by: INTERNAL MEDICINE

## 2023-07-18 PROCEDURE — 93306 TTE W/DOPPLER COMPLETE: CPT

## 2023-07-18 PROCEDURE — 93306 ECHO (CUPID ONLY): ICD-10-PCS | Mod: 26,,, | Performed by: INTERNAL MEDICINE

## 2023-07-22 ENCOUNTER — OFFICE VISIT (OUTPATIENT)
Dept: URGENT CARE | Facility: CLINIC | Age: 38
End: 2023-07-22
Payer: COMMERCIAL

## 2023-07-22 VITALS
SYSTOLIC BLOOD PRESSURE: 103 MMHG | WEIGHT: 176 LBS | DIASTOLIC BLOOD PRESSURE: 73 MMHG | OXYGEN SATURATION: 97 % | BODY MASS INDEX: 33.23 KG/M2 | HEART RATE: 74 BPM | HEIGHT: 61 IN | RESPIRATION RATE: 18 BRPM | TEMPERATURE: 99 F

## 2023-07-22 DIAGNOSIS — J06.9 VIRAL URI: Primary | ICD-10-CM

## 2023-07-22 DIAGNOSIS — R50.9 FEVER, UNSPECIFIED FEVER CAUSE: ICD-10-CM

## 2023-07-22 LAB
CTP QC/QA: YES
CTP QC/QA: YES
MOLECULAR STREP A: NEGATIVE
SARS-COV-2 AG RESP QL IA.RAPID: NEGATIVE

## 2023-07-22 PROCEDURE — 87651 POCT STREP A MOLECULAR: ICD-10-PCS | Mod: QW,S$GLB,, | Performed by: NURSE PRACTITIONER

## 2023-07-22 PROCEDURE — 87811 SARS CORONAVIRUS 2 ANTIGEN POCT, MANUAL READ: ICD-10-PCS | Mod: QW,S$GLB,, | Performed by: NURSE PRACTITIONER

## 2023-07-22 PROCEDURE — 99203 PR OFFICE/OUTPT VISIT, NEW, LEVL III, 30-44 MIN: ICD-10-PCS | Mod: S$GLB,,, | Performed by: NURSE PRACTITIONER

## 2023-07-22 PROCEDURE — 99203 OFFICE O/P NEW LOW 30 MIN: CPT | Mod: S$GLB,,, | Performed by: NURSE PRACTITIONER

## 2023-07-22 PROCEDURE — 87811 SARS-COV-2 COVID19 W/OPTIC: CPT | Mod: QW,S$GLB,, | Performed by: NURSE PRACTITIONER

## 2023-07-22 PROCEDURE — 87651 STREP A DNA AMP PROBE: CPT | Mod: QW,S$GLB,, | Performed by: NURSE PRACTITIONER

## 2023-07-22 RX ORDER — BROMPHENIRAMINE MALEATE, PSEUDOEPHEDRINE HYDROCHLORIDE, AND DEXTROMETHORPHAN HYDROBROMIDE 2; 30; 10 MG/5ML; MG/5ML; MG/5ML
10 SYRUP ORAL 3 TIMES DAILY PRN
Qty: 118 ML | Refills: 0 | Status: SHIPPED | OUTPATIENT
Start: 2023-07-22 | End: 2023-07-26

## 2023-07-22 RX ORDER — GUAIFENESIN 600 MG/1
1200 TABLET, EXTENDED RELEASE ORAL 2 TIMES DAILY
Qty: 40 TABLET | Refills: 0 | Status: SHIPPED | OUTPATIENT
Start: 2023-07-22 | End: 2023-07-26

## 2023-07-22 RX ORDER — FLUTICASONE PROPIONATE 50 MCG
1 SPRAY, SUSPENSION (ML) NASAL 2 TIMES DAILY
Qty: 9.9 ML | Refills: 0 | Status: SHIPPED | OUTPATIENT
Start: 2023-07-22 | End: 2023-07-26

## 2023-07-22 RX ORDER — CETIRIZINE HYDROCHLORIDE 10 MG/1
10 TABLET ORAL DAILY
Qty: 30 TABLET | Refills: 0 | Status: SHIPPED | OUTPATIENT
Start: 2023-07-22 | End: 2023-07-26

## 2023-07-22 NOTE — PROGRESS NOTES
"Subjective:      Patient ID: King Botello is a 38 y.o. female.    Vitals:  height is 5' 1" (1.549 m) and weight is 79.8 kg (176 lb). Her oral temperature is 99 °F (37.2 °C). Her blood pressure is 103/73 and her pulse is 74. Her respiration is 18 and oxygen saturation is 97%.     Chief Complaint: Fever    38-year-old female presents to clinic for evaluation of subjective fever, body aches, congestion that started last night.  She is vaccinated for COVID, denies any known sick contacts.  She is not taking any medications for her current symptoms.  She is awake and alert, answers questions appropriately, no acute distress noted on today's visit.    Fever   This is a new problem. The current episode started yesterday. The problem occurs constantly. The problem has been unchanged. Associated symptoms include congestion and coughing. Pertinent negatives include no abdominal pain, chest pain or sore throat. The treatment provided no relief.     Constitution: Positive for fever. Negative for activity change, appetite change, chills, sweating and fatigue.   HENT:  Positive for congestion. Negative for sore throat.    Cardiovascular:  Negative for chest pain.   Respiratory:  Positive for cough. Negative for shortness of breath.    Gastrointestinal:  Negative for abdominal pain.   Neurological:  Negative for dizziness.    Objective:     Physical Exam   Constitutional: She is oriented to person, place, and time. She appears well-developed.  Non-toxic appearance. She does not appear ill. No distress.   HENT:   Head: Normocephalic and atraumatic. Head is without abrasion, without contusion and without laceration.   Ears:   Right Ear: Tympanic membrane and external ear normal.   Left Ear: Tympanic membrane and external ear normal.   Nose: Nose normal. No congestion.   Mouth/Throat: Mucous membranes are normal. Mucous membranes are moist. Posterior oropharyngeal erythema present. No oropharyngeal exudate. Oropharynx is clear. "   Eyes: Conjunctivae, EOM and lids are normal. Right eye exhibits no discharge. Left eye exhibits no discharge.   Neck: Trachea normal and phonation normal. Neck supple.   Cardiovascular: Normal rate.   Pulmonary/Chest: Effort normal and breath sounds normal. No stridor. No respiratory distress. She has no wheezes.   Abdominal: Normal appearance.   Musculoskeletal: Normal range of motion.         General: Normal range of motion.   Neurological: She is alert and oriented to person, place, and time.   Skin: Skin is dry, intact, not diaphoretic and not pale. No abrasion, No burn and No ecchymosis   Psychiatric: Her speech is normal and behavior is normal. Mood, judgment and thought content normal.   Nursing note and vitals reviewed.  Results for orders placed or performed in visit on 07/22/23   SARS Coronavirus 2 Antigen, POCT Manual Read   Result Value Ref Range    SARS Coronavirus 2 Antigen Negative Negative     Acceptable Yes    POCT Strep A, Molecular   Result Value Ref Range    Molecular Strep A, POC Negative Negative     Acceptable Yes        Assessment:     1. Viral URI    2. Fever, unspecified fever cause        Plan:       Viral URI  -     cetirizine (ZYRTEC) 10 MG tablet; Take 1 tablet (10 mg total) by mouth once daily.  Dispense: 30 tablet; Refill: 0  -     fluticasone propionate (FLONASE) 50 mcg/actuation nasal spray; 1 spray (50 mcg total) by Each Nostril route 2 (two) times daily.  Dispense: 9.9 mL; Refill: 0  -     guaiFENesin (MUCINEX) 600 mg 12 hr tablet; Take 2 tablets (1,200 mg total) by mouth 2 (two) times daily. for 10 days  Dispense: 40 tablet; Refill: 0  -     brompheniramine-pseudoeph-DM (BROMFED DM) 2-30-10 mg/5 mL Syrp; Take 10 mLs by mouth 3 (three) times daily as needed (cough).  Dispense: 118 mL; Refill: 0    Fever, unspecified fever cause  -     SARS Coronavirus 2 Antigen, POCT Manual Read  -     POCT Strep A, Molecular      - negative COVID, negative strep,  discussed symptomatic care for likely viral etiology.  Follow-up with PCP.  Patient verbalized understanding and is in agreement with plan.    Patient Instructions   - Follow up with your PCP or specialty clinic as directed in the next 1-2 weeks if not improved or as needed.  You can call [phone] to schedule an appointment with the appropriate provider.    - Go to the ER or seek medical attention immediately if you develop new or worsening symptoms.    - You must understand that you have received an Urgent Care treatment only and that you may be released before all of your medical problems are known or treated.   - You, the patient, will arrange for follow up care as instructed.   - If your condition worsens or fails to improve we recommend that you receive another evaluation at the ER immediately or contact your PCP to discuss your concerns or return here.     You have tested negative for COVID on our Binax test.    If you test negative within the first 7 days of experiencing COVID-like symptoms, test again with at least 48 hours in between tests     If you test negative with no symptoms, test again 2 more times with at least 48 hours between each test      URI    - Rest.    - Drink plenty of fluids.      - Viral upper respiratory infections typically run their course in 10-14 days.      - Tylenol or Ibuprofen as directed as needed for fever/pain. Avoid tylenol if you have a history of liver disease. Do not take ibuprofen if you have a history of GI bleeding, kidney disease, or if you take blood thinners.   - Take ibuprofen 600-800 mg every 6-8 hours for pain and inflammation.  You can also take Tylenol/acetaminophen 650-1000 mg every 6-8 hours for added pain relief.     - You can take over-the-counter claritin, zyrtec, allegra, or xyzal as directed. These are antihistamines that can help with runny nose, nasal congestion, sneezing, and helps to dry up post-nasal drip, which usually causes sore throat and  cough.              - If you do NOT have high blood pressure, you may use a decongestant form (D)  of this medication or if you do not take the D form, you can take sudafed (pseudoephedrine) over the counter, which is a decongestant.     - You can use Flonase (fluticasone) nasal spray as directed for sinus congestion and postnasal drip. This is a steroid nasal spray that works locally over time to decrease the inflammation in your nose/sinuses and help with allergic symptoms. This is not an quick- relief spray like afrin, but it works well if used daily.  Discontinue if you develop nose bleed  - use nasal saline prior to Flonase.     - Use Ocean Spray Nasal Saline 1-3 puffs each nostril every 2-3 hours then blow out onto tissue. This is to irrigate the nasal passage way to clear the sinus openings. Use until sinus problem resolved.     - you can take plain Mucinex (guaifenesin) 1200 mg twice a day to help loosen mucous     -warm salt water gargles can help with sore throat     - warm tea with honey can help with cough. Honey is a natural cough suppressant.     - Follow up with your PCP or specialty clinic as directed in the next 1-2 weeks if not improved or as needed.  You can call [phone] to schedule an appointment with the appropriate provider.       - Go to the ER if you develop new or worsening symptoms.

## 2023-07-22 NOTE — PATIENT INSTRUCTIONS
- Follow up with your PCP or specialty clinic as directed in the next 1-2 weeks if not improved or as needed.  You can call [phone] to schedule an appointment with the appropriate provider.    - Go to the ER or seek medical attention immediately if you develop new or worsening symptoms.    - You must understand that you have received an Urgent Care treatment only and that you may be released before all of your medical problems are known or treated.   - You, the patient, will arrange for follow up care as instructed.   - If your condition worsens or fails to improve we recommend that you receive another evaluation at the ER immediately or contact your PCP to discuss your concerns or return here.     You have tested negative for COVID on our Binax test.    If you test negative within the first 7 days of experiencing COVID-like symptoms, test again with at least 48 hours in between tests     If you test negative with no symptoms, test again 2 more times with at least 48 hours between each test      URI    - Rest.    - Drink plenty of fluids.      - Viral upper respiratory infections typically run their course in 10-14 days.      - Tylenol or Ibuprofen as directed as needed for fever/pain. Avoid tylenol if you have a history of liver disease. Do not take ibuprofen if you have a history of GI bleeding, kidney disease, or if you take blood thinners.   - Take ibuprofen 600-800 mg every 6-8 hours for pain and inflammation.  You can also take Tylenol/acetaminophen 650-1000 mg every 6-8 hours for added pain relief.     - You can take over-the-counter claritin, zyrtec, allegra, or xyzal as directed. These are antihistamines that can help with runny nose, nasal congestion, sneezing, and helps to dry up post-nasal drip, which usually causes sore throat and cough.              - If you do NOT have high blood pressure, you may use a decongestant form (D)  of this medication or if you do not take the D form, you can take  sudafed (pseudoephedrine) over the counter, which is a decongestant.     - You can use Flonase (fluticasone) nasal spray as directed for sinus congestion and postnasal drip. This is a steroid nasal spray that works locally over time to decrease the inflammation in your nose/sinuses and help with allergic symptoms. This is not an quick- relief spray like afrin, but it works well if used daily.  Discontinue if you develop nose bleed  - use nasal saline prior to Flonase.     - Use Ocean Spray Nasal Saline 1-3 puffs each nostril every 2-3 hours then blow out onto tissue. This is to irrigate the nasal passage way to clear the sinus openings. Use until sinus problem resolved.     - you can take plain Mucinex (guaifenesin) 1200 mg twice a day to help loosen mucous     -warm salt water gargles can help with sore throat     - warm tea with honey can help with cough. Honey is a natural cough suppressant.     - Follow up with your PCP or specialty clinic as directed in the next 1-2 weeks if not improved or as needed.  You can call [phone] to schedule an appointment with the appropriate provider.       - Go to the ER if you develop new or worsening symptoms.

## 2023-07-25 ENCOUNTER — PATIENT MESSAGE (OUTPATIENT)
Dept: CARDIOLOGY | Facility: CLINIC | Age: 38
End: 2023-07-25
Payer: COMMERCIAL

## 2023-07-26 ENCOUNTER — OFFICE VISIT (OUTPATIENT)
Dept: CARDIOLOGY | Facility: CLINIC | Age: 38
End: 2023-07-26
Payer: COMMERCIAL

## 2023-07-26 VITALS
BODY MASS INDEX: 32.09 KG/M2 | WEIGHT: 174.38 LBS | HEART RATE: 68 BPM | SYSTOLIC BLOOD PRESSURE: 109 MMHG | HEIGHT: 62 IN | OXYGEN SATURATION: 96 % | DIASTOLIC BLOOD PRESSURE: 68 MMHG

## 2023-07-26 DIAGNOSIS — I34.1 MITRAL VALVE PROLAPSE: Primary | Chronic | ICD-10-CM

## 2023-07-26 DIAGNOSIS — T82.857D STENOSIS OF PROSTHETIC MITRAL VALVE, SUBSEQUENT ENCOUNTER: ICD-10-CM

## 2023-07-26 DIAGNOSIS — I10 HYPERTENSION, ESSENTIAL: ICD-10-CM

## 2023-07-26 DIAGNOSIS — I34.2 NONRHEUMATIC MITRAL VALVE STENOSIS: ICD-10-CM

## 2023-07-26 DIAGNOSIS — Z98.890 S/P MITRAL VALVE REPAIR: Primary | ICD-10-CM

## 2023-07-26 PROBLEM — T82.857A PROSTHETIC MITRAL VALVE STENOSIS: Status: ACTIVE | Noted: 2023-07-26

## 2023-07-26 PROCEDURE — 3074F PR MOST RECENT SYSTOLIC BLOOD PRESSURE < 130 MM HG: ICD-10-PCS | Mod: CPTII,S$GLB,, | Performed by: INTERNAL MEDICINE

## 2023-07-26 PROCEDURE — 99204 PR OFFICE/OUTPT VISIT, NEW, LEVL IV, 45-59 MIN: ICD-10-PCS | Mod: S$GLB,,, | Performed by: INTERNAL MEDICINE

## 2023-07-26 PROCEDURE — 1159F PR MEDICATION LIST DOCUMENTED IN MEDICAL RECORD: ICD-10-PCS | Mod: CPTII,S$GLB,, | Performed by: INTERNAL MEDICINE

## 2023-07-26 PROCEDURE — 3074F SYST BP LT 130 MM HG: CPT | Mod: CPTII,S$GLB,, | Performed by: INTERNAL MEDICINE

## 2023-07-26 PROCEDURE — 1159F MED LIST DOCD IN RCRD: CPT | Mod: CPTII,S$GLB,, | Performed by: INTERNAL MEDICINE

## 2023-07-26 PROCEDURE — 99204 OFFICE O/P NEW MOD 45 MIN: CPT | Mod: S$GLB,,, | Performed by: INTERNAL MEDICINE

## 2023-07-26 PROCEDURE — 3008F BODY MASS INDEX DOCD: CPT | Mod: CPTII,S$GLB,, | Performed by: INTERNAL MEDICINE

## 2023-07-26 PROCEDURE — 3078F PR MOST RECENT DIASTOLIC BLOOD PRESSURE < 80 MM HG: ICD-10-PCS | Mod: CPTII,S$GLB,, | Performed by: INTERNAL MEDICINE

## 2023-07-26 PROCEDURE — 3078F DIAST BP <80 MM HG: CPT | Mod: CPTII,S$GLB,, | Performed by: INTERNAL MEDICINE

## 2023-07-26 PROCEDURE — 3008F PR BODY MASS INDEX (BMI) DOCUMENTED: ICD-10-PCS | Mod: CPTII,S$GLB,, | Performed by: INTERNAL MEDICINE

## 2023-07-26 PROCEDURE — 99999 PR PBB SHADOW E&M-EST. PATIENT-LVL III: ICD-10-PCS | Mod: PBBFAC,,, | Performed by: INTERNAL MEDICINE

## 2023-07-26 PROCEDURE — 99999 PR PBB SHADOW E&M-EST. PATIENT-LVL III: CPT | Mod: PBBFAC,,, | Performed by: INTERNAL MEDICINE

## 2023-07-26 RX ORDER — AMOXICILLIN 500 MG/1
2000 TABLET, FILM COATED ORAL DAILY PRN
Qty: 12 TABLET | Refills: 2 | Status: ON HOLD | OUTPATIENT
Start: 2023-07-26 | End: 2024-02-06 | Stop reason: HOSPADM

## 2023-07-26 RX ORDER — AMOXICILLIN 500 MG/1
2000 TABLET, FILM COATED ORAL DAILY PRN
Qty: 12 TABLET | Refills: 2 | OUTPATIENT
Start: 2023-07-26

## 2023-07-26 RX ORDER — METOPROLOL SUCCINATE 25 MG/1
TABLET, EXTENDED RELEASE ORAL
Qty: 270 TABLET | Refills: 3 | Status: ON HOLD | OUTPATIENT
Start: 2023-07-26 | End: 2024-01-18 | Stop reason: HOSPADM

## 2023-07-26 NOTE — ASSESSMENT & PLAN NOTE
- Continue management as per MV stenosis  - Currently BP normal  - Improvement in HR might improve BP    - Monitor BP  - Meds as per MV stenosis A&P

## 2023-07-26 NOTE — ASSESSMENT & PLAN NOTE
"- Patient with history of MVP s/p mitral valve annuloplasty (19yo) and subsequent mitral valve stenosis  - Has been controlled for many years, and likely decompensated in the setting of pregnancy  - Currently gradient concerning for severe, but area consistent with moderate  - Improving with Furosemide, still room to improve HR  - Patient will most likely need new surgical MV replacement, sooner rather than later if symptoms do not improve  - Discussed with echocardiography team and if exercise evaluation is decided on future evaluations, then the order should be for a "3D MVA" by transthoracic echocardio (as free text).    - Increase Metoprolol from 50mg to 75mg /day (50mg am and 25mg pm)  - Continue furosemide alternating-day doses as before (20mg and 40mg)  - Follow up in 3 months to assess symptoms and future steps in management  "

## 2023-07-26 NOTE — TELEPHONE ENCOUNTER
King STRAUSS Staff (supporting Eugene Cordero MD) 16 minutes ago (1:14 PM)       May need to send it to [facility] in [place] instead.  Its closer. I was going to get it today while I was seeing the valve clinic.        MD Anabelle Shaffer MA 5 hours ago (7:57 AM)       Amoxicilin 500 mg 4 pills an hour before. Give 12 pills with 2 refills,CJL      Anabelle Oshea MA routed conversation to Eugene Cordero MD 18 hours ago (6:53 PM)     King STRAUSS Staff (supporting Eugene Cordero MD) 23 hours ago (2:09 PM)       I am going get a dental cleaning on Thursday. Can you please send in the antibiotics I need to premedicate to the [facility] pharmacy.      Thanks   King

## 2023-07-26 NOTE — TELEPHONE ENCOUNTER
Spoke to pt . She said that she was not sure if prescription was at pharmacy. I told her that it is and she verbalized understanding.

## 2023-07-26 NOTE — PROGRESS NOTES
Interventional Cardiology Clinic Note  Reason for Visit: moderate to Severe Mitral Stenosis    HPI:   39yo F patient with MVP s/p MV annuloplasty and now with moderate mitral valve stenosis, who was being seen on the outpatient clinic after pregnancy for follow up of MV stenosis. After delivery patient had episode of shortness of breath, which improved with lasix. She states that she is feeling better with furosemide, which she is taking 20mg/40mg alternating days. Additionally she is also on metoprolol for rate control, which has improved her BP and HR. She denies chest pain, palpitations, orthopnea, PND, claudication. TTE moderate right atrial enlargement, there is moderate to severe mitral stenosis. Javier score is 10, the mean diastolic gradient across the mitral valve is 12 mmHg at a heart rate of 72 bpm; MVA 1.55.    ROS:    Constitution: Negative for fever, chills, weight loss or gain.   HENT: Negative for sore throat, rhinorrhea, or headache.  Eyes: Negative for blurred or double vision.   Cardiovascular: See above  Pulmonary: Negative for SOB   Gastrointestinal: Negative for abdominal pain, nausea, vomiting, or diarrhea.   : Negative for dysuria.   Neurological: Negative for focal weakness or sensory changes.  PMH:     Past Medical History:   Diagnosis Date    Abnormal Pap smear     Colposcopy    Bradycardia 2020    Class 2 obesity due to excess calories with body mass index (BMI) of 35.0 to 35.9 in adult 3/9/2021    Hypertension, essential 3/16/2021    Menarche     Age of onset 11    Missed ab 2021    Mitral valve prolapse     Previous  delivery affecting pregnancy, antepartum 2020    S/P suction D&C 2021    Status post mitral valve annuloplasty 2017     Past Surgical History:   Procedure Laterality Date     SECTION       SECTION WITH TUBAL LIGATION Bilateral 2023    Procedure:  SECTION, WITH TUBAL LIGATION;  Surgeon: Mane Moreno,  MD;  Location: [facility] L&D;  Service: OB/GYN;  Laterality: Bilateral;    DILATION AND CURETTAGE OF UTERUS USING SUCTION N/A 7/1/2021    Procedure: DILATION AND CURETTAGE, UTERUS, USING SUCTION;  Surgeon: Mane Moreno MD;  Location: [facility] OR;  Service: OB/GYN;  Laterality: N/A;    MITRAL VALVE REPAIR       Allergies:     Review of patient's allergies indicates:   Allergen Reactions    Doxycycline      Abdomen pain severe     Medications:     Current Outpatient Medications on File Prior to Visit   Medication Sig Dispense Refill    furosemide (LASIX) 20 MG tablet Take 1 tablet (20 mg total) by mouth once daily. 30 tablet 11    metoprolol succinate (TOPROL-XL) 25 MG 24 hr tablet Take 1 tablet (25 mg total) by mouth 2 (two) times daily. 60 tablet 11    brompheniramine-pseudoeph-DM (BROMFED DM) 2-30-10 mg/5 mL Syrp Take 10 mLs by mouth 3 (three) times daily as needed (cough). (Patient not taking: Reported on 7/26/2023) 118 mL 0    cetirizine (ZYRTEC) 10 MG tablet Take 1 tablet (10 mg total) by mouth once daily. (Patient not taking: Reported on 7/26/2023) 30 tablet 0    docusate sodium (COLACE) 100 MG capsule Take 1 capsule (100 mg total) by mouth 2 (two) times daily. (Patient not taking: Reported on 7/22/2023) 30 capsule 0    fluticasone propionate (FLONASE) 50 mcg/actuation nasal spray 1 spray (50 mcg total) by Each Nostril route 2 (two) times daily. (Patient not taking: Reported on 7/26/2023) 9.9 mL 0    guaiFENesin (MUCINEX) 600 mg 12 hr tablet Take 2 tablets (1,200 mg total) by mouth 2 (two) times daily. for 10 days (Patient not taking: Reported on 7/26/2023) 40 tablet 0    ibuprofen (ADVIL,MOTRIN) 600 MG tablet Take 1 tablet (600 mg total) by mouth every 6 (six) hours as needed for Pain. (Patient not taking: Reported on 7/22/2023) 30 tablet 1     No current facility-administered medications on file prior to visit.     Social History:     Social History     Tobacco Use    Smoking status: Never    Smokeless tobacco:  "Never   Substance Use Topics    Alcohol use: Not Currently     Comment: not since +UPT     Family History:     Family History   Problem Relation Age of Onset    Heart disease Mother     Hypertension Mother     Hyperlipidemia Mother     Diabetes Mother     Cancer Mother     Hyperlipidemia Father     Hypertension Father     Breast cancer Paternal Aunt     Colon cancer Neg Hx     Ovarian cancer Neg Hx      Physical Exam:   /68 (BP Location: Right arm, Patient Position: Sitting, BP Method: Large (Automatic))   Pulse 68   Ht 5' 1.5" (1.562 m)   Wt 79.1 kg (174 lb 6.1 oz)   LMP 07/26/2023 (Exact Date)   SpO2 96%   BMI 32.42 kg/m²    Wt Readings from Last 4 Encounters:   07/26/23 79.1 kg (174 lb 6.1 oz)   07/22/23 79.8 kg (176 lb)   07/18/23 79.8 kg (176 lb)   06/02/23 80.1 kg (176 lb 9.4 oz)     Constitutional: No distress, obese, conversant  HEENT: Sclera anicteric, PERRLA  Neck: No JVD, no masses, good movement  CV: RRR, S1 and S2 normal, Pulses 2+ and equal bilaterally in radial arteries, distal pulses are 2+ and equal in the femoral, DP and PT areas bilaterally  Pulm: Clear to auscultation bilaterally with symmetrical expansion.   GI: Abdomen soft, non-tender, good bowel sounds  Extremities: Both extremities intact and grossly normal, skin is warm, no edema noted  Skin: No ecchymosis, erythema, or ulcers  Psych: AOx3, appropriate affect  Neuro: CNII-XII intact, no focal deficits      Labs:     Lab Results   Component Value Date     06/19/2023    K 4.4 06/19/2023     06/19/2023    CO2 22 (L) 06/19/2023    BUN 11 06/19/2023    CREATININE 0.7 06/19/2023    GLUCOSE 77 11/18/2008    ANIONGAP 9 06/19/2023     No results found for: HGBA1C  Lab Results   Component Value Date     (H) 06/19/2023     (H) 05/24/2023     (H) 05/08/2023    Lab Results   Component Value Date    WBC 15.23 (H) 05/03/2023    HGB 8.7 (L) 05/03/2023    HCT 27.5 (L) 05/03/2023     05/03/2023    GRAN 73.0 " 05/03/2023    GRAN 19.0 (H) 04/27/2023     Lab Results   Component Value Date    CHOL 199 06/28/2022    HDL 54 06/28/2022    LDLCALC 124.0 06/28/2022    TRIG 105 06/28/2022          Imaging:         EF   Date Value Ref Range Status   07/18/2023 65 % Final   05/03/2023 60 % Final   02/03/2023 60 % Final       EKG 04/28/23:   Normal sinus rhythm   Possible Left atrial enlargement   Nonspecific ST abnormality   Abnormal ECG   When compared with ECG of 14-FEB-2023 07:54,   No significant change was found   Confirmed by MARY BUSTILLO MD ([id number]) on 5/10/2023 9:34:05 AM     TTE 03/14/23:  The left ventricle is normal in size with normal systolic function.  The estimated ejection fraction is 65%.  There is abnormal septal wall motion.  There are segmental left ventricular wall motion abnormalities.  Severe left atrial enlargement.  Normal right ventricular size with normal right ventricular systolic function.  Moderate right atrial enlargement.  There is moderate to severe mitral stenosis. Javier score is 10.  The mean diastolic gradient across the mitral valve is 12 mmHg at a heart rate of 72 bpm; MVA 1.55  Mild mitral regurgitation.  Mild tricuspid regurgitation.  Normal central venous pressure (3 mmHg).  The estimated PA systolic pressure is 35 mmHg.    TTE 05/03/23:  The left ventricle is normal in size with normal systolic function.  The estimated ejection fraction is 60%.  The left ventricular global longitudinal strain is -15%.  Indeterminate left ventricular diastolic function.  Severe left atrial enlargement.  Normal right ventricular size with normal right ventricular systolic function.  Moderate right atrial enlargement.  The mitral valve is s/p repair.  Moderate mitral regurgitation.  There is severe mitral stenosis.  The mean diastolic gradient across the mitral valve is 16 mmHg at a heart rate of 77 bpm.  Moderate tricuspid regurgitation.  There is mild pulmonary hypertension.  The estimated PA systolic  "pressure is 41 mmHg.  Normal central venous pressure (3 mmHg).         Assessment and Plan:      Problem List Items Addressed This Visit          Cardiac/Vascular    Prosthetic mitral valve stenosis    Current Assessment & Plan     - Patient with history of MVP s/p mitral valve annuloplasty (19yo) and subsequent mitral valve stenosis  - Has been controlled for many years, and likely decompensated in the setting of pregnancy  - Currently gradient concerning for severe, but area consistent with moderate  - Improving with Furosemide, still room to improve HR  - Patient will most likely need new surgical MV replacement, sooner rather than later if symptoms do not improve  - Discussed with echocardiography team and if exercise evaluation is decided on future evaluations, then the order should be for a "3D MVA" by transthoracic echocardio (as free text).    - Increase Metoprolol from 50mg to 75mg /day (50mg am and 25mg pm)  - Continue furosemide alternating-day doses as before (20mg and 40mg)  - Follow up in 3 months to assess symptoms and future steps in management         S/P mitral valve repair    Hypertension, essential    Current Assessment & Plan     - Continue management as per MV stenosis  - Currently BP normal  - Improvement in HR might improve BP    - Monitor BP  - Meds as per MV stenosis A&P         Nonrheumatic mitral valve stenosis - Primary    Overview     Moderate to moderate -severe            Signed:  Sunday Flores M.D.  Cardiology Fellow  [facility]  7/26/2023 2:21 PM      "

## 2023-08-13 NOTE — PROGRESS NOTES
Subjective:   Patient ID:  King Botello is a 38 y.o. female who presents for follow-up of VHD/MS    HPI:  Patient is here for valvular heart disease.  The patient has no chest pain, SOB, TIA, palpitations, syncope or pre-syncope.Patient currently exercises a few times per week.        Review of Systems   Constitutional: Negative for chills, decreased appetite, diaphoresis, fever, malaise/fatigue, night sweats, weight gain and weight loss.   HENT:  Negative for congestion, hoarse voice, nosebleeds, sore throat and tinnitus.    Eyes:  Negative for blurred vision, double vision, vision loss in left eye, vision loss in right eye, visual disturbance and visual halos.   Cardiovascular:  Negative for chest pain, claudication, cyanosis, dyspnea on exertion, irregular heartbeat, leg swelling, near-syncope, orthopnea, palpitations, paroxysmal nocturnal dyspnea and syncope.   Respiratory:  Negative for cough, hemoptysis, shortness of breath, sleep disturbances due to breathing, snoring, sputum production and wheezing.    Endocrine: Negative for cold intolerance, heat intolerance, polydipsia, polyphagia and polyuria.   Hematologic/Lymphatic: Negative for adenopathy and bleeding problem. Does not bruise/bleed easily.   Skin:  Negative for color change, dry skin, flushing, itching, nail changes, poor wound healing, rash, skin cancer, suspicious lesions and unusual hair distribution.   Musculoskeletal:  Negative for arthritis, back pain, falls, gout, joint pain, joint swelling, muscle cramps, muscle weakness, myalgias and stiffness.   Gastrointestinal:  Negative for abdominal pain, anorexia, change in bowel habit, constipation, diarrhea, dysphagia, heartburn, hematemesis, hematochezia, melena and vomiting.   Genitourinary:  Negative for decreased libido, dysuria, hematuria, hesitancy and urgency.   Neurological:  Negative for excessive daytime sleepiness, dizziness, focal weakness, headaches, light-headedness, loss of  "balance, numbness, paresthesias, seizures, sensory change, tremors, vertigo and weakness.   Psychiatric/Behavioral:  Negative for altered mental status, depression, hallucinations, memory loss, substance abuse and suicidal ideas. The patient does not have insomnia and is not nervous/anxious.    Allergic/Immunologic: Negative for environmental allergies and hives.       Objective: /75   Pulse 65   Ht 5' 1" (1.549 m)   Wt 79.3 kg (174 lb 13.2 oz)   LMP 07/26/2023 (Exact Date)   BMI 33.03 kg/m²      Physical Exam  Constitutional:       Appearance: She is well-developed.   HENT:      Head: Normocephalic.   Eyes:      Pupils: Pupils are equal, round, and reactive to light.   Neck:      Thyroid: No thyromegaly.      Vascular: Normal carotid pulses. No carotid bruit, hepatojugular reflux or JVD.   Cardiovascular:      Rate and Rhythm: Normal rate and regular rhythm.      Pulses: Intact distal pulses.      Heart sounds: Murmur heard.      Diastolic murmur is present with a grade of 1/4.      No friction rub. No gallop.   Pulmonary:      Effort: Pulmonary effort is normal. No tachypnea or respiratory distress.      Breath sounds: Normal breath sounds. No wheezing or rales.   Chest:      Chest wall: No tenderness.   Abdominal:      General: Bowel sounds are normal. There is no distension.      Palpations: Abdomen is soft. There is no mass.      Tenderness: There is no abdominal tenderness. There is no guarding or rebound.   Musculoskeletal:         General: No tenderness. Normal range of motion.      Cervical back: Normal range of motion.   Lymphadenopathy:      Cervical: No cervical adenopathy.   Skin:     General: Skin is warm.      Findings: No erythema or rash.   Neurological:      Mental Status: She is alert and oriented to person, place, and time.      Cranial Nerves: No cranial nerve deficit.      Coordination: Coordination normal.   Psychiatric:         Behavior: Behavior normal.         Thought Content: " Thought content normal.         Judgment: Judgment normal.         Assessment:     1. S/P mitral valve repair    2. Stenosis of prosthetic mitral valve, subsequent encounter    3. Heart failure, diastolic, acute on chronic    4. Nonrheumatic mitral valve stenosis    5. Pregnancy, unspecified gestational age    6. Hypertension, essential    7. Class 2 severe obesity due to excess calories with serious comorbidity and body mass index (BMI) of 35.0 to 35.9 in adult    8. Acute bilateral low back pain without sciatica    9. Vitamin D deficiency disease        Plan:   Discussed diet , achieving and maintaining ideal body weight, and exercise.   We reviewed meds in detail.  Reassured-Discussed goals, options, plan.  We have discussed the need for endocarditis prophylaxis.   Let's increase Furosimide by an extra every other day and KCL 10 MEQ Daily         King was seen today for hypertension and s/p mitral valve repair.    Diagnoses and all orders for this visit:    S/P mitral valve repair    Stenosis of prosthetic mitral valve, subsequent encounter    Heart failure, diastolic, acute on chronic  -     potassium chloride SA (K-DUR,KLOR-CON M) 10 MEQ tablet; Take 2 tablets (20 mEq total) by mouth 2 (two) times daily.  -     Basic Metabolic Panel; Standing  -     BNP; Standing    Nonrheumatic mitral valve stenosis  -     potassium chloride SA (K-DUR,KLOR-CON M) 10 MEQ tablet; Take 2 tablets (20 mEq total) by mouth 2 (two) times daily.  -     Basic Metabolic Panel; Standing  -     BNP; Standing    Pregnancy, unspecified gestational age    Hypertension, essential    Class 2 severe obesity due to excess calories with serious comorbidity and body mass index (BMI) of 35.0 to 35.9 in adult    Acute bilateral low back pain without sciatica    Vitamin D deficiency disease            Follow up for labs 3 and 6 months.

## 2023-08-14 ENCOUNTER — LAB VISIT (OUTPATIENT)
Dept: LAB | Facility: HOSPITAL | Age: 38
End: 2023-08-14
Payer: COMMERCIAL

## 2023-08-14 DIAGNOSIS — I10 HYPERTENSION, ESSENTIAL: ICD-10-CM

## 2023-08-14 DIAGNOSIS — M25.473 ANKLE SWELLING, UNSPECIFIED LATERALITY: ICD-10-CM

## 2023-08-14 DIAGNOSIS — Z98.890 STATUS POST MITRAL VALVE ANNULOPLASTY: ICD-10-CM

## 2023-08-14 DIAGNOSIS — R00.1 BRADYCARDIA: ICD-10-CM

## 2023-08-14 LAB
ALBUMIN SERPL BCP-MCNC: 3.8 G/DL (ref 3.5–5.2)
ALP SERPL-CCNC: 69 U/L (ref 55–135)
ALT SERPL W/O P-5'-P-CCNC: 18 U/L (ref 10–44)
ANION GAP SERPL CALC-SCNC: 10 MMOL/L (ref 8–16)
AST SERPL-CCNC: 21 U/L (ref 10–40)
BASOPHILS # BLD AUTO: 0.04 K/UL (ref 0–0.2)
BASOPHILS NFR BLD: 0.4 % (ref 0–1.9)
BILIRUB SERPL-MCNC: 0.4 MG/DL (ref 0.1–1)
BNP SERPL-MCNC: 215 PG/ML (ref 0–99)
BUN SERPL-MCNC: 16 MG/DL (ref 6–20)
CALCIUM SERPL-MCNC: 9.4 MG/DL (ref 8.7–10.5)
CHLORIDE SERPL-SCNC: 104 MMOL/L (ref 95–110)
CHOLEST SERPL-MCNC: 212 MG/DL (ref 120–199)
CHOLEST/HDLC SERPL: 4.5 {RATIO} (ref 2–5)
CO2 SERPL-SCNC: 23 MMOL/L (ref 23–29)
CREAT SERPL-MCNC: 0.7 MG/DL (ref 0.5–1.4)
DIFFERENTIAL METHOD: ABNORMAL
EOSINOPHIL # BLD AUTO: 0.1 K/UL (ref 0–0.5)
EOSINOPHIL NFR BLD: 0.9 % (ref 0–8)
ERYTHROCYTE [DISTWIDTH] IN BLOOD BY AUTOMATED COUNT: 16.1 % (ref 11.5–14.5)
EST. GFR  (NO RACE VARIABLE): >60 ML/MIN/1.73 M^2
GLUCOSE SERPL-MCNC: 102 MG/DL (ref 70–110)
HCT VFR BLD AUTO: 36.6 % (ref 37–48.5)
HDLC SERPL-MCNC: 47 MG/DL (ref 40–75)
HDLC SERPL: 22.2 % (ref 20–50)
HGB BLD-MCNC: 11.7 G/DL (ref 12–16)
IMM GRANULOCYTES # BLD AUTO: 0.03 K/UL (ref 0–0.04)
IMM GRANULOCYTES NFR BLD AUTO: 0.3 % (ref 0–0.5)
LDLC SERPL CALC-MCNC: 129.6 MG/DL (ref 63–159)
LYMPHOCYTES # BLD AUTO: 3.2 K/UL (ref 1–4.8)
LYMPHOCYTES NFR BLD: 33.7 % (ref 18–48)
MCH RBC QN AUTO: 25.2 PG (ref 27–31)
MCHC RBC AUTO-ENTMCNC: 32 G/DL (ref 32–36)
MCV RBC AUTO: 79 FL (ref 82–98)
MONOCYTES # BLD AUTO: 0.7 K/UL (ref 0.3–1)
MONOCYTES NFR BLD: 7.2 % (ref 4–15)
NEUTROPHILS # BLD AUTO: 5.5 K/UL (ref 1.8–7.7)
NEUTROPHILS NFR BLD: 57.5 % (ref 38–73)
NONHDLC SERPL-MCNC: 165 MG/DL
NRBC BLD-RTO: 0 /100 WBC
PLATELET # BLD AUTO: 443 K/UL (ref 150–450)
PMV BLD AUTO: 10.4 FL (ref 9.2–12.9)
POTASSIUM SERPL-SCNC: 3.9 MMOL/L (ref 3.5–5.1)
PROT SERPL-MCNC: 7.4 G/DL (ref 6–8.4)
RBC # BLD AUTO: 4.64 M/UL (ref 4–5.4)
SODIUM SERPL-SCNC: 137 MMOL/L (ref 136–145)
TRIGL SERPL-MCNC: 177 MG/DL (ref 30–150)
TSH SERPL DL<=0.005 MIU/L-ACNC: 0.82 UIU/ML (ref 0.4–4)
WBC # BLD AUTO: 9.62 K/UL (ref 3.9–12.7)

## 2023-08-14 PROCEDURE — 80053 COMPREHEN METABOLIC PANEL: CPT | Performed by: INTERNAL MEDICINE

## 2023-08-14 PROCEDURE — 85025 COMPLETE CBC W/AUTO DIFF WBC: CPT | Performed by: INTERNAL MEDICINE

## 2023-08-14 PROCEDURE — 84443 ASSAY THYROID STIM HORMONE: CPT | Performed by: INTERNAL MEDICINE

## 2023-08-14 PROCEDURE — 80061 LIPID PANEL: CPT | Performed by: INTERNAL MEDICINE

## 2023-08-14 PROCEDURE — 36415 COLL VENOUS BLD VENIPUNCTURE: CPT | Performed by: INTERNAL MEDICINE

## 2023-08-14 PROCEDURE — 83880 ASSAY OF NATRIURETIC PEPTIDE: CPT | Performed by: INTERNAL MEDICINE

## 2023-08-15 NOTE — PROGRESS NOTES
Your results look fine and do not require any change in treatment. Let's increase Furosimide by an extra every other day and KCL 10 MEQ Daily    Please contact me if you have any additional concerns.    Sincerely,    Eugene Cordero

## 2023-08-16 ENCOUNTER — OFFICE VISIT (OUTPATIENT)
Dept: CARDIOLOGY | Facility: CLINIC | Age: 38
End: 2023-08-16
Payer: COMMERCIAL

## 2023-08-16 VITALS
SYSTOLIC BLOOD PRESSURE: 112 MMHG | HEIGHT: 61 IN | DIASTOLIC BLOOD PRESSURE: 75 MMHG | BODY MASS INDEX: 33 KG/M2 | HEART RATE: 65 BPM | WEIGHT: 174.81 LBS

## 2023-08-16 DIAGNOSIS — I10 HYPERTENSION, ESSENTIAL: ICD-10-CM

## 2023-08-16 DIAGNOSIS — M54.50 ACUTE BILATERAL LOW BACK PAIN WITHOUT SCIATICA: ICD-10-CM

## 2023-08-16 DIAGNOSIS — I50.33 HEART FAILURE, DIASTOLIC, ACUTE ON CHRONIC: ICD-10-CM

## 2023-08-16 DIAGNOSIS — I34.2 NONRHEUMATIC MITRAL VALVE STENOSIS: ICD-10-CM

## 2023-08-16 DIAGNOSIS — E55.9 VITAMIN D DEFICIENCY DISEASE: ICD-10-CM

## 2023-08-16 DIAGNOSIS — E66.01 CLASS 2 SEVERE OBESITY DUE TO EXCESS CALORIES WITH SERIOUS COMORBIDITY AND BODY MASS INDEX (BMI) OF 35.0 TO 35.9 IN ADULT: ICD-10-CM

## 2023-08-16 DIAGNOSIS — Z98.890 S/P MITRAL VALVE REPAIR: Primary | ICD-10-CM

## 2023-08-16 DIAGNOSIS — Z34.90 PREGNANCY, UNSPECIFIED GESTATIONAL AGE: ICD-10-CM

## 2023-08-16 DIAGNOSIS — T82.857D STENOSIS OF PROSTHETIC MITRAL VALVE, SUBSEQUENT ENCOUNTER: ICD-10-CM

## 2023-08-16 PROCEDURE — 99215 OFFICE O/P EST HI 40 MIN: CPT | Mod: S$GLB,,, | Performed by: INTERNAL MEDICINE

## 2023-08-16 PROCEDURE — 3074F PR MOST RECENT SYSTOLIC BLOOD PRESSURE < 130 MM HG: ICD-10-PCS | Mod: CPTII,S$GLB,, | Performed by: INTERNAL MEDICINE

## 2023-08-16 PROCEDURE — 3074F SYST BP LT 130 MM HG: CPT | Mod: CPTII,S$GLB,, | Performed by: INTERNAL MEDICINE

## 2023-08-16 PROCEDURE — 1159F PR MEDICATION LIST DOCUMENTED IN MEDICAL RECORD: ICD-10-PCS | Mod: CPTII,S$GLB,, | Performed by: INTERNAL MEDICINE

## 2023-08-16 PROCEDURE — 3078F PR MOST RECENT DIASTOLIC BLOOD PRESSURE < 80 MM HG: ICD-10-PCS | Mod: CPTII,S$GLB,, | Performed by: INTERNAL MEDICINE

## 2023-08-16 PROCEDURE — 99999 PR PBB SHADOW E&M-EST. PATIENT-LVL IV: ICD-10-PCS | Mod: PBBFAC,,, | Performed by: INTERNAL MEDICINE

## 2023-08-16 PROCEDURE — 3078F DIAST BP <80 MM HG: CPT | Mod: CPTII,S$GLB,, | Performed by: INTERNAL MEDICINE

## 2023-08-16 PROCEDURE — 3008F BODY MASS INDEX DOCD: CPT | Mod: CPTII,S$GLB,, | Performed by: INTERNAL MEDICINE

## 2023-08-16 PROCEDURE — 1159F MED LIST DOCD IN RCRD: CPT | Mod: CPTII,S$GLB,, | Performed by: INTERNAL MEDICINE

## 2023-08-16 PROCEDURE — 99999 PR PBB SHADOW E&M-EST. PATIENT-LVL IV: CPT | Mod: PBBFAC,,, | Performed by: INTERNAL MEDICINE

## 2023-08-16 PROCEDURE — 99215 PR OFFICE/OUTPT VISIT, EST, LEVL V, 40-54 MIN: ICD-10-PCS | Mod: S$GLB,,, | Performed by: INTERNAL MEDICINE

## 2023-08-16 PROCEDURE — 3008F PR BODY MASS INDEX (BMI) DOCUMENTED: ICD-10-PCS | Mod: CPTII,S$GLB,, | Performed by: INTERNAL MEDICINE

## 2023-08-16 RX ORDER — POTASSIUM CHLORIDE 750 MG/1
20 TABLET, EXTENDED RELEASE ORAL 2 TIMES DAILY
Qty: 180 TABLET | Refills: 3 | Status: SHIPPED | OUTPATIENT
Start: 2023-08-16 | End: 2023-08-18 | Stop reason: SDUPTHER

## 2023-08-16 NOTE — PATIENT INSTRUCTIONS
Discussed diet , achieving and maintaining ideal body weight, and exercise.   We reviewed meds in detail.  Reassured-Discussed goals, options, plan.  We have discussed the need for endocarditis prophylaxis.   Let's increase Furosimide by an extra every other day and KCL 10 MEQ Daily

## 2023-08-17 ENCOUNTER — PATIENT MESSAGE (OUTPATIENT)
Dept: CARDIOLOGY | Facility: CLINIC | Age: 38
End: 2023-08-17
Payer: COMMERCIAL

## 2023-08-17 RX ORDER — FUROSEMIDE 20 MG/1
TABLET ORAL
Qty: 180 TABLET | Refills: 3 | Status: ON HOLD | OUTPATIENT
Start: 2023-08-17 | End: 2024-01-18 | Stop reason: HOSPADM

## 2023-08-18 ENCOUNTER — PATIENT MESSAGE (OUTPATIENT)
Dept: CARDIOLOGY | Facility: CLINIC | Age: 38
End: 2023-08-18
Payer: COMMERCIAL

## 2023-08-18 DIAGNOSIS — I34.2 NONRHEUMATIC MITRAL VALVE STENOSIS: ICD-10-CM

## 2023-08-18 DIAGNOSIS — I50.33 HEART FAILURE, DIASTOLIC, ACUTE ON CHRONIC: ICD-10-CM

## 2023-08-18 RX ORDER — POTASSIUM CHLORIDE 750 MG/1
TABLET, EXTENDED RELEASE ORAL
Qty: 180 TABLET | Refills: 3 | Status: ON HOLD | OUTPATIENT
Start: 2023-08-18 | End: 2024-01-18 | Stop reason: HOSPADM

## 2023-10-06 ENCOUNTER — PATIENT MESSAGE (OUTPATIENT)
Dept: CARDIOLOGY | Facility: CLINIC | Age: 38
End: 2023-10-06
Payer: COMMERCIAL

## 2023-10-18 ENCOUNTER — TELEPHONE (OUTPATIENT)
Dept: CARDIOTHORACIC SURGERY | Facility: CLINIC | Age: 38
End: 2023-10-18
Payer: COMMERCIAL

## 2023-10-18 ENCOUNTER — OFFICE VISIT (OUTPATIENT)
Dept: CARDIOLOGY | Facility: CLINIC | Age: 38
End: 2023-10-18
Attending: INTERNAL MEDICINE
Payer: COMMERCIAL

## 2023-10-18 ENCOUNTER — HOSPITAL ENCOUNTER (OUTPATIENT)
Dept: CARDIOLOGY | Facility: HOSPITAL | Age: 38
Discharge: HOME | End: 2023-10-18
Attending: INTERNAL MEDICINE
Payer: COMMERCIAL

## 2023-10-18 VITALS
WEIGHT: 172.38 LBS | SYSTOLIC BLOOD PRESSURE: 111 MMHG | HEART RATE: 74 BPM | HEART RATE: 74 BPM | WEIGHT: 174.81 LBS | DIASTOLIC BLOOD PRESSURE: 66 MMHG | SYSTOLIC BLOOD PRESSURE: 110 MMHG | HEIGHT: 61 IN | BODY MASS INDEX: 33 KG/M2 | HEIGHT: 61 IN | BODY MASS INDEX: 32.55 KG/M2 | DIASTOLIC BLOOD PRESSURE: 75 MMHG | OXYGEN SATURATION: 97 %

## 2023-10-18 DIAGNOSIS — Z98.890 S/P MITRAL VALVE REPAIR: ICD-10-CM

## 2023-10-18 DIAGNOSIS — I50.32 CHRONIC DIASTOLIC HEART FAILURE: ICD-10-CM

## 2023-10-18 DIAGNOSIS — I34.1 MITRAL VALVE PROLAPSE: Chronic | ICD-10-CM

## 2023-10-18 DIAGNOSIS — I10 HYPERTENSION, ESSENTIAL: ICD-10-CM

## 2023-10-18 DIAGNOSIS — I34.1 MITRAL VALVE PROLAPSE: Primary | Chronic | ICD-10-CM

## 2023-10-18 LAB
ASCENDING AORTA: 2.78 CM
AV INDEX (PROSTH): 0.73
AV MEAN GRADIENT: 4 MMHG
AV PEAK GRADIENT: 8 MMHG
AV VALVE AREA BY VELOCITY RATIO: 2.17 CM²
AV VALVE AREA: 2.41 CM²
AV VELOCITY RATIO: 0.66
BSA FOR ECHO PROCEDURE: 1.85 M2
CV ECHO LV RWT: 0.41 CM
DOP CALC AO PEAK VEL: 1.4 M/S
DOP CALC AO VTI: 26.74 CM
DOP CALC LVOT AREA: 3.3 CM2
DOP CALC LVOT DIAMETER: 2.05 CM
DOP CALC LVOT PEAK VEL: 0.92 M/S
DOP CALC LVOT STROKE VOLUME: 64.46 CM3
DOP CALC MV VTI: 69.77 CM
DOP CALCLVOT PEAK VEL VTI: 19.54 CM
E WAVE DECELERATION TIME: 678.48 MSEC
E/A RATIO: 1.09
ECHO LV POSTERIOR WALL: 0.78 CM (ref 0.6–1.1)
EJECTION FRACTION: 65 %
FRACTIONAL SHORTENING: 32 % (ref 28–44)
INTERVENTRICULAR SEPTUM: 0.73 CM (ref 0.6–1.1)
LA MAJOR: 6.54 CM
LA MINOR: 6.19 CM
LA WIDTH: 4.99 CM
LEFT ATRIUM SIZE: 5.14 CM
LEFT ATRIUM VOLUME INDEX MOD: 75.4 ML/M2
LEFT ATRIUM VOLUME INDEX: 77.9 ML/M2
LEFT ATRIUM VOLUME MOD: 134.2 CM3
LEFT ATRIUM VOLUME: 138.66 CM3
LEFT INTERNAL DIMENSION IN SYSTOLE: 2.57 CM (ref 2.1–4)
LEFT VENTRICLE DIASTOLIC VOLUME INDEX: 34.33 ML/M2
LEFT VENTRICLE DIASTOLIC VOLUME: 61.1 ML
LEFT VENTRICLE MASS INDEX: 44 G/M2
LEFT VENTRICLE SYSTOLIC VOLUME INDEX: 13.4 ML/M2
LEFT VENTRICLE SYSTOLIC VOLUME: 23.82 ML
LEFT VENTRICULAR INTERNAL DIMENSION IN DIASTOLE: 3.78 CM (ref 3.5–6)
LEFT VENTRICULAR MASS: 78.83 G
LV SEPTAL E/E' RATIO: 31.17 M/S
MV MEAN GRADIENT: 11 MMHG
MV PEAK A VEL: 1.72 M/S
MV PEAK E VEL: 1.87 M/S
MV PEAK GRADIENT: 16 MMHG
MV STENOSIS PRESSURE HALF TIME: 166 MS
MV VALVE AREA BY CONTINUITY EQUATION: 0.92 CM2
MV VALVE AREA P 1/2 METHOD: 1.33 CM2
PISA TR MAX VEL: 2.89 M/S
RA MAJOR: 5.68 CM
RA PRESSURE ESTIMATED: 3 MMHG
RA WIDTH: 3.98 CM
RIGHT VENTRICULAR END-DIASTOLIC DIMENSION: 3.96 CM
RV TB RVSP: 6 MMHG
SINUS: 2.86 CM
STJ: 2.24 CM
TDI SEPTAL: 0.06 M/S
TR MAX PG: 33 MMHG
TRICUSPID ANNULAR PLANE SYSTOLIC EXCURSION: 1.7 CM
TV REST PULMONARY ARTERY PRESSURE: 36 MMHG
Z-SCORE OF LEFT VENTRICULAR DIMENSION IN END DIASTOLE: -2.64
Z-SCORE OF LEFT VENTRICULAR DIMENSION IN END SYSTOLE: -1.33

## 2023-10-18 PROCEDURE — 99999 PR PBB SHADOW E&M-EST. PATIENT-LVL III: CPT | Mod: PBBFAC,,, | Performed by: INTERNAL MEDICINE

## 2023-10-18 PROCEDURE — 1159F MED LIST DOCD IN RCRD: CPT | Mod: CPTII,S$GLB,, | Performed by: INTERNAL MEDICINE

## 2023-10-18 PROCEDURE — 93306 TTE W/DOPPLER COMPLETE: CPT | Mod: 26,,, | Performed by: INTERNAL MEDICINE

## 2023-10-18 PROCEDURE — 93306 ECHO (CUPID ONLY): ICD-10-PCS | Mod: 26,,, | Performed by: INTERNAL MEDICINE

## 2023-10-18 PROCEDURE — 3078F PR MOST RECENT DIASTOLIC BLOOD PRESSURE < 80 MM HG: ICD-10-PCS | Mod: CPTII,S$GLB,, | Performed by: INTERNAL MEDICINE

## 2023-10-18 PROCEDURE — 99999 PR PBB SHADOW E&M-EST. PATIENT-LVL III: ICD-10-PCS | Mod: PBBFAC,,, | Performed by: INTERNAL MEDICINE

## 2023-10-18 PROCEDURE — 3074F SYST BP LT 130 MM HG: CPT | Mod: CPTII,S$GLB,, | Performed by: INTERNAL MEDICINE

## 2023-10-18 PROCEDURE — 3078F DIAST BP <80 MM HG: CPT | Mod: CPTII,S$GLB,, | Performed by: INTERNAL MEDICINE

## 2023-10-18 PROCEDURE — 3008F PR BODY MASS INDEX (BMI) DOCUMENTED: ICD-10-PCS | Mod: CPTII,S$GLB,, | Performed by: INTERNAL MEDICINE

## 2023-10-18 PROCEDURE — 99214 OFFICE O/P EST MOD 30 MIN: CPT | Mod: S$GLB,,, | Performed by: INTERNAL MEDICINE

## 2023-10-18 PROCEDURE — 1159F PR MEDICATION LIST DOCUMENTED IN MEDICAL RECORD: ICD-10-PCS | Mod: CPTII,S$GLB,, | Performed by: INTERNAL MEDICINE

## 2023-10-18 PROCEDURE — 93306 TTE W/DOPPLER COMPLETE: CPT

## 2023-10-18 PROCEDURE — 99214 PR OFFICE/OUTPT VISIT, EST, LEVL IV, 30-39 MIN: ICD-10-PCS | Mod: S$GLB,,, | Performed by: INTERNAL MEDICINE

## 2023-10-18 PROCEDURE — 3074F PR MOST RECENT SYSTOLIC BLOOD PRESSURE < 130 MM HG: ICD-10-PCS | Mod: CPTII,S$GLB,, | Performed by: INTERNAL MEDICINE

## 2023-10-18 PROCEDURE — 3008F BODY MASS INDEX DOCD: CPT | Mod: CPTII,S$GLB,, | Performed by: INTERNAL MEDICINE

## 2023-10-18 NOTE — TELEPHONE ENCOUNTER
Called pt and scheduled first available consultation appointment. Pt verbalized understanding of appointment date, time, and location.

## 2023-10-18 NOTE — PROGRESS NOTES
Interventional Cardiology Clinic Note  Reason for Visit: moderate to Severe Mitral Stenosis    HPI:   39yo F patient with MVP s/p MV annuloplasty and now with moderate mitral valve stenosis, who was being seen on the outpatient clinic after pregnancy for follow up of MV stenosis. After delivery patient had episode of shortness of breath, which improved with lasix. She states that she is feeling better with furosemide, which she is taking 20mg/40mg alternating days. Additionally she is also on metoprolol for rate control, which has improved her BP and HR. She denies chest pain, palpitations, orthopnea, PND, claudication. TTE moderate right atrial enlargement, there is moderate to severe mitral stenosis. Javier score is 10, the mean diastolic gradient across the mitral valve is 11 mmHg at a heart rate of 77 bpm; MVA 1.33.      ROS:    Constitution: Negative for fever, chills, weight loss or gain.   HENT: Negative for sore throat, rhinorrhea, or headache.  Eyes: Negative for blurred or double vision.   Cardiovascular: See above  Pulmonary: Negative for SOB   Gastrointestinal: Negative for abdominal pain, nausea, vomiting, or diarrhea.   : Negative for dysuria.   Neurological: Negative for focal weakness or sensory changes.  PMH:     Past Medical History:   Diagnosis Date    Abnormal Pap smear     Colposcopy    Bradycardia 2020    Class 2 obesity due to excess calories with body mass index (BMI) of 35.0 to 35.9 in adult 3/9/2021    Hypertension, essential 3/16/2021    Menarche     Age of onset 11    Missed ab 2021    Mitral valve prolapse     Previous  delivery affecting pregnancy, antepartum 2020    S/P suction D&C 2021    Status post mitral valve annuloplasty 2017     Past Surgical History:   Procedure Laterality Date     SECTION       SECTION WITH TUBAL LIGATION Bilateral 2023    Procedure:  SECTION, WITH TUBAL LIGATION;  Surgeon: Mane GRAVES  "MD Josh;  Location: [facility] L&D;  Service: OB/GYN;  Laterality: Bilateral;    DILATION AND CURETTAGE OF UTERUS USING SUCTION N/A 7/1/2021    Procedure: DILATION AND CURETTAGE, UTERUS, USING SUCTION;  Surgeon: Mane Moreno MD;  Location: [facility] OR;  Service: OB/GYN;  Laterality: N/A;    MITRAL VALVE REPAIR       Allergies:     Review of patient's allergies indicates:   Allergen Reactions    Doxycycline      Abdomen pain severe     Medications:     Current Outpatient Medications on File Prior to Visit   Medication Sig Dispense Refill    amoxicillin (AMOXIL) 500 MG Tab Take 4 tablets (2,000 mg total) by mouth daily as needed (for procedures). Take one hour before the procedure. 12 tablet 2    furosemide (LASIX) 20 MG tablet Take one to two tablets by mouth per day as instructed by  tablet 3    metoprolol succinate (TOPROL-XL) 25 MG 24 hr tablet Take 2 tablets (50 mg total) by mouth every morning AND 1 tablet (25 mg total) every evening. 270 tablet 3    potassium chloride SA (K-DUR,KLOR-CON M) 10 MEQ tablet Take one to two tablets daily as instructed by MD. 180 tablet 3     No current facility-administered medications on file prior to visit.     Social History:     Social History     Tobacco Use    Smoking status: Never    Smokeless tobacco: Never   Substance Use Topics    Alcohol use: Not Currently     Comment: not since +UPT     Family History:     Family History   Problem Relation Age of Onset    Heart disease Mother     Hypertension Mother     Hyperlipidemia Mother     Diabetes Mother     Cancer Mother     Hyperlipidemia Father     Hypertension Father     Breast cancer Paternal Aunt     Colon cancer Neg Hx     Ovarian cancer Neg Hx      Physical Exam:   /66 (BP Location: Right arm, Patient Position: Sitting, BP Method: Large (Automatic))   Pulse 74   Ht 5' 1" (1.549 m)   Wt 78.2 kg (172 lb 6.4 oz)   SpO2 97%   BMI 32.57 kg/m²    Wt Readings from Last 4 Encounters:   10/18/23 78.2 kg (172 lb " "6.4 oz)   10/18/23 79.3 kg (174 lb 13.3 oz)   08/16/23 79.3 kg (174 lb 13.2 oz)   07/26/23 79.1 kg (174 lb 6.1 oz)     Constitutional: No distress, obese, conversant  HEENT: Sclera anicteric, PERRLA  Neck: No JVD, no masses, good movement  CV: RRR, S1 and S2 normal, Pulses 2+ and equal bilaterally in radial arteries, distal pulses are 2+ and equal in the femoral, DP and PT areas bilaterally  Pulm: Clear to auscultation bilaterally with symmetrical expansion.   GI: Abdomen soft, non-tender, good bowel sounds  Extremities: Both extremities intact and grossly normal, skin is warm, no edema noted  Skin: No ecchymosis, erythema, or ulcers  Psych: AOx3, appropriate affect  Neuro: CNII-XII intact, no focal deficits      Labs:     Lab Results   Component Value Date     08/14/2023    K 3.9 08/14/2023     08/14/2023    CO2 23 08/14/2023    BUN 16 08/14/2023    CREATININE 0.7 08/14/2023    GLUCOSE 77 11/18/2008    ANIONGAP 10 08/14/2023     No results found for: "HGBA1C"  Lab Results   Component Value Date     (H) 08/14/2023     (H) 06/19/2023     (H) 05/24/2023    Lab Results   Component Value Date    WBC 9.62 08/14/2023    HGB 11.7 (L) 08/14/2023    HCT 36.6 (L) 08/14/2023     08/14/2023    GRAN 5.5 08/14/2023    GRAN 57.5 08/14/2023     Lab Results   Component Value Date    CHOL 212 (H) 08/14/2023    HDL 47 08/14/2023    LDLCALC 129.6 08/14/2023    TRIG 177 (H) 08/14/2023          Imaging:         EF   Date Value Ref Range Status   10/18/2023 65 % Final   07/18/2023 65 % Final   05/03/2023 60 % Final       EKG 04/28/23:   Normal sinus rhythm   Possible Left atrial enlargement   Nonspecific ST abnormality   Abnormal ECG   When compared with ECG of 14-FEB-2023 07:54,   No significant change was found   Confirmed by MARY BUSTILLO MD ([id number]) on 5/10/2023 9:34:05 AM       Assessment and Plan:      King Botello is a 38 y.o. female very pleasant lady with     S/P mitral valve " repair  She has MS with atrial enlargement and heart failure symptoms. Her symptoms came with pregnancy and are controlled now but she required lasix. Her echo shows a small mitral valve area and enlarged LA. She does not have Afib. I recommend she visits with Dr Liu to establish care and consider mitral valve replacement.    Hypertension, essential  Controlled on current regimen.    Chronic diastolic heart failure  NYHA class I symptoms on lasix and BB    Body mass index is 32.57 kg/m². Healthy lifestyle was discussed with the patient as well as the importance of physical activity to improve cardiovascular health.    Today's systolic blood pressure is 111. Therapeutic options to target SBP<140 mmHg have been described in this note.     The medication list was reviewed with the patient and inactive medications as well as duplicates were removed from the medical record.    Huey Camp MD [facility]  Interventional Cardiology  Structural/Valvular heart disease  [phone]

## 2023-10-18 NOTE — ASSESSMENT & PLAN NOTE
She has MS with atrial enlargement and heart failure symptoms. Her symptoms came with pregnancy and are controlled now but she required lasix. Her echo shows a small mitral valve area and enlarged LA. She does not have Afib. I recommend she visits with Dr Liu to establish care and consider mitral valve replacement.

## 2023-10-24 ENCOUNTER — OFFICE VISIT (OUTPATIENT)
Dept: CARDIOTHORACIC SURGERY | Facility: CLINIC | Age: 38
End: 2023-10-24
Payer: COMMERCIAL

## 2023-10-24 VITALS
OXYGEN SATURATION: 99 % | HEART RATE: 65 BPM | BODY MASS INDEX: 33.61 KG/M2 | DIASTOLIC BLOOD PRESSURE: 69 MMHG | SYSTOLIC BLOOD PRESSURE: 108 MMHG | HEIGHT: 61 IN | WEIGHT: 178 LBS

## 2023-10-24 DIAGNOSIS — I34.1 MITRAL VALVE PROLAPSE: Chronic | ICD-10-CM

## 2023-10-24 DIAGNOSIS — T82.857A STENOSIS OF PROSTHETIC MITRAL VALVE, INITIAL ENCOUNTER: Primary | ICD-10-CM

## 2023-10-24 DIAGNOSIS — Z98.890 S/P MITRAL VALVE REPAIR: ICD-10-CM

## 2023-10-24 PROCEDURE — 3078F DIAST BP <80 MM HG: CPT | Mod: CPTII,S$GLB,, | Performed by: THORACIC SURGERY (CARDIOTHORACIC VASCULAR SURGERY)

## 2023-10-24 PROCEDURE — 3078F PR MOST RECENT DIASTOLIC BLOOD PRESSURE < 80 MM HG: ICD-10-PCS | Mod: CPTII,S$GLB,, | Performed by: THORACIC SURGERY (CARDIOTHORACIC VASCULAR SURGERY)

## 2023-10-24 PROCEDURE — 1159F MED LIST DOCD IN RCRD: CPT | Mod: CPTII,S$GLB,, | Performed by: THORACIC SURGERY (CARDIOTHORACIC VASCULAR SURGERY)

## 2023-10-24 PROCEDURE — 99205 OFFICE O/P NEW HI 60 MIN: CPT | Mod: S$GLB,,, | Performed by: THORACIC SURGERY (CARDIOTHORACIC VASCULAR SURGERY)

## 2023-10-24 PROCEDURE — 99999 PR PBB SHADOW E&M-EST. PATIENT-LVL III: CPT | Mod: PBBFAC,,, | Performed by: THORACIC SURGERY (CARDIOTHORACIC VASCULAR SURGERY)

## 2023-10-24 PROCEDURE — 3008F BODY MASS INDEX DOCD: CPT | Mod: CPTII,S$GLB,, | Performed by: THORACIC SURGERY (CARDIOTHORACIC VASCULAR SURGERY)

## 2023-10-24 PROCEDURE — 3008F PR BODY MASS INDEX (BMI) DOCUMENTED: ICD-10-PCS | Mod: CPTII,S$GLB,, | Performed by: THORACIC SURGERY (CARDIOTHORACIC VASCULAR SURGERY)

## 2023-10-24 PROCEDURE — 1159F PR MEDICATION LIST DOCUMENTED IN MEDICAL RECORD: ICD-10-PCS | Mod: CPTII,S$GLB,, | Performed by: THORACIC SURGERY (CARDIOTHORACIC VASCULAR SURGERY)

## 2023-10-24 PROCEDURE — 3074F SYST BP LT 130 MM HG: CPT | Mod: CPTII,S$GLB,, | Performed by: THORACIC SURGERY (CARDIOTHORACIC VASCULAR SURGERY)

## 2023-10-24 PROCEDURE — 99205 PR OFFICE/OUTPT VISIT, NEW, LEVL V, 60-74 MIN: ICD-10-PCS | Mod: S$GLB,,, | Performed by: THORACIC SURGERY (CARDIOTHORACIC VASCULAR SURGERY)

## 2023-10-24 PROCEDURE — 99999 PR PBB SHADOW E&M-EST. PATIENT-LVL III: ICD-10-PCS | Mod: PBBFAC,,, | Performed by: THORACIC SURGERY (CARDIOTHORACIC VASCULAR SURGERY)

## 2023-10-24 PROCEDURE — 3074F PR MOST RECENT SYSTOLIC BLOOD PRESSURE < 130 MM HG: ICD-10-PCS | Mod: CPTII,S$GLB,, | Performed by: THORACIC SURGERY (CARDIOTHORACIC VASCULAR SURGERY)

## 2023-10-24 NOTE — PROGRESS NOTES
Subjective:      Patient ID: King Botello is a 38 y.o. female.    Chief Complaint: No chief complaint on file.      HPI:  King Botello is a 38 y.o. female who presents to an initial surgical evaluation for prosthetic mitral stenosis s/p mitral valve repair. Medical conditions include hx of MVP and MR s/p mitral annuloplasty in [place] (),  HTN, bradycardia. Patient is followed by Dr. Cordero. Patient reports chronic fatigue and BLANK before pregnancy.  After delivery of baby in May, patient was discharged and a few days later readmitted for CHF exacerbation which improved with lasix. Additionally she is also on metoprolol for rate control, which has improved her BP and HR. Denies atrial fibrillation. Today she denies chest pain, SOB,  palpitations, orthopnea, PND, claudication. TTE LVEF 60-66%. moderate right atrial enlargement. LA severely dilated. There is moderate to severe mitral stenosis. Javier score is 10, the mean diastolic gradient across the mitral valve is 11 mmHg at a heart rate of 77 bpm; MVA 1.33. Independently perform ADLs. No Assistive devices for walking. Denies prior history of strokes, stents, sternotomies. Denies tobacco, EtoH, or illicit drug use.       Of note, mitral annuloplasty was done in  due to the anticipation of having children in the future. Patient is aware that she would potentially need a mechanical valve replacement surgery and is ready to do so.       Family and social history reviewed    Review of patient's allergies indicates:   Allergen Reactions    Doxycycline      Abdomen pain severe     Past Medical History:   Diagnosis Date    Abnormal Pap smear     Colposcopy    Bradycardia 2020    Class 2 obesity due to excess calories with body mass index (BMI) of 35.0 to 35.9 in adult 3/9/2021    Hypertension, essential 3/16/2021    Menarche     Age of onset 11    Missed ab 2021    Mitral valve prolapse     Previous  delivery affecting pregnancy,  antepartum 2020    S/P suction D&C 2021    Status post mitral valve annuloplasty 2017     Past Surgical History:   Procedure Laterality Date     SECTION       SECTION WITH TUBAL LIGATION Bilateral 2023    Procedure:  SECTION, WITH TUBAL LIGATION;  Surgeon: Mane Moreno MD;  Location: [facility] L&D;  Service: OB/GYN;  Laterality: Bilateral;    DILATION AND CURETTAGE OF UTERUS USING SUCTION N/A 2021    Procedure: DILATION AND CURETTAGE, UTERUS, USING SUCTION;  Surgeon: Mane Moreno MD;  Location: [facility] OR;  Service: OB/GYN;  Laterality: N/A;    MITRAL VALVE REPAIR       Family History       Problem Relation (Age of Onset)    Breast cancer Paternal Aunt    Cancer Mother    Diabetes Mother    Heart disease Mother    Hyperlipidemia Mother, Father    Hypertension Mother, Father          Social History     Socioeconomic History    Marital status:    Occupational History    Occupation: xray tech   Tobacco Use    Smoking status: Never    Smokeless tobacco: Never   Substance and Sexual Activity    Alcohol use: Not Currently     Comment: not since +UPT    Drug use: No    Sexual activity: Yes     Partners: Male     Birth control/protection: None     Social Determinants of Health     Financial Resource Strain: Low Risk  (2023)    Overall Financial Resource Strain (CARDIA)     Difficulty of Paying Living Expenses: Not hard at all   Food Insecurity: No Food Insecurity (2023)    Hunger Vital Sign     Worried About Running Out of Food in the Last Year: Never true     Ran Out of Food in the Last Year: Never true   Transportation Needs: No Transportation Needs (2023)    PRAPARE - Transportation     Lack of Transportation (Medical): No     Lack of Transportation (Non-Medical): No   Physical Activity: Inactive (2023)    Exercise Vital Sign     Days of Exercise per Week: 0 days     Minutes of Exercise per Session: 0 min   Stress: No Stress Concern Present  (5/4/2023)    [employer] [employer] - Occupational Stress Questionnaire     Feeling of Stress : Not at all   Social Connections: Moderately Integrated (5/4/2023)    Social Connection and Isolation Panel [NHANES]     Frequency of Communication with Friends and Family: More than three times a week     Frequency of Social Gatherings with Friends and Family: Once a week     Attends [employer] Services: More than 4 times per year     Active Member of Clubs or Organizations: No     Attends Club or Organization Meetings: Never     Marital Status:    Housing Stability: Low Risk  (5/4/2023)    Housing Stability Vital Sign     Unable to Pay for Housing in the Last Year: No     Number of Places Lived in the Last Year: 1     Unstable Housing in the Last Year: No       Current medications Reviewed  Current Outpatient Medications on File Prior to Visit   Medication Sig Dispense Refill    amoxicillin (AMOXIL) 500 MG Tab Take 4 tablets (2,000 mg total) by mouth daily as needed (for procedures). Take one hour before the procedure. 12 tablet 2    furosemide (LASIX) 20 MG tablet Take one to two tablets by mouth per day as instructed by  tablet 3    metoprolol succinate (TOPROL-XL) 25 MG 24 hr tablet Take 2 tablets (50 mg total) by mouth every morning AND 1 tablet (25 mg total) every evening. 270 tablet 3    potassium chloride SA (K-DUR,KLOR-CON M) 10 MEQ tablet Take one to two tablets daily as instructed by MD. 180 tablet 3     No current facility-administered medications on file prior to visit.       Review of Systems   Constitutional:  Positive for fatigue. Negative for activity change, appetite change and fever.   HENT:  Negative for nosebleeds.    Respiratory:  Positive for shortness of breath. Negative for cough.    Cardiovascular:  Negative for chest pain, palpitations and leg swelling.   Gastrointestinal:  Negative for abdominal distention, abdominal pain and nausea.   Genitourinary:  Negative for  frequency.   Musculoskeletal:  Negative for arthralgias and myalgias.   Skin:  Negative for rash.   Neurological:  Negative for dizziness and numbness.   Hematological:  Does not bruise/bleed easily.     Objective:   Physical Exam  Constitutional:       Appearance: Normal appearance.   HENT:      Head: Normocephalic and atraumatic.   Eyes:      Extraocular Movements: Extraocular movements intact.   Cardiovascular:      Rate and Rhythm: Normal rate.      Heart sounds: Murmur heard.   Pulmonary:      Effort: Pulmonary effort is normal.   Abdominal:      General: Abdomen is flat.      Palpations: Abdomen is soft.   Musculoskeletal:         General: Normal range of motion.      Cervical back: Normal range of motion.   Skin:     General: Skin is warm and dry.      Capillary Refill: Capillary refill takes less than 2 seconds.      Coloration: Skin is not pale.   Neurological:      General: No focal deficit present.      Mental Status: She is alert.         Diagnostic Results: reviewed     Left Ventricle: The left ventricle is normal in size. Ventricular mass is normal. Normal wall thickness. Normal wall motion. There is normal systolic function with a visually estimated ejection fraction of 60 - 65%. Ejection fraction by visual approximation is 65%.    Left Atrium: Left atrium is severely dilated.    Right Ventricle: Normal right ventricular cavity size. Wall thickness is normal. Right ventricle wall motion  is normal. Systolic function is normal.    Right Atrium: Right atrium is moderately dilated.    Aortic Valve: The aortic valve is a trileaflet valve. There is mild aortic valve sclerosis.    Mitral Valve: There is severe bileaflet sclerosis. There is moderate mitral annular calcification present. The Higinio score is 10-11. Moderately restricted motion. There is moderate to severe stenosis. The mean pressure gradient across the mitral valve is 11 mmHg at a heart rate of 77 bpm;MVA 1.33 . There is trace regurgitation.     Pulmonary Artery: The estimated pulmonary artery systolic pressure is 36 mmHg.    IVC/SVC: Normal venous pressure at 3 mmHg.  Assessment:   Prosthetic mitral stenosis   S/p MVr   MVP   Plan:     CTS Attending Note:    I have personally taken the history and examined this patient and agree with the OBINNA's note as stated above.  Very pleasant 38-year-old woman who underwent mitral valve repair at age 17.  She now has severe mitral stenosis.  She is symptomatic for this, with fatigue and lifestyle limiting dyspnea on exertion.  I recommended mitral valve replacement, and she agreed with this.  Her original operation was done via thoracotomy.  I recommended sternotomy.  We discussed replacement options.  She was emphatic that she does not wish to do this again, and as a result desires a mechanical prosthesis.  She has a 6-month-old child at home and used leave time around the delivery.  She would like to wait until after the 1st of the year when she accumulates additional medical leave to have this done.  I think that would be reasonable, as that is only 8 weeks away.  She is aware that should she become increasingly symptomatic in the interim we will be happy to get this done for her sooner.

## 2023-10-25 DIAGNOSIS — Z01.818 PRE-OP TESTING: ICD-10-CM

## 2023-10-25 DIAGNOSIS — T82.857A STENOSIS OF PROSTHETIC MITRAL VALVE, INITIAL ENCOUNTER: Primary | ICD-10-CM

## 2023-10-26 ENCOUNTER — PATIENT MESSAGE (OUTPATIENT)
Dept: CARDIOTHORACIC SURGERY | Facility: CLINIC | Age: 38
End: 2023-10-26
Payer: COMMERCIAL

## 2023-11-20 ENCOUNTER — PATIENT MESSAGE (OUTPATIENT)
Dept: CARDIOTHORACIC SURGERY | Facility: CLINIC | Age: 38
End: 2023-11-20
Payer: COMMERCIAL

## 2023-11-21 ENCOUNTER — TELEPHONE (OUTPATIENT)
Dept: CARDIOTHORACIC SURGERY | Facility: CLINIC | Age: 38
End: 2023-11-21
Payer: COMMERCIAL

## 2023-11-21 NOTE — TELEPHONE ENCOUNTER
Called pt to review pre-op testing appointments which have been scheduled for January 4. Also reviewed pt's medications. Pt does not need to stop any medications prior to surgery. Pt verbalized understanding of all information.

## 2023-12-16 ENCOUNTER — PATIENT MESSAGE (OUTPATIENT)
Dept: CARDIOTHORACIC SURGERY | Facility: CLINIC | Age: 38
End: 2023-12-16
Payer: COMMERCIAL

## 2024-01-04 ENCOUNTER — HOSPITAL ENCOUNTER (OUTPATIENT)
Dept: RADIOLOGY | Facility: HOSPITAL | Age: 39
Discharge: HOME | End: 2024-01-04
Attending: THORACIC SURGERY (CARDIOTHORACIC VASCULAR SURGERY)
Payer: COMMERCIAL

## 2024-01-04 ENCOUNTER — HOSPITAL ENCOUNTER (OUTPATIENT)
Dept: PULMONOLOGY | Facility: CLINIC | Age: 39
Discharge: HOME | End: 2024-01-04
Payer: COMMERCIAL

## 2024-01-04 ENCOUNTER — OFFICE VISIT (OUTPATIENT)
Dept: CARDIOTHORACIC SURGERY | Facility: CLINIC | Age: 39
End: 2024-01-04
Payer: COMMERCIAL

## 2024-01-04 ENCOUNTER — HOSPITAL ENCOUNTER (OUTPATIENT)
Dept: CARDIOLOGY | Facility: CLINIC | Age: 39
Discharge: HOME | End: 2024-01-04
Payer: COMMERCIAL

## 2024-01-04 ENCOUNTER — HOSPITAL ENCOUNTER (OUTPATIENT)
Dept: VASCULAR SURGERY | Facility: CLINIC | Age: 39
Discharge: HOME | End: 2024-01-04
Attending: THORACIC SURGERY (CARDIOTHORACIC VASCULAR SURGERY)
Payer: COMMERCIAL

## 2024-01-04 ENCOUNTER — HOSPITAL ENCOUNTER (OUTPATIENT)
Dept: CARDIOLOGY | Facility: HOSPITAL | Age: 39
Discharge: HOME | End: 2024-01-04
Attending: THORACIC SURGERY (CARDIOTHORACIC VASCULAR SURGERY)
Payer: COMMERCIAL

## 2024-01-04 VITALS
HEART RATE: 73 BPM | HEIGHT: 61 IN | SYSTOLIC BLOOD PRESSURE: 114 MMHG | DIASTOLIC BLOOD PRESSURE: 70 MMHG | BODY MASS INDEX: 33.57 KG/M2 | OXYGEN SATURATION: 99 % | WEIGHT: 177.81 LBS

## 2024-01-04 VITALS
HEART RATE: 64 BPM | BODY MASS INDEX: 33.61 KG/M2 | HEIGHT: 61 IN | WEIGHT: 178 LBS | DIASTOLIC BLOOD PRESSURE: 80 MMHG | SYSTOLIC BLOOD PRESSURE: 125 MMHG

## 2024-01-04 DIAGNOSIS — Z01.818 PRE-OP TESTING: ICD-10-CM

## 2024-01-04 DIAGNOSIS — Z98.890 S/P MITRAL VALVE REPAIR: ICD-10-CM

## 2024-01-04 DIAGNOSIS — T82.857A STENOSIS OF PROSTHETIC MITRAL VALVE, INITIAL ENCOUNTER: Primary | ICD-10-CM

## 2024-01-04 DIAGNOSIS — T82.857A STENOSIS OF PROSTHETIC MITRAL VALVE, INITIAL ENCOUNTER: ICD-10-CM

## 2024-01-04 DIAGNOSIS — I34.2 NONRHEUMATIC MITRAL VALVE STENOSIS: ICD-10-CM

## 2024-01-04 LAB
ASCENDING AORTA: 2.77 CM
AV INDEX (PROSTH): 0.81
AV MEAN GRADIENT: 3 MMHG
AV PEAK GRADIENT: 5 MMHG
AV VALVE AREA BY VELOCITY RATIO: 2.13 CM²
AV VALVE AREA: 2.27 CM²
AV VELOCITY RATIO: 0.76
BSA FOR ECHO PROCEDURE: 1.86 M2
CV ECHO LV RWT: 0.3 CM
DLCO SINGLE BREATH LLN: 17.6
DLCO SINGLE BREATH PRE REF: 59.1 %
DLCO SINGLE BREATH REF: 23.34
DLCOC SBVA LLN: 3.62
DLCOC SBVA REF: 5.47
DLCOC SINGLE BREATH LLN: 17.6
DLCOC SINGLE BREATH REF: 23.34
DLCOCSBVAULN: 7.31
DLCOCSINGLEBREATHULN: 29.07
DLCOSINGLEBREATHULN: 29.07
DLCOVA LLN: 3.62
DLCOVA PRE REF: 80.2 %
DLCOVA PRE: 4.38 ML/(MIN*MMHG*L) (ref 3.62–7.31)
DLCOVA REF: 5.47
DLCOVAULN: 7.31
DOP CALC AO PEAK VEL: 1.17 M/S
DOP CALC AO VTI: 22.38 CM
DOP CALC LVOT AREA: 2.8 CM2
DOP CALC LVOT DIAMETER: 1.89 CM
DOP CALC LVOT PEAK VEL: 0.89 M/S
DOP CALC LVOT STROKE VOLUME: 50.73 CM3
DOP CALCLVOT PEAK VEL VTI: 18.09 CM
E/E' RATIO: 21.89 M/S
ECHO LV POSTERIOR WALL: 0.66 CM (ref 0.6–1.1)
FEF 25 75 LLN: 1.78
FEF 25 75 PRE REF: 96 %
FEF 25 75 REF: 2.92
FEV05 LLN: 1.09
FEV05 REF: 1.94
FEV1 FVC LLN: 72
FEV1 FVC PRE REF: 101.1 %
FEV1 FVC REF: 83
FEV1 LLN: 2.1
FEV1 PRE REF: 81.9 %
FEV1 REF: 2.64
FRACTIONAL SHORTENING: 38 % (ref 28–44)
FVC LLN: 2.54
FVC PRE REF: 80.8 %
FVC REF: 3.19
INTERVENTRICULAR SEPTUM: 0.76 CM (ref 0.6–1.1)
IVC PRE: 2.29 L (ref 2.54–3.86)
IVC SINGLE BREATH LLN: 2.54
IVC SINGLE BREATH PRE REF: 71.8 %
IVC SINGLE BREATH REF: 3.19
IVCSINGLEBREATHULN: 3.86
LA MAJOR: 5.64 CM
LA MINOR: 5.74 CM
LA WIDTH: 4.92 CM
LEFT ATRIUM SIZE: 4.39 CM
LEFT ATRIUM VOLUME INDEX MOD: 53.3 ML/M2
LEFT ATRIUM VOLUME INDEX: 58 ML/M2
LEFT ATRIUM VOLUME MOD: 96.02 CM3
LEFT ATRIUM VOLUME: 104.45 CM3
LEFT INTERNAL DIMENSION IN SYSTOLE: 2.68 CM (ref 2.1–4)
LEFT VENTRICLE DIASTOLIC VOLUME INDEX: 47.19 ML/M2
LEFT VENTRICLE DIASTOLIC VOLUME: 84.94 ML
LEFT VENTRICLE MASS INDEX: 51 G/M2
LEFT VENTRICLE SYSTOLIC VOLUME INDEX: 14.8 ML/M2
LEFT VENTRICLE SYSTOLIC VOLUME: 26.57 ML
LEFT VENTRICULAR INTERNAL DIMENSION IN DIASTOLE: 4.34 CM (ref 3.5–6)
LEFT VENTRICULAR MASS: 91.58 G
LV LATERAL E/E' RATIO: 15.15 M/S
LV SEPTAL E/E' RATIO: 39.4 M/S
MV PEAK E VEL: 1.97 M/S
MV PEAK GRADIENT: 13 MMHG
MV STENOSIS PRESSURE HALF TIME: 190 MS
MV VALVE AREA P 1/2 METHOD: 1.16 CM2
MVV LLN: 83
MVV PRE REF: 92.3 %
MVV REF: 98
PEF LLN: 4.88
PEF PRE REF: 98.4 %
PEF REF: 6.39
PHYSICIAN COMMENT: ABNORMAL
PISA TR MAX VEL: 2.5 M/S
PRE DLCO: 13.8 ML/(MIN*MMHG) (ref 17.6–29.07)
PRE FEF 25 75: 2.8 L/S (ref 1.78–4.29)
PRE FET 100: 6.5 SEC
PRE FEV05 REF: 96.1 %
PRE FEV1 FVC: 83.89 % (ref 71.88–92.13)
PRE FEV1: 2.16 L (ref 2.1–3.16)
PRE FEV5: 1.87 L (ref 1.09–2.8)
PRE FVC: 2.58 L (ref 2.54–3.86)
PRE MVV: 90.22 L/MIN (ref 83.12–112.46)
PRE PEF: 6.29 L/S (ref 4.88–7.89)
RA MAJOR: 4.84 CM
RA PRESSURE ESTIMATED: 3 MMHG
RA WIDTH: 3.5 CM
RIGHT VENTRICULAR END-DIASTOLIC DIMENSION: 3.47 CM
RV TB RVSP: 6 MMHG
SINUS: 2.66 CM
STJ: 2.58 CM
TDI LATERAL: 0.13 M/S
TDI SEPTAL: 0.05 M/S
TDI: 0.09 M/S
TR MAX PG: 25 MMHG
TRICUSPID ANNULAR PLANE SYSTOLIC EXCURSION: 1.57 CM
TV REST PULMONARY ARTERY PRESSURE: 28 MMHG
VA PRE: 3.15 L (ref 4.12–4.12)
VA SINGLE BREATH LLN: 4.12
VA SINGLE BREATH PRE REF: 76.4 %
VA SINGLE BREATH REF: 4.12
VASINGLEBREATHULN: 4.12
Z-SCORE OF LEFT VENTRICULAR DIMENSION IN END DIASTOLE: -1.37
Z-SCORE OF LEFT VENTRICULAR DIMENSION IN END SYSTOLE: -1.09

## 2024-01-04 PROCEDURE — 93306 TTE W/DOPPLER COMPLETE: CPT

## 2024-01-04 PROCEDURE — 93306 TTE W/DOPPLER COMPLETE: CPT | Mod: 26,,, | Performed by: INTERNAL MEDICINE

## 2024-01-04 PROCEDURE — 99499 UNLISTED E&M SERVICE: CPT | Mod: S$GLB,,, | Performed by: THORACIC SURGERY (CARDIOTHORACIC VASCULAR SURGERY)

## 2024-01-04 PROCEDURE — 71046 X-RAY EXAM CHEST 2 VIEWS: CPT | Mod: TC,FY

## 2024-01-04 PROCEDURE — 99999 PR PBB SHADOW E&M-EST. PATIENT-LVL IV: CPT | Mod: PBBFAC,,, | Performed by: THORACIC SURGERY (CARDIOTHORACIC VASCULAR SURGERY)

## 2024-01-04 PROCEDURE — 71046 X-RAY EXAM CHEST 2 VIEWS: CPT | Mod: 26,,, | Performed by: RADIOLOGY

## 2024-01-04 PROCEDURE — 71250 CT THORAX DX C-: CPT | Mod: TC

## 2024-01-04 PROCEDURE — 93010 ELECTROCARDIOGRAM REPORT: CPT | Mod: S$GLB,,, | Performed by: INTERNAL MEDICINE

## 2024-01-04 PROCEDURE — 93005 ELECTROCARDIOGRAM TRACING: CPT | Mod: S$GLB,,, | Performed by: THORACIC SURGERY (CARDIOTHORACIC VASCULAR SURGERY)

## 2024-01-04 PROCEDURE — 94729 DIFFUSING CAPACITY: CPT | Mod: S$GLB,,, | Performed by: INTERNAL MEDICINE

## 2024-01-04 PROCEDURE — 71250 CT THORAX DX C-: CPT | Mod: 26,,, | Performed by: RADIOLOGY

## 2024-01-04 PROCEDURE — 93880 EXTRACRANIAL BILAT STUDY: CPT | Mod: S$GLB,,, | Performed by: SURGERY

## 2024-01-04 PROCEDURE — 94010 BREATHING CAPACITY TEST: CPT | Mod: S$GLB,,, | Performed by: INTERNAL MEDICINE

## 2024-01-04 RX ORDER — FENTANYL CITRATE 50 UG/ML
25 INJECTION, SOLUTION INTRAMUSCULAR; INTRAVENOUS
Status: CANCELLED | OUTPATIENT
Start: 2024-01-08 | End: 2024-01-09

## 2024-01-04 RX ORDER — PROPOFOL 10 MG/ML
0-50 INJECTION, EMULSION INTRAVENOUS CONTINUOUS
Status: CANCELLED | OUTPATIENT
Start: 2024-01-04

## 2024-01-04 RX ORDER — POTASSIUM CHLORIDE 14.9 MG/ML
20 INJECTION INTRAVENOUS
Status: CANCELLED | OUTPATIENT
Start: 2024-01-04

## 2024-01-04 RX ORDER — IPRATROPIUM BROMIDE AND ALBUTEROL SULFATE 2.5; .5 MG/3ML; MG/3ML
3 SOLUTION RESPIRATORY (INHALATION) EVERY 4 HOURS
Status: CANCELLED | OUTPATIENT
Start: 2024-01-04 | End: 2024-01-05

## 2024-01-04 RX ORDER — ASPIRIN 300 MG/1
300 SUPPOSITORY RECTAL ONCE AS NEEDED
Status: CANCELLED | OUTPATIENT
Start: 2024-01-04 | End: 2035-06-02

## 2024-01-04 RX ORDER — OXYCODONE HYDROCHLORIDE 5 MG/1
5 TABLET ORAL EVERY 4 HOURS PRN
Status: CANCELLED | OUTPATIENT
Start: 2024-01-04

## 2024-01-04 RX ORDER — ATORVASTATIN CALCIUM 40 MG/1
40 TABLET, FILM COATED ORAL NIGHTLY
Status: CANCELLED | OUTPATIENT
Start: 2024-01-04

## 2024-01-04 RX ORDER — FAMOTIDINE 20 MG/1
20 TABLET, FILM COATED ORAL 2 TIMES DAILY
Status: CANCELLED | OUTPATIENT
Start: 2024-01-04

## 2024-01-04 RX ORDER — ASPIRIN 325 MG
325 TABLET, DELAYED RELEASE (ENTERIC COATED) ORAL DAILY
Status: CANCELLED | OUTPATIENT
Start: 2024-01-05

## 2024-01-04 RX ORDER — OXYCODONE HYDROCHLORIDE 10 MG/1
10 TABLET ORAL EVERY 4 HOURS PRN
Status: CANCELLED | OUTPATIENT
Start: 2024-01-04

## 2024-01-04 RX ORDER — ALBUMIN HUMAN 50 G/1000ML
25 SOLUTION INTRAVENOUS ONCE AS NEEDED
Status: CANCELLED | OUTPATIENT
Start: 2024-01-08 | End: 2035-06-05

## 2024-01-04 RX ORDER — GUAIFENESIN 100 MG/5ML
81 LIQUID (ML) ORAL DAILY
Status: CANCELLED | OUTPATIENT
Start: 2024-01-05

## 2024-01-04 RX ORDER — CEFAZOLIN SODIUM 2 G/50ML
2 SOLUTION INTRAVENOUS
Status: CANCELLED | OUTPATIENT
Start: 2024-01-04

## 2024-01-04 RX ORDER — MAGNESIUM SULFATE HEPTAHYDRATE 40 MG/ML
2 INJECTION, SOLUTION INTRAVENOUS
Status: CANCELLED | OUTPATIENT
Start: 2024-01-04

## 2024-01-04 RX ORDER — FENTANYL CITRATE 50 UG/ML
50 INJECTION, SOLUTION INTRAMUSCULAR; INTRAVENOUS
Status: CANCELLED | OUTPATIENT
Start: 2024-01-10

## 2024-01-04 RX ORDER — MUPIROCIN 20 MG/G
1 OINTMENT TOPICAL
Status: CANCELLED | OUTPATIENT
Start: 2024-01-04

## 2024-01-04 RX ORDER — GUAIFENESIN 100 MG/5ML
81 LIQUID (ML) ORAL ONCE
Status: CANCELLED | OUTPATIENT
Start: 2024-01-04 | End: 2024-01-04

## 2024-01-04 RX ORDER — FENTANYL CITRATE 50 UG/ML
25 INJECTION, SOLUTION INTRAMUSCULAR; INTRAVENOUS
Status: CANCELLED | OUTPATIENT
Start: 2024-01-04

## 2024-01-04 RX ORDER — DOCUSATE SODIUM 100 MG/1
100 CAPSULE, LIQUID FILLED ORAL 2 TIMES DAILY
Status: CANCELLED | OUTPATIENT
Start: 2024-01-04

## 2024-01-04 RX ORDER — ACETAMINOPHEN 325 MG/1
650 TABLET ORAL EVERY 4 HOURS PRN
Status: CANCELLED | OUTPATIENT
Start: 2024-01-04

## 2024-01-04 RX ORDER — METOPROLOL TARTRATE 25 MG/1
25 TABLET, FILM COATED ORAL
Status: CANCELLED | OUTPATIENT
Start: 2024-01-04

## 2024-01-04 RX ORDER — FAMOTIDINE 10 MG/ML
20 INJECTION INTRAVENOUS 2 TIMES DAILY
Status: CANCELLED | OUTPATIENT
Start: 2024-01-04

## 2024-01-04 RX ORDER — DEXTROSE MONOHYDRATE, SODIUM CHLORIDE, AND POTASSIUM CHLORIDE 50; 1.49; 4.5 G/1000ML; G/1000ML; G/1000ML
INJECTION, SOLUTION INTRAVENOUS CONTINUOUS
Status: CANCELLED | OUTPATIENT
Start: 2024-01-04

## 2024-01-04 RX ORDER — IPRATROPIUM BROMIDE AND ALBUTEROL SULFATE 2.5; .5 MG/3ML; MG/3ML
3 SOLUTION RESPIRATORY (INHALATION) EVERY 4 HOURS PRN
Status: CANCELLED | OUTPATIENT
Start: 2024-01-04 | End: 2024-01-05

## 2024-01-04 RX ORDER — SODIUM CHLORIDE 0.9 % (FLUSH) 0.9 %
10 SYRINGE (ML) INJECTION
Status: CANCELLED | OUTPATIENT
Start: 2024-01-04

## 2024-01-04 RX ORDER — FACIAL-BODY WIPES
10 EACH TOPICAL DAILY PRN
Status: CANCELLED | OUTPATIENT
Start: 2024-01-04

## 2024-01-04 RX ORDER — MUPIROCIN 20 MG/G
1 OINTMENT TOPICAL 2 TIMES DAILY
Status: CANCELLED | OUTPATIENT
Start: 2024-01-04 | End: 2024-01-09

## 2024-01-04 RX ORDER — METOCLOPRAMIDE HYDROCHLORIDE 5 MG/ML
5 INJECTION INTRAMUSCULAR; INTRAVENOUS EVERY 6 HOURS PRN
Status: CANCELLED | OUTPATIENT
Start: 2024-01-04

## 2024-01-04 RX ORDER — MAGNESIUM SULFATE/D5W 2 G/50 ML
4 INTRAVENOUS SOLUTION, PIGGYBACK (ML) INTRAVENOUS
Status: CANCELLED | OUTPATIENT
Start: 2024-01-04

## 2024-01-04 RX ORDER — POTASSIUM CHLORIDE 14.9 MG/ML
60 INJECTION INTRAVENOUS
Status: CANCELLED | OUTPATIENT
Start: 2024-01-04

## 2024-01-04 RX ORDER — LIDOCAINE HYDROCHLORIDE 10 MG/ML
1 INJECTION, SOLUTION EPIDURAL; INFILTRATION; INTRACAUDAL; PERINEURAL
Status: CANCELLED | OUTPATIENT
Start: 2024-01-04

## 2024-01-04 RX ORDER — CEFAZOLIN SODIUM 2 G/50ML
2 SOLUTION INTRAVENOUS
Status: CANCELLED | OUTPATIENT
Start: 2024-01-04 | End: 2024-01-06

## 2024-01-04 RX ORDER — POLYETHYLENE GLYCOL 3350 17 G/17G
17 POWDER, FOR SOLUTION ORAL DAILY
Status: CANCELLED | OUTPATIENT
Start: 2024-01-05

## 2024-01-04 RX ORDER — SODIUM CHLORIDE 9 MG/ML
INJECTION, SOLUTION INTRAVENOUS CONTINUOUS
Status: CANCELLED | OUTPATIENT
Start: 2024-01-04

## 2024-01-04 RX ORDER — POTASSIUM CHLORIDE 29.8 MG/ML
40 INJECTION INTRAVENOUS
Status: CANCELLED | OUTPATIENT
Start: 2024-01-04

## 2024-01-04 RX ORDER — ONDANSETRON 2 MG/ML
4 INJECTION INTRAMUSCULAR; INTRAVENOUS EVERY 12 HOURS PRN
Status: CANCELLED | OUTPATIENT
Start: 2024-01-04

## 2024-01-04 RX ORDER — POTASSIUM CHLORIDE 20 MEQ/1
20 TABLET, EXTENDED RELEASE ORAL EVERY 12 HOURS
Status: CANCELLED | OUTPATIENT
Start: 2024-01-04

## 2024-01-04 NOTE — PROGRESS NOTES
Subjective:      Patient ID: King Botello is a 38 y.o. female.    Chief Complaint: No chief complaint on file.      HPI:  King Botello is a 38 y.o. female who presents to preop for redo MVR. Medical conditions include Medical conditions include hx of MVP and MR s/p mitral annuloplasty in [place] (2003),  HTN, bradycardia. Patient is followed by Dr. Cordero. Patient reports chronic fatigue and BLANK before pregnancy.  After delivery of baby in May, patient was discharged and a few days later readmitted for CHF exacerbation which improved with lasix. Additionally she is also on metoprolol for rate control, which has improved her BP and HR. Denies atrial fibrillation. Today she denies chest pain, SOB,  palpitations, orthopnea, PND, claudication. TTE LVEF 60-66%. moderate right atrial enlargement. LA severely dilated. There is moderate to severe mitral stenosis. Javier score is 10, the mean diastolic gradient across the mitral valve is 11 mmHg at a heart rate of 77 bpm; MVA 1.33. Independently perform ADLs. No Assistive devices for walking. Denies prior history of strokes, stents, sternotomies. Denies tobacco, EtoH, or illicit drug use.      Of note, mitral annuloplasty was done in 2003 due to the anticipation of having children in the future. Patient is aware that she would potentially need a mechanical valve replacement surgery and is ready to do so.    Current medications Reviewed  Current Outpatient Medications on File Prior to Visit   Medication Sig Dispense Refill    amoxicillin (AMOXIL) 500 MG Tab Take 4 tablets (2,000 mg total) by mouth daily as needed (for procedures). Take one hour before the procedure. 12 tablet 2    furosemide (LASIX) 20 MG tablet Take one to two tablets by mouth per day as instructed by  tablet 3    metoprolol succinate (TOPROL-XL) 25 MG 24 hr tablet Take 2 tablets (50 mg total) by mouth every morning AND 1 tablet (25 mg total) every evening. 270 tablet 3    potassium  chloride SA (K-DUR,KLOR-CON M) 10 MEQ tablet Take one to two tablets daily as instructed by MD. 180 tablet 3     No current facility-administered medications on file prior to visit.       Review of Systems   Constitutional:  Positive for fatigue. Negative for activity change, appetite change and fever.   HENT:  Negative for nosebleeds.    Respiratory:  Positive for shortness of breath. Negative for cough.    Cardiovascular:  Negative for chest pain, palpitations and leg swelling.   Gastrointestinal:  Negative for abdominal distention, abdominal pain and nausea.   Genitourinary:  Negative for frequency.   Musculoskeletal:  Negative for arthralgias and myalgias.   Skin:  Negative for rash.   Neurological:  Negative for dizziness and numbness.   Hematological:  Does not bruise/bleed easily.     Objective:   Physical Exam  Constitutional:       Appearance: Normal appearance.   HENT:      Head: Normocephalic and atraumatic.   Eyes:      Extraocular Movements: Extraocular movements intact.   Cardiovascular:      Rate and Rhythm: Normal rate.      Heart sounds: Murmur heard.   Pulmonary:      Effort: Pulmonary effort is normal.   Abdominal:      General: Abdomen is flat.      Palpations: Abdomen is soft.   Musculoskeletal:         General: Normal range of motion.      Cervical back: Normal range of motion.   Skin:     General: Skin is warm and dry.      Capillary Refill: Capillary refill takes less than 2 seconds.      Coloration: Skin is not pale.   Neurological:      General: No focal deficit present.      Mental Status: She is alert.         Diagnotic Results: reviewed   CT chest 1/4/23    Echo 1/4/23    Echo 10/2023    Left Ventricle: The left ventricle is normal in size. Ventricular mass is normal. Normal wall thickness. Normal wall motion. There is normal systolic function with a visually estimated ejection fraction of 60 - 65%. Ejection fraction by visual approximation is 65%.    Left Atrium: Left atrium is severely  dilated.    Right Ventricle: Normal right ventricular cavity size. Wall thickness is normal. Right ventricle wall motion  is normal. Systolic function is normal.    Right Atrium: Right atrium is moderately dilated.    Aortic Valve: The aortic valve is a trileaflet valve. There is mild aortic valve sclerosis.    Mitral Valve: There is severe bileaflet sclerosis. There is moderate mitral annular calcification present. The Higinio score is 10-11. Moderately restricted motion. There is moderate to severe stenosis. The mean pressure gradient across the mitral valve is 11 mmHg at a heart rate of 77 bpm;MVA 1.33 . There is trace regurgitation.    Pulmonary Artery: The estimated pulmonary artery systolic pressure is 36 mmHg.    IVC/SVC: Normal venous pressure at 3 mmHg.    Assessment:   Prosthetic mitral stenosis   S/p MVr   Plan:      CTS Attending Note:    I have personally taken the history and examined this patient and agree with the OBINNA's note as stated above.  Very pleasant 38-year-old woman who underwent mitral valve repair at the age of 17.  She now has severe symptomatic mitral stenosis.  We plan reoperation, with replacement of the mitral valve.  Her previous operation was done via thoracotomy.  We will plan for median sternotomy.  She desires a mechanical prosthesis.  I reviewed the plan with her.  We discussed the risks and benefits of the proposed procedure, including but not limited to death, stroke, respiratory complications, renal complications, arrythmia, need for permanent pacemaker, and infection. We discussed the STS predicted risk.  Her questions have been answered, and she wishes to proceed.

## 2024-01-04 NOTE — PATIENT INSTRUCTIONS
"Pt verbally instructed and written instructions given for surgery.      PREPARING FOR SURGERY    Your surgery has been scheduled for:    Day: Monday   Date:  January 8    Arrival Time: 0500    Start Time: 0700    You should report to the second floor surgery center, located on the [facility] side of the second floor of the [facility]. The phone number is [phone].         PLEASE NOTE    If you are allergic to any medications, please inform your doctor or the nurse responsible for your care.  Tell the doctor if you take aspirin, products containing aspirin, herbal medications or blood thinners, such as Coumadin, Pradaxa, or Plavix.  Notify your doctor if you are diabetic and provide information about the medications you take.  Arrange for someone to drive you home following surgery. You will not be allowed to leave the surgical facility alone or drive yourself home following sedation and anesthesia.  If you have not already done so, please bring a list of your medications with you the day of your surgery.          THE NIGHT BEFORE SURGERY    Eat a light supper on the night before your surgery, no greasy or fatty foods.    DO NOT EAT OR DRINK ANYTHING AFTER MIDNIGHT, INCLUDING GUM, HARD CANDY, MINTS, OR CHEWING TOBACCO    Take a complete shower. Wash your body from the neck down with Hibiclens (chlorhexidine gluconate) soap. Hibiclens soap may be purchased over the counter at the pharmacy. Keep the soap away from your eyes, ears, and mouth. After washing with Hibiclens, rinse thoroughly. You may also use any soap labeled "antibacterial". Shampoo your hair with your regular shampoo.         THE DAY OF SURGERY    Take another shower with Hibiclens or any antibacterial soap, to reduce the change of infection.    Medications to take the morning of surgery: metoprolol with a small sip of water.     Diabetic medication instructions: n/a.    You may brush your teeth and rinse your mouth, but do not swallow " any water.    Do not apply perfume, powder, body lotions or deodorant on the day of surgery.    Do not wear any makeup, including mascara and false eyelashes.    Nail polish should be removed.    Wear comfortable clothes, such as button front shirt and loose-fitting pants.    Leave all jewelry, including body piercings and valuables at home.    Hairpins and clasps must be removed before you enter the operating room.    You may wear glasses, dentures and hearing aids before and after surgery. They may need to be removed before going into the operating room. Contact lenses worn before surgery must be removed before entering the operating room. Please bring a case for your hearing aids, glasses and/or contacts.    Bring any devices you will need after surgery such as crutches or canes.    If you have sleep apnea, please bring your CPAP machine.    If you have an implantable device, such as a pacemaker or AICD, please bring the device information card, if you have one.       If you have any questions or concerns, please don't hesitate to call.

## 2024-01-04 NOTE — H&P (VIEW-ONLY)
Subjective:      Patient ID: King Botello is a 38 y.o. female.    Chief Complaint: No chief complaint on file.      HPI:  King Botello is a 38 y.o. female who presents to preop for redo MVR. Medical conditions include Medical conditions include hx of MVP and MR s/p mitral annuloplasty in [place] (2003),  HTN, bradycardia. Patient is followed by Dr. Cordero. Patient reports chronic fatigue and BLANK before pregnancy.  After delivery of baby in May, patient was discharged and a few days later readmitted for CHF exacerbation which improved with lasix. Additionally she is also on metoprolol for rate control, which has improved her BP and HR. Denies atrial fibrillation. Today she denies chest pain, SOB,  palpitations, orthopnea, PND, claudication. TTE LVEF 60-66%. moderate right atrial enlargement. LA severely dilated. There is moderate to severe mitral stenosis. Javier score is 10, the mean diastolic gradient across the mitral valve is 11 mmHg at a heart rate of 77 bpm; MVA 1.33. Independently perform ADLs. No Assistive devices for walking. Denies prior history of strokes, stents, sternotomies. Denies tobacco, EtoH, or illicit drug use.      Of note, mitral annuloplasty was done in 2003 due to the anticipation of having children in the future. Patient is aware that she would potentially need a mechanical valve replacement surgery and is ready to do so.    Current medications Reviewed  Current Outpatient Medications on File Prior to Visit   Medication Sig Dispense Refill    amoxicillin (AMOXIL) 500 MG Tab Take 4 tablets (2,000 mg total) by mouth daily as needed (for procedures). Take one hour before the procedure. 12 tablet 2    furosemide (LASIX) 20 MG tablet Take one to two tablets by mouth per day as instructed by  tablet 3    metoprolol succinate (TOPROL-XL) 25 MG 24 hr tablet Take 2 tablets (50 mg total) by mouth every morning AND 1 tablet (25 mg total) every evening. 270 tablet 3    potassium  chloride SA (K-DUR,KLOR-CON M) 10 MEQ tablet Take one to two tablets daily as instructed by MD. 180 tablet 3     No current facility-administered medications on file prior to visit.       Review of Systems   Constitutional:  Positive for fatigue. Negative for activity change, appetite change and fever.   HENT:  Negative for nosebleeds.    Respiratory:  Positive for shortness of breath. Negative for cough.    Cardiovascular:  Negative for chest pain, palpitations and leg swelling.   Gastrointestinal:  Negative for abdominal distention, abdominal pain and nausea.   Genitourinary:  Negative for frequency.   Musculoskeletal:  Negative for arthralgias and myalgias.   Skin:  Negative for rash.   Neurological:  Negative for dizziness and numbness.   Hematological:  Does not bruise/bleed easily.     Objective:   Physical Exam  Constitutional:       Appearance: Normal appearance.   HENT:      Head: Normocephalic and atraumatic.   Eyes:      Extraocular Movements: Extraocular movements intact.   Cardiovascular:      Rate and Rhythm: Normal rate.      Heart sounds: Murmur heard.   Pulmonary:      Effort: Pulmonary effort is normal.   Abdominal:      General: Abdomen is flat.      Palpations: Abdomen is soft.   Musculoskeletal:         General: Normal range of motion.      Cervical back: Normal range of motion.   Skin:     General: Skin is warm and dry.      Capillary Refill: Capillary refill takes less than 2 seconds.      Coloration: Skin is not pale.   Neurological:      General: No focal deficit present.      Mental Status: She is alert.         Diagnotic Results: reviewed   CT chest 1/4/23    Echo 1/4/23    Echo 10/2023    Left Ventricle: The left ventricle is normal in size. Ventricular mass is normal. Normal wall thickness. Normal wall motion. There is normal systolic function with a visually estimated ejection fraction of 60 - 65%. Ejection fraction by visual approximation is 65%.    Left Atrium: Left atrium is severely  dilated.    Right Ventricle: Normal right ventricular cavity size. Wall thickness is normal. Right ventricle wall motion  is normal. Systolic function is normal.    Right Atrium: Right atrium is moderately dilated.    Aortic Valve: The aortic valve is a trileaflet valve. There is mild aortic valve sclerosis.    Mitral Valve: There is severe bileaflet sclerosis. There is moderate mitral annular calcification present. The Higinio score is 10-11. Moderately restricted motion. There is moderate to severe stenosis. The mean pressure gradient across the mitral valve is 11 mmHg at a heart rate of 77 bpm;MVA 1.33 . There is trace regurgitation.    Pulmonary Artery: The estimated pulmonary artery systolic pressure is 36 mmHg.    IVC/SVC: Normal venous pressure at 3 mmHg.    Assessment:   Prosthetic mitral stenosis   S/p MVr   Plan:      CTS Attending Note:    I have personally taken the history and examined this patient and agree with the OBINNA's note as stated above.  Very pleasant 38-year-old woman who underwent mitral valve repair at the age of 17.  She now has severe symptomatic mitral stenosis.  We plan reoperation, with replacement of the mitral valve.  Her previous operation was done via thoracotomy.  We will plan for median sternotomy.  She desires a mechanical prosthesis.  I reviewed the plan with her.  We discussed the risks and benefits of the proposed procedure, including but not limited to death, stroke, respiratory complications, renal complications, arrythmia, need for permanent pacemaker, and infection. We discussed the STS predicted risk.  Her questions have been answered, and she wishes to proceed.

## 2024-01-05 ENCOUNTER — TELEPHONE (OUTPATIENT)
Dept: CARDIOTHORACIC SURGERY | Facility: CLINIC | Age: 39
End: 2024-01-05
Payer: COMMERCIAL

## 2024-01-05 PROBLEM — T82.857A PROSTHETIC MITRAL VALVE STENOSIS: Status: RESOLVED | Noted: 2023-07-26 | Resolved: 2024-01-05

## 2024-01-05 NOTE — TELEPHONE ENCOUNTER
Called pt and informed her of arrival time for surgery.  Pt instructed to report to [facility] at 5:00 Monday morning.  Pt reminded to perform shower with antibacterial soap Sunday night and Monday morning, and to become NPO at midnight before surgery. Pt may take morning dose of metoprolol the morning of surgery.  Pt verbalized understanding.

## 2024-01-07 ENCOUNTER — ANESTHESIA EVENT (OUTPATIENT)
Dept: SURGERY | Facility: HOSPITAL | Age: 39
DRG: 220 | End: 2024-01-07
Payer: COMMERCIAL

## 2024-01-07 RX ORDER — ACETAMINOPHEN 500 MG
1000 TABLET ORAL
Status: COMPLETED | OUTPATIENT
Start: 2024-01-08 | End: 2024-01-08

## 2024-01-07 NOTE — ANESTHESIA PREPROCEDURE EVALUATION
[facility]  Anesthesia Pre-Operative Evaluation         Patient Name: King Botello  YOB: 1985  MRN: [id number]    SUBJECTIVE:     Pre-operative evaluation for Procedure(s) (LRB):  MITRAL VALVE REPLACEMENT, REDO STERNOTOMY (N/A)  Scheduled for 2024    HPI 2024:  King Botello is a 38 y.o. female with hx of HTN and MVP with MR s/p mitral valve annuloplasty in  with ongoing BLANK. She delivered via  in May and post-operatively had a CHF exacerbation treated with lasix. TTE with severe stenosis and trace regurg. Presents now for above procedure.    TTE 24:   Left Ventricle: The left ventricle is normal in size. Normal wall thickness. Normal wall motion. There is normal systolic function with a visually estimated ejection fraction of 60 - 65%. There is normal diastolic function.    Right Ventricle: Normal right ventricular cavity size. Wall thickness is normal. Right ventricle wall motion  is normal. Systolic function is normal.    Left Atrium: Left atrium is severely dilated.    Right Atrium: Right atrium is mildly dilated.    Mitral Valve: There is severe bileaflet sclerosis. Severely thickened leaflets. Moderately calcified leaflets. Severely restricted motion. There is severe stenosis with MVA  1.16 and MG 13 mmHg at HR 84.. There is trace regurgitation.    Tricuspid Valve: There is mild regurgitation.    Pulmonary Artery: The estimated pulmonary artery systolic pressure is 28 mmHg.    IVC/SVC: Normal venous pressure at 3 mmHg.    Prev airway:   None on file    Oxygen/Ventilation Requirements:  On room air            Patient Active Problem List   Diagnosis    Mitral valve prolapse    S/P mitral valve repair    Bradycardia    Vitamin D deficiency disease    Low back pain    Class 2 obesity due to excess calories with body mass index (BMI) of 35.0 to 35.9 in adult    Hypertension, essential    Chronic fatigue    Ankle swelling    Pregnancy     Pre-existing essential hypertension during pregnancy, antepartum    Nonrheumatic mitral valve stenosis    S/P  section    Chronic diastolic heart failure    Pre-op testing       Review of patient's allergies indicates:   Allergen Reactions    Doxycycline      Abdomen pain severe       Outpatient Medications:  No current facility-administered medications on file prior to encounter.     Current Outpatient Medications on File Prior to Encounter   Medication Sig Dispense Refill    amoxicillin (AMOXIL) 500 MG Tab Take 4 tablets (2,000 mg total) by mouth daily as needed (for procedures). Take one hour before the procedure. (Patient not taking: Reported on 2024) 12 tablet 2    furosemide (LASIX) 20 MG tablet Take one to two tablets by mouth per day as instructed by  tablet 3    metoprolol succinate (TOPROL-XL) 25 MG 24 hr tablet Take 2 tablets (50 mg total) by mouth every morning AND 1 tablet (25 mg total) every evening. 270 tablet 3    potassium chloride SA (K-DUR,KLOR-CON M) 10 MEQ tablet Take one to two tablets daily as instructed by MD. 180 tablet 3        Current Inpatient Medications:      Past Surgical History:   Procedure Laterality Date     SECTION       SECTION WITH TUBAL LIGATION Bilateral 2023    Procedure:  SECTION, WITH TUBAL LIGATION;  Surgeon: Mane Moreno MD;  Location: [facility] L&D;  Service: OB/GYN;  Laterality: Bilateral;    DILATION AND CURETTAGE OF UTERUS USING SUCTION N/A 2021    Procedure: DILATION AND CURETTAGE, UTERUS, USING SUCTION;  Surgeon: Mane Moreno MD;  Location: [facility] OR;  Service: OB/GYN;  Laterality: N/A;    MITRAL VALVE REPAIR         Social History     Socioeconomic History    Marital status:    Occupational History    Occupation: xray tech   Tobacco Use    Smoking status: Never    Smokeless tobacco: Never   Substance and Sexual Activity    Alcohol use: Not Currently     Comment: not since +UPT    Drug use: No     Sexual activity: Yes     Partners: Male     Birth control/protection: None     Social Determinants of Health     Financial Resource Strain: Low Risk  (1/4/2024)    Overall Financial Resource Strain (CARDIA)     Difficulty of Paying Living Expenses: Not hard at all   Food Insecurity: No Food Insecurity (1/4/2024)    Hunger Vital Sign     Worried About Running Out of Food in the Last Year: Never true     Ran Out of Food in the Last Year: Never true   Transportation Needs: No Transportation Needs (1/4/2024)    PRAPARE - Transportation     Lack of Transportation (Medical): No     Lack of Transportation (Non-Medical): No   Physical Activity: Insufficiently Active (1/4/2024)    Exercise Vital Sign     Days of Exercise per Week: 2 days     Minutes of Exercise per Session: 30 min   Stress: No Stress Concern Present (1/4/2024)    [employer] [employer] - Occupational Stress Questionnaire     Feeling of Stress : Not at all   Social Connections: Moderately Integrated (1/4/2024)    Social Connection and Isolation Panel [NHANES]     Frequency of Communication with Friends and Family: More than three times a week     Frequency of Social Gatherings with Friends and Family: Once a week     Attends Mosque Services: More than 4 times per year     Active Member of Clubs or Organizations: No     Attends Club or Organization Meetings: Never     Marital Status:    Housing Stability: Low Risk  (1/4/2024)    Housing Stability Vital Sign     Unable to Pay for Housing in the Last Year: No     Number of Places Lived in the Last Year: 1     Unstable Housing in the Last Year: No       OBJECTIVE:     Weight:  Wt Readings from Last 1 Encounters:   01/04/24 80.7 kg (177 lb 12.8 oz)     There is no height or weight on file to calculate BMI.    Recent Blood Pressure Readings:  BP Readings from Last 3 Encounters:   01/04/24 114/70   01/04/24 125/80   10/24/23 108/69       Vital Signs Range (Last 24H):         CBC:   Lab  Results   Component Value Date    WBC 9.80 01/04/2024    HGB 13.0 01/04/2024    HCT 40.8 01/04/2024    MCV 85 01/04/2024     01/04/2024       CMP:     Chemistry        Component Value Date/Time     01/04/2024 1500    K 3.9 01/04/2024 1500     01/04/2024 1500    CO2 24 01/04/2024 1500    BUN 10 01/04/2024 1500    CREATININE 0.6 01/04/2024 1500    GLU 73 01/04/2024 1500        Component Value Date/Time    CALCIUM 9.7 01/04/2024 1500    ALKPHOS 73 01/04/2024 1500    AST 18 01/04/2024 1500    ALT 11 01/04/2024 1500    BILITOT 0.5 01/04/2024 1500    ESTGFRAFRICA >60.0 06/28/2022 0743    EGFRNONAA >60.0 06/28/2022 0743            INR:  Lab Results   Component Value Date    INR 1.0 01/04/2024    INR 1.1 06/10/2006       Diagnostic Studies:      EKG:     Results for orders placed or performed during the hospital encounter of 01/04/24   EKG 12-lead    Collection Time: 01/04/24  2:41 PM    Narrative    Test Reason : T82.857A,Z01.818,    Vent. Rate : 072 BPM     Atrial Rate : 072 BPM     P-R Int : 162 ms          QRS Dur : 074 ms      QT Int : 402 ms       P-R-T Axes : 070 040 076 degrees     QTc Int : 440 ms    Normal sinus rhythm  Possible Left atrial enlargement  Borderline Abnormal ECG  When compared with ECG of 10-MAY-2023 08:10,  No significant change was found  Confirmed by Page George MD ([id number]) on 1/4/2024 3:57:28 PM    Referred By: MARY KENDALL           Confirmed By:Page George MD       2D Echo:    Results for orders placed or performed during the hospital encounter of 09/06/17   2D Echo w/ Color Flow Doppler   Result Value Ref Range    EF + QEF 60 55 - 65    Mitral Valve Regurgitation TRIVIAL     Est. PA Systolic Pressure 22.28     Mitral Valve Mobility MILDLY RESTRICTED     Mitral Valve Stenosis MILD (A)     Tricuspid Valve Regurgitation TRIVIAL TO MILD        Results for orders placed or performed during the hospital encounter of 10/18/23   Echo Saline Bubble? No   Result Value Ref  Range    BSA 1.85 m2    LVOT stroke volume 64.46 cm3    LVIDd 3.78 3.5 - 6.0 cm    LV Systolic Volume 23.82 mL    LV Systolic Volume Index 13.4 mL/m2    LVIDs 2.57 2.1 - 4.0 cm    LV Diastolic Volume 61.10 mL    LV Diastolic Volume Index 34.33 mL/m2    IVS 0.73 0.6 - 1.1 cm    LVOT diameter 2.05 cm    LVOT area 3.3 cm2    FS 32 28 - 44 %    Left Ventricle Relative Wall Thickness 0.41 cm    Posterior Wall 0.78 0.6 - 1.1 cm    LV mass 78.83 g    LV Mass Index 44 g/m2    MV Peak E Anuj 1.87 m/s    TDI SEPTAL 0.06 m/s    MV Peak A Anuj 1.72 m/s    TR Max Anuj 2.89 m/s    E/A ratio 1.09     E wave deceleration time 678.48 msec    LV SEPTAL E/E' RATIO 31.17 m/s    LA Volume Index 77.9 mL/m2    LA volume 138.66 cm3    LVOT peak anuj 0.92 m/s    LA size 5.14 cm    Left Atrium Minor Axis 6.19 cm    Left Atrium Major Axis 6.54 cm    LA volume (mod) 134.20 cm3    LA WIDTH 4.99 cm    LA Volume Index (Mod) 75.4 mL/m2    RVDD 3.96 cm    TAPSE 1.70 cm    RA Major Axis 5.68 cm    RA Width 3.98 cm    AV mean gradient 4 mmHg    AV peak gradient 8 mmHg    Ao peak anuj 1.40 m/s    Ao VTI 26.74 cm    LVOT peak VTI 19.54 cm    AV valve area 2.41 cm²    AV Velocity Ratio 0.66     AV index (prosthetic) 0.73     ADELAIDE by Velocity Ratio 2.17 cm²    MV mean gradient 11 mmHg    MV peak gradient 16 mmHg    MV stenosis pressure 1/2 time 166.0 ms    MV valve area p 1/2 method 1.33 cm2    MV valve area by continuity eq 0.92 cm2    MV VTI 69.77 cm    Triscuspid Valve Regurgitation Peak Gradient 33 mmHg    Sinus 2.86 cm    STJ 2.24 cm    Ascending aorta 2.78 cm    ZLVIDS -1.33     ZLVIDD -2.64     EF 65 %    TV resting pulmonary artery pressure 36 mmHg    RV TB RVSP 6 mmHg    Est. RA pres 3 mmHg    Narrative      Left Ventricle: The left ventricle is normal in size. Ventricular mass   is normal. Normal wall thickness. Normal wall motion. There is normal   systolic function with a visually estimated ejection fraction of 60 - 65%.   Ejection fraction by  visual approximation is 65%.    Left Atrium: Left atrium is severely dilated.    Right Ventricle: Normal right ventricular cavity size. Wall thickness   is normal. Right ventricle wall motion  is normal. Systolic function is   normal.    Right Atrium: Right atrium is moderately dilated.    Aortic Valve: The aortic valve is a trileaflet valve. There is mild   aortic valve sclerosis.    Mitral Valve: There is severe bileaflet sclerosis. There is moderate   mitral annular calcification present. The Greenwood score is 10-11.   Moderately restricted motion. There is moderate to severe stenosis. The   mean pressure gradient across the mitral valve is 11 mmHg at a heart rate   of 77 bpm;MVA 1.33 . There is trace regurgitation.    Pulmonary Artery: The estimated pulmonary artery systolic pressure is   36 mmHg.    IVC/SVC: Normal venous pressure at 3 mmHg.         No results found for this or any previous visit.      ASSESSMENT/PLAN:           Pre-op Assessment    I have reviewed the Patient Summary Reports.     I have reviewed the Nursing Notes. I have reviewed the NPO Status.      Review of Systems  Anesthesia Hx:  No problems with previous Anesthesia   History of prior surgery of interest to airway management or planning: heart surgery. Previous anesthesia: General, Spinal        Denies Family Hx of Anesthesia complications.    Denies Personal Hx of Anesthesia complications.                    Social:  Non-Smoker, No Alcohol Use       Cardiovascular:     Denies Pacemaker. Hypertension Valvular problems/Murmurs, MVP   Denies CABG/stent.  Denies Dysrhythmias.        BLANK   H/o MVP/MR s/p mitral annuloplasty now with severe MS                         Pulmonary:    Denies COPD.  Denies Asthma.     Denies Sleep Apnea.                Hepatic/GI:   Denies PUD.   Denies GERD.             Neurological:    Denies CVA.    Denies Seizures.                                Endocrine:  Denies Diabetes.         Obesity / BMI >  30      Physical Exam  General: Well nourished, Cooperative, Alert and Oriented    Airway:  Mallampati: II   Mouth Opening: Normal  TM Distance: Normal  Tongue: Normal  Neck ROM: Normal ROM    Dental:  Intact    Chest/Lungs:  Normal Respiratory Rate    Heart:  Rate: Normal  Rhythm: Regular Rhythm        Anesthesia Plan  Type of Anesthesia, risks & benefits discussed:    Anesthesia Type: Gen ETT, Regional  Intra-op Monitoring Plan: Standard ASA Monitors, Art Line, Central Line, MINERVA, PA and CO  Post Op Pain Control Plan: multimodal analgesia, IV/PO Opioids PRN and peripheral nerve block  Induction:  IV  Airway Plan: Direct and Video, Post-Induction  Informed Consent: Informed consent signed with the Patient and all parties understand the risks and agree with anesthesia plan.  All questions answered. Patient consented to blood products? Yes  ASA Score: 4  Day of Surgery Review of History & Physical: H&P Update referred to the surgeon/provider.I have interviewed and examined the patient. I have reviewed the patient's H&P dated: There are no significant changes.   Anesthesia Plan Notes: Discussed plan for awake arterial line placement, intraoperative MINERVA, central line insertion, and post operative ICU care    Ready For Surgery From Anesthesia Perspective.     .

## 2024-01-08 ENCOUNTER — ANESTHESIA (OUTPATIENT)
Dept: SURGERY | Facility: HOSPITAL | Age: 39
DRG: 220 | End: 2024-01-08
Payer: COMMERCIAL

## 2024-01-08 ENCOUNTER — HOSPITAL ENCOUNTER (INPATIENT)
Facility: HOSPITAL | Age: 39
LOS: 10 days | Discharge: HOME HEALTH | DRG: 220 | End: 2024-01-18
Attending: THORACIC SURGERY (CARDIOTHORACIC VASCULAR SURGERY) | Admitting: THORACIC SURGERY (CARDIOTHORACIC VASCULAR SURGERY)
Payer: COMMERCIAL

## 2024-01-08 DIAGNOSIS — I44.2 COMPLETE HEART BLOCK: ICD-10-CM

## 2024-01-08 DIAGNOSIS — I49.8 ATRIAL ARRHYTHMIA: ICD-10-CM

## 2024-01-08 DIAGNOSIS — Z95.2 S/P MITRAL VALVE REPLACEMENT: Primary | ICD-10-CM

## 2024-01-08 DIAGNOSIS — R73.9 TRANSIENT HYPERGLYCEMIA POST PROCEDURE: ICD-10-CM

## 2024-01-08 DIAGNOSIS — I48.92 ATRIAL FLUTTER: ICD-10-CM

## 2024-01-08 DIAGNOSIS — I50.32 CHRONIC DIASTOLIC HEART FAILURE: ICD-10-CM

## 2024-01-08 DIAGNOSIS — I49.9 ARRHYTHMIA: ICD-10-CM

## 2024-01-08 DIAGNOSIS — Z95.2 S/P MVR (MITRAL VALVE REPLACEMENT): Primary | ICD-10-CM

## 2024-01-08 DIAGNOSIS — I44.2 CHB (COMPLETE HEART BLOCK): ICD-10-CM

## 2024-01-08 DIAGNOSIS — T82.857A STENOSIS OF PROSTHETIC MITRAL VALVE, INITIAL ENCOUNTER: ICD-10-CM

## 2024-01-08 DIAGNOSIS — I48.91 A-FIB: ICD-10-CM

## 2024-01-08 DIAGNOSIS — Z98.890 HISTORY OF HEART SURGERY: ICD-10-CM

## 2024-01-08 DIAGNOSIS — I34.2 NONRHEUMATIC MITRAL VALVE STENOSIS: ICD-10-CM

## 2024-01-08 PROBLEM — D62 ACUTE POSTOPERATIVE ANEMIA DUE TO EXPECTED BLOOD LOSS: Status: ACTIVE | Noted: 2024-01-08

## 2024-01-08 LAB
ALBUMIN SERPL BCP-MCNC: 2.1 G/DL (ref 3.5–5.2)
ALLENS TEST: ABNORMAL
ALP SERPL-CCNC: 37 U/L (ref 55–135)
ALT SERPL W/O P-5'-P-CCNC: 10 U/L (ref 10–44)
ANION GAP SERPL CALC-SCNC: 9 MMOL/L (ref 8–16)
APTT PPP: 36.3 SEC (ref 21–32)
AST SERPL-CCNC: 67 U/L (ref 10–40)
B-HCG UR QL: NEGATIVE
BASOPHILS # BLD AUTO: 0.07 K/UL (ref 0–0.2)
BASOPHILS NFR BLD: 0.2 % (ref 0–1.9)
BILIRUB SERPL-MCNC: 0.7 MG/DL (ref 0.1–1)
BUN SERPL-MCNC: 9 MG/DL (ref 6–20)
CALCIUM SERPL-MCNC: 8 MG/DL (ref 8.7–10.5)
CHLORIDE SERPL-SCNC: 113 MMOL/L (ref 95–110)
CO2 SERPL-SCNC: 21 MMOL/L (ref 23–29)
CREAT SERPL-MCNC: 0.6 MG/DL (ref 0.5–1.4)
CTP QC/QA: YES
DELSYS: ABNORMAL
DIFFERENTIAL METHOD BLD: ABNORMAL
EOSINOPHIL # BLD AUTO: 0 K/UL (ref 0–0.5)
EOSINOPHIL NFR BLD: 0.1 % (ref 0–8)
ERYTHROCYTE [DISTWIDTH] IN BLOOD BY AUTOMATED COUNT: 16.2 % (ref 11.5–14.5)
ERYTHROCYTE [SEDIMENTATION RATE] IN BLOOD BY WESTERGREN METHOD: 20 MM/H
EST. GFR  (NO RACE VARIABLE): >60 ML/MIN/1.73 M^2
FIBRINOGEN PPP-MCNC: 143 MG/DL (ref 182–400)
FIBRINOGEN PPP-MCNC: 244 MG/DL (ref 182–400)
FIO2: 10
FIO2: 100
FIO2: 40
FIO2: 40
FIO2: 50
FIO2: 55
FIO2: 60
FIO2: 65
GLUCOSE SERPL-MCNC: 131 MG/DL (ref 70–110)
GLUCOSE SERPL-MCNC: 141 MG/DL (ref 70–110)
GLUCOSE SERPL-MCNC: 149 MG/DL (ref 70–110)
GLUCOSE SERPL-MCNC: 171 MG/DL (ref 70–110)
GLUCOSE SERPL-MCNC: 178 MG/DL (ref 70–110)
GLUCOSE SERPL-MCNC: 181 MG/DL (ref 70–110)
GLUCOSE SERPL-MCNC: 187 MG/DL (ref 70–110)
GLUCOSE SERPL-MCNC: 198 MG/DL (ref 70–110)
HCO3 UR-SCNC: 17.9 MMOL/L (ref 24–28)
HCO3 UR-SCNC: 18.2 MMOL/L (ref 24–28)
HCO3 UR-SCNC: 18.8 MMOL/L (ref 24–28)
HCO3 UR-SCNC: 19.8 MMOL/L (ref 24–28)
HCO3 UR-SCNC: 20.2 MMOL/L (ref 24–28)
HCO3 UR-SCNC: 22.7 MMOL/L (ref 24–28)
HCO3 UR-SCNC: 23 MMOL/L (ref 24–28)
HCO3 UR-SCNC: 23.1 MMOL/L (ref 24–28)
HCO3 UR-SCNC: 23.3 MMOL/L (ref 24–28)
HCO3 UR-SCNC: 23.9 MMOL/L (ref 24–28)
HCO3 UR-SCNC: 23.9 MMOL/L (ref 24–28)
HCO3 UR-SCNC: 24 MMOL/L (ref 24–28)
HCO3 UR-SCNC: 25 MMOL/L (ref 24–28)
HCT VFR BLD AUTO: 27.5 % (ref 37–48.5)
HCT VFR BLD CALC: 19 %PCV (ref 36–54)
HCT VFR BLD CALC: 20 %PCV (ref 36–54)
HCT VFR BLD CALC: 20 %PCV (ref 36–54)
HCT VFR BLD CALC: 22 %PCV (ref 36–54)
HCT VFR BLD CALC: 23 %PCV (ref 36–54)
HCT VFR BLD CALC: 24 %PCV (ref 36–54)
HCT VFR BLD CALC: 25 %PCV (ref 36–54)
HCT VFR BLD CALC: 26 %PCV (ref 36–54)
HCT VFR BLD CALC: 26 %PCV (ref 36–54)
HGB BLD-MCNC: 8.8 G/DL (ref 12–16)
IMM GRANULOCYTES # BLD AUTO: 0.39 K/UL (ref 0–0.04)
IMM GRANULOCYTES NFR BLD AUTO: 1.1 % (ref 0–0.5)
INR PPP: 1.2 (ref 0.8–1.2)
LDH SERPL L TO P-CCNC: 2.17 MMOL/L (ref 0.36–1.25)
LDH SERPL L TO P-CCNC: 2.58 MMOL/L (ref 0.36–1.25)
LDH SERPL L TO P-CCNC: 2.81 MMOL/L (ref 0.5–2.2)
LDH SERPL L TO P-CCNC: 2.98 MMOL/L (ref 0.36–1.25)
LDH SERPL L TO P-CCNC: 3.04 MMOL/L (ref 0.36–1.25)
LDH SERPL L TO P-CCNC: 3.1 MMOL/L (ref 0.36–1.25)
LDH SERPL L TO P-CCNC: 3.48 MMOL/L (ref 0.36–1.25)
LDH SERPL L TO P-CCNC: 4.15 MMOL/L (ref 0.36–1.25)
LDH SERPL L TO P-CCNC: 4.51 MMOL/L (ref 0.36–1.25)
LDH SERPL L TO P-CCNC: 5.15 MMOL/L (ref 0.36–1.25)
LYMPHOCYTES # BLD AUTO: 2.7 K/UL (ref 1–4.8)
LYMPHOCYTES NFR BLD: 7.6 % (ref 18–48)
MAGNESIUM SERPL-MCNC: 2.5 MG/DL (ref 1.6–2.6)
MAGNESIUM SERPL-MCNC: 2.5 MG/DL (ref 1.6–2.6)
MCH RBC QN AUTO: 27.2 PG (ref 27–31)
MCHC RBC AUTO-ENTMCNC: 32 G/DL (ref 32–36)
MCV RBC AUTO: 85 FL (ref 82–98)
MODE: ABNORMAL
MONOCYTES # BLD AUTO: 2.2 K/UL (ref 0.3–1)
MONOCYTES NFR BLD: 6.2 % (ref 4–15)
NEUTROPHILS # BLD AUTO: 30.3 K/UL (ref 1.8–7.7)
NEUTROPHILS NFR BLD: 84.8 % (ref 38–73)
NRBC BLD-RTO: 0 /100 WBC
PCO2 BLDA: 31.4 MMHG (ref 35–45)
PCO2 BLDA: 33 MMHG (ref 35–45)
PCO2 BLDA: 34.9 MMHG (ref 35–45)
PCO2 BLDA: 35.3 MMHG (ref 35–45)
PCO2 BLDA: 37.4 MMHG (ref 35–45)
PCO2 BLDA: 37.6 MMHG (ref 35–45)
PCO2 BLDA: 39.7 MMHG (ref 35–45)
PCO2 BLDA: 42.5 MMHG (ref 35–45)
PCO2 BLDA: 43.3 MMHG (ref 35–45)
PCO2 BLDA: 44 MMHG (ref 35–45)
PCO2 BLDA: 44.5 MMHG (ref 35–45)
PCO2 BLDA: 45 MMHG (ref 35–45)
PCO2 BLDA: 45.2 MMHG (ref 35–45)
PEEP: 5
PH SMN: 7.3 [PH] (ref 7.35–7.45)
PH SMN: 7.32 [PH] (ref 7.35–7.45)
PH SMN: 7.33 [PH] (ref 7.35–7.45)
PH SMN: 7.34 [PH] (ref 7.35–7.45)
PH SMN: 7.34 [PH] (ref 7.35–7.45)
PH SMN: 7.35 [PH] (ref 7.35–7.45)
PH SMN: 7.36 [PH] (ref 7.35–7.45)
PH SMN: 7.37 [PH] (ref 7.35–7.45)
PH SMN: 7.37 [PH] (ref 7.35–7.45)
PH SMN: 7.39 [PH] (ref 7.35–7.45)
PH SMN: 7.41 [PH] (ref 7.35–7.45)
PHOSPHATE SERPL-MCNC: 2.9 MG/DL (ref 2.7–4.5)
PHOSPHATE SERPL-MCNC: 2.9 MG/DL (ref 2.7–4.5)
PLATELET # BLD AUTO: 220 K/UL (ref 150–450)
PMV BLD AUTO: 11.1 FL (ref 9.2–12.9)
PO2 BLDA: 129 MMHG (ref 80–100)
PO2 BLDA: 131 MMHG (ref 80–100)
PO2 BLDA: 138 MMHG (ref 80–100)
PO2 BLDA: 141 MMHG (ref 80–100)
PO2 BLDA: 181 MMHG (ref 80–100)
PO2 BLDA: 240 MMHG (ref 80–100)
PO2 BLDA: 257 MMHG (ref 80–100)
PO2 BLDA: 281 MMHG (ref 80–100)
PO2 BLDA: 288 MMHG (ref 80–100)
PO2 BLDA: 343 MMHG (ref 80–100)
PO2 BLDA: 43 MMHG (ref 80–100)
PO2 BLDA: 46 MMHG (ref 40–60)
PO2 BLDA: 543 MMHG (ref 80–100)
POC BE: -1 MMOL/L
POC BE: -1 MMOL/L
POC BE: -2 MMOL/L
POC BE: -3 MMOL/L
POC BE: -5 MMOL/L
POC BE: -6 MMOL/L
POC BE: -6 MMOL/L
POC BE: -8 MMOL/L
POC BE: -8 MMOL/L
POC IONIZED CALCIUM: 0.8 MMOL/L (ref 1.06–1.42)
POC IONIZED CALCIUM: 0.88 MMOL/L (ref 1.06–1.42)
POC IONIZED CALCIUM: 0.92 MMOL/L (ref 1.06–1.42)
POC IONIZED CALCIUM: 0.92 MMOL/L (ref 1.06–1.42)
POC IONIZED CALCIUM: 0.94 MMOL/L (ref 1.06–1.42)
POC IONIZED CALCIUM: 0.95 MMOL/L (ref 1.06–1.42)
POC IONIZED CALCIUM: 0.96 MMOL/L (ref 1.06–1.42)
POC IONIZED CALCIUM: 1.18 MMOL/L (ref 1.06–1.42)
POC IONIZED CALCIUM: 1.18 MMOL/L (ref 1.06–1.42)
POC IONIZED CALCIUM: 1.22 MMOL/L (ref 1.06–1.42)
POC IONIZED CALCIUM: 1.23 MMOL/L (ref 1.06–1.42)
POC IONIZED CALCIUM: 1.24 MMOL/L (ref 1.06–1.42)
POC IONIZED CALCIUM: 1.27 MMOL/L (ref 1.06–1.42)
POC SATURATED O2: 100 % (ref 95–100)
POC SATURATED O2: 76 % (ref 95–100)
POC SATURATED O2: 79 % (ref 95–100)
POC SATURATED O2: 99 % (ref 95–100)
POC TCO2: 19 MMOL/L (ref 23–27)
POC TCO2: 19 MMOL/L (ref 23–27)
POC TCO2: 20 MMOL/L (ref 23–27)
POC TCO2: 21 MMOL/L (ref 23–27)
POC TCO2: 21 MMOL/L (ref 23–27)
POC TCO2: 24 MMOL/L (ref 23–27)
POC TCO2: 25 MMOL/L (ref 23–27)
POC TCO2: 25 MMOL/L (ref 24–29)
POC TCO2: 26 MMOL/L (ref 23–27)
POCT GLUCOSE: 117 MG/DL (ref 70–110)
POCT GLUCOSE: 118 MG/DL (ref 70–110)
POCT GLUCOSE: 123 MG/DL (ref 70–110)
POCT GLUCOSE: 126 MG/DL (ref 70–110)
POCT GLUCOSE: 130 MG/DL (ref 70–110)
POCT GLUCOSE: 133 MG/DL (ref 70–110)
POCT GLUCOSE: 144 MG/DL (ref 70–110)
POCT GLUCOSE: 150 MG/DL (ref 70–110)
POCT GLUCOSE: 157 MG/DL (ref 70–110)
POCT GLUCOSE: 178 MG/DL (ref 70–110)
POTASSIUM BLD-SCNC: 3.1 MMOL/L (ref 3.5–5.1)
POTASSIUM BLD-SCNC: 3.2 MMOL/L (ref 3.5–5.1)
POTASSIUM BLD-SCNC: 3.5 MMOL/L (ref 3.5–5.1)
POTASSIUM BLD-SCNC: 3.5 MMOL/L (ref 3.5–5.1)
POTASSIUM BLD-SCNC: 3.6 MMOL/L (ref 3.5–5.1)
POTASSIUM BLD-SCNC: 3.7 MMOL/L (ref 3.5–5.1)
POTASSIUM BLD-SCNC: 3.9 MMOL/L (ref 3.5–5.1)
POTASSIUM BLD-SCNC: 3.9 MMOL/L (ref 3.5–5.1)
POTASSIUM BLD-SCNC: 4.1 MMOL/L (ref 3.5–5.1)
POTASSIUM BLD-SCNC: 4.2 MMOL/L (ref 3.5–5.1)
POTASSIUM BLD-SCNC: 4.3 MMOL/L (ref 3.5–5.1)
POTASSIUM SERPL-SCNC: 3.2 MMOL/L (ref 3.5–5.1)
POTASSIUM SERPL-SCNC: 4.3 MMOL/L (ref 3.5–5.1)
PROT SERPL-MCNC: 3.9 G/DL (ref 6–8.4)
PROTHROMBIN TIME: 13 SEC (ref 9–12.5)
RBC # BLD AUTO: 3.24 M/UL (ref 4–5.4)
SAMPLE: ABNORMAL
SITE: ABNORMAL
SODIUM BLD-SCNC: 137 MMOL/L (ref 136–145)
SODIUM BLD-SCNC: 138 MMOL/L (ref 136–145)
SODIUM BLD-SCNC: 138 MMOL/L (ref 136–145)
SODIUM BLD-SCNC: 139 MMOL/L (ref 136–145)
SODIUM BLD-SCNC: 140 MMOL/L (ref 136–145)
SODIUM BLD-SCNC: 141 MMOL/L (ref 136–145)
SODIUM BLD-SCNC: 142 MMOL/L (ref 136–145)
SODIUM BLD-SCNC: 143 MMOL/L (ref 136–145)
SODIUM BLD-SCNC: 144 MMOL/L (ref 136–145)
SODIUM SERPL-SCNC: 143 MMOL/L (ref 136–145)
VT: 380
WBC # BLD AUTO: 35.72 K/UL (ref 3.9–12.7)

## 2024-01-08 PROCEDURE — P9045 ALBUMIN (HUMAN), 5%, 250 ML: HCPCS | Mod: JZ,JG

## 2024-01-08 PROCEDURE — 36000713 HC OR TIME LEV V EA ADD 15 MIN: Performed by: THORACIC SURGERY (CARDIOTHORACIC VASCULAR SURGERY)

## 2024-01-08 PROCEDURE — 25000003 PHARM REV CODE 250: Performed by: STUDENT IN AN ORGANIZED HEALTH CARE EDUCATION/TRAINING PROGRAM

## 2024-01-08 PROCEDURE — 94150 VITAL CAPACITY TEST: CPT

## 2024-01-08 PROCEDURE — 80053 COMPREHEN METABOLIC PANEL: CPT | Performed by: STUDENT IN AN ORGANIZED HEALTH CARE EDUCATION/TRAINING PROGRAM

## 2024-01-08 PROCEDURE — 27100171 HC OXYGEN HIGH FLOW UP TO 24 HOURS

## 2024-01-08 PROCEDURE — 93010 ELECTROCARDIOGRAM REPORT: CPT | Mod: ,,, | Performed by: INTERNAL MEDICINE

## 2024-01-08 PROCEDURE — 86920 COMPATIBILITY TEST SPIN: CPT | Performed by: STUDENT IN AN ORGANIZED HEALTH CARE EDUCATION/TRAINING PROGRAM

## 2024-01-08 PROCEDURE — 33430 REPLACEMENT OF MITRAL VALVE: CPT | Mod: ,,, | Performed by: THORACIC SURGERY (CARDIOTHORACIC VASCULAR SURGERY)

## 2024-01-08 PROCEDURE — 25000003 PHARM REV CODE 250

## 2024-01-08 PROCEDURE — 36620 INSERTION CATHETER ARTERY: CPT | Mod: 59,,, | Performed by: ANESTHESIOLOGY

## 2024-01-08 PROCEDURE — 27000175 HC ADULT BYPASS PUMP

## 2024-01-08 PROCEDURE — 82803 BLOOD GASES ANY COMBINATION: CPT

## 2024-01-08 PROCEDURE — C1729 CATH, DRAINAGE: HCPCS | Performed by: THORACIC SURGERY (CARDIOTHORACIC VASCULAR SURGERY)

## 2024-01-08 PROCEDURE — 93325 DOPPLER ECHO COLOR FLOW MAPG: CPT | Mod: 26,,, | Performed by: ANESTHESIOLOGY

## 2024-01-08 PROCEDURE — 36620 INSERTION CATHETER ARTERY: CPT

## 2024-01-08 PROCEDURE — 99900017 HC EXTUBATION W/PARAMETERS (STAT)

## 2024-01-08 PROCEDURE — 84132 ASSAY OF SERUM POTASSIUM: CPT

## 2024-01-08 PROCEDURE — 27201037 HC PRESSURE MONITORING SET UP

## 2024-01-08 PROCEDURE — 63600175 PHARM REV CODE 636 W HCPCS: Mod: JZ,JG

## 2024-01-08 PROCEDURE — 33464 VALVULOPLASTY TRICUSPID: CPT | Mod: 51,,, | Performed by: THORACIC SURGERY (CARDIOTHORACIC VASCULAR SURGERY)

## 2024-01-08 PROCEDURE — 36592 COLLECT BLOOD FROM PICC: CPT

## 2024-01-08 PROCEDURE — 20000000 HC ICU ROOM

## 2024-01-08 PROCEDURE — 94010 BREATHING CAPACITY TEST: CPT

## 2024-01-08 PROCEDURE — 37799 UNLISTED PX VASCULAR SURGERY: CPT

## 2024-01-08 PROCEDURE — 85520 HEPARIN ASSAY: CPT

## 2024-01-08 PROCEDURE — 93005 ELECTROCARDIOGRAM TRACING: CPT

## 2024-01-08 PROCEDURE — 27000191 HC C-V MONITORING

## 2024-01-08 PROCEDURE — 64999 UNLISTED PX NERVOUS SYSTEM: CPT | Mod: ,,, | Performed by: ANESTHESIOLOGY

## 2024-01-08 PROCEDURE — 27800595 HC HEART VALVES

## 2024-01-08 PROCEDURE — 03HY32Z INSERTION OF MONITORING DEVICE INTO UPPER ARTERY, PERCUTANEOUS APPROACH: ICD-10-PCS | Performed by: ANESTHESIOLOGY

## 2024-01-08 PROCEDURE — 99900026 HC AIRWAY MAINTENANCE (STAT)

## 2024-01-08 PROCEDURE — 84295 ASSAY OF SERUM SODIUM: CPT

## 2024-01-08 PROCEDURE — 36556 INSERT NON-TUNNEL CV CATH: CPT | Mod: 59,,, | Performed by: ANESTHESIOLOGY

## 2024-01-08 PROCEDURE — 88300 SURGICAL PATH GROSS: CPT | Performed by: STUDENT IN AN ORGANIZED HEALTH CARE EDUCATION/TRAINING PROGRAM

## 2024-01-08 PROCEDURE — 27100026 HC SHUNT SENSOR, TERUMO

## 2024-01-08 PROCEDURE — 63600175 PHARM REV CODE 636 W HCPCS: Mod: JG | Performed by: STUDENT IN AN ORGANIZED HEALTH CARE EDUCATION/TRAINING PROGRAM

## 2024-01-08 PROCEDURE — 93312 ECHO TRANSESOPHAGEAL: CPT | Mod: 26,59,, | Performed by: ANESTHESIOLOGY

## 2024-01-08 PROCEDURE — 27201423 OPTIME MED/SURG SUP & DEVICES STERILE SUPPLY: Performed by: THORACIC SURGERY (CARDIOTHORACIC VASCULAR SURGERY)

## 2024-01-08 PROCEDURE — 93320 DOPPLER ECHO COMPLETE: CPT | Mod: 26,,, | Performed by: ANESTHESIOLOGY

## 2024-01-08 PROCEDURE — 02RG0JZ REPLACEMENT OF MITRAL VALVE WITH SYNTHETIC SUBSTITUTE, OPEN APPROACH: ICD-10-PCS | Performed by: THORACIC SURGERY (CARDIOTHORACIC VASCULAR SURGERY)

## 2024-01-08 PROCEDURE — 63600175 PHARM REV CODE 636 W HCPCS: Mod: JG | Performed by: ANESTHESIOLOGY

## 2024-01-08 PROCEDURE — 85730 THROMBOPLASTIN TIME PARTIAL: CPT

## 2024-01-08 PROCEDURE — 84100 ASSAY OF PHOSPHORUS: CPT

## 2024-01-08 PROCEDURE — 36000712 HC OR TIME LEV V 1ST 15 MIN: Performed by: THORACIC SURGERY (CARDIOTHORACIC VASCULAR SURGERY)

## 2024-01-08 PROCEDURE — D9220A PRA ANESTHESIA: Mod: ,,, | Performed by: ANESTHESIOLOGY

## 2024-01-08 PROCEDURE — 82800 BLOOD PH: CPT

## 2024-01-08 PROCEDURE — 83605 ASSAY OF LACTIC ACID: CPT

## 2024-01-08 PROCEDURE — 37000009 HC ANESTHESIA EA ADD 15 MINS: Performed by: THORACIC SURGERY (CARDIOTHORACIC VASCULAR SURGERY)

## 2024-01-08 PROCEDURE — 37000008 HC ANESTHESIA 1ST 15 MINUTES: Performed by: THORACIC SURGERY (CARDIOTHORACIC VASCULAR SURGERY)

## 2024-01-08 PROCEDURE — 63600175 PHARM REV CODE 636 W HCPCS

## 2024-01-08 PROCEDURE — 94761 N-INVAS EAR/PLS OXIMETRY MLT: CPT | Mod: XB

## 2024-01-08 PROCEDURE — 85384 FIBRINOGEN ACTIVITY: CPT | Performed by: THORACIC SURGERY (CARDIOTHORACIC VASCULAR SURGERY)

## 2024-01-08 PROCEDURE — 63600175 PHARM REV CODE 636 W HCPCS: Performed by: STUDENT IN AN ORGANIZED HEALTH CARE EDUCATION/TRAINING PROGRAM

## 2024-01-08 PROCEDURE — 94002 VENT MGMT INPAT INIT DAY: CPT

## 2024-01-08 PROCEDURE — 85014 HEMATOCRIT: CPT

## 2024-01-08 PROCEDURE — 02UJ0JZ SUPPLEMENT TRICUSPID VALVE WITH SYNTHETIC SUBSTITUTE, OPEN APPROACH: ICD-10-PCS | Performed by: THORACIC SURGERY (CARDIOTHORACIC VASCULAR SURGERY)

## 2024-01-08 PROCEDURE — 85610 PROTHROMBIN TIME: CPT

## 2024-01-08 PROCEDURE — 81025 URINE PREGNANCY TEST: CPT | Performed by: THORACIC SURGERY (CARDIOTHORACIC VASCULAR SURGERY)

## 2024-01-08 PROCEDURE — 85384 FIBRINOGEN ACTIVITY: CPT | Mod: 91

## 2024-01-08 PROCEDURE — 99291 CRITICAL CARE FIRST HOUR: CPT | Mod: ,,, | Performed by: ANESTHESIOLOGY

## 2024-01-08 PROCEDURE — 25000003 PHARM REV CODE 250: Performed by: THORACIC SURGERY (CARDIOTHORACIC VASCULAR SURGERY)

## 2024-01-08 PROCEDURE — 88305 TISSUE EXAM BY PATHOLOGIST: CPT | Performed by: STUDENT IN AN ORGANIZED HEALTH CARE EDUCATION/TRAINING PROGRAM

## 2024-01-08 PROCEDURE — 85025 COMPLETE CBC W/AUTO DIFF WBC: CPT

## 2024-01-08 PROCEDURE — 27000513 HC SENSOR FLOTRAC

## 2024-01-08 PROCEDURE — 83735 ASSAY OF MAGNESIUM: CPT

## 2024-01-08 PROCEDURE — 64488 TAP BLOCK BI INJECTION: CPT | Mod: 59,,, | Performed by: ANESTHESIOLOGY

## 2024-01-08 PROCEDURE — 99223 1ST HOSP IP/OBS HIGH 75: CPT | Mod: ,,, | Performed by: NURSE PRACTITIONER

## 2024-01-08 PROCEDURE — 82330 ASSAY OF CALCIUM: CPT

## 2024-01-08 PROCEDURE — 64488 TAP BLOCK BI INJECTION: CPT | Performed by: STUDENT IN AN ORGANIZED HEALTH CARE EDUCATION/TRAINING PROGRAM

## 2024-01-08 PROCEDURE — 25000242 PHARM REV CODE 250 ALT 637 W/ HCPCS

## 2024-01-08 PROCEDURE — 82565 ASSAY OF CREATININE: CPT

## 2024-01-08 PROCEDURE — 27100088 HC CELL SAVER

## 2024-01-08 PROCEDURE — 88300 SURGICAL PATH GROSS: CPT | Mod: 26,,, | Performed by: STUDENT IN AN ORGANIZED HEALTH CARE EDUCATION/TRAINING PROGRAM

## 2024-01-08 PROCEDURE — 27202608 HC CANNULA, MISC

## 2024-01-08 PROCEDURE — 88305 TISSUE EXAM BY PATHOLOGIST: CPT | Mod: 26,,, | Performed by: STUDENT IN AN ORGANIZED HEALTH CARE EDUCATION/TRAINING PROGRAM

## 2024-01-08 PROCEDURE — 36620 INSERTION CATHETER ARTERY: CPT | Mod: ,,, | Performed by: ANESTHESIOLOGY

## 2024-01-08 PROCEDURE — 27200953 HC CARDIOPLEGIA SYSTEM

## 2024-01-08 PROCEDURE — 33530 CORONARY ARTERY BYPASS/REOP: CPT | Mod: ,,, | Performed by: THORACIC SURGERY (CARDIOTHORACIC VASCULAR SURGERY)

## 2024-01-08 PROCEDURE — 27200667 HC PACEMAKER, TEMPORARY MONITORING, PER SHIFT

## 2024-01-08 PROCEDURE — 99900035 HC TECH TIME PER 15 MIN (STAT)

## 2024-01-08 DEVICE — RING ANNULOPLASTY TRICUSPID: Type: IMPLANTABLE DEVICE | Site: HEART | Status: FUNCTIONAL

## 2024-01-08 DEVICE — IMPLANTABLE DEVICE: Type: IMPLANTABLE DEVICE | Site: HEART | Status: FUNCTIONAL

## 2024-01-08 RX ORDER — POTASSIUM CHLORIDE 14.9 MG/ML
20 INJECTION INTRAVENOUS
Status: DISCONTINUED | OUTPATIENT
Start: 2024-01-08 | End: 2024-01-12

## 2024-01-08 RX ORDER — FENTANYL CITRATE 50 UG/ML
25 INJECTION, SOLUTION INTRAMUSCULAR; INTRAVENOUS
Status: DISCONTINUED | OUTPATIENT
Start: 2024-01-08 | End: 2024-01-08

## 2024-01-08 RX ORDER — ONDANSETRON 2 MG/ML
INJECTION INTRAMUSCULAR; INTRAVENOUS
Status: DISCONTINUED | OUTPATIENT
Start: 2024-01-08 | End: 2024-01-08

## 2024-01-08 RX ORDER — BUPIVACAINE HYDROCHLORIDE 5 MG/ML
INJECTION, SOLUTION EPIDURAL; INTRACAUDAL
Status: COMPLETED | OUTPATIENT
Start: 2024-01-08 | End: 2024-01-08

## 2024-01-08 RX ORDER — LIDOCAINE HYDROCHLORIDE 10 MG/ML
1 INJECTION, SOLUTION EPIDURAL; INFILTRATION; INTRACAUDAL; PERINEURAL
Status: COMPLETED | OUTPATIENT
Start: 2024-01-08 | End: 2024-01-08

## 2024-01-08 RX ORDER — SODIUM CHLORIDE 0.9 % (FLUSH) 0.9 %
10 SYRINGE (ML) INJECTION
Status: DISCONTINUED | OUTPATIENT
Start: 2024-01-08 | End: 2024-01-12

## 2024-01-08 RX ORDER — ONDANSETRON HYDROCHLORIDE 2 MG/ML
4 INJECTION, SOLUTION INTRAVENOUS EVERY 12 HOURS PRN
Status: DISCONTINUED | OUTPATIENT
Start: 2024-01-08 | End: 2024-01-18 | Stop reason: HOSPADM

## 2024-01-08 RX ORDER — PROCHLORPERAZINE EDISYLATE 5 MG/ML
INJECTION INTRAMUSCULAR; INTRAVENOUS
Status: DISCONTINUED | OUTPATIENT
Start: 2024-01-08 | End: 2024-01-08

## 2024-01-08 RX ORDER — DEXTROSE MONOHYDRATE, SODIUM CHLORIDE, AND POTASSIUM CHLORIDE 50; 1.49; 4.5 G/1000ML; G/1000ML; G/1000ML
INJECTION, SOLUTION INTRAVENOUS CONTINUOUS
Status: DISCONTINUED | OUTPATIENT
Start: 2024-01-08 | End: 2024-01-10

## 2024-01-08 RX ORDER — BISACODYL 10 MG/1
10 SUPPOSITORY RECTAL DAILY PRN
Status: DISCONTINUED | OUTPATIENT
Start: 2024-01-08 | End: 2024-01-18 | Stop reason: HOSPADM

## 2024-01-08 RX ORDER — POLYETHYLENE GLYCOL 3350 17 G/17G
17 POWDER, FOR SOLUTION ORAL DAILY
Status: DISCONTINUED | OUTPATIENT
Start: 2024-01-08 | End: 2024-01-18 | Stop reason: HOSPADM

## 2024-01-08 RX ORDER — ALBUMIN HUMAN 50 G/1000ML
SOLUTION INTRAVENOUS
Status: COMPLETED
Start: 2024-01-08 | End: 2024-01-08

## 2024-01-08 RX ORDER — ALBUMIN HUMAN 50 G/1000ML
25 SOLUTION INTRAVENOUS ONCE
Status: COMPLETED | OUTPATIENT
Start: 2024-01-08 | End: 2024-01-08

## 2024-01-08 RX ORDER — ESMOLOL HYDROCHLORIDE 10 MG/ML
INJECTION INTRAVENOUS
Status: DISCONTINUED | OUTPATIENT
Start: 2024-01-08 | End: 2024-01-08

## 2024-01-08 RX ORDER — POTASSIUM CHLORIDE 14.9 MG/ML
INJECTION INTRAVENOUS CONTINUOUS PRN
Status: DISCONTINUED | OUTPATIENT
Start: 2024-01-08 | End: 2024-01-08

## 2024-01-08 RX ORDER — PROPOFOL 10 MG/ML
0-50 INJECTION, EMULSION INTRAVENOUS CONTINUOUS
Status: DISCONTINUED | OUTPATIENT
Start: 2024-01-08 | End: 2024-01-08

## 2024-01-08 RX ORDER — PROPOFOL 10 MG/ML
0-50 INJECTION, EMULSION INTRAVENOUS CONTINUOUS
Status: DISCONTINUED | OUTPATIENT
Start: 2024-01-08 | End: 2024-01-09

## 2024-01-08 RX ORDER — ASPIRIN 325 MG
325 TABLET ORAL DAILY
Status: DISCONTINUED | OUTPATIENT
Start: 2024-01-08 | End: 2024-01-08

## 2024-01-08 RX ORDER — POTASSIUM CHLORIDE 14.9 MG/ML
60 INJECTION INTRAVENOUS
Status: DISCONTINUED | OUTPATIENT
Start: 2024-01-08 | End: 2024-01-12

## 2024-01-08 RX ORDER — FENTANYL CITRATE 50 UG/ML
50 INJECTION, SOLUTION INTRAMUSCULAR; INTRAVENOUS
Status: DISCONTINUED | OUTPATIENT
Start: 2024-01-10 | End: 2024-01-08

## 2024-01-08 RX ORDER — MIDAZOLAM HYDROCHLORIDE 1 MG/ML
INJECTION INTRAMUSCULAR; INTRAVENOUS
Status: DISCONTINUED | OUTPATIENT
Start: 2024-01-08 | End: 2024-01-08

## 2024-01-08 RX ORDER — NOREPINEPHRINE BITARTRATE 1 MG/ML
INJECTION, SOLUTION INTRAVENOUS
Status: DISCONTINUED | OUTPATIENT
Start: 2024-01-08 | End: 2024-01-08

## 2024-01-08 RX ORDER — VASOPRESSIN 20 [USP'U]/ML
INJECTION, SOLUTION INTRAMUSCULAR; SUBCUTANEOUS
Status: DISCONTINUED | OUTPATIENT
Start: 2024-01-08 | End: 2024-01-08

## 2024-01-08 RX ORDER — MAGNESIUM SULFATE HEPTAHYDRATE 40 MG/ML
2 INJECTION, SOLUTION INTRAVENOUS
Status: DISCONTINUED | OUTPATIENT
Start: 2024-01-08 | End: 2024-01-13

## 2024-01-08 RX ORDER — NOREPINEPHRINE BITARTRATE/D5W 4MG/250ML
0.02 PLASTIC BAG, INJECTION (ML) INTRAVENOUS CONTINUOUS
Status: CANCELLED | OUTPATIENT
Start: 2024-01-08

## 2024-01-08 RX ORDER — ETOMIDATE 2 MG/ML
INJECTION INTRAVENOUS
Status: DISCONTINUED | OUTPATIENT
Start: 2024-01-08 | End: 2024-01-08

## 2024-01-08 RX ORDER — OXYCODONE HYDROCHLORIDE 10 MG/1
10 TABLET ORAL EVERY 4 HOURS PRN
Status: DISCONTINUED | OUTPATIENT
Start: 2024-01-08 | End: 2024-01-14

## 2024-01-08 RX ORDER — FENTANYL CITRATE 50 UG/ML
INJECTION, SOLUTION INTRAMUSCULAR; INTRAVENOUS
Status: DISCONTINUED | OUTPATIENT
Start: 2024-01-08 | End: 2024-01-08

## 2024-01-08 RX ORDER — METOPROLOL TARTRATE 25 MG/1
25 TABLET, FILM COATED ORAL
Status: DISCONTINUED | OUTPATIENT
Start: 2024-01-08 | End: 2024-01-08 | Stop reason: HOSPADM

## 2024-01-08 RX ORDER — DEXMEDETOMIDINE HYDROCHLORIDE 100 UG/ML
INJECTION, SOLUTION INTRAVENOUS
Status: DISCONTINUED | OUTPATIENT
Start: 2024-01-08 | End: 2024-01-08

## 2024-01-08 RX ORDER — MUPIROCIN 20 MG/G
1 OINTMENT TOPICAL
Status: COMPLETED | OUTPATIENT
Start: 2024-01-08 | End: 2024-01-08

## 2024-01-08 RX ORDER — CEFAZOLIN SODIUM 1 G/3ML
INJECTION, POWDER, FOR SOLUTION INTRAMUSCULAR; INTRAVENOUS
Status: DISCONTINUED | OUTPATIENT
Start: 2024-01-08 | End: 2024-01-08

## 2024-01-08 RX ORDER — NAPROXEN SODIUM 220 MG/1
81 TABLET, FILM COATED ORAL ONCE
Status: DISCONTINUED | OUTPATIENT
Start: 2024-01-08 | End: 2024-01-08

## 2024-01-08 RX ORDER — IPRATROPIUM BROMIDE AND ALBUTEROL SULFATE 2.5; .5 MG/3ML; MG/3ML
3 SOLUTION RESPIRATORY (INHALATION) EVERY 4 HOURS PRN
Status: DISCONTINUED | OUTPATIENT
Start: 2024-01-08 | End: 2024-01-09

## 2024-01-08 RX ORDER — PROTAMINE SULFATE 10 MG/ML
50 INJECTION, SOLUTION INTRAVENOUS ONCE
Status: COMPLETED | OUTPATIENT
Start: 2024-01-08 | End: 2024-01-08

## 2024-01-08 RX ORDER — ROCURONIUM BROMIDE 10 MG/ML
INJECTION, SOLUTION INTRAVENOUS
Status: DISCONTINUED | OUTPATIENT
Start: 2024-01-08 | End: 2024-01-08

## 2024-01-08 RX ORDER — NAPROXEN SODIUM 220 MG/1
81 TABLET, FILM COATED ORAL DAILY
Status: DISCONTINUED | OUTPATIENT
Start: 2024-01-09 | End: 2024-01-18 | Stop reason: HOSPADM

## 2024-01-08 RX ORDER — NOREPINEPHRINE BITARTRATE/D5W 4MG/250ML
0.02 PLASTIC BAG, INJECTION (ML) INTRAVENOUS CONTINUOUS
Status: DISCONTINUED | OUTPATIENT
Start: 2024-01-08 | End: 2024-01-09

## 2024-01-08 RX ORDER — IPRATROPIUM BROMIDE AND ALBUTEROL SULFATE 2.5; .5 MG/3ML; MG/3ML
3 SOLUTION RESPIRATORY (INHALATION) EVERY 4 HOURS
Status: COMPLETED | OUTPATIENT
Start: 2024-01-08 | End: 2024-01-09

## 2024-01-08 RX ORDER — ALBUMIN HUMAN 50 G/1000ML
25 SOLUTION INTRAVENOUS ONCE AS NEEDED
Status: COMPLETED | OUTPATIENT
Start: 2024-01-08 | End: 2024-01-08

## 2024-01-08 RX ORDER — ASPIRIN 300 MG/1
300 SUPPOSITORY RECTAL ONCE AS NEEDED
Status: DISCONTINUED | OUTPATIENT
Start: 2024-01-08 | End: 2024-01-14

## 2024-01-08 RX ORDER — MAGNESIUM SULFATE HEPTAHYDRATE 40 MG/ML
4 INJECTION, SOLUTION INTRAVENOUS
Status: DISCONTINUED | OUTPATIENT
Start: 2024-01-08 | End: 2024-01-12

## 2024-01-08 RX ORDER — ACETAMINOPHEN 500 MG
1000 TABLET ORAL EVERY 8 HOURS
Status: DISCONTINUED | OUTPATIENT
Start: 2024-01-08 | End: 2024-01-09

## 2024-01-08 RX ORDER — HEPARIN SODIUM 1000 [USP'U]/ML
INJECTION, SOLUTION INTRAVENOUS; SUBCUTANEOUS
Status: DISCONTINUED | OUTPATIENT
Start: 2024-01-08 | End: 2024-01-08

## 2024-01-08 RX ORDER — OXYCODONE HYDROCHLORIDE 5 MG/1
5 TABLET ORAL EVERY 4 HOURS PRN
Status: DISCONTINUED | OUTPATIENT
Start: 2024-01-08 | End: 2024-01-16

## 2024-01-08 RX ORDER — TRANEXAMIC ACID 100 MG/ML
INJECTION, SOLUTION INTRAVENOUS
Status: DISCONTINUED | OUTPATIENT
Start: 2024-01-08 | End: 2024-01-08

## 2024-01-08 RX ORDER — NAPROXEN SODIUM 220 MG/1
81 TABLET, FILM COATED ORAL DAILY
Status: DISCONTINUED | OUTPATIENT
Start: 2024-01-08 | End: 2024-01-08

## 2024-01-08 RX ORDER — ACETAMINOPHEN 325 MG/1
650 TABLET ORAL EVERY 4 HOURS PRN
Status: DISCONTINUED | OUTPATIENT
Start: 2024-01-08 | End: 2024-01-14

## 2024-01-08 RX ORDER — SODIUM CHLORIDE 9 MG/ML
INJECTION, SOLUTION INTRAVENOUS CONTINUOUS
Status: DISCONTINUED | OUTPATIENT
Start: 2024-01-08 | End: 2024-01-10

## 2024-01-08 RX ORDER — POTASSIUM CHLORIDE 29.8 MG/ML
40 INJECTION INTRAVENOUS
Status: DISCONTINUED | OUTPATIENT
Start: 2024-01-08 | End: 2024-01-12

## 2024-01-08 RX ORDER — PROTAMINE SULFATE 10 MG/ML
INJECTION, SOLUTION INTRAVENOUS
Status: DISCONTINUED | OUTPATIENT
Start: 2024-01-08 | End: 2024-01-08

## 2024-01-08 RX ORDER — ASPIRIN 325 MG
325 TABLET ORAL ONCE
Status: COMPLETED | OUTPATIENT
Start: 2024-01-08 | End: 2024-01-08

## 2024-01-08 RX ORDER — METOCLOPRAMIDE HYDROCHLORIDE 5 MG/ML
5 INJECTION INTRAMUSCULAR; INTRAVENOUS EVERY 6 HOURS PRN
Status: DISCONTINUED | OUTPATIENT
Start: 2024-01-08 | End: 2024-01-18 | Stop reason: HOSPADM

## 2024-01-08 RX ORDER — LIDOCAINE HYDROCHLORIDE 20 MG/ML
INJECTION, SOLUTION EPIDURAL; INFILTRATION; INTRACAUDAL; PERINEURAL
Status: DISCONTINUED | OUTPATIENT
Start: 2024-01-08 | End: 2024-01-08

## 2024-01-08 RX ORDER — TRANEXAMIC ACID 100 MG/ML
INJECTION, SOLUTION INTRAVENOUS CONTINUOUS PRN
Status: DISCONTINUED | OUTPATIENT
Start: 2024-01-08 | End: 2024-01-08

## 2024-01-08 RX ORDER — FAMOTIDINE 10 MG/ML
20 INJECTION INTRAVENOUS 2 TIMES DAILY
Status: DISCONTINUED | OUTPATIENT
Start: 2024-01-08 | End: 2024-01-10

## 2024-01-08 RX ORDER — DOCUSATE SODIUM 100 MG/1
100 CAPSULE, LIQUID FILLED ORAL 2 TIMES DAILY
Status: DISCONTINUED | OUTPATIENT
Start: 2024-01-08 | End: 2024-01-18 | Stop reason: HOSPADM

## 2024-01-08 RX ORDER — KETAMINE HCL IN 0.9 % NACL 50 MG/5 ML
SYRINGE (ML) INTRAVENOUS
Status: DISCONTINUED | OUTPATIENT
Start: 2024-01-08 | End: 2024-01-08

## 2024-01-08 RX ORDER — MUPIROCIN 20 MG/G
1 OINTMENT TOPICAL 2 TIMES DAILY
Status: COMPLETED | OUTPATIENT
Start: 2024-01-08 | End: 2024-01-13

## 2024-01-08 RX ADMIN — SODIUM CHLORIDE 2 UNITS/HR: 9 INJECTION, SOLUTION INTRAVENOUS at 08:01

## 2024-01-08 RX ADMIN — FENTANYL CITRATE 200 MCG: 50 INJECTION, SOLUTION INTRAMUSCULAR; INTRAVENOUS at 07:01

## 2024-01-08 RX ADMIN — AMIODARONE HYDROCHLORIDE 1 MG/MIN: 1.8 INJECTION, SOLUTION INTRAVENOUS at 04:01

## 2024-01-08 RX ADMIN — ALBUMIN HUMAN 25 G: 50 SOLUTION INTRAVENOUS at 04:01

## 2024-01-08 RX ADMIN — ONDANSETRON 1000 MG: 2 INJECTION INTRAMUSCULAR; INTRAVENOUS at 12:01

## 2024-01-08 RX ADMIN — HEPARIN SODIUM 34000 UNITS: 1000 INJECTION, SOLUTION INTRAVENOUS; SUBCUTANEOUS at 09:01

## 2024-01-08 RX ADMIN — ESMOLOL HYDROCHLORIDE 20 MG: 100 INJECTION, SOLUTION INTRAVENOUS at 07:01

## 2024-01-08 RX ADMIN — PROTAMINE SULFATE 50 MG: 10 INJECTION, SOLUTION INTRAVENOUS at 04:01

## 2024-01-08 RX ADMIN — POTASSIUM CHLORIDE: 14.9 INJECTION INTRAVENOUS at 01:01

## 2024-01-08 RX ADMIN — LIDOCAINE HYDROCHLORIDE 1 MG: 10 INJECTION, SOLUTION EPIDURAL; INFILTRATION; INTRACAUDAL; PERINEURAL at 05:01

## 2024-01-08 RX ADMIN — ASPIRIN 325 MG ORAL TABLET 325 MG: 325 PILL ORAL at 08:01

## 2024-01-08 RX ADMIN — POLYETHYLENE GLYCOL 3350 17 G: 17 POWDER, FOR SOLUTION ORAL at 05:01

## 2024-01-08 RX ADMIN — MIDAZOLAM HYDROCHLORIDE 2 MG: 1 INJECTION INTRAMUSCULAR; INTRAVENOUS at 07:01

## 2024-01-08 RX ADMIN — ROCURONIUM BROMIDE 20 MG: 10 INJECTION INTRAVENOUS at 11:01

## 2024-01-08 RX ADMIN — SODIUM CHLORIDE, SODIUM GLUCONATE, SODIUM ACETATE, POTASSIUM CHLORIDE, MAGNESIUM CHLORIDE, SODIUM PHOSPHATE, DIBASIC, AND POTASSIUM PHOSPHATE: .53; .5; .37; .037; .03; .012; .00082 INJECTION, SOLUTION INTRAVENOUS at 09:01

## 2024-01-08 RX ADMIN — EPINEPHRINE 0.04 MCG/KG/MIN: 1 INJECTION INTRAMUSCULAR; INTRAVENOUS; SUBCUTANEOUS at 12:01

## 2024-01-08 RX ADMIN — ESMOLOL HYDROCHLORIDE 10 MG: 100 INJECTION, SOLUTION INTRAVENOUS at 08:01

## 2024-01-08 RX ADMIN — BUPIVACAINE HYDROCHLORIDE 20 ML: 5 INJECTION, SOLUTION EPIDURAL; INTRACAUDAL; PERINEURAL at 07:01

## 2024-01-08 RX ADMIN — VASOPRESSIN 1 UNITS: 20 INJECTION INTRAVENOUS at 09:01

## 2024-01-08 RX ADMIN — PROCHLORPERAZINE EDISYLATE 5 MG: 5 INJECTION INTRAMUSCULAR; INTRAVENOUS at 01:01

## 2024-01-08 RX ADMIN — ACETAMINOPHEN 1000 MG: 500 TABLET ORAL at 05:01

## 2024-01-08 RX ADMIN — AMIODARONE HYDROCHLORIDE 0.5 MG/MIN: 1.8 INJECTION, SOLUTION INTRAVENOUS at 10:01

## 2024-01-08 RX ADMIN — POTASSIUM CHLORIDE 40 MEQ: 29.8 INJECTION, SOLUTION INTRAVENOUS at 03:01

## 2024-01-08 RX ADMIN — IPRATROPIUM BROMIDE AND ALBUTEROL SULFATE 3 ML: .5; 3 SOLUTION RESPIRATORY (INHALATION) at 07:01

## 2024-01-08 RX ADMIN — TRANEXAMIC ACID 1 MG/KG/HR: 100 INJECTION INTRAVENOUS at 07:01

## 2024-01-08 RX ADMIN — CEFAZOLIN 2 G: 330 INJECTION, POWDER, FOR SOLUTION INTRAMUSCULAR; INTRAVENOUS at 07:01

## 2024-01-08 RX ADMIN — ALBUMIN (HUMAN) 25 G: 12.5 SOLUTION INTRAVENOUS at 02:01

## 2024-01-08 RX ADMIN — ACETAMINOPHEN 1000 MG: 500 TABLET ORAL at 10:01

## 2024-01-08 RX ADMIN — VASOPRESSIN 1 UNITS: 20 INJECTION INTRAVENOUS at 07:01

## 2024-01-08 RX ADMIN — CEFAZOLIN 2 G: 330 INJECTION, POWDER, FOR SOLUTION INTRAMUSCULAR; INTRAVENOUS at 11:01

## 2024-01-08 RX ADMIN — SODIUM CHLORIDE: 0.9 INJECTION, SOLUTION INTRAVENOUS at 06:01

## 2024-01-08 RX ADMIN — ALBUMIN (HUMAN) 25 G: 12.5 SOLUTION INTRAVENOUS at 04:01

## 2024-01-08 RX ADMIN — NOREPINEPHRINE BITARTRATE 16 MCG: 1 INJECTION, SOLUTION, CONCENTRATE INTRAVENOUS at 08:01

## 2024-01-08 RX ADMIN — NOREPINEPHRINE BITARTRATE 8 MCG: 1 INJECTION, SOLUTION, CONCENTRATE INTRAVENOUS at 08:01

## 2024-01-08 RX ADMIN — SODIUM CHLORIDE: 9 INJECTION, SOLUTION INTRAVENOUS at 03:01

## 2024-01-08 RX ADMIN — MUPIROCIN 1 G: 20 OINTMENT TOPICAL at 08:01

## 2024-01-08 RX ADMIN — SODIUM CHLORIDE, SODIUM GLUCONATE, SODIUM ACETATE, POTASSIUM CHLORIDE, MAGNESIUM CHLORIDE, SODIUM PHOSPHATE, DIBASIC, AND POTASSIUM PHOSPHATE: .53; .5; .37; .037; .03; .012; .00082 INJECTION, SOLUTION INTRAVENOUS at 07:01

## 2024-01-08 RX ADMIN — TRANEXAMIC ACID 800 MG: 100 INJECTION INTRAVENOUS at 07:01

## 2024-01-08 RX ADMIN — AMIODARONE HYDROCHLORIDE 150 MG: 1.5 INJECTION, SOLUTION INTRAVENOUS at 04:01

## 2024-01-08 RX ADMIN — NOREPINEPHRINE BITARTRATE 16 MCG: 1 INJECTION, SOLUTION, CONCENTRATE INTRAVENOUS at 12:01

## 2024-01-08 RX ADMIN — ALBUMIN (HUMAN) 25 G: 12.5 SOLUTION INTRAVENOUS at 09:01

## 2024-01-08 RX ADMIN — OXYCODONE HYDROCHLORIDE 5 MG: 5 TABLET ORAL at 08:01

## 2024-01-08 RX ADMIN — NOREPINEPHRINE BITARTRATE 8 MCG: 1 INJECTION, SOLUTION, CONCENTRATE INTRAVENOUS at 01:01

## 2024-01-08 RX ADMIN — DEXMEDETOMIDINE 8 MCG: 200 INJECTION, SOLUTION INTRAVENOUS at 01:01

## 2024-01-08 RX ADMIN — DEXMEDETOMIDINE 8 MCG: 200 INJECTION, SOLUTION INTRAVENOUS at 02:01

## 2024-01-08 RX ADMIN — FAMOTIDINE 20 MG: 10 INJECTION, SOLUTION INTRAVENOUS at 08:01

## 2024-01-08 RX ADMIN — ONDANSETRON 1000 MG: 2 INJECTION INTRAMUSCULAR; INTRAVENOUS at 01:01

## 2024-01-08 RX ADMIN — ROCURONIUM BROMIDE 100 MG: 10 INJECTION INTRAVENOUS at 07:01

## 2024-01-08 RX ADMIN — BUPIVACAINE HYDROCHLORIDE 10 ML: 5 INJECTION, SOLUTION EPIDURAL; INTRACAUDAL at 07:01

## 2024-01-08 RX ADMIN — ONDANSETRON 4 MG: 2 INJECTION INTRAMUSCULAR; INTRAVENOUS at 01:01

## 2024-01-08 RX ADMIN — LIDOCAINE HYDROCHLORIDE 100 MG: 20 INJECTION, SOLUTION EPIDURAL; INFILTRATION; INTRACAUDAL; PERINEURAL at 08:01

## 2024-01-08 RX ADMIN — LIDOCAINE HYDROCHLORIDE 80 MG: 20 INJECTION, SOLUTION EPIDURAL; INFILTRATION; INTRACAUDAL; PERINEURAL at 07:01

## 2024-01-08 RX ADMIN — SODIUM CHLORIDE: 9 INJECTION, SOLUTION INTRAVENOUS at 05:01

## 2024-01-08 RX ADMIN — CEFAZOLIN 2 G: 2 INJECTION, POWDER, FOR SOLUTION INTRAMUSCULAR; INTRAVENOUS at 08:01

## 2024-01-08 RX ADMIN — PROTAMINE SULFATE 180 MG: 10 INJECTION, SOLUTION INTRAVENOUS at 12:01

## 2024-01-08 RX ADMIN — ROCURONIUM BROMIDE 50 MG: 10 INJECTION INTRAVENOUS at 08:01

## 2024-01-08 RX ADMIN — MUPIROCIN 1 G: 20 OINTMENT TOPICAL at 05:01

## 2024-01-08 RX ADMIN — ETOMIDATE 20 MG: 2 INJECTION, SOLUTION INTRAVENOUS at 07:01

## 2024-01-08 RX ADMIN — Medication 50 MG: at 07:01

## 2024-01-08 RX ADMIN — NOREPINEPHRINE BITARTRATE 0.06 MCG/KG/MIN: 1 INJECTION, SOLUTION, CONCENTRATE INTRAVENOUS at 07:01

## 2024-01-08 NOTE — PROGRESS NOTES
Took metoprolol 25 mg at 3:30 this morning at home.  Blood bank stated sample from 1/4/24 is valid but send an extension form.  Earrings given to , nose ring remains in place. Will ask anesthesia about nose ring.

## 2024-01-08 NOTE — BRIEF OP NOTE
[facility] - Surgery ([facility])  Cardiothoracic Surgery  Operative Note    SUMMARY     Date of Procedure: 1/8/2024     Procedure: Procedure(s) (LRB):    1)  MITRAL VALVE REPLACEMENT, with a 25 mm [employer]omedics Standard mechanical prosthesis  91753      2)  VALVULOPLASTY, TRICUSPID REPAIR with a 26 mm Medtronic Contour annuloplasty band  28704    3)  REOPERATION, more than one month after original operation  78599    Surgeon(s) and Role:     * Mary Kendall MD - Primary     * Dariusz Lemos MD - Fellow    Assisting Surgeon: None    Pre-Operative Diagnosis: Stenosis of prosthetic mitral valve, initial encounter [T82.857A]    Post-Operative Diagnosis: Post-Op Diagnosis Codes:     * Stenosis of prosthetic mitral valve, initial encounter [T82.857A]    Anesthesia: General    Operative Findings (including complications, if any): Severe mitral stenosis with calcification of some segments of annuloplasty band. Severe TR.     Estimated Blood Loss (EBL): 100 mL           Implants:   Implant Name Type Inv. Item Serial No.  Lot No. LRB No. Used Action   VALVE STANDARD MITRAL SIZE 25 - [id number]  VALVE STANDARD MITRAL SIZE 25 [id number]  N/A 1 Implanted   RING ANNULOPLASTY TRICUSPID - [id number]  RING ANNULOPLASTY TRICUSPID [id number] [facility]  N/A 1 Implanted       Specimens:   Specimen (24h ago, onward)       Start     Ordered    01/08/24 1300  Specimen to Pathology, Surgery Cardiovascular  Once        Comments: Pre-op Diagnosis: Stenosis of prosthetic mitral valve, initial encounter [T82.237A]Procedure(s):MITRAL VALVE REPLACEMENT, REDO STERNOTOMYVALVULOPLASTY, TRICUSPID Number of specimens: 2Name of specimens: 1. Mitral valve annuloplasty band- gross2. Anterior mitral valve leaflets- perm     References:    Click here for ordering Quick Tip   Question Answer Comment   Procedure Type: Cardiovascular    Specimen Class: Routine/Screening    Which provider would you like to cc? MARY KENDALL    Release to  patient Immediate        01/08/24 3010                   Attestation:  I was present and scrubbed for the procedure.

## 2024-01-08 NOTE — ANESTHESIA PROCEDURE NOTES
Intubation    Date/Time: 1/8/2024 7:25 AM    Performed by: Ryan Whaley MD  Authorized by: Juan Manuel Palma MD    Intubation:     Induction:  Intravenous    Intubated:  Postinduction    Mask Ventilation:  Easy with oral airway    Attempts:  1    Attempted By:  Resident anesthesiologist and other (see comments) (fellow)    Method of Intubation:  Video laryngoscopy    Blade:  Boyce 3    Laryngeal View Grade: Grade I - full view of cords      Difficult Airway Encountered?: No      Complications:  None    Airway Device:  Oral endotracheal tube    Airway Device Size:  7.0    Style/Cuff Inflation:  Cuffed    Tube secured:  22    Secured at:  The lips    Placement Verified By:  Capnometry    Complicating Factors:  None    Findings Post-Intubation:  BS equal bilateral and atraumatic/condition of teeth unchanged

## 2024-01-08 NOTE — EICU
Intervention Initiated From:  Bedside    Rehana intervened regarding:  Time-Out    Comments:  16:02  Called into room via elert for arterial line insertion.  Physician verification done for Shakeel Mckeon w/ [facility].  Time-out completed w/ bedside team.  16:10  Right radial a-line inserted using u/s guidance. Acceptable waveform  Secured & sutured.  Pt tolerated procedure well.

## 2024-01-08 NOTE — ANESTHESIA PROCEDURE NOTES
Bilateral rectus sheath blocks    Patient location during procedure: pre-op   Block not for primary anesthetic.  Reason for block: at surgeon's request and post-op pain management   Post-op Pain Location: bilateral abdominal pain   Start time: 1/8/2024 7:40 AM  Timeout: 1/8/2024 7:30 AM   End time: 1/8/2024 7:45 AM    Staffing  Authorizing Provider: Juan Manuel Palma MD  Performing Provider: Alis Barrios DO    Staffing  Performed by: Alis Barrios DO  Authorized by: Juan Manuel Palma MD    Preanesthetic Checklist  Completed: patient identified, IV checked, site marked, risks and benefits discussed, surgical consent, monitors and equipment checked, pre-op evaluation and timeout performed  Peripheral Block  Patient position: supine  Prep: ChloraPrep  Patient monitoring: heart rate, cardiac monitor, continuous pulse ox, continuous capnometry and frequent blood pressure checks  Block type: rectus sheath  Laterality: bilateral  Injection technique: single shot  Needle  Needle type: Stimuplex   Needle gauge: 22 G  Needle length: 4 in  Needle localization: anatomical landmarks and ultrasound guidance  Catheter type: non-stimulating   -ultrasound image captured on disc.  Assessment  Injection assessment: negative aspiration, negative parasthesia and local visualized surrounding nerve  Paresthesia pain: none  Heart rate change: no  Slow fractionated injection: yes  Pain Tolerance: comfortable throughout block and no complaints  Medications:    Medications: bupivacaine (pf) (MARCAINE) injection 0.5% - Perineural   10 mL - 1/8/2024 7:42:00 AM    Additional Notes  VSS.  Vitals monitored throughout procedure - see record.  Patient tolerated procedure well.

## 2024-01-08 NOTE — ANESTHESIA PROCEDURE NOTES
Arterial    Diagnosis: mitral stenosis    Patient location during procedure: done in OR  Procedure start time: 1/8/2024 7:10 AM  Timeout: 1/8/2024 7:10 AM  Procedure end time: 1/8/2024 7:15 AM    Staffing  Authorizing Provider: Juan Manuel Palma MD  Performing Provider: Alis Barrios DO    Staffing  Performed by: Alis Barrios DO  Authorized by: Juan Manuel Palma MD    Anesthesiologist was present at the time of the procedure.    Preanesthetic Checklist  Completed: patient identified, IV checked, site marked, risks and benefits discussed, surgical consent, monitors and equipment checked, pre-op evaluation, timeout performed and anesthesia consent givenArterial  Skin Prep: chlorhexidine gluconate and isopropyl alcohol  Local Infiltration: lidocaine  Orientation: left  Location: radial    Catheter Size: 20 G  Catheter placement by Ultrasound guidance. Heme positive aspiration all ports.   Vessel Caliber: patent  Needle advanced into vessel with real time Ultrasound guidance.Insertion Attempts: 1  Assessment  Dressing: secured with tape and tegaderm

## 2024-01-08 NOTE — ASSESSMENT & PLAN NOTE
Endocrinology consulted for BG management.   BG goal 110-140 CTS      - IIP  - Requires intensive BG monitoring.   - BG checks q1hr  - Hypoglycemia protocol in place    - Notify endocrine if patient becomes hypokalemic (K < 3.3) and/or is not responding to replacement.   - Notify endocrine if patient requiring > 20 units/hr.      ** Please notify Endocrine for any change and/or advance in diet**  ** Please call Endocrine for any BG related issues **    Discharge Planning:   TBD. Please notify endocrinology prior to discharge.

## 2024-01-08 NOTE — ANESTHESIA PROCEDURE NOTES
Central Line    Diagnosis: mitral stenosis  Patient location during procedure: done in OR  Timeout: 1/8/2024 7:30 AM  Procedure end time: 1/8/2024 7:40 AM    Staffing  Authorizing Provider: Juan Manuel Palma MD  Performing Provider: Alis Barrios DO    Staffing  Performed: resident/CRNA   Anesthesiologist: Juan Manuel Palma MD  Resident/CRNA: Alis Barrios DO  Performed by: Alis Barrios DO  Authorized by: Juan Manuel Palma MD    Anesthesiologist was present at the time of the procedure.  Preanesthetic Checklist  Completed: patient identified, IV checked, site marked, risks and benefits discussed, surgical consent, monitors and equipment checked, pre-op evaluation, timeout performed and anesthesia consent given  Indication   Indication: vascular access, med administration, hemodynamic monitoring     Anesthesia   general anesthesia    Central Line   Skin Prep: skin prepped with ChloraPrep, skin prep agent completely dried prior to procedure  Sterile Barriers Followed: Yes    All five maximal barriers used- gloves, gown, cap, mask, and large sterile sheet    hand hygiene performed prior to central venous catheter insertion  Location: right internal jugular.   Catheter type: introducer  Catheter Size: 9 Fr  Inserted Catheter Length: 11.5 cm  Ultrasound: vascular probe with ultrasound   Vessel Caliber: patent  Needle advanced into vessel with real time Ultrasound guidance.  sterile gel and probe cover used in ultrasound-guided central venous catheter insertion   Manometry: Venous cannualation confirmed by visual estimation of blood vessel pressure using manometry.  Insertion Attempts: 1   Securement:line sutured, chlorhexidine patch, sterile dressing applied and blood return through all ports    Post-Procedure    Adverse Events:none      Guidewire Guidewire removed intact.

## 2024-01-08 NOTE — ASSESSMENT & PLAN NOTE
38 y.o. female with a past medical history of MVP and MR s/p mitral annuloplasty in [place] (2003),  HTN, bradycardia now s/p Mechanical Mitral Valve Replacement on 1/8/2024 with Dr. Liu    Neuro/Psych:     - Sedation: propofol    - Pain:    - Scheduled Acetaminophen 1g q8h   - Fentanyl PRN while intubated, Oxy PRN   - Received TTP and Rectus Sheath blocks intra-op             Cardiac:     - S/P Mechanical Mitral Valve Replacement with Dr. Liu on 1/8/2024    - BP Goal: MAP 60-80    - Cleviprex PRN    - Pressors: 0.04 epi, 0.02 norepi      - Wean pressors as able    - Anti-HTNs: Will start when appropriate    - Rhythm: NSR    - Beta blocker: Will start when appropriate    - Statin: Not indicated      Pulmonary:     - Goal SpO2 >92%    - Will wean ventilator support as tolerated to extubate    - Chest Tubes 2 Meds     - ABGs PRN      Renal:    - Trend BUN/Cr     - Maintain Soares, record strict Is/Os    Recent Labs   Lab 01/04/24  1500   BUN 10   CREATININE 0.6         FEN / GI:     - Daily CMP, PRN K/Mag/Phos per protocol     - Replace electrolytes as needed    - Nutrition: NPO pending extubation    - Bowel Regimen: Miralax, docusate      ID:     - Afebrile    - WBC stable    - Abx: Complete perioperative cefazolin 2g Q8H x 5 doses    Recent Labs   Lab 01/04/24  1500   WBC 9.80         Heme/Onc:     - Hgb 13.0 pre-operatively    - CBC daily    - ASA 325mg daily    - Transfuse Hb <7 and symptomatic anemia    Recent Labs   Lab 01/04/24  1500   HGB 13.0      APTT 23.3   INR 1.0         Endocrine:     - CTS Goal -140    - HgbA1c: 5.2    - Insulin gtt    - Endocrinology consulted for insulin management      PPx:   Feeding: NPO, pending extubation and bedside swallow evaluation  Analgesia/Sedation: Multimodal  Thromboembolic Prevention: SCD's  HOB >30: Yes  Stress Ulcer: Famotidine  Glucose Control: Yes, insulin management per Endocrinology     Lines/Drains/Airway:   ETT   Right radial arterial  line   RIJ CVC   Soares   Chest Tubes: 2 Mediastinal    Pacing Wires: Temporary ventricular pacing wires      Dispo/Code Status/Palliative:     - Continue SICU Care    - Full Code

## 2024-01-08 NOTE — ANESTHESIA PROCEDURE NOTES
MINERVA    Diagnosis: mitral stenosis  Patient location during procedure: OR  Exam type: Baseline    Staffing  Performed: anesthesiologist and fellow     Anesthesiologist: Juan Manuel Palma MD        Anesthesiologist Present  Yes      Setup & Induction  Patient preparation: bite block inserted  Probe Insertion: easy  Exam: completeDoppler Echo: 2D, continuous wave Doppler, 3D, color flow mapping and pulse wave Doppler.  Exam     Left Heart  Left Atruim: normal  Left appendage velocity:30    Left Ventricle: cm, normal (men 4.2-5.9; women 3.8-5.2)    LVAD:no  Estimated Ejection Fraction: > 55% normal  Regional Wall Abnormalities: no RWMA            Right Heart  Right Ventricle: normal  Right Ventricle Function: normal  Right Atria:  normal    Intra Atrial Septum  PFO: yes by color flow doppler  no IAS aneurysm  no lipomatous hypertrophy  no Atrial Septal Defect (Asd)    Right Ventricle  Size: normal, Free Wall Thickness: normal    Aortic Valve:  Stenosis: none.  Morphology: trileaflet    Regurgitation: no aortic valve regurgitation      Mitral Valve:   Morphology:calcified  Prolapse: none  Flail: no flail  Jet Description: mild and centrally-directedStenosis: severe.    Tricuspid Valve:  Morphology: normal  Regurgitation: severe, TV Hepatic Vein flow:systolic reversal    Pulmonic Valve:  Morphology:normal  Regurgitation(color flow): none    Great Vessels  Ascending Aorta Atherosclerosis: 1=nl-min dz  Aortic Arch Atherosclerosis: 1=nl-min dz  IABP: no  Descending Aorta Atherosclerosis: 1=nl-min dz  Aorta    Descending aorta IABP: no    Effusions none    SummaryFindings discussed with surgeon.    Other Findings   Intraoperative MINERVA      Baseline exam:  - LV systolic function is normal with LVEF >55%  - RV systolic function is normal  - No regional wall motion abnormalities  - MV: Severe MS and mild MR  - AV: Trace AI, no AS  - TV: Moderate to severe TR with TV annulus of 2.5 cm  - PV: No PI  - Small PFO via color flow  doppler with left to right flow  - No significant aortic pathology  - No effusions    Post-procedure exam:  - bileaflet mechanical valve in mitral position, no annular rocking or abnormal motion, normal leaflet motion and appropriate washing jets, mean gradient 5 mmHg  -prosthetic ring in tricuspid position, no rocking or abnormal motion, no regurgitation, no turbulent flow, mean gradient 2 mmHg  - LV systolic function is normal with LVEF >55%  - RV systolic function is normal  - No regional wall motion abnormalities  - MV: Interval replacement with mechanical MV;   - New MV functioning appropriately with no MR; MG 5 mmHg   - AV: Trace to mild AI  - No significant aortic pathology  - No effusions     Stable s/p chest closure  Probe removed atraumatically

## 2024-01-08 NOTE — PROCEDURES
"King Botello is a 38 y.o. female patient.    Temp: 98.3 °F (36.8 °C) (01/08/24 1424)  Pulse: 88 (01/08/24 1615)  Resp: (!) 28 (01/08/24 1615)  BP: 116/66 (01/08/24 1500)  SpO2: 98 % (01/08/24 1615)  Weight: 80.7 kg (177 lb 12.8 oz) (01/08/24 0513)  Height: 5' 1" (154.9 cm) (01/08/24 1445)       Arterial Line    Date/Time: 1/8/2024 4:52 PM  Location procedure was performed: [facility] ICU (SICU)    Performed by: Shakeel Kendall MD  Authorized by: Shakeel Kendall MD  Pre-op Diagnosis: mitral valve replacement  Post-operative diagnosis: mitral valve replacement  Consent Done: Emergent Situation  Indications: multiple ABGs and hemodynamic monitoring  Location: right radial  Anesthesia: see MAR for details    Patient sedated: yes  Sedation type: moderate (conscious) sedation    Sedatives: propofol  Rudolph's test normal: yes  Needle gauge: 18  Seldinger technique: Seldinger technique used  Number of attempts: 1  Technical procedures used: Ultrasound  Complications: No  Estimated blood loss (mL): 5  Specimens: No  Implants: No  Post-procedure: line sutured and dressing applied  Post-procedure CMS: normal and unchanged  Patient tolerance: Patient tolerated the procedure well with no immediate complications        Shakeel Kendall MD  General Surgery - PGY 2      1/8/2024    "

## 2024-01-08 NOTE — TRANSFER OF CARE
"Anesthesia Transfer of Care Note    Patient: King Botello    Procedure(s) Performed: Procedure(s) (LRB):  MITRAL VALVE REPLACEMENT, REDO STERNOTOMY (N/A)  VALVULOPLASTY, TRICUSPID REPAIR (N/A)    Patient location: ICU    Anesthesia Type: general    Transport from OR: Transported from OR intubated on 100% O2  with adequate ventilation controlled by transport ventilator. Upon arrival to PACU/ICU, patient attached to ventilator and auscultated to confirm bilateral breath sounds and adequate TV. Continuous ECG monitoring in transport. Continuous SpO2 monitoring in transport. Continuos invasive BP monitoring in transport    Post pain: adequate analgesia    Post assessment: no apparent anesthetic complications and tolerated procedure well    Post vital signs: stable    Level of consciousness: sedated    Nausea/Vomiting: no nausea/vomiting    Complications: none    Transfer of care protocol was followed      Last vitals: Visit Vitals  /63 (BP Location: Right arm, Patient Position: Lying)   Pulse 75   Temp 36.8 °C (98.3 °F) (Oral)   Resp 20   Ht 5' 1" (1.549 m)   Wt 80.7 kg (177 lb 12.8 oz)   LMP 01/07/2024   SpO2 95%   Breastfeeding No   BMI 33.60 kg/m²     "

## 2024-01-08 NOTE — SUBJECTIVE & OBJECTIVE
Interval HPI:   Overnight events: No acute events overnight. Patient in room 54383/12912 A. Blood glucose stable. BG at goal on current insulin regimen (IIP). Steroid use- None. Day of Surgery  Renal function- Normal   Vasopressors-  Epinephrine  and Norepinephrine       Endocrine will continue to follow and manage insulin orders inpatient.         Diet NPO Except for: Sips with Medication     Eating:   NPO  Nausea: No  Hypoglycemia and intervention: No  Fever: No  TPN and/or TF: No    PMH, PSH, FH, SH updated and reviewed     ROS:  Review of Systems  GUNJAN intubated.     Current Medications and/or Treatments Impacting Glycemic Control  Immunotherapy:    Immunosuppressants       None          Steroids:   Hormones (From admission, onward)      None          Pressors:    Autonomic Drugs (From admission, onward)      Start     Stop Route Frequency Ordered    01/08/24 1445  EPINEPHrine 5 mg in dextrose 5% 250 mL infusion (premix)        Question Answer Comment   Begin at (in mcg/kg/min): 0.04    Titrate by: (in mcg/kg/min) 0.02    Titrate interval: (in minutes) 5    Titrate to maintain: (SBP or MAP or Cardiac Index) MAP    Greater than: (in mmHg) 65    Cardiac index greater than: (in L/min) 2.2    Maximum dose: (in mcg/kg/min) 2        -- IV Continuous 01/08/24 1432    01/08/24 1425  NORepinephrine 4 mg in dextrose 5% 250 mL infusion (premix)        Question Answer Comment   Begin at (in mcg/kg/min): 0.02    Titrate by: (in mcg/kg/min) 0.02    Titrate interval: (in minutes) 5    Titrate to maintain: (MAP or SBP) MAP    Greater than: (in mmHg) 65    Maximum dose: (in mcg/kg/min) 3        -- IV Continuous 01/08/24 1444          Hyperglycemia/Diabetes Medications:   Antihyperglycemics (From admission, onward)      Start     Stop Route Frequency Ordered    01/08/24 1400  insulin regular in 0.9 % NaCl 100 unit/100 mL (1 unit/mL) infusion        Question Answer Comment   Insulin rate changes (DO NOT MODIFY ANSWER)  [email or web address] [id number]    Enter initial dose (Units/hr): 1        -- IV Continuous 01/08/24 1352             PHYSICAL EXAMINATION:  Vitals:    01/08/24 1615   BP:    Pulse: 88   Resp: (!) 28   Temp:      Body mass index is 33.6 kg/m².     Physical Exam     Constitutional: Well developed, obese, NAD.  ENT: External ears no masses with nose patent  Neck: Supple; trachea midline  Cardiovascular: Normal heart sounds, no LE edema. DP +2 bilaterally.  Lungs: Normal effort; lungs anterior bilaterally clear to auscultation.  Intubated on a ventilator.  Abdomen: Soft, no masses, no hernias.  Hypoactive BS noted.  MS: No clubbing or cyanosis of nails noted; unable to assess gait.  Skin: No rashes, lesions, or ulcers; no nodules.  Injection sites are ok. No lipo hypertropthy or atrophy.  Mid-sternal incision with telfa island dressing, CDI.  CT x 2.  LLE wrapped with ACE wrap.  Psychiatric: GUNJAN  Neurological: GUNJAN  Foot: Nails in good condition, no amputations noted

## 2024-01-08 NOTE — PLAN OF CARE
Please merge this note with today's resident critical care note.    SICU Staff Addendum  I have reviewed and concur with the resident's/NP's history, physical, assessment, and plan.  I have personally interviewed and examined the patient at bedside.  See below for any additional findings/changes.    Reason for admission:  Nonrheumatic mitral valve stenosis  Present on Admission:   Mitral valve prolapse   Nonrheumatic mitral valve stenosis   Hypertension, essential      Active issues/Goals for Today:     POD 0 s/p redo chest, (first approach was thoracotomy) mechanical MVR for bioprosthetic MS, TV repair.    - SAT/SBT   - Pain control  - Wean epi as able for MAP > 60 and follow clinical markers of perfusion. Hold BB until reliably tolerated. Start ASA if CT output is acceptable.  - Lung-protective mechanical ventilation. Wean FiO2 and PEEP for SpO2 > 88-90%, pO2 > 65   - NPO for now. OGT to LCWS   - Monitor UOP and aim for > 0.5 cc/kg/hour   - Monitor CT output   - Monitor Hgb and aim for Hgb > 7   - SUP     35 minutes of critical care time was spent personally by me on the following activities: development of treatment plan with patient or surrogate and bedside caregivers, discussions with consultants, evaluation of patient's response to treatment, examination of patient, ordering and performing treatments and interventions, ordering and review of laboratory studies, ordering and review of radiographic studies, pulse oximetry, re-evaluation of patient's condition.  This critical care time did not overlap with that of any other provider or involve time for any procedures.    Dashawn Aguilar MD  Anesthesiology/Critical Care

## 2024-01-08 NOTE — HPI
King Botello is a 38 y.o. female with a past medical history of MVP and MR s/p mitral annuloplasty in [place] (2003),  HTN, bradycardia, PONV. Patient is followed by Dr. Cordero. Patient reports chronic fatigue and BLANK before pregnancy.  After delivery of baby in May, patient was discharged and a few days later readmitted for CHF exacerbation which improved with lasix. TTE LVEF 60-66%. moderate right atrial enlargement. LA severely dilated. There is moderate to severe mitral stenosis. Javier score is 10, the mean diastolic gradient across the mitral valve is 11 mmHg at a heart rate of 77 bpm; MVA 1.33.     The patient presents to the SICU s/p Mechanical Mitral Valve Replacement with Dr. Liu on 01/08/2024. On admission, they are intubated, sedated with propofol, and in stable condition. Inotropic and vasopressor requirements upon admission are 0.04 mcg/kg/min of epinephrine, 0.02 mcg/kg/min of norepinephrine, to maintain blood pressure at a MAP 60-80. Central access includes a RIJ CVC, arterial access includes a left radial arterial line. They also have 1 pleural and 2 mediastinal chest tubes with appropriate output.    Intraoperatively, they received 3.8L of crystalloid, 575 of cell saver, 2 units of Autologous whole blood. Urine output intraoperatively was 625cc. The pre-operative echocardiogram was notable for Mitral stenosis, moderate to severe tricuspid regurgitation, and a mild AI Jet. Post-operative echo was resolution of Mitral stenosis with mean gradient of 5, resolution of tricuspid regurgitation.    Immediate post-operative plans include hemodynamic stabilization and weaning of cardiac and respiratory support. Plan to wean vasopressors and inotropes as tolerated, wean ventilator support with goal of early extubation, and closely monitor fluid status with strict Is/Os and continued fluid resuscitation as needed.

## 2024-01-08 NOTE — PROGRESS NOTES
Notified Dr. Davis of patient's radial nikolay with dampened waveform and unable to draw back. Attempted to reposition and redress the line with no improvement in arterial line. Provider to come to the bedside and reassess for new arterial line.

## 2024-01-08 NOTE — PROGRESS NOTES
Autotransfusion/Rapid Infusion Record:      2024  Autotransfusionist:  Tim Davidson    Surgeon(s) and Role:     * Thierno Liu MD - Primary     * Dariusz Lemos MD - Fellow  Anesthesiologist:  Juan Manuel Palma MD    Past Medical History:   Diagnosis Date    Abnormal Pap smear     Colposcopy    Bradycardia 2020    Class 2 obesity due to excess calories with body mass index (BMI) of 35.0 to 35.9 in adult 3/9/2021    Hypertension, essential 3/16/2021    Menarche     Age of onset 11    Missed ab 2021    Mitral valve prolapse     Previous  delivery affecting pregnancy, antepartum 2020    S/P suction D&C 2021    Status post mitral valve annuloplasty 2017       Procedure(s) (LRB):  MITRAL VALVE REPLACEMENT, REDO STERNOTOMY (N/A)  VALVULOPLASTY, TRICUSPID REPAIR (N/A)     1:47 PM    Equipment:    Cell Saver     R.I.S.  : [employer] Model: CATSmart or CATSplus : PalindromX   Model: [id number]     Serial number: [id number]   Serial number:    Disposable lot #:    Disposable lot #:      Were extra cardiotomies used for cell saver?  no   if yes, #:      Solutions:  Anticoagulant: ACD-A   Expiration date:  Volume used: 400   Wash solution: 0.9% NaCl   Expiration date:  Volume used: 4200     Cell saver checklist  Time completed: 07:00           [x]   Circuit assembled correctly     [x]   Cell saver powered and operational     [x]   Vacuum connected, functional, adjust to max -150mmHg     [x]   Anticoagulant drip rate adjusted     [x]   Transfer bag properly labeled with patient name, expiration time, volume,       anticoagulant, OR number, and initials     [x]   Cell saver disinfected after use (completed at end of case)       Cell Saver volumes:    Total volume processed:     4052 mL     Total volume pRBCs recovered     676 mL     Volume pRBCs infused     676 mL         RIS checklist   Time completed:  []   RIS circuit assembled correctly     []   RIS  power and operational     []   RIS disinfected after use (completed at end of case)       RIS volumes:    Total volume infused:    (see anesthesia record for blood       product information)    mL       Additional comments:

## 2024-01-08 NOTE — ASSESSMENT & PLAN NOTE
Preop Hgb 13. Blood loss expected in the postoperative setting.     - Trend CBC  - Transfuse Hgb <7 and with symptomatic anemia

## 2024-01-08 NOTE — Clinical Note
A venogram was performed in the left subclavian vein. The vessel was injected via hand injection  with 10 mL of contrast. Given by CRNA.

## 2024-01-08 NOTE — Clinical Note
A generator pocket was made and opened at the left chest with blunt dissection, electrocautery and sharp dissection.

## 2024-01-08 NOTE — ANESTHESIA PROCEDURE NOTES
Bilateral TTP blocks    Patient location during procedure: OR   Block not for primary anesthetic.  Reason for block: at surgeon's request and post-op pain management   Post-op Pain Location: bilateral sternum   Start time: 1/8/2024 7:40 AM  Timeout: 1/8/2024 7:30 AM   End time: 1/8/2024 7:45 AM    Staffing  Authorizing Provider: Juan Manuel Palma MD  Performing Provider: Alis Barrios DO    Staffing  Performed by: Alis Barrios DO  Authorized by: Juan Manuel Palma MD    Preanesthetic Checklist  Completed: patient identified, IV checked, site marked, risks and benefits discussed, surgical consent, monitors and equipment checked, pre-op evaluation and timeout performed  Peripheral Block  Patient position: supine  Prep: ChloraPrep  Patient monitoring: heart rate, cardiac monitor, continuous pulse ox, continuous capnometry and frequent blood pressure checks  Block type: Transversus thoracis  Laterality: bilateral  Injection technique: single shot  Needle  Needle type: Stimuplex   Needle gauge: 22 G  Needle length: 4 in  Needle localization: ultrasound guidance  Catheter type: non-stimulating     Assessment  Injection assessment: negative aspiration  Paresthesia pain: none  Heart rate change: no  Slow fractionated injection: yes  Pain Tolerance: comfortable throughout block and no complaints  Medications:    Medications: bupivacaine (pf) (MARCAINE) injection 0.5% - Perineural   20 mL - 1/8/2024 7:42:00 AM

## 2024-01-08 NOTE — HPI
Reason for Consult: Management of Hyperglycemia     Surgical Procedure and Date: s/p MVR on 1/8/2024    Patient is not diabetic and does not currently take any oral/injectable antidiabetic/hypoglycemic medications.     Lab Results   Component Value Date    HGBA1C 5.2 01/04/2024         HPI: King Botello is a 38 y.o. female with a past medical history of MVP and MR s/p mitral annuloplasty in [place] (2003),  HTN, bradycardia, PONV. Patient is followed by Dr. Cordero. Patient reports chronic fatigue and BLANK before pregnancy.  After delivery of baby in May, patient was discharged and a few days later readmitted for CHF exacerbation which improved with lasix. TTE LVEF 60-66%. moderate right atrial enlargement. LA severely dilated. There is moderate to severe mitral stenosis. Javier score is 10, the mean diastolic gradient across the mitral valve is 11 mmHg at a heart rate of 77 bpm; MVA 1.33. The patient presents to the SICU s/p Mechanical Mitral Valve Replacement with Dr. Liu on 01/08/2024. Endocrine consulted to manage post-operative hyperglycemia.

## 2024-01-08 NOTE — SUBJECTIVE & OBJECTIVE
Follow-up For: Procedure(s) (LRB):  MITRAL VALVE REPLACEMENT, REDO STERNOTOMY (N/A)  VALVULOPLASTY, TRICUSPID (N/A)    Post-Operative Day: Day of Surgery     Past Medical History:   Diagnosis Date    Abnormal Pap smear     Colposcopy    Bradycardia 2020    Class 2 obesity due to excess calories with body mass index (BMI) of 35.0 to 35.9 in adult 3/9/2021    Hypertension, essential 3/16/2021    Menarche     Age of onset 11    Missed ab 2021    Mitral valve prolapse     Previous  delivery affecting pregnancy, antepartum 2020    S/P suction D&C 2021    Status post mitral valve annuloplasty 2017       Past Surgical History:   Procedure Laterality Date     SECTION       SECTION WITH TUBAL LIGATION Bilateral 2023    Procedure:  SECTION, WITH TUBAL LIGATION;  Surgeon: Mane Moreno MD;  Location: [facility] L&D;  Service: OB/GYN;  Laterality: Bilateral;    DILATION AND CURETTAGE OF UTERUS USING SUCTION N/A 2021    Procedure: DILATION AND CURETTAGE, UTERUS, USING SUCTION;  Surgeon: Mane Moreno MD;  Location: [facility] OR;  Service: OB/GYN;  Laterality: N/A;    MITRAL VALVE REPAIR         Review of patient's allergies indicates:   Allergen Reactions    Doxycycline      Abdomen pain severe       Family History       Problem Relation (Age of Onset)    Breast cancer Paternal Aunt    Cancer Mother    Diabetes Mother    Heart disease Mother    Hyperlipidemia Mother, Father    Hypertension Mother, Father          Tobacco Use    Smoking status: Never    Smokeless tobacco: Never   Substance and Sexual Activity    Alcohol use: Not Currently     Comment: not since +UPT    Drug use: No    Sexual activity: Yes     Partners: Male     Birth control/protection: None      Review of Systems   Unable to perform ROS: Intubated     Objective:     Vital Signs (Most Recent):  Temp: 98.4 °F (36.9 °C) (24)  Pulse: 72 (24)  Resp: 16 (24)  BP: 111/75  (01/08/24 0542)  SpO2: 99 % (01/08/24 0542) Vital Signs (24h Range):  Temp:  [98.4 °F (36.9 °C)] 98.4 °F (36.9 °C)  Pulse:  [72] 72  Resp:  [16] 16  SpO2:  [99 %] 99 %  BP: (111)/(75) 111/75     Weight: 80.7 kg (177 lb 12.8 oz)  Body mass index is 33.6 kg/m².      Intake/Output Summary (Last 24 hours) at 1/8/2024 1221  Last data filed at 1/8/2024 1110  Gross per 24 hour   Intake 2000 ml   Output 375 ml   Net 1625 ml          Physical Exam  Vitals and nursing note reviewed.   Constitutional:       General: She is not in acute distress.     Interventions: She is sedated and intubated.   HENT:      Mouth/Throat:      Mouth: Mucous membranes are dry.   Eyes:      Extraocular Movements: Extraocular movements intact.      Pupils: Pupils are equal, round, and reactive to light.   Neck:      Comments: [facility] CV  Cardiovascular:      Rate and Rhythm: Normal rate and regular rhythm.      Pulses: Normal pulses.      Comments: V Wires  Right Radial Arterial Line  Pulmonary:      Effort: Pulmonary effort is normal. No respiratory distress. She is intubated.      Comments: Mechanical Breath Sounds  2 Mediastinal and 1 Pleural Chest tube  Abdominal:      General: Abdomen is flat.      Palpations: Abdomen is soft.   Genitourinary:     Comments: Soares in place  Musculoskeletal:         General: No swelling.      Cervical back: Normal range of motion.      Right lower leg: No edema.      Left lower leg: No edema.   Skin:     General: Skin is warm and dry.      Capillary Refill: Capillary refill takes less than 2 seconds.      Comments: MSI CDI              Vents:       Lines/Drains/Airways       Central Venous Catheter Line  Duration             Introducer 01/08/24 0901 <1 day              Drain  Duration                  Urethral Catheter 01/08/24 0757 Straight-tip;Temperature probe 16 Fr. <1 day              Airway  Duration                  Airway - Non-Surgical 01/08/24 0725 <1 day              Arterial Line  Duration              Arterial Line 01/08/24 0710 Left Radial <1 day              Peripheral Intravenous Line  Duration                  Peripheral IV - Single Lumen 01/08/24 0548 20 G Anterior;Left Forearm <1 day         Peripheral IV - Single Lumen 01/08/24 0729 16 G Right Forearm <1 day                    Significant Labs:    CBC/Anemia Profile:  Recent Labs   Lab 01/08/24  1104 01/08/24  1139 01/08/24  1210   HCT 23* 23* 20*        Chemistries:  Recent Labs   Lab 01/08/24  1423      K 3.2*   *   CO2 21*   BUN 9   CREATININE 0.6   CALCIUM 8.0*   ALBUMIN 2.1*   PROT 3.9*   BILITOT 0.7   ALKPHOS 37*   ALT 10   AST 67*   MG 2.5  2.5   PHOS 2.9  2.9       All pertinent labs within the past 24 hours have been reviewed.    Significant Imaging: I have reviewed all pertinent imaging results/findings within the past 24 hours.

## 2024-01-08 NOTE — Clinical Note
The lead was inserted under fluorscopic guidance, thresholds were tested and was sutured in place. The lead was inserted in the right atrium.

## 2024-01-09 LAB
ABO + RH BLD: NORMAL
ALBUMIN SERPL BCP-MCNC: 3.6 G/DL (ref 3.5–5.2)
ALBUMIN SERPL BCP-MCNC: 3.6 G/DL (ref 3.5–5.2)
ALLENS TEST: ABNORMAL
ALLENS TEST: ABNORMAL
ALP SERPL-CCNC: 30 U/L (ref 55–135)
ALP SERPL-CCNC: 37 U/L (ref 55–135)
ALT SERPL W/O P-5'-P-CCNC: 12 U/L (ref 10–44)
ALT SERPL W/O P-5'-P-CCNC: 15 U/L (ref 10–44)
ANION GAP SERPL CALC-SCNC: 8 MMOL/L (ref 8–16)
ANION GAP SERPL CALC-SCNC: 9 MMOL/L (ref 8–16)
APTT PPP: 32.8 SEC (ref 21–32)
APTT PPP: 32.8 SEC (ref 21–32)
APTT PPP: 36.5 SEC (ref 21–32)
AST SERPL-CCNC: 76 U/L (ref 10–40)
AST SERPL-CCNC: 81 U/L (ref 10–40)
BASOPHILS # BLD AUTO: 0.02 K/UL (ref 0–0.2)
BASOPHILS NFR BLD: 0.1 % (ref 0–1.9)
BILIRUB SERPL-MCNC: 0.7 MG/DL (ref 0.1–1)
BILIRUB SERPL-MCNC: 0.9 MG/DL (ref 0.1–1)
BLD GP AB SCN CELLS X3 SERPL QL: NORMAL
BUN SERPL-MCNC: 11 MG/DL (ref 6–20)
BUN SERPL-MCNC: 12 MG/DL (ref 6–20)
CALCIUM SERPL-MCNC: 8 MG/DL (ref 8.7–10.5)
CALCIUM SERPL-MCNC: 8.3 MG/DL (ref 8.7–10.5)
CHLORIDE SERPL-SCNC: 105 MMOL/L (ref 95–110)
CHLORIDE SERPL-SCNC: 112 MMOL/L (ref 95–110)
CO2 SERPL-SCNC: 20 MMOL/L (ref 23–29)
CO2 SERPL-SCNC: 22 MMOL/L (ref 23–29)
CREAT SERPL-MCNC: 0.6 MG/DL (ref 0.5–1.4)
CREAT SERPL-MCNC: 0.6 MG/DL (ref 0.5–1.4)
DIFFERENTIAL METHOD BLD: ABNORMAL
EOSINOPHIL # BLD AUTO: 0 K/UL (ref 0–0.5)
EOSINOPHIL NFR BLD: 0 % (ref 0–8)
ERYTHROCYTE [DISTWIDTH] IN BLOOD BY AUTOMATED COUNT: 16.7 % (ref 11.5–14.5)
ERYTHROCYTE [DISTWIDTH] IN BLOOD BY AUTOMATED COUNT: 16.7 % (ref 11.5–14.5)
ERYTHROCYTE [DISTWIDTH] IN BLOOD BY AUTOMATED COUNT: 17.1 % (ref 11.5–14.5)
EST. GFR  (NO RACE VARIABLE): >60 ML/MIN/1.73 M^2
EST. GFR  (NO RACE VARIABLE): >60 ML/MIN/1.73 M^2
GLUCOSE SERPL-MCNC: 122 MG/DL (ref 70–110)
GLUCOSE SERPL-MCNC: 137 MG/DL (ref 70–110)
HCO3 UR-SCNC: 20.3 MMOL/L (ref 24–28)
HCT VFR BLD AUTO: 21.7 % (ref 37–48.5)
HCT VFR BLD AUTO: 21.7 % (ref 37–48.5)
HCT VFR BLD AUTO: 26.1 % (ref 37–48.5)
HCT VFR BLD CALC: 20 %PCV (ref 36–54)
HGB BLD-MCNC: 7 G/DL (ref 12–16)
HGB BLD-MCNC: 7 G/DL (ref 12–16)
HGB BLD-MCNC: 8.5 G/DL (ref 12–16)
IMM GRANULOCYTES # BLD AUTO: 0.04 K/UL (ref 0–0.04)
IMM GRANULOCYTES # BLD AUTO: 0.04 K/UL (ref 0–0.04)
IMM GRANULOCYTES # BLD AUTO: 0.07 K/UL (ref 0–0.04)
IMM GRANULOCYTES NFR BLD AUTO: 0.3 % (ref 0–0.5)
IMM GRANULOCYTES NFR BLD AUTO: 0.3 % (ref 0–0.5)
IMM GRANULOCYTES NFR BLD AUTO: 0.4 % (ref 0–0.5)
INR PPP: 1.2 (ref 0.8–1.2)
INR PPP: 1.2 (ref 0.8–1.2)
LDH SERPL L TO P-CCNC: 1.35 MMOL/L (ref 0.36–1.25)
LYMPHOCYTES # BLD AUTO: 0.9 K/UL (ref 1–4.8)
LYMPHOCYTES # BLD AUTO: 0.9 K/UL (ref 1–4.8)
LYMPHOCYTES # BLD AUTO: 1.1 K/UL (ref 1–4.8)
LYMPHOCYTES NFR BLD: 5.9 % (ref 18–48)
LYMPHOCYTES NFR BLD: 6.2 % (ref 18–48)
LYMPHOCYTES NFR BLD: 6.2 % (ref 18–48)
MAGNESIUM SERPL-MCNC: 2.2 MG/DL (ref 1.6–2.6)
MCH RBC QN AUTO: 27.5 PG (ref 27–31)
MCH RBC QN AUTO: 27.7 PG (ref 27–31)
MCH RBC QN AUTO: 27.7 PG (ref 27–31)
MCHC RBC AUTO-ENTMCNC: 32.3 G/DL (ref 32–36)
MCHC RBC AUTO-ENTMCNC: 32.3 G/DL (ref 32–36)
MCHC RBC AUTO-ENTMCNC: 32.6 G/DL (ref 32–36)
MCV RBC AUTO: 85 FL (ref 82–98)
MCV RBC AUTO: 86 FL (ref 82–98)
MCV RBC AUTO: 86 FL (ref 82–98)
MONOCYTES # BLD AUTO: 1.4 K/UL (ref 0.3–1)
MONOCYTES # BLD AUTO: 1.4 K/UL (ref 0.3–1)
MONOCYTES # BLD AUTO: 1.6 K/UL (ref 0.3–1)
MONOCYTES NFR BLD: 8.8 % (ref 4–15)
MONOCYTES NFR BLD: 9.8 % (ref 4–15)
MONOCYTES NFR BLD: 9.8 % (ref 4–15)
NEUTROPHILS # BLD AUTO: 11.5 K/UL (ref 1.8–7.7)
NEUTROPHILS # BLD AUTO: 11.5 K/UL (ref 1.8–7.7)
NEUTROPHILS # BLD AUTO: 15.5 K/UL (ref 1.8–7.7)
NEUTROPHILS NFR BLD: 83.6 % (ref 38–73)
NEUTROPHILS NFR BLD: 83.6 % (ref 38–73)
NEUTROPHILS NFR BLD: 84.8 % (ref 38–73)
NRBC BLD-RTO: 0 /100 WBC
PCO2 BLDA: 31.8 MMHG (ref 35–45)
PH SMN: 7.41 [PH] (ref 7.35–7.45)
PHOSPHATE SERPL-MCNC: 3 MG/DL (ref 2.7–4.5)
PHOSPHATE SERPL-MCNC: 3.7 MG/DL (ref 2.7–4.5)
PHOSPHATE SERPL-MCNC: 3.7 MG/DL (ref 2.7–4.5)
PLATELET # BLD AUTO: 130 K/UL (ref 150–450)
PLATELET # BLD AUTO: 141 K/UL (ref 150–450)
PLATELET # BLD AUTO: 141 K/UL (ref 150–450)
PMV BLD AUTO: 12.1 FL (ref 9.2–12.9)
PMV BLD AUTO: 12.1 FL (ref 9.2–12.9)
PMV BLD AUTO: 12.3 FL (ref 9.2–12.9)
PO2 BLDA: 97 MMHG (ref 80–100)
POC BE: -4 MMOL/L
POC IONIZED CALCIUM: 1.21 MMOL/L (ref 1.06–1.42)
POC SATURATED O2: 98 % (ref 95–100)
POC TCO2: 21 MMOL/L (ref 23–27)
POCT GLUCOSE: 114 MG/DL (ref 70–110)
POCT GLUCOSE: 129 MG/DL (ref 70–110)
POCT GLUCOSE: 132 MG/DL (ref 70–110)
POCT GLUCOSE: 133 MG/DL (ref 70–110)
POCT GLUCOSE: 135 MG/DL (ref 70–110)
POCT GLUCOSE: 140 MG/DL (ref 70–110)
POCT GLUCOSE: 142 MG/DL (ref 70–110)
POCT GLUCOSE: 143 MG/DL (ref 70–110)
POCT GLUCOSE: 150 MG/DL (ref 70–110)
POCT GLUCOSE: 164 MG/DL (ref 70–110)
POTASSIUM BLD-SCNC: 4.7 MMOL/L (ref 3.5–5.1)
POTASSIUM SERPL-SCNC: 3.6 MMOL/L (ref 3.5–5.1)
POTASSIUM SERPL-SCNC: 4.1 MMOL/L (ref 3.5–5.1)
POTASSIUM SERPL-SCNC: 4.2 MMOL/L (ref 3.5–5.1)
POTASSIUM SERPL-SCNC: 4.7 MMOL/L (ref 3.5–5.1)
PROT SERPL-MCNC: 5.1 G/DL (ref 6–8.4)
PROT SERPL-MCNC: 5.7 G/DL (ref 6–8.4)
PROTHROMBIN TIME: 13 SEC (ref 9–12.5)
PROTHROMBIN TIME: 13 SEC (ref 9–12.5)
RBC # BLD AUTO: 2.53 M/UL (ref 4–5.4)
RBC # BLD AUTO: 2.53 M/UL (ref 4–5.4)
RBC # BLD AUTO: 3.09 M/UL (ref 4–5.4)
SAMPLE: ABNORMAL
SAMPLE: ABNORMAL
SITE: ABNORMAL
SITE: ABNORMAL
SODIUM BLD-SCNC: 141 MMOL/L (ref 136–145)
SODIUM SERPL-SCNC: 136 MMOL/L (ref 136–145)
SODIUM SERPL-SCNC: 140 MMOL/L (ref 136–145)
SPECIMEN OUTDATE: NORMAL
WBC # BLD AUTO: 13.78 K/UL (ref 3.9–12.7)
WBC # BLD AUTO: 13.78 K/UL (ref 3.9–12.7)
WBC # BLD AUTO: 18.31 K/UL (ref 3.9–12.7)

## 2024-01-09 PROCEDURE — 83735 ASSAY OF MAGNESIUM: CPT | Mod: 91 | Performed by: THORACIC SURGERY (CARDIOTHORACIC VASCULAR SURGERY)

## 2024-01-09 PROCEDURE — 99291 CRITICAL CARE FIRST HOUR: CPT | Mod: ,,, | Performed by: ANESTHESIOLOGY

## 2024-01-09 PROCEDURE — 20000000 HC ICU ROOM

## 2024-01-09 PROCEDURE — 99900035 HC TECH TIME PER 15 MIN (STAT)

## 2024-01-09 PROCEDURE — 63600175 PHARM REV CODE 636 W HCPCS

## 2024-01-09 PROCEDURE — 25000003 PHARM REV CODE 250

## 2024-01-09 PROCEDURE — 99233 SBSQ HOSP IP/OBS HIGH 50: CPT | Mod: ,,, | Performed by: NURSE PRACTITIONER

## 2024-01-09 PROCEDURE — 82330 ASSAY OF CALCIUM: CPT

## 2024-01-09 PROCEDURE — 37799 UNLISTED PX VASCULAR SURGERY: CPT

## 2024-01-09 PROCEDURE — 25000003 PHARM REV CODE 250: Performed by: STUDENT IN AN ORGANIZED HEALTH CARE EDUCATION/TRAINING PROGRAM

## 2024-01-09 PROCEDURE — 85025 COMPLETE CBC W/AUTO DIFF WBC: CPT

## 2024-01-09 PROCEDURE — 83735 ASSAY OF MAGNESIUM: CPT

## 2024-01-09 PROCEDURE — 63600175 PHARM REV CODE 636 W HCPCS: Performed by: STUDENT IN AN ORGANIZED HEALTH CARE EDUCATION/TRAINING PROGRAM

## 2024-01-09 PROCEDURE — 92610 EVALUATE SWALLOWING FUNCTION: CPT

## 2024-01-09 PROCEDURE — 27000221 HC OXYGEN, UP TO 24 HOURS

## 2024-01-09 PROCEDURE — 94761 N-INVAS EAR/PLS OXIMETRY MLT: CPT

## 2024-01-09 PROCEDURE — 80053 COMPREHEN METABOLIC PANEL: CPT | Performed by: STUDENT IN AN ORGANIZED HEALTH CARE EDUCATION/TRAINING PROGRAM

## 2024-01-09 PROCEDURE — 82800 BLOOD PH: CPT

## 2024-01-09 PROCEDURE — 85610 PROTHROMBIN TIME: CPT

## 2024-01-09 PROCEDURE — 97535 SELF CARE MNGMENT TRAINING: CPT

## 2024-01-09 PROCEDURE — 85730 THROMBOPLASTIN TIME PARTIAL: CPT

## 2024-01-09 PROCEDURE — P9016 RBC LEUKOCYTES REDUCED: HCPCS | Performed by: STUDENT IN AN ORGANIZED HEALTH CARE EDUCATION/TRAINING PROGRAM

## 2024-01-09 PROCEDURE — 97165 OT EVAL LOW COMPLEX 30 MIN: CPT

## 2024-01-09 PROCEDURE — 83605 ASSAY OF LACTIC ACID: CPT

## 2024-01-09 PROCEDURE — 97116 GAIT TRAINING THERAPY: CPT

## 2024-01-09 PROCEDURE — 84100 ASSAY OF PHOSPHORUS: CPT

## 2024-01-09 PROCEDURE — 94640 AIRWAY INHALATION TREATMENT: CPT

## 2024-01-09 PROCEDURE — 25000242 PHARM REV CODE 250 ALT 637 W/ HCPCS

## 2024-01-09 PROCEDURE — 97162 PT EVAL MOD COMPLEX 30 MIN: CPT

## 2024-01-09 PROCEDURE — 84295 ASSAY OF SERUM SODIUM: CPT

## 2024-01-09 PROCEDURE — 85730 THROMBOPLASTIN TIME PARTIAL: CPT | Mod: 91

## 2024-01-09 PROCEDURE — 80053 COMPREHEN METABOLIC PANEL: CPT | Mod: 91 | Performed by: THORACIC SURGERY (CARDIOTHORACIC VASCULAR SURGERY)

## 2024-01-09 PROCEDURE — 82565 ASSAY OF CREATININE: CPT

## 2024-01-09 PROCEDURE — 85014 HEMATOCRIT: CPT

## 2024-01-09 PROCEDURE — 25000003 PHARM REV CODE 250: Performed by: NURSE PRACTITIONER

## 2024-01-09 PROCEDURE — 82803 BLOOD GASES ANY COMBINATION: CPT

## 2024-01-09 PROCEDURE — 86901 BLOOD TYPING SEROLOGIC RH(D): CPT

## 2024-01-09 PROCEDURE — 85025 COMPLETE CBC W/AUTO DIFF WBC: CPT | Mod: 91 | Performed by: THORACIC SURGERY (CARDIOTHORACIC VASCULAR SURGERY)

## 2024-01-09 PROCEDURE — 84100 ASSAY OF PHOSPHORUS: CPT | Mod: 91 | Performed by: THORACIC SURGERY (CARDIOTHORACIC VASCULAR SURGERY)

## 2024-01-09 PROCEDURE — 84132 ASSAY OF SERUM POTASSIUM: CPT

## 2024-01-09 PROCEDURE — 94799 UNLISTED PULMONARY SVC/PX: CPT

## 2024-01-09 RX ORDER — GLUCAGON 1 MG
1 KIT INJECTION
Status: DISCONTINUED | OUTPATIENT
Start: 2024-01-09 | End: 2024-01-10

## 2024-01-09 RX ORDER — HYDROMORPHONE HYDROCHLORIDE 1 MG/ML
0.5 INJECTION, SOLUTION INTRAMUSCULAR; INTRAVENOUS; SUBCUTANEOUS EVERY 6 HOURS PRN
Status: DISCONTINUED | OUTPATIENT
Start: 2024-01-09 | End: 2024-01-12

## 2024-01-09 RX ORDER — HYDROCODONE BITARTRATE AND ACETAMINOPHEN 500; 5 MG/1; MG/1
TABLET ORAL
Status: DISCONTINUED | OUTPATIENT
Start: 2024-01-09 | End: 2024-01-10

## 2024-01-09 RX ORDER — HEPARIN SODIUM 10000 [USP'U]/100ML
700 INJECTION, SOLUTION INTRAVENOUS CONTINUOUS
Status: DISCONTINUED | OUTPATIENT
Start: 2024-01-09 | End: 2024-01-10

## 2024-01-09 RX ORDER — SCOLOPAMINE TRANSDERMAL SYSTEM 1 MG/1
1 PATCH, EXTENDED RELEASE TRANSDERMAL
Status: DISCONTINUED | OUTPATIENT
Start: 2024-01-09 | End: 2024-01-18 | Stop reason: HOSPADM

## 2024-01-09 RX ORDER — ACETAMINOPHEN 10 MG/ML
1000 INJECTION, SOLUTION INTRAVENOUS EVERY 8 HOURS
Status: COMPLETED | OUTPATIENT
Start: 2024-01-09 | End: 2024-01-09

## 2024-01-09 RX ORDER — WARFARIN 4 MG/1
4 TABLET ORAL DAILY
Status: DISCONTINUED | OUTPATIENT
Start: 2024-01-09 | End: 2024-01-12

## 2024-01-09 RX ORDER — IBUPROFEN 200 MG
16 TABLET ORAL
Status: DISCONTINUED | OUTPATIENT
Start: 2024-01-09 | End: 2024-01-10

## 2024-01-09 RX ORDER — ACETAMINOPHEN 500 MG
1000 TABLET ORAL EVERY 8 HOURS
Status: DISCONTINUED | OUTPATIENT
Start: 2024-01-09 | End: 2024-01-13

## 2024-01-09 RX ORDER — HYDROMORPHONE HYDROCHLORIDE 1 MG/ML
0.5 INJECTION, SOLUTION INTRAMUSCULAR; INTRAVENOUS; SUBCUTANEOUS ONCE
Status: COMPLETED | OUTPATIENT
Start: 2024-01-09 | End: 2024-01-09

## 2024-01-09 RX ORDER — PROCHLORPERAZINE EDISYLATE 5 MG/ML
2.5 INJECTION INTRAMUSCULAR; INTRAVENOUS EVERY 4 HOURS PRN
Status: DISCONTINUED | OUTPATIENT
Start: 2024-01-09 | End: 2024-01-18 | Stop reason: HOSPADM

## 2024-01-09 RX ORDER — INSULIN ASPART 100 [IU]/ML
0-5 INJECTION, SOLUTION INTRAVENOUS; SUBCUTANEOUS
Status: DISCONTINUED | OUTPATIENT
Start: 2024-01-09 | End: 2024-01-10

## 2024-01-09 RX ORDER — FUROSEMIDE 10 MG/ML
20 INJECTION INTRAMUSCULAR; INTRAVENOUS ONCE
Status: COMPLETED | OUTPATIENT
Start: 2024-01-09 | End: 2024-01-09

## 2024-01-09 RX ORDER — HEPARIN SODIUM 10000 [USP'U]/100ML
400 INJECTION, SOLUTION INTRAVENOUS CONTINUOUS
Status: DISCONTINUED | OUTPATIENT
Start: 2024-01-09 | End: 2024-01-09

## 2024-01-09 RX ORDER — IBUPROFEN 200 MG
24 TABLET ORAL
Status: DISCONTINUED | OUTPATIENT
Start: 2024-01-09 | End: 2024-01-10

## 2024-01-09 RX ADMIN — FAMOTIDINE 20 MG: 10 INJECTION, SOLUTION INTRAVENOUS at 08:01

## 2024-01-09 RX ADMIN — DOCUSATE SODIUM 100 MG: 100 CAPSULE, LIQUID FILLED ORAL at 08:01

## 2024-01-09 RX ADMIN — OXYCODONE HYDROCHLORIDE 10 MG: 10 TABLET ORAL at 05:01

## 2024-01-09 RX ADMIN — OXYCODONE HYDROCHLORIDE 10 MG: 10 TABLET ORAL at 03:01

## 2024-01-09 RX ADMIN — HYDROMORPHONE HYDROCHLORIDE 0.5 MG: 0.5 INJECTION, SOLUTION INTRAMUSCULAR; INTRAVENOUS; SUBCUTANEOUS at 12:01

## 2024-01-09 RX ADMIN — METOCLOPRAMIDE 5 MG: 5 INJECTION, SOLUTION INTRAMUSCULAR; INTRAVENOUS at 10:01

## 2024-01-09 RX ADMIN — OXYCODONE HYDROCHLORIDE 5 MG: 5 TABLET ORAL at 12:01

## 2024-01-09 RX ADMIN — HYDROMORPHONE HYDROCHLORIDE 0.5 MG: 0.5 INJECTION, SOLUTION INTRAMUSCULAR; INTRAVENOUS; SUBCUTANEOUS at 07:01

## 2024-01-09 RX ADMIN — POTASSIUM CHLORIDE 20 MEQ: 200 INJECTION, SOLUTION INTRAVENOUS at 06:01

## 2024-01-09 RX ADMIN — HYDROMORPHONE HYDROCHLORIDE 0.5 MG: 0.5 INJECTION, SOLUTION INTRAMUSCULAR; INTRAVENOUS; SUBCUTANEOUS at 06:01

## 2024-01-09 RX ADMIN — ACETAMINOPHEN 1000 MG: 10 INJECTION, SOLUTION INTRAVENOUS at 02:01

## 2024-01-09 RX ADMIN — ACETAMINOPHEN 1000 MG: 10 INJECTION, SOLUTION INTRAVENOUS at 10:01

## 2024-01-09 RX ADMIN — HEPARIN SODIUM 400 UNITS/HR: 10000 INJECTION, SOLUTION INTRAVENOUS at 12:01

## 2024-01-09 RX ADMIN — ACETAMINOPHEN 1000 MG: 500 TABLET ORAL at 10:01

## 2024-01-09 RX ADMIN — MUPIROCIN 1 G: 20 OINTMENT TOPICAL at 08:01

## 2024-01-09 RX ADMIN — ONDANSETRON 4 MG: 2 INJECTION INTRAMUSCULAR; INTRAVENOUS at 08:01

## 2024-01-09 RX ADMIN — IPRATROPIUM BROMIDE AND ALBUTEROL SULFATE 3 ML: .5; 3 SOLUTION RESPIRATORY (INHALATION) at 12:01

## 2024-01-09 RX ADMIN — INSULIN HUMAN 0.8 UNITS/HR: 1 INJECTION, SOLUTION INTRAVENOUS at 12:01

## 2024-01-09 RX ADMIN — CEFAZOLIN 2 G: 2 INJECTION, POWDER, FOR SOLUTION INTRAMUSCULAR; INTRAVENOUS at 08:01

## 2024-01-09 RX ADMIN — AMIODARONE HYDROCHLORIDE 0.5 MG/MIN: 1.8 INJECTION, SOLUTION INTRAVENOUS at 07:01

## 2024-01-09 RX ADMIN — CEFAZOLIN 2 G: 2 INJECTION, POWDER, FOR SOLUTION INTRAMUSCULAR; INTRAVENOUS at 11:01

## 2024-01-09 RX ADMIN — OXYCODONE HYDROCHLORIDE 10 MG: 10 TABLET ORAL at 08:01

## 2024-01-09 RX ADMIN — CEFAZOLIN 2 G: 2 INJECTION, POWDER, FOR SOLUTION INTRAMUSCULAR; INTRAVENOUS at 03:01

## 2024-01-09 RX ADMIN — IPRATROPIUM BROMIDE AND ALBUTEROL SULFATE 3 ML: .5; 3 SOLUTION RESPIRATORY (INHALATION) at 08:01

## 2024-01-09 RX ADMIN — FUROSEMIDE 20 MG: 10 INJECTION, SOLUTION INTRAMUSCULAR; INTRAVENOUS at 02:01

## 2024-01-09 RX ADMIN — IPRATROPIUM BROMIDE AND ALBUTEROL SULFATE 3 ML: .5; 3 SOLUTION RESPIRATORY (INHALATION) at 03:01

## 2024-01-09 RX ADMIN — OXYCODONE HYDROCHLORIDE 10 MG: 10 TABLET ORAL at 12:01

## 2024-01-09 RX ADMIN — WARFARIN SODIUM 4 MG: 4 TABLET ORAL at 05:01

## 2024-01-09 RX ADMIN — ASPIRIN 81 MG CHEWABLE TABLET 81 MG: 81 TABLET CHEWABLE at 08:01

## 2024-01-09 RX ADMIN — ONDANSETRON 4 MG: 2 INJECTION INTRAMUSCULAR; INTRAVENOUS at 07:01

## 2024-01-09 RX ADMIN — SCOPALAMINE 1 PATCH: 1 PATCH, EXTENDED RELEASE TRANSDERMAL at 03:01

## 2024-01-09 RX ADMIN — HEPARIN SODIUM 700 UNITS/HR: 10000 INJECTION, SOLUTION INTRAVENOUS at 09:01

## 2024-01-09 NOTE — PROGRESS NOTES
Notified Dr. Lemos of patient with 90 cc from CT noted. Updated provided on most recent lab results, VSS, gtt, and ABG values. No new orders at this time.

## 2024-01-09 NOTE — SUBJECTIVE & OBJECTIVE
"Interval HPI:   Overnight events: No acute events overnight. Patient in room 45152/41257 A. Blood glucose stable. BG at goal on current insulin regimen (IIP). Steroid use- None. 1 Day Post-Op  Renal function- Normal   Vasopressors-  None       Endocrine will continue to follow and manage insulin orders inpatient.         Diet NPO Except for: Sips with Medication     Eating:   NPO  Nausea: No  Hypoglycemia and intervention: No  Fever: No  TPN and/or TF: No    /64   Pulse 63   Temp 97.7 °F (36.5 °C) (Oral)   Resp (!) 8   Ht 5' 1" (1.549 m)   Wt 80.7 kg (177 lb 12.8 oz)   LMP 01/07/2024 Comment: on period now  SpO2 95%   Breastfeeding No   BMI 33.60 kg/m²     Labs Reviewed and Include    Recent Labs   Lab 01/09/24  0304 01/09/24  0745   *  --    CALCIUM 8.0*  --    ALBUMIN 3.6  --    PROT 5.1*  --      --    K 4.7 4.1   CO2 20*  --    *  --    BUN 12  --    CREATININE 0.6  --    ALKPHOS 30*  --    ALT 12  --    AST 76*  --    BILITOT 0.9  --      Lab Results   Component Value Date    WBC 13.78 (H) 01/09/2024    WBC 13.78 (H) 01/09/2024    HGB 7.0 (L) 01/09/2024    HGB 7.0 (L) 01/09/2024    HCT 20 (LL) 01/09/2024    MCV 86 01/09/2024    MCV 86 01/09/2024     (L) 01/09/2024     (L) 01/09/2024     No results for input(s): "TSH", "FREET4" in the last 168 hours.  Lab Results   Component Value Date    HGBA1C 5.2 01/04/2024       Nutritional status:   Body mass index is 33.6 kg/m².  Lab Results   Component Value Date    ALBUMIN 3.6 01/09/2024    ALBUMIN 2.1 (L) 01/08/2024    ALBUMIN 4.2 01/04/2024     No results found for: "PREALBUMIN"    Estimated Creatinine Clearance: 121.2 mL/min (based on SCr of 0.6 mg/dL).    Accu-Checks  Recent Labs     01/08/24  2304 01/09/24  0004 01/09/24  0158 01/09/24  0305 01/09/24  0403 01/09/24  0505 01/09/24  0556 01/09/24  0743 01/09/24  0911 01/09/24  1013   POCTGLUCOSE 118* 114* 129* 133* 143* 135* 164* 132* 140* 150*       Current Medications " and/or Treatments Impacting Glycemic Control  Immunotherapy:    Immunosuppressants       None          Steroids:   Hormones (From admission, onward)      None          Pressors:    Autonomic Drugs (From admission, onward)      None          Hyperglycemia/Diabetes Medications:   Antihyperglycemics (From admission, onward)      Start     Stop Route Frequency Ordered    01/09/24 1215  insulin regular in 0.9 % NaCl 100 unit/100 mL (1 unit/mL) infusion        Question:  Enter initial dose (Units/hr):  Answer:  0.8    -- IV Continuous 01/09/24 1112    01/09/24 1212  insulin aspart U-100 pen 0-5 Units         -- SubQ As needed (PRN) 01/09/24 1112

## 2024-01-09 NOTE — PLAN OF CARE
Problem: SLP  Goal: SLP Goal  Description: Speech Language Pathology Goals  Goals expected to be met by 1/23 1. Pt will participate in ongoing assessment of swallow.     Outcome: Ongoing, Progressing    Bedside swallow assessment completed.  Rec full liquid NECTAR thick liquid diet with strict aspiration precautions and crushed meds in puree.  Okay for tsp sips of thin/ice for pleasure.

## 2024-01-09 NOTE — PT/OT/SLP EVAL
Physical Therapy Evaluation    Patient Name:  King Botello   MRN:  [id number]  Admit Date: 1/8/2024  Admitting Diagnosis:  Nonrheumatic mitral valve stenosis  Length of Stay: 1 days  Recent Surgery: Procedure(s) (LRB):  MITRAL VALVE REPLACEMENT, REDO STERNOTOMY (N/A)  VALVULOPLASTY, TRICUSPID REPAIR (N/A) 1 Day Post-Op    Recommendations:     Discharge Recommendations:  No Therapy Indicated   Discharge Equipment Recommendations: none   Barriers to discharge: None    Assessment:     King Botello is a 38 y.o. female admitted with a medical diagnosis of Nonrheumatic mitral valve stenosis.      Problem List: impaired endurance, gait instability, impaired functional mobility, decreased upper extremity function, impaired cardiopulmonary response to activity  Rehab Prognosis: Good; patient would benefit from acute skilled PT services to address these deficits and reach maximum level of function.      Plan:     During this hospitalization, patient to be seen 5 x/week to address the identified rehab impairments via gait training, therapeutic activities, therapeutic exercises, neuromuscular re-education and progress towards the established goals.    Plan of Care Expires:  02/08/24    Subjective   Communicated with RN prior to session.  Patient found up in chair upon PT entry to room, agreeable to evaluation. King Botello's  present during session.    Chief Complaint: No chief complaint on file.    Patient/Family Comments/goals: to get better   Pain/Comfort:  Pain Rating 1: 0/10  Pain Rating Post-Intervention 1: 0/10    Living Environment:  Pt lives with spouse and children in a [facility] with no [facility]. Pt was ind with ambulation and ADLs. Patient uses DME as follows: none. DME owned (not currently used): none.    Patient reports they will have assistance from spouse upon discharge.    Objective:   Patient found with: telemetry, blood pressure cuff, central line, peripheral IV, wilkins catheter, chest tube,  "arterial line, oxygen, external pacer     General Precautions: Standard, Cardiac fall, sternal   Orthopedic Precautions:N/A   Braces: N/A   Oxygen Device: Nasal Cannula   Vitals: /64   Pulse (!) 58   Temp 97.7 °F (36.5 °C) (Oral)   Resp (!) 23   Ht 5' 1" (1.549 m)   Wt 80.7 kg (177 lb 12.8 oz)   LMP 01/07/2024 Comment: on period now  SpO2 98%   Breastfeeding No   BMI 33.60 kg/m²     Exams:  Cognition:   Alert and Cooperative  Ox4  Command following: Follows multistep  commands  Fluency: clear/fluent    RLE ROM: WFL  RLE Strength: WFL  LLE ROM: WFL  LLE Strength: WFL      Outcome Measures:  AM-PAC 6 CLICK MOBILITY  Turning over in bed (including adjusting bedclothes, sheets and blankets)?: 3  Sitting down on and standing up from a chair with arms (e.g., wheelchair, bedside commode, etc.): 3  Moving from lying on back to sitting on the side of the bed?: 3  Moving to and from a bed to a chair (including a wheelchair)?: 3  Need to walk in hospital room?: 3  Climbing 3-5 steps with a railing?: 3  Basic Mobility Total Score: 18     Functional Mobility:  Additional staff present: OT  Bed Mobility:  Not performed 2nd to pt found in chair     Transfers:   Sit <> Stand Transfer: contact guard assistance with no assistive device from EOB      Gait:   Patient ambulated: 10ft + 10ft (ADLs standing at the sink between trials)   Patient required: contact guard  Patient used: no assistive device  Gait Pattern observed: reciprocal gait  Gait Deviation(s): decreased step length, decreased steven, and decreased dissociation of trunk and hips   Impairments due to: impaired balance and decreased endurance  Comments:   Verbal cuing for normal arm swing, upright posture, deep breathing      Therapeutic Activities, Exercises, & Education:   Educated pt on PT role/POC  Educated pt on importance of OOB activity and daily ambulation   Educated pt on sternal precautions  Pt verbalized understanding     Pt performed ADLs " standing at the sink with OT      Patient left up in chair with all lines intact, call button in reach, and RN notified.    GOALS:   Multidisciplinary Problems       Physical Therapy Goals          Problem: Physical Therapy    Goal Priority Disciplines Outcome Goal Variances Interventions   Physical Therapy Goal     PT, PT/OT Ongoing, Progressing     Description: Goals to be met by: 2024    Patient will increase functional independence with mobility by performin. Supine to sit with Supervision  2. Sit to stand transfer with Supervision  3. Gait  x 400 feet with Supervision.                          History:     Past Medical History:   Diagnosis Date    Abnormal Pap smear     Colposcopy    Bradycardia 2020    Class 2 obesity due to excess calories with body mass index (BMI) of 35.0 to 35.9 in adult 3/9/2021    Hypertension, essential 3/16/2021    Menarche     Age of onset 11    Missed ab 2021    Mitral valve prolapse     Previous  delivery affecting pregnancy, antepartum 2020    S/P suction D&C 2021    Status post mitral valve annuloplasty 2017       Past Surgical History:   Procedure Laterality Date     SECTION       SECTION WITH TUBAL LIGATION Bilateral 2023    Procedure:  SECTION, WITH TUBAL LIGATION;  Surgeon: Mane Moreno MD;  Location: [facility] L&D;  Service: OB/GYN;  Laterality: Bilateral;    DILATION AND CURETTAGE OF UTERUS USING SUCTION N/A 2021    Procedure: DILATION AND CURETTAGE, UTERUS, USING SUCTION;  Surgeon: Mane Moreno MD;  Location: [facility];  Service: OB/GYN;  Laterality: N/A;    MITRAL VALVE REPAIR      MITRAL VALVE REPLACEMENT N/A 2024    Procedure: MITRAL VALVE REPLACEMENT, REDO STERNOTOMY;  Surgeon: Thierno Liu MD;  Location: [address];  Service: Cardiothoracic;  Laterality: N/A;    VALVULOPLASTY, TRICUSPID N/A 2024    Procedure: VALVULOPLASTY, TRICUSPID REPAIR;  Surgeon: Alexa  Thierno ESPARZA MD;  Location: [facility] OR [address] [place] [address];  Service: Cardiothoracic;  Laterality: N/A;       Time Tracking:     PT Received On: 01/09/24  PT Start Time: 0815     PT Stop Time: 0831  PT Total Time (min): 16 min     Billable Minutes: Evaluation 8 and Gait Training 8

## 2024-01-09 NOTE — PLAN OF CARE
SICU PLAN OF CARE NOTE    Dx: Nonrheumatic mitral valve stenosis    Goals of Care: MAP 60-80.     Vital Signs (last 12 hours):   Temp:  [98.2 °F (36.8 °C)-98.9 °F (37.2 °C)]   Pulse:  []   Resp:  [3-35]   BP: ()/(46-66)   SpO2:  [92 %-100 %]   Arterial Line BP: ()/(43-66)      Neuro: AAO x4, Follows Commands, and Moves All Extremities    Cardiac: NSR and AFib    Respiratory: Nasal Cannula    Gtts: Insulin and Amiodarone    Urine Output: Urinary Catheter 350 cc/shift    Drains: 150    Diet: Clear Liquid    Labs/Accuchecks: Daily Labs. Q1H Accuchecks.     Skin:  All skin remains free from injury.  Patient turned Q2, mattress inflated, and bed working correctly.    Shift Events:  All VSS. HR 50's. CVP 11,13, and . Intermittently in Afib. Pt extubated to NC. OOBTC this AM. 500cc Albumin given for low UOP.  See flowsheet for further assessment/details.  Family updated on current condition/plan of care, questions answered, and emotional support provided.  MD updated on current condition, vitals, labs, and gtts.

## 2024-01-09 NOTE — PLAN OF CARE
[facility] - Surgical Intensive Care  Initial Discharge Assessment       Primary Care Provider: Mayco Hansen MD    Admission Diagnosis: Stenosis of prosthetic mitral valve, initial encounter [T82.560Y]    Admission Date: 1/8/2024  Expected Discharge Date:     Transition of Care Barriers: None    Payor: BLUE CROSS [employer]PL[employer]EE BENEFIT / [facility] [place] / Product Type: Self Funded /     Extended Emergency Contact Information  Primary Emergency Contact: Umer Botello  Address: [facility] [employer] [address]  Mobile Phone: [phone]  Relation: Spouse    Discharge Plan A: Home with family, Home  Discharge Plan B: Home      [id number] - [place], [place] - [address] [facility]  [address] [address]  Phone: [phone] Fax: [phone]    [facility]  [address]  [place] [address]  Phone: [phone] Fax: [phone]    [facility]  [address] [address]  Phone: [phone] Fax: [phone]      Initial Assessment (most recent)       Adult Discharge Assessment - 01/09/24 1221          Discharge Assessment    Assessment Type Discharge Planning Assessment     Confirmed/corrected address, phone number and insurance Yes     Confirmed Demographics Correct on Facesheet     Source of Information family;patient     Communicated ROBERT with patient/caregiver Date not available/Unable to determine     People in Home spouse;child(wandy), dependent     Do you have help at home or someone to help you manage your care at home? No     Prior to hospitilization cognitive status: No Deficits     Current cognitive status: No Deficits     Walking or Climbing Stairs Difficulty no     Dressing/Bathing Difficulty no     Home Layout Able to live on 1st floor     Equipment Currently Used at Home none     Readmission within 30 days? No     Patient currently being followed by  outpatient case management? No     Do you currently have service(s) that help you manage your care at home? No     Do you take prescription medications? Yes     Do you have prescription coverage? Yes     Do you have any problems affording any of your prescribed medications? No     Is the patient taking medications as prescribed? yes     Who is going to help you get home at discharge? SPOUSE     How do you get to doctors appointments? family or friend will provide;car, drives self     Are you on dialysis? No     Do you take coumadin? No     Discharge Plan A Home with family;Home     Discharge Plan B Home     DME Needed Upon Discharge  none     Discharge Plan discussed with: Patient;Spouse/sig other     Transition of Care Barriers None        Physical Activity    On average, how many days per week do you engage in moderate to strenuous exercise (like a brisk walk)? Patient declined        Financial Resource Strain    How hard is it for you to pay for the very basics like food, housing, medical care, and heating? Not hard at all        Housing Stability    In the last 12 months, was there a time when you were not able to pay the mortgage or rent on time? No     In the last 12 months, was there a time when you did not have a steady place to sleep or slept in a shelter (including now)? No        Transportation Needs    In the past 12 months, has lack of transportation kept you from medical appointments or from getting medications? No     In the past 12 months, has lack of transportation kept you from meetings, work, or from getting things needed for daily living? No        Food Insecurity    Within the past 12 months, you worried that your food would run out before you got the money to buy more. Never true     Within the past 12 months, the food you bought just didn't last and you didn't have money to get more. Never true        Stress    Do you feel stress - tense, restless, nervous, or anxious, or unable to sleep at  night because your mind is troubled all the time - these days? Patient declined        Social Connections    In a typical week, how many times do you talk on the phone with family, friends, or neighbors? Patient unable to answer     How often do you get together with friends or relatives? Patient unable to answer     How often do you attend Alevism or Hinduism services? Never     Do you belong to any clubs or organizations such as Alevism groups, unions, fraternal or athletic groups, or school groups? No     How often do you attend meetings of the clubs or organizations you belong to? Never     Are you , , , , never , or living with a partner?         Alcohol Use    Q1: How often do you have a drink containing alcohol? Patient declined     Q2: How many drinks containing alcohol do you have on a typical day when you are drinking? Patient declined     Q3: How often do you have six or more drinks on one occasion? Patient declined                 Discharge Plan A  HOME and Plan B HOME have been determined by review of patient's clinical status, future medical and therapeutic needs, and coverage/benefits for post-acute care in coordination with multidisciplinary team members.  Patient lives in a 1 story house with his family she sis not on dialysis and does not take coumadin She has a ride home

## 2024-01-09 NOTE — PROGRESS NOTES
"[facility]  Endocrinology  Progress Note    Admit Date: 1/8/2024     Reason for Consult: Management of Hyperglycemia     Surgical Procedure and Date: s/p MVR on 1/8/2024    Patient is not diabetic and does not currently take any oral/injectable antidiabetic/hypoglycemic medications.     Lab Results   Component Value Date    HGBA1C 5.2 01/04/2024         HPI: King Botello is a 38 y.o. female with a past medical history of MVP and MR s/p mitral annuloplasty in [place] (2003),  HTN, bradycardia, PONV. Patient is followed by Dr. Cordero. Patient reports chronic fatigue and BLANK before pregnancy.  After delivery of baby in May, patient was discharged and a few days later readmitted for CHF exacerbation which improved with lasix. TTE LVEF 60-66%. moderate right atrial enlargement. LA severely dilated. There is moderate to severe mitral stenosis. Javier score is 10, the mean diastolic gradient across the mitral valve is 11 mmHg at a heart rate of 77 bpm; MVA 1.33. The patient presents to the SICU s/p Mechanical Mitral Valve Replacement with Dr. Liu on 01/08/2024. Endocrine consulted to manage post-operative hyperglycemia.               Interval HPI:   Overnight events: No acute events overnight. Patient in room 90943/88888 A. Blood glucose stable. BG at goal on current insulin regimen (IIP). Steroid use- None. 1 Day Post-Op  Renal function- Normal   Vasopressors-  None       Endocrine will continue to follow and manage insulin orders inpatient.         Diet NPO Except for: Sips with Medication     Eating:   NPO  Nausea: No  Hypoglycemia and intervention: No  Fever: No  TPN and/or TF: No    /64   Pulse 63   Temp 97.7 °F (36.5 °C) (Oral)   Resp (!) 8   Ht 5' 1" (1.549 m)   Wt 80.7 kg (177 lb 12.8 oz)   LMP 01/07/2024 Comment: on period now  SpO2 95%   Breastfeeding No   BMI 33.60 kg/m²     Labs Reviewed and Include    Recent Labs   Lab 01/09/24  0304 01/09/24  0745   *  " "--    CALCIUM 8.0*  --    ALBUMIN 3.6  --    PROT 5.1*  --      --    K 4.7 4.1   CO2 20*  --    *  --    BUN 12  --    CREATININE 0.6  --    ALKPHOS 30*  --    ALT 12  --    AST 76*  --    BILITOT 0.9  --      Lab Results   Component Value Date    WBC 13.78 (H) 01/09/2024    WBC 13.78 (H) 01/09/2024    HGB 7.0 (L) 01/09/2024    HGB 7.0 (L) 01/09/2024    HCT 20 (LL) 01/09/2024    MCV 86 01/09/2024    MCV 86 01/09/2024     (L) 01/09/2024     (L) 01/09/2024     No results for input(s): "TSH", "FREET4" in the last 168 hours.  Lab Results   Component Value Date    HGBA1C 5.2 01/04/2024       Nutritional status:   Body mass index is 33.6 kg/m².  Lab Results   Component Value Date    ALBUMIN 3.6 01/09/2024    ALBUMIN 2.1 (L) 01/08/2024    ALBUMIN 4.2 01/04/2024     No results found for: "PREALBUMIN"    Estimated Creatinine Clearance: 121.2 mL/min (based on SCr of 0.6 mg/dL).    Accu-Checks  Recent Labs     01/08/24  2304 01/09/24  0004 01/09/24  0158 01/09/24  0305 01/09/24  0403 01/09/24  0505 01/09/24  0556 01/09/24  0743 01/09/24  0911 01/09/24  1013   POCTGLUCOSE 118* 114* 129* 133* 143* 135* 164* 132* 140* 150*       Current Medications and/or Treatments Impacting Glycemic Control  Immunotherapy:    Immunosuppressants       None          Steroids:   Hormones (From admission, onward)      None          Pressors:    Autonomic Drugs (From admission, onward)      None          Hyperglycemia/Diabetes Medications:   Antihyperglycemics (From admission, onward)      Start     Stop Route Frequency Ordered    01/09/24 1215  insulin regular in 0.9 % NaCl 100 unit/100 mL (1 unit/mL) infusion        Question:  Enter initial dose (Units/hr):  Answer:  0.8    -- IV Continuous 01/09/24 1112    01/09/24 1212  insulin aspart U-100 pen 0-5 Units         -- SubQ As needed (PRN) 01/09/24 1112            ASSESSMENT and PLAN    Cardiac/Vascular  * Nonrheumatic mitral valve stenosis  Managed per primary team  Avoid " hypoglycemia        S/P MVR (mitral valve replacement)  Optimize BG control to improve wound healing      Endocrine  Transient hyperglycemia post procedure  Endocrinology consulted for BG management.   BG goal 110-140 (CTS Protocol)    - D/C IIP  - Transition drip at 0.8 units/hr with step-down parameters. (Based on needs while on IIP)  - Novolog (aspart) insulin prn for BG excursions LDC SSI (150/50).  - BG checks q4hr and the AC/HS/0200 when diet is advanced.   - Hypoglycemia protocol in place    ** Please notify Endocrine for any change and/or advance in diet**  ** Please call Endocrine for any BG related issues **    Discharge Planning:   TBD. Please notify endocrinology prior to discharge.               Gerber Rehman, DNP, FNP  Endocrinology  [facility]

## 2024-01-09 NOTE — ANESTHESIA POSTPROCEDURE EVALUATION
Anesthesia Post Evaluation    Patient: King Botello    Procedure(s) Performed: Procedure(s) (LRB):  MITRAL VALVE REPLACEMENT, REDO STERNOTOMY (N/A)  VALVULOPLASTY, TRICUSPID REPAIR (N/A)    Final Anesthesia Type: general      Patient location during evaluation: ICU  Patient participation: Yes- Able to Participate  Level of consciousness: awake  Post-procedure vital signs: reviewed and stable  Pain management: adequate  Airway patency: patent    PONV status at discharge: No PONV  Anesthetic complications: no      Cardiovascular status: blood pressure returned to baseline  Respiratory status: nasal cannula  Hydration status: euvolemic  Follow-up not needed.              Vitals Value Taken Time   /58 01/09/24 0702   Temp 37.1 °C (98.8 °F) 01/08/24 2300   Pulse 61 01/09/24 0739   Resp 15 01/09/24 0739   SpO2 94 % 01/09/24 0739   Vitals shown include unvalidated device data.      No case tracking events are documented in the log.      Pain/Katerine Score: Pain Rating Prior to Med Admin: 7 (1/9/2024  7:34 AM)  Pain Rating Post Med Admin: 3 (1/9/2024  4:06 AM)

## 2024-01-09 NOTE — H&P
[facility]  Critical Care - Surgery  History & Physical    Patient Name: King Botello  MRN: [id number]  Admission Date: 1/8/2024  Code Status: Full Code  Attending Physician: Thierno Liu MD   Primary Care Provider: Mayco Hansen MD   Principal Problem: Nonrheumatic mitral valve stenosis    Subjective:     HPI:  King Botello is a 38 y.o. female with a past medical history of MVP and MR s/p mitral annuloplasty in [place] (2003),  HTN, bradycardia, PONV. Patient is followed by Dr. Cordero. Patient reports chronic fatigue and BLANK before pregnancy.  After delivery of baby in May, patient was discharged and a few days later readmitted for CHF exacerbation which improved with lasix. TTE LVEF 60-66%. moderate right atrial enlargement. LA severely dilated. There is moderate to severe mitral stenosis. Javier score is 10, the mean diastolic gradient across the mitral valve is 11 mmHg at a heart rate of 77 bpm; MVA 1.33.     The patient presents to the SICU s/p Mechanical Mitral Valve Replacement with Dr. Liu on 01/08/2024. On admission, they are intubated, sedated with propofol, and in stable condition. Inotropic and vasopressor requirements upon admission are 0.04 mcg/kg/min of epinephrine, 0.02 mcg/kg/min of norepinephrine, to maintain blood pressure at a MAP 60-80. Central access includes a RIJ CVC, arterial access includes a left radial arterial line. They also have 1 pleural and 2 mediastinal chest tubes with appropriate output.    Intraoperatively, they received 3.8L of crystalloid, 575 of cell saver, 2 units of Autologous whole blood. Urine output intraoperatively was 625cc. The pre-operative echocardiogram was notable for Mitral stenosis, moderate to severe tricuspid regurgitation, and a mild AI Jet. Post-operative echo was resolution of Mitral stenosis with mean gradient of 5, resolution of tricuspid regurgitation.    Immediate post-operative plans include  hemodynamic stabilization and weaning of cardiac and respiratory support. Plan to wean vasopressors and inotropes as tolerated, wean ventilator support with goal of early extubation, and closely monitor fluid status with strict Is/Os and continued fluid resuscitation as needed.      Hospital/ICU Course:  No notes on file    Follow-up For: Procedure(s) (LRB):  MITRAL VALVE REPLACEMENT, REDO STERNOTOMY (N/A)  VALVULOPLASTY, TRICUSPID (N/A)    Post-Operative Day: Day of Surgery     Past Medical History:   Diagnosis Date    Abnormal Pap smear     Colposcopy    Bradycardia 2020    Class 2 obesity due to excess calories with body mass index (BMI) of 35.0 to 35.9 in adult 3/9/2021    Hypertension, essential 3/16/2021    Menarche     Age of onset 11    Missed ab 2021    Mitral valve prolapse     Previous  delivery affecting pregnancy, antepartum 2020    S/P suction D&C 2021    Status post mitral valve annuloplasty 2017       Past Surgical History:   Procedure Laterality Date     SECTION       SECTION WITH TUBAL LIGATION Bilateral 2023    Procedure:  SECTION, WITH TUBAL LIGATION;  Surgeon: Mane Moreno MD;  Location: [facility] L&D;  Service: OB/GYN;  Laterality: Bilateral;    DILATION AND CURETTAGE OF UTERUS USING SUCTION N/A 2021    Procedure: DILATION AND CURETTAGE, UTERUS, USING SUCTION;  Surgeon: Mane Moreno MD;  Location: [facility] OR;  Service: OB/GYN;  Laterality: N/A;    MITRAL VALVE REPAIR         Review of patient's allergies indicates:   Allergen Reactions    Doxycycline      Abdomen pain severe       Family History       Problem Relation (Age of Onset)    Breast cancer Paternal Aunt    Cancer Mother    Diabetes Mother    Heart disease Mother    Hyperlipidemia Mother, Father    Hypertension Mother, Father          Tobacco Use    Smoking status: Never    Smokeless tobacco: Never   Substance and Sexual Activity    Alcohol use: Not Currently      Comment: not since +UPT    Drug use: No    Sexual activity: Yes     Partners: Male     Birth control/protection: None      Review of Systems   Unable to perform ROS: Intubated     Objective:     Vital Signs (Most Recent):  Temp: 98.4 °F (36.9 °C) (01/08/24 0542)  Pulse: 72 (01/08/24 0542)  Resp: 16 (01/08/24 0542)  BP: 111/75 (01/08/24 0542)  SpO2: 99 % (01/08/24 0542) Vital Signs (24h Range):  Temp:  [98.4 °F (36.9 °C)] 98.4 °F (36.9 °C)  Pulse:  [72] 72  Resp:  [16] 16  SpO2:  [99 %] 99 %  BP: (111)/(75) 111/75     Weight: 80.7 kg (177 lb 12.8 oz)  Body mass index is 33.6 kg/m².      Intake/Output Summary (Last 24 hours) at 1/8/2024 1221  Last data filed at 1/8/2024 1110  Gross per 24 hour   Intake 2000 ml   Output 375 ml   Net 1625 ml          Physical Exam  Vitals and nursing note reviewed.   Constitutional:       General: She is not in acute distress.     Interventions: She is sedated and intubated.   HENT:      Mouth/Throat:      Mouth: Mucous membranes are dry.   Eyes:      Extraocular Movements: Extraocular movements intact.      Pupils: Pupils are equal, round, and reactive to light.   Neck:      Comments: RIJ CVC  Cardiovascular:      Rate and Rhythm: Normal rate and regular rhythm.      Pulses: Normal pulses.      Comments: V Wires  Right Radial Arterial Line  Pulmonary:      Effort: Pulmonary effort is normal. No respiratory distress. She is intubated.      Comments: Mechanical Breath Sounds  2 Mediastinal and 1 Pleural Chest tube  Abdominal:      General: Abdomen is flat.      Palpations: Abdomen is soft.   Genitourinary:     Comments: Soares in place  Musculoskeletal:         General: No swelling.      Cervical back: Normal range of motion.      Right lower leg: No edema.      Left lower leg: No edema.   Skin:     General: Skin is warm and dry.      Capillary Refill: Capillary refill takes less than 2 seconds.      Comments: MSI CDI              Vents:       Lines/Drains/Airways       Central Venous  Catheter Line  Duration             Introducer 01/08/24 0901 <1 day              Drain  Duration                  Urethral Catheter 01/08/24 0757 Straight-tip;Temperature probe 16 Fr. <1 day              Airway  Duration                  Airway - Non-Surgical 01/08/24 0725 <1 day              Arterial Line  Duration             Arterial Line 01/08/24 0710 Left Radial <1 day              Peripheral Intravenous Line  Duration                  Peripheral IV - Single Lumen 01/08/24 0548 20 G Anterior;Left Forearm <1 day         Peripheral IV - Single Lumen 01/08/24 0729 16 G Right Forearm <1 day                    Significant Labs:    CBC/Anemia Profile:  Recent Labs   Lab 01/08/24  1104 01/08/24  1139 01/08/24  1210   HCT 23* 23* 20*        Chemistries:  Recent Labs   Lab 01/08/24  1423      K 3.2*   *   CO2 21*   BUN 9   CREATININE 0.6   CALCIUM 8.0*   ALBUMIN 2.1*   PROT 3.9*   BILITOT 0.7   ALKPHOS 37*   ALT 10   AST 67*   MG 2.5  2.5   PHOS 2.9  2.9       All pertinent labs within the past 24 hours have been reviewed.    Significant Imaging: I have reviewed all pertinent imaging results/findings within the past 24 hours.  Assessment/Plan:     Cardiac/Vascular  * Nonrheumatic mitral valve stenosis  38 y.o. female with a past medical history of MVP and MR s/p mitral annuloplasty in [place] (2003),  HTN, bradycardia now s/p Mechanical Mitral Valve Replacement on 1/8/2024 with Dr. Liu    Neuro/Psych:     - Sedation: propofol    - Pain:    - Scheduled Acetaminophen 1g q8h   - Fentanyl PRN while intubated, Oxy PRN   - Received TTP and Rectus Sheath blocks intra-op             Cardiac:     - S/P Mechanical Mitral Valve Replacement with Dr. Liu on 1/8/2024    - BP Goal: MAP 60-80    - Cleviprex PRN    - Pressors: 0.04 epi, 0.02 norepi      - Wean pressors as able    - Anti-HTNs: Will start when appropriate    - Rhythm: NSR    - Beta blocker: Will start when appropriate    - Statin: Not indicated       Pulmonary:     - Goal SpO2 >92%    - Will wean ventilator support as tolerated to extubate    - Chest Tubes 2 Meds     - ABGs PRN      Renal:    - Trend BUN/Cr     - Maintain Soares, record strict Is/Os    Recent Labs   Lab 01/04/24  1500   BUN 10   CREATININE 0.6         FEN / GI:     - Daily CMP, PRN K/Mag/Phos per protocol     - Replace electrolytes as needed    - Nutrition: NPO pending extubation    - Bowel Regimen: Miralax, docusate      ID:     - Afebrile    - WBC stable    - Abx: Complete perioperative cefazolin 2g Q8H x 5 doses    Recent Labs   Lab 01/04/24  1500   WBC 9.80         Heme/Onc:     - Hgb 13.0 pre-operatively    - CBC daily    - ASA 325mg daily    - Transfuse Hb <7 and symptomatic anemia    Recent Labs   Lab 01/04/24  1500   HGB 13.0      APTT 23.3   INR 1.0         Endocrine:     - CTS Goal -140    - HgbA1c: 5.2    - Insulin gtt    - Endocrinology consulted for insulin management      PPx:   Feeding: NPO, pending extubation and bedside swallow evaluation  Analgesia/Sedation: Multimodal  Thromboembolic Prevention: SCD's  HOB >30: Yes  Stress Ulcer: Famotidine  Glucose Control: Yes, insulin management per Endocrinology     Lines/Drains/Airway:   ETT   Right radial arterial line   RIJ CVC   Soares   Chest Tubes: 2 Mediastinal    Pacing Wires: Temporary ventricular pacing wires      Dispo/Code Status/Palliative:     - Continue SICU Care    - Full Code           Critical care was time spent personally by me on the following activities: development of treatment plan with patient or surrogate and bedside caregivers, discussions with consultants, evaluation of patient's response to treatment, examination of patient, ordering and performing treatments and interventions, ordering and review of laboratory studies, ordering and review of radiographic studies, pulse oximetry, re-evaluation of patient's condition.  This critical care time did not overlap with that of any other provider or  involve time for any procedures.     Umer Herrera MD  Critical Care - Surgery  [facility]

## 2024-01-09 NOTE — PT/OT/SLP EVAL
"Occupational Therapy   Co-Evaluation    Name: King Botello  MRN: [id number]  Admitting Diagnosis: Nonrheumatic mitral valve stenosis  Recent Surgery: Procedure(s) (LRB):  MITRAL VALVE REPLACEMENT, REDO STERNOTOMY (N/A)  VALVULOPLASTY, TRICUSPID REPAIR (N/A) 1 Day Post-Op    Recommendations:     Discharge Recommendations: No Therapy Indicated  Discharge Equipment Recommendations:  none  Barriers to discharge:  None    Assessment:     King Botello is a 38 y.o. female with a medical diagnosis of Nonrheumatic mitral valve stenosis.  She presents with complaints of generalized weakness and needing blood, but participatory. Pt able to mobilize to bathroom to complete grooming tasks with all VSS. Performance deficits affecting function: impaired self care skills, impaired endurance, impaired functional mobility, gait instability, decreased upper extremity function, impaired cardiopulmonary response to activity, impaired balance, weakness.  Pt benefits from co-treatment with PT to accommodate pt's activity tolerance and progression with therapy.      Rehab Prognosis: Good; patient would benefit from acute skilled OT services to address these deficits and reach maximum level of function.       Plan:     Patient to be seen 5 x/week to address the above listed problems via self-care/home management, therapeutic activities, therapeutic exercises  Plan of Care Expires: 01/16/24  Plan of Care Reviewed with: patient, spouse    Subjective     Chief Complaint: "I don't want to fall."  Patient/Family Comments/goals: to get better and go home    Occupational Profile:  Living Environment: lives with spouse and dependent children in [facility] with no DARRYN; WIS  Previous level of function: (I) with ADL and ambulation; drives  Roles and Routines: X-ray tech  Equipment Used at Home: none  Assistance upon Discharge: spouse    Pain/Comfort:  Pain Rating 1: 0/10  Pain Addressed 1: Pre-medicate for activity  Pain Rating Post-Intervention 1: " 0/10    Patients cultural, spiritual, Mu-ism conflicts given the current situation: no    Objective:     Communicated with: RN prior to session.  Patient found up in chair with blood pressure cuff, pulse ox (continuous), telemetry, arterial line, oxygen, wound vac, external pacer, central line, chest tube, wilkins catheter upon OT entry to room.    General Precautions: Standard, aspiration, sternal, fall  Orthopedic Precautions:    Braces:    Respiratory Status: Nasal cannula, flow 5 L/min    Occupational Performance:    Bed Mobility:    NT    Functional Mobility/Transfers:  Patient completed Sit <> Stand Transfer with contact guard assistance  with  no assistive device   Functional Mobility: CGA to/from bathroom    Activities of Daily Living:  Grooming: minimal assistance for combing hair/SBA for washing face with CGA for static standing  Upper Body Dressing: moderate assistance    Lower Body Dressing: moderate assistance      Cognitive/Visual Perceptual:  Oriented to: Person, Place, Time and Situation  Follows Commands/attention: Follows multistep  commands  Communication: clear/fluent  Memory:  No Deficits noted  Safety awareness/insight to disability: intact  Coping skills/emotional control: Appropriate to situation    Physical Exam:  Postural examination/scapula alignment:    -       No postural abnormalities identified  Sensation:    -       Intact  Upper Extremity Range of Motion:     -       Right Upper Extremity: WFL  -       Left Upper Extremity: WFL  Upper Extremity Strength:    -       Right Upper Extremity: WFL  -       Left Upper Extremity: WFL   Strength:    -       Right Upper Extremity: WFL  -       Left Upper Extremity: WFL  Fine Motor Coordination:    -       Intact  Gross motor coordination:   WFL    AMPAC 6 Click ADL:  AMPAC Total Score: 15    Treatment & Education:  Pt ed on OT POC  Pt ed on sternal precautions during ADL  Pt ed on ROM ex's 3x daily for increased overall strength and  endurance    Patient left up in chair with all lines intact, call button in reach, RN notified, and spouse present    GOALS:   Multidisciplinary Problems       Occupational Therapy Goals          Problem: Occupational Therapy    Goal Priority Disciplines Outcome Interventions   Occupational Therapy Goal     OT, PT/OT Ongoing, Progressing    Description: Goals to be met by: 24     Patient will increase functional independence with ADLs by performing:    Feeding with [place].  UE Dressing with Set-up Assistance.  LE Dressing with Set-up Assistance.  Grooming while standing at sink with Supervision.  Toileting from toilet with Supervision for hygiene and clothing management.   Toilet transfer to toilet with Supervision.                         History:     Past Medical History:   Diagnosis Date    Abnormal Pap smear     Colposcopy    Bradycardia 2020    Class 2 obesity due to excess calories with body mass index (BMI) of 35.0 to 35.9 in adult 3/9/2021    Hypertension, essential 3/16/2021    Menarche     Age of onset 11    Missed ab 2021    Mitral valve prolapse     Previous  delivery affecting pregnancy, antepartum 2020    S/P suction D&C 2021    Status post mitral valve annuloplasty 2017         Past Surgical History:   Procedure Laterality Date     SECTION       SECTION WITH TUBAL LIGATION Bilateral 2023    Procedure:  SECTION, WITH TUBAL LIGATION;  Surgeon: Mane Moreno MD;  [facility];  Service: OB/GYN;  Laterality: Bilateral;    DILATION AND CURETTAGE OF UTERUS USING SUCTION N/A 2021    Procedure: DILATION AND CURETTAGE, UTERUS, USING SUCTION;  Surgeon: Mane Moreno MD;  Location: [facility];  Service: OB/GYN;  Laterality: N/A;    MITRAL VALVE REPAIR      MITRAL VALVE REPLACEMENT N/A 2024    Procedure: MITRAL VALVE REPLACEMENT, REDO STERNOTOMY;  Surgeon: Thierno Liu MD;  Location: [address];  Service:  Cardiothoracic;  Laterality: N/A;    VALVULOPLASTY, TRICUSPID N/A 1/8/2024    Procedure: VALVULOPLASTY, TRICUSPID REPAIR;  Surgeon: Thierno Liu MD;  Location: [facility] OR [address] [place] [address];  Service: Cardiothoracic;  Laterality: N/A;       Time Tracking:     OT Date of Treatment: 01/09/24  OT Start Time: 0815  OT Stop Time: 0832  OT Total Time (min): 17 min    Billable Minutes:Evaluation 8  Self Care/Home Management 9    1/9/2024

## 2024-01-09 NOTE — PLAN OF CARE
Problem: Occupational Therapy  Goal: Occupational Therapy Goal  Description: Goals to be met by: 1/16/24     Patient will increase functional independence with ADLs by performing:    Feeding with [place].  UE Dressing with Set-up Assistance.  LE Dressing with Set-up Assistance.  Grooming while standing at sink with Supervision.  Toileting from toilet with Supervision for hygiene and clothing management.   Toilet transfer to toilet with Supervision.    Outcome: Ongoing, Progressing

## 2024-01-09 NOTE — PLAN OF CARE
Problem: Physical Therapy  Goal: Physical Therapy Goal  Description: Goals to be met by: 2024    Patient will increase functional independence with mobility by performin. Supine to sit with Supervision  2. Sit to stand transfer with Supervision  3. Gait  x 400 feet with Supervision.     Outcome: Ongoing, Progressing     Eval completed. Goals appropriate.

## 2024-01-09 NOTE — PROGRESS NOTES
Pt transported to SICU [id number] with portable telemetry and portable ventilator. Pt connected to ICU monitor Ventilator. Dr. Davis called to bedside for patient arrival. Report received from Anesthesia. New orders received and implemented. Pt assessed, immediate needs met. Family brought to bedside, updated on the patient's current condition and PoC for remainder of shift. Family also given ICU Welcome packet and educated on visiting hours. All questions answered, emotional support provided.     Prevena wound vac on place and seal intact      Admit Skin Note:   Nurses Note -- 4 Eyes      1/8/2024   3:15 PM      Skin assessed during: Admit      [x] No Altered Skin Integrity Present    [x]Prevention Measures Documented      [] Yes- Altered Skin Integrity Present or Discovered   [] LDA Added if Not in Epic (Describe Wound)   [] New Altered Skin Integrity was Present on Admit and Documented in LDA   [] Wound Image Taken    Wound Care Consulted? No    Attending Nurse:  Cata RUBIN RN     Second RN/Staff Member:   Ileana ESPARZA RN

## 2024-01-09 NOTE — ASSESSMENT & PLAN NOTE
Endocrinology consulted for BG management.   BG goal 110-140 (CTS Protocol)    - D/C IIP  - Transition drip at 0.8 units/hr with step-down parameters. (Based on needs while on IIP)  - Novolog (aspart) insulin prn for BG excursions LDC SSI (150/50).  - BG checks q4hr and the AC/HS/0200 when diet is advanced.   - Hypoglycemia protocol in place    ** Please notify Endocrine for any change and/or advance in diet**  ** Please call Endocrine for any BG related issues **    Discharge Planning:   TBD. Please notify endocrinology prior to discharge.

## 2024-01-09 NOTE — PROGRESS NOTES
[facility] - [facility]  Critical Care - Surgery  Progress Note    Patient Name: King Botello  MRN: [id number]  Admission Date: 1/8/2024  Hospital Length of Stay: 1 days  Code Status: Full Code  Attending Provider: Thierno Liu MD  Primary Care Provider: Mayco Hansen MD   Principal Problem: Nonrheumatic mitral valve stenosis    Subjective:     Interval History/Significant Events: NAEON. Patient was extubated overnight and weaned off of all pressors. Amio loaded and now at 0.5. Complaining of difficulty swallowing pills and liquids this morning. Also has right arm numbness starting at the forearm.     Follow-up For: Procedure(s) (LRB):  MITRAL VALVE REPLACEMENT, REDO STERNOTOMY (N/A)  VALVULOPLASTY, TRICUSPID REPAIR (N/A)    Post-Operative Day: 1 Day Post-Op    Objective:     Vital Signs (Most Recent):  Temp: 97.7 °F (36.5 °C) (01/09/24 0800)  Pulse: (!) 59 (01/09/24 0845)  Resp: (!) 24 (01/09/24 0845)  BP: (!) 110/59 (01/09/24 0800)  SpO2: 100 % (01/09/24 0845) Vital Signs (24h Range):  Temp:  [97.7 °F (36.5 °C)-98.9 °F (37.2 °C)] 97.7 °F (36.5 °C)  Pulse:  [] 59  Resp:  [3-35] 24  SpO2:  [87 %-100 %] 100 %  BP: ()/(46-66) 110/59  Arterial Line BP: ()/(43-66) 134/60     Weight: 80.7 kg (177 lb 12.8 oz)  Body mass index is 33.6 kg/m².      Intake/Output Summary (Last 24 hours) at 1/9/2024 0849  Last data filed at 1/9/2024 0800  Gross per 24 hour   Intake 8352.45 ml   Output 1970 ml   Net 6382.45 ml          Physical Exam  Vitals and nursing note reviewed.   Constitutional:       General: She is not in acute distress.     Interventions: She is sedated and intubated.   HENT:      Mouth/Throat:      Mouth: Mucous membranes are dry.   Eyes:      Extraocular Movements: Extraocular movements intact.      Pupils: Pupils are equal, round, and reactive to light.   Neck:      Comments: RIJ CVC  Cardiovascular:      Rate and Rhythm: Normal rate and regular rhythm.      Pulses:  Normal pulses.      Comments: V Wires  Right Radial Arterial Line  Pulmonary:      Effort: Pulmonary effort is normal. No respiratory distress. She is intubated.      Comments: 2 Mediastinal chest tube  Abdominal:      General: Abdomen is flat.      Palpations: Abdomen is soft.   Genitourinary:     Comments: Soares in place  Musculoskeletal:         General: No swelling.      Cervical back: Normal range of motion.      Right lower leg: No edema.      Left lower leg: No edema.   Skin:     General: Skin is warm and dry.      Capillary Refill: Capillary refill takes less than 2 seconds.      Comments: MSI CDI              Vents:  Vent Mode: Spont (01/08/24 2052)  Ventilator Initiated: Yes (01/08/24 1427)  Set Rate: 20 BPM (01/08/24 1804)  Vt Set: 380 mL (01/08/24 1804)  PEEP/CPAP: 5 cmH20 (01/08/24 2052)  Oxygen Concentration (%): 28 (01/09/24 0416)  Peak Airway Pressure: 9.9 cmH20 (01/08/24 2052)  Plateau Pressure: 0 cmH20 (01/08/24 2052)  Total Ve: 7.34 L/m (01/08/24 2052)  Negative Inspiratory Force (cm H2O): -15 (01/08/24 2137)  F/VT Ratio<105 (RSBI): (!) 96.3 (01/08/24 2052)    Lines/Drains/Airways       Central Venous Catheter Line  Duration              Introducer with Double Lumen 01/08/24 1000 Internal Jugular Right <1 day    Percutaneous Central Line Insertion/Assessment - Triple Lumen  01/08/24 1000 Internal Jugular Right <1 day              Drain  Duration                  Urethral Catheter 01/08/24 0757 Straight-tip;Temperature probe 16 Fr. 1 day         Y Chest Tube 1 and 2 01/08/24 1309 1 Anterior Mediastinal 19 Fr. 2 Anterior Mediastinal 19 Fr. <1 day              Arterial Line  Duration             Arterial Line 01/08/24 1610 Right Radial <1 day              Line  Duration                  Pacer Wires 01/08/24 1259 <1 day              Peripheral Intravenous Line  Duration                  Peripheral IV - Single Lumen 01/08/24 0548 20 G Anterior;Left Forearm 1 day         Peripheral IV - Single Lumen  01/08/24 0729 16 G Right Forearm 1 day                    Significant Labs:    CBC/Anemia Profile:  Recent Labs   Lab 01/08/24  1423 01/08/24 1426 01/08/24 2018 01/09/24 0304 01/09/24  0309   WBC 35.72*  --   --  13.78*  13.78*  --    HGB 8.8*  --   --  7.0*  7.0*  --    HCT 27.5*   < > 24* 21.7*  21.7* 20*     --   --  141*  141*  --    MCV 85  --   --  86  86  --    RDW 16.2*  --   --  16.7*  16.7*  --     < > = values in this interval not displayed.        Chemistries:  Recent Labs   Lab 01/08/24 1423 01/08/24 2005 01/09/24 0304 01/09/24  0745     --  140  --    K 3.2* 4.3 4.7 4.1   *  --  112*  --    CO2 21*  --  20*  --    BUN 9  --  12  --    CREATININE 0.6  --  0.6  --    CALCIUM 8.0*  --  8.0*  --    ALBUMIN 2.1*  --  3.6  --    PROT 3.9*  --  5.1*  --    BILITOT 0.7  --  0.9  --    ALKPHOS 37*  --  30*  --    ALT 10  --  12  --    AST 67*  --  76*  --    MG 2.5  2.5  --  2.2  2.2  --    PHOS 2.9  2.9  --  3.7  3.7  --        All pertinent labs within the past 24 hours have been reviewed.    Significant Imaging:  I have reviewed all pertinent imaging results/findings within the past 24 hours.  Assessment/Plan:     Cardiac/Vascular  * Nonrheumatic mitral valve stenosis  38 y.o. female with a past medical history of MVP and MR s/p mitral annuloplasty in [place] (2003),  HTN, bradycardia now s/p Mechanical Mitral Valve Replacement on 1/8/2024 with Dr. Liu    Neuro/Psych:     - Sedation: propofol    - Pain:    - Scheduled Acetaminophen 1g IV q8h x2 until evaled by speech   - Oxy PRN, Dilaudid PRN till speech   - Received TTP and Rectus Sheath blocks intra-op             Cardiac:     - S/P Mechanical Mitral Valve Replacement with Dr. Liu on 1/8/2024    - BP Goal: MAP 60-80    - initially placed on Amio for suspected A-fib.    - no signs of a-fib. Will d/c amio    - Pressors: off    - Anti-HTNs: Will start when appropriate    - Rhythm: NSR    - Beta blocker: Hold for  now    - Statin: Not indicated    - OOB      Pulmonary:     - Goal SpO2 >92%    - 4L NC - Wean as able    - Chest Tubes 2 Meds     - IS    - ABGs PRN      Renal:    - Trend BUN/Cr     - Maintain Soares, record strict Is/Os    Recent Labs   Lab 01/04/24  1500 01/08/24  1423 01/09/24  0304   BUN 10 9 12   CREATININE 0.6 0.6 0.6           FEN / GI:     - Daily CMP, PRN K/Mag/Phos per protocol     - Replace electrolytes as needed    - Nutrition: NPO pending speech    - Bowel Regimen: Miralax, docusate      ID:     - Afebrile    - WBC downtrending    - Abx: Complete perioperative cefazolin 2g Q8H x 5 doses    Recent Labs   Lab 01/04/24  1500 01/08/24  1423 01/09/24  0304   WBC 9.80 35.72* 13.78*  13.78*           Heme/Onc:     - Hgb 13.0 pre-operatively    - CBC daily    - ASA 325mg POD0    - ASA 81mg POD1    - Transfuse Hb <7 and symptomatic anemia    - 1u pRBC on 1/9/24    Recent Labs   Lab 01/04/24  1500 01/08/24  1423 01/09/24  0304   HGB 13.0 8.8* 7.0*  7.0*    220 141*  141*   APTT 23.3 36.3* 32.8*  32.8*   INR 1.0 1.2 1.2  1.2           Endocrine:     - CTS Goal -140    - HgbA1c: 5.2    - Insulin gtt    - Endocrinology consulted for insulin management      PPx:   Feeding: NPO, pending speech  Analgesia/Sedation: Multimodal  Thromboembolic Prevention: SCD's  HOB >30: Yes  Stress Ulcer: Famotidine  Glucose Control: Yes, insulin management per Endocrinology     Lines/Drains/Airway:   RIJ CVC   Soares   Chest Tubes: 2 Mediastinal    Pacing Wires: Temporary ventricular pacing wires      Dispo/Code Status/Palliative:     - Continue SICU Care    - Full Code         Critical care was time spent personally by me on the following activities: development of treatment plan with patient or surrogate and bedside caregivers, discussions with consultants, evaluation of patient's response to treatment, examination of patient, ordering and performing treatments and interventions, ordering and review of laboratory  studies, ordering and review of radiographic studies, pulse oximetry, re-evaluation of patient's condition.  This critical care time did not overlap with that of any other provider or involve time for any procedures.     Umer Herrera MD  Critical Care - Surgery  [facility] - [facility]

## 2024-01-09 NOTE — PT/OT/SLP EVAL
Speech Language Pathology Evaluation  Bedside Swallow    Patient Name:  King Botello   MRN:  [id number]  Admitting Diagnosis: Nonrheumatic mitral valve stenosis    Recommendations:                 General Recommendations:  Dysphagia therapy  Diet recommendations:   , Full liquids, Mildly thick/Nectar thick liquids - IDDSI Level 2   Aspiration Precautions: 1 bite/sip at a time, Frequent oral care, HOB to 90 degrees, Meds crushed in puree, Small bites/sips, and Strict aspiration precautions   General Precautions: Standard, aspiration, fall, sternal  Communication strategies:  go to room if call light pushed    Assessment:     King Botello is a 38 y.o. female with an SLP diagnosis of Dysphagia and Dysphonia    History:     Past Medical History:   Diagnosis Date    Abnormal Pap smear     Colposcopy    Bradycardia 2020    Class 2 obesity due to excess calories with body mass index (BMI) of 35.0 to 35.9 in adult 3/9/2021    Hypertension, essential 3/16/2021    Menarche     Age of onset 11    Missed ab 2021    Mitral valve prolapse     Previous  delivery affecting pregnancy, antepartum 2020    S/P suction D&C 2021    Status post mitral valve annuloplasty 2017       Past Surgical History:   Procedure Laterality Date     SECTION       SECTION WITH TUBAL LIGATION Bilateral 2023    Procedure:  SECTION, WITH TUBAL LIGATION;  Surgeon: Mane Moreno MD;  Location: [facility] L&D;  Service: OB/GYN;  Laterality: Bilateral;    DILATION AND CURETTAGE OF UTERUS USING SUCTION N/A 2021    Procedure: DILATION AND CURETTAGE, UTERUS, USING SUCTION;  Surgeon: Mane Moreno MD;  Location: [facility];  Service: OB/GYN;  Laterality: N/A;    MITRAL VALVE REPAIR      MITRAL VALVE REPLACEMENT N/A 2024    Procedure: MITRAL VALVE REPLACEMENT, REDO STERNOTOMY;  Surgeon: Thierno Liu MD;  Location: [address];  Service: Cardiothoracic;  Laterality: N/A;     "VALVULOPLASTY, TRICUSPID N/A 1/8/2024    Procedure: VALVULOPLASTY, TRICUSPID REPAIR;  Surgeon: Thierno Liu MD;  Location: [facility] OR [address] [place] [address];  Service: Cardiothoracic;  Laterality: N/A;       Social History: Patient lives with her spouse and two children.  Reg diet/thin liquids at baseline.     Prior Intubation HX:  1/8    Modified Barium Swallow: none reported    Chest X-Rays: 1/9 "Previously present endotracheal and enteric tubes have been removed.  Right hemidiaphragmatic margin is not as well displayed as on the examination of 01/08/2024 15:01, which may represent the development of some minimal airspace consolidation/volume loss in the right lower lobe, but overall there has been little interval change in the appearance of the chest since that time.  No pneumothorax."       Subjective     "That feels better." re: nectar thick liquids     Pain/Comfort:  Pain Rating 1: 3/10  Location 1: sternal  Pain Rating Post-Intervention 1: 3/10    Respiratory Status: Nasal cannula, flow 3 L/min    Objective:     Oral Musculature Evaluation  Oral Musculature: WFL  Dentition: present and adequate  Secretion Management: adequate  Mucosal Quality: adequate  Mandibular Strength and Mobility: WFL  Oral Labial Strength and Mobility: WFL  Lingual Strength and Mobility: WFL  Volitional Cough: midlly decreased strength  Volitional Swallow: WFL  Voice Prior to PO Intake: hoarse    Bedside Swallow Eval:   Consistencies Assessed:  Thin liquids via spoon/cup  Nectar thick liquids via cup  Puree    Solids        Oral Phase:   WFL    Pharyngeal Phase:   multiple spontaneous swallows  throat clearing with thin via cup  No overt s/s aspiration with puree or solids    Compensatory Strategies  None    Treatment: Education provided re: role of SLP, diet recs, swallow precs, s/s aspiration, and POC.  Pt verbalized understanding and agreement, requesting to remain on liquids for today, reporting nectar thick liquids feels more comfortable.  " SLP in agreement, reviewed liquids via tsp of thins for pleasure and ongoing SLP assessment.  Pt and spouse verbalized understanding.     Goals:   Multidisciplinary Problems       SLP Goals          Problem: SLP    Goal Priority Disciplines Outcome   SLP Goal     SLP Ongoing, Progressing   Description: Speech Language Pathology Goals  Goals expected to be met by 1/23  1. Pt will participate in ongoing assessment of swallow.                                Plan:     Patient to be seen:  4 x/week   Plan of Care expires:  02/08/24  Plan of Care reviewed with:  patient, spouse   SLP Follow-Up:  Yes       Discharge recommendations:   (pending progress)   Barriers to Discharge:  Level of Skilled Assistance Needed      Time Tracking:     SLP Treatment Date:   01/09/24  Speech Start Time:  0919  Speech Stop Time:  0934     Speech Total Time (min):  15 min    Billable Minutes: Eval Swallow and Oral Function 7 and Self Care/Home Management Training 8    01/09/2024

## 2024-01-09 NOTE — SUBJECTIVE & OBJECTIVE
Interval History/Significant Events: NAEON. Patient was extubated overnight and weaned off of all pressors. Amio loaded and now at 0.5. Complaining of difficulty swallowing pills and liquids this morning. Also has right arm numbness starting at the forearm.     Follow-up For: Procedure(s) (LRB):  MITRAL VALVE REPLACEMENT, REDO STERNOTOMY (N/A)  VALVULOPLASTY, TRICUSPID REPAIR (N/A)    Post-Operative Day: 1 Day Post-Op    Objective:     Vital Signs (Most Recent):  Temp: 97.7 °F (36.5 °C) (01/09/24 0800)  Pulse: (!) 59 (01/09/24 0845)  Resp: (!) 24 (01/09/24 0845)  BP: (!) 110/59 (01/09/24 0800)  SpO2: 100 % (01/09/24 0845) Vital Signs (24h Range):  Temp:  [97.7 °F (36.5 °C)-98.9 °F (37.2 °C)] 97.7 °F (36.5 °C)  Pulse:  [] 59  Resp:  [3-35] 24  SpO2:  [87 %-100 %] 100 %  BP: ()/(46-66) 110/59  Arterial Line BP: ()/(43-66) 134/60     Weight: 80.7 kg (177 lb 12.8 oz)  Body mass index is 33.6 kg/m².      Intake/Output Summary (Last 24 hours) at 1/9/2024 0849  Last data filed at 1/9/2024 0800  Gross per 24 hour   Intake 8352.45 ml   Output 1970 ml   Net 6382.45 ml          Physical Exam  Vitals and nursing note reviewed.   Constitutional:       General: She is not in acute distress.     Interventions: She is sedated and intubated.   HENT:      Mouth/Throat:      Mouth: Mucous membranes are dry.   Eyes:      Extraocular Movements: Extraocular movements intact.      Pupils: Pupils are equal, round, and reactive to light.   Neck:      Comments: [facility] CVC  Cardiovascular:      Rate and Rhythm: Normal rate and regular rhythm.      Pulses: Normal pulses.      Comments: V Wires  Right Radial Arterial Line  Pulmonary:      Effort: Pulmonary effort is normal. No respiratory distress. She is intubated.      Comments: 2 Mediastinal chest tube  Abdominal:      General: Abdomen is flat.      Palpations: Abdomen is soft.   Genitourinary:     Comments: Soares in place  Musculoskeletal:         General: No swelling.       Cervical back: Normal range of motion.      Right lower leg: No edema.      Left lower leg: No edema.   Skin:     General: Skin is warm and dry.      Capillary Refill: Capillary refill takes less than 2 seconds.      Comments: MSI CDI              Vents:  Vent Mode: Spont (01/08/24 2052)  Ventilator Initiated: Yes (01/08/24 1427)  Set Rate: 20 BPM (01/08/24 1804)  Vt Set: 380 mL (01/08/24 1804)  PEEP/CPAP: 5 cmH20 (01/08/24 2052)  Oxygen Concentration (%): 28 (01/09/24 0416)  Peak Airway Pressure: 9.9 cmH20 (01/08/24 2052)  Plateau Pressure: 0 cmH20 (01/08/24 2052)  Total Ve: 7.34 L/m (01/08/24 2052)  Negative Inspiratory Force (cm H2O): -15 (01/08/24 2137)  F/VT Ratio<105 (RSBI): (!) 96.3 (01/08/24 2052)    Lines/Drains/Airways       Central Venous Catheter Line  Duration              Introducer with Double Lumen 01/08/24 1000 Internal Jugular Right <1 day    Percutaneous Central Line Insertion/Assessment - Triple Lumen  01/08/24 1000 Internal Jugular Right <1 day              Drain  Duration                  Urethral Catheter 01/08/24 0757 Straight-tip;Temperature probe 16 Fr. 1 day         Y Chest Tube 1 and 2 01/08/24 1309 1 Anterior Mediastinal 19 Fr. 2 Anterior Mediastinal 19 Fr. <1 day              Arterial Line  Duration             Arterial Line 01/08/24 1610 Right Radial <1 day              Line  Duration                  Pacer Wires 01/08/24 1259 <1 day              Peripheral Intravenous Line  Duration                  Peripheral IV - Single Lumen 01/08/24 0548 20 G Anterior;Left Forearm 1 day         Peripheral IV - Single Lumen 01/08/24 0729 16 G Right Forearm 1 day                    Significant Labs:    CBC/Anemia Profile:  Recent Labs   Lab 01/08/24  1423 01/08/24  1426 01/08/24 2018 01/09/24  0304 01/09/24  0309   WBC 35.72*  --   --  13.78*  13.78*  --    HGB 8.8*  --   --  7.0*  7.0*  --    HCT 27.5*   < > 24* 21.7*  21.7* 20*     --   --  141*  141*  --    MCV 85  --   --  86  86   --    RDW 16.2*  --   --  16.7*  16.7*  --     < > = values in this interval not displayed.        Chemistries:  Recent Labs   Lab 01/08/24  1423 01/08/24 2005 01/09/24 0304 01/09/24  0745     --  140  --    K 3.2* 4.3 4.7 4.1   *  --  112*  --    CO2 21*  --  20*  --    BUN 9  --  12  --    CREATININE 0.6  --  0.6  --    CALCIUM 8.0*  --  8.0*  --    ALBUMIN 2.1*  --  3.6  --    PROT 3.9*  --  5.1*  --    BILITOT 0.7  --  0.9  --    ALKPHOS 37*  --  30*  --    ALT 10  --  12  --    AST 67*  --  76*  --    MG 2.5  2.5  --  2.2  2.2  --    PHOS 2.9  2.9  --  3.7  3.7  --        All pertinent labs within the past 24 hours have been reviewed.    Significant Imaging:  I have reviewed all pertinent imaging results/findings within the past 24 hours.

## 2024-01-09 NOTE — PROGRESS NOTES
Notified Dr. Lemos of patient with 140 cc from CT noted. Updated provided on most recent lab results, VSS, gtt, and ABG values.     Provider at bedside-- afib noted on bedside monitor; orders placed for Amio loading bolus and gtt. - see MAR for details    Orders placed for Protamine 50 mg IVP-- medication given per Dr. Lemos; see MAR for details.    Orders placed on 500 cc Albumin for hypotension--see MAR for details

## 2024-01-09 NOTE — OP NOTE
DATE OF PROCEDURE:  1/8/2024     ATTENDING SURGEON:  Thierno Liu M.D.    ASSISTANT: Dariusz Lemos MD, (Cardiothoracic Resident)     PREOPERATIVE DIAGNOSES:  1.  Severe mitral stenosis  2.  Previous mitral valve repair in 2003  3.  Hypertension  4.  Bradycardia  5.  Obesity     POSTOPERATIVE DIAGNOSES:  1.  Severe mitral stenosis  2.  Previous mitral valve repair in 2003  3.  Hypertension  4.  Bradycardia  5.  Obesity     OPERATION PERFORMED:      1)  Mitral valve replacement with a 25 mm [employer]omed[employer] standard   mechanical prosthesis.    2)  Tricuspid valve repair with a 26 mm Medtronic Contour annuloplasty band    3)  Reoperation     ANESTHESIA:  General endotracheal.     ESTIMATED BLOOD LOSS:  100 mL.     BRIEF HISTORY:  Ms. Botello is a very pleasant 38-year-old woman who underwent mitral valve repair in 2003 for mitral regurgitation.  She had a baby in May of last year, and prior to delivery she had chronic fatigue and dyspnea on exertion.  Postpartum, she was admitted for heart failure.  She reports lifestyle limiting dyspnea on exertion.  She underwent a thoughtful and thorough evaluation which included echocardiography.  This study demonstrated severe mitral stenosis, with a mean gradient of 13 mm mmHg.  She now presents for mitral valve replacement.  Her previous operation was done through a right thoracotomy.  Of note, the intraoperative echo demonstrated severe tricuspid regurgitation with reversal of flow in the hepatic veins.  As result, we plan tricuspid valve repair as well.     PROCEDURE IN DETAIL:  After obtaining informed and written consent, the patient   was brought to the Operating Room and placed on the operating table in supine   position.  After induction of adequate general endotracheal anesthesia, the   patient's chest, abdomen, pelvis and bilateral lower extremities were prepped   and draped in the usual sterile fashion.  A wire was placed in the right common femoral artery using  Seldinger technique.  Its position in the descending aorta was confirmed using MINERVA.  An upper midline skin incision was   made and a median sternotomy was performed.  The pericardium was opened.  There were moderate adhesions.  The right atrium was densely adherent to the lung.  We were able to expose the superior vena cava and the inferior vena cava.  A pericardial well was created.    The patient was systemically heparinized.  Cannulation sutures were placed in   the aorta and in the superior vena cava.  The aortic cannula was inserted,   followed by the venous.  An IVC cannula was also placed.  An antegrade  cardioplegia catheter was placed. The patient was then put on   cardiopulmonary bypass.  Once on bypass, the aortic crossclamp was applied and   the heart was arrested using cold blood enhanced antegrade cardioplegia.  A   prompt electromechanical arrest was achieved.  While the cardioplegia was being   administered, circumferential control was obtained over the superior vena cava and the inferior vena cava.   Once the cardioplegia was all in, the cannulas were repositioned and a right   atriotomy was made.  The coronary sinus catheter was placed under direct vision and secured using a 4-0 Prolene suture.  The fossa ovalis was identified and incised.  The incision was carried out onto the dome of the left atrium.  The Ortiz retractor   was used for exposure.  The mitral valve was evaluated.  There was severe stenosis with some calcification on the band.  A complete ring had been utilized.  The sutures securing the ring were grasped with a tonsil clamp and divided using an eleven blade.  We worked our way circumferentially around the orifice, dissecting tissue away from the ring, exposing a suture, and dividing it.  Eventually, all the sutures were removed.  A [place] elevator was then used to dissect the band away from the annulus.  It was densely adherent to the annulus.  Eventually, the ring was removed. The  anterior leaflet was partially resected and used for exposure as   multiple pledgeted 2-0 Ethibond sutures were placed along the anterior annulus. The anterior leaflet was eventually resected in its entirety. We were able   to preserve the posterior leaflet as we completed circumferential   coverage of the mitral orifice using pledgeted 2-0 Ethibond sutures.  Once the   sutures were all in, the annulus was sized and a 25 mm valve was selected.  The   valve was brought up, the sutures were passed through the sewing ring and it was   slid into position.  It seated nicely.  The sutures were tied and then cut.  The leaflets were tested and demonstrated full range of motion.The interatrial septum was then closed in a single layer using running 4-0 Prolene   suture.  The dome of the left atrium was closed in a single layer using running 4-0 Prolene suture.  Prior to closing the left atrium, de-airing maneuvers were performed.      Silk sutures were placed for exposure along the cut edge of right atrium. The tricuspid valve was evaluated.  The annulus was somewhat dilated.  The leaflets were structurally normal.  Multiple non pledgetted 2-0 Ethibond sutures were placed along the tricuspid annulus from the commissure between the septal and anterior leaflets to midway along the septal leaflet.  The hotshot was given retrograde, and the aortic cross-clamp was removed.  The annulus was sized, and a 26 mm rigid band was selected.  The band was brought up, the sutures were passed through it, and it was slid into position.  It seated nicely.  The sutures were tied and then cut.  We attempted to test the valve by occluding the pulmonary artery.  The coaptation appeared acceptable.  The right atriotomy was then closed in a single layer using running 4-0 Prolene suture.     Additional de-airing maneuvers were performed and the  patient was subsequently weaned from cardiopulmonary bypass.  The patient did   separate easily from  bypass.  Once off bypass, all surgical sites were   inspected.  There was good hemostasis.  The mitral valve was evaluated using MINERVA and   appeared to be functioning properly.  The tricuspid valve was evaluated using MINERVA.  There was no residual tricuspid regurgitation seen.  The test dose of protamine was   administered and this was well tolerated.  The total dose was then given.    [place] through the total dose, all cannulas were removed.  All surgical sites   were again inspected, again there was good hemostasis.  Ventricular pacing wires   were placed.  Drains were placed.  After again confirming adequate hemostasis,   the patient's chest was closed using #6 stainless steel wires to reapproximate   the sternum.  The overlying soft tissues were reapproximated using absorbable   suture material.  The patient's chest was washed and dried and a dry dressing   was applied.  The patient tolerated the procedure well, there were no   complications.  At the conclusion of the case, sponge and instrument counts were   correct.

## 2024-01-09 NOTE — ASSESSMENT & PLAN NOTE
38 y.o. female with a past medical history of MVP and MR s/p mitral annuloplasty in [place] (2003),  HTN, bradycardia now s/p Mechanical Mitral Valve Replacement on 1/8/2024 with Dr. Liu    Neuro/Psych:     - Sedation: propofol    - Pain:    - Scheduled Acetaminophen 1g IV q8h x2 until evaled by speech   - Oxy PRN, Dilaudid PRN till speech   - Received TTP and Rectus Sheath blocks intra-op             Cardiac:     - S/P Mechanical Mitral Valve Replacement with Dr. Liu on 1/8/2024    - BP Goal: MAP 60-80    - initially placed on Amio for suspected A-fib.    - no signs of a-fib. Will d/c amio    - Pressors: off    - Anti-HTNs: Will start when appropriate    - Rhythm: NSR    - Beta blocker: Hold for now    - Statin: Not indicated    - OOB      Pulmonary:     - Goal SpO2 >92%    - 4L NC - Wean as able    - Chest Tubes 2 Meds     - IS    - ABGs PRN      Renal:    - Trend BUN/Cr     - Maintain Soares, record strict Is/Os    Recent Labs   Lab 01/04/24  1500 01/08/24  1423 01/09/24  0304   BUN 10 9 12   CREATININE 0.6 0.6 0.6           FEN / GI:     - Daily CMP, PRN K/Mag/Phos per protocol     - Replace electrolytes as needed    - Nutrition: NPO pending speech    - Bowel Regimen: Miralax, docusate      ID:     - Afebrile    - WBC downtrending    - Abx: Complete perioperative cefazolin 2g Q8H x 5 doses    Recent Labs   Lab 01/04/24  1500 01/08/24  1423 01/09/24  0304   WBC 9.80 35.72* 13.78*  13.78*           Heme/Onc:     - Hgb 13.0 pre-operatively    - CBC daily    - ASA 325mg POD0    - ASA 81mg POD1    - Transfuse Hb <7 and symptomatic anemia    - 1u pRBC on 1/9/24    Recent Labs   Lab 01/04/24  1500 01/08/24  1423 01/09/24  0304   HGB 13.0 8.8* 7.0*  7.0*    220 141*  141*   APTT 23.3 36.3* 32.8*  32.8*   INR 1.0 1.2 1.2  1.2           Endocrine:     - CTS Goal -140    - HgbA1c: 5.2    - Insulin gtt    - Endocrinology consulted for insulin management      PPx:   Feeding: NPO, pending  speech  Analgesia/Sedation: Multimodal  Thromboembolic Prevention: SCD's  HOB >30: Yes  Stress Ulcer: Famotidine  Glucose Control: Yes, insulin management per Endocrinology     Lines/Drains/Airway:   RIJ CVC   Soares   Chest Tubes: 2 Mediastinal    Pacing Wires: Temporary ventricular pacing wires      Dispo/Code Status/Palliative:     - Continue SICU Care    - Full Code

## 2024-01-09 NOTE — ADDENDUM NOTE
Addendum  created 01/09/24 0937 by Ryan Whaley MD    Clinical Note Signed, Intraprocedure Blocks edited, SmartForm saved

## 2024-01-09 NOTE — CONSULTS
[facility] - Surgical Intensive Care  Endocrinology  Diabetes Consult Note    Consult Requested by: Thierno Liu MD   Reason for admit: Nonrheumatic mitral valve stenosis    HISTORY OF PRESENT ILLNESS:  Reason for Consult: Management of Hyperglycemia     Surgical Procedure and Date: s/p MVR on 1/8/2024    Patient is not diabetic and does not currently take any oral/injectable antidiabetic/hypoglycemic medications.     Lab Results   Component Value Date    HGBA1C 5.2 01/04/2024         HPI: King Botello is a 38 y.o. female with a past medical history of MVP and MR s/p mitral annuloplasty in [place] (2003),  HTN, bradycardia, PONV. Patient is followed by Dr. Cordero. Patient reports chronic fatigue and BLANK before pregnancy.  After delivery of baby in May, patient was discharged and a few days later readmitted for CHF exacerbation which improved with lasix. TTE LVEF 60-66%. moderate right atrial enlargement. LA severely dilated. There is moderate to severe mitral stenosis. Javier score is 10, the mean diastolic gradient across the mitral valve is 11 mmHg at a heart rate of 77 bpm; MVA 1.33. The patient presents to the SICU s/p Mechanical Mitral Valve Replacement with Dr. Liu on 01/08/2024. Endocrine consulted to manage post-operative hyperglycemia.               Interval HPI:   Overnight events: No acute events overnight. Patient in room 08056/67337 A. Blood glucose stable. BG at goal on current insulin regimen (IIP). Steroid use- None. Day of Surgery  Renal function- Normal   Vasopressors-  Epinephrine  and Norepinephrine       Endocrine will continue to follow and manage insulin orders inpatient.         Diet NPO Except for: Sips with Medication     Eating:   NPO  Nausea: No  Hypoglycemia and intervention: No  Fever: No  TPN and/or TF: No    PMH, PSH, FH, SH updated and reviewed     ROS:  Review of Systems  GUNJAN intubated.     Current Medications and/or Treatments Impacting Glycemic  Control  Immunotherapy:    Immunosuppressants       None          Steroids:   Hormones (From admission, onward)      None          Pressors:    Autonomic Drugs (From admission, onward)      Start     Stop Route Frequency Ordered    01/08/24 1445  EPINEPHrine 5 mg in dextrose 5% 250 mL infusion (premix)        Question Answer Comment   Begin at (in mcg/kg/min): 0.04    Titrate by: (in mcg/kg/min) 0.02    Titrate interval: (in minutes) 5    Titrate to maintain: (SBP or MAP or Cardiac Index) MAP    Greater than: (in mmHg) 65    Cardiac index greater than: (in L/min) 2.2    Maximum dose: (in mcg/kg/min) 2        -- IV Continuous 01/08/24 1432    01/08/24 1425  NORepinephrine 4 mg in dextrose 5% 250 mL infusion (premix)        Question Answer Comment   Begin at (in mcg/kg/min): 0.02    Titrate by: (in mcg/kg/min) 0.02    Titrate interval: (in minutes) 5    Titrate to maintain: (MAP or SBP) MAP    Greater than: (in mmHg) 65    Maximum dose: (in mcg/kg/min) 3        -- IV Continuous 01/08/24 1444          Hyperglycemia/Diabetes Medications:   Antihyperglycemics (From admission, onward)      Start     Stop Route Frequency Ordered    01/08/24 1400  insulin regular in 0.9 % NaCl 100 unit/100 mL (1 unit/mL) infusion        Question Answer Comment   Insulin rate changes (DO NOT MODIFY ANSWER) [email or web address] [email or web address]    Enter initial dose (Units/hr): 1        -- IV Continuous 01/08/24 1352             PHYSICAL EXAMINATION:  Vitals:    01/08/24 1615   BP:    Pulse: 88   Resp: (!) 28   Temp:      Body mass index is 33.6 kg/m².     Physical Exam     Constitutional: Well developed, obese, NAD.  ENT: External ears no masses with nose patent  Neck: Supple; trachea midline  Cardiovascular: Normal heart sounds, no LE edema. DP +2 bilaterally.  Lungs: Normal effort; lungs anterior bilaterally clear to auscultation.  Intubated on a ventilator.  Abdomen: Soft, no masses, no hernias.   "Hypoactive BS noted.  MS: No clubbing or cyanosis of nails noted; unable to assess gait.  Skin: No rashes, lesions, or ulcers; no nodules.  Injection sites are ok. No lipo hypertropthy or atrophy.  Mid-sternal incision with telfa island dressing, CDI.  CT x 2.  LLE wrapped with ACE wrap.  Psychiatric: GUNJAN  Neurological: GUNJAN  Foot: Nails in good condition, no amputations noted      Labs Reviewed and Include   Recent Labs   Lab 01/08/24  1423   *   CALCIUM 8.0*   ALBUMIN 2.1*   PROT 3.9*      K 3.2*   CO2 21*   *   BUN 9   CREATININE 0.6   ALKPHOS 37*   ALT 10   AST 67*   BILITOT 0.7     Lab Results   Component Value Date    WBC 35.72 (H) 01/08/2024    HGB 8.8 (L) 01/08/2024    HCT 22 (L) 01/08/2024    MCV 85 01/08/2024     01/08/2024     No results for input(s): "TSH", "FREET4" in the last 168 hours.  Lab Results   Component Value Date    HGBA1C 5.2 01/04/2024       Nutritional status:   Body mass index is 33.6 kg/m².  Lab Results   Component Value Date    ALBUMIN 2.1 (L) 01/08/2024    ALBUMIN 4.2 01/04/2024    ALBUMIN 3.8 08/14/2023     No results found for: "PREALBUMIN"    Estimated Creatinine Clearance: 121.2 mL/min (based on SCr of 0.6 mg/dL).    Accu-Checks  Recent Labs     01/08/24  1424 01/08/24  1514 01/08/24  1614 01/08/24  1710 01/08/24  1807   POCTGLUCOSE 130* 117* 157* 178* 144*        ASSESSMENT and PLAN    Cardiac/Vascular  * Nonrheumatic mitral valve stenosis  Managed per primary team  Avoid hypoglycemia        S/P MVR (mitral valve replacement)  Optimize BG control to improve wound healing      Endocrine  Transient hyperglycemia post procedure  Endocrinology consulted for BG management.   BG goal 110-140 CTS      - IIP  - Requires intensive BG monitoring.   - BG checks q1hr  - Hypoglycemia protocol in place    - Notify endocrine if patient becomes hypokalemic (K < 3.3) and/or is not responding to replacement.   - Notify endocrine if patient requiring > 20 units/hr.      ** " Please notify Endocrine for any change and/or advance in diet**  ** Please call Endocrine for any BG related issues **    Discharge Planning:   TBD. Please notify endocrinology prior to discharge.            Plan discussed with patient, family, and RN at bedside.        Gerber Rehman, DNP, FNP  Endocrinology  [facility] - Surgical Intensive Care

## 2024-01-10 PROBLEM — I44.2 CHB (COMPLETE HEART BLOCK): Status: ACTIVE | Noted: 2024-01-10

## 2024-01-10 LAB
ALBUMIN SERPL BCP-MCNC: 3.4 G/DL (ref 3.5–5.2)
ALP SERPL-CCNC: 42 U/L (ref 55–135)
ALT SERPL W/O P-5'-P-CCNC: 14 U/L (ref 10–44)
ANION GAP SERPL CALC-SCNC: 5 MMOL/L (ref 8–16)
APTT PPP: 41.6 SEC (ref 21–32)
APTT PPP: 53.1 SEC (ref 21–32)
APTT PPP: 69.2 SEC (ref 21–32)
AST SERPL-CCNC: 64 U/L (ref 10–40)
BASOPHILS # BLD AUTO: 0.03 K/UL (ref 0–0.2)
BASOPHILS # BLD AUTO: 0.03 K/UL (ref 0–0.2)
BASOPHILS NFR BLD: 0.2 % (ref 0–1.9)
BASOPHILS NFR BLD: 0.2 % (ref 0–1.9)
BILIRUB SERPL-MCNC: 0.8 MG/DL (ref 0.1–1)
BLD PROD TYP BPU: NORMAL
BLOOD UNIT EXPIRATION DATE: NORMAL
BLOOD UNIT TYPE CODE: 6200
BLOOD UNIT TYPE: NORMAL
BUN SERPL-MCNC: 10 MG/DL (ref 6–20)
CALCIUM SERPL-MCNC: 8.7 MG/DL (ref 8.7–10.5)
CHLORIDE SERPL-SCNC: 106 MMOL/L (ref 95–110)
CO2 SERPL-SCNC: 25 MMOL/L (ref 23–29)
CODING SYSTEM: NORMAL
CREAT SERPL-MCNC: 0.6 MG/DL (ref 0.5–1.4)
CROSSMATCH INTERPRETATION: NORMAL
DIFFERENTIAL METHOD BLD: ABNORMAL
DIFFERENTIAL METHOD BLD: ABNORMAL
DISPENSE STATUS: NORMAL
EOSINOPHIL # BLD AUTO: 0 K/UL (ref 0–0.5)
EOSINOPHIL # BLD AUTO: 0 K/UL (ref 0–0.5)
EOSINOPHIL NFR BLD: 0 % (ref 0–8)
EOSINOPHIL NFR BLD: 0 % (ref 0–8)
ERYTHROCYTE [DISTWIDTH] IN BLOOD BY AUTOMATED COUNT: 17.4 % (ref 11.5–14.5)
ERYTHROCYTE [DISTWIDTH] IN BLOOD BY AUTOMATED COUNT: 17.4 % (ref 11.5–14.5)
EST. GFR  (NO RACE VARIABLE): >60 ML/MIN/1.73 M^2
GLUCOSE SERPL-MCNC: 99 MG/DL (ref 70–110)
HCT VFR BLD AUTO: 26 % (ref 37–48.5)
HCT VFR BLD AUTO: 26 % (ref 37–48.5)
HGB BLD-MCNC: 8.4 G/DL (ref 12–16)
HGB BLD-MCNC: 8.4 G/DL (ref 12–16)
IMM GRANULOCYTES # BLD AUTO: 0.1 K/UL (ref 0–0.04)
IMM GRANULOCYTES # BLD AUTO: 0.1 K/UL (ref 0–0.04)
IMM GRANULOCYTES NFR BLD AUTO: 0.5 % (ref 0–0.5)
IMM GRANULOCYTES NFR BLD AUTO: 0.5 % (ref 0–0.5)
INR PPP: 1.3 (ref 0.8–1.2)
LYMPHOCYTES # BLD AUTO: 1.6 K/UL (ref 1–4.8)
LYMPHOCYTES # BLD AUTO: 1.6 K/UL (ref 1–4.8)
LYMPHOCYTES NFR BLD: 8.5 % (ref 18–48)
LYMPHOCYTES NFR BLD: 8.5 % (ref 18–48)
MAGNESIUM SERPL-MCNC: 2.4 MG/DL (ref 1.6–2.6)
MAGNESIUM SERPL-MCNC: 2.4 MG/DL (ref 1.6–2.6)
MCH RBC QN AUTO: 27.5 PG (ref 27–31)
MCH RBC QN AUTO: 27.5 PG (ref 27–31)
MCHC RBC AUTO-ENTMCNC: 32.3 G/DL (ref 32–36)
MCHC RBC AUTO-ENTMCNC: 32.3 G/DL (ref 32–36)
MCV RBC AUTO: 85 FL (ref 82–98)
MCV RBC AUTO: 85 FL (ref 82–98)
MONOCYTES # BLD AUTO: 1.7 K/UL (ref 0.3–1)
MONOCYTES # BLD AUTO: 1.7 K/UL (ref 0.3–1)
MONOCYTES NFR BLD: 9.2 % (ref 4–15)
MONOCYTES NFR BLD: 9.2 % (ref 4–15)
NEUTROPHILS # BLD AUTO: 15.1 K/UL (ref 1.8–7.7)
NEUTROPHILS # BLD AUTO: 15.1 K/UL (ref 1.8–7.7)
NEUTROPHILS NFR BLD: 81.6 % (ref 38–73)
NEUTROPHILS NFR BLD: 81.6 % (ref 38–73)
NRBC BLD-RTO: 0 /100 WBC
NRBC BLD-RTO: 0 /100 WBC
NUM UNITS TRANS PACKED RBC: NORMAL
PHOSPHATE SERPL-MCNC: 1.9 MG/DL (ref 2.7–4.5)
PHOSPHATE SERPL-MCNC: 1.9 MG/DL (ref 2.7–4.5)
PHOSPHATE SERPL-MCNC: 2.1 MG/DL (ref 2.7–4.5)
PLATELET # BLD AUTO: 135 K/UL (ref 150–450)
PLATELET # BLD AUTO: 135 K/UL (ref 150–450)
PMV BLD AUTO: 12.8 FL (ref 9.2–12.9)
PMV BLD AUTO: 12.8 FL (ref 9.2–12.9)
POCT GLUCOSE: 122 MG/DL (ref 70–110)
POCT GLUCOSE: 131 MG/DL (ref 70–110)
POCT GLUCOSE: 143 MG/DL (ref 70–110)
POCT GLUCOSE: 145 MG/DL (ref 70–110)
POCT GLUCOSE: 164 MG/DL (ref 70–110)
POTASSIUM SERPL-SCNC: 3.6 MMOL/L (ref 3.5–5.1)
POTASSIUM SERPL-SCNC: 4 MMOL/L (ref 3.5–5.1)
POTASSIUM SERPL-SCNC: 4.2 MMOL/L (ref 3.5–5.1)
PROT SERPL-MCNC: 5.7 G/DL (ref 6–8.4)
PROTHROMBIN TIME: 14.1 SEC (ref 9–12.5)
RBC # BLD AUTO: 3.05 M/UL (ref 4–5.4)
RBC # BLD AUTO: 3.05 M/UL (ref 4–5.4)
SODIUM SERPL-SCNC: 136 MMOL/L (ref 136–145)
WBC # BLD AUTO: 18.46 K/UL (ref 3.9–12.7)
WBC # BLD AUTO: 18.46 K/UL (ref 3.9–12.7)

## 2024-01-10 PROCEDURE — 83735 ASSAY OF MAGNESIUM: CPT

## 2024-01-10 PROCEDURE — 99900035 HC TECH TIME PER 15 MIN (STAT)

## 2024-01-10 PROCEDURE — 27000221 HC OXYGEN, UP TO 24 HOURS

## 2024-01-10 PROCEDURE — 25000003 PHARM REV CODE 250

## 2024-01-10 PROCEDURE — 63600175 PHARM REV CODE 636 W HCPCS

## 2024-01-10 PROCEDURE — 11000001 HC ACUTE MED/SURG PRIVATE ROOM

## 2024-01-10 PROCEDURE — 85610 PROTHROMBIN TIME: CPT

## 2024-01-10 PROCEDURE — 25000003 PHARM REV CODE 250: Performed by: STUDENT IN AN ORGANIZED HEALTH CARE EDUCATION/TRAINING PROGRAM

## 2024-01-10 PROCEDURE — 99291 CRITICAL CARE FIRST HOUR: CPT | Mod: ,,, | Performed by: ANESTHESIOLOGY

## 2024-01-10 PROCEDURE — 84132 ASSAY OF SERUM POTASSIUM: CPT

## 2024-01-10 PROCEDURE — 84100 ASSAY OF PHOSPHORUS: CPT | Mod: 91

## 2024-01-10 PROCEDURE — 85730 THROMBOPLASTIN TIME PARTIAL: CPT | Mod: 91

## 2024-01-10 PROCEDURE — 99223 1ST HOSP IP/OBS HIGH 75: CPT | Mod: ,,, | Performed by: STUDENT IN AN ORGANIZED HEALTH CARE EDUCATION/TRAINING PROGRAM

## 2024-01-10 PROCEDURE — 94761 N-INVAS EAR/PLS OXIMETRY MLT: CPT

## 2024-01-10 PROCEDURE — 94799 UNLISTED PULMONARY SVC/PX: CPT

## 2024-01-10 PROCEDURE — 99232 SBSQ HOSP IP/OBS MODERATE 35: CPT | Mod: ,,, | Performed by: NURSE PRACTITIONER

## 2024-01-10 PROCEDURE — 93041 RHYTHM ECG TRACING: CPT

## 2024-01-10 PROCEDURE — 92526 ORAL FUNCTION THERAPY: CPT

## 2024-01-10 PROCEDURE — 97116 GAIT TRAINING THERAPY: CPT

## 2024-01-10 PROCEDURE — 93010 ELECTROCARDIOGRAM REPORT: CPT | Mod: ,,, | Performed by: INTERNAL MEDICINE

## 2024-01-10 PROCEDURE — 94640 AIRWAY INHALATION TREATMENT: CPT

## 2024-01-10 PROCEDURE — 93005 ELECTROCARDIOGRAM TRACING: CPT

## 2024-01-10 PROCEDURE — 63600175 PHARM REV CODE 636 W HCPCS: Performed by: NURSE PRACTITIONER

## 2024-01-10 PROCEDURE — 85025 COMPLETE CBC W/AUTO DIFF WBC: CPT

## 2024-01-10 PROCEDURE — 97530 THERAPEUTIC ACTIVITIES: CPT

## 2024-01-10 PROCEDURE — 80053 COMPREHEN METABOLIC PANEL: CPT | Performed by: STUDENT IN AN ORGANIZED HEALTH CARE EDUCATION/TRAINING PROGRAM

## 2024-01-10 PROCEDURE — 85730 THROMBOPLASTIN TIME PARTIAL: CPT | Performed by: THORACIC SURGERY (CARDIOTHORACIC VASCULAR SURGERY)

## 2024-01-10 PROCEDURE — 63600175 PHARM REV CODE 636 W HCPCS: Performed by: STUDENT IN AN ORGANIZED HEALTH CARE EDUCATION/TRAINING PROGRAM

## 2024-01-10 PROCEDURE — 25000003 PHARM REV CODE 250: Performed by: THORACIC SURGERY (CARDIOTHORACIC VASCULAR SURGERY)

## 2024-01-10 PROCEDURE — 97535 SELF CARE MNGMENT TRAINING: CPT

## 2024-01-10 PROCEDURE — 25000242 PHARM REV CODE 250 ALT 637 W/ HCPCS

## 2024-01-10 RX ORDER — FUROSEMIDE 10 MG/ML
40 INJECTION INTRAMUSCULAR; INTRAVENOUS 2 TIMES DAILY
Status: DISCONTINUED | OUTPATIENT
Start: 2024-01-10 | End: 2024-01-12

## 2024-01-10 RX ORDER — FAMOTIDINE 20 MG/1
20 TABLET, FILM COATED ORAL 2 TIMES DAILY
Status: DISCONTINUED | OUTPATIENT
Start: 2024-01-10 | End: 2024-01-18 | Stop reason: HOSPADM

## 2024-01-10 RX ORDER — GLUCAGON 1 MG
1 KIT INJECTION
Status: DISCONTINUED | OUTPATIENT
Start: 2024-01-10 | End: 2024-01-14

## 2024-01-10 RX ORDER — IBUPROFEN 200 MG
16 TABLET ORAL
Status: DISCONTINUED | OUTPATIENT
Start: 2024-01-10 | End: 2024-01-14

## 2024-01-10 RX ORDER — POTASSIUM CHLORIDE 20 MEQ/1
20 TABLET, EXTENDED RELEASE ORAL 2 TIMES DAILY
Status: DISCONTINUED | OUTPATIENT
Start: 2024-01-10 | End: 2024-01-12

## 2024-01-10 RX ORDER — HEPARIN SODIUM 10000 [USP'U]/100ML
1100 INJECTION, SOLUTION INTRAVENOUS CONTINUOUS
Status: DISCONTINUED | OUTPATIENT
Start: 2024-01-10 | End: 2024-01-10

## 2024-01-10 RX ORDER — IPRATROPIUM BROMIDE AND ALBUTEROL SULFATE 2.5; .5 MG/3ML; MG/3ML
3 SOLUTION RESPIRATORY (INHALATION) EVERY 6 HOURS PRN
Status: DISCONTINUED | OUTPATIENT
Start: 2024-01-10 | End: 2024-01-18 | Stop reason: HOSPADM

## 2024-01-10 RX ORDER — FUROSEMIDE 10 MG/ML
20 INJECTION INTRAMUSCULAR; INTRAVENOUS 2 TIMES DAILY
Status: DISCONTINUED | OUTPATIENT
Start: 2024-01-10 | End: 2024-01-10

## 2024-01-10 RX ORDER — INSULIN ASPART 100 [IU]/ML
0-10 INJECTION, SOLUTION INTRAVENOUS; SUBCUTANEOUS
Status: DISCONTINUED | OUTPATIENT
Start: 2024-01-10 | End: 2024-01-14

## 2024-01-10 RX ORDER — IBUPROFEN 200 MG
24 TABLET ORAL
Status: DISCONTINUED | OUTPATIENT
Start: 2024-01-10 | End: 2024-01-14

## 2024-01-10 RX ORDER — HEPARIN SODIUM 10000 [USP'U]/100ML
1200 INJECTION, SOLUTION INTRAVENOUS CONTINUOUS
Status: DISCONTINUED | OUTPATIENT
Start: 2024-01-10 | End: 2024-01-11

## 2024-01-10 RX ADMIN — POLYETHYLENE GLYCOL 3350 17 G: 17 POWDER, FOR SOLUTION ORAL at 08:01

## 2024-01-10 RX ADMIN — FAMOTIDINE 20 MG: 10 INJECTION, SOLUTION INTRAVENOUS at 08:01

## 2024-01-10 RX ADMIN — FUROSEMIDE 20 MG: 10 INJECTION, SOLUTION INTRAMUSCULAR; INTRAVENOUS at 08:01

## 2024-01-10 RX ADMIN — MUPIROCIN 1 G: 20 OINTMENT TOPICAL at 08:01

## 2024-01-10 RX ADMIN — OXYCODONE HYDROCHLORIDE 10 MG: 10 TABLET ORAL at 08:01

## 2024-01-10 RX ADMIN — SODIUM PHOSPHATE, MONOBASIC, MONOHYDRATE AND SODIUM PHOSPHATE, DIBASIC, ANHYDROUS 20.01 MMOL: 142; 276 INJECTION, SOLUTION INTRAVENOUS at 02:01

## 2024-01-10 RX ADMIN — SODIUM PHOSPHATE, MONOBASIC, MONOHYDRATE AND SODIUM PHOSPHATE, DIBASIC, ANHYDROUS 20.01 MMOL: 142; 276 INJECTION, SOLUTION INTRAVENOUS at 04:01

## 2024-01-10 RX ADMIN — POTASSIUM CHLORIDE 20 MEQ: 200 INJECTION, SOLUTION INTRAVENOUS at 09:01

## 2024-01-10 RX ADMIN — ACETAMINOPHEN 1000 MG: 500 TABLET ORAL at 01:01

## 2024-01-10 RX ADMIN — FUROSEMIDE 40 MG: 10 INJECTION, SOLUTION INTRAVENOUS at 08:01

## 2024-01-10 RX ADMIN — DOCUSATE SODIUM 100 MG: 100 CAPSULE, LIQUID FILLED ORAL at 08:01

## 2024-01-10 RX ADMIN — OXYCODONE HYDROCHLORIDE 10 MG: 10 TABLET ORAL at 03:01

## 2024-01-10 RX ADMIN — ACETAMINOPHEN 1000 MG: 500 TABLET ORAL at 09:01

## 2024-01-10 RX ADMIN — CEFAZOLIN 2 G: 2 INJECTION, POWDER, FOR SOLUTION INTRAMUSCULAR; INTRAVENOUS at 03:01

## 2024-01-10 RX ADMIN — HEPARIN SODIUM 1200 UNITS/HR: 10000 INJECTION, SOLUTION INTRAVENOUS at 08:01

## 2024-01-10 RX ADMIN — ASPIRIN 81 MG CHEWABLE TABLET 81 MG: 81 TABLET CHEWABLE at 08:01

## 2024-01-10 RX ADMIN — ACETAMINOPHEN 650 MG: 500 TABLET ORAL at 05:01

## 2024-01-10 RX ADMIN — WARFARIN SODIUM 4 MG: 4 TABLET ORAL at 06:01

## 2024-01-10 RX ADMIN — IPRATROPIUM BROMIDE AND ALBUTEROL SULFATE 3 ML: .5; 3 SOLUTION RESPIRATORY (INHALATION) at 07:01

## 2024-01-10 RX ADMIN — ACETAMINOPHEN 1000 MG: 500 TABLET ORAL at 05:01

## 2024-01-10 RX ADMIN — INSULIN ASPART 2 UNITS: 100 INJECTION, SOLUTION INTRAVENOUS; SUBCUTANEOUS at 11:01

## 2024-01-10 RX ADMIN — FAMOTIDINE 20 MG: 20 TABLET, FILM COATED ORAL at 08:01

## 2024-01-10 RX ADMIN — HYDROMORPHONE HYDROCHLORIDE 0.5 MG: 0.5 INJECTION, SOLUTION INTRAMUSCULAR; INTRAVENOUS; SUBCUTANEOUS at 05:01

## 2024-01-10 NOTE — ASSESSMENT & PLAN NOTE
Endocrinology consulted for BG management.   BG goal 110-140 (CTS Protocol)    - D/C Transition drip  - Novolog (aspart) insulin prn for BG excursions [facility] SSI (150/25).  - BG checks AC/HS  - Hypoglycemia protocol in place    ** Please notify Endocrine for any change and/or advance in diet**  ** Please call Endocrine for any BG related issues **    Discharge Planning:   TBD. Please notify endocrinology prior to discharge.

## 2024-01-10 NOTE — PT/OT/SLP PROGRESS
Occupational Therapy  Co Treatment    Name: King Botello  MRN: [id number]  Admitting Diagnosis:  Nonrheumatic mitral valve stenosis  2 Days Post-Op    Recommendations:     Discharge Recommendations: No Therapy Indicated      Assessment:     King Botello is a 38 y.o. female with a medical diagnosis of Nonrheumatic mitral valve stenosis.  Performance deficits affecting function are weakness, impaired endurance, impaired self care skills, impaired functional mobility, gait instability, pain.   Pt tolerated session well with good effort and performance and anticipate she will continue to progress well.     Rehab Prognosis:  Good; patient would benefit from acute skilled OT services to address these deficits and reach maximum level of function.       Plan:     Patient to be seen 5 x/week to address the above listed problems via self-care/home management, therapeutic activities, therapeutic exercises  Plan of Care Expires: 01/16/24  Plan of Care Reviewed with: patient, spouse    Subjective     Pt agreeable to therapy   Pain/Comfort:  Pain Rating 1: 7/10  Location 1: sternal  Pain Addressed 1: Reposition, Distraction, Nurse notified    Objective:     Communicated with: nsg prior to session.  Patient found in chair with tele, pulse ox, BP cuff, 2 LPM, wilkins and CT, central line   Spouse in room.  Cotx completed this date to optimize functional performance and safety given impaired tolerance for activity in setting of ICU     General Precautions: Standard, aspiration, sternal, fall      Occupational Performance:     Functional Mobility/Transfers:  Sit>stand with SBA first trial from chair   Sit.stand with CGA second trial from chair     Activities of Daily Living:  Feeding: independent  G/H standing with SBA for self care. Good B UE integration and adequate standing balance/endurance.   LE dressing; MAX A       AMPAC 6 Click ADL: 18    Treatment & Education:  Pt verb 2/3 sternal precautions and demo good  understanding and compliance with precautions during functional activity.   Pt required CGA during functional mobility.   Minimal SOB noted with activity but vital signs stayed stable.   Education provided re: role of OT and safety with functional mobility/ADL skills.     Patient left up in chair with all lines intact, call button in reach, nsg notified, and spouse present    GOALS:   Multidisciplinary Problems       Occupational Therapy Goals          Problem: Occupational Therapy    Goal Priority Disciplines Outcome Interventions   Occupational Therapy Goal     OT, PT/OT Ongoing, Progressing    Description: Goals to be met by: 1/16/24     Patient will increase functional independence with ADLs by performing:    Feeding with [place].  UE Dressing with Set-up Assistance.  LE Dressing with Set-up Assistance.  Grooming while standing at sink with Supervision.  Toileting from toilet with Supervision for hygiene and clothing management.   Toilet transfer to toilet with Supervision.                         Time Tracking:     OT Date of Treatment: 01/10/24  OT Start Time: 0846  OT Stop Time: 0912  OT Total Time (min): 26 min    Billable Minutes:Self Care/Home Management 10  Therapeutic Activity 16    OT/CADEN: OT          1/10/2024

## 2024-01-10 NOTE — PT/OT/SLP PROGRESS
Physical Therapy Co-Treatment with OT    Patient Name:  King Botello   MRN:  [id number]    Recommendations:     Discharge Recommendations: No Therapy Indicated  Discharge Equipment Recommendations: none  Barriers to discharge: None    Assessment:     King Botello is a 38 y.o. female admitted with a medical diagnosis of Nonrheumatic mitral valve stenosis.  She presents with the following impairments/functional limitations: impaired balance, decreased safety awareness, weakness, impaired endurance, impaired functional mobility, gait instability pt tolerated treatment better being able to gait train farther. Pt will benefit from cont skilled PT 5x/wk to progress physically. Pt will be able to discharge home with no needs when medically stable. Pt is s/p MVR, TVr 1/8/24.    Rehab Prognosis: Good; patient would benefit from acute skilled PT services to address these deficits and reach maximum level of function.    Recent Surgery: Procedure(s) (LRB):  MITRAL VALVE REPLACEMENT, REDO STERNOTOMY (N/A)  VALVULOPLASTY, TRICUSPID REPAIR (N/A) 2 Days Post-Op    Plan:     During this hospitalization, patient to be seen 5 x/week to address the identified rehab impairments via gait training, therapeutic activities, therapeutic exercises and progress toward the following goals:    Plan of Care Expires:  02/08/24    Subjective     Chief Complaint: pt c/o pain during treatment and being tired after treatment.   Patient/Family Comments/goals: to get better and go home.   Pain/Comfort:  Pain Rating 1: 7/10 (chest)  Pain Addressed 1: Reposition, Distraction  Pain Rating Post-Intervention 1: 7/10 (chest)      Objective:     Communicated with nurse  prior to session.  Patient found up in chair with telemetry, blood pressure cuff, pulse ox (continuous), wound vac, wilkins catheter, central line, oxygen, chest tube (CVP) upon PT entry to room.     General Precautions: Standard, fall, sternal  Orthopedic Precautions: N/A  Braces:  N/A  Respiratory Status: Nasal cannula, flow 2 L/min     Functional Mobility:  Transfers:   pt needed verbal cues for functional mobility with sternal precautions.   Sit to Stand:  stand by assistance with no AD from bedside chair then CGA from bedside chair.     Gait: pt received gait training ~ 20 ft, then 48 ft (68 ft total) with CGA, O2 intact. Pt had sitting rest period between distances gait trained. RN was present with portable monitor on longer distance gait trained. Pt was followed by chair for safety.     Balance: pt stood at sink with SBA and performed ADLS with OT.     Due to pt complex medical condition, the skill of 2 licensed therapists is needed to maximize treatment session and progression towards goals  Pt white board updated with current therapists name and level of mobility assistance needed.       AM-PAC 6 CLICK MOBILITY  Turning over in bed (including adjusting bedclothes, sheets and blankets)?: 3  Sitting down on and standing up from a chair with arms (e.g., wheelchair, bedside commode, etc.): 3  Moving from lying on back to sitting on the side of the bed?: 3  Moving to and from a bed to a chair (including a wheelchair)?: 3  Need to walk in hospital room?: 3  Climbing 3-5 steps with a railing?: 2  Basic Mobility Total Score: 17       Treatment & Education:  Pt and  received verbal instructions in PT POC and both verbally expressed understanding of such.     Patient left up in chair with all lines intact, call button in reach, RN notified, and  present..    GOALS:   Multidisciplinary Problems       Physical Therapy Goals          Problem: Physical Therapy    Goal Priority Disciplines Outcome Goal Variances Interventions   Physical Therapy Goal     PT, PT/OT Ongoing, Progressing     Description: Goals to be met by: 2024    Patient will increase functional independence with mobility by performin. Supine to sit with Supervision  2. Sit to stand transfer with  Supervision  3. Gait  x 400 feet with Supervision.                          Time Tracking:     PT Received On: 01/10/24  PT Start Time: 0848     PT Stop Time: 0912  PT Total Time (min): 24 min     Billable Minutes: Gait Training 24 min        PT/PTA: PT     Number of PTA visits since last PT visit: 0     01/10/2024

## 2024-01-10 NOTE — PLAN OF CARE
Please merge this note with today's resident critical care note.    SICU Staff Addendum  I have reviewed and concur with the resident's/NP's history, physical, assessment, and plan.  I have personally interviewed and examined the patient at bedside.  See below for any additional findings/changes.    Reason for admission:  Nonrheumatic mitral valve stenosis  Present on Admission:   Mitral valve prolapse   Nonrheumatic mitral valve stenosis   Hypertension, essential      Active issues/Goals for Today:     POD 2 s/p redo chest, (first approach was thoracotomy) mechanical MVR for bioprosthetic MS, TV repair. This AM she is sitting in a chair complaining of a sore throat.     - Pain control. Will order chloraseptic spray for throat soreness.  - Continue ASA. BB when able, holding for now in the setting of junctional rhythm/sinus bradycardia. Keep epicardial wires as backup.  - Wean supplemental oxygen for for SpO2 > 88-90%, pO2 > 65. IS.  - Advancing to cardiac diet as tolerated. Bowel regimen.  - Monitor CT output   - Monitor Hgb and aim for Hgb > 7   - Agree with diuresis with IV lasix.  - PT/OT  - Anticoagulation with coumadin/heparin drip for mechanical MV.  - Removing CVL and wilkins catheter.  - Appropriate candidate for transfer to the floor.  - Patient seen on rounds with Dr. Liu.     37 minutes of critical care time was spent personally by me on the following activities: development of treatment plan with patient or surrogate and bedside caregivers, discussions with consultants, evaluation of patient's response to treatment, examination of patient, ordering and performing treatments and interventions, ordering and review of laboratory studies, ordering and review of radiographic studies, pulse oximetry, re-evaluation of patient's condition.  This critical care time did not overlap with that of any other provider or involve time for any procedures.    Dashawn Aguilar MD  Anesthesiology/Critical Care

## 2024-01-10 NOTE — ASSESSMENT & PLAN NOTE
Patient is POD 2 s/p MVR (revision with mechanical valve) and TV banding. Patient was found to be CHB with a junctional escape rhythm this AM via tele and confirmed with 12 lead ECG. Review of Tele shows this arrhythm appearing as early as 1/08/2024. Patient received Amiodarone from 1/08/24 to 1/10/24. Currently having palpitations and BLANK, Unclear if related to this arrhythmia.    Recommendations  Unclear etiology with broad differential; unclear if reversible or permanent. Patient has epicardial wires currently in place. Recommending to continue ICU level care, telemetry. and Epicardial pacer wires set to 10mA and 40bpms. Currently not dependant on pacer. Recommend holding AV herminia blocking agents. Recommend holding Amiodarone. Recommend contacting on call fellow if patient started to become pacer dependant or rhythm changes drastically. Recommend daily AM ECGs. Will consider PPM vs EP study Friday if not clinically improving.

## 2024-01-10 NOTE — PLAN OF CARE
SICU PLAN OF CARE NOTE    Dx: Nonrheumatic mitral valve stenosis    Goals of Care: MAP 60-80. SBP <160. aPTT 60-80.    Vital Signs (last 12 hours):   Temp:  [97.8 °F (36.6 °C)-98.1 °F (36.7 °C)]   Pulse:  [49-61]   Resp:  [15-34]   BP: (116-147)/(63-71)   SpO2:  [93 %-100 %]      Neuro: AAO x4, Follows Commands, and Moves All Extremities    Cardiac: NSR    Respiratory: Nasal Cannula    Gtts: Heparin    Urine Output: Urinary Catheter 30 cc/shift    Drains: Chest Tube, total output 350 cc /  shift    Diet: Full Liquids, Mildly Thick/[place] Thick.    Labs/Accuchecks: Daily Labs. Q6H aPTT. A4H Accuchecks. Q12H Potassium.    Skin:  All skin remains free from injury.  Patient turned Q2, mattress inflated, and bed working correctly.    Shift Events:  NAEON. All VSS. OOBTC this AM. PRN oxycodone and hydromorphone given per patient request. See flowsheet for further assessment/details. Family updated on current condition/plan of care, questions answered, and emotional support provided. MD updated on current condition, vitals, labs, and gtts.

## 2024-01-10 NOTE — PLAN OF CARE
SICU PLAN OF CARE NOTE    Dx: Nonrheumatic mitral valve stenosis    Goals of Care:   MAP 60-80  SBP <140      Vital Signs (last 12 hours):   Temp:  [97.7 °F (36.5 °C)]   Pulse:  [49-72]   Resp:  [7-30]   BP: (110-137)/(59-71)   SpO2:  [87 %-100 %]   Arterial Line BP: (100-141)/(44-63)      Neuro: AAO x4, Follows Commands, and Moves All Extremities; oxycodone and dilaudid given for pain management. Speech consulted; thick liquids.     Cardiac: NSR    Respiratory: Nasal Cannula 2L    Gtts: Insulin and Heparin    Urine Output: Urinary Catheter 1315 cc/shift    Drains: Chest Tube, total output 60 cc /  shift    Diet: Thick liquids    Labs/Accuchecks: q6h aptt; daily labs; q4h accuchecks     Skin:  All skin remains free from injury.  Patient turned Q2, Waffle mattress inflated, and bed working correctly.    Shift Events:  Arterial line d/c. 1uPRBC. Heparin gtt started and coumadin. 20mg lasix IVP; pt responded accordingly.  See flowsheet for further assessment/details.  Family updated on current condition/plan of care, questions answered, and emotional support provided.  MD updated on current condition, vitals, labs, and gtts.  No new orders received, will continue to monitor.

## 2024-01-10 NOTE — CONSULTS
[facility]  Cardiac Electrophysiology  Consult Note    Admission Date: 1/8/2024  Code Status: Full Code   Attending Provider: Thierno Liu MD  Consulting Provider: Trell Dillon DO  Principal Problem:Nonrheumatic mitral valve stenosis    Inpatient consult to Electrophysiology  Consult performed by: Trell Dillon DO  Consult ordered by: Alise Montoya MD  Reason for consult: CHB with junctional escape rhythm post operatively in a patient s/p MVR and TV banding        Subjective:     Chief Complaint:  BLANK     HPI:   King Botello is a 38 y.o. female with a past medical history of MVP and MR s/p mitral annuloplasty in [place] (2003),  HTN, bradycardia s/p Mechanical Mitral Valve Replacement and TV banding with Dr. Liu on 01/08/24, Patient reports chronic fatigue and BLANK before pregnancy.  After delivery of baby in May, patient was discharged and a few days later readmitted for CHF exacerbation which improved with lasix. TTE Prior to surgery shows LVEF 60-66%. moderate right atrial enlargement. LA severely dilated. There is moderate to severe mitral stenosis.  Patient is followed by Dr. Cordero.     Perioperatively patient was started on Amio for brief runs of non-conducted/buried P waves and rapid junctional escape ventricular contractions as per tele.     On Morning of 1/10/24, RN at bedside noticed abnormal conduction via telemetry and Ecg was ordered. Review of ECG shows complete heart block. Review of Tele shows patient has been in CHB since surgery 1/08/24. Patient has some BLANK and sensations of palpitations but otherwise has no acute complaints.     EP was consulted for CHB with junctional escape rhythm post operatively in a MVR and TV banding             Past Medical History:   Diagnosis Date    Abnormal Pap smear       Colposcopy    Bradycardia 6/4/2020    Class 2 obesity due to excess calories with body mass index (BMI) of 35.0 to 35.9 in adult 3/9/2021     Hypertension, essential 3/16/2021    Menarche       Age of onset 11    Missed ab 2021    Mitral valve prolapse      Previous  delivery affecting pregnancy, antepartum 2020    S/P suction D&C 2021    Status post mitral valve annuloplasty 2017               Past Surgical History:   Procedure Laterality Date     SECTION         SECTION WITH TUBAL LIGATION Bilateral 2023     Procedure:  SECTION, WITH TUBAL LIGATION;  Surgeon: Mane Moreno MD;  Location: [facility] L[facility];  Service: OB/GYN;  Laterality: Bilateral;    DILATION AND CURETTAGE OF UTERUS USING SUCTION N/A 2021     Procedure: DILATION AND CURETTAGE, UTERUS, USING SUCTION;  Surgeon: Mane Moreno MD;  Location: [facility];  Service: OB/GYN;  Laterality: N/A;    MITRAL VALVE REPAIR        MITRAL VALVE REPLACEMENT N/A 2024     Procedure: MITRAL VALVE REPLACEMENT, REDO STERNOTOMY;  Surgeon: Thierno Liu MD;  Location: [facility] OR [address] [place] [address];  Service: Cardiothoracic;  Laterality: N/A;    VALVULOPLASTY, TRICUSPID N/A 2024     Procedure: VALVULOPLASTY, TRICUSPID REPAIR;  Surgeon: Thierno Liu MD;  Location: [facility] OR [address] [place] [address];  Service: Cardiothoracic;  Laterality: N/A;               Review of patient's allergies indicates:   Allergen Reactions    Cinnamon Itching    Doxycycline         Abdomen pain severe         No current facility-administered medications on file prior to encounter.           Current Outpatient Medications on File Prior to Encounter   Medication Sig    furosemide (LASIX) 20 MG tablet Take one to two tablets by mouth per day as instructed by MD    metoprolol succinate (TOPROL-XL) 25 MG 24 hr tablet Take 2 tablets (50 mg total) by mouth every morning AND 1 tablet (25 mg total) every evening.    potassium chloride SA (K-DUR,KLOR-CON M) 10 MEQ tablet Take one to two tablets daily as instructed by MD.    amoxicillin (AMOXIL) 500 MG Tab Take 4 tablets (2,000 mg total) by mouth  daily as needed (for procedures). Take one hour before the procedure. (Patient not taking: Reported on 1/4/2024)      Family History         Problem Relation (Age of Onset)     Breast cancer Paternal Aunt     Cancer Mother     Diabetes Mother     Heart disease Mother     Hyperlipidemia Mother, Father     Hypertension Mother, Father                   Tobacco Use    Smoking status: Never    Smokeless tobacco: Never   Substance and Sexual Activity    Alcohol use: Not Currently       Comment: not since +UPT    Drug use: No    Sexual activity: Yes       Partners: Male       Birth control/protection: None      Review of Systems   Constitutional: Positive for malaise/fatigue.   Cardiovascular:  Positive for dyspnea on exertion and palpitations. Negative for chest pain, irregular heartbeat, near-syncope and syncope.      Objective:      Vital Signs (Most Recent):  Temp: 99.2 °F (37.3 °C) (01/10/24 1500)  Pulse: (!) 54 (01/10/24 1500)  Resp: 15 (01/10/24 1500)  BP: (!) 117/57 (01/10/24 1500)  SpO2: (!) 91 % (01/10/24 1500) Vital Signs (24h Range):  Temp:  [97.8 °F (36.6 °C)-99.2 °F (37.3 °C)] 99.2 °F (37.3 °C)  Pulse:  [54-68] 54  Resp:  [14-34] 15  SpO2:  [91 %-100 %] 91 %  BP: (116-147)/(56-71) 117/57         Weight: 80.7 kg (177 lb 12.8 oz)  Body mass index is 33.6 kg/m².     SpO2: (!) 91 %        Physical Exam  Vitals and nursing note reviewed.   Constitutional:       General: She is not in acute distress.     Interventions: She is sedated and intubated.   HENT:      Mouth/Throat:      Mouth: Mucous membranes are dry.   Eyes:      Extraocular Movements: Extraocular movements intact.      Pupils: Pupils are equal, round, and reactive to light.   Neck:      Comments: RIJ CVC  Cardiovascular:      Rate and Rhythm: Normal rate and regular rhythm.      Pulses: Normal pulses.      Comments: V Wires  Pulmonary:      Effort: Pulmonary effort is normal. No respiratory distress. She is intubated.      Comments: 2 Mediastinal chest  tube  Abdominal:      General: Abdomen is flat.      Palpations: Abdomen is soft.   Musculoskeletal:         General: No swelling.      Cervical back: Normal range of motion.      Right lower leg: No edema.      Left lower leg: No edema.   Skin:     General: Skin is warm and dry.      Capillary Refill: Capillary refill takes less than 2 seconds.            Significant Labs: EP:            Recent Labs   Lab 01/09/24  0304 01/09/24  0309 01/09/24  1547 01/09/24  1918 01/10/24  0255 01/10/24  1124     --  136  --  136  --    K 4.7   < > 3.6 4.2 4.2 4.0   *  --  105  --  106  --    CO2 20*  --  22*  --  25  --    *  --  137*  --  99  --    BUN 12  --  11  --  10  --    CREATININE 0.6  --  0.6  --  0.6  --    CALCIUM 8.0*  --  8.3*  --  8.7  --    PROT 5.1*  --  5.7*  --  5.7*  --    ALBUMIN 3.6  --  3.6  --  3.4*  --    BILITOT 0.9  --  0.7  --  0.8  --    ALKPHOS 30*  --  37*  --  42*  --    AST 76*  --  81*  --  64*  --    ALT 12  --  15  --  14  --    ANIONGAP 8  --  9  --  5*  --    WBC 13.78*  13.78*  --  18.31*  --  18.46*  18.46*  --    HGB 7.0*  7.0*  --  8.5*  --  8.4*  8.4*  --    HCT 21.7*  21.7*   < > 26.1*  --  26.0*  26.0*  --    *  141*  --  130*  --  135*  135*  --    INR 1.2  1.2  --   --   --  1.3*  --     < > = values in this interval not displayed.         Significant Imaging: EKG: CHB with Junctional Escape Rhythm. Review of Tele shows this Rhythm appeared as early as 1/08/2024.                Assessment and Plan:     CHB (complete heart block)  Patient is POD 2 s/p MVR (revision with mechanical valve) and TV banding. Patient was found to be CHB with a junctional escape rhythm this AM via tele and confirmed with 12 lead ECG. Review of Tele shows this arrhythm appearing as early as 1/08/2024. Patient received Amiodarone from 1/08/24 to 1/10/24. Currently having palpitations and BLANK, Unclear if related to this arrhythmia.    Recommendations  Unclear etiology with  broad differential; we are hopeful this is a transient Arrhythmia in the post operative setting. Patient has epicardial wires currently in place. Recommending to continue ICU level care, telemetry. and Epicardial pacer wires set to 10mA and 40bpms. Currently not dependant on pacer. Recommend holding AV herminia blocking agents. Recommend holding Amiodarone. Recommend contacting on call fellow if patient started to become pacer dependant or rhythm changes drastically. Recommend daily AM ECGs. Will consider PPM vs EP study Friday if not clinically improving.        Thank you for your consult. I will follow-up with patient. Please contact us if you have any additional questions.    Discussed with staff. Attestation to follow.     Trell Dillon, DO  Cardiac Electrophysiology  [facility] - Surgical Intensive Care

## 2024-01-10 NOTE — HPI
King Botello is a 38 y.o. female with a past medical history of MVP and MR s/p mitral annuloplasty in [employer] (2003),  HTN, bradycardia s/p Mechanical Mitral Valve Replacement and TV banding with Dr. Liu on 01/08/24, Patient reports chronic fatigue and BLANK before pregnancy.  After delivery of baby in May, patient was discharged and a few days later readmitted for CHF exacerbation which improved with lasix. TTE Prior to surgery shows LVEF 60-66%. moderate right atrial enlargement. LA severely dilated. There is moderate to severe mitral stenosis.  Patient is followed by Dr. Cordero.     Perioperatively patient was started on Amio for brief runs of non-conducted/buried P waves and rapid junctional escape ventricular contractions as per tele.     On Morning of 1/10/24, RN at bedside noticed abnormal conduction via telemetry and Ecg was ordered. Review of ECG shows complete heart block. Review of Tele shows patient has been in CHB since surgery 1/08/24. Patient has some BLANK and sensations of palpitations but otherwise has no acute complaints.     EP was consulted for CHB with junctional escape rhythm post operatively in a MVR and TV banding

## 2024-01-10 NOTE — PROGRESS NOTES
[facility]  Critical Care - Surgery  Progress Note    Patient Name: King Botello  MRN: [id number]  Admission Date: 1/8/2024  Hospital Length of Stay: 2 days  Code Status: Full Code  Attending Provider: Thierno Liu MD  Primary Care Provider: Mayco Hansen MD   Principal Problem: Nonrheumatic mitral valve stenosis    Subjective:         Interval History/Significant Events: NAEON. Patient w/ mild nausea with food but no vomiting. PRNs in place. Oob in chair. No BM but passing gas.  Chest tube output slowing appropriately. Lasix 20 IV yesterday with good response. On 2L NC. Weaning. Replacing Phos.     Follow-up For: Procedure(s) (LRB):  MITRAL VALVE REPLACEMENT, REDO STERNOTOMY (N/A)  VALVULOPLASTY, TRICUSPID REPAIR (N/A)    Post-Operative Day: 2 Days Post-Op    Objective:     Vital Signs (Most Recent):  Temp: 98.7 °F (37.1 °C) (01/10/24 0700)  Pulse: (!) 57 (01/10/24 0822)  Resp: 16 (01/10/24 0852)  BP: 125/64 (01/10/24 0800)  SpO2: 100 % (01/10/24 0822) Vital Signs (24h Range):  Temp:  [97.8 °F (36.6 °C)-98.7 °F (37.1 °C)] 98.7 °F (37.1 °C)  Pulse:  [49-65] 57  Resp:  [8-34] 16  SpO2:  [93 %-100 %] 100 %  BP: (113-147)/(58-71) 125/64  Arterial Line BP: (131)/(61) 131/61     Weight: 80.7 kg (177 lb 12.8 oz)  Body mass index is 33.6 kg/m².      Intake/Output Summary (Last 24 hours) at 1/10/2024 0910  Last data filed at 1/10/2024 0800  Gross per 24 hour   Intake 1255.06 ml   Output 1830 ml   Net -574.94 ml          Physical Exam  Vitals and nursing note reviewed.   Constitutional:       General: She is not in acute distress.     Interventions: She is sedated and intubated.   HENT:      Mouth/Throat:      Mouth: Mucous membranes are dry.   Eyes:      Extraocular Movements: Extraocular movements intact.      Pupils: Pupils are equal, round, and reactive to light.   Neck:      Comments: RIJ CVC  Cardiovascular:      Rate and Rhythm: Normal rate and regular rhythm.      Pulses:  Normal pulses.      Comments: V Wires  Pulmonary:      Effort: Pulmonary effort is normal. No respiratory distress. She is intubated.      Comments: 2 Mediastinal chest tube  Abdominal:      General: Abdomen is flat.      Palpations: Abdomen is soft.   Genitourinary:     Comments: Soares in place  Musculoskeletal:         General: No swelling.      Cervical back: Normal range of motion.      Right lower leg: No edema.      Left lower leg: No edema.   Skin:     General: Skin is warm and dry.      Capillary Refill: Capillary refill takes less than 2 seconds.      Comments: MSI CDI              Vents:  Vent Mode: Spont (01/08/24 2052)  Ventilator Initiated: Yes (01/08/24 1427)  Set Rate: 20 BPM (01/08/24 1804)  Vt Set: 380 mL (01/08/24 1804)  PEEP/CPAP: 5 cmH20 (01/08/24 2052)  Oxygen Concentration (%): 28 (01/09/24 0416)  Peak Airway Pressure: 9.9 cmH20 (01/08/24 2052)  Plateau Pressure: 0 cmH20 (01/08/24 2052)  Total Ve: 7.34 L/m (01/08/24 2052)  Negative Inspiratory Force (cm H2O): -15 (01/08/24 2137)  F/VT Ratio<105 (RSBI): (!) 96.3 (01/08/24 2052)    Lines/Drains/Airways       Central Venous Catheter Line  Duration              Introducer with Double Lumen 01/08/24 1000 Internal Jugular Right 1 day    Percutaneous Central Line Insertion/Assessment - Triple Lumen  01/08/24 1000 Internal Jugular Right 1 day              Drain  Duration                  Urethral Catheter 01/08/24 0757 Straight-tip;Temperature probe 16 Fr. 2 days         Y Chest Tube 1 and 2 01/08/24 1309 1 Anterior Mediastinal 19 Fr. 2 Anterior Mediastinal 19 Fr. 1 day              Line  Duration                  Pacer Wires 01/08/24 1259 1 day              Peripheral Intravenous Line  Duration                  Peripheral IV - Single Lumen 01/08/24 0548 20 G Anterior;Left Forearm 2 days         Peripheral IV - Single Lumen 01/08/24 0729 16 G Right Forearm 2 days                    Significant Labs:    CBC/Anemia Profile:  Recent Labs   Lab  01/09/24  0304 01/09/24  0309 01/09/24  1547 01/10/24  0255   WBC 13.78*  13.78*  --  18.31* 18.46*  18.46*   HGB 7.0*  7.0*  --  8.5* 8.4*  8.4*   HCT 21.7*  21.7* 20* 26.1* 26.0*  26.0*   *  141*  --  130* 135*  135*   MCV 86  86  --  85 85  85   RDW 16.7*  16.7*  --  17.1* 17.4*  17.4*        Chemistries:  Recent Labs   Lab 01/09/24 0304 01/09/24  0745 01/09/24  1547 01/09/24  1918 01/10/24  0255     --  136  --  136   K 4.7   < > 3.6 4.2 4.2   *  --  105  --  106   CO2 20*  --  22*  --  25   BUN 12  --  11  --  10   CREATININE 0.6  --  0.6  --  0.6   CALCIUM 8.0*  --  8.3*  --  8.7   ALBUMIN 3.6  --  3.6  --  3.4*   PROT 5.1*  --  5.7*  --  5.7*   BILITOT 0.9  --  0.7  --  0.8   ALKPHOS 30*  --  37*  --  42*   ALT 12  --  15  --  14   AST 76*  --  81*  --  64*   MG 2.2  2.2  --  2.2  --  2.4  2.4   PHOS 3.7  3.7  --  3.0  --  1.9*  1.9*    < > = values in this interval not displayed.       All pertinent labs within the past 24 hours have been reviewed.    Significant Imaging:  I have reviewed all pertinent imaging results/findings within the past 24 hours.  Assessment/Plan:     Cardiac/Vascular  * Nonrheumatic mitral valve stenosis  38 y.o. female with a past medical history of MVP and MR s/p mitral annuloplasty in [place] (2003),  HTN, bradycardia now s/p Mechanical Mitral Valve Replacement on 1/8/2024 with Dr. Liu    Neuro/Psych:     - Sedation: propofol    - Pain:    - Scheduled Acetaminophen 1g q8h    - Oxy PRN, dilaudid for breakthrough   - Received TTP and Rectus Sheath blocks intra-op             Cardiac:     - S/P Mechanical Mitral Valve Replacement with Dr. Liu on 1/8/2024    - BP Goal: MAP 60-80    - initially placed on Amio for suspected A-fib.    - d/c'd 1/9/24    - Pressors: off    - Anti-HTNs: Will start when appropriate    - Rhythm: NSR,    - Beta blocker: Hold for now, bradycardia    - Statin: Not indicated    - d/c A-line    - d/c cvc    - EKG  today     Pulmonary:     - Goal SpO2 >92%    - 2L NC - Wean as able    - Chest Tubes 2 Meds     - 150cc 24h output on 1/10/24    - IS    - ABGs PRN      Renal:    - Trend BUN/Cr     - lasix 20mg IV BID    - Maintain Soares, record strict Is/Os    Recent Labs   Lab 01/09/24  0304 01/09/24  1547 01/10/24  0255   BUN 12 11 10   CREATININE 0.6 0.6 0.6           FEN / GI:     - Daily CMP, PRN K/Mag/Phos per protocol     - Replace electrolytes as needed    - Nutrition: nectar thick liquids, meds crushed.    - Bowel Regimen: Miralax, docusate      ID:     - Afebrile    - WBC stable    - Abx: Complete perioperative cefazolin 2g Q8H x 5 doses    Recent Labs   Lab 01/09/24  0304 01/09/24  1547 01/10/24  0255   WBC 13.78*  13.78* 18.31* 18.46*  18.46*           Heme/Onc:     - Hgb 13.0 pre-operatively    - CBC daily    - ASA 325mg POD0    - ASA 81mg POD1    - Heparin @ 700 increased to 1100 after APTT of 41.   - Goal APTT 60-80    - Coumadin 4 tonight    - Transfuse Hb <7 and symptomatic anemia    - 1u pRBC on 1/9/24    Recent Labs   Lab 01/08/24  1423 01/09/24  0304 01/09/24  1547 01/09/24  1918 01/10/24  0255   HGB 8.8* 7.0*  7.0* 8.5*  --  8.4*  8.4*    141*  141* 130*  --  135*  135*   APTT 36.3* 32.8*  32.8*  --  36.5* 41.6*   INR 1.2 1.2  1.2  --   --  1.3*           Endocrine:     - CTS Goal -140    - HgbA1c: 5.2    - Insulin gtt    - Endocrinology consulted for insulin management      PPx:   Feeding: nectar thick   Analgesia/Sedation: Multimodal  Thromboembolic Prevention: SCD's  HOB >30: Yes  Stress Ulcer: Famotidine  Glucose Control: Yes, insulin management per Endocrinology     Lines/Drains/Airway:   RIJ CVC   Soares   Chest Tubes: 2 Mediastinal    Pacing Wires: Temporary ventricular pacing wires      Dispo/Code Status/Palliative:     - Continue SICU Care    - Full Code           Critical care was time spent personally by me on the following activities: development of treatment plan with patient  or surrogate and bedside caregivers, discussions with consultants, evaluation of patient's response to treatment, examination of patient, ordering and performing treatments and interventions, ordering and review of laboratory studies, ordering and review of radiographic studies, pulse oximetry, re-evaluation of patient's condition.  This critical care time did not overlap with that of any other provider or involve time for any procedures.     Umer Herrera MD  Critical Care - Surgery  [facility] - [facility]

## 2024-01-10 NOTE — SUBJECTIVE & OBJECTIVE
Interval History/Significant Events: NAEON. Patient w/ mild nausea with food but no vomiting. PRNs in place. Oob in chair. No BM but passing gas.  Chest tube output slowing appropriately. Lasix 20 IV yesterday with good response. On 2L NC. Weaning. Replacing Phos.     Follow-up For: Procedure(s) (LRB):  MITRAL VALVE REPLACEMENT, REDO STERNOTOMY (N/A)  VALVULOPLASTY, TRICUSPID REPAIR (N/A)    Post-Operative Day: 2 Days Post-Op    Objective:     Vital Signs (Most Recent):  Temp: 98.7 °F (37.1 °C) (01/10/24 0700)  Pulse: (!) 57 (01/10/24 0822)  Resp: 16 (01/10/24 0852)  BP: 125/64 (01/10/24 0800)  SpO2: 100 % (01/10/24 0822) Vital Signs (24h Range):  Temp:  [97.8 °F (36.6 °C)-98.7 °F (37.1 °C)] 98.7 °F (37.1 °C)  Pulse:  [49-65] 57  Resp:  [8-34] 16  SpO2:  [93 %-100 %] 100 %  BP: (113-147)/(58-71) 125/64  Arterial Line BP: (131)/(61) 131/61     Weight: 80.7 kg (177 lb 12.8 oz)  Body mass index is 33.6 kg/m².      Intake/Output Summary (Last 24 hours) at 1/10/2024 0910  Last data filed at 1/10/2024 0800  Gross per 24 hour   Intake 1255.06 ml   Output 1830 ml   Net -574.94 ml          Physical Exam  Vitals and nursing note reviewed.   Constitutional:       General: She is not in acute distress.     Interventions: She is sedated and intubated.   HENT:      Mouth/Throat:      Mouth: Mucous membranes are dry.   Eyes:      Extraocular Movements: Extraocular movements intact.      Pupils: Pupils are equal, round, and reactive to light.   Neck:      Comments: RIJ CVC  Cardiovascular:      Rate and Rhythm: Normal rate and regular rhythm.      Pulses: Normal pulses.      Comments: V Wires  Pulmonary:      Effort: Pulmonary effort is normal. No respiratory distress. She is intubated.      Comments: 2 Mediastinal chest tube  Abdominal:      General: Abdomen is flat.      Palpations: Abdomen is soft.   Genitourinary:     Comments: Soares in place  Musculoskeletal:         General: No swelling.      Cervical back: Normal range of  motion.      Right lower leg: No edema.      Left lower leg: No edema.   Skin:     General: Skin is warm and dry.      Capillary Refill: Capillary refill takes less than 2 seconds.      Comments: MSI CDI              Vents:  Vent Mode: Spont (01/08/24 2052)  Ventilator Initiated: Yes (01/08/24 1427)  Set Rate: 20 BPM (01/08/24 1804)  Vt Set: 380 mL (01/08/24 1804)  PEEP/CPAP: 5 cmH20 (01/08/24 2052)  Oxygen Concentration (%): 28 (01/09/24 0416)  Peak Airway Pressure: 9.9 cmH20 (01/08/24 2052)  Plateau Pressure: 0 cmH20 (01/08/24 2052)  Total Ve: 7.34 L/m (01/08/24 2052)  Negative Inspiratory Force (cm H2O): -15 (01/08/24 2137)  F/VT Ratio<105 (RSBI): (!) 96.3 (01/08/24 2052)    Lines/Drains/Airways       Central Venous Catheter Line  Duration              Introducer with Double Lumen 01/08/24 1000 Internal Jugular Right 1 day    Percutaneous Central Line Insertion/Assessment - Triple Lumen  01/08/24 1000 Internal Jugular Right 1 day              Drain  Duration                  Urethral Catheter 01/08/24 0757 Straight-tip;Temperature probe 16 Fr. 2 days         Y Chest Tube 1 and 2 01/08/24 1309 1 Anterior Mediastinal 19 Fr. 2 Anterior Mediastinal 19 Fr. 1 day              Line  Duration                  Pacer Wires 01/08/24 1259 1 day              Peripheral Intravenous Line  Duration                  Peripheral IV - Single Lumen 01/08/24 0548 20 G Anterior;Left Forearm 2 days         Peripheral IV - Single Lumen 01/08/24 0729 16 G Right Forearm 2 days                    Significant Labs:    CBC/Anemia Profile:  Recent Labs   Lab 01/09/24  0304 01/09/24  0309 01/09/24  1547 01/10/24  0255   WBC 13.78*  13.78*  --  18.31* 18.46*  18.46*   HGB 7.0*  7.0*  --  8.5* 8.4*  8.4*   HCT 21.7*  21.7* 20* 26.1* 26.0*  26.0*   *  141*  --  130* 135*  135*   MCV 86  86  --  85 85  85   RDW 16.7*  16.7*  --  17.1* 17.4*  17.4*        Chemistries:  Recent Labs   Lab 01/09/24  0304 01/09/24  0745 01/09/24  1547  01/09/24  1918 01/10/24  0255     --  136  --  136   K 4.7   < > 3.6 4.2 4.2   *  --  105  --  106   CO2 20*  --  22*  --  25   BUN 12  --  11  --  10   CREATININE 0.6  --  0.6  --  0.6   CALCIUM 8.0*  --  8.3*  --  8.7   ALBUMIN 3.6  --  3.6  --  3.4*   PROT 5.1*  --  5.7*  --  5.7*   BILITOT 0.9  --  0.7  --  0.8   ALKPHOS 30*  --  37*  --  42*   ALT 12  --  15  --  14   AST 76*  --  81*  --  64*   MG 2.2  2.2  --  2.2  --  2.4  2.4   PHOS 3.7  3.7  --  3.0  --  1.9*  1.9*    < > = values in this interval not displayed.       All pertinent labs within the past 24 hours have been reviewed.    Significant Imaging:  I have reviewed all pertinent imaging results/findings within the past 24 hours.

## 2024-01-10 NOTE — PROGRESS NOTES
"[facility] - [facility]  Endocrinology  Progress Note    Admit Date: 1/8/2024     Reason for Consult: Management of Hyperglycemia     Surgical Procedure and Date: s/p MVR on 1/8/2024    Patient is not diabetic and does not currently take any oral/injectable antidiabetic/hypoglycemic medications.     Lab Results   Component Value Date    HGBA1C 5.2 01/04/2024         HPI: King Botello is a 38 y.o. female with a past medical history of MVP and MR s/p mitral annuloplasty in [place] (2003),  HTN, bradycardia, PONV. Patient is followed by Dr. Cordero. Patient reports chronic fatigue and BLANK before pregnancy.  After delivery of baby in May, patient was discharged and a few days later readmitted for CHF exacerbation which improved with lasix. TTE LVEF 60-66%. moderate right atrial enlargement. LA severely dilated. There is moderate to severe mitral stenosis. Javier score is 10, the mean diastolic gradient across the mitral valve is 11 mmHg at a heart rate of 77 bpm; MVA 1.33. The patient presents to the SICU s/p Mechanical Mitral Valve Replacement with Dr. Liu on 01/08/2024. Endocrine consulted to manage post-operative hyperglycemia.               Interval HPI:   Overnight events: No acute events overnight. Patient in room 92106/18953 A. Blood glucose stable. BG at goal on current insulin regimen (Transition Insulin Drip). Steroid use- None. 2 Days Post-Op  Renal function- Normal   Vasopressors-  None       Endocrine will continue to follow and manage insulin orders inpatient.         Diet full liquid Nectar Thick (Mildly Thick)     Eating:   NPO  Nausea: No  Hypoglycemia and intervention: No  Fever: No  TPN and/or TF: No    BP (!) 123/59 (BP Location: Right arm, Patient Position: Sitting)   Pulse (!) 59   Temp 98.7 °F (37.1 °C) (Oral)   Resp 16   Ht 5' 1" (1.549 m)   Wt 80.7 kg (177 lb 12.8 oz)   LMP 01/07/2024 Comment: on period now  SpO2 (!) 91%   Breastfeeding No   BMI 33.60 kg/m² " "    Labs Reviewed and Include    Recent Labs   Lab 01/10/24  0255   GLU 99   CALCIUM 8.7   ALBUMIN 3.4*   PROT 5.7*      K 4.2   CO2 25      BUN 10   CREATININE 0.6   ALKPHOS 42*   ALT 14   AST 64*   BILITOT 0.8     Lab Results   Component Value Date    WBC 18.46 (H) 01/10/2024    WBC 18.46 (H) 01/10/2024    HGB 8.4 (L) 01/10/2024    HGB 8.4 (L) 01/10/2024    HCT 26.0 (L) 01/10/2024    HCT 26.0 (L) 01/10/2024    MCV 85 01/10/2024    MCV 85 01/10/2024     (L) 01/10/2024     (L) 01/10/2024     No results for input(s): "TSH", "FREET4" in the last 168 hours.  Lab Results   Component Value Date    HGBA1C 5.2 01/04/2024       Nutritional status:   Body mass index is 33.6 kg/m².  Lab Results   Component Value Date    ALBUMIN 3.4 (L) 01/10/2024    ALBUMIN 3.6 01/09/2024    ALBUMIN 3.6 01/09/2024     No results found for: "PREALBUMIN"    Estimated Creatinine Clearance: 121.2 mL/min (based on SCr of 0.6 mg/dL).    Accu-Checks  Recent Labs     01/09/24  0305 01/09/24  0403 01/09/24  0505 01/09/24  0556 01/09/24  0743 01/09/24  0911 01/09/24  1013 01/09/24  1550 01/09/24  1922 01/10/24  0100   POCTGLUCOSE 133* 143* 135* 164* 132* 140* 150* 142* 145* 131*       Current Medications and/or Treatments Impacting Glycemic Control  Immunotherapy:    Immunosuppressants       None          Steroids:   Hormones (From admission, onward)      None          Pressors:    Autonomic Drugs (From admission, onward)      None          Hyperglycemia/Diabetes Medications:   Antihyperglycemics (From admission, onward)      Start     Stop Route Frequency Ordered    01/10/24 1207  insulin aspart U-100 pen 0-10 Units         -- SubQ Before meals & nightly PRN 01/10/24 1107            ASSESSMENT and PLAN    Cardiac/Vascular  * Nonrheumatic mitral valve stenosis  Managed per primary team  Avoid hypoglycemia        S/P MVR (mitral valve replacement)  Optimize BG control to improve wound healing      Endocrine  Transient " hyperglycemia post procedure  Endocrinology consulted for BG management.   BG goal 110-140 (CTS Protocol)    - D/C Transition drip  - Novolog (aspart) insulin prn for BG excursions MDC SSI (150/25).  - BG checks AC/HS  - Hypoglycemia protocol in place    ** Please notify Endocrine for any change and/or advance in diet**  ** Please call Endocrine for any BG related issues **    Discharge Planning:   TBD. Please notify endocrinology prior to discharge.               Gerber Rehman, DNP, FNP  Endocrinology  [facility] - Surgical Intensive Care

## 2024-01-10 NOTE — PT/OT/SLP PROGRESS
Speech Language Pathology Treatment    Patient Name:  King Botello   MRN:  [id number]  Admitting Diagnosis: Nonrheumatic mitral valve stenosis    Recommendations:                 General Recommendations:  Dysphagia therapy  Diet recommendations:   , Liquid Diet Level: Full liquids, Mildly thick/Nectar thick liquids - IDDSI Level 2   Aspiration Precautions: Small bites/sips and Standard aspiration precautions   General Precautions: Standard, fall, sternal  Communication strategies:  none    Assessment:     King Botello is a 38 y.o. female with an SLP diagnosis of Dysphagia.      Subjective     Awake/alert    Pain/Comfort:  Pain Rating 1: 0/10  Pain Rating Post-Intervention 1: 0/10    Respiratory Status: Room air    Objective:     Has the patient been evaluated by SLP for swallowing?   Yes  Keep patient NPO? No     Pt upright in bedside chair throughout session. Pt stated she is still having difficulty with thin liquids at this time and would like to remain on nectar thick liquids at this time with tsp sips of thin liquids. Pt tolerated nectar thick liquids via cup x3 and tsp sips thin x5 with no overt s/s of airway compromise. Pt attempted trial of cracker x1 and tolerated without difficulty, but stated cracker was too dry. Recommend continue full liquids (nectar thick) at this time. SLP will follow up for ongoing swallow assessment.     Goals:   Multidisciplinary Problems       SLP Goals          Problem: SLP    Goal Priority Disciplines Outcome   SLP Goal     SLP Ongoing, Progressing   Description: Speech Language Pathology Goals  Goals expected to be met by 1/23  1. Pt will participate in ongoing assessment of swallow.                                Plan:     Patient to be seen:  4 x/week   Plan of Care expires:  02/08/24  Plan of Care reviewed with:  patient, spouse   SLP Follow-Up:  Yes       Discharge recommendations:   (pending progress)     Time Tracking:     SLP Treatment Date:   01/10/24  Speech  Start Time:  0924  Speech Stop Time:  0935     Speech Total Time (min):  11 min    Billable Minutes: Treatment Swallowing Dysfunction 11    01/10/2024

## 2024-01-10 NOTE — PLAN OF CARE
Problem: Physical Therapy  Goal: Physical Therapy Goal  Description: Goals to be met by: 2024    Patient will increase functional independence with mobility by performin. Supine to sit with Supervision  2. Sit to stand transfer with Supervision  3. Gait  x 400 feet with Supervision.     Outcome: Ongoing, Progressing   Goals remain appropriate. 1/10/2024

## 2024-01-10 NOTE — ASSESSMENT & PLAN NOTE
38 y.o. female with a past medical history of MVP and MR s/p mitral annuloplasty in [place] (2003),  HTN, bradycardia now s/p Mechanical Mitral Valve Replacement on 1/8/2024 with Dr. Liu    Neuro/Psych:     - Sedation: propofol    - Pain:    - Scheduled Acetaminophen 1g q8h    - Oxy PRN, dilaudid for breakthrough   - Received TTP and Rectus Sheath blocks intra-op             Cardiac:     - S/P Mechanical Mitral Valve Replacement with Dr. Liu on 1/8/2024    - BP Goal: MAP 60-80    - initially placed on Amio for suspected A-fib.    - d/c'd 1/9/24    - Pressors: off    - Anti-HTNs: Will start when appropriate    - Rhythm: NSR,    - Beta blocker: Hold for now, bradycardia    - Statin: Not indicated    - d/c A-line    - d/c cvc    - EKG today     Pulmonary:     - Goal SpO2 >92%    - 2L NC - Wean as able    - Chest Tubes 2 Meds     - 150cc 24h output on 1/10/24    - IS    - ABGs PRN      Renal:    - Trend BUN/Cr     - lasix 20mg IV BID    - Maintain Soares, record strict Is/Os    Recent Labs   Lab 01/09/24  0304 01/09/24  1547 01/10/24  0255   BUN 12 11 10   CREATININE 0.6 0.6 0.6           FEN / GI:     - Daily CMP, PRN K/Mag/Phos per protocol     - Replace electrolytes as needed    - Nutrition: nectar thick liquids, meds crushed.    - Bowel Regimen: Miralax, docusate      ID:     - Afebrile    - WBC stable    - Abx: Complete perioperative cefazolin 2g Q8H x 5 doses    Recent Labs   Lab 01/09/24  0304 01/09/24  1547 01/10/24  0255   WBC 13.78*  13.78* 18.31* 18.46*  18.46*           Heme/Onc:     - Hgb 13.0 pre-operatively    - CBC daily    - ASA 325mg POD0    - ASA 81mg POD1    - Heparin @ 700 increased to 1100 after APTT of 41.   - Goal APTT 60-80    - Coumadin 4 tonight    - Transfuse Hb <7 and symptomatic anemia    - 1u pRBC on 1/9/24    Recent Labs   Lab 01/08/24  1423 01/09/24  0304 01/09/24  1547 01/09/24  1918 01/10/24  0255   HGB 8.8* 7.0*  7.0* 8.5*  --  8.4*  8.4*    141*  141* 130*  --   135*  135*   APTT 36.3* 32.8*  32.8*  --  36.5* 41.6*   INR 1.2 1.2  1.2  --   --  1.3*           Endocrine:     - CTS Goal -140    - HgbA1c: 5.2    - Insulin gtt    - Endocrinology consulted for insulin management      PPx:   Feeding: nectar thick   Analgesia/Sedation: Multimodal  Thromboembolic Prevention: SCD's  HOB >30: Yes  Stress Ulcer: Famotidine  Glucose Control: Yes, insulin management per Endocrinology     Lines/Drains/Airway:   RIJ CVC   Soares   Chest Tubes: 2 Mediastinal    Pacing Wires: Temporary ventricular pacing wires      Dispo/Code Status/Palliative:     - Continue SICU Care    - Full Code

## 2024-01-10 NOTE — SUBJECTIVE & OBJECTIVE
"Interval HPI:   Overnight events: No acute events overnight. Patient in room 68644/60319 A. Blood glucose stable. BG at goal on current insulin regimen (Transition Insulin Drip). Steroid use- None. 2 Days Post-Op  Renal function- Normal   Vasopressors-  None       Endocrine will continue to follow and manage insulin orders inpatient.         Diet full liquid Nectar Thick (Mildly Thick)     Eating:   NPO  Nausea: No  Hypoglycemia and intervention: No  Fever: No  TPN and/or TF: No    BP (!) 123/59 (BP Location: Right arm, Patient Position: Sitting)   Pulse (!) 59   Temp 98.7 °F (37.1 °C) (Oral)   Resp 16   Ht 5' 1" (1.549 m)   Wt 80.7 kg (177 lb 12.8 oz)   LMP 01/07/2024 Comment: on period now  SpO2 (!) 91%   Breastfeeding No   BMI 33.60 kg/m²     Labs Reviewed and Include    Recent Labs   Lab 01/10/24  0255   GLU 99   CALCIUM 8.7   ALBUMIN 3.4*   PROT 5.7*      K 4.2   CO2 25      BUN 10   CREATININE 0.6   ALKPHOS 42*   ALT 14   AST 64*   BILITOT 0.8     Lab Results   Component Value Date    WBC 18.46 (H) 01/10/2024    WBC 18.46 (H) 01/10/2024    HGB 8.4 (L) 01/10/2024    HGB 8.4 (L) 01/10/2024    HCT 26.0 (L) 01/10/2024    HCT 26.0 (L) 01/10/2024    MCV 85 01/10/2024    MCV 85 01/10/2024     (L) 01/10/2024     (L) 01/10/2024     No results for input(s): "TSH", "FREET4" in the last 168 hours.  Lab Results   Component Value Date    HGBA1C 5.2 01/04/2024       Nutritional status:   Body mass index is 33.6 kg/m².  Lab Results   Component Value Date    ALBUMIN 3.4 (L) 01/10/2024    ALBUMIN 3.6 01/09/2024    ALBUMIN 3.6 01/09/2024     No results found for: "PREALBUMIN"    Estimated Creatinine Clearance: 121.2 mL/min (based on SCr of 0.6 mg/dL).    Accu-Checks  Recent Labs     01/09/24  0305 01/09/24  0403 01/09/24  0505 01/09/24  0556 01/09/24  0743 01/09/24  0911 01/09/24  1013 01/09/24  1550 01/09/24  1922 01/10/24  0100   POCTGLUCOSE 133* 143* 135* 164* 132* 140* 150* 142* 145* 131* "       Current Medications and/or Treatments Impacting Glycemic Control  Immunotherapy:    Immunosuppressants       None          Steroids:   Hormones (From admission, onward)      None          Pressors:    Autonomic Drugs (From admission, onward)      None          Hyperglycemia/Diabetes Medications:   Antihyperglycemics (From admission, onward)      Start     Stop Route Frequency Ordered    01/10/24 1207  insulin aspart U-100 pen 0-10 Units         -- SubQ Before meals & nightly PRN 01/10/24 1107

## 2024-01-10 NOTE — SUBJECTIVE & OBJECTIVE
Past Medical History:   Diagnosis Date    Abnormal Pap smear     Colposcopy    Bradycardia 2020    Class 2 obesity due to excess calories with body mass index (BMI) of 35.0 to 35.9 in adult 3/9/2021    Hypertension, essential 3/16/2021    Menarche     Age of onset 11    Missed ab 2021    Mitral valve prolapse     Previous  delivery affecting pregnancy, antepartum 2020    S/P suction D&C 2021    Status post mitral valve annuloplasty 2017       Past Surgical History:   Procedure Laterality Date     SECTION       SECTION WITH TUBAL LIGATION Bilateral 2023    Procedure:  SECTION, WITH TUBAL LIGATION;  Surgeon: aMne Moreno MD;  Location: [facility] L&D;  Service: OB/GYN;  Laterality: Bilateral;    DILATION AND CURETTAGE OF UTERUS USING SUCTION N/A 2021    Procedure: DILATION AND CURETTAGE, UTERUS, USING SUCTION;  Surgeon: Mane Moreno MD;  Location: [facility] OR;  Service: OB/GYN;  Laterality: N/A;    MITRAL VALVE REPAIR      MITRAL VALVE REPLACEMENT N/A 2024    Procedure: MITRAL VALVE REPLACEMENT, REDO STERNOTOMY;  Surgeon: Thierno Liu MD;  Location: [facility] OR [address] [place] [address];  Service: Cardiothoracic;  Laterality: N/A;    VALVULOPLASTY, TRICUSPID N/A 2024    Procedure: VALVULOPLASTY, TRICUSPID REPAIR;  Surgeon: Thierno Liu MD;  Location: [facility] OR [address] [place] [address];  Service: Cardiothoracic;  Laterality: N/A;       Review of patient's allergies indicates:   Allergen Reactions    Cinnamon Itching    Doxycycline      Abdomen pain severe       No current facility-administered medications on file prior to encounter.     Current Outpatient Medications on File Prior to Encounter   Medication Sig    furosemide (LASIX) 20 MG tablet Take one to two tablets by mouth per day as instructed by MD    metoprolol succinate (TOPROL-XL) 25 MG 24 hr tablet Take 2 tablets (50 mg total) by mouth every morning AND 1 tablet (25 mg total) every evening.    potassium  chloride SA (K-DUR,KLOR-CON M) 10 MEQ tablet Take one to two tablets daily as instructed by MD.    amoxicillin (AMOXIL) 500 MG Tab Take 4 tablets (2,000 mg total) by mouth daily as needed (for procedures). Take one hour before the procedure. (Patient not taking: Reported on 1/4/2024)     Family History       Problem Relation (Age of Onset)    Breast cancer Paternal Aunt    Cancer Mother    Diabetes Mother    Heart disease Mother    Hyperlipidemia Mother, Father    Hypertension Mother, Father          Tobacco Use    Smoking status: Never    Smokeless tobacco: Never   Substance and Sexual Activity    Alcohol use: Not Currently     Comment: not since +UPT    Drug use: No    Sexual activity: Yes     Partners: Male     Birth control/protection: None     Review of Systems   Constitutional: Positive for malaise/fatigue.   Cardiovascular:  Positive for dyspnea on exertion and palpitations. Negative for chest pain, irregular heartbeat, near-syncope and syncope.     Objective:     Vital Signs (Most Recent):  Temp: 99.2 °F (37.3 °C) (01/10/24 1500)  Pulse: (!) 54 (01/10/24 1500)  Resp: 15 (01/10/24 1500)  BP: (!) 117/57 (01/10/24 1500)  SpO2: (!) 91 % (01/10/24 1500) Vital Signs (24h Range):  Temp:  [97.8 °F (36.6 °C)-99.2 °F (37.3 °C)] 99.2 °F (37.3 °C)  Pulse:  [54-68] 54  Resp:  [14-34] 15  SpO2:  [91 %-100 %] 91 %  BP: (116-147)/(56-71) 117/57       Weight: 80.7 kg (177 lb 12.8 oz)  Body mass index is 33.6 kg/m².    SpO2: (!) 91 %        Physical Exam  Vitals and nursing note reviewed.   Constitutional:       General: She is not in acute distress.     Interventions: She is sedated and intubated.   HENT:      Mouth/Throat:      Mouth: Mucous membranes are dry.   Eyes:      Extraocular Movements: Extraocular movements intact.      Pupils: Pupils are equal, round, and reactive to light.   Neck:      Comments: RIJ CVC  Cardiovascular:      Rate and Rhythm: Normal rate and regular rhythm.      Pulses: Normal pulses.       Comments: V Wires  Pulmonary:      Effort: Pulmonary effort is normal. No respiratory distress. She is intubated.      Comments: 2 Mediastinal chest tube  Abdominal:      General: Abdomen is flat.      Palpations: Abdomen is soft.   Musculoskeletal:         General: No swelling.      Cervical back: Normal range of motion.      Right lower leg: No edema.      Left lower leg: No edema.   Skin:     General: Skin is warm and dry.      Capillary Refill: Capillary refill takes less than 2 seconds.          Significant Labs: EP:   Recent Labs   Lab 01/09/24  0304 01/09/24  0309 01/09/24  1547 01/09/24  1918 01/10/24  0255 01/10/24  1124     --  136  --  136  --    K 4.7   < > 3.6 4.2 4.2 4.0   *  --  105  --  106  --    CO2 20*  --  22*  --  25  --    *  --  137*  --  99  --    BUN 12  --  11  --  10  --    CREATININE 0.6  --  0.6  --  0.6  --    CALCIUM 8.0*  --  8.3*  --  8.7  --    PROT 5.1*  --  5.7*  --  5.7*  --    ALBUMIN 3.6  --  3.6  --  3.4*  --    BILITOT 0.9  --  0.7  --  0.8  --    ALKPHOS 30*  --  37*  --  42*  --    AST 76*  --  81*  --  64*  --    ALT 12  --  15  --  14  --    ANIONGAP 8  --  9  --  5*  --    WBC 13.78*  13.78*  --  18.31*  --  18.46*  18.46*  --    HGB 7.0*  7.0*  --  8.5*  --  8.4*  8.4*  --    HCT 21.7*  21.7*   < > 26.1*  --  26.0*  26.0*  --    *  141*  --  130*  --  135*  135*  --    INR 1.2  1.2  --   --   --  1.3*  --     < > = values in this interval not displayed.       Significant Imaging: EKG: CHB with Junctional Escape Rhythm. Review of Tele shows this Rhythm appeared as early as 1/08/2024.

## 2024-01-10 NOTE — NURSING
Spoke w/ Dr. Gillies w/EP regarding pt's EKG & concerns for complete heart block. Connected EP MD w/ Dr. Montoya.  VSS @ this time w/pt's HR in 50s. Pt connected to pacer w/ backup rate set @ 40. Transfer orders cancelled @ this time.

## 2024-01-10 NOTE — ASSESSMENT & PLAN NOTE
Patient is POD 2 s/p MVR (revision with mechanical valve) and TV banding. Patient was found to be CHB with a junctional escape rhythm this AM via tele and confirmed with 12 lead ECG. Review of Tele shows this arrhythm appearing as early as 1/08/2024. Patient received Amiodarone from 1/08/24 to 1/10/24. Currently having palpitations and BLANK, Unclear if related to this arrhythmia.    Recommendations  Unclear etiology with broad differential; we are hopeful this is a transient Arrhythmia in the post operative setting. Patient has epicardial wires currently in place. Recommending to continue ICU level care, telemetry. and Epicardial pacer wires set to 10mA and 40bpms. Currently not pacemaker dependant. Recommend holding AV herminia blocking agents. Recommend holding Amiodarone. Recommend contacting on call EP fellow if patient started to become pacer dependant or rhythm changes drastically. Recommend daily AM ECGs. Will consider PPM vs EP study as early as Friday PM if indicated. Patient should remain in the SICU for warfarin bridging. Will need to have a therapeutic INR for PPM. NPO at MN. Patient will need a 6 hr washout from heparin. Will reassess in AM when daily INRs result.

## 2024-01-11 LAB
ALBUMIN SERPL BCP-MCNC: 3.2 G/DL (ref 3.5–5.2)
ALP SERPL-CCNC: 49 U/L (ref 55–135)
ALT SERPL W/O P-5'-P-CCNC: 23 U/L (ref 10–44)
ANION GAP SERPL CALC-SCNC: 9 MMOL/L (ref 8–16)
APTT PPP: 56.9 SEC (ref 21–32)
APTT PPP: 63.3 SEC (ref 21–32)
APTT PPP: 77.2 SEC (ref 21–32)
AST SERPL-CCNC: 45 U/L (ref 10–40)
BASOPHILS # BLD AUTO: 0.04 K/UL (ref 0–0.2)
BASOPHILS NFR BLD: 0.2 % (ref 0–1.9)
BILIRUB SERPL-MCNC: 0.8 MG/DL (ref 0.1–1)
BUN SERPL-MCNC: 10 MG/DL (ref 6–20)
CALCIUM SERPL-MCNC: 8.4 MG/DL (ref 8.7–10.5)
CHLORIDE SERPL-SCNC: 103 MMOL/L (ref 95–110)
CO2 SERPL-SCNC: 26 MMOL/L (ref 23–29)
CREAT SERPL-MCNC: 0.6 MG/DL (ref 0.5–1.4)
DIFFERENTIAL METHOD BLD: ABNORMAL
EOSINOPHIL # BLD AUTO: 0 K/UL (ref 0–0.5)
EOSINOPHIL NFR BLD: 0.2 % (ref 0–8)
ERYTHROCYTE [DISTWIDTH] IN BLOOD BY AUTOMATED COUNT: 17.1 % (ref 11.5–14.5)
EST. GFR  (NO RACE VARIABLE): >60 ML/MIN/1.73 M^2
GLUCOSE SERPL-MCNC: 128 MG/DL (ref 70–110)
GLUCOSE SERPL-MCNC: 135 MG/DL (ref 70–110)
GLUCOSE SERPL-MCNC: 162 MG/DL (ref 70–110)
GLUCOSE SERPL-MCNC: 206 MG/DL (ref 70–110)
GLUCOSE SERPL-MCNC: 99 MG/DL (ref 70–110)
HCO3 UR-SCNC: 19.9 MMOL/L (ref 24–28)
HCO3 UR-SCNC: 23.4 MMOL/L (ref 24–28)
HCO3 UR-SCNC: 23.5 MMOL/L (ref 24–28)
HCO3 UR-SCNC: 23.6 MMOL/L (ref 24–28)
HCT VFR BLD AUTO: 24.5 % (ref 37–48.5)
HCT VFR BLD CALC: 16 %PCV (ref 36–54)
HCT VFR BLD CALC: 21 %PCV (ref 36–54)
HCT VFR BLD CALC: 21 %PCV (ref 36–54)
HCT VFR BLD CALC: 34 %PCV (ref 36–54)
HGB BLD-MCNC: 7.9 G/DL (ref 12–16)
IMM GRANULOCYTES # BLD AUTO: 0.13 K/UL (ref 0–0.04)
IMM GRANULOCYTES NFR BLD AUTO: 0.7 % (ref 0–0.5)
INR PPP: 1.6 (ref 0.8–1.2)
LYMPHOCYTES # BLD AUTO: 1.8 K/UL (ref 1–4.8)
LYMPHOCYTES NFR BLD: 9.4 % (ref 18–48)
MAGNESIUM SERPL-MCNC: 2.2 MG/DL (ref 1.6–2.6)
MCH RBC QN AUTO: 27.9 PG (ref 27–31)
MCHC RBC AUTO-ENTMCNC: 32.2 G/DL (ref 32–36)
MCV RBC AUTO: 87 FL (ref 82–98)
MONOCYTES # BLD AUTO: 1.8 K/UL (ref 0.3–1)
MONOCYTES NFR BLD: 9.5 % (ref 4–15)
NEUTROPHILS # BLD AUTO: 15.6 K/UL (ref 1.8–7.7)
NEUTROPHILS NFR BLD: 80 % (ref 38–73)
NRBC BLD-RTO: 0 /100 WBC
PCO2 BLDA: 34.7 MMHG (ref 35–45)
PCO2 BLDA: 41.9 MMHG (ref 35–45)
PCO2 BLDA: 43.1 MMHG (ref 35–45)
PCO2 BLDA: 43.4 MMHG (ref 35–45)
PH SMN: 7.34 [PH] (ref 7.35–7.45)
PH SMN: 7.34 [PH] (ref 7.35–7.45)
PH SMN: 7.36 [PH] (ref 7.35–7.45)
PH SMN: 7.37 [PH] (ref 7.35–7.45)
PHOSPHATE SERPL-MCNC: 2.5 MG/DL (ref 2.7–4.5)
PHOSPHATE SERPL-MCNC: 3.3 MG/DL (ref 2.7–4.5)
PLATELET # BLD AUTO: 162 K/UL (ref 150–450)
PMV BLD AUTO: 12.2 FL (ref 9.2–12.9)
PO2 BLDA: 135 MMHG (ref 80–100)
PO2 BLDA: 284 MMHG (ref 80–100)
PO2 BLDA: 303 MMHG (ref 80–100)
PO2 BLDA: 309 MMHG (ref 80–100)
POC BE: -2 MMOL/L
POC BE: -5 MMOL/L
POC IONIZED CALCIUM: 0.8 MMOL/L (ref 1.06–1.42)
POC IONIZED CALCIUM: 1.11 MMOL/L (ref 1.06–1.42)
POC IONIZED CALCIUM: 1.12 MMOL/L (ref 1.06–1.42)
POC IONIZED CALCIUM: 1.24 MMOL/L (ref 1.06–1.42)
POC SATURATED O2: 100 % (ref 95–100)
POC SATURATED O2: 99 % (ref 95–100)
POC TCO2: 21 MMOL/L (ref 23–27)
POC TCO2: 25 MMOL/L (ref 23–27)
POCT GLUCOSE: 112 MG/DL (ref 70–110)
POCT GLUCOSE: 117 MG/DL (ref 70–110)
POCT GLUCOSE: 143 MG/DL (ref 70–110)
POCT GLUCOSE: 91 MG/DL (ref 70–110)
POTASSIUM BLD-SCNC: 2.7 MMOL/L (ref 3.5–5.1)
POTASSIUM BLD-SCNC: 2.8 MMOL/L (ref 3.5–5.1)
POTASSIUM BLD-SCNC: 3.1 MMOL/L (ref 3.5–5.1)
POTASSIUM BLD-SCNC: 3.3 MMOL/L (ref 3.5–5.1)
POTASSIUM SERPL-SCNC: 3.9 MMOL/L (ref 3.5–5.1)
POTASSIUM SERPL-SCNC: 4.1 MMOL/L (ref 3.5–5.1)
PROT SERPL-MCNC: 5.9 G/DL (ref 6–8.4)
PROTHROMBIN TIME: 16.6 SEC (ref 9–12.5)
RBC # BLD AUTO: 2.83 M/UL (ref 4–5.4)
SAMPLE: ABNORMAL
SODIUM BLD-SCNC: 140 MMOL/L (ref 136–145)
SODIUM BLD-SCNC: 142 MMOL/L (ref 136–145)
SODIUM BLD-SCNC: 143 MMOL/L (ref 136–145)
SODIUM BLD-SCNC: 143 MMOL/L (ref 136–145)
SODIUM SERPL-SCNC: 138 MMOL/L (ref 136–145)
WBC # BLD AUTO: 19.43 K/UL (ref 3.9–12.7)

## 2024-01-11 PROCEDURE — 94761 N-INVAS EAR/PLS OXIMETRY MLT: CPT

## 2024-01-11 PROCEDURE — 63600175 PHARM REV CODE 636 W HCPCS

## 2024-01-11 PROCEDURE — 27000221 HC OXYGEN, UP TO 24 HOURS

## 2024-01-11 PROCEDURE — 84132 ASSAY OF SERUM POTASSIUM: CPT

## 2024-01-11 PROCEDURE — 99232 SBSQ HOSP IP/OBS MODERATE 35: CPT | Mod: ,,, | Performed by: NURSE PRACTITIONER

## 2024-01-11 PROCEDURE — 85730 THROMBOPLASTIN TIME PARTIAL: CPT | Mod: 91 | Performed by: THORACIC SURGERY (CARDIOTHORACIC VASCULAR SURGERY)

## 2024-01-11 PROCEDURE — 25000003 PHARM REV CODE 250

## 2024-01-11 PROCEDURE — 99233 SBSQ HOSP IP/OBS HIGH 50: CPT | Mod: ,,, | Performed by: STUDENT IN AN ORGANIZED HEALTH CARE EDUCATION/TRAINING PROGRAM

## 2024-01-11 PROCEDURE — 85610 PROTHROMBIN TIME: CPT

## 2024-01-11 PROCEDURE — 80053 COMPREHEN METABOLIC PANEL: CPT | Performed by: STUDENT IN AN ORGANIZED HEALTH CARE EDUCATION/TRAINING PROGRAM

## 2024-01-11 PROCEDURE — 99291 CRITICAL CARE FIRST HOUR: CPT | Mod: ,,, | Performed by: ANESTHESIOLOGY

## 2024-01-11 PROCEDURE — 97116 GAIT TRAINING THERAPY: CPT

## 2024-01-11 PROCEDURE — 63600175 PHARM REV CODE 636 W HCPCS: Performed by: STUDENT IN AN ORGANIZED HEALTH CARE EDUCATION/TRAINING PROGRAM

## 2024-01-11 PROCEDURE — 25000003 PHARM REV CODE 250: Performed by: STUDENT IN AN ORGANIZED HEALTH CARE EDUCATION/TRAINING PROGRAM

## 2024-01-11 PROCEDURE — 85025 COMPLETE CBC W/AUTO DIFF WBC: CPT

## 2024-01-11 PROCEDURE — 83735 ASSAY OF MAGNESIUM: CPT

## 2024-01-11 PROCEDURE — 97535 SELF CARE MNGMENT TRAINING: CPT

## 2024-01-11 PROCEDURE — 85730 THROMBOPLASTIN TIME PARTIAL: CPT

## 2024-01-11 PROCEDURE — 93005 ELECTROCARDIOGRAM TRACING: CPT

## 2024-01-11 PROCEDURE — 93010 ELECTROCARDIOGRAM REPORT: CPT | Mod: ,,, | Performed by: INTERNAL MEDICINE

## 2024-01-11 PROCEDURE — 11000001 HC ACUTE MED/SURG PRIVATE ROOM

## 2024-01-11 PROCEDURE — 84100 ASSAY OF PHOSPHORUS: CPT | Mod: 91

## 2024-01-11 PROCEDURE — 99900035 HC TECH TIME PER 15 MIN (STAT)

## 2024-01-11 PROCEDURE — 84132 ASSAY OF SERUM POTASSIUM: CPT | Mod: 91 | Performed by: THORACIC SURGERY (CARDIOTHORACIC VASCULAR SURGERY)

## 2024-01-11 PROCEDURE — 25000003 PHARM REV CODE 250: Performed by: THORACIC SURGERY (CARDIOTHORACIC VASCULAR SURGERY)

## 2024-01-11 RX ORDER — HEPARIN SODIUM 10000 [USP'U]/100ML
1300 INJECTION, SOLUTION INTRAVENOUS CONTINUOUS
Status: DISCONTINUED | OUTPATIENT
Start: 2024-01-11 | End: 2024-01-12

## 2024-01-11 RX ADMIN — ASPIRIN 81 MG CHEWABLE TABLET 81 MG: 81 TABLET CHEWABLE at 09:01

## 2024-01-11 RX ADMIN — MUPIROCIN 1 G: 20 OINTMENT TOPICAL at 09:01

## 2024-01-11 RX ADMIN — FUROSEMIDE 40 MG: 10 INJECTION, SOLUTION INTRAVENOUS at 09:01

## 2024-01-11 RX ADMIN — WARFARIN SODIUM 4 MG: 4 TABLET ORAL at 04:01

## 2024-01-11 RX ADMIN — ACETAMINOPHEN 1000 MG: 500 TABLET ORAL at 01:01

## 2024-01-11 RX ADMIN — OXYCODONE HYDROCHLORIDE 10 MG: 10 TABLET ORAL at 05:01

## 2024-01-11 RX ADMIN — ACETAMINOPHEN 1000 MG: 500 TABLET ORAL at 05:01

## 2024-01-11 RX ADMIN — FAMOTIDINE 20 MG: 20 TABLET, FILM COATED ORAL at 09:01

## 2024-01-11 RX ADMIN — HEPARIN SODIUM 1300 UNITS/HR: 10000 INJECTION, SOLUTION INTRAVENOUS at 07:01

## 2024-01-11 RX ADMIN — SODIUM PHOSPHATE, MONOBASIC, MONOHYDRATE AND SODIUM PHOSPHATE, DIBASIC, ANHYDROUS 15 MMOL: 142; 276 INJECTION, SOLUTION INTRAVENOUS at 05:01

## 2024-01-11 RX ADMIN — POTASSIUM CHLORIDE 20 MEQ: 200 INJECTION, SOLUTION INTRAVENOUS at 04:01

## 2024-01-11 RX ADMIN — DOCUSATE SODIUM 100 MG: 100 CAPSULE, LIQUID FILLED ORAL at 09:01

## 2024-01-11 RX ADMIN — OXYCODONE HYDROCHLORIDE 10 MG: 10 TABLET ORAL at 10:01

## 2024-01-11 RX ADMIN — POLYETHYLENE GLYCOL 3350 17 G: 17 POWDER, FOR SOLUTION ORAL at 09:01

## 2024-01-11 RX ADMIN — OXYCODONE HYDROCHLORIDE 5 MG: 5 TABLET ORAL at 09:01

## 2024-01-11 RX ADMIN — ONDANSETRON 4 MG: 2 INJECTION INTRAMUSCULAR; INTRAVENOUS at 10:01

## 2024-01-11 RX ADMIN — ACETAMINOPHEN 1000 MG: 500 TABLET ORAL at 09:01

## 2024-01-11 NOTE — SUBJECTIVE & OBJECTIVE
"Interval HPI:   Overnight events: No acute events overnight. Patient in room 17172/53812 A. Blood glucose stable. BG at goal on current insulin regimen (SSI ). Steroid use- None. 3 Days Post-Op  Renal function- Normal   Vasopressors-  None       Endocrine will continue to follow and manage insulin orders inpatient.         Diet NPO Except for: Sips with Medication     Eating:   NPO  Nausea: No  Hypoglycemia and intervention: No  Fever: No  TPN and/or TF: No    /80 (BP Location: Left arm, Patient Position: Lying)   Pulse (!) 52   Temp 98.4 °F (36.9 °C) (Oral)   Resp (!) 23   Ht 5' 1" (1.549 m)   Wt 80.7 kg (177 lb 12.8 oz)   LMP 01/07/2024 Comment: on period now  SpO2 99%   Breastfeeding No   BMI 33.60 kg/m²     Labs Reviewed and Include    Recent Labs   Lab 01/11/24  0333   GLU 99   CALCIUM 8.4*   ALBUMIN 3.2*   PROT 5.9*      K 3.9   CO2 26      BUN 10   CREATININE 0.6   ALKPHOS 49*   ALT 23   AST 45*   BILITOT 0.8     Lab Results   Component Value Date    WBC 19.43 (H) 01/11/2024    HGB 7.9 (L) 01/11/2024    HCT 24.5 (L) 01/11/2024    MCV 87 01/11/2024     01/11/2024     No results for input(s): "TSH", "FREET4" in the last 168 hours.  Lab Results   Component Value Date    HGBA1C 5.2 01/04/2024       Nutritional status:   Body mass index is 33.6 kg/m².  Lab Results   Component Value Date    ALBUMIN 3.2 (L) 01/11/2024    ALBUMIN 3.4 (L) 01/10/2024    ALBUMIN 3.6 01/09/2024     No results found for: "PREALBUMIN"    Estimated Creatinine Clearance: 121.2 mL/min (based on SCr of 0.6 mg/dL).    Accu-Checks  Recent Labs     01/09/24  0743 01/09/24  0911 01/09/24  1013 01/09/24  1550 01/09/24  1922 01/10/24  0100 01/10/24  1128 01/10/24  1822 01/10/24  2038 01/11/24  0008   POCTGLUCOSE 132* 140* 150* 142* 145* 131* 164* 122* 143* 112*       Current Medications and/or Treatments Impacting Glycemic Control  Immunotherapy:    Immunosuppressants       None          Steroids:   Hormones (From " admission, onward)      None          Pressors:    Autonomic Drugs (From admission, onward)      None          Hyperglycemia/Diabetes Medications:   Antihyperglycemics (From admission, onward)      Start     Stop Route Frequency Ordered    01/10/24 1207  insulin aspart U-100 pen 0-10 Units         -- SubQ Before meals & nightly PRN 01/10/24 1107

## 2024-01-11 NOTE — ASSESSMENT & PLAN NOTE
38 y.o. female with a past medical history of MVP and MR s/p mitral annuloplasty in [place] (2003),  HTN, bradycardia now s/p Mechanical Mitral Valve Replacement on 1/8/2024 with Dr. Liu      Neuro/Psych:     - Sedation: propofol    - Pain:    - Scheduled Acetaminophen 1g q8h    - Oxy PRN, dilaudid for breakthrough   - Received TTP and Rectus Sheath blocks intra-op             Cardiac:     - S/P Mechanical Mitral Valve Replacement with Dr. Liu on 1/8/2024    - BP Goal: MAP 60-80    - initially placed on Amio for suspected A-fib.    - d/c'd 1/9/24    - Pressors: off    - Anti-HTNs: Will start when appropriate    - Rhythm: Complete Heart block with occasional junctional    - Beta blocker: Hold for now, bradycardia    - EP involved for Possible PPM    - Statin: Not indicated    - d/c A-line and wilkins    - d/c cvc 1/11/23    - EKG today     Pulmonary:     - Goal SpO2 >92%    - 4L overnight     - 2L Nc day - Wean as able    - CXR stable    - Chest Tubes 2 Meds 70mL     - pull tubes on 1/11/24    - IS    - ABGs PRN      Renal:    - Trend BUN/Cr     - lasix 40mg IV BID    - Wilkins out    Recent Labs   Lab 01/09/24  1547 01/10/24  0255 01/11/24  0333   BUN 11 10 10   CREATININE 0.6 0.6 0.6           FEN / GI:     - Daily CMP, PRN K/Mag/Phos per protocol     - Replace electrolytes as needed    - Nutrition: nectar thick liquids, meds crushed.    - Bowel Regimen: Miralax, docusate      ID:     - Afebrile    - WBC up slightly    - Abx: Completed perioperative cefazolin 2g Q8H x 5 doses    Recent Labs   Lab 01/09/24  1547 01/10/24  0255 01/11/24  0333   WBC 18.31* 18.46*  18.46* 19.43*           Heme/Onc:     - Hgb 13.0 pre-operatively    - CBC daily    - ASA 325mg POD0    - ASA 81mg POD1    - Heparin @ 1100 increased to 1200 after APTT of 41.   - Goal APTT 60-80    - Coumadin 4 tonight , INR 1.6    - Transfuse Hb <7 and symptomatic anemia    - 1u pRBC on 1/9/24      Recent Labs   Lab 01/09/24  0304 01/09/24  1542  01/09/24  1918 01/10/24  0255 01/10/24  1124 01/10/24  1817 01/11/24  0009 01/11/24  0333   HGB 7.0*  7.0* 8.5*  --  8.4*  8.4*  --   --   --  7.9*   *  141* 130*  --  135*  135*  --   --   --  162   APTT 32.8*  32.8*  --    < > 41.6* 53.1* 69.2* 56.9*  --    INR 1.2  1.2  --   --  1.3*  --   --   --  1.6*    < > = values in this interval not displayed.           Endocrine:     - CTS Goal -140    - HgbA1c: 5.2    - Insulin SSI    - Endocrinology consulted for insulin management      PPx:   Feeding: nectar thick, Speech  Analgesia/Sedation: Multimodal controlled  Thromboembolic Prevention: SCD's   HOB >30: Yes  Stress Ulcer: Famotidine  Glucose Control: Yes, insulin management per Endocrinology     Lines/Drains/Airway:   Chest Tubes: 2 Mediastinal    Pacing Wires: Temporary ventricular pacing wires      Dispo/Code Status/Palliative:     - Continue SICU Care    - Full Code

## 2024-01-11 NOTE — PLAN OF CARE
Problem: Physical Therapy  Goal: Physical Therapy Goal  Description: Goals to be met by: 2024    Patient will increase functional independence with mobility by performin. Supine to sit with Supervision  2. Sit to stand transfer with Supervision  3. Gait  x 400 feet with Supervision.     Outcome: Ongoing, Progressing   Goals remain appropriate. 2024

## 2024-01-11 NOTE — NURSING
SICU PLAN OF CARE NOTE    Dx: Nonrheumatic mitral valve stenosis    Shift Events: VSS, no acute events overnight.     Gtts: heparin     Neuro: AAO x4, Follows Commands, and Moves All Extremities    Cardiac: Complete Heart block  48-50    Respiratory: Nasal Cannula 4LPM    GI: Dental Soft, nectar thick     : Voids Spontaneously 550 cc/shift    Drains: Chest Tube, total output 30 cc /  shift     Labs/Accuchecks: daily labs, accuchecks Q4, aptt q6    Skin: No new skin breakdown noted during shift. Pt turned and repositioned throughout shift. Pillows in use, foams in place, SCDs on. Bed plugged in,wheels locked, call light in reach. Pt and family updated on plan of care and verbalize understanding.

## 2024-01-11 NOTE — HPI
Ms. Botello is a very pleasant 38-year-old woman who underwent mitral valve repair in 2003 for mitral regurgitation.  She had a baby in May of last year, and prior to delivery she had chronic fatigue and dyspnea on exertion.  Postpartum, she was admitted for heart failure.  She reports lifestyle limiting dyspnea on exertion.  She underwent a thoughtful and thorough evaluation which included echocardiography.  This study demonstrated severe mitral stenosis, with a mean gradient of 13 mm mmHg.  She now presents for mitral valve replacement.  Her previous operation was done through a right thoracotomy.  Of note, the intraoperative echo demonstrated severe tricuspid regurgitation with reversal of flow in the hepatic veins.  As result, we plan tricuspid valve repair as well.

## 2024-01-11 NOTE — SUBJECTIVE & OBJECTIVE
Interval History/Significant Events: NAEON. EP yesterday looked at EKG calling it complete heart block. Nothing to do yesterday but NPO at midnight for possible PMM vs JESICA.    Follow-up For: Procedure(s) (LRB):  MITRAL VALVE REPLACEMENT, REDO STERNOTOMY (N/A)  VALVULOPLASTY, TRICUSPID REPAIR (N/A)    Post-Operative Day: 3 Days Post-Op    Objective:     Vital Signs (Most Recent):  Temp: 98.8 °F (37.1 °C) (01/11/24 0700)  Pulse: (!) 55 (01/11/24 0821)  Resp: (!) 39 (eating at this time) (01/11/24 0821)  BP: 139/65 (01/11/24 0800)  SpO2: 95 % (01/11/24 0821) Vital Signs (24h Range):  Temp:  [98.4 °F (36.9 °C)-99.2 °F (37.3 °C)] 98.8 °F (37.1 °C)  Pulse:  [52-74] 55  Resp:  [14-49] 39  SpO2:  [88 %-100 %] 95 %  BP: (108-151)/(56-80) 139/65     Weight: 80.7 kg (177 lb 12.8 oz)  Body mass index is 33.6 kg/m².      Intake/Output Summary (Last 24 hours) at 1/11/2024 0836  Last data filed at 1/11/2024 0800  Gross per 24 hour   Intake 1074.96 ml   Output 1305 ml   Net -230.04 ml          Physical Exam  Vitals and nursing note reviewed.   Constitutional:       General: She is not in acute distress.     Interventions: She is sedated and intubated.   HENT:      Mouth/Throat:      Mouth: Mucous membranes are dry.   Eyes:      Extraocular Movements: Extraocular movements intact.      Pupils: Pupils are equal, round, and reactive to light.   Cardiovascular:      Rate and Rhythm: Normal rate and regular rhythm.      Pulses: Normal pulses.      Comments: V Wires  Pulmonary:      Effort: Pulmonary effort is normal. No respiratory distress. She is intubated.      Comments: 2 Mediastinal chest tube  Abdominal:      General: Abdomen is flat.      Palpations: Abdomen is soft.   Musculoskeletal:         General: No swelling.      Cervical back: Normal range of motion.      Right lower leg: No edema.      Left lower leg: No edema.   Skin:     General: Skin is warm and dry.      Capillary Refill: Capillary refill takes less than 2 seconds.       Comments: MSI CDI              Vents:  Vent Mode: Spont (01/08/24 2052)  Ventilator Initiated: Yes (01/08/24 1427)  Set Rate: 20 BPM (01/08/24 1804)  Vt Set: 380 mL (01/08/24 1804)  PEEP/CPAP: 5 cmH20 (01/08/24 2052)  Oxygen Concentration (%): 100 (01/10/24 1415)  Peak Airway Pressure: 9.9 cmH20 (01/08/24 2052)  Plateau Pressure: 0 cmH20 (01/08/24 2052)  Total Ve: 7.34 L/m (01/08/24 2052)  Negative Inspiratory Force (cm H2O): -15 (01/08/24 2137)  F/VT Ratio<105 (RSBI): (!) 96.3 (01/08/24 2052)    Lines/Drains/Airways       Drain  Duration                  Y Chest Tube 1 and 2 01/08/24 1309 1 Anterior Mediastinal 19 Fr. 2 Anterior Mediastinal 19 Fr. 2 days              Line  Duration                  Pacer Wires 01/08/24 1259 2 days              Peripheral Intravenous Line  Duration                  Peripheral IV - Single Lumen 01/08/24 0548 20 G Anterior;Left Forearm 3 days         Peripheral IV - Single Lumen 01/08/24 0729 16 G Right Forearm 3 days                    Significant Labs:    CBC/Anemia Profile:  Recent Labs   Lab 01/09/24  1547 01/10/24  0255 01/11/24  0333   WBC 18.31* 18.46*  18.46* 19.43*   HGB 8.5* 8.4*  8.4* 7.9*   HCT 26.1* 26.0*  26.0* 24.5*   * 135*  135* 162   MCV 85 85  85 87   RDW 17.1* 17.4*  17.4* 17.1*        Chemistries:  Recent Labs   Lab 01/09/24  1547 01/09/24  1918 01/10/24  0255 01/10/24  1124 01/10/24  2038 01/11/24  0333     --  136  --   --  138   K 3.6   < > 4.2 4.0 3.6 3.9     --  106  --   --  103   CO2 22*  --  25  --   --  26   BUN 11  --  10  --   --  10   CREATININE 0.6  --  0.6  --   --  0.6   CALCIUM 8.3*  --  8.7  --   --  8.4*   ALBUMIN 3.6  --  3.4*  --   --  3.2*   PROT 5.7*  --  5.7*  --   --  5.9*   BILITOT 0.7  --  0.8  --   --  0.8   ALKPHOS 37*  --  42*  --   --  49*   ALT 15  --  14  --   --  23   AST 81*  --  64*  --   --  45*   MG 2.2  --  2.4  2.4  --   --  2.2   PHOS 3.0  --  1.9*  1.9* 2.1*  --  2.5*    < > = values in this  interval not displayed.       All pertinent labs within the past 24 hours have been reviewed.    Significant Imaging:  I have reviewed all pertinent imaging results/findings within the past 24 hours.

## 2024-01-11 NOTE — PROGRESS NOTES
[facility] - [facility]  Critical Care - Surgery  Progress Note    Patient Name: Knig Botello  MRN: [id number]  Admission Date: 1/8/2024  Hospital Length of Stay: 3 days  Code Status: Full Code  Attending Provider: Thierno Liu MD  Primary Care Provider: Mayco Hansen MD   Principal Problem: Nonrheumatic mitral valve stenosis    Subjective:     Interval History/Significant Events: NAEON. EP yesterday looked at EKG calling it complete heart block. Nothing to do yesterday but NPO at midnight for possible PMM vs EJSICA.    Follow-up For: Procedure(s) (LRB):  MITRAL VALVE REPLACEMENT, REDO STERNOTOMY (N/A)  VALVULOPLASTY, TRICUSPID REPAIR (N/A)    Post-Operative Day: 3 Days Post-Op    Objective:     Vital Signs (Most Recent):  Temp: 98.8 °F (37.1 °C) (01/11/24 0700)  Pulse: (!) 55 (01/11/24 0821)  Resp: (!) 39 (eating at this time) (01/11/24 0821)  BP: 139/65 (01/11/24 0800)  SpO2: 95 % (01/11/24 0821) Vital Signs (24h Range):  Temp:  [98.4 °F (36.9 °C)-99.2 °F (37.3 °C)] 98.8 °F (37.1 °C)  Pulse:  [52-74] 55  Resp:  [14-49] 39  SpO2:  [88 %-100 %] 95 %  BP: (108-151)/(56-80) 139/65     Weight: 80.7 kg (177 lb 12.8 oz)  Body mass index is 33.6 kg/m².      Intake/Output Summary (Last 24 hours) at 1/11/2024 0836  Last data filed at 1/11/2024 0800  Gross per 24 hour   Intake 1074.96 ml   Output 1305 ml   Net -230.04 ml          Physical Exam  Vitals and nursing note reviewed.   Constitutional:       General: She is not in acute distress.     Interventions: She is sedated and intubated.   HENT:      Mouth/Throat:      Mouth: Mucous membranes are dry.   Eyes:      Extraocular Movements: Extraocular movements intact.      Pupils: Pupils are equal, round, and reactive to light.   Cardiovascular:      Rate and Rhythm: Normal rate and regular rhythm.      Pulses: Normal pulses.      Comments: V Wires  Pulmonary:      Effort: Pulmonary effort is normal. No respiratory distress. She is intubated.       Comments: 2 Mediastinal chest tube  Abdominal:      General: Abdomen is flat.      Palpations: Abdomen is soft.   Musculoskeletal:         General: No swelling.      Cervical back: Normal range of motion.      Right lower leg: No edema.      Left lower leg: No edema.   Skin:     General: Skin is warm and dry.      Capillary Refill: Capillary refill takes less than 2 seconds.      Comments: MSI CDI              Vents:  Vent Mode: Spont (01/08/24 2052)  Ventilator Initiated: Yes (01/08/24 1427)  Set Rate: 20 BPM (01/08/24 1804)  Vt Set: 380 mL (01/08/24 1804)  PEEP/CPAP: 5 cmH20 (01/08/24 2052)  Oxygen Concentration (%): 100 (01/10/24 1415)  Peak Airway Pressure: 9.9 cmH20 (01/08/24 2052)  Plateau Pressure: 0 cmH20 (01/08/24 2052)  Total Ve: 7.34 L/m (01/08/24 2052)  Negative Inspiratory Force (cm H2O): -15 (01/08/24 2137)  F/VT Ratio<105 (RSBI): (!) 96.3 (01/08/24 2052)    Lines/Drains/Airways       Drain  Duration                  Y Chest Tube 1 and 2 01/08/24 1309 1 Anterior Mediastinal 19 Fr. 2 Anterior Mediastinal 19 Fr. 2 days              Line  Duration                  Pacer Wires 01/08/24 1259 2 days              Peripheral Intravenous Line  Duration                  Peripheral IV - Single Lumen 01/08/24 0548 20 G Anterior;Left Forearm 3 days         Peripheral IV - Single Lumen 01/08/24 0729 16 G Right Forearm 3 days                    Significant Labs:    CBC/Anemia Profile:  Recent Labs   Lab 01/09/24  1547 01/10/24  0255 01/11/24  0333   WBC 18.31* 18.46*  18.46* 19.43*   HGB 8.5* 8.4*  8.4* 7.9*   HCT 26.1* 26.0*  26.0* 24.5*   * 135*  135* 162   MCV 85 85  85 87   RDW 17.1* 17.4*  17.4* 17.1*        Chemistries:  Recent Labs   Lab 01/09/24  1547 01/09/24  1918 01/10/24  0255 01/10/24  1124 01/10/24  2038 01/11/24  0333     --  136  --   --  138   K 3.6   < > 4.2 4.0 3.6 3.9     --  106  --   --  103   CO2 22*  --  25  --   --  26   BUN 11  --  10  --   --  10   CREATININE 0.6   --  0.6  --   --  0.6   CALCIUM 8.3*  --  8.7  --   --  8.4*   ALBUMIN 3.6  --  3.4*  --   --  3.2*   PROT 5.7*  --  5.7*  --   --  5.9*   BILITOT 0.7  --  0.8  --   --  0.8   ALKPHOS 37*  --  42*  --   --  49*   ALT 15  --  14  --   --  23   AST 81*  --  64*  --   --  45*   MG 2.2  --  2.4  2.4  --   --  2.2   PHOS 3.0  --  1.9*  1.9* 2.1*  --  2.5*    < > = values in this interval not displayed.       All pertinent labs within the past 24 hours have been reviewed.    Significant Imaging:  I have reviewed all pertinent imaging results/findings within the past 24 hours.  Assessment/Plan:     Cardiac/Vascular  * Nonrheumatic mitral valve stenosis  38 y.o. female with a past medical history of MVP and MR s/p mitral annuloplasty in [place] (2003),  HTN, bradycardia now s/p Mechanical Mitral Valve Replacement on 1/8/2024 with Dr. Liu      Neuro/Psych:     - Sedation: propofol    - Pain:    - Scheduled Acetaminophen 1g q8h    - Oxy PRN, dilaudid for breakthrough   - Received TTP and Rectus Sheath blocks intra-op             Cardiac:     - S/P Mechanical Mitral Valve Replacement with Dr. Liu on 1/8/2024    - BP Goal: MAP 60-80    - initially placed on Amio for suspected A-fib.    - d/c'd 1/9/24    - Pressors: off    - Anti-HTNs: Will start when appropriate    - Rhythm: Complete Heart block with occasional junctional    - Beta blocker: Hold for now, bradycardia    - EP involved for Possible PPM    - Statin: Not indicated    - d/c A-line and claudette    - d/c cvc 1/11/23    - EKG today     Pulmonary:     - Goal SpO2 >92%    - 4L overnight     - 2L Nc day - Wean as able    - CXR stable    - Chest Tubes 2 Meds 70mL     - pull tubes on 1/11/24    - IS    - ABGs PRN      Renal:    - Trend BUN/Cr     - lasix 40mg IV BID    - Soares out    Recent Labs   Lab 01/09/24  1547 01/10/24  0255 01/11/24  0333   BUN 11 10 10   CREATININE 0.6 0.6 0.6           FEN / GI:     - Daily CMP, PRN K/Mag/Phos per protocol     - Replace  electrolytes as needed    - Nutrition: nectar thick liquids, meds crushed.    - Bowel Regimen: Miralax, docusate      ID:     - Afebrile    - WBC up slightly    - Abx: Completed perioperative cefazolin 2g Q8H x 5 doses    Recent Labs   Lab 01/09/24  1547 01/10/24  0255 01/11/24  0333   WBC 18.31* 18.46*  18.46* 19.43*           Heme/Onc:     - Hgb 13.0 pre-operatively    - CBC daily    - ASA 325mg POD0    - ASA 81mg POD1    - Heparin @ 1100 increased to 1200 after APTT of 41.   - Goal APTT 60-80    - Coumadin 4 tonight , INR 1.6    - Transfuse Hb <7 and symptomatic anemia    - 1u pRBC on 1/9/24      Recent Labs   Lab 01/09/24  0304 01/09/24  1547 01/09/24  1918 01/10/24  0255 01/10/24  1124 01/10/24  1817 01/11/24  0009 01/11/24  0333   HGB 7.0*  7.0* 8.5*  --  8.4*  8.4*  --   --   --  7.9*   *  141* 130*  --  135*  135*  --   --   --  162   APTT 32.8*  32.8*  --    < > 41.6* 53.1* 69.2* 56.9*  --    INR 1.2  1.2  --   --  1.3*  --   --   --  1.6*    < > = values in this interval not displayed.           Endocrine:     - CTS Goal -140    - HgbA1c: 5.2    - Insulin SSI    - Endocrinology consulted for insulin management      PPx:   Feeding: nectar thick, Speech  Analgesia/Sedation: Multimodal controlled  Thromboembolic Prevention: SCD's   HOB >30: Yes  Stress Ulcer: Famotidine  Glucose Control: Yes, insulin management per Endocrinology     Lines/Drains/Airway:   Chest Tubes: 2 Mediastinal    Pacing Wires: Temporary ventricular pacing wires      Dispo/Code Status/Palliative:     - Continue SICU Care    - Full Code             Critical care was time spent personally by me on the following activities: development of treatment plan with patient or surrogate and bedside caregivers, discussions with consultants, evaluation of patient's response to treatment, examination of patient, ordering and performing treatments and interventions, ordering and review of laboratory studies, ordering and review of  radiographic studies, pulse oximetry, re-evaluation of patient's condition.  This critical care time did not overlap with that of any other provider or involve time for any procedures.     Umer Herrera MD  Critical Care - Surgery  [facility] - [facility]

## 2024-01-11 NOTE — CARE UPDATE
I have reviewed the chart of King Botello and participated in the care of the patient who is hospitalized for the following:    Active Hospital Problems    Diagnosis    *Nonrheumatic mitral valve stenosis    CHB (complete heart block)    Acute postoperative anemia due to expected blood loss    Transient hyperglycemia post procedure    S/P MVR (mitral valve replacement)    Hypertension, essential    Mitral valve prolapse      I have reviewed King Botello with the multidisciplinary team during rounds.      Soo Stern, JUNG  [facility]

## 2024-01-11 NOTE — PT/OT/SLP PROGRESS
Physical Therapy Treatment    Patient Name:  King Botello   MRN:  [id number]    Recommendations:     Discharge Recommendations: No Therapy Indicated  Discharge Equipment Recommendations: none  Barriers to discharge: None    Assessment:     King Botello is a 38 y.o. female admitted with a medical diagnosis of Nonrheumatic mitral valve stenosis.  She presents with the following impairments/functional limitations: impaired balance, decreased safety awareness, impaired endurance, impaired functional mobility, gait instability pt tolerated treatment better being able to gait train farther. Pt will benefit from cont skilled PT 5x/wk to progress physically. Pt will be able to discharge home with no needs when medically stable. Pt is s/p MVR, TVr 1/8/24.    Rehab Prognosis: Good; patient would benefit from acute skilled PT services to address these deficits and reach maximum level of function.    Recent Surgery: Procedure(s) (LRB):  MITRAL VALVE REPLACEMENT, REDO STERNOTOMY (N/A)  VALVULOPLASTY, TRICUSPID REPAIR (N/A) 3 Days Post-Op    Plan:     During this hospitalization, patient to be seen 5 x/week to address the identified rehab impairments via gait training, therapeutic activities and progress toward the following goals:    Plan of Care Expires:  02/08/24    Subjective     Chief Complaint: pt c/o slight pain but of being tired during treatment.   Patient/Family Comments/goals:  to get better and go home.   Pain/Comfort:  Pain Rating 1: 2/10 (chest)  Pain Addressed 1: Reposition, Distraction  Pain Rating Post-Intervention 1: 2/10 (chest)      Objective:     Communicated with nurse  prior to session.  Patient found up in chair with telemetry, blood pressure cuff, oxygen, pulse ox (continuous), PureWick, wound vac, external pacer, peripheral IV upon PT entry to room.     General Precautions: Standard, fall, sternal  Orthopedic Precautions: N/A  Braces: N/A  Respiratory Status: Nasal cannula, flow 1 L/min      Functional Mobility:  Transfers:   pt needed verbal cues for functional mobility with sternal precautions.   Sit to Stand:  contact guard assistance with hand-held assist x 2 reps from bedside chair and 1 rep from bedside commode.     Gait: pt received gait training ~ 16 ft , 40 ft 22 ft  (78 ft total) with CGA, O2 intact and RN present with portable monitor. Pt had sitting rest periods between distances gait trained. Pt was additional assist of 1 to follow with chair.       AM-PAC 6 CLICK MOBILITY  Turning over in bed (including adjusting bedclothes, sheets and blankets)?: 3  Sitting down on and standing up from a chair with arms (e.g., wheelchair, bedside commode, etc.): 3  Moving from lying on back to sitting on the side of the bed?: 3  Moving to and from a bed to a chair (including a wheelchair)?: 3  Need to walk in hospital room?: 3  Climbing 3-5 steps with a railing?: 2  Basic Mobility Total Score: 17       Treatment & Education:  Pt and  received verbal instructions in PT POC and both verbally expressed understanding of such.     Patient left up in chair with all lines intact, call button in reach, and  and RN  present..    GOALS:   Multidisciplinary Problems       Physical Therapy Goals          Problem: Physical Therapy    Goal Priority Disciplines Outcome Goal Variances Interventions   Physical Therapy Goal     PT, PT/OT Ongoing, Progressing     Description: Goals to be met by: 2024    Patient will increase functional independence with mobility by performin. Supine to sit with Supervision  2. Sit to stand transfer with Supervision  3. Gait  x 400 feet with Supervision.                          Time Tracking:     PT Received On: 24  PT Start Time: 1312     PT Stop Time: 1335  PT Total Time (min): 23 min     Billable Minutes: Gait Training 23 min     Treatment Type: Treatment  PT/PTA: PT     Number of PTA visits since last PT visit: 0     2024

## 2024-01-11 NOTE — PROGRESS NOTES
"[facility]  Endocrinology  Progress Note    Admit Date: 1/8/2024     Reason for Consult: Management of Hyperglycemia     Surgical Procedure and Date: s/p MVR on 1/8/2024    Patient is not diabetic and does not currently take any oral/injectable antidiabetic/hypoglycemic medications.     Lab Results   Component Value Date    HGBA1C 5.2 01/04/2024         HPI: King Botello is a 38 y.o. female with a past medical history of MVP and MR s/p mitral annuloplasty in [place] (2003),  HTN, bradycardia, PONV. Patient is followed by Dr. Cordero. Patient reports chronic fatigue and BLANK before pregnancy.  After delivery of baby in May, patient was discharged and a few days later readmitted for CHF exacerbation which improved with lasix. TTE LVEF 60-66%. moderate right atrial enlargement. LA severely dilated. There is moderate to severe mitral stenosis. Javier score is 10, the mean diastolic gradient across the mitral valve is 11 mmHg at a heart rate of 77 bpm; MVA 1.33. The patient presents to the SICU s/p Mechanical Mitral Valve Replacement with Dr. Liu on 01/08/2024. Endocrine consulted to manage post-operative hyperglycemia.               Interval HPI:   Overnight events: No acute events overnight. Patient in room 86545/34640 A. Blood glucose stable. BG at goal on current insulin regimen (SSI ). Steroid use- None. 3 Days Post-Op  Renal function- Normal   Vasopressors-  None       Endocrine will continue to follow and manage insulin orders inpatient.         Diet NPO Except for: Sips with Medication     Eating:   NPO  Nausea: No  Hypoglycemia and intervention: No  Fever: No  TPN and/or TF: No    /80 (BP Location: Left arm, Patient Position: Lying)   Pulse (!) 52   Temp 98.4 °F (36.9 °C) (Oral)   Resp (!) 23   Ht 5' 1" (1.549 m)   Wt 80.7 kg (177 lb 12.8 oz)   LMP 01/07/2024 Comment: on period now  SpO2 99%   Breastfeeding No   BMI 33.60 kg/m²     Labs Reviewed and Include  " "  Recent Labs   Lab 01/11/24  0333   GLU 99   CALCIUM 8.4*   ALBUMIN 3.2*   PROT 5.9*      K 3.9   CO2 26      BUN 10   CREATININE 0.6   ALKPHOS 49*   ALT 23   AST 45*   BILITOT 0.8     Lab Results   Component Value Date    WBC 19.43 (H) 01/11/2024    HGB 7.9 (L) 01/11/2024    HCT 24.5 (L) 01/11/2024    MCV 87 01/11/2024     01/11/2024     No results for input(s): "TSH", "FREET4" in the last 168 hours.  Lab Results   Component Value Date    HGBA1C 5.2 01/04/2024       Nutritional status:   Body mass index is 33.6 kg/m².  Lab Results   Component Value Date    ALBUMIN 3.2 (L) 01/11/2024    ALBUMIN 3.4 (L) 01/10/2024    ALBUMIN 3.6 01/09/2024     No results found for: "PREALBUMIN"    Estimated Creatinine Clearance: 121.2 mL/min (based on SCr of 0.6 mg/dL).    Accu-Checks  Recent Labs     01/09/24  0743 01/09/24  0911 01/09/24  1013 01/09/24  1550 01/09/24  1922 01/10/24  0100 01/10/24  1128 01/10/24  1822 01/10/24  2038 01/11/24  0008   POCTGLUCOSE 132* 140* 150* 142* 145* 131* 164* 122* 143* 112*       Current Medications and/or Treatments Impacting Glycemic Control  Immunotherapy:    Immunosuppressants       None          Steroids:   Hormones (From admission, onward)      None          Pressors:    Autonomic Drugs (From admission, onward)      None          Hyperglycemia/Diabetes Medications:   Antihyperglycemics (From admission, onward)      Start     Stop Route Frequency Ordered    01/10/24 1207  insulin aspart U-100 pen 0-10 Units         -- SubQ Before meals & nightly PRN 01/10/24 1107            ASSESSMENT and PLAN    Cardiac/Vascular  * Nonrheumatic mitral valve stenosis  Managed per primary team  Avoid hypoglycemia        S/P MVR (mitral valve replacement)  Optimize BG control to improve wound healing      Endocrine  Transient hyperglycemia post procedure  Endocrinology consulted for BG management.   BG goal 110-140 (CTS Protocol)    - Novolog (aspart) insulin prn for BG excursions [facility] SSI " (150/25).  - BG checks q4hr while NPO  - Hypoglycemia protocol in place    ** Please notify Endocrine for any change and/or advance in diet**  ** Please call Endocrine for any BG related issues **    Discharge Planning:   TBD. Please notify endocrinology prior to discharge.               Gerber Rehman, DNP, FNP  Endocrinology  [facility]

## 2024-01-11 NOTE — PT/OT/SLP PROGRESS
Speech Language Pathology      King Botello  MRN: [id number]    Patient not seen today secondary to working with medical team upon initial attempt. SLP unable to return in PM. Will follow-up per SLP POC. Continue with most recent recommendations as follows:     General Recommendations:  Dysphagia therapy  Diet recommendations:   , Liquid Diet Level: Full liquids, Mildly thick/Nectar thick liquids - IDDSI Level 2   Aspiration Precautions: Small bites/sips and Standard aspiration precautions   General Precautions: Standard, fall, sternal  Communication strategies:  none       1/11/2024

## 2024-01-11 NOTE — PT/OT/SLP PROGRESS
Occupational Therapy   Co-Treatment    Name: King Botello  MRN: [id number]  Admitting Diagnosis:  Nonrheumatic mitral valve stenosis  3 Days Post-Op    Recommendations:     Discharge Recommendations: No Therapy Indicated  Discharge Equipment Recommendations:  none  Barriers to discharge:  None    Assessment:     King Botello is a 38 y.o. female with a medical diagnosis of Nonrheumatic mitral valve stenosis.  She presents with progress towards goals as evidenced by increased overall active participation, with no reports of pain and minimal fatigue. Performance deficits affecting function are impaired self care skills, impaired endurance, impaired functional mobility, decreased upper extremity function, gait instability, impaired balance, impaired cardiopulmonary response to activity. Pt benefits from co-treatment with PT to accommodate pt's activity tolerance and progression with therapy.      Rehab Prognosis:  Good; patient would benefit from acute skilled OT services to address these deficits and reach maximum level of function.       Plan:     Patient to be seen 5 x/week to address the above listed problems via self-care/home management, therapeutic activities, therapeutic exercises  Plan of Care Expires: 01/16/24  Plan of Care Reviewed with: patient, spouse    Subjective     Chief Complaint: denies  Patient/Family Comments/goals: to get better and go home  Pain/Comfort:  Pain Rating 1: 0/10  Pain Rating Post-Intervention 1: 0/10    Objective:     Communicated with: RN prior to session.  Patient found supine with blood pressure cuff, pulse ox (continuous), telemetry, wound vac, oxygen, peripheral IV, PureWick upon OT entry to room.    General Precautions: Standard, fall, sternal    Orthopedic Precautions:   Braces:    Respiratory Status: Nasal cannula, flow 2 L/min     Occupational Performance:     Bed Mobility:    Patient completed Scooting/Bridging with contact guard assistance to EOB  Patient completed  Supine to Sit with minimum assistance     Functional Mobility/Transfers:  Patient completed Sit <> Stand Transfer with contact guard assistance  with  no assistive device   Patient completed Toilet Transfer Step Transfer and STS technique with contact guard assistance with  bedside commode (used only for measuring)  Functional Mobility: CGA to/from bathroom    Activities of Daily Living:  Grooming: independence for tasks with SBA provided for static standing  Upper Body Dressing: minimum assistance    Lower Body Dressing: minimum assistance    Toileting: minimum for pericare      AMPAC 6 Click ADL: 20    Treatment & Education:  Pt ed on OT POC  Pt re-ed on sternal precautions  Pt ed on ROM ex's 3x daily for increased overall strength and endurance      Patient left up in chair with all lines intact, call button in reach, RN notified, and spouse present    GOALS:   Multidisciplinary Problems       Occupational Therapy Goals          Problem: Occupational Therapy    Goal Priority Disciplines Outcome Interventions   Occupational Therapy Goal     OT, PT/OT Ongoing, Progressing    Description: Goals to be met by: 1/16/24     Patient will increase functional independence with ADLs by performing:    Feeding with [place].  UE Dressing with Set-up Assistance.  LE Dressing with Set-up Assistance.  Grooming while standing at sink with Supervision.  Toileting from toilet with Supervision for hygiene and clothing management.   Toilet transfer to toilet with Supervision.                         Time Tracking:     OT Date of Treatment: 01/11/24  OT Start Time: 0822  OT Stop Time: 0836  OT Total Time (min): 14 min    Billable Minutes:Self Care/Home Management 14               1/11/2024

## 2024-01-11 NOTE — ASSESSMENT & PLAN NOTE
Endocrinology consulted for BG management.   BG goal 110-140 (CTS Protocol)    - Novolog (aspart) insulin prn for BG excursions [facility] SSI (150/25).  - BG checks q4hr while NPO  - Hypoglycemia protocol in place    ** Please notify Endocrine for any change and/or advance in diet**  ** Please call Endocrine for any BG related issues **    Discharge Planning:   TBD. Please notify endocrinology prior to discharge.

## 2024-01-11 NOTE — HOSPITAL COURSE
On 1/8/24, the patient was taken to the Operating Room for the above stated procedure. Please see the previously dictated operative report for complete details. Postoperatively, the patient was taken from the  Operating Room to the ICU where the vital signs were monitored and pain was kept under control. The patient was weaned from the drips and extubated in the ICU per protocol. Once hemodynamically stable, the patient was transferred to the Cardiac Step-Down floor for continued strengthening and ambulation. On postoperative day ***, the patient was ready for discharge to home. At the time of discharge, the patient was ambulating unassisted. Pain was well controlled with oral analgesics and the patient was tolerating the diet.    Patient was found to be CHB with a junctional escape rhythm 1/9 AM via tele and confirmed with 12 lead ECG. Review of Tele shows this arrhythm appearing as early as 1/08/2024. Patient received Amiodarone from 1/08/24 to 1/10/24. EP consulted, Recommended to continue ICU level care, telemetry. and Epicardial pacer wires set to 10mA and 40bpms. Patient was not dependant on pacer.  AV herminia blocking agents and Amiodarone held.       Patient is followed by Dr. Eugene Cordero. She will be on Coumadin indefinitely for mechanical mitral valve, INR goal 2.5-3.5. at time of discharge, Coumadin Clinic was set up with [facility].      MOBILITY AND ACTIVITY: As tolerated. Patient may shower. No heavy lifting of greater than 5 pounds and no driving.     DIET: An 1800-calorie ADA with a 1500 mL fluid restriction.     WOUND CARE INSTRUCTIONS: Check for redness, swelling and drainage around the  incision or wound. Patient is to call for any obvious bleeding, drainage, pus from the wound, unusual problems or difficulties or temperature of greater than 101   degrees.     FOLLOWUP: Follow up with Dr. Liu in approximately 3 weeks. Prior to this  appointment, the patient will have a chest x-ray and EKG.      Patient not placed on Ace-Inhibitor at the time of discharge due to potential for hypotension       DISCHARGE CONDITION: At the time of discharge, the patient was in sinus rhythm and afebrile with stable vital signs.

## 2024-01-11 NOTE — PROGRESS NOTES
[facility]  Cardiac Electrophysiology  Progress Note    Admission Date: 1/8/2024  Code Status: Full Code   Attending Physician: Thierno Liu MD   Expected Discharge Date: 1/15/2024  Principal Problem:Nonrheumatic mitral valve stenosis    Subjective:     Interval History: No Acute Overnight Events. Patient is afebrile and hemodynamically stable.  Patient remains in Sinus Rhythm with CBH with Junctional Escape Rhythm. Denies new symptoms or complaints. Epicardial pacing wires in places. Currently not pacemaker dependant. Currently on Heparin bridge with subtherapeutic INR      Review of Systems   Constitutional: Positive for malaise/fatigue.   Cardiovascular:  Positive for dyspnea on exertion and palpitations. Negative for chest pain, irregular heartbeat, near-syncope and syncope.     Objective:     Vital Signs (Most Recent):  Temp: 98.8 °F (37.1 °C) (01/11/24 0700)  Pulse: (!) 55 (01/11/24 0821)  Resp: (!) 39 (eating at this time) (01/11/24 0821)  BP: 139/65 (01/11/24 0800)  SpO2: 95 % (01/11/24 0821) Vital Signs (24h Range):  Temp:  [98.4 °F (36.9 °C)-99.2 °F (37.3 °C)] 98.8 °F (37.1 °C)  Pulse:  [52-74] 55  Resp:  [14-49] 39  SpO2:  [88 %-100 %] 95 %  BP: (108-151)/(56-80) 139/65     Weight: 80.7 kg (177 lb 12.8 oz)  Body mass index is 33.6 kg/m².     SpO2: 95 %        Physical Exam  Vitals and nursing note reviewed.   Constitutional:       General: She is not in acute distress.     Appearance: She is not ill-appearing.   HENT:      Head: Normocephalic and atraumatic.      Right Ear: External ear normal.      Left Ear: External ear normal.      Mouth/Throat:      Mouth: Mucous membranes are moist.      Pharynx: No oropharyngeal exudate.   Eyes:      Extraocular Movements: Extraocular movements intact.      Pupils: Pupils are equal, round, and reactive to light.   Neck:      Comments:  RIJ CVC  Cardiovascular:      Rate and Rhythm: Normal rate and regular rhythm.      Pulses: Normal  pulses.      Heart sounds: Normal heart sounds. No murmur heard.     Comments: Epicardial pacer wires in place  Pulmonary:      Effort: Pulmonary effort is normal. No respiratory distress.      Breath sounds: Normal breath sounds. No wheezing or rales.   Abdominal:      General: Abdomen is flat. Bowel sounds are normal. There is no distension.      Palpations: Abdomen is soft.      Tenderness: There is no abdominal tenderness.   Musculoskeletal:         General: No signs of injury. Normal range of motion.      Cervical back: Normal range of motion.      Right lower leg: No edema.      Left lower leg: No edema.   Skin:     General: Skin is warm and dry.   Neurological:      General: No focal deficit present.      Mental Status: She is alert and oriented to person, place, and time.      Sensory: No sensory deficit.      Motor: No weakness.   Psychiatric:         Mood and Affect: Mood normal.         Behavior: Behavior normal.         Thought Content: Thought content normal.            Significant Labs: EP:   Recent Labs   Lab 01/09/24  1547 01/09/24  1918 01/10/24  0255 01/10/24  1124 01/10/24  2038 01/11/24  0333 01/11/24  0801     --  136  --   --  138  --    K 3.6   < > 4.2   < > 3.6 3.9 4.1     --  106  --   --  103  --    CO2 22*  --  25  --   --  26  --    *  --  99  --   --  99  --    BUN 11  --  10  --   --  10  --    CREATININE 0.6  --  0.6  --   --  0.6  --    CALCIUM 8.3*  --  8.7  --   --  8.4*  --    PROT 5.7*  --  5.7*  --   --  5.9*  --    ALBUMIN 3.6  --  3.4*  --   --  3.2*  --    BILITOT 0.7  --  0.8  --   --  0.8  --    ALKPHOS 37*  --  42*  --   --  49*  --    AST 81*  --  64*  --   --  45*  --    ALT 15  --  14  --   --  23  --    ANIONGAP 9  --  5*  --   --  9  --    WBC 18.31*  --  18.46*  18.46*  --   --  19.43*  --    HGB 8.5*  --  8.4*  8.4*  --   --  7.9*  --    HCT 26.1*  --  26.0*  26.0*  --   --  24.5*  --    *  --  135*  135*  --   --  162  --    INR  --    --  1.3*  --   --  1.6*  --     < > = values in this interval not displayed.       Significant Imaging: EKG: Sinus Rhythm w/ CHB with Junctional Escape Rhythm  Assessment and Plan:     CHB (complete heart block)  Patient is POD 2 s/p MVR (revision with mechanical valve) and TV banding. Patient was found to be CHB with a junctional escape rhythm this AM via tele and confirmed with 12 lead ECG. Review of Tele shows this arrhythm appearing as early as 1/08/2024. Patient received Amiodarone from 1/08/24 to 1/10/24. Currently having palpitations and BLANK, Unclear if related to this arrhythmia.    Recommendations  Unclear etiology with broad differential; we are hopeful this is a transient Arrhythmia in the post operative setting. Patient has epicardial wires currently in place. Recommending to continue ICU level care, telemetry. and Epicardial pacer wires set to 10mA and 40bpms. Currently not pacemaker dependant. Recommend holding AV herminia blocking agents. Recommend holding Amiodarone. Recommend contacting on call EP fellow if patient started to become pacer dependant or rhythm changes drastically. Recommend daily AM ECGs. Will consider PPM vs EP study as early as Friday PM if indicated. Patient should remain in the SICU for warfarin bridging. Will need to have a therapeutic INR for PPM. NPO at MN. Patient will need a 6 hr washout from heparin. Will reassess in AM when daily INRs result.           Discussed with staff. Attestation to follow.       Trell Dillon, DO  Cardiac Electrophysiology  [facility]

## 2024-01-11 NOTE — SUBJECTIVE & OBJECTIVE
Interval History: No Acute Overnight Events. Patient is afebrile and hemodynamically stable.  Patient remains in Sinus Rhythm with CBH with Junctional Escape Rhythm. Denies new symptoms or complaints. Epicardial pacing wires in places. Currently not pacemaker dependant. Currently on Heparin bridge with subtherapeutic INR      Review of Systems   Constitutional: Positive for malaise/fatigue.   Cardiovascular:  Positive for dyspnea on exertion and palpitations. Negative for chest pain, irregular heartbeat, near-syncope and syncope.     Objective:     Vital Signs (Most Recent):  Temp: 98.8 °F (37.1 °C) (01/11/24 0700)  Pulse: (!) 55 (01/11/24 0821)  Resp: (!) 39 (eating at this time) (01/11/24 0821)  BP: 139/65 (01/11/24 0800)  SpO2: 95 % (01/11/24 0821) Vital Signs (24h Range):  Temp:  [98.4 °F (36.9 °C)-99.2 °F (37.3 °C)] 98.8 °F (37.1 °C)  Pulse:  [52-74] 55  Resp:  [14-49] 39  SpO2:  [88 %-100 %] 95 %  BP: (108-151)/(56-80) 139/65     Weight: 80.7 kg (177 lb 12.8 oz)  Body mass index is 33.6 kg/m².     SpO2: 95 %        Physical Exam  Vitals and nursing note reviewed.   Constitutional:       General: She is not in acute distress.     Appearance: She is not ill-appearing.   HENT:      Head: Normocephalic and atraumatic.      Right Ear: External ear normal.      Left Ear: External ear normal.      Mouth/Throat:      Mouth: Mucous membranes are moist.      Pharynx: No oropharyngeal exudate.   Eyes:      Extraocular Movements: Extraocular movements intact.      Pupils: Pupils are equal, round, and reactive to light.   Neck:      Comments:  [facility] CV  Cardiovascular:      Rate and Rhythm: Normal rate and regular rhythm.      Pulses: Normal pulses.      Heart sounds: Normal heart sounds. No murmur heard.     Comments: Epicardial pacer wires in place  Pulmonary:      Effort: Pulmonary effort is normal. No respiratory distress.      Breath sounds: Normal breath sounds. No wheezing or rales.   Abdominal:      General: Abdomen  is flat. Bowel sounds are normal. There is no distension.      Palpations: Abdomen is soft.      Tenderness: There is no abdominal tenderness.   Musculoskeletal:         General: No signs of injury. Normal range of motion.      Cervical back: Normal range of motion.      Right lower leg: No edema.      Left lower leg: No edema.   Skin:     General: Skin is warm and dry.   Neurological:      General: No focal deficit present.      Mental Status: She is alert and oriented to person, place, and time.      Sensory: No sensory deficit.      Motor: No weakness.   Psychiatric:         Mood and Affect: Mood normal.         Behavior: Behavior normal.         Thought Content: Thought content normal.            Significant Labs: EP:   Recent Labs   Lab 01/09/24  1547 01/09/24  1918 01/10/24  0255 01/10/24  1124 01/10/24  2038 01/11/24  0333 01/11/24  0801     --  136  --   --  138  --    K 3.6   < > 4.2   < > 3.6 3.9 4.1     --  106  --   --  103  --    CO2 22*  --  25  --   --  26  --    *  --  99  --   --  99  --    BUN 11  --  10  --   --  10  --    CREATININE 0.6  --  0.6  --   --  0.6  --    CALCIUM 8.3*  --  8.7  --   --  8.4*  --    PROT 5.7*  --  5.7*  --   --  5.9*  --    ALBUMIN 3.6  --  3.4*  --   --  3.2*  --    BILITOT 0.7  --  0.8  --   --  0.8  --    ALKPHOS 37*  --  42*  --   --  49*  --    AST 81*  --  64*  --   --  45*  --    ALT 15  --  14  --   --  23  --    ANIONGAP 9  --  5*  --   --  9  --    WBC 18.31*  --  18.46*  18.46*  --   --  19.43*  --    HGB 8.5*  --  8.4*  8.4*  --   --  7.9*  --    HCT 26.1*  --  26.0*  26.0*  --   --  24.5*  --    *  --  135*  135*  --   --  162  --    INR  --   --  1.3*  --   --  1.6*  --     < > = values in this interval not displayed.       Significant Imaging: EKG: Sinus Rhythm w/ CHB with Junctional Escape Rhythm

## 2024-01-12 LAB
ALBUMIN SERPL BCP-MCNC: 3 G/DL (ref 3.5–5.2)
ALP SERPL-CCNC: 53 U/L (ref 55–135)
ALT SERPL W/O P-5'-P-CCNC: 23 U/L (ref 10–44)
ANION GAP SERPL CALC-SCNC: 10 MMOL/L (ref 8–16)
APTT PPP: 49.7 SEC (ref 21–32)
AST SERPL-CCNC: 30 U/L (ref 10–40)
BASOPHILS # BLD AUTO: 0.04 K/UL (ref 0–0.2)
BASOPHILS NFR BLD: 0.3 % (ref 0–1.9)
BILIRUB SERPL-MCNC: 0.8 MG/DL (ref 0.1–1)
BUN SERPL-MCNC: 8 MG/DL (ref 6–20)
CALCIUM SERPL-MCNC: 8.4 MG/DL (ref 8.7–10.5)
CHLORIDE SERPL-SCNC: 100 MMOL/L (ref 95–110)
CO2 SERPL-SCNC: 25 MMOL/L (ref 23–29)
CREAT SERPL-MCNC: 0.5 MG/DL (ref 0.5–1.4)
DIFFERENTIAL METHOD BLD: ABNORMAL
EOSINOPHIL # BLD AUTO: 0.1 K/UL (ref 0–0.5)
EOSINOPHIL NFR BLD: 0.7 % (ref 0–8)
ERYTHROCYTE [DISTWIDTH] IN BLOOD BY AUTOMATED COUNT: 16.7 % (ref 11.5–14.5)
EST. GFR  (NO RACE VARIABLE): >60 ML/MIN/1.73 M^2
GLUCOSE SERPL-MCNC: 96 MG/DL (ref 70–110)
HCT VFR BLD AUTO: 24 % (ref 37–48.5)
HGB BLD-MCNC: 7.7 G/DL (ref 12–16)
IMM GRANULOCYTES # BLD AUTO: 0.06 K/UL (ref 0–0.04)
IMM GRANULOCYTES NFR BLD AUTO: 0.4 % (ref 0–0.5)
INR PPP: 2.5 (ref 0.8–1.2)
LYMPHOCYTES # BLD AUTO: 1.3 K/UL (ref 1–4.8)
LYMPHOCYTES NFR BLD: 8.8 % (ref 18–48)
MAGNESIUM SERPL-MCNC: 1.9 MG/DL (ref 1.6–2.6)
MAGNESIUM SERPL-MCNC: 2.3 MG/DL (ref 1.6–2.6)
MCH RBC QN AUTO: 27.2 PG (ref 27–31)
MCHC RBC AUTO-ENTMCNC: 32.1 G/DL (ref 32–36)
MCV RBC AUTO: 85 FL (ref 82–98)
MONOCYTES # BLD AUTO: 1.3 K/UL (ref 0.3–1)
MONOCYTES NFR BLD: 8.6 % (ref 4–15)
NEUTROPHILS # BLD AUTO: 12 K/UL (ref 1.8–7.7)
NEUTROPHILS NFR BLD: 81.2 % (ref 38–73)
NRBC BLD-RTO: 0 /100 WBC
PHOSPHATE SERPL-MCNC: 2.6 MG/DL (ref 2.7–4.5)
PHOSPHATE SERPL-MCNC: 2.9 MG/DL (ref 2.7–4.5)
PLATELET # BLD AUTO: 242 K/UL (ref 150–450)
PMV BLD AUTO: 12.2 FL (ref 9.2–12.9)
POCT GLUCOSE: 115 MG/DL (ref 70–110)
POCT GLUCOSE: 84 MG/DL (ref 70–110)
POCT GLUCOSE: 86 MG/DL (ref 70–110)
POTASSIUM SERPL-SCNC: 3.3 MMOL/L (ref 3.5–5.1)
POTASSIUM SERPL-SCNC: 3.7 MMOL/L (ref 3.5–5.1)
POTASSIUM SERPL-SCNC: 3.8 MMOL/L (ref 3.5–5.1)
POTASSIUM SERPL-SCNC: 4.3 MMOL/L (ref 3.5–5.1)
PROT SERPL-MCNC: 5.9 G/DL (ref 6–8.4)
PROTHROMBIN TIME: 25.1 SEC (ref 9–12.5)
RBC # BLD AUTO: 2.83 M/UL (ref 4–5.4)
SODIUM SERPL-SCNC: 135 MMOL/L (ref 136–145)
WBC # BLD AUTO: 14.82 K/UL (ref 3.9–12.7)

## 2024-01-12 PROCEDURE — 11000001 HC ACUTE MED/SURG PRIVATE ROOM

## 2024-01-12 PROCEDURE — 80053 COMPREHEN METABOLIC PANEL: CPT | Performed by: STUDENT IN AN ORGANIZED HEALTH CARE EDUCATION/TRAINING PROGRAM

## 2024-01-12 PROCEDURE — 93010 ELECTROCARDIOGRAM REPORT: CPT | Mod: 76,,, | Performed by: INTERNAL MEDICINE

## 2024-01-12 PROCEDURE — 85025 COMPLETE CBC W/AUTO DIFF WBC: CPT

## 2024-01-12 PROCEDURE — 84100 ASSAY OF PHOSPHORUS: CPT

## 2024-01-12 PROCEDURE — 84132 ASSAY OF SERUM POTASSIUM: CPT | Mod: 91

## 2024-01-12 PROCEDURE — 97535 SELF CARE MNGMENT TRAINING: CPT

## 2024-01-12 PROCEDURE — 63600175 PHARM REV CODE 636 W HCPCS

## 2024-01-12 PROCEDURE — 25000003 PHARM REV CODE 250: Performed by: THORACIC SURGERY (CARDIOTHORACIC VASCULAR SURGERY)

## 2024-01-12 PROCEDURE — 93010 ELECTROCARDIOGRAM REPORT: CPT | Mod: ,,, | Performed by: INTERNAL MEDICINE

## 2024-01-12 PROCEDURE — 25000003 PHARM REV CODE 250

## 2024-01-12 PROCEDURE — 83735 ASSAY OF MAGNESIUM: CPT

## 2024-01-12 PROCEDURE — 84100 ASSAY OF PHOSPHORUS: CPT | Mod: 91 | Performed by: THORACIC SURGERY (CARDIOTHORACIC VASCULAR SURGERY)

## 2024-01-12 PROCEDURE — 83735 ASSAY OF MAGNESIUM: CPT | Mod: 91 | Performed by: THORACIC SURGERY (CARDIOTHORACIC VASCULAR SURGERY)

## 2024-01-12 PROCEDURE — 92526 ORAL FUNCTION THERAPY: CPT

## 2024-01-12 PROCEDURE — 99232 SBSQ HOSP IP/OBS MODERATE 35: CPT | Mod: ,,, | Performed by: NURSE PRACTITIONER

## 2024-01-12 PROCEDURE — 99291 CRITICAL CARE FIRST HOUR: CPT | Mod: ,,, | Performed by: ANESTHESIOLOGY

## 2024-01-12 PROCEDURE — 85610 PROTHROMBIN TIME: CPT

## 2024-01-12 PROCEDURE — 94761 N-INVAS EAR/PLS OXIMETRY MLT: CPT

## 2024-01-12 PROCEDURE — 97116 GAIT TRAINING THERAPY: CPT

## 2024-01-12 PROCEDURE — 99233 SBSQ HOSP IP/OBS HIGH 50: CPT | Mod: ,,, | Performed by: STUDENT IN AN ORGANIZED HEALTH CARE EDUCATION/TRAINING PROGRAM

## 2024-01-12 PROCEDURE — 93005 ELECTROCARDIOGRAM TRACING: CPT

## 2024-01-12 PROCEDURE — 25000003 PHARM REV CODE 250: Performed by: STUDENT IN AN ORGANIZED HEALTH CARE EDUCATION/TRAINING PROGRAM

## 2024-01-12 RX ORDER — POTASSIUM CHLORIDE 7.45 MG/ML
60 INJECTION INTRAVENOUS
Status: DISCONTINUED | OUTPATIENT
Start: 2024-01-12 | End: 2024-01-13

## 2024-01-12 RX ORDER — POTASSIUM CHLORIDE 7.45 MG/ML
40 INJECTION INTRAVENOUS
Status: DISCONTINUED | OUTPATIENT
Start: 2024-01-12 | End: 2024-01-13

## 2024-01-12 RX ORDER — CALCIUM GLUCONATE 20 MG/ML
1 INJECTION, SOLUTION INTRAVENOUS
Status: DISCONTINUED | OUTPATIENT
Start: 2024-01-12 | End: 2024-01-13

## 2024-01-12 RX ORDER — MAGNESIUM SULFATE HEPTAHYDRATE 40 MG/ML
2 INJECTION, SOLUTION INTRAVENOUS
Status: DISCONTINUED | OUTPATIENT
Start: 2024-01-12 | End: 2024-01-13

## 2024-01-12 RX ORDER — HEPARIN SODIUM 10000 [USP'U]/100ML
1400 INJECTION, SOLUTION INTRAVENOUS CONTINUOUS
Status: DISCONTINUED | OUTPATIENT
Start: 2024-01-12 | End: 2024-01-12

## 2024-01-12 RX ORDER — CALCIUM GLUCONATE 20 MG/ML
2 INJECTION, SOLUTION INTRAVENOUS
Status: DISCONTINUED | OUTPATIENT
Start: 2024-01-12 | End: 2024-01-13

## 2024-01-12 RX ORDER — WARFARIN 1 MG/1
2 TABLET ORAL DAILY
Status: DISCONTINUED | OUTPATIENT
Start: 2024-01-12 | End: 2024-01-15

## 2024-01-12 RX ORDER — POTASSIUM CHLORIDE 20 MEQ/1
40 TABLET, EXTENDED RELEASE ORAL 2 TIMES DAILY
Status: DISCONTINUED | OUTPATIENT
Start: 2024-01-12 | End: 2024-01-13

## 2024-01-12 RX ORDER — FUROSEMIDE 10 MG/ML
40 INJECTION INTRAMUSCULAR; INTRAVENOUS 2 TIMES DAILY
Status: DISCONTINUED | OUTPATIENT
Start: 2024-01-12 | End: 2024-01-13

## 2024-01-12 RX ORDER — POTASSIUM CHLORIDE 20 MEQ/1
40 TABLET, EXTENDED RELEASE ORAL 2 TIMES DAILY
Status: DISCONTINUED | OUTPATIENT
Start: 2024-01-12 | End: 2024-01-12

## 2024-01-12 RX ORDER — CALCIUM GLUCONATE 20 MG/ML
3 INJECTION, SOLUTION INTRAVENOUS
Status: DISCONTINUED | OUTPATIENT
Start: 2024-01-12 | End: 2024-01-13

## 2024-01-12 RX ORDER — MAGNESIUM SULFATE HEPTAHYDRATE 40 MG/ML
4 INJECTION, SOLUTION INTRAVENOUS
Status: DISCONTINUED | OUTPATIENT
Start: 2024-01-12 | End: 2024-01-13

## 2024-01-12 RX ORDER — POTASSIUM CHLORIDE 20 MEQ/1
60 TABLET, EXTENDED RELEASE ORAL ONCE
Status: COMPLETED | OUTPATIENT
Start: 2024-01-12 | End: 2024-01-12

## 2024-01-12 RX ORDER — POTASSIUM CHLORIDE 7.45 MG/ML
10 INJECTION INTRAVENOUS
Status: DISCONTINUED | OUTPATIENT
Start: 2024-01-12 | End: 2024-01-13

## 2024-01-12 RX ADMIN — SODIUM PHOSPHATE, MONOBASIC, MONOHYDRATE AND SODIUM PHOSPHATE, DIBASIC, ANHYDROUS 15 MMOL: 142; 276 INJECTION, SOLUTION INTRAVENOUS at 06:01

## 2024-01-12 RX ADMIN — MUPIROCIN 1 G: 20 OINTMENT TOPICAL at 08:01

## 2024-01-12 RX ADMIN — ASPIRIN 81 MG CHEWABLE TABLET 81 MG: 81 TABLET CHEWABLE at 09:01

## 2024-01-12 RX ADMIN — FAMOTIDINE 20 MG: 20 TABLET, FILM COATED ORAL at 09:01

## 2024-01-12 RX ADMIN — WARFARIN SODIUM 2 MG: 1 TABLET ORAL at 04:01

## 2024-01-12 RX ADMIN — ACETAMINOPHEN 1000 MG: 500 TABLET ORAL at 09:01

## 2024-01-12 RX ADMIN — POTASSIUM CHLORIDE 40 MEQ: 7.46 INJECTION, SOLUTION INTRAVENOUS at 05:01

## 2024-01-12 RX ADMIN — POTASSIUM CHLORIDE 10 MEQ: 7.46 INJECTION, SOLUTION INTRAVENOUS at 09:01

## 2024-01-12 RX ADMIN — MAGNESIUM SULFATE HEPTAHYDRATE 2 G: 40 INJECTION, SOLUTION INTRAVENOUS at 06:01

## 2024-01-12 RX ADMIN — POTASSIUM BICARBONATE 40 MEQ: 391 TABLET, EFFERVESCENT ORAL at 08:01

## 2024-01-12 RX ADMIN — DOCUSATE SODIUM 100 MG: 100 CAPSULE, LIQUID FILLED ORAL at 08:01

## 2024-01-12 RX ADMIN — FUROSEMIDE 40 MG: 10 INJECTION, SOLUTION INTRAVENOUS at 09:01

## 2024-01-12 RX ADMIN — ACETAMINOPHEN 1000 MG: 500 TABLET ORAL at 05:01

## 2024-01-12 RX ADMIN — FUROSEMIDE 40 MG: 10 INJECTION, SOLUTION INTRAVENOUS at 05:01

## 2024-01-12 RX ADMIN — POTASSIUM CHLORIDE 60 MEQ: 1500 TABLET, EXTENDED RELEASE ORAL at 02:01

## 2024-01-12 RX ADMIN — FAMOTIDINE 20 MG: 20 TABLET, FILM COATED ORAL at 08:01

## 2024-01-12 RX ADMIN — SCOPALAMINE 1 PATCH: 1 PATCH, EXTENDED RELEASE TRANSDERMAL at 04:01

## 2024-01-12 RX ADMIN — DOCUSATE SODIUM 100 MG: 100 CAPSULE, LIQUID FILLED ORAL at 09:01

## 2024-01-12 RX ADMIN — OXYCODONE HYDROCHLORIDE 5 MG: 5 TABLET ORAL at 01:01

## 2024-01-12 RX ADMIN — MUPIROCIN 1 G: 20 OINTMENT TOPICAL at 09:01

## 2024-01-12 NOTE — PROGRESS NOTES
"[facility] - [facility] Int[facility]  Endocrinology  Progress Note    Admit Date: 1/8/2024     Reason for Consult: Management of Hyperglycemia     Surgical Procedure and Date: s/p MVR on 1/8/2024    Patient is not diabetic and does not currently take any oral/injectable antidiabetic/hypoglycemic medications.     Lab Results   Component Value Date    HGBA1C 5.2 01/04/2024         HPI: King Botello is a 38 y.o. female with a past medical history of MVP and MR s/p mitral annuloplasty in [place] (2003),  HTN, bradycardia, PONV. Patient is followed by Dr. Cordero. Patient reports chronic fatigue and BLANK before pregnancy.  After delivery of baby in May, patient was discharged and a few days later readmitted for CHF exacerbation which improved with lasix. TTE LVEF 60-66%. moderate right atrial enlargement. LA severely dilated. There is moderate to severe mitral stenosis. Javier score is 10, the mean diastolic gradient across the mitral valve is 11 mmHg at a heart rate of 77 bpm; MVA 1.33. The patient presents to the SICU s/p Mechanical Mitral Valve Replacement with Dr. Liu on 01/08/2024. Endocrine consulted to manage post-operative hyperglycemia.               Interval HPI:   Overnight events: No acute events overnight. Patient in room 54018/74283 A. Blood glucose stable. BG at goal on current insulin regimen (SSI ). Steroid use- None. 4 Days Post-Op  Renal function- Normal   Vasopressors-  None       Endocrine will continue to follow and manage insulin orders inpatient.         Diet NPO Except for: Sips with Medication     Eating:   NPO  Nausea: No  Hypoglycemia and intervention: No  Fever: No  TPN and/or TF: No    BP (!) 127/56   Pulse (!) 55   Temp 98.6 °F (37 °C) (Oral)   Resp (!) 24   Ht 5' 1" (1.549 m)   Wt 80.7 kg (177 lb 12.8 oz)   LMP 01/07/2024 Comment: on period now  SpO2 95%   Breastfeeding No   BMI 33.60 kg/m²     Labs Reviewed and Include    Recent Labs   Lab 01/12/24  0416   GLU 96 " "  CALCIUM 8.4*   ALBUMIN 3.0*   PROT 5.9*   *   K 3.7   CO2 25      BUN 8   CREATININE 0.5   ALKPHOS 53*   ALT 23   AST 30   BILITOT 0.8     Lab Results   Component Value Date    WBC 14.82 (H) 01/12/2024    HGB 7.7 (L) 01/12/2024    HCT 24.0 (L) 01/12/2024    MCV 85 01/12/2024     01/12/2024     No results for input(s): "TSH", "FREET4" in the last 168 hours.  Lab Results   Component Value Date    HGBA1C 5.2 01/04/2024       Nutritional status:   Body mass index is 33.6 kg/m².  Lab Results   Component Value Date    ALBUMIN 3.0 (L) 01/12/2024    ALBUMIN 3.2 (L) 01/11/2024    ALBUMIN 3.4 (L) 01/10/2024     No results found for: "PREALBUMIN"    Estimated Creatinine Clearance: 145.5 mL/min (based on SCr of 0.5 mg/dL).    Accu-Checks  Recent Labs     01/09/24  1550 01/09/24  1922 01/10/24  0100 01/10/24  1128 01/10/24  1822 01/10/24  2038 01/11/24  0008 01/11/24  0817 01/11/24  1618 01/11/24  2141   POCTGLUCOSE 142* 145* 131* 164* 122* 143* 112* 143* 117* 91       Current Medications and/or Treatments Impacting Glycemic Control  Immunotherapy:    Immunosuppressants       None          Steroids:   Hormones (From admission, onward)      None          Pressors:    Autonomic Drugs (From admission, onward)      None          Hyperglycemia/Diabetes Medications:   Antihyperglycemics (From admission, onward)      Start     Stop Route Frequency Ordered    01/10/24 1207  insulin aspart U-100 pen 0-10 Units         -- SubQ Before meals & nightly PRN 01/10/24 1107            ASSESSMENT and PLAN    Cardiac/Vascular  * Nonrheumatic mitral valve stenosis  Managed per primary team  Avoid hypoglycemia        S/P MVR (mitral valve replacement)  Optimize BG control to improve wound healing      Endocrine  Transient hyperglycemia post procedure  Endocrinology consulted for BG management.   BG goal 110-140 (CTS Protocol)    - Novolog (aspart) insulin prn for BG excursions MDC SSI (150/25).  - BG checks q4hr while NPO  - " Hypoglycemia protocol in place    ** Please notify Endocrine for any change and/or advance in diet**  ** Please call Endocrine for any BG related issues **    Discharge Planning:   TBD. Please notify endocrinology prior to discharge.               Gerber Rehman, DNP, FNP  Endocrinology  [facility] - Surgical Intensive Care

## 2024-01-12 NOTE — PROGRESS NOTES
[facility] - [facility]  Cardiac Electrophysiology  Progress Note    Admission Date: 1/8/2024  Code Status: Full Code   Attending Physician: Thierno Liu MD   Expected Discharge Date: 1/15/2024  Principal Problem:Nonrheumatic mitral valve stenosis    Subjective:     Interval History: No Acute Overnight Events. Patient is afebrile and hemodynamically stable.  Patient remains in Sinus Rhythm with CBH with Junctional Escape Rhythm. Denies new symptoms or complaints. Epicardial pacing wires in places. Currently not pacemaker dependant. Therapeutic INR reached.       Review of Systems   Constitutional: Positive for malaise/fatigue.   Cardiovascular:  Positive for dyspnea on exertion and palpitations. Negative for chest pain, irregular heartbeat, near-syncope and syncope.     Objective:     Vital Signs (Most Recent):  Temp: 98.6 °F (37 °C) (01/12/24 0715)  Pulse: (!) 54 (01/12/24 0930)  Resp: (!) 34 (01/12/24 0930)  BP: (!) 127/56 (01/12/24 0900)  SpO2: 96 % (01/12/24 0930) Vital Signs (24h Range):  Temp:  [98.4 °F (36.9 °C)-98.9 °F (37.2 °C)] 98.6 °F (37 °C)  Pulse:  [52-75] 54  Resp:  [14-50] 34  SpO2:  [90 %-100 %] 96 %  BP: ()/(56-73) 127/56     Weight: 80.7 kg (177 lb 12.8 oz)  Body mass index is 33.6 kg/m².     SpO2: 96 %        Physical Exam  Vitals and nursing note reviewed.   Constitutional:       General: She is not in acute distress.     Appearance: She is not ill-appearing.   HENT:      Head: Normocephalic and atraumatic.      Right Ear: External ear normal.      Left Ear: External ear normal.      Mouth/Throat:      Mouth: Mucous membranes are moist.      Pharynx: No oropharyngeal exudate.   Eyes:      Extraocular Movements: Extraocular movements intact.      Pupils: Pupils are equal, round, and reactive to light.   Cardiovascular:      Rate and Rhythm: Normal rate and regular rhythm.      Pulses: Normal pulses.      Heart sounds: Normal heart sounds. No murmur heard.     Comments:  Epicardial pacer wires in place  Pulmonary:      Effort: Pulmonary effort is normal. No respiratory distress.      Breath sounds: Normal breath sounds. No wheezing or rales.   Abdominal:      General: Abdomen is flat. Bowel sounds are normal. There is no distension.      Palpations: Abdomen is soft.      Tenderness: There is no abdominal tenderness.   Musculoskeletal:         General: No signs of injury. Normal range of motion.      Cervical back: Normal range of motion.      Right lower leg: No edema.      Left lower leg: No edema.   Skin:     General: Skin is warm and dry.   Neurological:      General: No focal deficit present.      Mental Status: She is alert and oriented to person, place, and time.      Sensory: No sensory deficit.      Motor: No weakness.   Psychiatric:         Mood and Affect: Mood normal.         Behavior: Behavior normal.         Thought Content: Thought content normal.            Significant Labs: EP:   Recent Labs   Lab 01/11/24  0333 01/11/24  0801 01/11/24  2329 01/12/24  0416 01/12/24  0419     --   --  135*  --    K 3.9 4.1 3.3* 3.7  --      --   --  100  --    CO2 26  --   --  25  --    GLU 99  --   --  96  --    BUN 10  --   --  8  --    CREATININE 0.6  --   --  0.5  --    CALCIUM 8.4*  --   --  8.4*  --    PROT 5.9*  --   --  5.9*  --    ALBUMIN 3.2*  --   --  3.0*  --    BILITOT 0.8  --   --  0.8  --    ALKPHOS 49*  --   --  53*  --    AST 45*  --   --  30  --    ALT 23  --   --  23  --    ANIONGAP 9  --   --  10  --    WBC 19.43*  --   --   --  14.82*   HGB 7.9*  --   --   --  7.7*   HCT 24.5*  --   --   --  24.0*     --   --   --  242   INR 1.6*  --   --  2.5*  --        Significant Imaging: EKG: Sinus with CHB and Junctional Escape Rhythm  Assessment and Plan:     CHB (complete heart block)  Patient is POD 2 s/p MVR (revision with mechanical valve) and TV banding. Patient was found to be Sinus w/ CHB w/ a junctional escape rhythm this AM via tele and confirmed  with 12 lead ECG. Review of Tele shows this arrhythm appearing as early as 1/08/2024. Patient received Amiodarone from 1/08/24 to 1/10/24. Currently having palpitations and BLANK, Unclear if related to this arrhythmia.    New c/o of increased fatigue, likely related to insomnia. No siggnifcant change in rhythm (still in Sinus with CHB and Junctional Escape)      Recommendations  Unclear etiology with broad differential; we are hopeful this is a transient Arrhythmia in the post operative setting. Patient describes a similar episode in 2003 post op from her initial MV surgery  Patient has epicardial wires currently in place,   Recommending to continue ICU level care, telemetry, and leaving Epicardial pacer wires in place,   Current settings are set to 10mA and 40bpms.  Currently not pacemaker dependant.   Recommend holding AV herminia blocking agents.   Recommend holding Amiodarone.   Recommend contacting on call EP fellow if patient started to become pacer dependant or rhythm changes drastically.   Recommend daily AM ECGs.   Will consider PPM vs EP study on Tuesday if rhythm has not improved   Patient should remain in the SICU d/t potential need for epicardial pacing.   Reached Therapeutic INR but concerns about need for additional heparin bridging leobardo procedurally.      Discussed with staff and team, attestation to follow       Trell Dillon DO  Cardiac Electrophysiology  PGY-1  [facility]

## 2024-01-12 NOTE — ASSESSMENT & PLAN NOTE
38 y.o. female with a past medical history of MVP and MR s/p mitral annuloplasty in [place] (2003),  HTN, bradycardia now s/p Mechanical Mitral Valve Replacement on 1/8/2024 with Dr. Liu      Neuro/Psych:     - Sedation: n/a    - Pain:    - Scheduled Acetaminophen 1g q8h    - Oxy PRN   - Received TTP and Rectus Sheath blocks intra-op             Cardiac:     - S/P Mechanical Mitral Valve Replacement with Dr. Liu on 1/8/2024    - BP Goal: MAP 60-80    - initially placed on Amio for suspected A-fib.    - d/c'd 1/9/24    - Pressors: off    - Anti-HTNs: Will start when appropriate    - Rhythm: Complete Heart block with occasional junctional    - Beta blocker: Hold for now, heart block    - EP involved for Complete Heart Block and PPM   - Tentative Tuesday if EP decides she requires PPM. EP hopeful heart block is transient    - Statin: Not indicated    - EKG daily     Pulmonary:     - Goal SpO2 >92%    - on RA    - CXR stable    - Chest Tubes 2 Meds     - pulled on 1/11/24    - IS    - ABGs PRN      Renal:    - Trend BUN/Cr     - lasix 40mg IV BID    - Soares out    Recent Labs   Lab 01/10/24  0255 01/11/24  0333 01/12/24  0416   BUN 10 10 8   CREATININE 0.6 0.6 0.5           FEN / GI:     - Daily CMP, PRN K/Mag/Phos per protocol     - Replace electrolytes as needed    - Nutrition: nectar thick liquids, meds crushed.   - follow speech recs    - Bowel Regimen: Miralax, docusate      ID:     - Afebrile    - WBC downtrending    - Abx: Completed perioperative cefazolin 2g Q8H x 5 doses    Recent Labs   Lab 01/10/24  0255 01/11/24  0333 01/12/24  0419   WBC 18.46*  18.46* 19.43* 14.82*           Heme/Onc:     - Hgb 13.0 pre-operatively    - CBC daily    - ASA 325mg POD0    - ASA 81mg POD1    - Heparin d/c'd as therapeutic INR achieved    - Coumadin 2 tonight , INR 2.5    - Transfuse Hb <7 and symptomatic anemia    - 1u pRBC on 1/9/24      Recent Labs   Lab 01/10/24  0255 01/10/24  1124 01/11/24  0333 01/11/24  0944  Gastroenterology Consult Note    Patient:   Jose Silva   YOB: 1962   Facility:   [facility]   Referring/PCP: Yadi Castaneda DO  Date:     7/8/2021  Consultant:   Brian Gomes MD, MD    Subjective: This 60 y.o. female was admitted 7/8/2021 with a diagnosis of \"Abdominal pain [R10.9]\" and is seen in consultation regarding \"colitis\". Information was obtained from interview of  the patient, examination of the patient, and review of records. I did  update the past medical, surgical, social and / or family history. Colitis mild-mod in colon for months assoc w abd pain, diarrhea, nausea    Current status  Present  Diet Order: Diet NPO Exceptions are: Ice Chips, Sips of Water with Meds and she is tolerating diet. Recently, she has experienced moderate, maximum 5/10 LLQ and LUQ abdominal  Pain and she has not required Intravenous narcotic analgesics. The patient has also experienced no constipation, fever, melena and vomiting      Prior to Admission medications    Medication Sig Start Date End Date Taking?  Authorizing Provider   sucralfate (CARAFATE) 1 GM tablet Take 1 g by mouth 4 times daily (after meals and at bedtime) Pt takes PRN 4/23/21   Historical Provider, MD   predniSONE (DELTASONE) 20 MG tablet Take 1 tablet by mouth 2 times daily for 5 days 7/7/21 7/12/21  MARYCARMEN Ge - CNP   mirtazapine (REMERON) 15 MG tablet Take 15 mg by mouth nightly    Historical Provider, MD Rosas Purchase 100-62.5-25 MCG/INH AEPB INHALE 1 PUFF INTO THE LUNGS DAILY 5/11/21   Sammie Guzmán MD   losartan (COZAAR) 100 MG tablet Take 1 tablet by mouth daily 5/10/21   Retia Paget, MD   amLODIPine (NORVASC) 5 MG tablet Take 1 tablet by mouth nightly 5/10/21   Retia Paget, MD   atorvastatin (LIPITOR) 40 MG tablet Take 1 tablet by mouth daily 5/10/21 5/5/22  Retia Paget, MD   metoprolol succinate (TOPROL XL) 50 MG extended release tablet Take 1 tablet 01/11/24  1612 01/11/24  2329 01/12/24  0416 01/12/24 0419   HGB 8.4*  8.4*  --  7.9*  --   --   --   --  7.7*   *  135*  --  162  --   --   --   --  242   APTT 41.6*   < >  --  63.3* 77.2* 49.7*  --   --    INR 1.3*  --  1.6*  --   --   --  2.5*  --     < > = values in this interval not displayed.           Endocrine:     - CTS Goal -140    - HgbA1c: 5.2    - Insulin SSI    - Endocrinology consulted for insulin management      PPx:   Feeding: nectar thick, Speech following  Analgesia/Sedation: Multimodal controlled  Thromboembolic Prevention: SCD's   HOB >30: Yes  Stress Ulcer: Famotidine  Glucose Control: Yes, insulin management per Endocrinology     Lines/Drains/Airway:   Pacing Wires: Temporary ventricular pacing wires      Dispo/Code Status/Palliative:     - Continue SICU Care    - Full Code     magnesium sulfate, promethazine **OR** ondansetron, acetaminophen **OR** acetaminophen  Allergies: No Known Allergies    Past Medical History:   Diagnosis Date    Back pain     [facility]ch[facility] obstructive [facility] disease) [facility])     Fatigue     Hypertension     Syncope and collapse 2019    Thyroid disease     Ulcerative colitis, unspecified, without complications ([facility] [address]      Past Surgical History:   Procedure Laterality Date    BACK SURGERY      L4-L5 rods in    COLONOSCOPY N/A 2021    COLONOSCOPY WITH BIOPSY performed by Khris Downey MD at [address]      due to a fall    TONSILLECTOMY         Social:   Social History     Tobacco Use    Smoking status: Former Smoker     Packs/day: 0.10     Years: 32.00     Pack years: 3.20     Types: Cigarettes     Quit date: 2020     Years since quittin.4    Smokeless tobacco: Never Used   Substance Use Topics    Alcohol use: Never     Family:   Family History   Problem Relation Age of Onset    Heart Disease Father     High Blood Pressure Sister        ROS: Pertinent items are noted in HPI.     Objective:   Vital Signs:  Temp (24hrs), Av.1 °F (36.7 °C), Min:97.8 °F (36.6 °C), Max:98.4 °F (95.7 °C)     Systolic (27OGL), CCM:386 , Min:128 , PYC:753      Diastolic (97WIL), DOMINIC:40, Min:78, Max:112     Pulse  Av  Min: 58  Max: 80  /84   Pulse 58   Temp 97.8 °F (36.6 °C) (Oral)   Resp 16   Ht 5' 4\" (1.626 m)   Wt 95 lb (43.1 kg)   SpO2 94%   BMI 16.31 kg/m²      Physical Exam:   /84   Pulse 58   Temp 97.8 °F (36.6 °C) (Oral)   Resp 16   Ht 5' 4\" (1.626 m)   Wt 95 lb (43.1 kg)   SpO2 94%   BMI 16.31 kg/m²   General appearance: alert, appears stated age and cooperative  Lungs: clear to auscultation bilaterally  Chest wall: no tenderness  Heart: regular rate and rhythm, S1, S2 normal, no murmur, click, rub or gallop  Abdomen: abnormal findings:  tenderness mild in the LLQ  Extremities: extremities normal, atraumatic, no cyanosis or edema  Skin: Skin color, texture, turgor normal. No rashes or lesions  Neurologic: Grossly normal    Lab and Imaging Review   Recent Labs     07/06/21  0540 07/07/21  0440 07/08/21  0358   WBC 17.4* 14.4* 30.8*   HGB 12.9 11.9* 14.5   MCV 91.9 92.3 90.5   * 457* 472*    142 135*   K 3.6 3.2* 3.4*    106 98*   CO2 27 31 25   BUN 14 13 20   CREATININE <0.5* <0.5* 0.7   GLUCOSE 161* 132* 108*   CALCIUM 9.5 8.9 9.9   PROT  --   --  6.8   LABALBU 3.9 3.3* 4.0   AST  --   --  29   ALT  --   --  24   ALKPHOS  --   --  102   BILITOT  --   --  0.4   LIPASE  --   --  29.0   MG  --   --  1.60*       Assessment:     Patient Active Problem List    Diagnosis Date Noted    Abdominal pain 07/08/2021    Moderate malnutrition ([facility] [address] 07/02/2021    Ulcerative colitis with rectal bleeding ([facility] [address] 07/01/2021    Other osteoporosis without current pathological fracture 06/05/2021    Menopause 05/26/2021    Non-ischemic cardiomyopathy (HCC)     NSTEMI (non-ST elevated myocardial infarction) ([facility])     Adrenal nodule ([facility]) 03/01/2021    Hypercalcemia 03/01/2021    Diarrhea     COPD with acute exacerbation ([facility]) 12/22/2020    Gastroesophageal reflux disease without esophagitis 11/05/2020    Anxiety and depression 11/05/2020    Neuropathy of left lower extremity 11/05/2020    Acquired hypothyroidism 11/05/2020    COPD exacerbation ([facility] [address] 11/30/2019    Moderate COPD (chronic obstructive pulmonary disease) ([facility]) 10/10/2019    Heterozygous alpha 1-antitrypsin deficiency ([facility] [address] 10/10/2019    Essential hypertension 09/24/2019    Chest pain 09/24/2019     61 yo w HTN, CAD, COPD, GERD and h/o UC/?CD re-admitted w 1 month of Lt-sided abd pain/diarrhea/hematochezia and wt loss and suggested to have diffuse colitis on CT, which has markedly improved on F/U CTon steroids/Abx's. C diff is neg.  Flex Sig revealed poor prep and small ulceration at 20cm, biopsied, eventhough she had a neg. Colonoscopy in 2019. CD IS A MORE LIKELY POSSIBILITY THAN UC, BUT SHE SEEMS TO BE HAVING COLONIC SPASM AS WELL. HBsAg is neg. Plan:   1. Will start lower dose steroids   2. Awaiting Bx result and Daniel So before deciding on Biologics  3. Will start Bentyl 20 mg tid for colon spasm  4. Will start Protonix for her GERD/nausea  5.  Consider advancing diet soon and repeating C diff    Shanell Norris MD       [phone]

## 2024-01-12 NOTE — PT/OT/SLP PROGRESS
Physical Therapy Treatment    Patient Name:  King Botello   MRN:  [id number]    Recommendations:     Discharge Recommendations: Low Intensity Therapy  Discharge Equipment Recommendations: none  Barriers to discharge: None    Assessment:     King Botello is a 38 y.o. female admitted with a medical diagnosis of Nonrheumatic mitral valve stenosis.  She presents with the following impairments/functional limitations: weakness, impaired balance, decreased safety awareness, impaired endurance, impaired functional mobility, gait instability pt tolerated treatment well but is still functioning below her baseline. Pt will benefit from cont skilled PT 5x/wk to progress physically.  Patient continues to demonstrate the need for low intensity therapy on a scheduled basis exhibited by decreased independence with functional mobility. Pt is s/p MVR, TVr 1/8/24.    Rehab Prognosis: Good; patient would benefit from acute skilled PT services to address these deficits and reach maximum level of function.    Recent Surgery: Procedure(s) (LRB):  MITRAL VALVE REPLACEMENT, REDO STERNOTOMY (N/A)  VALVULOPLASTY, TRICUSPID REPAIR (N/A) 4 Days Post-Op    Plan:     During this hospitalization, patient to be seen 5 x/week to address the identified rehab impairments via gait training, therapeutic activities and progress toward the following goals:    Plan of Care Expires:  02/08/24    Subjective     Chief Complaint: pt c/o feeling tired with gait training.   Patient/Family Comments/goals: to feel better and go home.   Pain/Comfort:  Pain Rating 1: 5/10 (RUE due to IV)  Pain Addressed 1: Reposition, Distraction  Pain Rating Post-Intervention 1: 5/10 (RUE due to IV)      Objective:     Communicated with nurse  prior to session.  Patient found  on bedside commode   with telemetry, pulse ox (continuous), blood pressure cuff, external pacer, oxygen, central line  UE IV upon PT entry to room.     General Precautions: Standard, fall,  sternal  Orthopedic Precautions: N/A  Braces: N/A  Respiratory Status: Room air pt received gait training with O2 2L per pt request and RN approval.      Functional Mobility:  Transfers: pt needed verbal cues for functional mobility with sternal precautions.     Sit to Stand:  contact guard assistance with hand-held assist. Pt stood x 2 reps from bedside commode and 2 reps from bedside chair.     Gait: pt received gait training 62 ft wit CGA, O2 intact with 1 sitting rest period due to feeling weak. RN was present with portable monitor and pt was additional assist of 1 to follow with chair.     Pt white board updated with current therapists name and level of mobility assistance needed.     AM-PAC 6 CLICK MOBILITY  Turning over in bed (including adjusting bedclothes, sheets and blankets)?: 3  Sitting down on and standing up from a chair with arms (e.g., wheelchair, bedside commode, etc.): 3  Moving from lying on back to sitting on the side of the bed?: 3  Moving to and from a bed to a chair (including a wheelchair)?: 3  Need to walk in hospital room?: 3  Climbing 3-5 steps with a railing?: 1  Basic Mobility Total Score: 16       Treatment & Education:  Pt,  and pt father, received verbal instructions in PT POC and all verbally expressed understanding of such.     Patient left up in chair with all lines intact, call button in reach Pt father,  pt , and RN present..    GOALS:   Multidisciplinary Problems       Physical Therapy Goals          Problem: Physical Therapy    Goal Priority Disciplines Outcome Goal Variances Interventions   Physical Therapy Goal     PT, PT/OT Ongoing, Progressing     Description: Goals to be met by: 2024    Patient will increase functional independence with mobility by performin. Supine to sit with Supervision  2. Sit to stand transfer with Supervision  3. Gait  x 400 feet with Supervision.                          Time Tracking:     PT Received On: 24  PT  Start Time: 1021     PT Stop Time: 1048  PT Total Time (min): 27 min     Billable Minutes: Gait Training 27 min     Treatment Type: Treatment  PT/PTA: PT     Number of PTA visits since last PT visit: 0     01/12/2024

## 2024-01-12 NOTE — PLAN OF CARE
Please merge this note with today's resident critical care note.    SICU Staff Addendum  I have reviewed and concur with the resident's/NP's history, physical, assessment, and plan.  I have personally interviewed and examined the patient at bedside.  See below for any additional findings/changes.    Reason for admission:  Nonrheumatic mitral valve stenosis  Present on Admission:   Mitral valve prolapse   Nonrheumatic mitral valve stenosis   Hypertension, essential      Active issues/Goals for Today:      POD 4 s/p redo chest, (first approach was thoracotomy) mechanical MVR for bioprosthetic MS, TV repair.      - Pain is controlled on current regimen.  - Continue ASA. CHB with periods of SB. No AV herminia blocking agents for now. EP recs noted and appreciated. Possible PPM on 1/16.  - Wean supplemental oxygen for for SpO2 > 88-90%, pO2 > 65. IS.  - Cardiac diet as tolerated. Bowel regimen.  - Monitor CT output   - Monitor Hgb and aim for Hgb > 7   - Diuresis with IV lasix.   - PT/OT  - Anticoagulation for mechanical MV. INR goal is 2.5 to 3.5. Therapeutic today. Continuing coumadin.  - Patient seen on rounds with Dr. Liu.    35 minutes of critical care time was spent personally by me on the following activities: development of treatment plan with patient or surrogate and bedside caregivers, discussions with consultants, evaluation of patient's response to treatment, examination of patient, ordering and performing treatments and interventions, ordering and review of laboratory studies, ordering and review of radiographic studies, pulse oximetry, re-evaluation of patient's condition.  This critical care time did not overlap with that of any other provider or involve time for any procedures.    Dashawn Aguilar MD  Anesthesiology/Critical Care

## 2024-01-12 NOTE — PLAN OF CARE
Problem: Adult Inpatient Plan of Care  Goal: Plan of Care Review  Outcome: Ongoing, Progressing        SICU PLAN OF CARE NOTE    Dx: Nonrheumatic mitral valve stenosis    Vital Signs (last 12 hours):   Temp:  [97.9 °F (36.6 °C)-98.6 °F (37 °C)]   Pulse:  [52-73]   Resp:  [14-41]   BP: (121-141)/()   SpO2:  [94 %-99 %]      Neuro: AAO x4, Follows Commands, and Moves All Extremities    Cardiac: NSR, ventricular pacer wires connected, BP WDL     Respiratory: Room Air spo2 95%-98%    Urine Output: Voids Spontaneously 2000 cc/shift    Drains: Wound Vac - pervena , total output 0 / shift    Diet: Cardiac Diet, able to swallow thin liquids     Labs/Accuchecks: accuchecks achs, AM labs , q12hr K     Skin:  All skin remains free from injury.  Midsternal incision with wound vac .    Shift Events:  ambulated in mercedes, EP consult - no interventions at this time, speech consult - able to swallow thin liquids and advance to regular diet - able to swallow pills 1 at a time.  See flowsheet for further assessment/details.  Family updated on current condition/plan of care, questions answered.

## 2024-01-12 NOTE — PT/OT/SLP PROGRESS
"Speech Language Pathology Treatment    Patient Name:  King Botello   MRN:  [id number]  Admitting Diagnosis: Nonrheumatic mitral valve stenosis    Recommendations:                 General Recommendations:  Dysphagia therapy  Diet recommendations:  Regular Diet - IDDSI Level 7, Liquid Diet Level: Thin liquids - IDDSI Level 0   Aspiration Precautions:   1 bite/sip at a time  Meds whole buried in puree  No straws  Small bites/sips   General Precautions: Standard, aspiration, fall, sternal  Communication strategies:  none    Assessment:     King Botello is a 38 y.o. female with an SLP diagnosis of Dysphagia. She presents with improved tolerance of thin liquids this date though pt continues to exhibit hesitancy with oral intake. SLP to continue to follow.     Subjective     Spoke with nursing prior to session. Pt agreeable to participate in all aspects of session.      Patient goals: none stated     Pain/Comfort:  Pain Rating 1:  (none stated)    Respiratory Status: Room air    Objective:     Has the patient been evaluated by SLP for swallowing?   Yes  Keep patient NPO? No   Current Respiratory Status:        Pt seen for ongoing dysphagia therapy. Pt reported minimal intake of nectar thick liquids recently though adequate tolerance of thickened liquids. Spouse reported concerns of poor strength recently though reviewed that poor oral intake likely contributing to decreased energy levels. Of note, pt advanced to regular diet per medical team and pt/spouse endorsing pt consumed small bites of regular solids without signs of airway compromise. PO trials of thin liquids via open cup sips regulated by pt without overt signs of airway compromise. Of note, pt consistently taking very small sips due to fear of "swallowing wrong." Provided encouragement with pt ultimately consuming >10 open cup sips with single dry throat clearing. She politely declined trials of puree and solids. SLP provided education regarding overall " impressions, upgrade to regular diet with thin liquids, and ongoing SLP POC. Discussed that pt may return to nectar thick liquids per her comfort/discretion as desired. Pt verbalized understanding and had no additional questions or concerns upon SLP exit. Spoke with RN regarding impressions and recommendations and nursing verbalized understanding.       Goals:   Multidisciplinary Problems       SLP Goals          Problem: SLP    Goal Priority Disciplines Outcome   SLP Goal     SLP Ongoing, Progressing   Description: Speech Language Pathology Goals  Goals expected to be met by 1/23  1. Pt will participate in ongoing assessment of swallow.                                Plan:     Patient to be seen:  4 x/week   Plan of Care expires:  02/08/24  Plan of Care reviewed with:  patient   SLP Follow-Up:  Yes       Discharge recommendations:   (TBD)   Barriers to Discharge:  Level of Skilled Assistance Needed      Time Tracking:     SLP Treatment Date:   01/12/24  Speech Start Time:  0951  Speech Stop Time:  1006     Speech Total Time (min):  15 min    Billable Minutes: Treatment Swallowing Dysfunction 8 and Self Care/Home Management Training 7      01/12/2024

## 2024-01-12 NOTE — PROGRESS NOTES
[facility] - [facility]ensive [facility]  Critical Care - Surgery  Progress Note    Patient Name: King Botello  MRN: [id number]  Admission Date: 1/8/2024  Hospital Length of Stay: 4 days  Code Status: Full Code  Attending Provider: Thierno Liu MD  Primary Care Provider: Mayco Hansen MD   Principal Problem: Nonrheumatic mitral valve stenosis    Subjective:     Interval History/Significant Events: NAEON. Chest tubes pulled yesterday. Continues to remain in complete heart block. Reached therapeutic INR 2.5 today. Heparin bridge d/c'd. EP following with plans for pacemaker.     Follow-up For: Procedure(s) (LRB):  MITRAL VALVE REPLACEMENT, REDO STERNOTOMY (N/A)  VALVULOPLASTY, TRICUSPID REPAIR (N/A)    Post-Operative Day: 4 Days Post-Op    Objective:     Vital Signs (Most Recent):  Temp: 98.6 °F (37 °C) (01/12/24 0715)  Pulse: (!) 54 (01/12/24 0930)  Resp: (!) 34 (01/12/24 0930)  BP: (!) 127/56 (01/12/24 0900)  SpO2: 96 % (01/12/24 0930) Vital Signs (24h Range):  Temp:  [98.4 °F (36.9 °C)-98.9 °F (37.2 °C)] 98.6 °F (37 °C)  Pulse:  [52-75] 54  Resp:  [14-50] 34  SpO2:  [90 %-100 %] 96 %  BP: ()/(56-73) 127/56     Weight: 80.7 kg (177 lb 12.8 oz)  Body mass index is 33.6 kg/m².      Intake/Output Summary (Last 24 hours) at 1/12/2024 1114  Last data filed at 1/12/2024 0943  Gross per 24 hour   Intake 1395.49 ml   Output 2745 ml   Net -1349.51 ml          Physical Exam  Vitals and nursing note reviewed.   Constitutional:       General: She is not in acute distress.     Interventions: She is sedated.   HENT:      Mouth/Throat:      Mouth: Mucous membranes are dry.   Eyes:      Extraocular Movements: Extraocular movements intact.      Pupils: Pupils are equal, round, and reactive to light.   Cardiovascular:      Rate and Rhythm: Normal rate and regular rhythm.      Pulses: Normal pulses.      Comments: V Wires  Pulmonary:      Effort: Pulmonary effort is normal. No respiratory distress.   Abdominal:       General: Abdomen is flat.      Palpations: Abdomen is soft.   Musculoskeletal:         General: No swelling.      Cervical back: Normal range of motion.      Right lower leg: No edema.      Left lower leg: No edema.   Skin:     General: Skin is warm and dry.      Capillary Refill: Capillary refill takes less than 2 seconds.      Comments: MSI CDI              Vents:  Vent Mode: Spont (01/08/24 2052)  Ventilator Initiated: Yes (01/08/24 1427)  Set Rate: 20 BPM (01/08/24 1804)  Vt Set: 380 mL (01/08/24 1804)  PEEP/CPAP: 5 cmH20 (01/08/24 2052)  Oxygen Concentration (%): 100 (01/10/24 1415)  Peak Airway Pressure: 9.9 cmH20 (01/08/24 2052)  Plateau Pressure: 0 cmH20 (01/08/24 2052)  Total Ve: 7.34 L/m (01/08/24 2052)  Negative Inspiratory Force (cm H2O): -15 (01/08/24 2137)  F/VT Ratio<105 (RSBI): (!) 96.3 (01/08/24 2052)    Lines/Drains/Airways       Line  Duration                  Pacer Wires 01/08/24 1259 3 days              Peripheral Intravenous Line  Duration                  Peripheral IV - Single Lumen 01/08/24 0729 16 G Right Forearm 4 days         Peripheral IV - Single Lumen 01/12/24 0131 20 G Anterior;Right Forearm <1 day                    Significant Labs:    CBC/Anemia Profile:  Recent Labs   Lab 01/11/24  0333 01/12/24  0419   WBC 19.43* 14.82*   HGB 7.9* 7.7*   HCT 24.5* 24.0*    242   MCV 87 85   RDW 17.1* 16.7*        Chemistries:  Recent Labs   Lab 01/11/24  0333 01/11/24  0801 01/11/24  2329 01/12/24  0416 01/12/24  0959     --   --  135*  --    K 3.9 4.1 3.3* 3.7 4.3     --   --  100  --    CO2 26  --   --  25  --    BUN 10  --   --  8  --    CREATININE 0.6  --   --  0.5  --    CALCIUM 8.4*  --   --  8.4*  --    ALBUMIN 3.2*  --   --  3.0*  --    PROT 5.9*  --   --  5.9*  --    BILITOT 0.8  --   --  0.8  --    ALKPHOS 49*  --   --  53*  --    ALT 23  --   --  23  --    AST 45*  --   --  30  --    MG 2.2  --   --  1.9  --    PHOS 2.5* 3.3  --  2.6*  --        All pertinent labs  within the past 24 hours have been reviewed.    Significant Imaging:  I have reviewed all pertinent imaging results/findings within the past 24 hours.  Assessment/Plan:     Cardiac/Vascular  * Nonrheumatic mitral valve stenosis  38 y.o. female with a past medical history of MVP and MR s/p mitral annuloplasty in [place] (2003),  HTN, bradycardia now s/p Mechanical Mitral Valve Replacement on 1/8/2024 with Dr. Liu      Neuro/Psych:     - Sedation: n/a    - Pain:    - Scheduled Acetaminophen 1g q8h    - Oxy PRN   - Received TTP and Rectus Sheath blocks intra-op             Cardiac:     - S/P Mechanical Mitral Valve Replacement with Dr. Liu on 1/8/2024    - BP Goal: MAP 60-80    - initially placed on Amio for suspected A-fib.    - d/c'd 1/9/24    - Pressors: off    - Anti-HTNs: Will start when appropriate    - Rhythm: Complete Heart block with occasional junctional    - Beta blocker: Hold for now, heart block    - EP involved for Complete Heart Block and PPM   - Tentative Tuesday if EP decides she requires PPM. EP hopeful heart block is transient    - Statin: Not indicated    - EKG daily     Pulmonary:     - Goal SpO2 >92%    - on RA    - CXR stable    - Chest Tubes 2 Meds     - pulled on 1/11/24    - IS    - ABGs PRN      Renal:    - Trend BUN/Cr     - lasix 40mg IV BID    - Soares out    Recent Labs   Lab 01/10/24  0255 01/11/24  0333 01/12/24  0416   BUN 10 10 8   CREATININE 0.6 0.6 0.5           FEN / GI:     - Daily CMP, PRN K/Mag/Phos per protocol     - Replace electrolytes as needed    - Nutrition: nectar thick liquids, meds crushed.   - follow speech recs    - Bowel Regimen: Miralax, docusate      ID:     - Afebrile    - WBC downtrending    - Abx: Completed perioperative cefazolin 2g Q8H x 5 doses    Recent Labs   Lab 01/10/24  0255 01/11/24  0333 01/12/24  0419   WBC 18.46*  18.46* 19.43* 14.82*           Heme/Onc:     - Hgb 13.0 pre-operatively    - CBC daily    - ASA 325mg POD0    - ASA 81mg POD1     - Heparin d/c'd as therapeutic INR achieved    - Coumadin 2 tonight , INR 2.5    - Transfuse Hb <7 and symptomatic anemia    - 1u pRBC on 1/9/24      Recent Labs   Lab 01/10/24  0255 01/10/24  1124 01/11/24  0333 01/11/24  0944 01/11/24  1612 01/11/24  2329 01/12/24  0416 01/12/24  0419   HGB 8.4*  8.4*  --  7.9*  --   --   --   --  7.7*   *  135*  --  162  --   --   --   --  242   APTT 41.6*   < >  --  63.3* 77.2* 49.7*  --   --    INR 1.3*  --  1.6*  --   --   --  2.5*  --     < > = values in this interval not displayed.           Endocrine:     - CTS Goal -140    - HgbA1c: 5.2    - Insulin SSI    - Endocrinology consulted for insulin management      PPx:   Feeding: nectar thick, Speech following  Analgesia/Sedation: Multimodal controlled  Thromboembolic Prevention: SCD's   HOB >30: Yes  Stress Ulcer: Famotidine  Glucose Control: Yes, insulin management per Endocrinology     Lines/Drains/Airway:   Pacing Wires: Temporary ventricular pacing wires      Dispo/Code Status/Palliative:     - Continue SICU Care    - Full Code          Critical care was time spent personally by me on the following activities: development of treatment plan with patient or surrogate and bedside caregivers, discussions with consultants, evaluation of patient's response to treatment, examination of patient, ordering and performing treatments and interventions, ordering and review of laboratory studies, ordering and review of radiographic studies, pulse oximetry, re-evaluation of patient's condition.  This critical care time did not overlap with that of any other provider or involve time for any procedures.     Umer Herrera MD  Critical Care - Surgery  [facility] - [facility]

## 2024-01-12 NOTE — SUBJECTIVE & OBJECTIVE
Interval History/Significant Events: NAEON. Chest tubes pulled yesterday. Continues to remain in complete heart block. Reached therapeutic INR 2.5 today. Heparin bridge d/c'd. EP following with plans for pacemaker.     Follow-up For: Procedure(s) (LRB):  MITRAL VALVE REPLACEMENT, REDO STERNOTOMY (N/A)  VALVULOPLASTY, TRICUSPID REPAIR (N/A)    Post-Operative Day: 4 Days Post-Op    Objective:     Vital Signs (Most Recent):  Temp: 98.6 °F (37 °C) (01/12/24 0715)  Pulse: (!) 54 (01/12/24 0930)  Resp: (!) 34 (01/12/24 0930)  BP: (!) 127/56 (01/12/24 0900)  SpO2: 96 % (01/12/24 0930) Vital Signs (24h Range):  Temp:  [98.4 °F (36.9 °C)-98.9 °F (37.2 °C)] 98.6 °F (37 °C)  Pulse:  [52-75] 54  Resp:  [14-50] 34  SpO2:  [90 %-100 %] 96 %  BP: ()/(56-73) 127/56     Weight: 80.7 kg (177 lb 12.8 oz)  Body mass index is 33.6 kg/m².      Intake/Output Summary (Last 24 hours) at 1/12/2024 1114  Last data filed at 1/12/2024 0943  Gross per 24 hour   Intake 1395.49 ml   Output 2745 ml   Net -1349.51 ml          Physical Exam  Vitals and nursing note reviewed.   Constitutional:       General: She is not in acute distress.     Interventions: She is sedated.   HENT:      Mouth/Throat:      Mouth: Mucous membranes are dry.   Eyes:      Extraocular Movements: Extraocular movements intact.      Pupils: Pupils are equal, round, and reactive to light.   Cardiovascular:      Rate and Rhythm: Normal rate and regular rhythm.      Pulses: Normal pulses.      Comments: V Wires  Pulmonary:      Effort: Pulmonary effort is normal. No respiratory distress.   Abdominal:      General: Abdomen is flat.      Palpations: Abdomen is soft.   Musculoskeletal:         General: No swelling.      Cervical back: Normal range of motion.      Right lower leg: No edema.      Left lower leg: No edema.   Skin:     General: Skin is warm and dry.      Capillary Refill: Capillary refill takes less than 2 seconds.      Comments: MSI CDI              Vents:  Vent  Mode: Spont (01/08/24 2052)  Ventilator Initiated: Yes (01/08/24 1427)  Set Rate: 20 BPM (01/08/24 1804)  Vt Set: 380 mL (01/08/24 1804)  PEEP/CPAP: 5 cmH20 (01/08/24 2052)  Oxygen Concentration (%): 100 (01/10/24 1415)  Peak Airway Pressure: 9.9 cmH20 (01/08/24 2052)  Plateau Pressure: 0 cmH20 (01/08/24 2052)  Total Ve: 7.34 L/m (01/08/24 2052)  Negative Inspiratory Force (cm H2O): -15 (01/08/24 2137)  F/VT Ratio<105 (RSBI): (!) 96.3 (01/08/24 2052)    Lines/Drains/Airways       Line  Duration                  Pacer Wires 01/08/24 1259 3 days              Peripheral Intravenous Line  Duration                  Peripheral IV - Single Lumen 01/08/24 0729 16 G Right Forearm 4 days         Peripheral IV - Single Lumen 01/12/24 0131 20 G Anterior;Right Forearm <1 day                    Significant Labs:    CBC/Anemia Profile:  Recent Labs   Lab 01/11/24  0333 01/12/24  0419   WBC 19.43* 14.82*   HGB 7.9* 7.7*   HCT 24.5* 24.0*    242   MCV 87 85   RDW 17.1* 16.7*        Chemistries:  Recent Labs   Lab 01/11/24  0333 01/11/24  0801 01/11/24  2329 01/12/24  0416 01/12/24  0959     --   --  135*  --    K 3.9 4.1 3.3* 3.7 4.3     --   --  100  --    CO2 26  --   --  25  --    BUN 10  --   --  8  --    CREATININE 0.6  --   --  0.5  --    CALCIUM 8.4*  --   --  8.4*  --    ALBUMIN 3.2*  --   --  3.0*  --    PROT 5.9*  --   --  5.9*  --    BILITOT 0.8  --   --  0.8  --    ALKPHOS 49*  --   --  53*  --    ALT 23  --   --  23  --    AST 45*  --   --  30  --    MG 2.2  --   --  1.9  --    PHOS 2.5* 3.3  --  2.6*  --        All pertinent labs within the past 24 hours have been reviewed.    Significant Imaging:  I have reviewed all pertinent imaging results/findings within the past 24 hours.

## 2024-01-12 NOTE — ASSESSMENT & PLAN NOTE
Patient is POD 2 s/p MVR (revision with mechanical valve) and TV banding. Patient was found to be Sinus w/ CHB w/ a junctional escape rhythm this AM via tele and confirmed with 12 lead ECG. Review of Tele shows this arrhythm appearing as early as 1/08/2024. Patient received Amiodarone from 1/08/24 to 1/10/24. Currently having palpitations and BLANK, Unclear if related to this arrhythmia.    New c/o of increased fatigue, likely related to insomnia. No siggnifcant change in rhythm (still in Sinus with CHB and Junctional Escape)      Recommendations  Unclear etiology with broad differential; we are hopeful this is a transient Arrhythmia in the post operative setting. Patient describes a similar episode in 2003 post op from her initial MV surgery  Patient has epicardial wires currently in place,   Recommending to continue ICU level care, telemetry, and leaving Epicardial pacer wires in place,   Current settings are set to 10mA and 40bpms.  Currently not pacemaker dependant.   Recommend holding AV herminia blocking agents.   Recommend holding Amiodarone.   Recommend contacting on call EP fellow if patient started to become pacer dependant or rhythm changes drastically.   Recommend daily AM ECGs.   Will consider PPM vs EP study on Tuesday if rhythm has not improved   Patient should remain in the SICU d/t potential need for epicardial pacing.   Reached Therapeutic INR but concerns about need for additional heparin bridging leobardo procedurally.

## 2024-01-12 NOTE — NURSING
SICU PLAN OF CARE NOTE    Dx: Nonrheumatic mitral valve stenosis    Shift Events: VSS, no events overnight    Gtts: N/A    Neuro: AAO x4, Follows Commands, and Moves All Extremities    Cardiac: Complete heart block, HR 50-60    Respiratory: Room Air    GI: Dental Soft    : Voids Spontaneously  1820 cc/shift           Labs/Accuchecks: ACHS, daily labs    Skin: Pt self-turned Q2 hours, foam dressings in place, skin warm dry and intact    Call light within reach, family at bedside, notified of plan of cafe.

## 2024-01-12 NOTE — SUBJECTIVE & OBJECTIVE
"Interval HPI:   Overnight events: No acute events overnight. Patient in room 40656/34701 A. Blood glucose stable. BG at goal on current insulin regimen (SSI ). Steroid use- None. 4 Days Post-Op  Renal function- Normal   Vasopressors-  None       Endocrine will continue to follow and manage insulin orders inpatient.         Diet NPO Except for: Sips with Medication     Eating:   NPO  Nausea: No  Hypoglycemia and intervention: No  Fever: No  TPN and/or TF: No    BP (!) 127/56   Pulse (!) 55   Temp 98.6 °F (37 °C) (Oral)   Resp (!) 24   Ht 5' 1" (1.549 m)   Wt 80.7 kg (177 lb 12.8 oz)   LMP 01/07/2024 Comment: on period now  SpO2 95%   Breastfeeding No   BMI 33.60 kg/m²     Labs Reviewed and Include    Recent Labs   Lab 01/12/24  0416   GLU 96   CALCIUM 8.4*   ALBUMIN 3.0*   PROT 5.9*   *   K 3.7   CO2 25      BUN 8   CREATININE 0.5   ALKPHOS 53*   ALT 23   AST 30   BILITOT 0.8     Lab Results   Component Value Date    WBC 14.82 (H) 01/12/2024    HGB 7.7 (L) 01/12/2024    HCT 24.0 (L) 01/12/2024    MCV 85 01/12/2024     01/12/2024     No results for input(s): "TSH", "FREET4" in the last 168 hours.  Lab Results   Component Value Date    HGBA1C 5.2 01/04/2024       Nutritional status:   Body mass index is 33.6 kg/m².  Lab Results   Component Value Date    ALBUMIN 3.0 (L) 01/12/2024    ALBUMIN 3.2 (L) 01/11/2024    ALBUMIN 3.4 (L) 01/10/2024     No results found for: "PREALBUMIN"    Estimated Creatinine Clearance: 145.5 mL/min (based on SCr of 0.5 mg/dL).    Accu-Checks  Recent Labs     01/09/24  1550 01/09/24  1922 01/10/24  0100 01/10/24  1128 01/10/24  1822 01/10/24  2038 01/11/24  0008 01/11/24  0817 01/11/24  1618 01/11/24  2141   POCTGLUCOSE 142* 145* 131* 164* 122* 143* 112* 143* 117* 91       Current Medications and/or Treatments Impacting Glycemic Control  Immunotherapy:    Immunosuppressants       None          Steroids:   Hormones (From admission, onward)      None          Pressors: "    Autonomic Drugs (From admission, onward)      None          Hyperglycemia/Diabetes Medications:   Antihyperglycemics (From admission, onward)      Start     Stop Route Frequency Ordered    01/10/24 1207  insulin aspart U-100 pen 0-10 Units         -- SubQ Before meals & nightly PRN 01/10/24 1107

## 2024-01-12 NOTE — ASSESSMENT & PLAN NOTE
Endocrinology consulted for BG management.   BG goal 110-140 (CTS Protocol)    - Novolog (aspart) insulin prn for BG excursions [facility] SSI (150/25).  - BG checks q4hr while NPO  - Hypoglycemia protocol in place    ** Please notify Endocrine for any change and/or advance in diet**  ** Please call Endocrine for any BG related issues **    Discharge Planning:   TBD. Please notify endocrinology prior to discharge.

## 2024-01-12 NOTE — SUBJECTIVE & OBJECTIVE
Interval History: No Acute Overnight Events. Patient is afebrile and hemodynamically stable.  Patient remains in Sinus Rhythm with CBH with Junctional Escape Rhythm. Denies new symptoms or complaints. Epicardial pacing wires in places. Currently not pacemaker dependant. Therapeutic INR reached.       Review of Systems   Constitutional: Positive for malaise/fatigue.   Cardiovascular:  Positive for dyspnea on exertion and palpitations. Negative for chest pain, irregular heartbeat, near-syncope and syncope.     Objective:     Vital Signs (Most Recent):  Temp: 98.6 °F (37 °C) (01/12/24 0715)  Pulse: (!) 54 (01/12/24 0930)  Resp: (!) 34 (01/12/24 0930)  BP: (!) 127/56 (01/12/24 0900)  SpO2: 96 % (01/12/24 0930) Vital Signs (24h Range):  Temp:  [98.4 °F (36.9 °C)-98.9 °F (37.2 °C)] 98.6 °F (37 °C)  Pulse:  [52-75] 54  Resp:  [14-50] 34  SpO2:  [90 %-100 %] 96 %  BP: ()/(56-73) 127/56     Weight: 80.7 kg (177 lb 12.8 oz)  Body mass index is 33.6 kg/m².     SpO2: 96 %        Physical Exam  Vitals and nursing note reviewed.   Constitutional:       General: She is not in acute distress.     Appearance: She is not ill-appearing.   HENT:      Head: Normocephalic and atraumatic.      Right Ear: External ear normal.      Left Ear: External ear normal.      Mouth/Throat:      Mouth: Mucous membranes are moist.      Pharynx: No oropharyngeal exudate.   Eyes:      Extraocular Movements: Extraocular movements intact.      Pupils: Pupils are equal, round, and reactive to light.   Cardiovascular:      Rate and Rhythm: Normal rate and regular rhythm.      Pulses: Normal pulses.      Heart sounds: Normal heart sounds. No murmur heard.     Comments: Epicardial pacer wires in place  Pulmonary:      Effort: Pulmonary effort is normal. No respiratory distress.      Breath sounds: Normal breath sounds. No wheezing or rales.   Abdominal:      General: Abdomen is flat. Bowel sounds are normal. There is no distension.      Palpations:  Abdomen is soft.      Tenderness: There is no abdominal tenderness.   Musculoskeletal:         General: No signs of injury. Normal range of motion.      Cervical back: Normal range of motion.      Right lower leg: No edema.      Left lower leg: No edema.   Skin:     General: Skin is warm and dry.   Neurological:      General: No focal deficit present.      Mental Status: She is alert and oriented to person, place, and time.      Sensory: No sensory deficit.      Motor: No weakness.   Psychiatric:         Mood and Affect: Mood normal.         Behavior: Behavior normal.         Thought Content: Thought content normal.            Significant Labs: EP:   Recent Labs   Lab 01/11/24  0333 01/11/24  0801 01/11/24  2329 01/12/24  0416 01/12/24  0419     --   --  135*  --    K 3.9 4.1 3.3* 3.7  --      --   --  100  --    CO2 26  --   --  25  --    GLU 99  --   --  96  --    BUN 10  --   --  8  --    CREATININE 0.6  --   --  0.5  --    CALCIUM 8.4*  --   --  8.4*  --    PROT 5.9*  --   --  5.9*  --    ALBUMIN 3.2*  --   --  3.0*  --    BILITOT 0.8  --   --  0.8  --    ALKPHOS 49*  --   --  53*  --    AST 45*  --   --  30  --    ALT 23  --   --  23  --    ANIONGAP 9  --   --  10  --    WBC 19.43*  --   --   --  14.82*   HGB 7.9*  --   --   --  7.7*   HCT 24.5*  --   --   --  24.0*     --   --   --  242   INR 1.6*  --   --  2.5*  --        Significant Imaging: EKG: Sinus with CHB and Junctional Escape Rhythm

## 2024-01-13 LAB
ABO + RH BLD: NORMAL
ALBUMIN SERPL BCP-MCNC: 2.9 G/DL (ref 3.5–5.2)
ALP SERPL-CCNC: 53 U/L (ref 55–135)
ALT SERPL W/O P-5'-P-CCNC: 19 U/L (ref 10–44)
ANION GAP SERPL CALC-SCNC: 5 MMOL/L (ref 8–16)
APTT PPP: 32.8 SEC (ref 21–32)
AST SERPL-CCNC: 25 U/L (ref 10–40)
BASOPHILS # BLD AUTO: 0.03 K/UL (ref 0–0.2)
BASOPHILS NFR BLD: 0.3 % (ref 0–1.9)
BILIRUB SERPL-MCNC: 0.9 MG/DL (ref 0.1–1)
BLD GP AB SCN CELLS X3 SERPL QL: NORMAL
BUN SERPL-MCNC: 10 MG/DL (ref 6–20)
CALCIUM SERPL-MCNC: 8.9 MG/DL (ref 8.7–10.5)
CHLORIDE SERPL-SCNC: 100 MMOL/L (ref 95–110)
CO2 SERPL-SCNC: 29 MMOL/L (ref 23–29)
CREAT SERPL-MCNC: 0.6 MG/DL (ref 0.5–1.4)
DIFFERENTIAL METHOD BLD: ABNORMAL
EOSINOPHIL # BLD AUTO: 0.2 K/UL (ref 0–0.5)
EOSINOPHIL NFR BLD: 1.6 % (ref 0–8)
ERYTHROCYTE [DISTWIDTH] IN BLOOD BY AUTOMATED COUNT: 16.5 % (ref 11.5–14.5)
EST. GFR  (NO RACE VARIABLE): >60 ML/MIN/1.73 M^2
GLUCOSE SERPL-MCNC: 94 MG/DL (ref 70–110)
HCT VFR BLD AUTO: 26.3 % (ref 37–48.5)
HGB BLD-MCNC: 8.5 G/DL (ref 12–16)
IMM GRANULOCYTES # BLD AUTO: 0.06 K/UL (ref 0–0.04)
IMM GRANULOCYTES NFR BLD AUTO: 0.6 % (ref 0–0.5)
INR PPP: 2.6 (ref 0.8–1.2)
LYMPHOCYTES # BLD AUTO: 1.3 K/UL (ref 1–4.8)
LYMPHOCYTES NFR BLD: 12.7 % (ref 18–48)
MAGNESIUM SERPL-MCNC: 2 MG/DL (ref 1.6–2.6)
MCH RBC QN AUTO: 27.2 PG (ref 27–31)
MCHC RBC AUTO-ENTMCNC: 32.3 G/DL (ref 32–36)
MCV RBC AUTO: 84 FL (ref 82–98)
MONOCYTES # BLD AUTO: 1.2 K/UL (ref 0.3–1)
MONOCYTES NFR BLD: 11.5 % (ref 4–15)
NEUTROPHILS # BLD AUTO: 7.7 K/UL (ref 1.8–7.7)
NEUTROPHILS NFR BLD: 73.3 % (ref 38–73)
NRBC BLD-RTO: 1 /100 WBC
PHOSPHATE SERPL-MCNC: 3.6 MG/DL (ref 2.7–4.5)
PLATELET # BLD AUTO: 261 K/UL (ref 150–450)
PMV BLD AUTO: 11.2 FL (ref 9.2–12.9)
POCT GLUCOSE: 105 MG/DL (ref 70–110)
POCT GLUCOSE: 118 MG/DL (ref 70–110)
POCT GLUCOSE: 99 MG/DL (ref 70–110)
POTASSIUM SERPL-SCNC: 3.8 MMOL/L (ref 3.5–5.1)
POTASSIUM SERPL-SCNC: 4.2 MMOL/L (ref 3.5–5.1)
PROT SERPL-MCNC: 6.1 G/DL (ref 6–8.4)
PROTHROMBIN TIME: 26.3 SEC (ref 9–12.5)
RBC # BLD AUTO: 3.13 M/UL (ref 4–5.4)
SODIUM SERPL-SCNC: 134 MMOL/L (ref 136–145)
SPECIMEN OUTDATE: NORMAL
WBC # BLD AUTO: 10.54 K/UL (ref 3.9–12.7)

## 2024-01-13 PROCEDURE — 80053 COMPREHEN METABOLIC PANEL: CPT | Performed by: STUDENT IN AN ORGANIZED HEALTH CARE EDUCATION/TRAINING PROGRAM

## 2024-01-13 PROCEDURE — 25000003 PHARM REV CODE 250

## 2024-01-13 PROCEDURE — 86850 RBC ANTIBODY SCREEN: CPT | Performed by: THORACIC SURGERY (CARDIOTHORACIC VASCULAR SURGERY)

## 2024-01-13 PROCEDURE — 85730 THROMBOPLASTIN TIME PARTIAL: CPT

## 2024-01-13 PROCEDURE — 84100 ASSAY OF PHOSPHORUS: CPT

## 2024-01-13 PROCEDURE — 84132 ASSAY OF SERUM POTASSIUM: CPT

## 2024-01-13 PROCEDURE — 93005 ELECTROCARDIOGRAM TRACING: CPT

## 2024-01-13 PROCEDURE — 63600175 PHARM REV CODE 636 W HCPCS

## 2024-01-13 PROCEDURE — 85610 PROTHROMBIN TIME: CPT

## 2024-01-13 PROCEDURE — 99232 SBSQ HOSP IP/OBS MODERATE 35: CPT | Mod: ,,, | Performed by: NURSE PRACTITIONER

## 2024-01-13 PROCEDURE — 11000001 HC ACUTE MED/SURG PRIVATE ROOM

## 2024-01-13 PROCEDURE — 93010 ELECTROCARDIOGRAM REPORT: CPT | Mod: ,,, | Performed by: INTERNAL MEDICINE

## 2024-01-13 PROCEDURE — 83735 ASSAY OF MAGNESIUM: CPT

## 2024-01-13 PROCEDURE — 25000003 PHARM REV CODE 250: Performed by: STUDENT IN AN ORGANIZED HEALTH CARE EDUCATION/TRAINING PROGRAM

## 2024-01-13 PROCEDURE — 25000003 PHARM REV CODE 250: Performed by: THORACIC SURGERY (CARDIOTHORACIC VASCULAR SURGERY)

## 2024-01-13 PROCEDURE — 85025 COMPLETE CBC W/AUTO DIFF WBC: CPT

## 2024-01-13 PROCEDURE — 99291 CRITICAL CARE FIRST HOUR: CPT | Mod: ,,, | Performed by: ANESTHESIOLOGY

## 2024-01-13 RX ORDER — SODIUM,POTASSIUM PHOSPHATES 280-250MG
2 POWDER IN PACKET (EA) ORAL
Status: DISCONTINUED | OUTPATIENT
Start: 2024-01-13 | End: 2024-01-18 | Stop reason: HOSPADM

## 2024-01-13 RX ORDER — LANOLIN ALCOHOL/MO/W.PET/CERES
800 CREAM (GRAM) TOPICAL
Status: DISCONTINUED | OUTPATIENT
Start: 2024-01-13 | End: 2024-01-18 | Stop reason: HOSPADM

## 2024-01-13 RX ORDER — FUROSEMIDE 10 MG/ML
20 INJECTION INTRAMUSCULAR; INTRAVENOUS 2 TIMES DAILY
Status: DISCONTINUED | OUTPATIENT
Start: 2024-01-13 | End: 2024-01-18

## 2024-01-13 RX ORDER — ACETAMINOPHEN 650 MG/20.3ML
1000 LIQUID ORAL EVERY 6 HOURS PRN
Status: DISCONTINUED | OUTPATIENT
Start: 2024-01-13 | End: 2024-01-18 | Stop reason: HOSPADM

## 2024-01-13 RX ADMIN — POTASSIUM BICARBONATE 40 MEQ: 391 TABLET, EFFERVESCENT ORAL at 05:01

## 2024-01-13 RX ADMIN — DOCUSATE SODIUM 100 MG: 100 CAPSULE, LIQUID FILLED ORAL at 09:01

## 2024-01-13 RX ADMIN — POTASSIUM CHLORIDE 40 MEQ: 1500 TABLET, EXTENDED RELEASE ORAL at 09:01

## 2024-01-13 RX ADMIN — WARFARIN SODIUM 2 MG: 1 TABLET ORAL at 05:01

## 2024-01-13 RX ADMIN — ACETAMINOPHEN 1000 MG: 500 TABLET ORAL at 05:01

## 2024-01-13 RX ADMIN — MUPIROCIN 1 G: 20 OINTMENT TOPICAL at 09:01

## 2024-01-13 RX ADMIN — ASPIRIN 81 MG CHEWABLE TABLET 81 MG: 81 TABLET CHEWABLE at 09:01

## 2024-01-13 RX ADMIN — POTASSIUM BICARBONATE 40 MEQ: 391 TABLET, EFFERVESCENT ORAL at 01:01

## 2024-01-13 RX ADMIN — FUROSEMIDE 20 MG: 10 INJECTION, SOLUTION INTRAMUSCULAR; INTRAVENOUS at 05:01

## 2024-01-13 RX ADMIN — FUROSEMIDE 20 MG: 10 INJECTION, SOLUTION INTRAMUSCULAR; INTRAVENOUS at 09:01

## 2024-01-13 RX ADMIN — FAMOTIDINE 20 MG: 20 TABLET, FILM COATED ORAL at 09:01

## 2024-01-13 RX ADMIN — FAMOTIDINE 20 MG: 20 TABLET, FILM COATED ORAL at 08:01

## 2024-01-13 RX ADMIN — ACETAMINOPHEN 999.01 MG: 650 SOLUTION ORAL at 02:01

## 2024-01-13 RX ADMIN — ACETAMINOPHEN 999.01 MG: 650 SOLUTION ORAL at 09:01

## 2024-01-13 RX ADMIN — DOCUSATE SODIUM 100 MG: 100 CAPSULE, LIQUID FILLED ORAL at 08:01

## 2024-01-13 RX ADMIN — POTASSIUM BICARBONATE 40 MEQ: 391 TABLET, EFFERVESCENT ORAL at 08:01

## 2024-01-13 NOTE — PROGRESS NOTES
[facility]  Critical Care - Surgery  Progress Note    Patient Name: King Botello  MRN: [id number]  Admission Date: 1/8/2024  Hospital Length of Stay: 5 days  Code Status: Full Code  Attending Provider: Thierno Liu MD  Primary Care Provider: Mayco Hansen MD   Principal Problem: Nonrheumatic mitral valve stenosis    Subjective:     Hospital/ICU Course:  No notes on file    Interval History/Significant Events: NAEON. Chest tubes pulled yesterday. Continues to remain in complete heart block. INR therapeutic & stable today at 2.6. EP following with plans for pacemaker. Hungry this AM requesting breakfast. Pain well-controlled w/ tylenol. Passing gas & tolerating PO. No CP, SOB, headaches, weakness, dizziness, abd pain, n/v/d, f/c.     Follow-up For: Procedure(s) (LRB):  MITRAL VALVE REPLACEMENT, REDO STERNOTOMY (N/A)  VALVULOPLASTY, TRICUSPID REPAIR (N/A)    Post-Operative Day: 4 Days Post-Op    Objective:     Vital Signs (Most Recent):  Temp: 98.5 °F (36.9 °C) (01/13/24 0345)  Pulse: 64 (01/13/24 0645)  Resp: (!) 23 (01/13/24 0645)  BP: (!) 106/53 (01/13/24 0300)  SpO2: 98 % (01/13/24 0645) Vital Signs (24h Range):  Temp:  [97.9 °F (36.6 °C)-99.5 °F (37.5 °C)] 98.5 °F (36.9 °C)  Pulse:  [52-74] 64  Resp:  [14-41] 23  SpO2:  [94 %-100 %] 98 %  BP: (103-148)/() 106/53     Weight: 80.7 kg (177 lb 12.8 oz)  Body mass index is 33.6 kg/m².      Intake/Output Summary (Last 24 hours) at 1/13/2024 0722  Last data filed at 1/13/2024 0701  Gross per 24 hour   Intake 811.64 ml   Output 3880 ml   Net -3068.36 ml            Physical Exam  Vitals and nursing note reviewed.   Constitutional:       General: She is not in acute distress.     Interventions: She is sedated.   HENT:      Mouth/Throat:      Mouth: Mucous membranes are dry.   Eyes:      Extraocular Movements: Extraocular movements intact.      Pupils: Pupils are equal, round, and reactive to light.   Cardiovascular:      Rate  and Rhythm: Normal rate and regular rhythm.      Pulses: Normal pulses.      Comments: V Wires  Pulmonary:      Effort: Pulmonary effort is normal. No respiratory distress.   Abdominal:      General: Abdomen is flat.      Palpations: Abdomen is soft.   Musculoskeletal:         General: No swelling.      Cervical back: Normal range of motion.      Right lower leg: No edema.      Left lower leg: No edema.   Skin:     General: Skin is warm and dry.      Capillary Refill: Capillary refill takes less than 2 seconds.      Comments: MSI CDI              Vents:  Vent Mode: Spont (01/08/24 2052)  Ventilator Initiated: Yes (01/08/24 1427)  Set Rate: 20 BPM (01/08/24 1804)  Vt Set: 380 mL (01/08/24 1804)  PEEP/CPAP: 5 cmH20 (01/08/24 2052)  Oxygen Concentration (%): 100 (01/10/24 1415)  Peak Airway Pressure: 9.9 cmH20 (01/08/24 2052)  Plateau Pressure: 0 cmH20 (01/08/24 2052)  Total Ve: 7.34 L/m (01/08/24 2052)  Negative Inspiratory Force (cm H2O): -15 (01/08/24 2137)  F/VT Ratio<105 (RSBI): (!) 96.3 (01/08/24 2052)    Lines/Drains/Airways       Line  Duration                  Pacer Wires 01/08/24 1259 4 days              Peripheral Intravenous Line  Duration                  Peripheral IV - Single Lumen 01/12/24 0131 20 G Anterior;Right Forearm 1 day         Peripheral IV - Single Lumen 01/13/24 0330 22 G Posterior;Right Wrist <1 day                    Significant Labs:    CBC/Anemia Profile:  Recent Labs   Lab 01/12/24  0419 01/13/24  0322   WBC 14.82* 10.54   HGB 7.7* 8.5*   HCT 24.0* 26.3*    261   MCV 85 84   RDW 16.7* 16.5*          Chemistries:  Recent Labs   Lab 01/12/24  0416 01/12/24  0959 01/12/24  1804 01/13/24  0322   *  --   --  134*   K 3.7 4.3 3.8 3.8     --   --  100   CO2 25  --   --  29   BUN 8  --   --  10   CREATININE 0.5  --   --  0.6   CALCIUM 8.4*  --   --  8.9   ALBUMIN 3.0*  --   --  2.9*   PROT 5.9*  --   --  6.1   BILITOT 0.8  --   --  0.9   ALKPHOS 53*  --   --  53*   ALT 23  --    --  19   AST 30  --   --  25   MG 1.9  --  2.3 2.0   PHOS 2.6*  --  2.9 3.6         All pertinent labs within the past 24 hours have been reviewed.    Significant Imaging:  I have reviewed all pertinent imaging results/findings within the past 24 hours.  Assessment/Plan:     Cardiac/Vascular  * Nonrheumatic mitral valve stenosis  38 y.o. female with a past medical history of MVP and MR s/p mitral annuloplasty in [place] (2003),  HTN, bradycardia now s/p Mechanical Mitral Valve Replacement on 1/8/2024 with Dr. Liu      Neuro/Psych:     - Sedation: n/a    - Pain:    - Scheduled Acetaminophen 1g q8h    - Oxy PRN   - Received TTP and Rectus Sheath blocks intra-op             Cardiac:     - S/P Mechanical Mitral Valve Replacement with Dr. Liu on 1/8/2024    - BP Goal: MAP 60-80    - initially placed on Amio for suspected A-fib.    - d/c'd 1/9/24    - Pressors: off    - Anti-HTNs: Will start when appropriate (metop succ 50 QAM & 25 QHS)       -On IV lasix 40mg BID    - Rhythm: Complete Heart block with occasional junctional    - Beta blocker: Hold for now, heart block    - EP involved for Complete Heart Block and PPM   - Tentative Tuesday if EP decides she requires PPM. EP hopeful heart block is transient    - Statin: Not indicated    - EKG daily     Pulmonary:     - Goal SpO2 >92%    - on RA    - CXR stable    - Chest Tubes 2 Meds     - pulled on 1/11/24    - IS    - ABGs PRN      Renal:    - Trend BUN/Cr     - lasix 40mg IV BID    - Soares out    Recent Labs   Lab 01/11/24  0333 01/12/24  0416 01/13/24  0322   BUN 10 8 10   CREATININE 0.6 0.5 0.6           FEN / GI:     - Daily CMP, PRN K/Mag/Phos per protocol     - Replace electrolytes as needed    - Nutrition: nectar thick liquids, meds crushed.   - follow speech recs    - Bowel Regimen: Miralax, docusate      ID:     - Afebrile    - WBC downtrending    - Abx: Completed perioperative cefazolin 2g Q8H x 5 doses    Recent Labs   Lab 01/11/24 0333  01/12/24 0419 01/13/24 0322   WBC 19.43* 14.82* 10.54           Heme/Onc:     - Hgb 13.0 pre-operatively    - CBC daily    - ASA 325mg POD0    - ASA 81mg POD1    - Heparin d/c'd as therapeutic INR achieved    - PO Coumadin 2mg QD, INR 2.6    - Transfuse Hb <7 and symptomatic anemia    - 1u pRBC on 1/9/24      Recent Labs   Lab 01/11/24  0333 01/11/24  0944 01/11/24  1612 01/11/24  2329 01/12/24 0416 01/12/24 0419 01/13/24 0322   HGB 7.9*  --   --   --   --  7.7* 8.5*     --   --   --   --  242 261   APTT  --    < > 77.2* 49.7*  --   --  32.8*   INR 1.6*  --   --   --  2.5*  --  2.6*    < > = values in this interval not displayed.           Endocrine:     - CTS Goal -140    - HgbA1c: 5.2    - Insulin SSI    - Endocrinology consulted for insulin management      PPx:   Feeding: Cardiac diet, Speech following  Analgesia/Sedation: Multimodal controlled  Thromboembolic Prevention: SCD's   HOB >30: Yes  Stress Ulcer: Famotidine  Glucose Control: Yes, insulin management per Endocrinology     Lines/Drains/Airway:   Pacing Wires: Temporary ventricular pacing wires              PIVs              Wound Vac      Dispo/Code Status/Palliative:     - Continue SICU Care    - Full Code           Inderjit Owens MD  Critical Care - Surgery  [facility]

## 2024-01-13 NOTE — SUBJECTIVE & OBJECTIVE
"Interval HPI:   Overnight events: No acute events overnight. Patient in room 71407/00644 A. Blood glucose stable. BG at goal on current insulin regimen (SSI ). Steroid use- None. 5 Days Post-Op  Renal function- Normal   Vasopressors-  None       Endocrine will continue to follow and manage insulin orders inpatient.         Diet Cardiac Thin; Standard Tray     Eatin%  Nausea: No  Hypoglycemia and intervention: No  Fever: No  TPN and/or TF: No    /85   Pulse (!) 58   Temp 98.5 °F (36.9 °C) (Oral)   Resp 19   Ht 5' 1" (1.549 m)   Wt 80.7 kg (177 lb 12.8 oz)   LMP 2024 Comment: on period now  SpO2 97%   Breastfeeding No   BMI 33.60 kg/m²     Labs Reviewed and Include    Recent Labs   Lab 24  0322   GLU 94   CALCIUM 8.9   ALBUMIN 2.9*   PROT 6.1   *   K 3.8   CO2 29      BUN 10   CREATININE 0.6   ALKPHOS 53*   ALT 19   AST 25   BILITOT 0.9     Lab Results   Component Value Date    WBC 10.54 2024    HGB 8.5 (L) 2024    HCT 26.3 (L) 2024    MCV 84 2024     2024     No results for input(s): "TSH", "FREET4" in the last 168 hours.  Lab Results   Component Value Date    HGBA1C 5.2 2024       Nutritional status:   Body mass index is 33.6 kg/m².  Lab Results   Component Value Date    ALBUMIN 2.9 (L) 2024    ALBUMIN 3.0 (L) 2024    ALBUMIN 3.2 (L) 2024     No results found for: "PREALBUMIN"    Estimated Creatinine Clearance: 121.2 mL/min (based on SCr of 0.6 mg/dL).    Accu-Checks  Recent Labs     01/10/24  1128 01/10/24  1822 01/10/24  2038 24  0008 24  0817 24  1618 24  2141 24  0958 24  1715 24   POCTGLUCOSE 164* 122* 143* 112* 143* 117* 91 115* 84 86       Current Medications and/or Treatments Impacting Glycemic Control  Immunotherapy:    Immunosuppressants       None          Steroids:   Hormones (From admission, onward)      None          Pressors:    Autonomic Drugs " (From admission, onward)      None          Hyperglycemia/Diabetes Medications:   Antihyperglycemics (From admission, onward)      Start     Stop Route Frequency Ordered    01/10/24 1207  insulin aspart U-100 pen 0-10 Units         -- SubQ Before meals & nightly PRN 01/10/24 1107

## 2024-01-13 NOTE — SUBJECTIVE & OBJECTIVE
Interval History/Significant Events: NAEON. Chest tubes pulled yesterday. Continues to remain in complete heart block. INR therapeutic & stable today at 2.6. EP following with plans for pacemaker. Hungry this AM requesting breakfast. Pain well-controlled w/ tylenol. Passing gas & tolerating PO. No CP, SOB, headaches, weakness, dizziness, abd pain, n/v/d, f/c.     Follow-up For: Procedure(s) (LRB):  MITRAL VALVE REPLACEMENT, REDO STERNOTOMY (N/A)  VALVULOPLASTY, TRICUSPID REPAIR (N/A)    Post-Operative Day: 4 Days Post-Op    Objective:     Vital Signs (Most Recent):  Temp: 98.5 °F (36.9 °C) (01/13/24 0345)  Pulse: 64 (01/13/24 0645)  Resp: (!) 23 (01/13/24 0645)  BP: (!) 106/53 (01/13/24 0300)  SpO2: 98 % (01/13/24 0645) Vital Signs (24h Range):  Temp:  [97.9 °F (36.6 °C)-99.5 °F (37.5 °C)] 98.5 °F (36.9 °C)  Pulse:  [52-74] 64  Resp:  [14-41] 23  SpO2:  [94 %-100 %] 98 %  BP: (103-148)/() 106/53     Weight: 80.7 kg (177 lb 12.8 oz)  Body mass index is 33.6 kg/m².      Intake/Output Summary (Last 24 hours) at 1/13/2024 0722  Last data filed at 1/13/2024 0701  Gross per 24 hour   Intake 811.64 ml   Output 3880 ml   Net -3068.36 ml            Physical Exam  Vitals and nursing note reviewed.   Constitutional:       General: She is not in acute distress.     Interventions: She is sedated.   HENT:      Mouth/Throat:      Mouth: Mucous membranes are dry.   Eyes:      Extraocular Movements: Extraocular movements intact.      Pupils: Pupils are equal, round, and reactive to light.   Cardiovascular:      Rate and Rhythm: Normal rate and regular rhythm.      Pulses: Normal pulses.      Comments: V Wires  Pulmonary:      Effort: Pulmonary effort is normal. No respiratory distress.   Abdominal:      General: Abdomen is flat.      Palpations: Abdomen is soft.   Musculoskeletal:         General: No swelling.      Cervical back: Normal range of motion.      Right lower leg: No edema.      Left lower leg: No edema.   Skin:      General: Skin is warm and dry.      Capillary Refill: Capillary refill takes less than 2 seconds.      Comments: MSI CDI              Vents:  Vent Mode: Spont (01/08/24 2052)  Ventilator Initiated: Yes (01/08/24 1427)  Set Rate: 20 BPM (01/08/24 1804)  Vt Set: 380 mL (01/08/24 1804)  PEEP/CPAP: 5 cmH20 (01/08/24 2052)  Oxygen Concentration (%): 100 (01/10/24 1415)  Peak Airway Pressure: 9.9 cmH20 (01/08/24 2052)  Plateau Pressure: 0 cmH20 (01/08/24 2052)  Total Ve: 7.34 L/m (01/08/24 2052)  Negative Inspiratory Force (cm H2O): -15 (01/08/24 2137)  F/VT Ratio<105 (RSBI): (!) 96.3 (01/08/24 2052)    Lines/Drains/Airways       Line  Duration                  Pacer Wires 01/08/24 1259 4 days              Peripheral Intravenous Line  Duration                  Peripheral IV - Single Lumen 01/12/24 0131 20 G Anterior;Right Forearm 1 day         Peripheral IV - Single Lumen 01/13/24 0330 22 G Posterior;Right Wrist <1 day                    Significant Labs:    CBC/Anemia Profile:  Recent Labs   Lab 01/12/24 0419 01/13/24 0322   WBC 14.82* 10.54   HGB 7.7* 8.5*   HCT 24.0* 26.3*    261   MCV 85 84   RDW 16.7* 16.5*          Chemistries:  Recent Labs   Lab 01/12/24 0416 01/12/24  0959 01/12/24  1804 01/13/24  0322   *  --   --  134*   K 3.7 4.3 3.8 3.8     --   --  100   CO2 25  --   --  29   BUN 8  --   --  10   CREATININE 0.5  --   --  0.6   CALCIUM 8.4*  --   --  8.9   ALBUMIN 3.0*  --   --  2.9*   PROT 5.9*  --   --  6.1   BILITOT 0.8  --   --  0.9   ALKPHOS 53*  --   --  53*   ALT 23  --   --  19   AST 30  --   --  25   MG 1.9  --  2.3 2.0   PHOS 2.6*  --  2.9 3.6         All pertinent labs within the past 24 hours have been reviewed.    Significant Imaging:  I have reviewed all pertinent imaging results/findings within the past 24 hours.

## 2024-01-13 NOTE — ASSESSMENT & PLAN NOTE
Endocrinology consulted for BG management.   BG goal 110-140 (CTS Protocol)    - Novolog (aspart) insulin prn for BG excursions [facility] SSI (150/25).  - BG checks AC/HS  - Hypoglycemia protocol in place    ** Please notify Endocrine for any change and/or advance in diet**  ** Please call Endocrine for any BG related issues **    Discharge Planning:   TBD. Please notify endocrinology prior to discharge.

## 2024-01-13 NOTE — PROGRESS NOTES
"[facility]  Endocrinology  Progress Note    Admit Date: 2024     Reason for Consult: Management of Hyperglycemia     Surgical Procedure and Date: s/p MVR on 2024    Patient is not diabetic and does not currently take any oral/injectable antidiabetic/hypoglycemic medications.     Lab Results   Component Value Date    HGBA1C 5.2 2024         HPI: King Botello is a 38 y.o. female with a past medical history of MVP and MR s/p mitral annuloplasty in [place] (),  HTN, bradycardia, PONV. Patient is followed by Dr. Cordero. Patient reports chronic fatigue and BLANK before pregnancy.  After delivery of baby in May, patient was discharged and a few days later readmitted for CHF exacerbation which improved with lasix. TTE LVEF 60-66%. moderate right atrial enlargement. LA severely dilated. There is moderate to severe mitral stenosis. Javier score is 10, the mean diastolic gradient across the mitral valve is 11 mmHg at a heart rate of 77 bpm; MVA 1.33. The patient presents to the SICU s/p Mechanical Mitral Valve Replacement with Dr. Liu on 2024. Endocrine consulted to manage post-operative hyperglycemia.               Interval HPI:   Overnight events: No acute events overnight. Patient in room 05368/17897 A. Blood glucose stable. BG at goal on current insulin regimen (SSI ). Steroid use- None. 5 Days Post-Op  Renal function- Normal   Vasopressors-  None       Endocrine will continue to follow and manage insulin orders inpatient.         Diet Cardiac Thin; Standard Tray     Eatin%  Nausea: No  Hypoglycemia and intervention: No  Fever: No  TPN and/or TF: No    /85   Pulse (!) 58   Temp 98.5 °F (36.9 °C) (Oral)   Resp 19   Ht 5' 1" (1.549 m)   Wt 80.7 kg (177 lb 12.8 oz)   LMP 2024 Comment: on period now  SpO2 97%   Breastfeeding No   BMI 33.60 kg/m²     Labs Reviewed and Include    Recent Labs   Lab 24  0322   GLU 94   CALCIUM 8.9   ALBUMIN " "2.9*   PROT 6.1   *   K 3.8   CO2 29      BUN 10   CREATININE 0.6   ALKPHOS 53*   ALT 19   AST 25   BILITOT 0.9     Lab Results   Component Value Date    WBC 10.54 01/13/2024    HGB 8.5 (L) 01/13/2024    HCT 26.3 (L) 01/13/2024    MCV 84 01/13/2024     01/13/2024     No results for input(s): "TSH", "FREET4" in the last 168 hours.  Lab Results   Component Value Date    HGBA1C 5.2 01/04/2024       Nutritional status:   Body mass index is 33.6 kg/m².  Lab Results   Component Value Date    ALBUMIN 2.9 (L) 01/13/2024    ALBUMIN 3.0 (L) 01/12/2024    ALBUMIN 3.2 (L) 01/11/2024     No results found for: "PREALBUMIN"    Estimated Creatinine Clearance: 121.2 mL/min (based on SCr of 0.6 mg/dL).    Accu-Checks  Recent Labs     01/10/24  1128 01/10/24  1822 01/10/24  2038 01/11/24  0008 01/11/24  0817 01/11/24  1618 01/11/24  2141 01/12/24  0958 01/12/24  1715 01/12/24 2031   POCTGLUCOSE 164* 122* 143* 112* 143* 117* 91 115* 84 86       Current Medications and/or Treatments Impacting Glycemic Control  Immunotherapy:    Immunosuppressants       None          Steroids:   Hormones (From admission, onward)      None          Pressors:    Autonomic Drugs (From admission, onward)      None          Hyperglycemia/Diabetes Medications:   Antihyperglycemics (From admission, onward)      Start     Stop Route Frequency Ordered    01/10/24 1207  insulin aspart U-100 pen 0-10 Units         -- SubQ Before meals & nightly PRN 01/10/24 1107            ASSESSMENT and PLAN    Cardiac/Vascular  * Nonrheumatic mitral valve stenosis  Managed per primary team  Avoid hypoglycemia        S/P MVR (mitral valve replacement)  Optimize BG control to improve wound healing      Endocrine  Transient hyperglycemia post procedure  Endocrinology consulted for BG management.   BG goal 110-140 (CTS Protocol)    - Novolog (aspart) insulin prn for BG excursions [facility] SSI (150/25).  - BG checks AC/HS  - Hypoglycemia protocol in place    ** Please " notify Endocrine for any change and/or advance in diet**  ** Please call Endocrine for any BG related issues **    Discharge Planning:   TBD. Please notify endocrinology prior to discharge.               Gerber Rehman, DNP, FNP  Endocrinology  [facility] - Surgical Intensive Care

## 2024-01-13 NOTE — NURSING
SICU PLAN OF CARE NOTE    Dx: Nonrheumatic mitral valve stenosis    Shift Events: No acute events overnight     Gtts:     Neuro: AAO x4, Follows Commands, and Moves All Extremities    Cardiac:     Respiratory: Room Air    GI: Cardiac Diet    : Voids Spontaneously 650 cc/shift         Labs/Accuchecks: Accuchecks ACHS, AM labs    Skin: Sx incision to vacuum suction, dressing CDI    Call light within reach, plan of care discussed with pt and spouse

## 2024-01-13 NOTE — NURSING
SICU PLAN OF CARE NOTE    Dx: Nonrheumatic mitral valve stenosis    Goals of Care: SBP < 140, MAP > 60    Vital Signs (last 12 hours):   Temp:  [98.3 °F (36.8 °C)-98.4 °F (36.9 °C)]   Pulse:  [56-65]   Resp:  [14-39]   BP: ()/(48-85)   SpO2:  [96 %-100 %]      Neuro: AAO x4    Cardiac: NSR    Respiratory: Room Air    Gtts: none    Urine Output: Voids Spontaneously 2000 cc/shift    Diet: cardiac diet     Labs/Accuchecks: accu checks ACHS, no coverage needed    Skin:  All skin remains free from injury. Pt OOB in chair all day.    Shift Events:  Ambulated around unit with RN.  See flowsheet for further assessment/details.  Family updated on current condition/plan of care, questions answered, and emotional support provided.  MD updated on current condition, vitals, labs, and gtts.  No new orders received

## 2024-01-13 NOTE — ASSESSMENT & PLAN NOTE
38 y.o. female with a past medical history of MVP and MR s/p mitral annuloplasty in [employer] (2003),  HTN, bradycardia now s/p Mechanical Mitral Valve Replacement on 1/8/2024 with Dr. Liu      Neuro/Psych:     - Sedation: n/a    - Pain:    - Scheduled Acetaminophen 1g q8h    - Oxy PRN   - Received TTP and Rectus Sheath blocks intra-op             Cardiac:     - S/P Mechanical Mitral Valve Replacement with Dr. Liu on 1/8/2024    - BP Goal: MAP 60-80    - initially placed on Amio for suspected A-fib.    - d/c'd 1/9/24    - Pressors: off    - Anti-HTNs: Will start when appropriate (metop succ 50 QAM & 25 QHS)       -On IV lasix 40mg BID    - Rhythm: Complete Heart block with occasional junctional    - Beta blocker: Hold for now, heart block    - EP involved for Complete Heart Block and PPM   - Tentative Tuesday if EP decides she requires PPM. EP hopeful heart block is transient    - Statin: Not indicated    - EKG daily     Pulmonary:     - Goal SpO2 >92%    - on RA    - CXR stable    - Chest Tubes 2 Meds     - pulled on 1/11/24    - IS    - ABGs PRN      Renal:    - Trend BUN/Cr     - lasix 40mg IV BID    - Soares out    Recent Labs   Lab 01/11/24  0333 01/12/24  0416 01/13/24  0322   BUN 10 8 10   CREATININE 0.6 0.5 0.6           FEN / GI:     - Daily CMP, PRN K/Mag/Phos per protocol     - Replace electrolytes as needed    - Nutrition: nectar thick liquids, meds crushed.   - follow speech recs    - Bowel Regimen: Miralax, docusate      ID:     - Afebrile    - WBC downtrending    - Abx: Completed perioperative cefazolin 2g Q8H x 5 doses    Recent Labs   Lab 01/11/24  0333 01/12/24  0419 01/13/24  0322   WBC 19.43* 14.82* 10.54           Heme/Onc:     - Hgb 13.0 pre-operatively    - CBC daily    - ASA 325mg POD0    - ASA 81mg POD1    - Heparin d/c'd as therapeutic INR achieved    - PO Coumadin 2mg QD, INR 2.6    - Transfuse Hb <7 and symptomatic anemia    - 1u pRBC on 1/9/24      Recent Labs   Lab 01/11/24  4277  01/11/24  0944 01/11/24  1612 01/11/24  2329 01/12/24  0416 01/12/24  0419 01/13/24  0322   HGB 7.9*  --   --   --   --  7.7* 8.5*     --   --   --   --  242 261   APTT  --    < > 77.2* 49.7*  --   --  32.8*   INR 1.6*  --   --   --  2.5*  --  2.6*    < > = values in this interval not displayed.           Endocrine:     - CTS Goal -140    - HgbA1c: 5.2    - Insulin SSI    - Endocrinology consulted for insulin management      PPx:   Feeding: Cardiac diet, Speech following  Analgesia/Sedation: Multimodal controlled  Thromboembolic Prevention: SCD's   HOB >30: Yes  Stress Ulcer: Famotidine  Glucose Control: Yes, insulin management per Endocrinology     Lines/Drains/Airway:   Pacing Wires: Temporary ventricular pacing wires              PIVs              Wound Vac      Dispo/Code Status/Palliative:     - Continue SICU Care    - Full Code

## 2024-01-14 LAB
ALBUMIN SERPL BCP-MCNC: 2.9 G/DL (ref 3.5–5.2)
ALP SERPL-CCNC: 60 U/L (ref 55–135)
ALT SERPL W/O P-5'-P-CCNC: 16 U/L (ref 10–44)
ANION GAP SERPL CALC-SCNC: 10 MMOL/L (ref 8–16)
APTT PPP: 35.4 SEC (ref 21–32)
AST SERPL-CCNC: 20 U/L (ref 10–40)
BASOPHILS # BLD AUTO: 0.04 K/UL (ref 0–0.2)
BASOPHILS NFR BLD: 0.3 % (ref 0–1.9)
BILIRUB SERPL-MCNC: 0.8 MG/DL (ref 0.1–1)
BUN SERPL-MCNC: 9 MG/DL (ref 6–20)
CALCIUM SERPL-MCNC: 9 MG/DL (ref 8.7–10.5)
CHLORIDE SERPL-SCNC: 100 MMOL/L (ref 95–110)
CO2 SERPL-SCNC: 27 MMOL/L (ref 23–29)
CREAT SERPL-MCNC: 0.6 MG/DL (ref 0.5–1.4)
DIFFERENTIAL METHOD BLD: ABNORMAL
EOSINOPHIL # BLD AUTO: 0.3 K/UL (ref 0–0.5)
EOSINOPHIL NFR BLD: 2.7 % (ref 0–8)
ERYTHROCYTE [DISTWIDTH] IN BLOOD BY AUTOMATED COUNT: 16.3 % (ref 11.5–14.5)
EST. GFR  (NO RACE VARIABLE): >60 ML/MIN/1.73 M^2
GLUCOSE SERPL-MCNC: 92 MG/DL (ref 70–110)
HCT VFR BLD AUTO: 27.3 % (ref 37–48.5)
HGB BLD-MCNC: 9 G/DL (ref 12–16)
IMM GRANULOCYTES # BLD AUTO: 0.08 K/UL (ref 0–0.04)
IMM GRANULOCYTES NFR BLD AUTO: 0.7 % (ref 0–0.5)
INR PPP: 3 (ref 0.8–1.2)
LYMPHOCYTES # BLD AUTO: 1.9 K/UL (ref 1–4.8)
LYMPHOCYTES NFR BLD: 16.2 % (ref 18–48)
MAGNESIUM SERPL-MCNC: 2 MG/DL (ref 1.6–2.6)
MCH RBC QN AUTO: 27.8 PG (ref 27–31)
MCHC RBC AUTO-ENTMCNC: 33 G/DL (ref 32–36)
MCV RBC AUTO: 84 FL (ref 82–98)
MONOCYTES # BLD AUTO: 1.3 K/UL (ref 0.3–1)
MONOCYTES NFR BLD: 10.8 % (ref 4–15)
NEUTROPHILS # BLD AUTO: 8.1 K/UL (ref 1.8–7.7)
NEUTROPHILS NFR BLD: 69.3 % (ref 38–73)
NRBC BLD-RTO: 1 /100 WBC
PHOSPHATE SERPL-MCNC: 4.3 MG/DL (ref 2.7–4.5)
PLATELET # BLD AUTO: 361 K/UL (ref 150–450)
PMV BLD AUTO: 10.4 FL (ref 9.2–12.9)
POTASSIUM SERPL-SCNC: 4.3 MMOL/L (ref 3.5–5.1)
POTASSIUM SERPL-SCNC: 4.3 MMOL/L (ref 3.5–5.1)
PROT SERPL-MCNC: 6.3 G/DL (ref 6–8.4)
PROTHROMBIN TIME: 29.7 SEC (ref 9–12.5)
RBC # BLD AUTO: 3.24 M/UL (ref 4–5.4)
SODIUM SERPL-SCNC: 137 MMOL/L (ref 136–145)
WBC # BLD AUTO: 11.66 K/UL (ref 3.9–12.7)

## 2024-01-14 PROCEDURE — 99900035 HC TECH TIME PER 15 MIN (STAT)

## 2024-01-14 PROCEDURE — 11000001 HC ACUTE MED/SURG PRIVATE ROOM

## 2024-01-14 PROCEDURE — 93005 ELECTROCARDIOGRAM TRACING: CPT

## 2024-01-14 PROCEDURE — 99291 CRITICAL CARE FIRST HOUR: CPT | Mod: ,,, | Performed by: ANESTHESIOLOGY

## 2024-01-14 PROCEDURE — 25000003 PHARM REV CODE 250

## 2024-01-14 PROCEDURE — 85610 PROTHROMBIN TIME: CPT

## 2024-01-14 PROCEDURE — 85730 THROMBOPLASTIN TIME PARTIAL: CPT

## 2024-01-14 PROCEDURE — 80053 COMPREHEN METABOLIC PANEL: CPT | Performed by: STUDENT IN AN ORGANIZED HEALTH CARE EDUCATION/TRAINING PROGRAM

## 2024-01-14 PROCEDURE — 85025 COMPLETE CBC W/AUTO DIFF WBC: CPT

## 2024-01-14 PROCEDURE — 83735 ASSAY OF MAGNESIUM: CPT

## 2024-01-14 PROCEDURE — 93010 ELECTROCARDIOGRAM REPORT: CPT | Mod: ,,, | Performed by: INTERNAL MEDICINE

## 2024-01-14 PROCEDURE — 94761 N-INVAS EAR/PLS OXIMETRY MLT: CPT

## 2024-01-14 PROCEDURE — 27200667 HC PACEMAKER, TEMPORARY MONITORING, PER SHIFT

## 2024-01-14 PROCEDURE — 63600175 PHARM REV CODE 636 W HCPCS

## 2024-01-14 PROCEDURE — 25000003 PHARM REV CODE 250: Performed by: THORACIC SURGERY (CARDIOTHORACIC VASCULAR SURGERY)

## 2024-01-14 PROCEDURE — 99233 SBSQ HOSP IP/OBS HIGH 50: CPT | Mod: ,,, | Performed by: INTERNAL MEDICINE

## 2024-01-14 PROCEDURE — 25000003 PHARM REV CODE 250: Performed by: STUDENT IN AN ORGANIZED HEALTH CARE EDUCATION/TRAINING PROGRAM

## 2024-01-14 PROCEDURE — 84100 ASSAY OF PHOSPHORUS: CPT

## 2024-01-14 PROCEDURE — 84132 ASSAY OF SERUM POTASSIUM: CPT | Performed by: THORACIC SURGERY (CARDIOTHORACIC VASCULAR SURGERY)

## 2024-01-14 RX ADMIN — OXYCODONE HYDROCHLORIDE 5 MG: 5 TABLET ORAL at 03:01

## 2024-01-14 RX ADMIN — ASPIRIN 81 MG CHEWABLE TABLET 81 MG: 81 TABLET CHEWABLE at 08:01

## 2024-01-14 RX ADMIN — FUROSEMIDE 20 MG: 10 INJECTION, SOLUTION INTRAMUSCULAR; INTRAVENOUS at 06:01

## 2024-01-14 RX ADMIN — POTASSIUM BICARBONATE 40 MEQ: 391 TABLET, EFFERVESCENT ORAL at 08:01

## 2024-01-14 RX ADMIN — POTASSIUM BICARBONATE 40 MEQ: 391 TABLET, EFFERVESCENT ORAL at 09:01

## 2024-01-14 RX ADMIN — DOCUSATE SODIUM 100 MG: 100 CAPSULE, LIQUID FILLED ORAL at 08:01

## 2024-01-14 RX ADMIN — OXYCODONE HYDROCHLORIDE 5 MG: 5 TABLET ORAL at 09:01

## 2024-01-14 RX ADMIN — FUROSEMIDE 20 MG: 10 INJECTION, SOLUTION INTRAMUSCULAR; INTRAVENOUS at 08:01

## 2024-01-14 RX ADMIN — WARFARIN SODIUM 2 MG: 1 TABLET ORAL at 05:01

## 2024-01-14 RX ADMIN — FAMOTIDINE 20 MG: 20 TABLET, FILM COATED ORAL at 08:01

## 2024-01-14 RX ADMIN — ACETAMINOPHEN 999.01 MG: 650 SOLUTION ORAL at 06:01

## 2024-01-14 NOTE — SUBJECTIVE & OBJECTIVE
Interval History/Significant Events: POD 6. Sinus diamond overnight but did not require any pacing. Ate solid food yesterday and had a small bowel movement. INR at goal today.    Follow-up For: Procedure(s) (LRB):  MITRAL VALVE REPLACEMENT, REDO STERNOTOMY (N/A)  VALVULOPLASTY, TRICUSPID REPAIR (N/A)    Post-Operative Day: 6 Days Post-Op    Objective:     Vital Signs (Most Recent):  Temp: 98.3 °F (36.8 °C) (01/14/24 0700)  Pulse: 60 (01/14/24 0700)  Resp: (!) 24 (01/14/24 0700)  BP: 127/60 (01/14/24 0700)  SpO2: 96 % (01/14/24 0700) Vital Signs (24h Range):  Temp:  [97.7 °F (36.5 °C)-98.6 °F (37 °C)] 98.3 °F (36.8 °C)  Pulse:  [55-67] 60  Resp:  [14-39] 24  SpO2:  [96 %-100 %] 96 %  BP: (107-138)/(56-67) 127/60     Weight: 80.7 kg (177 lb 12.8 oz)  Body mass index is 33.6 kg/m².      Intake/Output Summary (Last 24 hours) at 1/14/2024 0803  Last data filed at 1/14/2024 0551  Gross per 24 hour   Intake --   Output 2900 ml   Net -2900 ml          Physical Exam  Vitals and nursing note reviewed.   Constitutional:       General: She is not in acute distress.     Appearance: Normal appearance. She is obese. She is not toxic-appearing.      Interventions: She is sedated.   HENT:      Head: Normocephalic and atraumatic.      Right Ear: External ear normal.      Left Ear: External ear normal.      Nose: Nose normal.   Eyes:      General: No scleral icterus.        Right eye: No discharge.         Left eye: No discharge.      Extraocular Movements: Extraocular movements intact.   Cardiovascular:      Rate and Rhythm: Normal rate and regular rhythm.      Pulses: Normal pulses.      Comments: V Wires  Pulmonary:      Effort: Pulmonary effort is normal. No respiratory distress.   Abdominal:      General: Abdomen is flat. There is no distension.      Palpations: Abdomen is soft.      Tenderness: There is no abdominal tenderness.   Musculoskeletal:         General: No swelling. Normal range of motion.      Cervical back: Normal  range of motion.      Right lower leg: No edema.      Left lower leg: No edema.   Skin:     General: Skin is warm and dry.      Comments: MSI CDI     Neurological:      General: No focal deficit present.      Mental Status: She is alert and oriented to person, place, and time. Mental status is at baseline.   Psychiatric:         Mood and Affect: Mood normal.         Behavior: Behavior normal.         Thought Content: Thought content normal.            Vents:  Vent Mode: Spont (01/08/24 2052)  Ventilator Initiated: Yes (01/08/24 1427)  Set Rate: 20 BPM (01/08/24 1804)  Vt Set: 380 mL (01/08/24 1804)  PEEP/CPAP: 5 cmH20 (01/08/24 2052)  Oxygen Concentration (%): 100 (01/10/24 1415)  Peak Airway Pressure: 9.9 cmH20 (01/08/24 2052)  Plateau Pressure: 0 cmH20 (01/08/24 2052)  Total Ve: 7.34 L/m (01/08/24 2052)  Negative Inspiratory Force (cm H2O): -15 (01/08/24 2137)  F/VT Ratio<105 (RSBI): (!) 96.3 (01/08/24 2052)    Lines/Drains/Airways       Line  Duration                  Pacer Wires 01/08/24 1259 5 days              Peripheral Intravenous Line  Duration                  Peripheral IV - Single Lumen 01/12/24 0131 20 G Anterior;Right Forearm 2 days         Peripheral IV - Single Lumen 01/13/24 0330 22 G Posterior;Right Wrist 1 day                    Significant Labs:    CBC/Anemia Profile:  Recent Labs   Lab 01/13/24 0322 01/14/24 0316   WBC 10.54 11.66   HGB 8.5* 9.0*   HCT 26.3* 27.3*    361   MCV 84 84   RDW 16.5* 16.3*        Chemistries:  Recent Labs   Lab 01/12/24  1804 01/13/24 0322 01/13/24 2014 01/14/24 0316   NA  --  134*  --  137   K 3.8 3.8 4.2 4.3   CL  --  100  --  100   CO2  --  29  --  27   BUN  --  10  --  9   CREATININE  --  0.6  --  0.6   CALCIUM  --  8.9  --  9.0   ALBUMIN  --  2.9*  --  2.9*   PROT  --  6.1  --  6.3   BILITOT  --  0.9  --  0.8   ALKPHOS  --  53*  --  60   ALT  --  19  --  16   AST  --  25  --  20   MG 2.3 2.0  --  2.0   PHOS 2.9 3.6  --  4.3       All pertinent labs  within the past 24 hours have been reviewed.    Significant Imaging:  I have reviewed all pertinent imaging results/findings within the past 24 hours.

## 2024-01-14 NOTE — SUBJECTIVE & OBJECTIVE
Interval History:  No events overnight.  She continues to be in sinus rhythm with complete heart block, junctional escape rhythm.  Hemodynamically stable and not pacer dependent.  No clinical changes    Review of Systems   Constitutional: Positive for malaise/fatigue.   Cardiovascular:  Positive for dyspnea on exertion and palpitations. Negative for chest pain, irregular heartbeat, near-syncope and syncope.     Objective:     Vital Signs (Most Recent):  Temp: 98.6 °F (37 °C) (01/13/24 1930)  Pulse: 64 (01/13/24 2210)  Resp: (!) 23 (01/13/24 2210)  BP: 129/67 (01/13/24 2210)  SpO2: 100 % (01/13/24 2210) Vital Signs (24h Range):  Temp:  [98.3 °F (36.8 °C)-98.6 °F (37 °C)] 98.6 °F (37 °C)  Pulse:  [56-67] 64  Resp:  [14-39] 23  SpO2:  [96 %-100 %] 100 %  BP: ()/(48-85) 129/67     Weight: 80.7 kg (177 lb 12.8 oz)  Body mass index is 33.6 kg/m².     SpO2: 100 %        Physical Exam  Vitals and nursing note reviewed.   Constitutional:       General: She is not in acute distress.     Appearance: She is not ill-appearing.   HENT:      Head: Normocephalic and atraumatic.      Right Ear: External ear normal.      Left Ear: External ear normal.      Mouth/Throat:      Mouth: Mucous membranes are moist.      Pharynx: No oropharyngeal exudate.   Eyes:      Extraocular Movements: Extraocular movements intact.      Pupils: Pupils are equal, round, and reactive to light.   Cardiovascular:      Rate and Rhythm: Normal rate and regular rhythm.      Pulses: Normal pulses.      Heart sounds: Normal heart sounds. No murmur heard.     Comments: Epicardial pacer wires in place  Pulmonary:      Effort: Pulmonary effort is normal. No respiratory distress.      Breath sounds: Normal breath sounds. No wheezing or rales.   Abdominal:      General: Abdomen is flat. Bowel sounds are normal. There is no distension.      Palpations: Abdomen is soft.      Tenderness: There is no abdominal tenderness.   Musculoskeletal:         General: No  signs of injury. Normal range of motion.      Cervical back: Normal range of motion.      Right lower leg: No edema.      Left lower leg: No edema.   Skin:     General: Skin is warm and dry.   Neurological:      General: No focal deficit present.      Mental Status: She is alert and oriented to person, place, and time.      Sensory: No sensory deficit.      Motor: No weakness.   Psychiatric:         Mood and Affect: Mood normal.         Behavior: Behavior normal.         Thought Content: Thought content normal.            Significant Labs: EP:   Recent Labs   Lab   0000 01/12/24  0416 01/12/24  0419 01/12/24  0959 01/12/24  1804 01/13/24  0322 01/13/24 2014   NA  --  135*  --   --   --  134*  --    K  --  3.7  --    < > 3.8 3.8 4.2   CL  --  100  --   --   --  100  --    CO2  --  25  --   --   --  29  --    GLU  --  96  --   --   --  94  --    BUN  --  8  --   --   --  10  --    CREATININE  --  0.5  --   --   --  0.6  --    CALCIUM  --  8.4*  --   --   --  8.9  --    PROT  --  5.9*  --   --   --  6.1  --    ALBUMIN  --  3.0*  --   --   --  2.9*  --    BILITOT  --  0.8  --   --   --  0.9  --    ALKPHOS  --  53*  --   --   --  53*  --    AST  --  30  --   --   --  25  --    ALT  --  23  --   --   --  19  --    ANIONGAP  --  10  --   --   --  5*  --    WBC  --   --  14.82*  --   --  10.54  --    HGB  --   --  7.7*  --   --  8.5*  --    HCT   < >  --  24.0*  --   --  26.3*  --    PLT  --   --  242  --   --  261  --    INR  --  2.5*  --   --   --  2.6*  --     < > = values in this interval not displayed.         Significant Imaging: EKG: Sinus with CHB and Junctional Escape Rhythm

## 2024-01-14 NOTE — SUBJECTIVE & OBJECTIVE
Interval History:  No events overnight.  She continues to be in sinus rhythm with complete heart block, junctional escape rhythm.  Hemodynamically stable and not pacer dependent.  No clinical changes    Review of Systems   Constitutional: Positive for malaise/fatigue.   Cardiovascular:  Positive for dyspnea on exertion and palpitations. Negative for chest pain, irregular heartbeat, near-syncope and syncope.     Objective:     Vital Signs (Most Recent):  Temp: 98.3 °F (36.8 °C) (01/14/24 0700)  Pulse: 64 (01/14/24 1100)  Resp: 20 (01/14/24 1100)  BP: 120/65 (01/14/24 1100)  SpO2: 98 % (01/14/24 1100) Vital Signs (24h Range):  Temp:  [97.7 °F (36.5 °C)-98.6 °F (37 °C)] 98.3 °F (36.8 °C)  Pulse:  [55-67] 64  Resp:  [14-39] 20  SpO2:  [96 %-100 %] 98 %  BP: (107-138)/(56-67) 120/65     Weight: 80.7 kg (177 lb 12.8 oz)  Body mass index is 33.6 kg/m².     SpO2: 98 %        Physical Exam  Vitals and nursing note reviewed.   Constitutional:       General: She is not in acute distress.     Appearance: She is not ill-appearing.   HENT:      Head: Normocephalic and atraumatic.      Right Ear: External ear normal.      Left Ear: External ear normal.      Mouth/Throat:      Mouth: Mucous membranes are moist.      Pharynx: No oropharyngeal exudate.   Eyes:      Extraocular Movements: Extraocular movements intact.      Pupils: Pupils are equal, round, and reactive to light.   Cardiovascular:      Rate and Rhythm: Normal rate and regular rhythm.      Pulses: Normal pulses.      Heart sounds: Normal heart sounds. No murmur heard.     Comments: Epicardial pacer wires in place  Pulmonary:      Effort: Pulmonary effort is normal. No respiratory distress.      Breath sounds: Normal breath sounds. No wheezing or rales.   Abdominal:      General: Abdomen is flat. Bowel sounds are normal. There is no distension.      Palpations: Abdomen is soft.      Tenderness: There is no abdominal tenderness.   Musculoskeletal:         General: No signs  of injury. Normal range of motion.      Cervical back: Normal range of motion.      Right lower leg: No edema.      Left lower leg: No edema.   Skin:     General: Skin is warm and dry.   Neurological:      General: No focal deficit present.      Mental Status: She is alert and oriented to person, place, and time.      Sensory: No sensory deficit.      Motor: No weakness.   Psychiatric:         Mood and Affect: Mood normal.         Behavior: Behavior normal.         Thought Content: Thought content normal.            Significant Labs: EP:   Recent Labs   Lab 01/13/24  0322 01/13/24 2014 01/14/24  0316   *  --  137   K 3.8 4.2 4.3     --  100   CO2 29  --  27   GLU 94  --  92   BUN 10  --  9   CREATININE 0.6  --  0.6   CALCIUM 8.9  --  9.0   PROT 6.1  --  6.3   ALBUMIN 2.9*  --  2.9*   BILITOT 0.9  --  0.8   ALKPHOS 53*  --  60   AST 25  --  20   ALT 19  --  16   ANIONGAP 5*  --  10   WBC 10.54  --  11.66   HGB 8.5*  --  9.0*   HCT 26.3*  --  27.3*     --  361   INR 2.6*  --  3.0*         Significant Imaging: EKG: Sinus with CHB and Junctional Escape Rhythm

## 2024-01-14 NOTE — PLAN OF CARE
SICU PLAN OF CARE NOTE    Dx: Nonrheumatic mitral valve stenosis    Goals of Care: MAP>60, SBP< 140    Vital Signs (last 12 hours):   Temp:  [97.7 °F (36.5 °C)-98.6 °F (37 °C)]   Pulse:  [55-67]   Resp:  [15-39]   BP: (107-135)/(56-67)   SpO2:  [96 %-100 %]      Neuro: AAO x4, Follows Commands, and Moves All Extremities    Cardiac: NSR/Sinus Diamond, Epicardial wires connected to pacer @ back up rate of 40    Respiratory: Room Air    Urine Output: Voids Spontaneously 700 cc/shift      Diet: Cardiac Diet     Labs/Accuchecks: Q12 K, Daily labs, ACHS    Skin:  All skin remains free from injury.  Patient up in chair, Jim mattress inflated, and bed working correctly.    Shift Events:  No acute events noted throughout shift. Pt sustained sinus rhythm/sinus diamond all night, didn't required pacemaker use. See flowsheet for further assessment/details.  Family updated on current condition/plan of care, questions answered, and emotional support provided.  MD Ryan updated on current condition, vitals, labs, and gtts.  No new orders received, will continue to monitor.

## 2024-01-14 NOTE — PROGRESS NOTES
[facility] - [facility]ensive [facility]  Critical Care - Surgery  Progress Note    Patient Name: King Botello  MRN: [id number]  Admission Date: 1/8/2024  Hospital Length of Stay: 6 days  Code Status: Full Code  Attending Provider: Thierno Liu MD  Primary Care Provider: Mayco Hansen MD   Principal Problem: Nonrheumatic mitral valve stenosis    Subjective:     Hospital/ICU Course:  No notes on file    Interval History/Significant Events: POD 6. Sinus diamond overnight but did not require any pacing. Ate solid food yesterday and had a small bowel movement. INR at goal today.    Follow-up For: Procedure(s) (LRB):  MITRAL VALVE REPLACEMENT, REDO STERNOTOMY (N/A)  VALVULOPLASTY, TRICUSPID REPAIR (N/A)    Post-Operative Day: 6 Days Post-Op    Objective:     Vital Signs (Most Recent):  Temp: 98.3 °F (36.8 °C) (01/14/24 0700)  Pulse: 60 (01/14/24 0700)  Resp: (!) 24 (01/14/24 0700)  BP: 127/60 (01/14/24 0700)  SpO2: 96 % (01/14/24 0700) Vital Signs (24h Range):  Temp:  [97.7 °F (36.5 °C)-98.6 °F (37 °C)] 98.3 °F (36.8 °C)  Pulse:  [55-67] 60  Resp:  [14-39] 24  SpO2:  [96 %-100 %] 96 %  BP: (107-138)/(56-67) 127/60     Weight: 80.7 kg (177 lb 12.8 oz)  Body mass index is 33.6 kg/m².      Intake/Output Summary (Last 24 hours) at 1/14/2024 0803  Last data filed at 1/14/2024 0551  Gross per 24 hour   Intake --   Output 2900 ml   Net -2900 ml          Physical Exam  Vitals and nursing note reviewed.   Constitutional:       General: She is not in acute distress.     Appearance: Normal appearance. She is obese. She is not toxic-appearing.      Interventions: She is sedated.   HENT:      Head: Normocephalic and atraumatic.      Right Ear: External ear normal.      Left Ear: External ear normal.      Nose: Nose normal.   Eyes:      General: No scleral icterus.        Right eye: No discharge.         Left eye: No discharge.      Extraocular Movements: Extraocular movements intact.   Cardiovascular:      Rate and Rhythm:  Normal rate and regular rhythm.      Pulses: Normal pulses.      Comments: V Wires  Pulmonary:      Effort: Pulmonary effort is normal. No respiratory distress.   Abdominal:      General: Abdomen is flat. There is no distension.      Palpations: Abdomen is soft.      Tenderness: There is no abdominal tenderness.   Musculoskeletal:         General: No swelling. Normal range of motion.      Cervical back: Normal range of motion.      Right lower leg: No edema.      Left lower leg: No edema.   Skin:     General: Skin is warm and dry.      Comments: MSI CDI     Neurological:      General: No focal deficit present.      Mental Status: She is alert and oriented to person, place, and time. Mental status is at baseline.   Psychiatric:         Mood and Affect: Mood normal.         Behavior: Behavior normal.         Thought Content: Thought content normal.            Vents:  Vent Mode: Spont (01/08/24 2052)  Ventilator Initiated: Yes (01/08/24 1427)  Set Rate: 20 BPM (01/08/24 1804)  Vt Set: 380 mL (01/08/24 1804)  PEEP/CPAP: 5 cmH20 (01/08/24 2052)  Oxygen Concentration (%): 100 (01/10/24 1415)  Peak Airway Pressure: 9.9 cmH20 (01/08/24 2052)  Plateau Pressure: 0 cmH20 (01/08/24 2052)  Total Ve: 7.34 L/m (01/08/24 2052)  Negative Inspiratory Force (cm H2O): -15 (01/08/24 2137)  F/VT Ratio<105 (RSBI): (!) 96.3 (01/08/24 2052)    Lines/Drains/Airways       Line  Duration                  Pacer Wires 01/08/24 1259 5 days              Peripheral Intravenous Line  Duration                  Peripheral IV - Single Lumen 01/12/24 0131 20 G Anterior;Right Forearm 2 days         Peripheral IV - Single Lumen 01/13/24 0330 22 G Posterior;Right Wrist 1 day                    Significant Labs:    CBC/Anemia Profile:  Recent Labs   Lab 01/13/24  0322 01/14/24  0316   WBC 10.54 11.66   HGB 8.5* 9.0*   HCT 26.3* 27.3*    361   MCV 84 84   RDW 16.5* 16.3*        Chemistries:  Recent Labs   Lab 01/12/24  1804 01/13/24 0322  01/13/24 2014 01/14/24 0316   NA  --  134*  --  137   K 3.8 3.8 4.2 4.3   CL  --  100  --  100   CO2  --  29  --  27   BUN  --  10  --  9   CREATININE  --  0.6  --  0.6   CALCIUM  --  8.9  --  9.0   ALBUMIN  --  2.9*  --  2.9*   PROT  --  6.1  --  6.3   BILITOT  --  0.9  --  0.8   ALKPHOS  --  53*  --  60   ALT  --  19  --  16   AST  --  25  --  20   MG 2.3 2.0  --  2.0   PHOS 2.9 3.6  --  4.3       All pertinent labs within the past 24 hours have been reviewed.    Significant Imaging:  I have reviewed all pertinent imaging results/findings within the past 24 hours.  Assessment/Plan:     Cardiac/Vascular  * Nonrheumatic mitral valve stenosis  38 y.o. female with a past medical history of MVP and MR s/p mitral annuloplasty in [place] (2003),  HTN, bradycardia now s/p Mechanical Mitral Valve Replacement on 1/8/2024 with Dr. Liu      Neuro/Psych:     - Sedation: n/a    - Pain:    - PRN liquid Acetaminophen 1g q6h    - Oxy 5 PRN   - discontinue oxy 10 as has not used in several days             Cardiac:     - S/P Mechanical Mitral Valve Replacement with Dr. Liu on 1/8/2024    - BP Goal: MAP 60-80    - initially placed on Amio for suspected A-fib, d/c'd 1/9/24    - Pressors: off    - Anti-HTNs: Will start when appropriate     - Rhythm: Complete Heart block with occasional junctional     - Beta blocker: Hold, heart block   - paced with temporary v-wires     - EP involved for Complete Heart Block and PPM   - Tentative Tuesday if EP decides she requires PPM. EP hopeful heart block is transient    - Statin: Not indicated    - EKG daily     Pulmonary:     - Goal SpO2 >92%    - on RA    - CXR stable    - Chest Tubes 2 Meds     - pulled on 1/11/24    - IS    - ABGs PRN      Renal:    - Trend BUN/Cr     - lasix 20mg IV BID    - Soares out    Recent Labs   Lab 01/12/24  0416 01/13/24  0322 01/14/24 0316   BUN 8 10 9   CREATININE 0.5 0.6 0.6           FEN / GI:     - Daily CMP, PRN K/Mag/Phos per protocol     - Replace  electrolytes as needed    - scheduled potassium supplement    - Nutrition: cardiac thin, meds crushed   - follow speech recs    - Bowel Regimen: Miralax, docusate      ID:     - Afebrile    - WBC downtrending    - Abx: Completed perioperative cefazolin 2g Q8H x 5 doses    Recent Labs   Lab 01/12/24 0419 01/13/24 0322 01/14/24  0316   WBC 14.82* 10.54 11.66           Heme/Onc:     - Hgb 13.0 pre-operatively    - CBC daily    - ASA 81mg     - Heparin d/c'd as therapeutic INR achieved    - PO Coumadin 2mg QD, INR 3 (goal is 2.5-3.5)    - Transfuse Hb <7 and symptomatic anemia    - 1u pRBC on 1/9/24      Recent Labs   Lab 01/11/24 2329 01/12/24 0416 01/12/24 0419 01/13/24 0322 01/14/24  0316   HGB  --   --  7.7* 8.5* 9.0*   PLT  --   --  242 261 361   APTT 49.7*  --   --  32.8* 35.4*   INR  --  2.5*  --  2.6* 3.0*           Endocrine:     - CTS Goal -140    - HgbA1c: 5.2    - Insulin SSI    - Endocrinology consulted for insulin management      PPx:   Feeding: Cardiac diet, Speech following  Analgesia/Sedation: Multimodal controlled  Thromboembolic Prevention: SCD's   HOB >30: Yes  Stress Ulcer: Famotidine  Glucose Control: Yes, insulin management per Endocrinology     Lines/Drains/Airway:   Pacing Wires: Temporary ventricular pacing wires              PIVs              Wound Vac      Dispo/Code Status/Palliative:     - Continue SICU Care    - Full Code      Alise Montoya MD  Critical Care - Surgery  [facility] - [facility]

## 2024-01-14 NOTE — ASSESSMENT & PLAN NOTE
38 y.o. female with a past medical history of MVP and MR s/p mitral annuloplasty in [place] (2003),  HTN, bradycardia now s/p Mechanical Mitral Valve Replacement on 1/8/2024 with Dr. Liu      Neuro/Psych:     - Sedation: n/a    - Pain:    - PRN liquid Acetaminophen 1g q6h    - Oxy 5 PRN   - discontinue oxy 10 as has not used in several days             Cardiac:     - S/P Mechanical Mitral Valve Replacement with Dr. Liu on 1/8/2024    - BP Goal: MAP 60-80    - initially placed on Amio for suspected A-fib, d/c'd 1/9/24    - Pressors: off    - Anti-HTNs: Will start when appropriate     - Rhythm: Complete Heart block with occasional junctional     - Beta blocker: Hold, heart block   - paced with temporary v-wires     - EP involved for Complete Heart Block and PPM   - Tentative Tuesday if EP decides she requires PPM. EP hopeful heart block is transient    - Statin: Not indicated    - EKG daily     Pulmonary:     - Goal SpO2 >92%    - on RA    - CXR stable    - Chest Tubes 2 Meds     - pulled on 1/11/24    - IS    - ABGs PRN      Renal:    - Trend BUN/Cr     - lasix 20mg IV BID    - Soares out    Recent Labs   Lab 01/12/24  0416 01/13/24  0322 01/14/24  0316   BUN 8 10 9   CREATININE 0.5 0.6 0.6           FEN / GI:     - Daily CMP, PRN K/Mag/Phos per protocol     - Replace electrolytes as needed    - scheduled potassium supplement    - Nutrition: cardiac thin, meds crushed   - follow speech recs    - Bowel Regimen: Miralax, docusate      ID:     - Afebrile    - WBC downtrending    - Abx: Completed perioperative cefazolin 2g Q8H x 5 doses    Recent Labs   Lab 01/12/24  0419 01/13/24  0322 01/14/24  0316   WBC 14.82* 10.54 11.66           Heme/Onc:     - Hgb 13.0 pre-operatively    - CBC daily    - ASA 81mg     - Heparin d/c'd as therapeutic INR achieved    - PO Coumadin 2mg QD, INR 3 (goal is 2.5-3.5)    - Transfuse Hb <7 and symptomatic anemia    - 1u pRBC on 1/9/24      Recent Labs   Lab 01/11/24  2339  01/12/24  0416 01/12/24  0419 01/13/24  0322 01/14/24  0316   HGB  --   --  7.7* 8.5* 9.0*   PLT  --   --  242 261 361   APTT 49.7*  --   --  32.8* 35.4*   INR  --  2.5*  --  2.6* 3.0*           Endocrine:     - CTS Goal -140    - HgbA1c: 5.2    - Insulin SSI    - Endocrinology consulted for insulin management      PPx:   Feeding: Cardiac diet, Speech following  Analgesia/Sedation: Multimodal controlled  Thromboembolic Prevention: SCD's   HOB >30: Yes  Stress Ulcer: Famotidine  Glucose Control: Yes, insulin management per Endocrinology     Lines/Drains/Airway:   Pacing Wires: Temporary ventricular pacing wires              PIVs              Wound Vac      Dispo/Code Status/Palliative:     - Continue SICU Care    - Full Code

## 2024-01-14 NOTE — CARE UPDATE
SIGN OFF    Discontinue BG monitoring. Will sign off. Please re-consult Endo as needed.    Patient has no Dx of DM, and is tolerating PO intake without BG excursions and/or complications.     Lab Results   Component Value Date    HGBA1C 5.2 01/04/2024     POCT Glucose   Date Value Ref Range Status   01/13/2024 99 70 - 110 mg/dL Final   01/13/2024 105 70 - 110 mg/dL Final   01/13/2024 118 (H) 70 - 110 mg/dL Final   01/12/2024 86 70 - 110 mg/dL Final   01/12/2024 84 70 - 110 mg/dL Final   01/12/2024 115 (H) 70 - 110 mg/dL Final   01/11/2024 91 70 - 110 mg/dL Final   01/11/2024 117 (H) 70 - 110 mg/dL Final       Thank you for the consult.     ** Please call Endocrine for any BG related issues **      Gerber Rehman DNP, FNP-C  Department of Endocrinology  Inpatient Glycemic Management

## 2024-01-14 NOTE — ASSESSMENT & PLAN NOTE
Patient is POD 2 s/p MVR (revision with mechanical valve) and TV banding. Patient was found to be Sinus w/ CHB w/ a junctional escape rhythm this AM via tele and confirmed with 12 lead ECG. Review of Tele shows this arrhythm appearing as early as 1/08/2024. Patient received Amiodarone from 1/08/24 to 1/10/24. Currently having palpitations and BLANK, Unclear if related to this arrhythmia.    New c/o of increased fatigue, likely related to insomnia. No siggnifcant change in rhythm (still in Sinus with CHB and Junctional Escape)      Recommendations  Unclear etiology with broad differential; we are hopeful this is a transient Arrhythmia in the post operative setting. Patient describes a similar episode in 2003 post op from her initial MV surgery  Maintain ICU level of care with epicardial wires in place  Plan for pacemaker placement on Tuesday unless she regains intrinsic AV herminia activity  Avoid any AV herminia blocking agents including beta blockers and amiodarone

## 2024-01-14 NOTE — PLAN OF CARE
Please merge this note with today's resident critical care note.    SICU Staff Addendum  I have reviewed and concur with the resident's/NP's history, physical, assessment, and plan.  I have personally interviewed and examined the patient at bedside.  See below for any additional findings/changes.    Reason for admission:  Nonrheumatic mitral valve stenosis  Present on Admission:   Mitral valve prolapse   Nonrheumatic mitral valve stenosis   Hypertension, essential      Active issues/Goals for Today:     POD 6 s/p redo chest, (first approach was thoracotomy) mechanical MVR for bioprosthetic MS, TV repair.      - Pain is controlled on current regimen.  - Continue ASA. CHB with periods of SB. No AV herminia blocking agents for now. EP recs noted and appreciated. Possible PPM on 1/16.  - On room air. IS.  - Cardiac diet as tolerated. Bowel regimen.  - Monitor Hgb and aim for Hgb > 7   - Diuresis with IV lasix.   - PT/OT  - Anticoagulation for mechanical MV. INR goal is 2.5 to 3.5. Therapeutic today. Continuing coumadin.    34 minutes of critical care time was spent personally by me on the following activities: development of treatment plan with patient or surrogate and bedside caregivers, discussions with consultants, evaluation of patient's response to treatment, examination of patient, ordering and performing treatments and interventions, ordering and review of laboratory studies, ordering and review of radiographic studies, pulse oximetry, re-evaluation of patient's condition.  This critical care time did not overlap with that of any other provider or involve time for any procedures.    Dashawn Aguilar MD  Anesthesiology/Critical Care

## 2024-01-14 NOTE — PROGRESS NOTES
[facility] - Surgical Intensive Care  Cardiac Electrophysiology  Progress Note    Admission Date: 1/8/2024  Code Status: Full Code   Attending Physician: Thierno Liu MD   Expected Discharge Date: 1/16/2024  Principal Problem:Nonrheumatic mitral valve stenosis    Subjective:     Interval History:  No events overnight.  She continues to be in sinus rhythm with complete heart block, junctional escape rhythm.  Hemodynamically stable and not pacer dependent.  No clinical changes    Review of Systems   Constitutional: Positive for malaise/fatigue.   Cardiovascular:  Positive for dyspnea on exertion and palpitations. Negative for chest pain, irregular heartbeat, near-syncope and syncope.     Objective:     Vital Signs (Most Recent):  Temp: 98.3 °F (36.8 °C) (01/14/24 0700)  Pulse: 64 (01/14/24 1100)  Resp: 20 (01/14/24 1100)  BP: 120/65 (01/14/24 1100)  SpO2: 98 % (01/14/24 1100) Vital Signs (24h Range):  Temp:  [97.7 °F (36.5 °C)-98.6 °F (37 °C)] 98.3 °F (36.8 °C)  Pulse:  [55-67] 64  Resp:  [14-39] 20  SpO2:  [96 %-100 %] 98 %  BP: (107-138)/(56-67) 120/65     Weight: 80.7 kg (177 lb 12.8 oz)  Body mass index is 33.6 kg/m².     SpO2: 98 %        Physical Exam  Vitals and nursing note reviewed.   Constitutional:       General: She is not in acute distress.     Appearance: She is not ill-appearing.   HENT:      Head: Normocephalic and atraumatic.      Right Ear: External ear normal.      Left Ear: External ear normal.      Mouth/Throat:      Mouth: Mucous membranes are moist.      Pharynx: No oropharyngeal exudate.   Eyes:      Extraocular Movements: Extraocular movements intact.      Pupils: Pupils are equal, round, and reactive to light.   Cardiovascular:      Rate and Rhythm: Normal rate and regular rhythm.      Pulses: Normal pulses.      Heart sounds: Normal heart sounds. No murmur heard.     Comments: Epicardial pacer wires in place  Pulmonary:      Effort: Pulmonary effort is normal. No respiratory distress.       Breath sounds: Normal breath sounds. No wheezing or rales.   Abdominal:      General: Abdomen is flat. Bowel sounds are normal. There is no distension.      Palpations: Abdomen is soft.      Tenderness: There is no abdominal tenderness.   Musculoskeletal:         General: No signs of injury. Normal range of motion.      Cervical back: Normal range of motion.      Right lower leg: No edema.      Left lower leg: No edema.   Skin:     General: Skin is warm and dry.   Neurological:      General: No focal deficit present.      Mental Status: She is alert and oriented to person, place, and time.      Sensory: No sensory deficit.      Motor: No weakness.   Psychiatric:         Mood and Affect: Mood normal.         Behavior: Behavior normal.         Thought Content: Thought content normal.            Significant Labs: EP:   Recent Labs   Lab 01/13/24  0322 01/13/24 2014 01/14/24 0316   *  --  137   K 3.8 4.2 4.3     --  100   CO2 29  --  27   GLU 94  --  92   BUN 10  --  9   CREATININE 0.6  --  0.6   CALCIUM 8.9  --  9.0   PROT 6.1  --  6.3   ALBUMIN 2.9*  --  2.9*   BILITOT 0.9  --  0.8   ALKPHOS 53*  --  60   AST 25  --  20   ALT 19  --  16   ANIONGAP 5*  --  10   WBC 10.54  --  11.66   HGB 8.5*  --  9.0*   HCT 26.3*  --  27.3*     --  361   INR 2.6*  --  3.0*         Significant Imaging: EKG: Sinus with CHB and Junctional Escape Rhythm  Assessment and Plan:     CHB (complete heart block)  Patient is POD 2 s/p MVR (revision with mechanical valve) and TV banding. Patient was found to be Sinus w/ CHB w/ a junctional escape rhythm this AM via tele and confirmed with 12 lead ECG. Review of Tele shows this arrhythm appearing as early as 1/08/2024. Patient received Amiodarone from 1/08/24 to 1/10/24. Currently having palpitations and BLANK, Unclear if related to this arrhythmia.    New c/o of increased fatigue, likely related to insomnia. No siggnifcant change in rhythm (still in Sinus with CHB and  Junctional Escape)      Recommendations  Unclear etiology with broad differential; we are hopeful this is a transient Arrhythmia in the post operative setting. Patient describes a similar episode in 2003 post op from her initial MV surgery  Maintain ICU level of care with epicardial wires in place  Plan for pacemaker placement on Tuesday unless she regains intrinsic AV herminia activity  Avoid any AV herminia blocking agents including beta blockers and amiodarone        Connor M Gillies, MD  Cardiac Electrophysiology  [facility] - Surgical Intensive Care

## 2024-01-14 NOTE — PROGRESS NOTES
[facility] - Surgical Intensive Care  Cardiac Electrophysiology  Progress Note    Admission Date: 1/8/2024  Code Status: Full Code   Attending Physician: Thierno Liu MD   Expected Discharge Date: 1/16/2024  Principal Problem:Nonrheumatic mitral valve stenosis    Subjective:     Interval History:  No events overnight.  She continues to be in sinus rhythm with complete heart block, junctional escape rhythm.  Hemodynamically stable and not pacer dependent.  No clinical changes    Review of Systems   Constitutional: Positive for malaise/fatigue.   Cardiovascular:  Positive for dyspnea on exertion and palpitations. Negative for chest pain, irregular heartbeat, near-syncope and syncope.     Objective:     Vital Signs (Most Recent):  Temp: 98.6 °F (37 °C) (01/13/24 1930)  Pulse: 64 (01/13/24 2210)  Resp: (!) 23 (01/13/24 2210)  BP: 129/67 (01/13/24 2210)  SpO2: 100 % (01/13/24 2210) Vital Signs (24h Range):  Temp:  [98.3 °F (36.8 °C)-98.6 °F (37 °C)] 98.6 °F (37 °C)  Pulse:  [56-67] 64  Resp:  [14-39] 23  SpO2:  [96 %-100 %] 100 %  BP: ()/(48-85) 129/67     Weight: 80.7 kg (177 lb 12.8 oz)  Body mass index is 33.6 kg/m².     SpO2: 100 %        Physical Exam  Vitals and nursing note reviewed.   Constitutional:       General: She is not in acute distress.     Appearance: She is not ill-appearing.   HENT:      Head: Normocephalic and atraumatic.      Right Ear: External ear normal.      Left Ear: External ear normal.      Mouth/Throat:      Mouth: Mucous membranes are moist.      Pharynx: No oropharyngeal exudate.   Eyes:      Extraocular Movements: Extraocular movements intact.      Pupils: Pupils are equal, round, and reactive to light.   Cardiovascular:      Rate and Rhythm: Normal rate and regular rhythm.      Pulses: Normal pulses.      Heart sounds: Normal heart sounds. No murmur heard.     Comments: Epicardial pacer wires in place  Pulmonary:      Effort: Pulmonary effort is normal. No respiratory  distress.      Breath sounds: Normal breath sounds. No wheezing or rales.   Abdominal:      General: Abdomen is flat. Bowel sounds are normal. There is no distension.      Palpations: Abdomen is soft.      Tenderness: There is no abdominal tenderness.   Musculoskeletal:         General: No signs of injury. Normal range of motion.      Cervical back: Normal range of motion.      Right lower leg: No edema.      Left lower leg: No edema.   Skin:     General: Skin is warm and dry.   Neurological:      General: No focal deficit present.      Mental Status: She is alert and oriented to person, place, and time.      Sensory: No sensory deficit.      Motor: No weakness.   Psychiatric:         Mood and Affect: Mood normal.         Behavior: Behavior normal.         Thought Content: Thought content normal.            Significant Labs: EP:   Recent Labs   Lab   0000 01/12/24  0416 01/12/24  0419 01/12/24  0959 01/12/24  1804 01/13/24  0322 01/13/24 2014   NA  --  135*  --   --   --  134*  --    K  --  3.7  --    < > 3.8 3.8 4.2   CL  --  100  --   --   --  100  --    CO2  --  25  --   --   --  29  --    GLU  --  96  --   --   --  94  --    BUN  --  8  --   --   --  10  --    CREATININE  --  0.5  --   --   --  0.6  --    CALCIUM  --  8.4*  --   --   --  8.9  --    PROT  --  5.9*  --   --   --  6.1  --    ALBUMIN  --  3.0*  --   --   --  2.9*  --    BILITOT  --  0.8  --   --   --  0.9  --    ALKPHOS  --  53*  --   --   --  53*  --    AST  --  30  --   --   --  25  --    ALT  --  23  --   --   --  19  --    ANIONGAP  --  10  --   --   --  5*  --    WBC  --   --  14.82*  --   --  10.54  --    HGB  --   --  7.7*  --   --  8.5*  --    HCT   < >  --  24.0*  --   --  26.3*  --    PLT  --   --  242  --   --  261  --    INR  --  2.5*  --   --   --  2.6*  --     < > = values in this interval not displayed.         Significant Imaging: EKG: Sinus with CHB and Junctional Escape Rhythm  Assessment and Plan:     CHB (complete heart  block)  Patient is POD 2 s/p MVR (revision with mechanical valve) and TV banding. Patient was found to be Sinus w/ CHB w/ a junctional escape rhythm this AM via tele and confirmed with 12 lead ECG. Review of Tele shows this arrhythm appearing as early as 1/08/2024. Patient received Amiodarone from 1/08/24 to 1/10/24. Currently having palpitations and BLANK, Unclear if related to this arrhythmia.    New c/o of increased fatigue, likely related to insomnia. No siggnifcant change in rhythm (still in Sinus with CHB and Junctional Escape)      Recommendations  Unclear etiology with broad differential; we are hopeful this is a transient Arrhythmia in the post operative setting. Patient describes a similar episode in 2003 post op from her initial MV surgery  Maintain ICU level of care with epicardial wires in place  Plan for pacemaker placement on Tuesday unless she regains intrinsic AV herminia activity  Avoid any AV herminia blocking agents including beta blockers and amiodarone        Connor M Gillies, MD  Cardiac Electrophysiology  [facility] - Surgical Intensive Care

## 2024-01-15 LAB
ALBUMIN SERPL BCP-MCNC: 3.1 G/DL (ref 3.5–5.2)
ALP SERPL-CCNC: 61 U/L (ref 55–135)
ALT SERPL W/O P-5'-P-CCNC: 17 U/L (ref 10–44)
ANION GAP SERPL CALC-SCNC: 9 MMOL/L (ref 8–16)
APTT PPP: 34.8 SEC (ref 21–32)
AST SERPL-CCNC: 19 U/L (ref 10–40)
BASOPHILS # BLD AUTO: 0.04 K/UL (ref 0–0.2)
BASOPHILS NFR BLD: 0.3 % (ref 0–1.9)
BILIRUB SERPL-MCNC: 0.8 MG/DL (ref 0.1–1)
BUN SERPL-MCNC: 10 MG/DL (ref 6–20)
CALCIUM SERPL-MCNC: 9.3 MG/DL (ref 8.7–10.5)
CHLORIDE SERPL-SCNC: 98 MMOL/L (ref 95–110)
CO2 SERPL-SCNC: 26 MMOL/L (ref 23–29)
CREAT SERPL-MCNC: 0.6 MG/DL (ref 0.5–1.4)
DIFFERENTIAL METHOD BLD: ABNORMAL
EOSINOPHIL # BLD AUTO: 0.4 K/UL (ref 0–0.5)
EOSINOPHIL NFR BLD: 2.5 % (ref 0–8)
ERYTHROCYTE [DISTWIDTH] IN BLOOD BY AUTOMATED COUNT: 16.3 % (ref 11.5–14.5)
EST. GFR  (NO RACE VARIABLE): >60 ML/MIN/1.73 M^2
GLUCOSE SERPL-MCNC: 97 MG/DL (ref 70–110)
HCT VFR BLD AUTO: 29.1 % (ref 37–48.5)
HGB BLD-MCNC: 9.3 G/DL (ref 12–16)
IMM GRANULOCYTES # BLD AUTO: 0.14 K/UL (ref 0–0.04)
IMM GRANULOCYTES NFR BLD AUTO: 0.9 % (ref 0–0.5)
INR PPP: 1.8 (ref 0.8–1.2)
INR PPP: 2.3 (ref 0.8–1.2)
LYMPHOCYTES # BLD AUTO: 2.3 K/UL (ref 1–4.8)
LYMPHOCYTES NFR BLD: 15.5 % (ref 18–48)
MAGNESIUM SERPL-MCNC: 2.1 MG/DL (ref 1.6–2.6)
MCH RBC QN AUTO: 27.3 PG (ref 27–31)
MCHC RBC AUTO-ENTMCNC: 32 G/DL (ref 32–36)
MCV RBC AUTO: 85 FL (ref 82–98)
MONOCYTES # BLD AUTO: 1.5 K/UL (ref 0.3–1)
MONOCYTES NFR BLD: 10.3 % (ref 4–15)
NEUTROPHILS # BLD AUTO: 10.5 K/UL (ref 1.8–7.7)
NEUTROPHILS NFR BLD: 70.5 % (ref 38–73)
NRBC BLD-RTO: 0 /100 WBC
PHOSPHATE SERPL-MCNC: 4.1 MG/DL (ref 2.7–4.5)
PLATELET # BLD AUTO: 457 K/UL (ref 150–450)
PMV BLD AUTO: 9.8 FL (ref 9.2–12.9)
POTASSIUM SERPL-SCNC: 4.3 MMOL/L (ref 3.5–5.1)
PROT SERPL-MCNC: 6.8 G/DL (ref 6–8.4)
PROTHROMBIN TIME: 18.8 SEC (ref 9–12.5)
PROTHROMBIN TIME: 23.2 SEC (ref 9–12.5)
RBC # BLD AUTO: 3.41 M/UL (ref 4–5.4)
SODIUM SERPL-SCNC: 133 MMOL/L (ref 136–145)
WBC # BLD AUTO: 14.89 K/UL (ref 3.9–12.7)

## 2024-01-15 PROCEDURE — 85025 COMPLETE CBC W/AUTO DIFF WBC: CPT

## 2024-01-15 PROCEDURE — 93005 ELECTROCARDIOGRAM TRACING: CPT

## 2024-01-15 PROCEDURE — 25000003 PHARM REV CODE 250: Performed by: THORACIC SURGERY (CARDIOTHORACIC VASCULAR SURGERY)

## 2024-01-15 PROCEDURE — 97116 GAIT TRAINING THERAPY: CPT

## 2024-01-15 PROCEDURE — 25000003 PHARM REV CODE 250

## 2024-01-15 PROCEDURE — 84100 ASSAY OF PHOSPHORUS: CPT

## 2024-01-15 PROCEDURE — 97530 THERAPEUTIC ACTIVITIES: CPT

## 2024-01-15 PROCEDURE — 85610 PROTHROMBIN TIME: CPT

## 2024-01-15 PROCEDURE — 99233 SBSQ HOSP IP/OBS HIGH 50: CPT | Mod: ,,, | Performed by: ANESTHESIOLOGY

## 2024-01-15 PROCEDURE — 97535 SELF CARE MNGMENT TRAINING: CPT

## 2024-01-15 PROCEDURE — 25000003 PHARM REV CODE 250: Performed by: STUDENT IN AN ORGANIZED HEALTH CARE EDUCATION/TRAINING PROGRAM

## 2024-01-15 PROCEDURE — 94761 N-INVAS EAR/PLS OXIMETRY MLT: CPT

## 2024-01-15 PROCEDURE — 11000001 HC ACUTE MED/SURG PRIVATE ROOM

## 2024-01-15 PROCEDURE — 85610 PROTHROMBIN TIME: CPT | Mod: 91

## 2024-01-15 PROCEDURE — 80053 COMPREHEN METABOLIC PANEL: CPT | Performed by: STUDENT IN AN ORGANIZED HEALTH CARE EDUCATION/TRAINING PROGRAM

## 2024-01-15 PROCEDURE — 83735 ASSAY OF MAGNESIUM: CPT

## 2024-01-15 PROCEDURE — 93010 ELECTROCARDIOGRAM REPORT: CPT | Mod: ,,, | Performed by: INTERNAL MEDICINE

## 2024-01-15 PROCEDURE — 85730 THROMBOPLASTIN TIME PARTIAL: CPT

## 2024-01-15 PROCEDURE — 63600175 PHARM REV CODE 636 W HCPCS

## 2024-01-15 RX ORDER — WARFARIN 1 MG/1
3 TABLET ORAL DAILY
Status: DISCONTINUED | OUTPATIENT
Start: 2024-01-15 | End: 2024-01-16

## 2024-01-15 RX ORDER — WARFARIN 1 MG/1
1 TABLET ORAL ONCE
Status: COMPLETED | OUTPATIENT
Start: 2024-01-15 | End: 2024-01-15

## 2024-01-15 RX ADMIN — FAMOTIDINE 20 MG: 20 TABLET, FILM COATED ORAL at 08:01

## 2024-01-15 RX ADMIN — OXYCODONE HYDROCHLORIDE 5 MG: 5 TABLET ORAL at 03:01

## 2024-01-15 RX ADMIN — WARFARIN SODIUM 3 MG: 1 TABLET ORAL at 05:01

## 2024-01-15 RX ADMIN — WARFARIN SODIUM 1 MG: 1 TABLET ORAL at 12:01

## 2024-01-15 RX ADMIN — OXYCODONE HYDROCHLORIDE 5 MG: 5 TABLET ORAL at 08:01

## 2024-01-15 RX ADMIN — FUROSEMIDE 20 MG: 10 INJECTION, SOLUTION INTRAMUSCULAR; INTRAVENOUS at 08:01

## 2024-01-15 RX ADMIN — SCOPALAMINE 1 PATCH: 1 PATCH, EXTENDED RELEASE TRANSDERMAL at 03:01

## 2024-01-15 RX ADMIN — OXYCODONE HYDROCHLORIDE 5 MG: 5 TABLET ORAL at 09:01

## 2024-01-15 RX ADMIN — POTASSIUM BICARBONATE 40 MEQ: 391 TABLET, EFFERVESCENT ORAL at 08:01

## 2024-01-15 RX ADMIN — ASPIRIN 81 MG CHEWABLE TABLET 81 MG: 81 TABLET CHEWABLE at 08:01

## 2024-01-15 RX ADMIN — FUROSEMIDE 20 MG: 10 INJECTION, SOLUTION INTRAMUSCULAR; INTRAVENOUS at 06:01

## 2024-01-15 NOTE — CONSULTS
[facility] consulted for IV access.  Unable to see patient due to high patient volume.  If unable to obtain access overnight, re consult [facility] in the morning.

## 2024-01-15 NOTE — ASSESSMENT & PLAN NOTE
Patient is POD 2 s/p MVR (revision with mechanical valve) and TV banding. Patient was found to be Sinus w/ CHB w/ a junctional escape rhythm this AM via tele and confirmed with 12 lead ECG. Review of Tele shows this arrhythm appearing as early as 1/08/2024. Patient received Amiodarone from 1/08/24 to 1/10/24. Currently having palpitations and BLANK, Unclear if related to this arrhythmia.    New c/o of increased fatigue, likely related to insomnia. No siggnifcant change in rhythm (still in Sinus with CHB and Junctional Escape)      Recommendations  Unclear etiology with broad differential; we are hopeful this is a transient Arrhythmia in the post operative setting. Patient describes a similar episode in 2003 post op from her initial MV surgery  Maintain ICU level of care with epicardial wires in place  Tentative dcPPM placement 1/16. NPO @[place], continue coumadin  INR needs to be therapeutic prior to dcPPM placement to avoid need for heparin products (mechanical valve) for 5 days post PPM placement  Avoid any AV herminia blocking agents including beta blockers and amiodarone

## 2024-01-15 NOTE — ASSESSMENT & PLAN NOTE
38 y.o. female with a past medical history of MVP and MR s/p mitral annuloplasty in [place] (2003),  HTN, bradycardia now s/p Mechanical Mitral Valve Replacement on 1/8/2024 with Dr. Liu      Neuro/Psych:     - Sedation: n/a    - Pain:    - PRN liquid Acetaminophen 1g q6h    - Oxy 5 PRN             Cardiac:     - S/P Mechanical Mitral Valve Replacement with Dr. Liu on 1/8/2024    - BP Goal: MAP 60-80    - initially placed on Amio for suspected A-fib, d/c'd 1/9/24    - Pressors: off    - Anti-HTNs: Will start when appropriate     - Rhythm: Complete Heart block with occasional junctional     - Beta blocker: Hold, heart block   - paced with temporary v-wires     - EP involved for Complete Heart Block and PPM   - Tentative Tuesday if EP decides she requires PPM. EP hopeful heart block is transient    - Statin: Not indicated    - EKG daily     Pulmonary:     - Goal SpO2 >92%    - on RA    - CXR stable    - Chest Tubes 2 Meds     - pulled on 1/11/24    - IS    - ABGs PRN      Renal:    - Trend BUN/Cr     - lasix 20mg IV BID    - Soares out    Recent Labs   Lab 01/13/24  0322 01/14/24  0316 01/15/24  0237   BUN 10 9 10   CREATININE 0.6 0.6 0.6           FEN / GI:     - Daily CMP, PRN K/Mag/Phos per protocol     - Replace electrolytes as needed    - scheduled potassium supplement    - Nutrition: cardiac thin, meds crushed   - follow speech recs    - Bowel Regimen: Miralax, docusate      ID:     - Afebrile    - WBC increased. No signs of infection. Will continue to monitor     - Abx: Completed perioperative cefazolin 2g Q8H x 5 doses    Recent Labs   Lab 01/13/24  0322 01/14/24  0316 01/15/24  0237   WBC 10.54 11.66 14.89*           Heme/Onc:     - Hgb 13.0 pre-operatively    - CBC daily    - ASA 81mg     - Heparin d/c'd as therapeutic INR achieved    - PO Coumadin 2mg QD, INR 3 (goal is 2.5-3.5)    - Transfuse Hb <7 and symptomatic anemia    - 1u pRBC on 1/9/24      Recent Labs   Lab 01/13/24  0322 01/14/24  0316  01/15/24  0237   HGB 8.5* 9.0* 9.3*    361 457*   APTT 32.8* 35.4* 34.8*   INR 2.6* 3.0* 2.3*           Endocrine:     - CTS Goal -140    - HgbA1c: 5.2    - Insulin SSI    - Endocrinology consulted for insulin management      PPx:   Feeding: Cardiac diet, Speech following  Analgesia/Sedation: Multimodal controlled  Thromboembolic Prevention: SCD's   HOB >30: Yes  Stress Ulcer: Famotidine  Glucose Control: Yes, insulin management per Endocrinology     Lines/Drains/Airway:   Pacing Wires: Temporary ventricular pacing wires              PIVs              Wound Vac      Dispo/Code Status/Palliative:     - Continue SICU Care    - Full Code

## 2024-01-15 NOTE — PLAN OF CARE
SICU PLAN OF CARE NOTE    Dx: Nonrheumatic mitral valve stenosis    Goals of Care: MAP > 60, SBP <160    Vital Signs (last 12 hours):   Temp:  [99 °F (37.2 °C)-99.1 °F (37.3 °C)]   Pulse:  [59-74]   Resp:  [18-48]   BP: (113-144)/(56-66)   SpO2:  [94 %-100 %]      Neuro: AAO x4, Follows Commands, and Moves All Extremities    Cardiac: NSR    Respiratory: Room Air    Urine Output: Voids Spontaneously 1000 cc/shift    Diet: Cardiac Diet     Labs/Accuchecks: daily labs    Skin:  Patient repositioned independently, mattress inflated, and bed working correctly.    Shift Events:  See flowsheet for further assessment/details.  Family updated on current condition/plan of care, questions answered, and emotional support provided.  MD updated on current condition, vitals, labs, and gtts.  No new orders received, will continue to monitor.

## 2024-01-15 NOTE — PROGRESS NOTES
[facility]  Cardiac Electrophysiology  Progress Note    Admission Date: 1/8/2024  Code Status: Full Code   Attending Physician: Thierno Liu MD   Expected Discharge Date: 1/16/2024  Principal Problem:Nonrheumatic mitral valve stenosis    Subjective:     Interval History:  No events overnight.  She continues to be in sinus rhythm with complete heart block, junctional escape rhythm.  Hemodynamically stable and not pacer dependent.  No clinical changes    Review of Systems   Constitutional: Positive for malaise/fatigue.   Cardiovascular:  Positive for dyspnea on exertion and palpitations. Negative for chest pain, irregular heartbeat, near-syncope and syncope.     Objective:     Vital Signs (Most Recent):  Temp: 98.7 °F (37.1 °C) (01/15/24 1500)  Pulse: 68 (01/15/24 1700)  Resp: 16 (01/15/24 1730)  BP: (!) 120/58 (01/15/24 1700)  SpO2: 97 % (01/15/24 1700) Vital Signs (24h Range):  Temp:  [98 °F (36.7 °C)-99.1 °F (37.3 °C)] 98.7 °F (37.1 °C)  Pulse:  [57-74] 68  Resp:  [11-48] 16  SpO2:  [94 %-100 %] 97 %  BP: (107-144)/(54-95) 120/58     Weight: 80.7 kg (177 lb 12.8 oz)  Body mass index is 33.6 kg/m².     SpO2: 97 %        Physical Exam  Vitals and nursing note reviewed.   Constitutional:       General: She is not in acute distress.     Appearance: She is not ill-appearing.   HENT:      Head: Normocephalic and atraumatic.      Right Ear: External ear normal.      Left Ear: External ear normal.      Mouth/Throat:      Mouth: Mucous membranes are moist.      Pharynx: No oropharyngeal exudate.   Eyes:      Extraocular Movements: Extraocular movements intact.      Pupils: Pupils are equal, round, and reactive to light.   Cardiovascular:      Rate and Rhythm: Normal rate and regular rhythm.      Pulses: Normal pulses.      Heart sounds: Normal heart sounds. No murmur heard.     Comments: Epicardial pacer wires in place  Pulmonary:      Effort: Pulmonary effort is normal. No respiratory  distress.      Breath sounds: Normal breath sounds. No wheezing or rales.   Abdominal:      General: Abdomen is flat. Bowel sounds are normal. There is no distension.      Palpations: Abdomen is soft.      Tenderness: There is no abdominal tenderness.   Musculoskeletal:         General: No signs of injury. Normal range of motion.      Cervical back: Normal range of motion.      Right lower leg: No edema.      Left lower leg: No edema.   Skin:     General: Skin is warm and dry.   Neurological:      General: No focal deficit present.      Mental Status: She is alert and oriented to person, place, and time.      Sensory: No sensory deficit.      Motor: No weakness.   Psychiatric:         Mood and Affect: Mood normal.         Behavior: Behavior normal.         Thought Content: Thought content normal.            Significant Labs: EP:   Recent Labs   Lab 01/14/24  0316 01/14/24  1640 01/15/24  0237 01/15/24  1416     --  133*  --    K 4.3 4.3 4.3  --      --  98  --    CO2 27  --  26  --    GLU 92  --  97  --    BUN 9  --  10  --    CREATININE 0.6  --  0.6  --    CALCIUM 9.0  --  9.3  --    PROT 6.3  --  6.8  --    ALBUMIN 2.9*  --  3.1*  --    BILITOT 0.8  --  0.8  --    ALKPHOS 60  --  61  --    AST 20  --  19  --    ALT 16  --  17  --    ANIONGAP 10  --  9  --    WBC 11.66  --  14.89*  --    HGB 9.0*  --  9.3*  --    HCT 27.3*  --  29.1*  --      --  457*  --    INR 3.0*  --  2.3* 1.8*         Significant Imaging: EKG: Sinus with CHB and Junctional Escape Rhythm  Assessment and Plan:     CHB (complete heart block)  Patient is POD 2 s/p MVR (revision with mechanical valve) and TV banding. Patient was found to be Sinus w/ CHB w/ a junctional escape rhythm this AM via tele and confirmed with 12 lead ECG. Review of Tele shows this arrhythm appearing as early as 1/08/2024. Patient received Amiodarone from 1/08/24 to 1/10/24. Currently having palpitations and BLANK, Unclear if related to this  arrhythmia.    New c/o of increased fatigue, likely related to insomnia. No siggnifcant change in rhythm (still in Sinus with CHB and Junctional Escape)      Recommendations  Unclear etiology with broad differential; we are hopeful this is a transient Arrhythmia in the post operative setting. Patient describes a similar episode in 2003 post op from her initial MV surgery  Maintain ICU level of care with epicardial wires in place  Tentative dcPPM placement 1/16. NPO @[place], continue coumadin  INR needs to be therapeutic prior to dcPPM placement to avoid need for heparin products (mechanical valve) for 5 days post PPM placement  Avoid any AV herminia blocking agents including beta blockers and amiodarone        Connor M Gillies, MD  Cardiac Electrophysiology  [facility] - Surgical Intensive Care

## 2024-01-15 NOTE — SUBJECTIVE & OBJECTIVE
Interval History:  No events overnight.  She continues to be in sinus rhythm with complete heart block, junctional escape rhythm.  Hemodynamically stable and not pacer dependent.  No clinical changes    Review of Systems   Constitutional: Positive for malaise/fatigue.   Cardiovascular:  Positive for dyspnea on exertion and palpitations. Negative for chest pain, irregular heartbeat, near-syncope and syncope.     Objective:     Vital Signs (Most Recent):  Temp: 98.7 °F (37.1 °C) (01/15/24 1500)  Pulse: 68 (01/15/24 1700)  Resp: 16 (01/15/24 1730)  BP: (!) 120/58 (01/15/24 1700)  SpO2: 97 % (01/15/24 1700) Vital Signs (24h Range):  Temp:  [98 °F (36.7 °C)-99.1 °F (37.3 °C)] 98.7 °F (37.1 °C)  Pulse:  [57-74] 68  Resp:  [11-48] 16  SpO2:  [94 %-100 %] 97 %  BP: (107-144)/(54-95) 120/58     Weight: 80.7 kg (177 lb 12.8 oz)  Body mass index is 33.6 kg/m².     SpO2: 97 %        Physical Exam  Vitals and nursing note reviewed.   Constitutional:       General: She is not in acute distress.     Appearance: She is not ill-appearing.   HENT:      Head: Normocephalic and atraumatic.      Right Ear: External ear normal.      Left Ear: External ear normal.      Mouth/Throat:      Mouth: Mucous membranes are moist.      Pharynx: No oropharyngeal exudate.   Eyes:      Extraocular Movements: Extraocular movements intact.      Pupils: Pupils are equal, round, and reactive to light.   Cardiovascular:      Rate and Rhythm: Normal rate and regular rhythm.      Pulses: Normal pulses.      Heart sounds: Normal heart sounds. No murmur heard.     Comments: Epicardial pacer wires in place  Pulmonary:      Effort: Pulmonary effort is normal. No respiratory distress.      Breath sounds: Normal breath sounds. No wheezing or rales.   Abdominal:      General: Abdomen is flat. Bowel sounds are normal. There is no distension.      Palpations: Abdomen is soft.      Tenderness: There is no abdominal tenderness.   Musculoskeletal:         General: No  signs of injury. Normal range of motion.      Cervical back: Normal range of motion.      Right lower leg: No edema.      Left lower leg: No edema.   Skin:     General: Skin is warm and dry.   Neurological:      General: No focal deficit present.      Mental Status: She is alert and oriented to person, place, and time.      Sensory: No sensory deficit.      Motor: No weakness.   Psychiatric:         Mood and Affect: Mood normal.         Behavior: Behavior normal.         Thought Content: Thought content normal.            Significant Labs: EP:   Recent Labs   Lab 01/14/24  0316 01/14/24  1640 01/15/24  0237 01/15/24  1416     --  133*  --    K 4.3 4.3 4.3  --      --  98  --    CO2 27  --  26  --    GLU 92  --  97  --    BUN 9  --  10  --    CREATININE 0.6  --  0.6  --    CALCIUM 9.0  --  9.3  --    PROT 6.3  --  6.8  --    ALBUMIN 2.9*  --  3.1*  --    BILITOT 0.8  --  0.8  --    ALKPHOS 60  --  61  --    AST 20  --  19  --    ALT 16  --  17  --    ANIONGAP 10  --  9  --    WBC 11.66  --  14.89*  --    HGB 9.0*  --  9.3*  --    HCT 27.3*  --  29.1*  --      --  457*  --    INR 3.0*  --  2.3* 1.8*         Significant Imaging: EKG: Sinus with CHB and Junctional Escape Rhythm

## 2024-01-15 NOTE — SUBJECTIVE & OBJECTIVE
Interval History/Significant Events: NAEON. Pain well controlled. Eating appropriately. Walking, voiding, stooling normally. Remains in complete heart block. V wires still in place. INR slightly subtherapeutic. Rechecking afternoon INR and adjusting dose.     Follow-up For: Procedure(s) (LRB):  MITRAL VALVE REPLACEMENT, REDO STERNOTOMY (N/A)  VALVULOPLASTY, TRICUSPID REPAIR (N/A)    Post-Operative Day: 7 Days Post-Op    Objective:     Vital Signs (Most Recent):  Temp: 98.4 °F (36.9 °C) (01/15/24 0300)  Pulse: 64 (01/15/24 0715)  Resp: 16 (01/15/24 0715)  BP: (!) 107/55 (01/15/24 0700)  SpO2: 96 % (01/15/24 0715) Vital Signs (24h Range):  Temp:  [98.2 °F (36.8 °C)-99.1 °F (37.3 °C)] 98.4 °F (36.9 °C)  Pulse:  [56-74] 64  Resp:  [15-48] 16  SpO2:  [94 %-100 %] 96 %  BP: (107-144)/(55-66) 107/55     Weight: 80.7 kg (177 lb 12.8 oz)  Body mass index is 33.6 kg/m².      Intake/Output Summary (Last 24 hours) at 1/15/2024 0737  Last data filed at 1/15/2024 0500  Gross per 24 hour   Intake 840 ml   Output 2525 ml   Net -1685 ml          Physical Exam  Vitals and nursing note reviewed.   Constitutional:       General: She is not in acute distress.     Appearance: Normal appearance. She is obese. She is not toxic-appearing.      Interventions: She is sedated.   HENT:      Head: Normocephalic and atraumatic.      Right Ear: External ear normal.      Left Ear: External ear normal.      Nose: Nose normal.   Eyes:      General: No scleral icterus.        Right eye: No discharge.         Left eye: No discharge.      Extraocular Movements: Extraocular movements intact.   Cardiovascular:      Rate and Rhythm: Normal rate and regular rhythm.      Pulses: Normal pulses.      Comments: V Wires  Pulmonary:      Effort: Pulmonary effort is normal. No respiratory distress.   Abdominal:      General: Abdomen is flat. There is no distension.      Palpations: Abdomen is soft.      Tenderness: There is no abdominal tenderness.    Musculoskeletal:         General: No swelling. Normal range of motion.      Cervical back: Normal range of motion.      Right lower leg: No edema.      Left lower leg: No edema.   Skin:     General: Skin is warm and dry.      Comments: MSI CDI     Neurological:      General: No focal deficit present.      Mental Status: She is alert and oriented to person, place, and time. Mental status is at baseline.   Psychiatric:         Mood and Affect: Mood normal.         Behavior: Behavior normal.         Thought Content: Thought content normal.            Vents:  Vent Mode: Spont (01/08/24 2052)  Ventilator Initiated: Yes (01/08/24 1427)  Set Rate: 20 BPM (01/08/24 1804)  Vt Set: 380 mL (01/08/24 1804)  PEEP/CPAP: 5 cmH20 (01/08/24 2052)  Oxygen Concentration (%): 97 (01/15/24 0600)  Peak Airway Pressure: 9.9 cmH20 (01/08/24 2052)  Plateau Pressure: 0 cmH20 (01/08/24 2052)  Total Ve: 7.34 L/m (01/08/24 2052)  Negative Inspiratory Force (cm H2O): -15 (01/08/24 2137)  F/VT Ratio<105 (RSBI): (!) 96.3 (01/08/24 2052)    Lines/Drains/Airways       Line  Duration                  Pacer Wires 01/08/24 1259 6 days              Peripheral Intravenous Line  Duration                  Peripheral IV - Single Lumen 01/12/24 0131 20 G Anterior;Right Forearm 3 days         Peripheral IV - Single Lumen 01/13/24 0330 22 G Posterior;Right Wrist 2 days                    Significant Labs:    CBC/Anemia Profile:  Recent Labs   Lab 01/14/24  0316 01/15/24  0237   WBC 11.66 14.89*   HGB 9.0* 9.3*   HCT 27.3* 29.1*    457*   MCV 84 85   RDW 16.3* 16.3*        Chemistries:  Recent Labs   Lab 01/14/24  0316 01/14/24  1640 01/15/24  0237     --  133*   K 4.3 4.3 4.3     --  98   CO2 27  --  26   BUN 9  --  10   CREATININE 0.6  --  0.6   CALCIUM 9.0  --  9.3   ALBUMIN 2.9*  --  3.1*   PROT 6.3  --  6.8   BILITOT 0.8  --  0.8   ALKPHOS 60  --  61   ALT 16  --  17   AST 20  --  19   MG 2.0  --  2.1   PHOS 4.3  --  4.1       All  pertinent labs within the past 24 hours have been reviewed.    Significant Imaging:  I have reviewed all pertinent imaging results/findings within the past 24 hours.

## 2024-01-15 NOTE — PROGRESS NOTES
[facility]  Critical Care - Surgery  Progress Note    Patient Name: King Botello  MRN: [id number]  Admission Date: 1/8/2024  Hospital Length of Stay: 7 days  Code Status: Full Code  Attending Provider: Thierno Liu MD  Primary Care Provider: Mayco Hansen MD   Principal Problem: Nonrheumatic mitral valve stenosis    Subjective:       Interval History/Significant Events: NAEON. Pain well controlled. Eating appropriately. Walking, voiding, stooling normally. Remains in complete heart block. V wires still in place. INR slightly subtherapeutic. Rechecking afternoon INR and adjusting dose.     Follow-up For: Procedure(s) (LRB):  MITRAL VALVE REPLACEMENT, REDO STERNOTOMY (N/A)  VALVULOPLASTY, TRICUSPID REPAIR (N/A)    Post-Operative Day: 7 Days Post-Op    Objective:     Vital Signs (Most Recent):  Temp: 98.4 °F (36.9 °C) (01/15/24 0300)  Pulse: 64 (01/15/24 0715)  Resp: 16 (01/15/24 0715)  BP: (!) 107/55 (01/15/24 0700)  SpO2: 96 % (01/15/24 0715) Vital Signs (24h Range):  Temp:  [98.2 °F (36.8 °C)-99.1 °F (37.3 °C)] 98.4 °F (36.9 °C)  Pulse:  [56-74] 64  Resp:  [15-48] 16  SpO2:  [94 %-100 %] 96 %  BP: (107-144)/(55-66) 107/55     Weight: 80.7 kg (177 lb 12.8 oz)  Body mass index is 33.6 kg/m².      Intake/Output Summary (Last 24 hours) at 1/15/2024 0737  Last data filed at 1/15/2024 0500  Gross per 24 hour   Intake 840 ml   Output 2525 ml   Net -1685 ml          Physical Exam  Vitals and nursing note reviewed.   Constitutional:       General: She is not in acute distress.     Appearance: Normal appearance. She is obese. She is not toxic-appearing.      Interventions: She is sedated.   HENT:      Head: Normocephalic and atraumatic.      Right Ear: External ear normal.      Left Ear: External ear normal.      Nose: Nose normal.   Eyes:      General: No scleral icterus.        Right eye: No discharge.         Left eye: No discharge.      Extraocular Movements: Extraocular movements  intact.   Cardiovascular:      Rate and Rhythm: Normal rate and regular rhythm.      Pulses: Normal pulses.      Comments: V Wires  Pulmonary:      Effort: Pulmonary effort is normal. No respiratory distress.   Abdominal:      General: Abdomen is flat. There is no distension.      Palpations: Abdomen is soft.      Tenderness: There is no abdominal tenderness.   Musculoskeletal:         General: No swelling. Normal range of motion.      Cervical back: Normal range of motion.      Right lower leg: No edema.      Left lower leg: No edema.   Skin:     General: Skin is warm and dry.      Comments: MSI CDI     Neurological:      General: No focal deficit present.      Mental Status: She is alert and oriented to person, place, and time. Mental status is at baseline.   Psychiatric:         Mood and Affect: Mood normal.         Behavior: Behavior normal.         Thought Content: Thought content normal.            Vents:  Vent Mode: Spont (01/08/24 2052)  Ventilator Initiated: Yes (01/08/24 1427)  Set Rate: 20 BPM (01/08/24 1804)  Vt Set: 380 mL (01/08/24 1804)  PEEP/CPAP: 5 cmH20 (01/08/24 2052)  Oxygen Concentration (%): 97 (01/15/24 0600)  Peak Airway Pressure: 9.9 cmH20 (01/08/24 2052)  Plateau Pressure: 0 cmH20 (01/08/24 2052)  Total Ve: 7.34 L/m (01/08/24 2052)  Negative Inspiratory Force (cm H2O): -15 (01/08/24 2137)  F/VT Ratio<105 (RSBI): (!) 96.3 (01/08/24 2052)    Lines/Drains/Airways       Line  Duration                  Pacer Wires 01/08/24 1259 6 days              Peripheral Intravenous Line  Duration                  Peripheral IV - Single Lumen 01/12/24 0131 20 G Anterior;Right Forearm 3 days         Peripheral IV - Single Lumen 01/13/24 0330 22 G Posterior;Right Wrist 2 days                    Significant Labs:    CBC/Anemia Profile:  Recent Labs   Lab 01/14/24  0316 01/15/24  0237   WBC 11.66 14.89*   HGB 9.0* 9.3*   HCT 27.3* 29.1*    457*   MCV 84 85   RDW 16.3* 16.3*        Chemistries:  Recent  Labs   Lab 01/14/24  0316 01/14/24  1640 01/15/24  0237     --  133*   K 4.3 4.3 4.3     --  98   CO2 27  --  26   BUN 9  --  10   CREATININE 0.6  --  0.6   CALCIUM 9.0  --  9.3   ALBUMIN 2.9*  --  3.1*   PROT 6.3  --  6.8   BILITOT 0.8  --  0.8   ALKPHOS 60  --  61   ALT 16  --  17   AST 20  --  19   MG 2.0  --  2.1   PHOS 4.3  --  4.1       All pertinent labs within the past 24 hours have been reviewed.    Significant Imaging:  I have reviewed all pertinent imaging results/findings within the past 24 hours.  Assessment/Plan:     Cardiac/Vascular  * Nonrheumatic mitral valve stenosis  38 y.o. female with a past medical history of MVP and MR s/p mitral annuloplasty in [place] (2003),  HTN, bradycardia now s/p Mechanical Mitral Valve Replacement on 1/8/2024 with Dr. Liu      Neuro/Psych:     - Sedation: n/a    - Pain:    - PRN liquid Acetaminophen 1g q6h    - Oxy 5 PRN             Cardiac:     - S/P Mechanical Mitral Valve Replacement with Dr. Liu on 1/8/2024    - BP Goal: MAP 60-80    - initially placed on Amio for suspected A-fib, d/c'd 1/9/24    - Pressors: off    - Anti-HTNs: Will start when appropriate     - Rhythm: Complete Heart block with occasional junctional     - Beta blocker: Hold, heart block   - paced with temporary v-wires     - EP involved for Complete Heart Block and PPM   - Tentative Tuesday if EP decides she requires PPM. EP hopeful heart block is transient    - Statin: Not indicated    - EKG daily     Pulmonary:     - Goal SpO2 >92%    - on RA    - CXR stable    - Chest Tubes 2 Meds     - pulled on 1/11/24    - IS    - ABGs PRN      Renal:    - Trend BUN/Cr     - lasix 20mg IV BID    - Soares out    Recent Labs   Lab 01/13/24  0322 01/14/24  0316 01/15/24  0237   BUN 10 9 10   CREATININE 0.6 0.6 0.6           FEN / GI:     - Daily CMP, PRN K/Mag/Phos per protocol     - Replace electrolytes as needed    - scheduled potassium supplement    - Nutrition: cardiac thin, meds  crushed   - follow speech recs    - Bowel Regimen: Miralax, docusate      ID:     - Afebrile    - WBC increased. No signs of infection. Will continue to monitor     - Abx: Completed perioperative cefazolin 2g Q8H x 5 doses    Recent Labs   Lab 01/13/24  0322 01/14/24  0316 01/15/24  0237   WBC 10.54 11.66 14.89*           Heme/Onc:     - Hgb 13.0 pre-operatively    - CBC daily    - ASA 81mg     - Heparin d/c'd as therapeutic INR achieved    - PO Coumadin 2mg QD, INR 3 (goal is 2.5-3.5)    - Transfuse Hb <7 and symptomatic anemia    - 1u pRBC on 1/9/24      Recent Labs   Lab 01/13/24  0322 01/14/24  0316 01/15/24  0237   HGB 8.5* 9.0* 9.3*    361 457*   APTT 32.8* 35.4* 34.8*   INR 2.6* 3.0* 2.3*           Endocrine:     - CTS Goal -140    - HgbA1c: 5.2    - Insulin SSI    - Endocrinology consulted for insulin management      PPx:   Feeding: Cardiac diet, Speech following  Analgesia/Sedation: Multimodal controlled  Thromboembolic Prevention: SCD's   HOB >30: Yes  Stress Ulcer: Famotidine  Glucose Control: Yes, insulin management per Endocrinology     Lines/Drains/Airway:   Pacing Wires: Temporary ventricular pacing wires              PIVs              Wound Vac      Dispo/Code Status/Palliative:     - Continue SICU Care    - Full Code           Critical care was time spent personally by me on the following activities: development of treatment plan with patient or surrogate and bedside caregivers, discussions with consultants, evaluation of patient's response to treatment, examination of patient, ordering and performing treatments and interventions, ordering and review of laboratory studies, ordering and review of radiographic studies, pulse oximetry, re-evaluation of patient's condition.  This critical care time did not overlap with that of any other provider or involve time for any procedures.     Umer Herrera MD  Critical Care - Surgery  [facility]

## 2024-01-15 NOTE — PT/OT/SLP PROGRESS
Physical Therapy   Progress Note    Patient Name:  King Botello  MRN: [id number]    Admit Date: 1/8/2024  Admitting Diagnosis:  Nonrheumatic mitral valve stenosis  Length of Stay: 7 days  Recent Surgery: Procedure(s) (LRB):  MITRAL VALVE REPLACEMENT, REDO STERNOTOMY (N/A)  VALVULOPLASTY, TRICUSPID REPAIR (N/A) 7 Days Post-Op    Recommendations:     Discharge Recommendations: no therapy indicated  Equipment recommendations: none  Barriers to discharge: None Identified     Assessment:     King Botello is a 38 y.o. female admitted to [facility] on 1/8/2024 with medical diagnosis of Nonrheumatic mitral valve stenosis. Pt presents with impaired endurance, impaired functional mobility, decreased coordination, impaired cardiopulmonary response to activity, gait instability, impaired self care skills, weakness. Pt is progressing towards goals, but has not yet reached prior level of function.     Pt agreeable to therapy session. Up in recliner with  in room. Pt able to stand, ambulate to sink and perform standing ADLs. Pt then able to ambulate in hallway with nsg present and portable monitor used. Pt stated this is the best she has felt since sx. Left up in recliner with all VSS.    King Botello would benefit from continued acute PT intervention to improve quality of life, focus on recovery of impairments, provide patient/caregiver education, reduce fall risk, and maximize (I) and safety with functional mobility. Once medically stable, recommending pt discharge to no therapy indicated.      Rehab Prognosis: Good    Plan:     During this hospitalization, patient to be seen 5 x/week to address the identified rehab impairments via gait training, therapeutic activities, therapeutic exercises, neuromuscular re-education and progress towards stated goals.     Plan of Care Expires:  02/08/24  Plan of Care reviewed with: patient and spouse    This plan of care has been discussed with the patient/caregiver, who was  "included in its development and is in agreement with the identified goals and treatment plan.     Subjective     Communicated with RN prior to session.  Patient found up in chair upon PT entry to room, agreeable to therapy session. Pt's  present during session.    Patient/Family Comments/goals: "This is the best I have felt"    Pain/Comfort:  Pain Rating 1: 0/10  Pain Rating Post-Intervention 1: 0/10    Patients cultural, spiritual, Alevism conflicts given the current situation: None identified     Objective:   OT present for cotreat due to pt's multiple medical comorbidities and functional/cognition deficits requiring two skilled therapists to appropriately progress pt's musculoskeletal strength, neuromuscular control, and endurance while taking into consideration medical acuity and pt safety.    Patient found with: external pacer, wound vac, telemetry, pulse ox (continuous), blood pressure cuff    General Precautions: Standard, fall, sternal   Orthopedic Precautions:N/A   Braces:     Oxygen Device: room air    Cognition:  Pt is Alert during session.    Therapist provided skilled verbal and tactile cueing to facilitate the following functional mobility tasks. Listed tasks are focused on recovery of impairments and improving pt's independence and efficiency with bed mobility, transfers and ambulation as able.     Bed Mobility:  Not assessed d/t in recliner    Transfers:   Sit <> Stand Transfer: Stand-by Assistance from recliner with no AD                  Gait:  Distance: 10 ft + standing ADLs + 240 ft  Assistance level: Stand-by Assistance  Assistive Device: none  Gait Assessment: significantly decreased gait speed (pt states she is being "cautious"); reduced reciprocal arm swing    Balance:  Dynamic Sitting: GOOD: Maintains balance through MODERATE excursions of active trunk movement  Standing:  Static: GOOD: Takes MODERATE challenges from all directions   Dynamic: FAIR+: Needs CLOSE SUPERVISION during " gait and is able to right self with minor LOB    Outcome Measure: AM-PAC 6 CLICK MOBILITY  Total Score:18     Patient/Caregiver Education and Additional Therapeutic Activities/Exercises   Therapist reinforced education re:   Post-op sternal precautions, including no lifting > 5 lbs, pulling or pushing with BUEs.    Modifying daily activities and functional mobility tasks to maintain sternal precautions appropriately   Importance of participation in therapy and engaging in increased OOB mobility with assistance to improve endurance     Provided pt/caregiver education regarding:   PT POC and goals for therapy   Safety with mobility and fall risk   Safe management of AD as needed   Energy conservation techniques   Instruction on use of call button and importance of calling nursing staff for assistance with mobility     Patient/caregiver able to verbalize understanding; will follow-up with pt/caregiver during current admit for additional questions/concerns within scope of practice.     White board updated.     Patient left up in chair with all lines intact, call button in reach, and  present.    Goals:     Multidisciplinary Problems       Physical Therapy Goals          Problem: Physical Therapy    Goal Priority Disciplines Outcome Goal Variances Interventions   Physical Therapy Goal     PT, PT/OT Ongoing, Progressing     Description: Goals to be met by: 2024    Patient will increase functional independence with mobility by performin. Supine to sit with Supervision  2. Sit to stand transfer with Supervision  3. Gait  x 400 feet with Supervision.                          Time Tracking:       PT Received On: 01/15/24  PT Start Time: 0948     PT Stop Time: 1012  PT Total Time (min): 24 min     Billable Minutes: Gait Training 24    01/15/2024

## 2024-01-16 LAB
ABO + RH BLD: NORMAL
ALBUMIN SERPL BCP-MCNC: 3.2 G/DL (ref 3.5–5.2)
ALP SERPL-CCNC: 65 U/L (ref 55–135)
ALT SERPL W/O P-5'-P-CCNC: 15 U/L (ref 10–44)
ANION GAP SERPL CALC-SCNC: 11 MMOL/L (ref 8–16)
APTT PPP: 32.2 SEC (ref 21–32)
APTT PPP: 32.8 SEC (ref 21–32)
APTT PPP: 35.8 SEC (ref 21–32)
AST SERPL-CCNC: 20 U/L (ref 10–40)
BASOPHILS # BLD AUTO: 0.05 K/UL (ref 0–0.2)
BASOPHILS NFR BLD: 0.3 % (ref 0–1.9)
BILIRUB SERPL-MCNC: 0.8 MG/DL (ref 0.1–1)
BLD GP AB SCN CELLS X3 SERPL QL: NORMAL
BUN SERPL-MCNC: 10 MG/DL (ref 6–20)
CALCIUM SERPL-MCNC: 9.5 MG/DL (ref 8.7–10.5)
CHLORIDE SERPL-SCNC: 98 MMOL/L (ref 95–110)
CO2 SERPL-SCNC: 25 MMOL/L (ref 23–29)
CREAT SERPL-MCNC: 0.6 MG/DL (ref 0.5–1.4)
DIFFERENTIAL METHOD BLD: ABNORMAL
EOSINOPHIL # BLD AUTO: 0.5 K/UL (ref 0–0.5)
EOSINOPHIL NFR BLD: 2.7 % (ref 0–8)
ERYTHROCYTE [DISTWIDTH] IN BLOOD BY AUTOMATED COUNT: 16.4 % (ref 11.5–14.5)
EST. GFR  (NO RACE VARIABLE): >60 ML/MIN/1.73 M^2
GLUCOSE SERPL-MCNC: 96 MG/DL (ref 70–110)
HCT VFR BLD AUTO: 30.4 % (ref 37–48.5)
HGB BLD-MCNC: 9.7 G/DL (ref 12–16)
IMM GRANULOCYTES # BLD AUTO: 0.23 K/UL (ref 0–0.04)
IMM GRANULOCYTES NFR BLD AUTO: 1.3 % (ref 0–0.5)
INR PPP: 1.9 (ref 0.8–1.2)
INR PPP: 1.9 (ref 0.8–1.2)
INR PPP: 2 (ref 0.8–1.2)
LYMPHOCYTES # BLD AUTO: 2.4 K/UL (ref 1–4.8)
LYMPHOCYTES NFR BLD: 13 % (ref 18–48)
MAGNESIUM SERPL-MCNC: 2.1 MG/DL (ref 1.6–2.6)
MCH RBC QN AUTO: 27.2 PG (ref 27–31)
MCHC RBC AUTO-ENTMCNC: 31.9 G/DL (ref 32–36)
MCV RBC AUTO: 85 FL (ref 82–98)
MONOCYTES # BLD AUTO: 1.5 K/UL (ref 0.3–1)
MONOCYTES NFR BLD: 8.4 % (ref 4–15)
NEUTROPHILS # BLD AUTO: 13.5 K/UL (ref 1.8–7.7)
NEUTROPHILS NFR BLD: 74.3 % (ref 38–73)
NRBC BLD-RTO: 0 /100 WBC
PHOSPHATE SERPL-MCNC: 3.9 MG/DL (ref 2.7–4.5)
PLATELET # BLD AUTO: 515 K/UL (ref 150–450)
PMV BLD AUTO: 9.9 FL (ref 9.2–12.9)
POTASSIUM SERPL-SCNC: 4.5 MMOL/L (ref 3.5–5.1)
PROT SERPL-MCNC: 7.2 G/DL (ref 6–8.4)
PROTHROMBIN TIME: 19.5 SEC (ref 9–12.5)
PROTHROMBIN TIME: 19.6 SEC (ref 9–12.5)
PROTHROMBIN TIME: 20.2 SEC (ref 9–12.5)
RBC # BLD AUTO: 3.56 M/UL (ref 4–5.4)
SODIUM SERPL-SCNC: 134 MMOL/L (ref 136–145)
SPECIMEN OUTDATE: NORMAL
WBC # BLD AUTO: 18.08 K/UL (ref 3.9–12.7)

## 2024-01-16 PROCEDURE — 85610 PROTHROMBIN TIME: CPT

## 2024-01-16 PROCEDURE — 99233 SBSQ HOSP IP/OBS HIGH 50: CPT | Mod: ,,, | Performed by: INTERNAL MEDICINE

## 2024-01-16 PROCEDURE — 86901 BLOOD TYPING SEROLOGIC RH(D): CPT

## 2024-01-16 PROCEDURE — 93010 ELECTROCARDIOGRAM REPORT: CPT | Mod: ,,, | Performed by: INTERNAL MEDICINE

## 2024-01-16 PROCEDURE — 25000003 PHARM REV CODE 250

## 2024-01-16 PROCEDURE — 97116 GAIT TRAINING THERAPY: CPT

## 2024-01-16 PROCEDURE — 25000003 PHARM REV CODE 250: Performed by: THORACIC SURGERY (CARDIOTHORACIC VASCULAR SURGERY)

## 2024-01-16 PROCEDURE — 85610 PROTHROMBIN TIME: CPT | Mod: 91

## 2024-01-16 PROCEDURE — 92526 ORAL FUNCTION THERAPY: CPT

## 2024-01-16 PROCEDURE — 93005 ELECTROCARDIOGRAM TRACING: CPT

## 2024-01-16 PROCEDURE — 97535 SELF CARE MNGMENT TRAINING: CPT

## 2024-01-16 PROCEDURE — 83735 ASSAY OF MAGNESIUM: CPT

## 2024-01-16 PROCEDURE — 84100 ASSAY OF PHOSPHORUS: CPT

## 2024-01-16 PROCEDURE — 80053 COMPREHEN METABOLIC PANEL: CPT | Performed by: STUDENT IN AN ORGANIZED HEALTH CARE EDUCATION/TRAINING PROGRAM

## 2024-01-16 PROCEDURE — 85610 PROTHROMBIN TIME: CPT | Mod: 91 | Performed by: THORACIC SURGERY (CARDIOTHORACIC VASCULAR SURGERY)

## 2024-01-16 PROCEDURE — 11000001 HC ACUTE MED/SURG PRIVATE ROOM

## 2024-01-16 PROCEDURE — 63600175 PHARM REV CODE 636 W HCPCS

## 2024-01-16 PROCEDURE — 85025 COMPLETE CBC W/AUTO DIFF WBC: CPT

## 2024-01-16 PROCEDURE — 25000003 PHARM REV CODE 250: Performed by: STUDENT IN AN ORGANIZED HEALTH CARE EDUCATION/TRAINING PROGRAM

## 2024-01-16 PROCEDURE — 85730 THROMBOPLASTIN TIME PARTIAL: CPT | Mod: 91 | Performed by: THORACIC SURGERY (CARDIOTHORACIC VASCULAR SURGERY)

## 2024-01-16 PROCEDURE — 85730 THROMBOPLASTIN TIME PARTIAL: CPT

## 2024-01-16 PROCEDURE — 94761 N-INVAS EAR/PLS OXIMETRY MLT: CPT

## 2024-01-16 PROCEDURE — 99232 SBSQ HOSP IP/OBS MODERATE 35: CPT | Mod: ,,, | Performed by: ANESTHESIOLOGY

## 2024-01-16 RX ORDER — HEPARIN SODIUM 10000 [USP'U]/100ML
1300 INJECTION, SOLUTION INTRAVENOUS CONTINUOUS
Status: DISCONTINUED | OUTPATIENT
Start: 2024-01-16 | End: 2024-01-16

## 2024-01-16 RX ORDER — OXYCODONE HCL 5 MG/5 ML
2.5 SOLUTION, ORAL ORAL EVERY 4 HOURS PRN
Status: DISCONTINUED | OUTPATIENT
Start: 2024-01-16 | End: 2024-01-18

## 2024-01-16 RX ORDER — WARFARIN SODIUM 5 MG/1
5 TABLET ORAL DAILY
Status: DISCONTINUED | OUTPATIENT
Start: 2024-01-16 | End: 2024-01-17

## 2024-01-16 RX ORDER — HEPARIN SODIUM 10000 [USP'U]/100ML
1400 INJECTION, SOLUTION INTRAVENOUS CONTINUOUS
Status: DISCONTINUED | OUTPATIENT
Start: 2024-01-16 | End: 2024-01-17

## 2024-01-16 RX ADMIN — DOCUSATE SODIUM 100 MG: 100 CAPSULE, LIQUID FILLED ORAL at 08:01

## 2024-01-16 RX ADMIN — FAMOTIDINE 20 MG: 20 TABLET, FILM COATED ORAL at 08:01

## 2024-01-16 RX ADMIN — FUROSEMIDE 20 MG: 10 INJECTION, SOLUTION INTRAMUSCULAR; INTRAVENOUS at 08:01

## 2024-01-16 RX ADMIN — HEPARIN SODIUM 1300 UNITS/HR: 10000 INJECTION, SOLUTION INTRAVENOUS at 02:01

## 2024-01-16 RX ADMIN — POTASSIUM BICARBONATE 40 MEQ: 391 TABLET, EFFERVESCENT ORAL at 08:01

## 2024-01-16 RX ADMIN — ASPIRIN 81 MG CHEWABLE TABLET 81 MG: 81 TABLET CHEWABLE at 08:01

## 2024-01-16 RX ADMIN — FUROSEMIDE 20 MG: 10 INJECTION, SOLUTION INTRAMUSCULAR; INTRAVENOUS at 06:01

## 2024-01-16 RX ADMIN — WARFARIN SODIUM 5 MG: 5 TABLET ORAL at 06:01

## 2024-01-16 NOTE — PROGRESS NOTES
[facility]  Critical Care - Surgery  Progress Note    Patient Name: King Botello  MRN: [id number]  Admission Date: 1/8/2024  Hospital Length of Stay: 8 days  Code Status: Full Code  Attending Provider: Thierno Liu MD  Primary Care Provider: Mayco Hansen MD   Principal Problem: Nonrheumatic mitral valve stenosis    Subjective:       Interval History/Significant Events: NAEON. Received Coumadin 1mg @ 1240 yesterday and coumadin 3mg last night. INR this morning was 1.9. Goal INR for mechanical valve is 2.5-3.5. Will redraw at noon. This morning patient anxious about possible procedure and the possibility of being intubated. Requests to not be intubated.     Follow-up For: Procedure(s) (LRB):  MITRAL VALVE REPLACEMENT, REDO STERNOTOMY (N/A)  VALVULOPLASTY, TRICUSPID REPAIR (N/A)    Post-Operative Day: 8 Days Post-Op    Objective:     Vital Signs (Most Recent):  Temp: 98.6 °F (37 °C) (01/16/24 0300)  Pulse: 64 (01/16/24 0715)  Resp: (!) 26 (01/16/24 0715)  BP: (!) 121/58 (01/16/24 0700)  SpO2: 97 % (01/16/24 0715) Vital Signs (24h Range):  Temp:  [98 °F (36.7 °C)-98.9 °F (37.2 °C)] 98.6 °F (37 °C)  Pulse:  [60-72] 64  Resp:  [11-35] 26  SpO2:  [95 %-100 %] 97 %  BP: (107-132)/(54-95) 121/58     Weight: 80.7 kg (177 lb 12.8 oz)  Body mass index is 33.6 kg/m².      Intake/Output Summary (Last 24 hours) at 1/16/2024 0811  Last data filed at 1/16/2024 0500  Gross per 24 hour   Intake 550 ml   Output 1895 ml   Net -1345 ml          Physical Exam  Vitals and nursing note reviewed.   Constitutional:       General: She is not in acute distress.     Appearance: Normal appearance. She is obese. She is not toxic-appearing.      Interventions: She is sedated.   HENT:      Head: Normocephalic and atraumatic.      Right Ear: External ear normal.      Left Ear: External ear normal.      Nose: Nose normal.   Eyes:      General: No scleral icterus.        Right eye: No discharge.         Left  eye: No discharge.      Extraocular Movements: Extraocular movements intact.   Cardiovascular:      Rate and Rhythm: Normal rate and regular rhythm.      Pulses: Normal pulses.      Comments: V Wires  Pulmonary:      Effort: Pulmonary effort is normal. No respiratory distress.   Abdominal:      General: Abdomen is flat. There is no distension.      Palpations: Abdomen is soft.      Tenderness: There is no abdominal tenderness.   Musculoskeletal:         General: No swelling. Normal range of motion.      Cervical back: Normal range of motion.      Right lower leg: No edema.      Left lower leg: No edema.   Skin:     General: Skin is warm and dry.      Comments: MSI CDI     Neurological:      General: No focal deficit present.      Mental Status: She is alert and oriented to person, place, and time. Mental status is at baseline.   Psychiatric:         Mood and Affect: Mood normal.         Behavior: Behavior normal.         Thought Content: Thought content normal.            Vents:  Vent Mode: Spont (01/08/24 2052)  Ventilator Initiated: Yes (01/08/24 1427)  Set Rate: 20 BPM (01/08/24 1804)  Vt Set: 380 mL (01/08/24 1804)  PEEP/CPAP: 5 cmH20 (01/08/24 2052)  Oxygen Concentration (%): 97 (01/15/24 0600)  Peak Airway Pressure: 9.9 cmH20 (01/08/24 2052)  Plateau Pressure: 0 cmH20 (01/08/24 2052)  Total Ve: 7.34 L/m (01/08/24 2052)  Negative Inspiratory Force (cm H2O): -15 (01/08/24 2137)  F/VT Ratio<105 (RSBI): (!) 96.3 (01/08/24 2052)    Lines/Drains/Airways       Line  Duration                  Pacer Wires 01/08/24 1259 7 days              Peripheral Intravenous Line  Duration                  Peripheral IV - Single Lumen 01/12/24 0131 20 G Anterior;Right Forearm 4 days         Peripheral IV - Single Lumen 01/13/24 0330 22 G Posterior;Right Wrist 3 days                    Significant Labs:    CBC/Anemia Profile:  Recent Labs   Lab 01/15/24  0237 01/16/24  0252   WBC 14.89* 18.08*   HGB 9.3* 9.7*   HCT 29.1* 30.4*   PLT  457* 515*   MCV 85 85   RDW 16.3* 16.4*        Chemistries:  Recent Labs   Lab 01/14/24  1640 01/15/24  0237 01/16/24  0252   NA  --  133* 134*   K 4.3 4.3 4.5   CL  --  98 98   CO2  --  26 25   BUN  --  10 10   CREATININE  --  0.6 0.6   CALCIUM  --  9.3 9.5   ALBUMIN  --  3.1* 3.2*   PROT  --  6.8 7.2   BILITOT  --  0.8 0.8   ALKPHOS  --  61 65   ALT  --  17 15   AST  --  19 20   MG  --  2.1 2.1   PHOS  --  4.1 3.9       All pertinent labs within the past 24 hours have been reviewed.    Significant Imaging:  I have reviewed all pertinent imaging results/findings within the past 24 hours.  Assessment/Plan:     Cardiac/Vascular  * Nonrheumatic mitral valve stenosis  38 y.o. female with a past medical history of MVP and MR s/p mitral annuloplasty in [place] (2003),  HTN, bradycardia now s/p Mechanical Mitral Valve Replacement on 1/8/2024 with Dr. Liu      Neuro/Psych:     - Sedation: n/a    - Pain:    - PRN liquid Acetaminophen 1g q6h    - Oxy 2.5 liquid PRN             Cardiac:     - S/P Mechanical Mitral Valve Replacement with Dr. Liu on 1/8/2024    - BP Goal: MAP 60-80    - initially placed on Amio for suspected A-fib, d/c'd 1/9/24    - Pressors: off    - Anti-HTNs: Will start when appropriate     - Rhythm: Complete Heart block with occasional junctional     - Beta blocker: Hold, heart block   - paced with temporary v-wires     - EP involved for Complete Heart Block and PPM   - Tentative Tuesday.   - INR must be within goal    - Statin: Not indicated    - EKG daily    - INR goal 2.5-3.5   - Coumadin 3mg     Pulmonary:     - Goal SpO2 >92%    - on RA    - CXR stable    - Chest Tubes 2 Meds     - pulled on 1/11/24    - IS    - ABGs PRN      Renal:    - Trend BUN/Cr     - lasix 20mg IV BID    - Soares out    Recent Labs   Lab 01/14/24  0316 01/15/24  0237 01/16/24  0252   BUN 9 10 10   CREATININE 0.6 0.6 0.6           FEN / GI:     - Daily CMP, PRN K/Mag/Phos per protocol     - Replace electrolytes as  needed    - scheduled potassium supplement    - Nutrition: cardiac thin, meds crushed   - follow speech recs    - Bowel Regimen: Miralax, docusate      ID:     - Afebrile    - WBC increased. No signs of infection. Will continue to monitor     - Abx: Completed perioperative cefazolin 2g Q8H x 5 doses    Recent Labs   Lab 01/14/24  0316 01/15/24  0237 01/16/24  0252   WBC 11.66 14.89* 18.08*           Heme/Onc:     - Hgb 13.0 pre-operatively    - CBC daily    - ASA 81mg     - Heparin d/c'd as therapeutic INR achieved    - PO Coumadin 2mg QD, INR 3 (goal is 2.5-3.5)    - Transfuse Hb <7 and symptomatic anemia    - 1u pRBC on 1/9/24      Recent Labs   Lab 01/14/24  0316 01/15/24  0237 01/15/24  1416 01/16/24  0252   HGB 9.0* 9.3*  --  9.7*    457*  --  515*   APTT 35.4* 34.8*  --  32.8*   INR 3.0* 2.3* 1.8* 1.9*           Endocrine:     - CTS Goal -140    - HgbA1c: 5.2    - Insulin SSI    - Endocrinology consulted for insulin management      PPx:   Feeding: Cardiac diet, Speech following  Analgesia/Sedation: Multimodal controlled  Thromboembolic Prevention: SCD's   HOB >30: Yes  Stress Ulcer: Famotidine  Glucose Control: Yes, insulin management per Endocrinology     Lines/Drains/Airway:   Pacing Wires: Temporary ventricular pacing wires              PIVs         Dispo/Code Status/Palliative:     - Continue SICU Care    - Full Code         Critical care was time spent personally by me on the following activities: development of treatment plan with patient or surrogate and bedside caregivers, discussions with consultants, evaluation of patient's response to treatment, examination of patient, ordering and performing treatments and interventions, ordering and review of laboratory studies, ordering and review of radiographic studies, pulse oximetry, re-evaluation of patient's condition.  This critical care time did not overlap with that of any other provider or involve time for any procedures.     Umer Herrera,  MD  Critical Care - Surgery  [facility]

## 2024-01-16 NOTE — PLAN OF CARE
PAULO. Pt OOB to chair this AM.     Pt afebrile. HR 60s. V wires in place with backup rate set @ 40bpm.  MAP >65. SpO2>90% on room air. Patient following commands and moving all extremities.     Pt OOB to bedside commode. Voids spontaneously. 845cc UOP/shift     PoC reviewed with patient and spouse. All questions/concerns addressed.       Skin: No new skin breakdown. Foams in place. Pt repositions independently. Midsternal incision clean and dry.

## 2024-01-16 NOTE — CONSULTS
"[facility]  Adult Nutrition  Consult Note    SUMMARY     Recommendations    1.) Recommend to ADAT, with goal of cardiac diet, fluid per MD.     2.) If and when diet advances, and PO intake <50%, recommend Boost Plus (or Boost alternative) BID to help optimize PRO/Kcal intake.     3.) RD to monitor wt, PO intake, skin, labs.      Goals: to meet % of EEN/EPN by next RD f/u  Nutrition Goal Status: new  Communication of RD Recs:  (POC)    Assessment and Plan    Nutrition Problem  Increased PRO needs    Related to (etiology):   Increased physiological needs    Signs and Symptoms (as evidenced by):   S/p MVR     Interventions/Recommendations (treatment strategy):  Collaboration of nutritional care with other providers.  ONS PRN  Diet edu provided on 1/16      Nutrition Diagnosis Status:   New     Reason for Assessment    Reason For Assessment: consult  Diagnosis: cardiac disease (Nonrheumatic mitral valve stenosis)  Relevant Medical History: MVP and MR s/p mitral annuloplasty (2003), HTN  Interdisciplinary Rounds: did not attend    General Information Comments: RD consulted for s/p cardiac sx. Pt denies n/v/d/c. Pt endorses fair appetite. Diet was advanced to cardiac from NPO today. Noted that pt had infant via C- section in April of 2023. Wt stable after birth. Pt endorses good appetite PTA. Pt appears nourished, with no visible s/s of malnutrition. NFPE not warranted. RD provided pt and caregiver Low Na and Heart Healthy diet edu. Pt asked RD for VitK edu. RD stated that they will provide at a later time.    Nutrition Discharge Planning: cardiac diet; Low Na and Heart Healthy diet edu provided on 1/16    Nutrition Risk Screen    Nutrition Risk Screen: no indicators present    Nutrition/Diet History    Spiritual, Cultural Beliefs, Sikh Practices, Values that Affect Care: no    Anthropometrics    Temp: 98.3 °F (36.8 °C)  Height Method: Stated  Height: 5' 1" (154.9 cm)  Height " (inches): 61 in  Weight Method: Bed Scale  Weight: 80.7 kg (177 lb 12.8 oz)  Weight (lb): 177.8 lb  Ideal Body Weight (IBW), Female: 105 lb  % Ideal Body Weight, Female (lb): 169.33 %  BMI (Calculated): 33.6    Lab/Procedures/Meds    Pertinent Labs Reviewed: reviewed  Pertinent Labs Comments: Na: 134, alb: 3.2    Pertinent Medications Reviewed: reviewed  Pertinent Medications Comments: Docusate Na, lasix, polyethylene glycol, K bicarb, coumadin    Estimated/Assessed Needs    Weight Used For Calorie Calculations: 80.7 kg (177 lb 14.6 oz)  Energy Calorie Requirements (kcal): 1560 kcal  Energy Need Method: Cobb-St Jeor (MSJ x 1.1)    Protein Requirements: 96- 120 kg (2.0-2.5g/kg of IBW)  Weight Used For Protein Calculations: 48 kg (105 lb 13.1 oz) (IBW d/t BMI >30.0)    Fluid Requirements (mL): 1ml/1kcal or per MD  Estimated Fluid Requirement Method: RDA Method  RDA Method (mL): 1560    Nutrition Prescription Ordered    Current Diet Order: NPO    Evaluation of Received Nutrient/Fluid Intake    I/O: -11.6L since 1/9  Energy Calories Required: not meeting needs  Protein Required: not meeting needs  Fluid Required:  (as per MD)  Comments: LBM 1/14  Tolerance: tolerating  % Intake of Estimated Energy Needs: 0 - 25 %  % Meal Intake: Other: Diet was just advanced today    Nutrition Risk    Level of Risk/Frequency of Follow-up:  (RD to f/u x 1-2/week)     Monitor and Evaluation    Food and Nutrient Intake: energy intake, food and beverage intake  Food and Nutrient Adminstration: diet order  Knowledge/Beliefs/Attitudes: food and nutrition knowledge/skill, beliefs and attitudes  Physical Activity and Function: nutrition-related ADLs and IADLs  Anthropometric Measurements: weight, weight change, body mass index  Biochemical Data, Medical Tests and Procedures: electrolyte and renal panel, glucose/endocrine profile, inflammatory profile  Nutrition-Focused Physical Findings: overall appearance, skin     Nutrition Follow-Up    RD  Follow-up?: Yes     Double Island Pedicle Flap Text: The defect edges were debeveled with a #15 scalpel blade.  Given the location of the defect, shape of the defect and the proximity to free margins a double island pedicle advancement flap was deemed most appropriate.  Using a sterile surgical marker, an appropriate advancement flap was drawn incorporating the defect, outlining the appropriate donor tissue and placing the expected incisions within the relaxed skin tension lines where possible.    The area thus outlined was incised deep to adipose tissue with a #15 scalpel blade.  The skin margins were undermined to an appropriate distance in all directions around the primary defect and laterally outward around the island pedicle utilizing iris scissors.  There was minimal undermining beneath the pedicle flap.

## 2024-01-16 NOTE — SUBJECTIVE & OBJECTIVE
Interval History/Significant Events: NAEON. Received Coumadin 1mg @ 1240 yesterday and coumadin 3mg last night. INR this morning was 1.9. Goal INR for mechanical valve is 2.5-3.5. Will redraw at noon. This morning patient anxious about possible procedure and the possibility of being intubated. Requests to not be intubated.     Follow-up For: Procedure(s) (LRB):  MITRAL VALVE REPLACEMENT, REDO STERNOTOMY (N/A)  VALVULOPLASTY, TRICUSPID REPAIR (N/A)    Post-Operative Day: 8 Days Post-Op    Objective:     Vital Signs (Most Recent):  Temp: 98.6 °F (37 °C) (01/16/24 0300)  Pulse: 64 (01/16/24 0715)  Resp: (!) 26 (01/16/24 0715)  BP: (!) 121/58 (01/16/24 0700)  SpO2: 97 % (01/16/24 0715) Vital Signs (24h Range):  Temp:  [98 °F (36.7 °C)-98.9 °F (37.2 °C)] 98.6 °F (37 °C)  Pulse:  [60-72] 64  Resp:  [11-35] 26  SpO2:  [95 %-100 %] 97 %  BP: (107-132)/(54-95) 121/58     Weight: 80.7 kg (177 lb 12.8 oz)  Body mass index is 33.6 kg/m².      Intake/Output Summary (Last 24 hours) at 1/16/2024 0811  Last data filed at 1/16/2024 0500  Gross per 24 hour   Intake 550 ml   Output 1895 ml   Net -1345 ml          Physical Exam  Vitals and nursing note reviewed.   Constitutional:       General: She is not in acute distress.     Appearance: Normal appearance. She is obese. She is not toxic-appearing.      Interventions: She is sedated.   HENT:      Head: Normocephalic and atraumatic.      Right Ear: External ear normal.      Left Ear: External ear normal.      Nose: Nose normal.   Eyes:      General: No scleral icterus.        Right eye: No discharge.         Left eye: No discharge.      Extraocular Movements: Extraocular movements intact.   Cardiovascular:      Rate and Rhythm: Normal rate and regular rhythm.      Pulses: Normal pulses.      Comments: V Wires  Pulmonary:      Effort: Pulmonary effort is normal. No respiratory distress.   Abdominal:      General: Abdomen is flat. There is no distension.      Palpations: Abdomen is soft.       Tenderness: There is no abdominal tenderness.   Musculoskeletal:         General: No swelling. Normal range of motion.      Cervical back: Normal range of motion.      Right lower leg: No edema.      Left lower leg: No edema.   Skin:     General: Skin is warm and dry.      Comments: MSI CDI     Neurological:      General: No focal deficit present.      Mental Status: She is alert and oriented to person, place, and time. Mental status is at baseline.   Psychiatric:         Mood and Affect: Mood normal.         Behavior: Behavior normal.         Thought Content: Thought content normal.            Vents:  Vent Mode: Spont (01/08/24 2052)  Ventilator Initiated: Yes (01/08/24 1427)  Set Rate: 20 BPM (01/08/24 1804)  Vt Set: 380 mL (01/08/24 1804)  PEEP/CPAP: 5 cmH20 (01/08/24 2052)  Oxygen Concentration (%): 97 (01/15/24 0600)  Peak Airway Pressure: 9.9 cmH20 (01/08/24 2052)  Plateau Pressure: 0 cmH20 (01/08/24 2052)  Total Ve: 7.34 L/m (01/08/24 2052)  Negative Inspiratory Force (cm H2O): -15 (01/08/24 2137)  F/VT Ratio<105 (RSBI): (!) 96.3 (01/08/24 2052)    Lines/Drains/Airways       Line  Duration                  Pacer Wires 01/08/24 1259 7 days              Peripheral Intravenous Line  Duration                  Peripheral IV - Single Lumen 01/12/24 0131 20 G Anterior;Right Forearm 4 days         Peripheral IV - Single Lumen 01/13/24 0330 22 G Posterior;Right Wrist 3 days                    Significant Labs:    CBC/Anemia Profile:  Recent Labs   Lab 01/15/24  0237 01/16/24  0252   WBC 14.89* 18.08*   HGB 9.3* 9.7*   HCT 29.1* 30.4*   * 515*   MCV 85 85   RDW 16.3* 16.4*        Chemistries:  Recent Labs   Lab 01/14/24  1640 01/15/24  0237 01/16/24  0252   NA  --  133* 134*   K 4.3 4.3 4.5   CL  --  98 98   CO2  --  26 25   BUN  --  10 10   CREATININE  --  0.6 0.6   CALCIUM  --  9.3 9.5   ALBUMIN  --  3.1* 3.2*   PROT  --  6.8 7.2   BILITOT  --  0.8 0.8   ALKPHOS  --  61 65   ALT  --  17 15   AST  --  19  20   MG  --  2.1 2.1   PHOS  --  4.1 3.9       All pertinent labs within the past 24 hours have been reviewed.    Significant Imaging:  I have reviewed all pertinent imaging results/findings within the past 24 hours.

## 2024-01-16 NOTE — PT/OT/SLP PROGRESS
Speech Language Pathology Treatment    Patient Name:  King Botello   MRN:  [id number]  Admitting Diagnosis: Nonrheumatic mitral valve stenosis    Recommendations:                 General Recommendations:  Dysphagia therapy  Diet recommendations:  Regular Diet - IDDSI Level 7, Liquid Diet Level: Thin liquids - IDDSI Level 0   Aspiration Precautions:   1 bite/sip at a time  Meds whole 1 at a time; may wish to consider larger pills crushed or coated in applesauce    No straws  Small bites/sips   General Precautions: Standard, aspiration, fall, sternal  Communication strategies:  none    Assessment:     King Botello is a 38 y.o. female with an SLP diagnosis of Dysphagia. She presents with improved dysphagia and dysphonia to continue with regular unrestricted diet, thin liquids and oral medications. No further speech service follow up warranted.    Subjective     Spoke with nursing prior to session. Pt agreeable to participate in all aspects of session.      Patient goals: none stated     Pain/Comfort:  Pain Rating 1: 0/10  Pain Rating Post-Intervention 1: 0/10    Respiratory Status: Room air    Objective:     Has the patient been evaluated by SLP for swallowing?   Yes  Keep patient NPO? No   Current Respiratory Status:        Pt seen for ongoing dysphagia therapy. Pt reporting over long weekend hesitancy with oral intake and any additional dysphagia and dysphonia has completed resolved. Pt reporting she has routinely participated in meals without any coughing, choking, sputtering, throat clearing, globus sensation or additional difficulty etc. Pt with strong vocal quality and reports her dysphonia and ability to produce strong clear phonation has improved significantly since post-op day 1.  Pt essentially NPO for pending pacemaker later this date and no solids offered. Pt was however, observed to take small single sips of thin liquids without any overt clinical signs of aspiration. Pt reporting managing whole  pills one at a time without difficulty. SLP provided education regarding overall impressions,  ongoing diet recommendations able to continue a regular diet with thin liquids.SLP discussed at length ongoing role of SLP and no additional need for skills speech services at this time.  Pt verbalized understanding and had no additional questions or concerns upon SLP exit. Spoke with RN regarding impressions and recommendations and nursing verbalized understanding.   Pt to be discharge from speech services as she is managing a regular unrestricted diet.     Goals:   Multidisciplinary Problems       SLP Goals          Problem: SLP    Goal Priority Disciplines Outcome   SLP Goal     SLP Ongoing, Progressing   Description: Speech Language Pathology Goals  Goals expected to be met by 1/23  1. Pt will participate in ongoing assessment of swallow.                                Plan:     Patient to be seen:  4 x/week   Plan of Care expires:  02/08/24  Plan of Care reviewed with:  patient   SLP Follow-Up:  No       Discharge recommendations:   (TBD)   Barriers to Discharge:  Level of Skilled Assistance Needed      Time Tracking:     SLP Treatment Date:   01/16/24  Speech Start Time:  0939  Speech Stop Time:  1002     Speech Total Time (min):  23 min    Billable Minutes: Treatment Swallowing Dysfunction 11 and Self Care/Home Management Training 12      01/16/2024

## 2024-01-16 NOTE — ASSESSMENT & PLAN NOTE
Patient is POD 2 s/p MVR (revision with mechanical valve) and TV banding. Patient was found to be Sinus w/ CHB w/ a junctional escape rhythm this AM via tele and confirmed with 12 lead ECG. Review of Tele shows this arrhythm appearing as early as 1/08/2024. Patient received Amiodarone from 1/08/24 to 1/10/24. Currently having palpitations and BLANK, Unclear if related to this arrhythmia.    New c/o of increased fatigue, likely related to insomnia. No siggnifcant change in rhythm (still in Sinus with CHB and Junctional Escape)      Recommendations  Continues to be in CHB. Likely underlying AFL. Repeat EKG ordered.  Continue ICU care, epicardial lead.  Plan for dcPPM when INR therapeutic.  Will need to be cardioverted as outpatient after pacemaker implantation. Not urgent, rate controlled.

## 2024-01-16 NOTE — NURSING
Dr. Liu and team at bedside to assess patient and discuss plan of care. Patient discussed feelings regarding pacemaker and intubation. Dr. Liu states team will talk to EP related to her plan of care. In meantime will continue NPO and draw INR at noon. Patient verbalized understanding.

## 2024-01-16 NOTE — PROGRESS NOTES
[facility] - Surgical Intensive Care  Cardiac Electrophysiology  Progress Note    Admission Date: 1/8/2024  Code Status: Full Code   Attending Physician: Thierno Liu MD   Expected Discharge Date: 1/19/2024  Principal Problem:Nonrheumatic mitral valve stenosis    Subjective:     Interval History:  No events overnight. Appears to be in atrial flutter but still with AV dissociation consistent with CHB.     Review of Systems   Constitutional: Positive for malaise/fatigue.   Cardiovascular:  Positive for dyspnea on exertion and palpitations. Negative for chest pain, irregular heartbeat, near-syncope and syncope.     Objective:     Vital Signs (Most Recent):  Temp: 98.3 °F (36.8 °C) (01/16/24 1101)  Pulse: 64 (01/16/24 1101)  Resp: (!) 25 (01/16/24 1101)  BP: (!) 116/57 (01/16/24 1101)  SpO2: 96 % (01/16/24 1101) Vital Signs (24h Range):  Temp:  [98.3 °F (36.8 °C)-98.9 °F (37.2 °C)] 98.3 °F (36.8 °C)  Pulse:  [60-72] 64  Resp:  [11-35] 25  SpO2:  [95 %-100 %] 96 %  BP: (107-132)/(54-95) 116/57     Weight: 80.7 kg (177 lb 12.8 oz)  Body mass index is 33.6 kg/m².     SpO2: 96 %        Physical Exam  Vitals and nursing note reviewed.   Constitutional:       General: She is not in acute distress.     Appearance: She is not ill-appearing.   HENT:      Head: Normocephalic and atraumatic.      Right Ear: External ear normal.      Left Ear: External ear normal.      Mouth/Throat:      Mouth: Mucous membranes are moist.      Pharynx: No oropharyngeal exudate.   Eyes:      Extraocular Movements: Extraocular movements intact.      Pupils: Pupils are equal, round, and reactive to light.   Cardiovascular:      Rate and Rhythm: Normal rate and regular rhythm.      Pulses: Normal pulses.      Heart sounds: Normal heart sounds. No murmur heard.     Comments: Epicardial pacer wires in place  Pulmonary:      Effort: Pulmonary effort is normal. No respiratory distress.      Breath sounds: Normal breath sounds. No wheezing or rales.    Abdominal:      General: Abdomen is flat. Bowel sounds are normal. There is no distension.      Palpations: Abdomen is soft.      Tenderness: There is no abdominal tenderness.   Musculoskeletal:         General: No signs of injury. Normal range of motion.      Cervical back: Normal range of motion.      Right lower leg: No edema.      Left lower leg: No edema.   Skin:     General: Skin is warm and dry.   Neurological:      General: No focal deficit present.      Mental Status: She is alert and oriented to person, place, and time.      Sensory: No sensory deficit.      Motor: No weakness.   Psychiatric:         Mood and Affect: Mood normal.         Behavior: Behavior normal.         Thought Content: Thought content normal.            Significant Labs: EP:   Recent Labs   Lab 01/14/24  1640 01/15/24  0237 01/15/24  0237 01/15/24  1416 01/16/24  0252   NA  --  133*  --   --  134*   K 4.3 4.3  --   --  4.5   CL  --  98  --   --  98   CO2  --  26  --   --  25   GLU  --  97  --   --  96   BUN  --  10  --   --  10   CREATININE  --  0.6  --   --  0.6   CALCIUM  --  9.3  --   --  9.5   PROT  --  6.8  --   --  7.2   ALBUMIN  --  3.1*  --   --  3.2*   BILITOT  --  0.8  --   --  0.8   ALKPHOS  --  61  --   --  65   AST  --  19  --   --  20   ALT  --  17  --   --  15   ANIONGAP  --  9  --   --  11   WBC  --  14.89*  --   --  18.08*   HGB  --  9.3*  --   --  9.7*   HCT  --  29.1*   < >  --  30.4*   PLT  --  457*  --   --  515*   INR  --  2.3*  --  1.8* 1.9*    < > = values in this interval not displayed.         Significant Imaging:Reviewed  Assessment and Plan:     CHB (complete heart block)  Patient is POD 2 s/p MVR (revision with mechanical valve) and TV banding. Patient was found to be Sinus w/ CHB w/ a junctional escape rhythm this AM via tele and confirmed with 12 lead ECG. Review of Tele shows this arrhythm appearing as early as 1/08/2024. Patient received Amiodarone from 1/08/24 to 1/10/24. Currently having palpitations  and BLANK, Unclear if related to this arrhythmia.    New c/o of increased fatigue, likely related to insomnia. No siggnifcant change in rhythm (still in Sinus with CHB and Junctional Escape)      Recommendations  Continues to be in CHB. Likely underlying AFL. Repeat EKG ordered.  Continue ICU care, epicardial lead.  Plan for dcPPM when INR therapeutic.  Will need to be cardioverted as outpatient after pacemaker implantation. Not urgent, rate controlled.        Connor M Gillies, MD  Cardiac Electrophysiology  [facility] - Surgical Intensive Care

## 2024-01-16 NOTE — PLAN OF CARE
Recommendations     1.) Recommend to ADAT, with goal of cardiac diet, fluid per MD.      2.) If and when diet advances, and PO intake <50%, recommend Boost Plus (or Boost alternative) BID to help optimize PRO/Kcal intake.      3.) RD to monitor wt, PO intake, skin, labs.        Goals: to meet % of EEN/EPN by next RD f/u  Nutrition Goal Status: new  Communication of RD Recs:  (POC)

## 2024-01-16 NOTE — PT/OT/SLP PROGRESS
Physical Therapy Treatment    Patient Name:  King Botello   MRN:  [id number]    Recommendations:     Discharge Recommendations: No Therapy Indicated  Discharge Equipment Recommendations: none  Barriers to discharge: None    Assessment:     King Botello is a 38 y.o. female admitted with a medical diagnosis of Nonrheumatic mitral valve stenosis.  She presents with the following impairments/functional limitations: impaired balance, decreased safety awareness, impaired endurance, impaired functional mobility, gait instability pt tolerated treatment better being able to gait train farther. Pt will benefit from cont skilled PT 5x/wk to progress physically. Pt will be able to discharge home when medically stable with no needs. Pt is s/p MVR, TVR 1/8/24.    Rehab Prognosis: Good; patient would benefit from acute skilled PT services to address these deficits and reach maximum level of function.    Recent Surgery: Procedure(s) (LRB):  MITRAL VALVE REPLACEMENT, REDO STERNOTOMY (N/A)  VALVULOPLASTY, TRICUSPID REPAIR (N/A) 8 Days Post-Op    Plan:     During this hospitalization, patient to be seen 5 x/week to address the identified rehab impairments via gait training, therapeutic activities and progress toward the following goals:    Plan of Care Expires:  02/08/24    Subjective     Chief Complaint: pt had no complaints during treatment.   Patient/Family Comments/goals: to get better and go home.   Pain/Comfort:  Pain Rating 1: 0/10  Pain Rating Post-Intervention 1: 0/10      Objective:     Communicated with nurse  prior to session.  Patient found up in chair with telemetry, blood pressure cuff, pulse ox (continuous), external pacer (hep lock IV) upon PT entry to room.     General Precautions: Standard, fall, sternal  Orthopedic Precautions: N/A  Braces: N/A  Respiratory Status: Room air     Functional Mobility:  Transfers:     Sit to Stand:  stand by assistance with no AD from chair and from toilet    Gait: pt received  gait training ~ 402 ft with TROY RN present with portable monitor and additional person to follow with chair for safety.     Pt white board updated with current therapists name and level of mobility assistance needed.     AM-PAC 6 CLICK MOBILITY  Turning over in bed (including adjusting bedclothes, sheets and blankets)?: 3  Sitting down on and standing up from a chair with arms (e.g., wheelchair, bedside commode, etc.): 3  Moving from lying on back to sitting on the side of the bed?: 3  Moving to and from a bed to a chair (including a wheelchair)?: 3  Need to walk in hospital room?: 3  Climbing 3-5 steps with a railing?: 2  Basic Mobility Total Score: 17       Treatment & Education:  Pt and  received verbal instructions in PT POC and both verbally expressed understanding of such.     Patient left up in chair with all lines intact, call button in reach, RN  notified, and   present..    GOALS:   Multidisciplinary Problems       Physical Therapy Goals          Problem: Physical Therapy    Goal Priority Disciplines Outcome Goal Variances Interventions   Physical Therapy Goal     PT, PT/OT Ongoing, Progressing     Description: Goals to be met by: 2024    Patient will increase functional independence with mobility by performin. Supine to sit with Supervision  2. Sit to stand transfer with Supervision  3. Gait  x 400 feet with Supervision.                          Time Tracking:     PT Received On: 24  PT Start Time: 806     PT Stop Time: 825  PT Total Time (min): 19 min     Billable Minutes: Gait Training 19 min     Treatment Type: Treatment  PT/PTA: PT     Number of PTA visits since last PT visit: 0     2024

## 2024-01-16 NOTE — ASSESSMENT & PLAN NOTE
38 y.o. female with a past medical history of MVP and MR s/p mitral annuloplasty in [place] (2003),  HTN, bradycardia now s/p Mechanical Mitral Valve Replacement on 1/8/2024 with Dr. Liu      Neuro/Psych:     - Sedation: n/a    - Pain:    - PRN liquid Acetaminophen 1g q6h    - Oxy 2.5 liquid PRN             Cardiac:     - S/P Mechanical Mitral Valve Replacement with Dr. Liu on 1/8/2024    - BP Goal: MAP 60-80    - initially placed on Amio for suspected A-fib, d/c'd 1/9/24    - Pressors: off    - Anti-HTNs: Will start when appropriate     - Rhythm: Complete Heart block with occasional junctional     - Beta blocker: Hold, heart block   - paced with temporary v-wires     - EP involved for Complete Heart Block and PPM   - Tentative Tuesday.   - INR must be within goal    - Statin: Not indicated    - EKG daily    - INR goal 2.5-3.5   - Coumadin 3mg     Pulmonary:     - Goal SpO2 >92%    - on RA    - CXR stable    - Chest Tubes 2 Meds     - pulled on 1/11/24    - IS    - ABGs PRN      Renal:    - Trend BUN/Cr     - lasix 20mg IV BID    - Soares out    Recent Labs   Lab 01/14/24  0316 01/15/24  0237 01/16/24  0252   BUN 9 10 10   CREATININE 0.6 0.6 0.6           FEN / GI:     - Daily CMP, PRN K/Mag/Phos per protocol     - Replace electrolytes as needed    - scheduled potassium supplement    - Nutrition: cardiac thin, meds crushed   - follow speech recs    - Bowel Regimen: Miralax, docusate      ID:     - Afebrile    - WBC increased. No signs of infection. Will continue to monitor     - Abx: Completed perioperative cefazolin 2g Q8H x 5 doses    Recent Labs   Lab 01/14/24  0316 01/15/24  0237 01/16/24  0252   WBC 11.66 14.89* 18.08*           Heme/Onc:     - Hgb 13.0 pre-operatively    - CBC daily    - ASA 81mg     - Heparin d/c'd as therapeutic INR achieved    - PO Coumadin 2mg QD, INR 3 (goal is 2.5-3.5)    - Transfuse Hb <7 and symptomatic anemia    - 1u pRBC on 1/9/24      Recent Labs   Lab 01/14/24  7957  01/15/24  0237 01/15/24  1416 01/16/24  0252   HGB 9.0* 9.3*  --  9.7*    457*  --  515*   APTT 35.4* 34.8*  --  32.8*   INR 3.0* 2.3* 1.8* 1.9*           Endocrine:     - CTS Goal -140    - HgbA1c: 5.2    - Insulin SSI    - Endocrinology consulted for insulin management      PPx:   Feeding: Cardiac diet, Speech following  Analgesia/Sedation: Multimodal controlled  Thromboembolic Prevention: SCD's   HOB >30: Yes  Stress Ulcer: Famotidine  Glucose Control: Yes, insulin management per Endocrinology     Lines/Drains/Airway:   Pacing Wires: Temporary ventricular pacing wires              PIVs         Dispo/Code Status/Palliative:     - Continue SICU Care    - Full Code

## 2024-01-17 ENCOUNTER — ANESTHESIA (OUTPATIENT)
Dept: MEDSURG UNIT | Facility: HOSPITAL | Age: 39
DRG: 220 | End: 2024-01-17
Payer: COMMERCIAL

## 2024-01-17 ENCOUNTER — ANESTHESIA EVENT (OUTPATIENT)
Dept: MEDSURG UNIT | Facility: HOSPITAL | Age: 39
DRG: 220 | End: 2024-01-17
Payer: COMMERCIAL

## 2024-01-17 LAB
ALBUMIN SERPL BCP-MCNC: 3 G/DL (ref 3.5–5.2)
ALP SERPL-CCNC: 66 U/L (ref 55–135)
ALT SERPL W/O P-5'-P-CCNC: 16 U/L (ref 10–44)
ANION GAP SERPL CALC-SCNC: 9 MMOL/L (ref 8–16)
APTT PPP: 32.5 SEC (ref 21–32)
AST SERPL-CCNC: 22 U/L (ref 10–40)
BASOPHILS # BLD AUTO: 0.07 K/UL (ref 0–0.2)
BASOPHILS NFR BLD: 0.4 % (ref 0–1.9)
BILIRUB SERPL-MCNC: 0.8 MG/DL (ref 0.1–1)
BSA FOR ECHO PROCEDURE: 1.86 M2
BUN SERPL-MCNC: 10 MG/DL (ref 6–20)
CALCIUM SERPL-MCNC: 9.3 MG/DL (ref 8.7–10.5)
CHLORIDE SERPL-SCNC: 100 MMOL/L (ref 95–110)
CO2 SERPL-SCNC: 25 MMOL/L (ref 23–29)
CREAT SERPL-MCNC: 0.6 MG/DL (ref 0.5–1.4)
DIFFERENTIAL METHOD BLD: ABNORMAL
EOSINOPHIL # BLD AUTO: 0.4 K/UL (ref 0–0.5)
EOSINOPHIL NFR BLD: 2.3 % (ref 0–8)
ERYTHROCYTE [DISTWIDTH] IN BLOOD BY AUTOMATED COUNT: 16.3 % (ref 11.5–14.5)
EST. GFR  (NO RACE VARIABLE): >60 ML/MIN/1.73 M^2
FINAL PATHOLOGIC DIAGNOSIS: NORMAL
GLUCOSE SERPL-MCNC: 103 MG/DL (ref 70–110)
GROSS: NORMAL
HCT VFR BLD AUTO: 30.1 % (ref 37–48.5)
HGB BLD-MCNC: 9.2 G/DL (ref 12–16)
IMM GRANULOCYTES # BLD AUTO: 0.23 K/UL (ref 0–0.04)
IMM GRANULOCYTES NFR BLD AUTO: 1.3 % (ref 0–0.5)
INR PPP: 2.2 (ref 0.8–1.2)
LYMPHOCYTES # BLD AUTO: 1.6 K/UL (ref 1–4.8)
LYMPHOCYTES NFR BLD: 9.1 % (ref 18–48)
Lab: NORMAL
MAGNESIUM SERPL-MCNC: 2.1 MG/DL (ref 1.6–2.6)
MCH RBC QN AUTO: 26.8 PG (ref 27–31)
MCHC RBC AUTO-ENTMCNC: 30.6 G/DL (ref 32–36)
MCV RBC AUTO: 88 FL (ref 82–98)
MONOCYTES # BLD AUTO: 1.4 K/UL (ref 0.3–1)
MONOCYTES NFR BLD: 7.6 % (ref 4–15)
NEUTROPHILS # BLD AUTO: 14 K/UL (ref 1.8–7.7)
NEUTROPHILS NFR BLD: 79.3 % (ref 38–73)
NRBC BLD-RTO: 0 /100 WBC
PHOSPHATE SERPL-MCNC: 3.7 MG/DL (ref 2.7–4.5)
PLATELET # BLD AUTO: 393 K/UL (ref 150–450)
PMV BLD AUTO: 9 FL (ref 9.2–12.9)
POTASSIUM SERPL-SCNC: 4 MMOL/L (ref 3.5–5.1)
PROT SERPL-MCNC: 6.6 G/DL (ref 6–8.4)
PROTHROMBIN TIME: 22.1 SEC (ref 9–12.5)
RBC # BLD AUTO: 3.43 M/UL (ref 4–5.4)
SODIUM SERPL-SCNC: 134 MMOL/L (ref 136–145)
WBC # BLD AUTO: 17.68 K/UL (ref 3.9–12.7)

## 2024-01-17 PROCEDURE — 93005 ELECTROCARDIOGRAM TRACING: CPT | Performed by: INTERNAL MEDICINE

## 2024-01-17 PROCEDURE — 02HK3JZ INSERTION OF PACEMAKER LEAD INTO RIGHT VENTRICLE, PERCUTANEOUS APPROACH: ICD-10-PCS | Performed by: STUDENT IN AN ORGANIZED HEALTH CARE EDUCATION/TRAINING PROGRAM

## 2024-01-17 PROCEDURE — 83735 ASSAY OF MAGNESIUM: CPT

## 2024-01-17 PROCEDURE — 85025 COMPLETE CBC W/AUTO DIFF WBC: CPT

## 2024-01-17 PROCEDURE — 99233 SBSQ HOSP IP/OBS HIGH 50: CPT | Mod: 57,25,, | Performed by: STUDENT IN AN ORGANIZED HEALTH CARE EDUCATION/TRAINING PROGRAM

## 2024-01-17 PROCEDURE — 27201423 OPTIME MED/SURG SUP & DEVICES STERILE SUPPLY: Performed by: STUDENT IN AN ORGANIZED HEALTH CARE EDUCATION/TRAINING PROGRAM

## 2024-01-17 PROCEDURE — 25000003 PHARM REV CODE 250: Performed by: STUDENT IN AN ORGANIZED HEALTH CARE EDUCATION/TRAINING PROGRAM

## 2024-01-17 PROCEDURE — 25000003 PHARM REV CODE 250: Performed by: NURSE ANESTHETIST, CERTIFIED REGISTERED

## 2024-01-17 PROCEDURE — 85610 PROTHROMBIN TIME: CPT

## 2024-01-17 PROCEDURE — 93010 ELECTROCARDIOGRAM REPORT: CPT | Mod: ,,, | Performed by: INTERNAL MEDICINE

## 2024-01-17 PROCEDURE — 63600175 PHARM REV CODE 636 W HCPCS: Performed by: STUDENT IN AN ORGANIZED HEALTH CARE EDUCATION/TRAINING PROGRAM

## 2024-01-17 PROCEDURE — C1769 GUIDE WIRE: HCPCS | Performed by: STUDENT IN AN ORGANIZED HEALTH CARE EDUCATION/TRAINING PROGRAM

## 2024-01-17 PROCEDURE — 63600175 PHARM REV CODE 636 W HCPCS: Performed by: NURSE ANESTHETIST, CERTIFIED REGISTERED

## 2024-01-17 PROCEDURE — 20000000 HC ICU ROOM

## 2024-01-17 PROCEDURE — C1894 INTRO/SHEATH, NON-LASER: HCPCS | Performed by: STUDENT IN AN ORGANIZED HEALTH CARE EDUCATION/TRAINING PROGRAM

## 2024-01-17 PROCEDURE — 99900035 HC TECH TIME PER 15 MIN (STAT)

## 2024-01-17 PROCEDURE — 84100 ASSAY OF PHOSPHORUS: CPT

## 2024-01-17 PROCEDURE — C1785 PMKR, DUAL, RATE-RESP: HCPCS | Performed by: STUDENT IN AN ORGANIZED HEALTH CARE EDUCATION/TRAINING PROGRAM

## 2024-01-17 PROCEDURE — 99499 UNLISTED E&M SERVICE: CPT | Mod: ,,, | Performed by: ANESTHESIOLOGY

## 2024-01-17 PROCEDURE — 94761 N-INVAS EAR/PLS OXIMETRY MLT: CPT

## 2024-01-17 PROCEDURE — 25000003 PHARM REV CODE 250: Performed by: THORACIC SURGERY (CARDIOTHORACIC VASCULAR SURGERY)

## 2024-01-17 PROCEDURE — D9220A PRA ANESTHESIA: Mod: CRNA,,, | Performed by: NURSE ANESTHETIST, CERTIFIED REGISTERED

## 2024-01-17 PROCEDURE — C1887 CATHETER, GUIDING: HCPCS | Performed by: STUDENT IN AN ORGANIZED HEALTH CARE EDUCATION/TRAINING PROGRAM

## 2024-01-17 PROCEDURE — C1898 LEAD, PMKR, OTHER THAN TRANS: HCPCS | Performed by: STUDENT IN AN ORGANIZED HEALTH CARE EDUCATION/TRAINING PROGRAM

## 2024-01-17 PROCEDURE — D9220A PRA ANESTHESIA: Mod: ANES,,, | Performed by: ANESTHESIOLOGY

## 2024-01-17 PROCEDURE — 63600175 PHARM REV CODE 636 W HCPCS

## 2024-01-17 PROCEDURE — 33208 INSRT HEART PM ATRIAL & VENT: CPT | Mod: 22,KX,, | Performed by: STUDENT IN AN ORGANIZED HEALTH CARE EDUCATION/TRAINING PROGRAM

## 2024-01-17 PROCEDURE — 92960 CARDIOVERSION ELECTRIC EXT: CPT | Mod: 59,,, | Performed by: STUDENT IN AN ORGANIZED HEALTH CARE EDUCATION/TRAINING PROGRAM

## 2024-01-17 PROCEDURE — 25500020 PHARM REV CODE 255: Performed by: NURSE ANESTHETIST, CERTIFIED REGISTERED

## 2024-01-17 PROCEDURE — 25000003 PHARM REV CODE 250: Performed by: INTERNAL MEDICINE

## 2024-01-17 PROCEDURE — 93005 ELECTROCARDIOGRAM TRACING: CPT

## 2024-01-17 PROCEDURE — 80053 COMPREHEN METABOLIC PANEL: CPT | Performed by: STUDENT IN AN ORGANIZED HEALTH CARE EDUCATION/TRAINING PROGRAM

## 2024-01-17 PROCEDURE — 33208 INSRT HEART PM ATRIAL & VENT: CPT | Mod: KX | Performed by: STUDENT IN AN ORGANIZED HEALTH CARE EDUCATION/TRAINING PROGRAM

## 2024-01-17 PROCEDURE — 92960 CARDIOVERSION ELECTRIC EXT: CPT | Mod: 59 | Performed by: STUDENT IN AN ORGANIZED HEALTH CARE EDUCATION/TRAINING PROGRAM

## 2024-01-17 PROCEDURE — 0JH606Z INSERTION OF PACEMAKER, DUAL CHAMBER INTO CHEST SUBCUTANEOUS TISSUE AND FASCIA, OPEN APPROACH: ICD-10-PCS | Performed by: STUDENT IN AN ORGANIZED HEALTH CARE EDUCATION/TRAINING PROGRAM

## 2024-01-17 PROCEDURE — 25000003 PHARM REV CODE 250

## 2024-01-17 PROCEDURE — 37000009 HC ANESTHESIA EA ADD 15 MINS: Performed by: STUDENT IN AN ORGANIZED HEALTH CARE EDUCATION/TRAINING PROGRAM

## 2024-01-17 PROCEDURE — 85730 THROMBOPLASTIN TIME PARTIAL: CPT

## 2024-01-17 PROCEDURE — 37000008 HC ANESTHESIA 1ST 15 MINUTES: Performed by: STUDENT IN AN ORGANIZED HEALTH CARE EDUCATION/TRAINING PROGRAM

## 2024-01-17 PROCEDURE — 02H63JZ INSERTION OF PACEMAKER LEAD INTO RIGHT ATRIUM, PERCUTANEOUS APPROACH: ICD-10-PCS | Performed by: STUDENT IN AN ORGANIZED HEALTH CARE EDUCATION/TRAINING PROGRAM

## 2024-01-17 DEVICE — LEAD 5076-45 MRI US RCMCRD
Type: IMPLANTABLE DEVICE | Site: HEART | Status: FUNCTIONAL
Brand: CAPSUREFIX NOVUS MRI™ SURESCAN®

## 2024-01-17 DEVICE — IPG W3DR01 AZURE S DR MRI USA
Type: IMPLANTABLE DEVICE | Site: CHEST | Status: FUNCTIONAL
Brand: AZURE™ S DR MRI SURESCAN™

## 2024-01-17 DEVICE — LEAD 3830 US MKT/ 69CM MRI LBBAP
Type: IMPLANTABLE DEVICE | Site: HEART | Status: FUNCTIONAL
Brand: SELECTSECURE™ MRI SURESCAN™

## 2024-01-17 RX ORDER — VANCOMYCIN HYDROCHLORIDE 1 G/20ML
INJECTION, POWDER, LYOPHILIZED, FOR SOLUTION INTRAVENOUS
Status: DISCONTINUED | OUTPATIENT
Start: 2024-01-17 | End: 2024-01-18

## 2024-01-17 RX ORDER — CEFAZOLIN SODIUM 1 G/3ML
INJECTION, POWDER, FOR SOLUTION INTRAMUSCULAR; INTRAVENOUS
Status: DISCONTINUED | OUTPATIENT
Start: 2024-01-17 | End: 2024-01-17

## 2024-01-17 RX ORDER — BUPIVACAINE HYDROCHLORIDE 2.5 MG/ML
INJECTION, SOLUTION EPIDURAL; INFILTRATION; INTRACAUDAL
Status: DISCONTINUED | OUTPATIENT
Start: 2024-01-17 | End: 2024-01-18 | Stop reason: HOSPADM

## 2024-01-17 RX ORDER — WARFARIN 4 MG/1
4 TABLET ORAL DAILY
Status: DISCONTINUED | OUTPATIENT
Start: 2024-01-17 | End: 2024-01-18 | Stop reason: HOSPADM

## 2024-01-17 RX ORDER — SULFAMETHOXAZOLE AND TRIMETHOPRIM 400; 80 MG/1; MG/1
1 TABLET ORAL 2 TIMES DAILY
Status: DISCONTINUED | OUTPATIENT
Start: 2024-01-17 | End: 2024-01-18 | Stop reason: HOSPADM

## 2024-01-17 RX ORDER — ONDANSETRON HYDROCHLORIDE 2 MG/ML
INJECTION, SOLUTION INTRAVENOUS
Status: DISCONTINUED | OUTPATIENT
Start: 2024-01-17 | End: 2024-01-17

## 2024-01-17 RX ORDER — LIDOCAINE HYDROCHLORIDE 20 MG/ML
INJECTION, SOLUTION INFILTRATION; PERINEURAL
Status: DISCONTINUED | OUTPATIENT
Start: 2024-01-17 | End: 2024-01-18 | Stop reason: HOSPADM

## 2024-01-17 RX ORDER — FENTANYL CITRATE 50 UG/ML
INJECTION, SOLUTION INTRAMUSCULAR; INTRAVENOUS
Status: DISCONTINUED | OUTPATIENT
Start: 2024-01-17 | End: 2024-01-17

## 2024-01-17 RX ORDER — SODIUM CHLORIDE 9 MG/ML
INJECTION, SOLUTION INTRAMUSCULAR; INTRAVENOUS; SUBCUTANEOUS
Status: DISCONTINUED | OUTPATIENT
Start: 2024-01-17 | End: 2024-01-18

## 2024-01-17 RX ORDER — HEPARIN SODIUM 10000 [USP'U]/100ML
1600 INJECTION, SOLUTION INTRAVENOUS CONTINUOUS
Status: DISCONTINUED | OUTPATIENT
Start: 2024-01-17 | End: 2024-01-18

## 2024-01-17 RX ORDER — MIDAZOLAM HYDROCHLORIDE 1 MG/ML
INJECTION, SOLUTION INTRAMUSCULAR; INTRAVENOUS
Status: DISCONTINUED | OUTPATIENT
Start: 2024-01-17 | End: 2024-01-17

## 2024-01-17 RX ORDER — LIDOCAINE HYDROCHLORIDE 20 MG/ML
INJECTION INTRAVENOUS
Status: DISCONTINUED | OUTPATIENT
Start: 2024-01-17 | End: 2024-01-17

## 2024-01-17 RX ORDER — PROPOFOL 10 MG/ML
VIAL (ML) INTRAVENOUS
Status: DISCONTINUED | OUTPATIENT
Start: 2024-01-17 | End: 2024-01-17

## 2024-01-17 RX ORDER — IODIXANOL 320 MG/ML
INJECTION, SOLUTION INTRAVASCULAR
Status: DISCONTINUED | OUTPATIENT
Start: 2024-01-17 | End: 2024-01-17

## 2024-01-17 RX ADMIN — SULFAMETHOXAZOLE AND TRIMETHOPRIM 1 TABLET: 400; 80 TABLET ORAL at 08:01

## 2024-01-17 RX ADMIN — FUROSEMIDE 20 MG: 10 INJECTION, SOLUTION INTRAMUSCULAR; INTRAVENOUS at 09:01

## 2024-01-17 RX ADMIN — ONDANSETRON 4 MG: 2 INJECTION INTRAMUSCULAR; INTRAVENOUS at 02:01

## 2024-01-17 RX ADMIN — CEFAZOLIN 2 G: 330 INJECTION, POWDER, FOR SOLUTION INTRAMUSCULAR; INTRAVENOUS at 12:01

## 2024-01-17 RX ADMIN — FAMOTIDINE 20 MG: 20 TABLET, FILM COATED ORAL at 08:01

## 2024-01-17 RX ADMIN — POTASSIUM BICARBONATE 40 MEQ: 391 TABLET, EFFERVESCENT ORAL at 08:01

## 2024-01-17 RX ADMIN — LIDOCAINE HYDROCHLORIDE 50 MG: 20 INJECTION INTRAVENOUS at 11:01

## 2024-01-17 RX ADMIN — SODIUM CHLORIDE: 9 INJECTION, SOLUTION INTRAVENOUS at 11:01

## 2024-01-17 RX ADMIN — FENTANYL CITRATE 25 MCG: 0.05 INJECTION, SOLUTION INTRAMUSCULAR; INTRAVENOUS at 11:01

## 2024-01-17 RX ADMIN — PROPOFOL 40 MG: 10 INJECTION, EMULSION INTRAVENOUS at 11:01

## 2024-01-17 RX ADMIN — FAMOTIDINE 20 MG: 20 TABLET, FILM COATED ORAL at 09:01

## 2024-01-17 RX ADMIN — FUROSEMIDE 20 MG: 10 INJECTION, SOLUTION INTRAMUSCULAR; INTRAVENOUS at 06:01

## 2024-01-17 RX ADMIN — ACETAMINOPHEN 999.01 MG: 650 SOLUTION ORAL at 06:01

## 2024-01-17 RX ADMIN — ASPIRIN 81 MG CHEWABLE TABLET 81 MG: 81 TABLET CHEWABLE at 09:01

## 2024-01-17 RX ADMIN — FENTANYL CITRATE 25 MCG: 0.05 INJECTION, SOLUTION INTRAMUSCULAR; INTRAVENOUS at 01:01

## 2024-01-17 RX ADMIN — MIDAZOLAM HYDROCHLORIDE 2 MG: 1 INJECTION, SOLUTION INTRAMUSCULAR; INTRAVENOUS at 11:01

## 2024-01-17 RX ADMIN — IODIXANOL 10 ML: 320 INJECTION, SOLUTION INTRAVASCULAR at 12:01

## 2024-01-17 RX ADMIN — WARFARIN SODIUM 4 MG: 4 TABLET ORAL at 06:01

## 2024-01-17 NOTE — PROGRESS NOTES
[facility] - Surgical Intensive Care  Critical Care - Surgery  Progress Note    Patient Name: King Botello  MRN: [id number]  Admission Date: 1/8/2024  Hospital Length of Stay: 9 days  Code Status: Full Code  Attending Provider: Thierno Liu MD  Primary Care Provider: Mayco Hansen MD   Principal Problem: Nonrheumatic mitral valve stenosis    Subjective:       Interval History/Significant Events: NAEON. Patient INR at 2.2 this morning, but advancing appropriately. Patient tearful as she's distressed that she's not home yet. She has been waiting on pacemaker. EP has agreed to go forward with PPM given her steady increase in INR.     Follow-up For: Procedure(s) (LRB):  INSERTION, CARDIAC PACEMAKER, DUAL CHAMBER (N/A)  Cardioversion or Defibrillation (N/A)  ECHOCARDIOGRAM, TRANSESOPHAGEAL (N/A)    Post-Operative Day: Day of Surgery    Objective:     Vital Signs (Most Recent):  Temp: 97.5 °F (36.4 °C) (01/17/24 1507)  Pulse: 90 (01/17/24 1617)  Resp: (!) 26 (01/17/24 1600)  BP: 119/79 (01/17/24 1600)  SpO2: 100 % (01/17/24 1600) Vital Signs (24h Range):  Temp:  [97.5 °F (36.4 °C)-98.6 °F (37 °C)] 97.5 °F (36.4 °C)  Pulse:  [] 90  Resp:  [11-40] 26  SpO2:  [95 %-100 %] 100 %  BP: (100-145)/(56-80) 119/79     Weight: 80.3 kg (177 lb)  Body mass index is 33.44 kg/m².      Intake/Output Summary (Last 24 hours) at 1/17/2024 1637  Last data filed at 1/17/2024 1440  Gross per 24 hour   Intake 1930.83 ml   Output 900 ml   Net 1030.83 ml          Physical Exam  Vitals and nursing note reviewed.   Constitutional:       General: She is not in acute distress.     Appearance: Normal appearance. She is obese. She is not toxic-appearing.      Interventions: She is sedated.   HENT:      Head: Normocephalic and atraumatic.      Right Ear: External ear normal.      Left Ear: External ear normal.      Nose: Nose normal.   Eyes:      General: No scleral icterus.        Right eye: No discharge.         Left eye: No  discharge.      Extraocular Movements: Extraocular movements intact.   Cardiovascular:      Rate and Rhythm: Normal rate and regular rhythm.      Pulses: Normal pulses.      Comments: V Wires  Pulmonary:      Effort: Pulmonary effort is normal. No respiratory distress.   Abdominal:      General: Abdomen is flat. There is no distension.      Palpations: Abdomen is soft.      Tenderness: There is no abdominal tenderness.   Musculoskeletal:         General: No swelling. Normal range of motion.      Cervical back: Normal range of motion.      Right lower leg: No edema.      Left lower leg: No edema.   Skin:     General: Skin is warm and dry.      Comments: MSI CDI     Neurological:      General: No focal deficit present.      Mental Status: She is alert and oriented to person, place, and time. Mental status is at baseline.   Psychiatric:         Mood and Affect: Mood normal.         Behavior: Behavior normal.         Thought Content: Thought content normal.            Vents:  Vent Mode: Spont (01/08/24 2052)  Ventilator Initiated: Yes (01/08/24 1427)  Set Rate: 20 BPM (01/08/24 1804)  Vt Set: 380 mL (01/08/24 1804)  PEEP/CPAP: 5 cmH20 (01/08/24 2052)  Oxygen Concentration (%): 97 (01/15/24 0600)  Peak Airway Pressure: 9.9 cmH20 (01/08/24 2052)  Plateau Pressure: 0 cmH20 (01/08/24 2052)  Total Ve: 7.34 L/m (01/08/24 2052)  Negative Inspiratory Force (cm H2O): -15 (01/08/24 2137)  F/VT Ratio<105 (RSBI): (!) 96.3 (01/08/24 2052)    Lines/Drains/Airways       Line  Duration                  Pacer Wires 01/08/24 1259 9 days              Peripheral Intravenous Line  Duration                  Peripheral IV - Single Lumen 01/13/24 0330 22 G Posterior;Right Wrist 4 days         Peripheral IV - Single Lumen 01/16/24 1055 20 G;1 3/4 in Anterior;Left Forearm 1 day                    Significant Labs:    CBC/Anemia Profile:  Recent Labs   Lab 01/16/24  0252 01/17/24  0300   WBC 18.08* 17.68*   HGB 9.7* 9.2*   HCT 30.4* 30.1*   PLT  515* 393   MCV 85 88   RDW 16.4* 16.3*        Chemistries:  Recent Labs   Lab 01/16/24  0252 01/17/24  0300   * 134*   K 4.5 4.0   CL 98 100   CO2 25 25   BUN 10 10   CREATININE 0.6 0.6   CALCIUM 9.5 9.3   ALBUMIN 3.2* 3.0*   PROT 7.2 6.6   BILITOT 0.8 0.8   ALKPHOS 65 66   ALT 15 16   AST 20 22   MG 2.1 2.1   PHOS 3.9 3.7       All pertinent labs within the past 24 hours have been reviewed.    Significant Imaging:  I have reviewed all pertinent imaging results/findings within the past 24 hours.  Assessment/Plan:     Cardiac/Vascular  * Nonrheumatic mitral valve stenosis  38 y.o. female with a past medical history of MVP and MR s/p mitral annuloplasty in [place] (2003),  HTN, bradycardia now s/p Mechanical Mitral Valve Replacement on 1/8/2024 with Dr. Liu      Neuro/Psych:     - Sedation: n/a    - Pain:    - PRN liquid Acetaminophen 1g q6h    - Oxy 2.5 liquid PRN             Cardiac:     - S/P Mechanical Mitral Valve Replacement with Dr. Liu on 1/8/2024    - BP Goal: MAP 60-80    - initially placed on Amio for suspected A-fib, d/c'd 1/9/24    - Pressors: off    - Anti-HTNs: Will start when appropriate     - Rhythm: Complete Heart block with occasional junctional     - Beta blocker: Hold, heart block   - back up pac  ed with temporary v-wires     - EP involved for Complete Heart Block and PPM   - Going for PPM today    - Statin: Not indicated    - EKG daily    - INR goal 2.5-3.5   - Coumadin 4mg     Pulmonary:     - Goal SpO2 >92%    - on RA    - CXR stable    - Chest Tubes 2 Meds     - pulled on 1/11/24    - IS    - ABGs PRN      Renal:    - Trend BUN/Cr     - lasix 20mg IV BID    - Soares out    Recent Labs   Lab 01/15/24  0237 01/16/24  0252 01/17/24  0300   BUN 10 10 10   CREATININE 0.6 0.6 0.6           FEN / GI:     - Daily CMP, PRN K/Mag/Phos per protocol     - Replace electrolytes as needed    - scheduled potassium supplement    - Nutrition: cardiac thin, meds crushed   - follow speech recs     - Bowel Regimen: Miralax, docusate      ID:     - Afebrile    - WBC elevated. No signs of infection. Will continue to monitor     - Abx: Completed perioperative cefazolin 2g Q8H x 5 doses    Recent Labs   Lab 01/15/24  0237 01/16/24  0252 01/17/24  0300   WBC 14.89* 18.08* 17.68*           Heme/Onc:     - Hgb 13.0 pre-operatively    - CBC daily    - ASA 81mg     - Heparin d/c'd as therapeutic INR achieved    - PO Coumadin 4mg QD, INR 3 (goal is 2.5-3.5)    - Transfuse Hb <7 and symptomatic anemia    - 1u pRBC on 1/9/24      Recent Labs   Lab 01/15/24  0237 01/15/24  1416 01/16/24  0252 01/16/24  1111 01/16/24  1415 01/16/24  1956 01/17/24  0300   HGB 9.3*  --  9.7*  --   --   --  9.2*   *  --  515*  --   --   --  393   APTT 34.8*  --  32.8*  --  32.2* 35.8* 32.5*   INR 2.3*   < > 1.9* 2.0*  --  1.9* 2.2*    < > = values in this interval not displayed.           Endocrine:     - CTS Goal -140    - HgbA1c: 5.2    - Insulin SSI    - Endocrinology consulted for insulin management      PPx:   Feeding: Cardiac diet, Speech following  Analgesia/Sedation: Multimodal controlled  Thromboembolic Prevention: SCD's   HOB >30: Yes  Stress Ulcer: Famotidine  Glucose Control: Yes, insulin management per Endocrinology     Lines/Drains/Airway:   Pacing Wires: Temporary ventricular pacing wires              PIVs         Dispo/Code Status/Palliative:     - Continue SICU Care    - Full Code         Critical care was time spent personally by me on the following activities: development of treatment plan with patient or surrogate and bedside caregivers, discussions with consultants, evaluation of patient's response to treatment, examination of patient, ordering and performing treatments and interventions, ordering and review of laboratory studies, ordering and review of radiographic studies, pulse oximetry, re-evaluation of patient's condition.  This critical care time did not overlap with that of any other provider or involve  time for any procedures.     Umer Herrera MD  Critical Care - Surgery  [facility] - Surgical Intensive Care

## 2024-01-17 NOTE — TRANSFER OF CARE
"Anesthesia Transfer of Care Note    Patient: King Botello    Procedure(s) Performed: Procedure(s) (LRB):  INSERTION, CARDIAC PACEMAKER, DUAL CHAMBER (N/A)  Cardioversion or Defibrillation (N/A)  ECHOCARDIOGRAM, TRANSESOPHAGEAL (N/A)    Patient location: ICU    Anesthesia Type: general    Transport from OR: Transported from OR on 6-10 L/min O2 by face mask with adequate spontaneous ventilation    Post pain: adequate analgesia    Post assessment: no apparent anesthetic complications and tolerated procedure well    Post vital signs: stable    Level of consciousness: awake    Nausea/Vomiting: no nausea/vomiting    Complications: none    Transfer of care protocol was followed    Last vitals: Visit Vitals  /78 (BP Location: Left arm, Patient Position: Lying)   Pulse 74   Temp 36.4 °C (97.5 °F) (Oral)   Resp (!) 23   Ht 5' 1" (1.549 m)   Wt 80.3 kg (177 lb)   LMP 01/07/2024   SpO2 96%   Breastfeeding No   BMI 33.44 kg/m²     "

## 2024-01-17 NOTE — PT/OT/SLP PROGRESS
Physical Therapy      Patient Name:  King Botello   MRN:  [id number]    Patient not seen today secondary to  (pt not seen due to PPM planned.). Will follow-up at a later date.    1/17/2024  .

## 2024-01-17 NOTE — SUBJECTIVE & OBJECTIVE
Interval History/Significant Events: NAEON. Patient INR at 2.2 this morning, but advancing appropriately. Patient tearful as she's distressed that she's not home yet. She has been waiting on pacemaker. EP has agreed to go forward with PPM given her steady increase in INR.     Follow-up For: Procedure(s) (LRB):  INSERTION, CARDIAC PACEMAKER, DUAL CHAMBER (N/A)  Cardioversion or Defibrillation (N/A)  ECHOCARDIOGRAM, TRANSESOPHAGEAL (N/A)    Post-Operative Day: Day of Surgery    Objective:     Vital Signs (Most Recent):  Temp: 97.5 °F (36.4 °C) (01/17/24 1507)  Pulse: 90 (01/17/24 1617)  Resp: (!) 26 (01/17/24 1600)  BP: 119/79 (01/17/24 1600)  SpO2: 100 % (01/17/24 1600) Vital Signs (24h Range):  Temp:  [97.5 °F (36.4 °C)-98.6 °F (37 °C)] 97.5 °F (36.4 °C)  Pulse:  [] 90  Resp:  [11-40] 26  SpO2:  [95 %-100 %] 100 %  BP: (100-145)/(56-80) 119/79     Weight: 80.3 kg (177 lb)  Body mass index is 33.44 kg/m².      Intake/Output Summary (Last 24 hours) at 1/17/2024 1637  Last data filed at 1/17/2024 1440  Gross per 24 hour   Intake 1930.83 ml   Output 900 ml   Net 1030.83 ml          Physical Exam  Vitals and nursing note reviewed.   Constitutional:       General: She is not in acute distress.     Appearance: Normal appearance. She is obese. She is not toxic-appearing.      Interventions: She is sedated.   HENT:      Head: Normocephalic and atraumatic.      Right Ear: External ear normal.      Left Ear: External ear normal.      Nose: Nose normal.   Eyes:      General: No scleral icterus.        Right eye: No discharge.         Left eye: No discharge.      Extraocular Movements: Extraocular movements intact.   Cardiovascular:      Rate and Rhythm: Normal rate and regular rhythm.      Pulses: Normal pulses.      Comments: V Wires  Pulmonary:      Effort: Pulmonary effort is normal. No respiratory distress.   Abdominal:      General: Abdomen is flat. There is no distension.      Palpations: Abdomen is soft.       Tenderness: There is no abdominal tenderness.   Musculoskeletal:         General: No swelling. Normal range of motion.      Cervical back: Normal range of motion.      Right lower leg: No edema.      Left lower leg: No edema.   Skin:     General: Skin is warm and dry.      Comments: MSI CDI     Neurological:      General: No focal deficit present.      Mental Status: She is alert and oriented to person, place, and time. Mental status is at baseline.   Psychiatric:         Mood and Affect: Mood normal.         Behavior: Behavior normal.         Thought Content: Thought content normal.            Vents:  Vent Mode: Spont (01/08/24 2052)  Ventilator Initiated: Yes (01/08/24 1427)  Set Rate: 20 BPM (01/08/24 1804)  Vt Set: 380 mL (01/08/24 1804)  PEEP/CPAP: 5 cmH20 (01/08/24 2052)  Oxygen Concentration (%): 97 (01/15/24 0600)  Peak Airway Pressure: 9.9 cmH20 (01/08/24 2052)  Plateau Pressure: 0 cmH20 (01/08/24 2052)  Total Ve: 7.34 L/m (01/08/24 2052)  Negative Inspiratory Force (cm H2O): -15 (01/08/24 2137)  F/VT Ratio<105 (RSBI): (!) 96.3 (01/08/24 2052)    Lines/Drains/Airways       Line  Duration                  Pacer Wires 01/08/24 1259 9 days              Peripheral Intravenous Line  Duration                  Peripheral IV - Single Lumen 01/13/24 0330 22 G Posterior;Right Wrist 4 days         Peripheral IV - Single Lumen 01/16/24 1055 20 G;1 3/4 in Anterior;Left Forearm 1 day                    Significant Labs:    CBC/Anemia Profile:  Recent Labs   Lab 01/16/24  0252 01/17/24  0300   WBC 18.08* 17.68*   HGB 9.7* 9.2*   HCT 30.4* 30.1*   * 393   MCV 85 88   RDW 16.4* 16.3*        Chemistries:  Recent Labs   Lab 01/16/24  0252 01/17/24  0300   * 134*   K 4.5 4.0   CL 98 100   CO2 25 25   BUN 10 10   CREATININE 0.6 0.6   CALCIUM 9.5 9.3   ALBUMIN 3.2* 3.0*   PROT 7.2 6.6   BILITOT 0.8 0.8   ALKPHOS 65 66   ALT 15 16   AST 20 22   MG 2.1 2.1   PHOS 3.9 3.7       All pertinent labs within the past 24  hours have been reviewed.    Significant Imaging:  I have reviewed all pertinent imaging results/findings within the past 24 hours.

## 2024-01-17 NOTE — ASSESSMENT & PLAN NOTE
Patient is POD 2 s/p MVR (revision with mechanical valve) and TV banding. Patient was found to be Sinus w/ CHB w/ a junctional escape rhythm this AM via tele and confirmed with 12 lead ECG. Review of Tele shows this arrhythm appearing as early as 1/08/2024. Patient received Amiodarone from 1/08/24 to 1/10/24. Currently having palpitations and BLANK, Unclear if related to this arrhythmia.    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  INR 2.2 but consistently uptrending on boosted dose    Recommendations  Going for dcPPM today  MINERVA DCCV prior to pacemaker placement  Continue ICU care, epicardial lead until PPM placement

## 2024-01-17 NOTE — NURSING
Patient taken down to EP lab by transport and this nurse with cardiac monitor on, ambu bag, and chart. Hand off to EP nurse Sruthi CORTES.

## 2024-01-17 NOTE — BRIEF OP NOTE
Attending: Aracelis Moore MD  Date of Procedure: 01/17/2024    Post-operative Diagnosis: CHB s/p MV replacement and TV repair    Procedure Performed: Dual-chamber pacemaker utilizing left bundle branch area pacing    Description of Procedure: The patient was brought to the EP lab in the fasting state. Prepped and draped in sterile fashion. Safety timeout was performed. Sedation administered by anesthesia staff. Selective venogram of the left axillary and cephalic veins performed via left ac IV. Lidocaine used for local anesthetic. Fluoroscopic guided axillary access utilized. Guide/J Wire advanced and confirmed in IVC. Incision made. Blunt and electrocautery dissection performed. Pocket made. Second fluoroscopic guided axillary access obtained. Sheath advanced to the interventricular septum. Lead placed through sheath, and advanced through the septum. V1 activation transitioned to RBBB-like morphology with decreasing impendence and LV activation time monitoring via lateral precordial leads. Difference in Adequate current of injury, sensing, and thresholds obtained. Sheath was slit with stable lead positioning by fluoroscopy. Lead parameters confirmed adequate and sutured into place. RA lead advanced into RA via peel away sheath. After adequate p waves and current of injury detected, the RA lead was fixed into place in the RA appendage. Peel away sheath removed and RA lead sutured into place. Pocket washed using antibiotic solution. Leads connected to generator. Generator placed into pocket, sutured in place, and washed with antibiotic solution. Deep layer closed with interrupted 3-0 suture. Intermediate layer closed with running 3-0 suture. Superficial layer closed with running 4-0 suture. Skin closed with Dermabond. Dressing to be placed after Dermabond dried.    EBL: <10 mL    Specimens: none  Complications: no immediate    Post-CIED implantation instructions:  Bactrim BID x 5 days starting this evening  Avoid  "all heparin products (e.g., DOACs, enoxaparin, heparin) for 5 days following implant. If the patient is taking coumadin, this can be continued uninterrupted  CXR & ECG (ordered)  Sling to arm for first 48 hours continuously, then only nightly for 6 weeks  No lifting elbow above shoulder height on arm ipsilateral to implant device for 6 weeks  No lifting over 5 lbs. for 2 weeks with arm ipsilateral to implanted device  No driving for 1 week if patient uses arm contralateral to implantation and 4 weeks if patient uses arm ipsilateral to implantation  Patient can shower starting post-procedure day 1. Do not submerge surgical site for 1 month (e.g., bathing or swimming)  Dressing can be removed tomorrow (Mepilex dressing only) or in 1 week at device clinic follow up (Aquacel dressing only)  Patient will be scheduled for device clinic follow up in 1 week to assess surgical site and device interrogation  Please contact EP for any questions or concerns at [phone] or page EP fellow on call  Patient is to seek immediate medical attention for acute onset of chest pain, shortness of breath, syncope, or evidence of surgical site infection or hematoma.    Patient is ok to discharge pending CXR and ECG being completed and 4 hours bedrest assuming to issues post-operatively. If patient is discharged tonight, she will need to remove the pressure dressing (only) tomorrow morning. If she stays overnight, the EP team will remove the pressure dressing in the AM. She is to keep the Aquacel Ag dressing in place until seen by the EP device clinic in 1 week. Please see "discharge instructions" for more information.       The attending physician was present for the entire duration of the procedure    Carlos Bowling MD, PGY8  Electrophysiology    "

## 2024-01-17 NOTE — ANESTHESIA PREPROCEDURE EVALUATION
[facility]  Anesthesia Pre-Operative Evaluation         Patient Name: King Botello  YOB: 1985  MRN: [id number]    SUBJECTIVE:     Pre-operative evaluation for Procedure(s) (LRB):  INSERTION, CARDIAC PACEMAKER, DUAL CHAMBER (N/A)     2024    King Botello is a 38 y.o. female w/ a significant PMHx of HTN and MVP with MR s/p mitral valve annuloplasty in  with ongoing BLANK. She delivered via  in May and post-operatively had a CHF exacerbation treated with lasix. TTE with severe stenosis and trace regurg. S/p redo MV replacement 24.     Patient was found to be Sinus w/ CHB w/ a junctional escape rhythm this AM via tele and confirmed with 12 lead ECG. Review of Tele shows this arrhythm appearing as early as 2024. Patient received Amiodarone from 24 to 1/10/24. Currently having palpitations and BLANK, now going for pacemaker placement    Patient reports that she would not like to be intubated if possible, but she understands that she may need to be intubated in order to complete above procedures.    Patient now presents for the above procedure(s).    Results for orders placed during the hospital encounter of 24    Echo    Interpretation Summary    Left Ventricle: The left ventricle is normal in size. Normal wall thickness. Normal wall motion. There is normal systolic function with a visually estimated ejection fraction of 60 - 65%. There is normal diastolic function.    Right Ventricle: Normal right ventricular cavity size. Wall thickness is normal. Right ventricle wall motion  is normal. Systolic function is normal.    Left Atrium: Left atrium is severely dilated.    Right Atrium: Right atrium is mildly dilated.    Mitral Valve: There is severe bileaflet sclerosis. Severely thickened leaflets. Moderately calcified leaflets. Severely restricted motion. There is severe stenosis with MVA  1.16 and MG 13 mmHg at HR 84.. There is trace  regurgitation.    Tricuspid Valve: There is mild regurgitation.    Pulmonary Artery: The estimated pulmonary artery systolic pressure is 28 mmHg.    IVC/SVC: Normal venous pressure at 3 mmHg.    LDA:        Peripheral IV - Single Lumen 01/13/24 0330 22 G Posterior;Right Wrist (Active)   Site Assessment Dry;Clean;No redness;Intact;No swelling 01/17/24 0315   Extremity Assessment Distal to IV No warmth;No swelling;No redness;No abnormal discoloration 01/17/24 0315   Line Status Saline locked;Flushed 01/17/24 0315   Dressing Status Clean;Dry;Intact 01/17/24 0315   Dressing Intervention Integrity maintained 01/17/24 0315   Dressing Change Due 01/17/24 01/17/24 0315   Site Change Due 01/17/24 01/17/24 0315   Reason Not Rotated Not due 01/17/24 0315   Number of days: 4            Peripheral IV - Single Lumen 01/16/24 1055 20 G;1 3/4 in Anterior;Left Forearm (Active)   Site Assessment Dry;Clean;Intact;No redness;No swelling 01/17/24 0315   Extremity Assessment Distal to IV No warmth;No swelling;No redness;No abnormal discoloration 01/17/24 0315   Line Status Flushed;Saline locked 01/17/24 0315   Dressing Status Clean;Dry;Intact 01/17/24 0315   Dressing Intervention Integrity maintained 01/17/24 0315   Dressing Change Due 01/20/24 01/17/24 0315   Site Change Due 01/20/24 01/17/24 0315   Reason Not Rotated Not due 01/17/24 0315   Number of days: 0            Pacer Wires 01/08/24 1259 (Active)   Pacer Wire Status Ventricular wires connected to pacer 01/17/24 0315   Site Assessment Clean;Dry;Intact 01/17/24 0315   How Pacer Wires are Secured Ventricular wires secured to dressing 01/17/24 0315   Dressing Status Clean;Dry;Intact 01/17/24 0315   Dressing Intervention Integrity maintained 01/17/24 0315   Dressing Change Due 01/18/24 01/17/24 0315   Number of days: 8       Prev airway:   Intubation     Date/Time: 1/8/2024 7:25 AM     Performed by: Ryan Whaley MD  Authorized by: Juan Manuel Palma MD    Intubation:      Induction:  Intravenous    Intubated:  Postinduction    Mask Ventilation:  Easy with oral airway    Attempts:  1    Attempted By:  Resident anesthesiologist and other (see comments) (fellow)    Method of Intubation:  Video laryngoscopy    Blade:  Boyce 3    Laryngeal View Grade: Grade I - full view of cords      Difficult Airway Encountered?: No      Complications:  None    Airway Device:  Oral endotracheal tube    Airway Device Size:  7.0    Style/Cuff Inflation:  Cuffed    Tube secured:  22    Secured at:  The lips    Placement Verified By:  Capnometry    Complicating Factors:  None    Findings Post-Intubation:  BS equal bilateral and atraumatic/condition of teeth unchanged    Drips: None documented.   heparin (porcine) in 5 % dex Stopped (24 0515)       Patient Active Problem List   Diagnosis    Mitral valve prolapse    S/P mitral valve repair    Bradycardia    Vitamin D deficiency disease    Low back pain    Class 2 obesity due to excess calories with body mass index (BMI) of 35.0 to 35.9 in adult    Hypertension, essential    Chronic fatigue    Ankle swelling    Pregnancy    Pre-existing essential hypertension during pregnancy, antepartum    Nonrheumatic mitral valve stenosis    S/P  section    Chronic diastolic heart failure    Pre-op testing    Acute postoperative anemia due to expected blood loss    Transient hyperglycemia post procedure    S/P MVR (mitral valve replacement)    CHB (complete heart block)       Review of patient's allergies indicates:   Allergen Reactions    Cinnamon Itching    Doxycycline      Abdomen pain severe       Current Inpatient Medications:   aspirin  81 mg Oral Daily    docusate sodium  100 mg Oral BID    famotidine  20 mg Oral BID    furosemide (LASIX) injection  20 mg Intravenous BID    polyethylene glycol  17 g Oral Daily    potassium bicarbonate  40 mEq Oral BID    scopolamine  1 patch Transdermal Q3 Days    warfarin  5 mg Oral Daily       No current  facility-administered medications on file prior to encounter.     Current Outpatient Medications on File Prior to Encounter   Medication Sig Dispense Refill    furosemide (LASIX) 20 MG tablet Take one to two tablets by mouth per day as instructed by  tablet 3    metoprolol succinate (TOPROL-XL) 25 MG 24 hr tablet Take 2 tablets (50 mg total) by mouth every morning AND 1 tablet (25 mg total) every evening. 270 tablet 3    potassium chloride SA (K-DUR,KLOR-CON M) 10 MEQ tablet Take one to two tablets daily as instructed by MD. 180 tablet 3    amoxicillin (AMOXIL) 500 MG Tab Take 4 tablets (2,000 mg total) by mouth daily as needed (for procedures). Take one hour before the procedure. (Patient not taking: Reported on 2024) 12 tablet 2       Past Surgical History:   Procedure Laterality Date     SECTION       SECTION WITH TUBAL LIGATION Bilateral 2023    Procedure:  SECTION, WITH TUBAL LIGATION;  Surgeon: Mane Moreno MD;  Location: [facility] L&D;  Service: OB/GYN;  Laterality: Bilateral;    DILATION AND CURETTAGE OF UTERUS USING SUCTION N/A 2021    Procedure: DILATION AND CURETTAGE, UTERUS, USING SUCTION;  Surgeon: Mane Moreno MD;  Location: [facility] OR;  Service: OB/GYN;  Laterality: N/A;    MITRAL VALVE REPAIR      MITRAL VALVE REPLACEMENT N/A 2024    Procedure: MITRAL VALVE REPLACEMENT, REDO STERNOTOMY;  Surgeon: Thierno Liu MD;  Location: [address];  Service: Cardiothoracic;  Laterality: N/A;    VALVULOPLASTY, TRICUSPID N/A 2024    Procedure: VALVULOPLASTY, TRICUSPID REPAIR;  Surgeon: Thierno Liu MD;  Location: [facility] OR [address] [place] [address];  Service: Cardiothoracic;  Laterality: N/A;       Social History     Socioeconomic History    Marital status:    Occupational History    Occupation: xray tech   Tobacco Use    Smoking status: Never    Smokeless tobacco: Never   Substance and Sexual Activity    Alcohol use: Not Currently     Comment: not  since +UPT    Drug use: No    Sexual activity: Yes     Partners: Male     Birth control/protection: None     Social Determinants of Health     Financial Resource Strain: Low Risk  (1/9/2024)    Overall Financial Resource Strain (CARDIA)     Difficulty of Paying Living Expenses: Not hard at all   Food Insecurity: No Food Insecurity (1/9/2024)    Hunger Vital Sign     Worried About Running Out of Food in the Last Year: Never true     Ran Out of Food in the Last Year: Never true   Transportation Needs: No Transportation Needs (1/9/2024)    PRAPARE - Transportation     Lack of Transportation (Medical): No     Lack of Transportation (Non-Medical): No   Physical Activity: Unknown (1/9/2024)    Exercise Vital Sign     Days of Exercise per Week: Patient declined     Minutes of Exercise per Session: 30 min   Recent Concern: Physical Activity - Insufficiently Active (1/4/2024)    Exercise Vital Sign     Days of Exercise per Week: 2 days     Minutes of Exercise per Session: 30 min   Stress: Patient Declined (1/9/2024)    [place] citizen [place] [employer] - Occupational Stress Questionnaire     Feeling of Stress : Patient declined   Social Connections: Moderately Isolated (1/9/2024)    Social Connection and Isolation Panel [NHANES]     Frequency of Communication with Friends and Family: Patient unable to answer     Frequency of Social Gatherings with Friends and Family: Patient unable to answer     Attends Confucianism Services: Never     Active Member of Clubs or Organizations: No     Attends Club or Organization Meetings: Never     Marital Status:    Housing Stability: Low Risk  (1/9/2024)    Housing Stability Vital Sign     Unable to Pay for Housing in the Last Year: No     Number of Places Lived in the Last Year: 1     Unstable Housing in the Last Year: No       OBJECTIVE:     Vital Signs Range (Last 24H):  Temp:  [36.8 °C (98.2 °F)-37 °C (98.6 °F)]   Pulse:  [55-76]   Resp:  [11-74]   BP: (100-140)/(56-80)    SpO2:  [95 %-100 %]       Significant Labs:  Lab Results   Component Value Date    WBC 17.68 (H) 01/17/2024    HGB 9.2 (L) 01/17/2024    HCT 30.1 (L) 01/17/2024     01/17/2024    CHOL 212 (H) 08/14/2023    TRIG 177 (H) 08/14/2023    HDL 47 08/14/2023    ALT 16 01/17/2024    AST 22 01/17/2024     (L) 01/17/2024    K 4.0 01/17/2024     01/17/2024    CREATININE 0.6 01/17/2024    BUN 10 01/17/2024    CO2 25 01/17/2024    TSH 0.819 08/14/2023    INR 2.2 (H) 01/17/2024    HGBA1C 5.2 01/04/2024       Diagnostic Studies: No relevant studies.    EKG:   Results for orders placed or performed during the hospital encounter of 01/08/24   EKG 12-lead    Collection Time: 01/16/24 11:55 AM    Narrative    Test Reason :     Vent. Rate : 063 BPM     Atrial Rate : 252 BPM     P-R Int : 000 ms          QRS Dur : 076 ms      QT Int : 498 ms       P-R-T Axes : 080 031 090 degrees     QTc Int : 509 ms    Atrial flutter with 4:1 A-V conduction  Nonspecific ST-t wave abnormality  Abnormal ECG  When compared with ECG of 16-JAN-2024 05:49,  ST elevation now present in Inferior leads but could be due to  Baseline  Artifact    Confirmed by BRODERICK CARLOS MD ([id number]) on 1/16/2024 2:07:43 PM    Referred By: MARY KENDALL           Confirmed By:BRODERICK CARLOS MD       2D ECHO:  TTE:  Results for orders placed or performed during the hospital encounter of 01/04/24   Echo   Result Value Ref Range    RA Width 3.50 cm    LA volume (mod) 96.02 cm3    Left Atrium Major Axis 5.64 cm    Left Atrium Minor Axis 5.74 cm    RA Major Axis 4.84 cm    LV Diastolic Volume 84.94 mL    LV Systolic Volume 26.57 mL    TR Max Anuj 2.50 m/s    MV Peak E Anuj 1.97 m/s    Ao VTI 22.38 cm    Ao peak anuj 1.17 m/s    LVOT peak VTI 18.09 cm    LVOT peak anuj 0.89 m/s    LVOT diameter 1.89 cm    AV mean gradient 3 mmHg    TAPSE 1.57 cm    RVDD 3.47 cm    LA size 4.39 cm    Ascending aorta 2.77 cm    STJ 2.58 cm    Sinus 2.66 cm    LVIDs 2.68 2.1 - 4.0 cm     Posterior Wall 0.66 0.6 - 1.1 cm    IVS 0.76 0.6 - 1.1 cm    LVIDd 4.34 3.5 - 6.0 cm    TDI LATERAL 0.13 m/s    LA WIDTH 4.92 cm    TDI SEPTAL 0.05 m/s    LV LATERAL E/E' RATIO 15.15 m/s    LV SEPTAL E/E' RATIO 39.40 m/s    FS 38 28 - 44 %    LA volume 104.45 cm3    LV mass 91.58 g    Left Ventricle Relative Wall Thickness 0.30 cm    AV valve area 2.27 cm²    AV Velocity Ratio 0.76     AV index (prosthetic) 0.81     Mean e' 0.09 m/s    LVOT area 2.8 cm2    LVOT stroke volume 50.73 cm3    AV peak gradient 5 mmHg    E/E' ratio 21.89 m/s    Triscuspid Valve Regurgitation Peak Gradient 25 mmHg    ADELAIDE by Velocity Ratio 2.13 cm²    BSA 1.86 m2    LV Systolic Volume Index 14.8 mL/m2    LV Diastolic Volume Index 47.19 mL/m2    LV Mass Index 51 g/m2    LA Volume Index 58.0 mL/m2    LA Volume Index (Mod) 53.3 mL/m2    ZLVIDS -1.09     ZLVIDD -1.37     TV resting pulmonary artery pressure 28 mmHg    RV TB RVSP 6 mmHg    Est. RA pres 3 mmHg    MV peak gradient 13 mmHg    MV stenosis pressure 1/2 time 190.0 ms    MV valve area p 1/2 method 1.16 cm2    Narrative      Left Ventricle: The left ventricle is normal in size. Normal wall   thickness. Normal wall motion. There is normal systolic function with a   visually estimated ejection fraction of 60 - 65%. There is normal   diastolic function.    Right Ventricle: Normal right ventricular cavity size. Wall thickness   is normal. Right ventricle wall motion  is normal. Systolic function is   normal.    Left Atrium: Left atrium is severely dilated.    Right Atrium: Right atrium is mildly dilated.    Mitral Valve: There is severe bileaflet sclerosis. Severely thickened   leaflets. Moderately calcified leaflets. Severely restricted motion. There   is severe stenosis with MVA  1.16 and MG 13 mmHg at HR 84.. There is trace   regurgitation.    Tricuspid Valve: There is mild regurgitation.    Pulmonary Artery: The estimated pulmonary artery systolic pressure is   28 mmHg.    IVC/SVC:  Normal venous pressure at 3 mmHg.         MINERVA:  No results found for this or any previous visit.    ASSESSMENT/PLAN:           Pre-op Assessment    I have reviewed the Patient Summary Reports.     I have reviewed the Nursing Notes. I have reviewed the NPO Status.      Review of Systems  Anesthesia Hx:  No problems with previous Anesthesia   History of prior surgery of interest to airway management or planning: heart surgery. Previous anesthesia: General, Spinal        Denies Family Hx of Anesthesia complications.    Denies Personal Hx of Anesthesia complications.                    Social:  Non-Smoker, No Alcohol Use       Cardiovascular:     Denies Pacemaker. Hypertension Valvular problems/Murmurs, MVP, MR   Denies CABG/stent. Dysrhythmias        BLANK   H/o MVP/MR s/p mitral annuloplasty now with severe MS                         Pulmonary:    Denies COPD.  Denies Asthma.     Denies Sleep Apnea.                Hepatic/GI:   Denies PUD.   Denies GERD.             OB/GYN/PEDS:  Currently breastfeeding           Neurological:    Denies CVA.    Denies Seizures.                                Endocrine:  Denies Diabetes.         Obesity / BMI > 30      Physical Exam  General: Well nourished, Cooperative, Alert and Oriented    Airway:  Mallampati: II   Mouth Opening: Normal  TM Distance: Normal  Tongue: Normal  Neck ROM: Normal ROM    Dental:  Intact    Chest/Lungs:  Normal Respiratory Rate    Heart:  Rate: Normal        Anesthesia Plan  Type of Anesthesia, risks & benefits discussed:    Anesthesia Type: Gen ETT, Gen Supraglottic Airway, Gen Natural Airway  Intra-op Monitoring Plan: Standard ASA Monitors  Post Op Pain Control Plan: multimodal analgesia and IV/PO Opioids PRN  Induction:  IV  Airway Plan: Direct and Video, Post-Induction  Informed Consent: Informed consent signed with the Patient and all parties understand the risks and agree with anesthesia plan.  All questions answered.   ASA Score: 4  Day of Surgery  Review of History & Physical: H&P Update referred to the surgeon/provider.  Anesthesia Plan Notes: NPO confirmed.  Patient concerned about ETT    Ready For Surgery From Anesthesia Perspective.     .

## 2024-01-17 NOTE — ANESTHESIA POSTPROCEDURE EVALUATION
Anesthesia Post Evaluation    Patient: King Botello    Procedure(s) Performed: Procedure(s) (LRB):  INSERTION, CARDIAC PACEMAKER, DUAL CHAMBER (N/A)  Cardioversion or Defibrillation (N/A)  ECHOCARDIOGRAM, TRANSESOPHAGEAL (N/A)    Final Anesthesia Type: general      Patient location during evaluation: ICU  Patient participation: Yes- Able to Participate  Level of consciousness: awake and alert  Post-procedure vital signs: reviewed and stable  Pain management: adequate  Airway patency: patent    PONV status at discharge: No PONV  Anesthetic complications: no      Cardiovascular status: blood pressure returned to baseline  Respiratory status: unassisted  Hydration status: euvolemic  Follow-up not needed.              Vitals Value Taken Time   /80 01/17/24 1547   Temp 36.4 °C (97.5 °F) 01/17/24 1507   Pulse 88 01/17/24 1601   Resp 13 01/17/24 1601   SpO2 100 % 01/17/24 1601   Vitals shown include unvalidated device data.      No case tracking events are documented in the log.      Pain/Katerine Score: No data recorded

## 2024-01-17 NOTE — DISCHARGE INSTRUCTIONS
Medication instructions:    Complete your 5 days of antibiotics  If you are on a blood thinner (examples: apixiban [Eliquis], rivaroxaban [Xarelto], dabigatran [Pradaxa], or enoxaparin [Lovenox]), do not take your blood thinner for the next 5 days after your procedure. You can resume after 5 days post-procedure (i.e., when you complete your antibiotics). If you are on Coumadin you can continue to take it the day of your procedure  Pain control: you can take up to Tylenol 1000 mg every 6 hours as needed or (if you do not have kidney disease) ibuprofen 600 mg every 6 hours as needed as needed for pain control (if you do not have kidney disease). If unsure, please contact your primary care physician for recommendations.      Activity restrictions & precautions:    Sling & arm instructions:  Wear your sling for 48 hours. After 48 hours, you can take your arm out of your sling.   You must wear your sling at night when you sleep for 6 weeks after your procedure  Do not lift >5 lbs for 2 weeks.  Do not raise your elbow above your shoulder on the same side as your device for 6 weeks.     Surgical site   Dressing (see images below)  **Note: these are general rules to follow unless instructed otherwise** - see images below  If you have a brown rectangular gel bandage (A.K.A. Aquacel Ag dressing) over your surgical incision do not remove it. This will be removed at your device clinic follow up appointment in 1 week.  If you have a square light brown bandage (A.K.A Mepilex dressing) you should remove in 48 hours after your procedure.  If you have a pressure dressing (white elastic tape with gauze underneath or a very large bandage that covers your left chest and is shaped like a triangle) over your surgical site, this should be removed the morning after your procedure unless instructed otherwise.  Do not place any creams or ointments over the surgical site. This could effect the integrity of the Dermabond glue.  You can shower  after 24 hours post-procedure, but do not let the jet directly hit your pocket site for at least 2 weeks. Recommend showering with your back to the shower head.   Do not reach your arm (on the same side as your device) around your back during showering for 6 weeks.  Do not submerge your surgical incision site under water (swimming, bathing if you submerge the actual surgical site) for at least 6 week. It is imperative that the surgical site is completely healed prior to doing so    Follow up in device clinic in 1 week to check your incision and device function  Please contact the electrophysiology clinic if you have any questions or if you experience: potential surgical/pocket site complications (pain, swelling, bleeding, drainage), fevers, chest pain/shortness of breath, or for any other concerns.          Rhombic Flap Text: The defect edges were debeveled with a #15 scalpel blade.  Given the location of the defect and the proximity to free margins a rhombic flap was deemed most appropriate.  Using a sterile surgical marker, an appropriate rhombic flap was drawn incorporating the defect.    The area thus outlined was incised deep to adipose tissue with a #15 scalpel blade.  The skin margins were undermined to an appropriate distance in all directions utilizing iris scissors.

## 2024-01-17 NOTE — SUBJECTIVE & OBJECTIVE
Interval History:  No events overnight. Appears to be in atrial flutter but still with AV dissociation consistent with CHB.     Review of Systems   Constitutional: Positive for malaise/fatigue.   Cardiovascular:  Positive for dyspnea on exertion and palpitations. Negative for chest pain, irregular heartbeat, near-syncope and syncope.     Objective:     Vital Signs (Most Recent):  Temp: 98.3 °F (36.8 °C) (01/17/24 1056)  Pulse: 72 (01/17/24 1056)  Resp: (!) 26 (01/17/24 1056)  BP: (!) 145/73 (01/17/24 1000)  SpO2: 100 % (01/17/24 1056) Vital Signs (24h Range):  Temp:  [98.2 °F (36.8 °C)-98.6 °F (37 °C)] 98.3 °F (36.8 °C)  Pulse:  [55-76] 72  Resp:  [11-74] 26  SpO2:  [95 %-100 %] 100 %  BP: (100-145)/(56-80) 145/73     Weight: 80.7 kg (177 lb 12.8 oz)  Body mass index is 33.6 kg/m².     SpO2: 100 %        Physical Exam  Vitals and nursing note reviewed.   Constitutional:       General: She is not in acute distress.     Appearance: She is not ill-appearing.   HENT:      Head: Normocephalic and atraumatic.      Right Ear: External ear normal.      Left Ear: External ear normal.      Mouth/Throat:      Mouth: Mucous membranes are moist.      Pharynx: No oropharyngeal exudate.   Eyes:      Extraocular Movements: Extraocular movements intact.      Pupils: Pupils are equal, round, and reactive to light.   Cardiovascular:      Rate and Rhythm: Normal rate and regular rhythm.      Pulses: Normal pulses.      Heart sounds: Normal heart sounds. No murmur heard.     Comments: Epicardial pacer wires in place  Pulmonary:      Effort: Pulmonary effort is normal. No respiratory distress.      Breath sounds: Normal breath sounds. No wheezing or rales.   Abdominal:      General: Abdomen is flat. Bowel sounds are normal. There is no distension.      Palpations: Abdomen is soft.      Tenderness: There is no abdominal tenderness.   Musculoskeletal:         General: No signs of injury. Normal range of motion.      Cervical back: Normal  range of motion.      Right lower leg: No edema.      Left lower leg: No edema.   Skin:     General: Skin is warm and dry.   Neurological:      General: No focal deficit present.      Mental Status: She is alert and oriented to person, place, and time.      Sensory: No sensory deficit.      Motor: No weakness.   Psychiatric:         Mood and Affect: Mood normal.         Behavior: Behavior normal.         Thought Content: Thought content normal.            Significant Labs: EP:   Recent Labs   Lab 01/16/24  0252 01/16/24  1111 01/16/24  1956 01/17/24  0300   *  --   --  134*   K 4.5  --   --  4.0   CL 98  --   --  100   CO2 25  --   --  25   GLU 96  --   --  103   BUN 10  --   --  10   CREATININE 0.6  --   --  0.6   CALCIUM 9.5  --   --  9.3   PROT 7.2  --   --  6.6   ALBUMIN 3.2*  --   --  3.0*   BILITOT 0.8  --   --  0.8   ALKPHOS 65  --   --  66   AST 20  --   --  22   ALT 15  --   --  16   ANIONGAP 11  --   --  9   WBC 18.08*  --   --  17.68*   HGB 9.7*  --   --  9.2*   HCT 30.4*  --   --  30.1*   *  --   --  393   INR 1.9* 2.0* 1.9* 2.2*         Significant Imaging:Reviewed

## 2024-01-17 NOTE — CONSULTS
[facility], [facility]  MINERVA Consult      King Botello  YOB: 1985  Medical Record Number:  [id number]  Attending Physician:  Thierno Liu MD   Date of Admission: 1/8/2024       Hospital Day:  9  Current Principal Problem:  Complete heart block       History     Cc: Complete heart block     HPI  King Botello is a 38 y.o. female with a past medical history of MVP and MR s/p mitral annuloplasty in [place] (2003),  HTN, bradycardia s/p Mechanical Mitral Valve Replacement and TV banding with Dr. Liu on 01/08/24, Patient reports chronic fatigue and BLANK before pregnancy.  After delivery of baby in May, patient was discharged and a few days later readmitted for CHF exacerbation which improved with lasix. TTE Prior to surgery shows LVEF 60-66%. moderate right atrial enlargement. LA severely dilated. There is moderate to severe mitral stenosis.    Anticoagulant/antiplatelets: warfarin, ASA  ECG: aflutter      Dysphagia or odynophagia:  no  Liver Disease, esophageal disease, or known varices:  no  Upper GI Bleeding: no  Snoring:  no  Sleep Apnea:  no  Prior neck surgery or radiation:  no  History of anesthetic difficulties:  no  Family history of anesthetic difficulties:  no  Last oral intake: last pm   Able to move neck in all directions: yes  Platelet count: 393  INR: 2.2    Medications - Outpatient  Prior to Admission medications    Medication Sig Start Date End Date Taking? Authorizing Provider   furosemide (LASIX) 20 MG tablet Take one to two tablets by mouth per day as instructed by MD 8/17/23  Yes Eugene Cordero MD   metoprolol succinate (TOPROL-XL) 25 MG 24 hr tablet Take 2 tablets (50 mg total) by mouth every morning AND 1 tablet (25 mg total) every evening. 7/26/23 7/25/24 Yes Sunday Hernandez MD   potassium chloride SA (K-DUR,KLOR-CON M) 10 MEQ tablet Take one to two tablets daily as instructed by MD. 8/18/23  Yes Eugene Cordero MD   amoxicillin  (AMOXIL) 500 MG Tab Take 4 tablets (2,000 mg total) by mouth daily as needed (for procedures). Take one hour before the procedure.  Patient not taking: Reported on 2024   Eugene Cordero MD         Medications - Current  Scheduled Meds:   aspirin  81 mg Oral Daily    docusate sodium  100 mg Oral BID    famotidine  20 mg Oral BID    furosemide (LASIX) injection  20 mg Intravenous BID    polyethylene glycol  17 g Oral Daily    potassium bicarbonate  40 mEq Oral BID    scopolamine  1 patch Transdermal Q3 Days    warfarin  5 mg Oral Daily     Continuous Infusions:   heparin (porcine) in 5 % dex Stopped (24 0515)     PRN Meds:.acetaminophen, albuterol-ipratropium, bisacodyL, dextrose 10%, dextrose 10%, magnesium oxide, magnesium oxide, metoclopramide, ondansetron, oxyCODONE, potassium bicarbonate, potassium bicarbonate, potassium bicarbonate, potassium, sodium phosphates, potassium, sodium phosphates, potassium, sodium phosphates, prochlorperazine      Allergies  Review of patient's allergies indicates:   Allergen Reactions    Cinnamon Itching    Doxycycline      Abdomen pain severe         Past Medical History  Past Medical History:   Diagnosis Date    Abnormal Pap smear     Colposcopy    Bradycardia 2020    Class 2 obesity due to excess calories with body mass index (BMI) of 35.0 to 35.9 in adult 3/9/2021    Hypertension, essential 3/16/2021    Menarche     Age of onset 11    Missed ab 2021    Mitral valve prolapse     Previous  delivery affecting pregnancy, antepartum 2020    S/P suction D&C 2021    Status post mitral valve annuloplasty 2017         Past Surgical History  Past Surgical History:   Procedure Laterality Date     SECTION       SECTION WITH TUBAL LIGATION Bilateral 2023    Procedure:  SECTION, WITH TUBAL LIGATION;  Surgeon: Mane Moreno MD;  Location: [facility] L&D;  Service: OB/GYN;  Laterality: Bilateral;    DILATION AND  CURETTAGE OF UTERUS USING SUCTION N/A 7/1/2021    Procedure: DILATION AND CURETTAGE, UTERUS, USING SUCTION;  Surgeon: Mane Moreno MD;  Location: [facility];  Service: OB/GYN;  Laterality: N/A;    MITRAL VALVE REPAIR      MITRAL VALVE REPLACEMENT N/A 1/8/2024    Procedure: MITRAL VALVE REPLACEMENT, REDO STERNOTOMY;  Surgeon: Thierno Liu MD;  Location: [facility] OR [address] [place] [address];  Service: Cardiothoracic;  Laterality: N/A;    VALVULOPLASTY, TRICUSPID N/A 1/8/2024    Procedure: VALVULOPLASTY, TRICUSPID REPAIR;  Surgeon: Thierno Liu MD;  Location: [facility] OR [address] [place] [address];  Service: Cardiothoracic;  Laterality: N/A;         Social History  Social History     Socioeconomic History    Marital status:    Occupational History    Occupation: xray tech   Tobacco Use    Smoking status: Never    Smokeless tobacco: Never   Substance and Sexual Activity    Alcohol use: Not Currently     Comment: not since +UPT    Drug use: No    Sexual activity: Yes     Partners: Male     Birth control/protection: None     Social Determinants of Health     Financial Resource Strain: Low Risk  (1/9/2024)    Overall Financial Resource Strain (CARDIA)     Difficulty of Paying Living Expenses: Not hard at all   Food Insecurity: No Food Insecurity (1/9/2024)    Hunger Vital Sign     Worried About Running Out of Food in the Last Year: Never true     Ran Out of Food in the Last Year: Never true   Transportation Needs: No Transportation Needs (1/9/2024)    PRAPARE - Transportation     Lack of Transportation (Medical): No     Lack of Transportation (Non-Medical): No   Physical Activity: Unknown (1/9/2024)    Exercise Vital Sign     Days of Exercise per Week: Patient declined     Minutes of Exercise per Session: 30 min   Recent Concern: Physical Activity - Insufficiently Active (1/4/2024)    Exercise Vital Sign     Days of Exercise per Week: 2 days     Minutes of Exercise per Session: 30 min   Stress: Patient Declined (1/9/2024)    [employer] - Occupational Stress Questionnaire     Feeling of Stress : Patient declined   Social Connections: Moderately Isolated (1/9/2024)    Social Connection and Isolation Panel [NHANES]     Frequency of Communication with Friends and Family: Patient unable to answer     Frequency of Social Gatherings with Friends and Family: Patient unable to answer     Attends [employer] Services: Never     Active Member of Clubs or Organizations: No     Attends Club or Organization Meetings: Never     Marital Status:    Housing Stability: Low Risk  (1/9/2024)    Housing Stability Vital Sign     Unable to Pay for Housing in the Last Year: No     Number of Places Lived in the Last Year: 1     Unstable Housing in the Last Year: No         ROS  10 point ROS performed and negative except as stated in HPI     Physical Examination         Vital Signs  Vitals  Temp: 98.2 °F (36.8 °C)  Temp Source: Oral  Pulse: 70  Heart Rate Source: Monitor  Resp: (!) 40  SpO2: 98 %  Pulse Oximetry Type: Continuous  Flow (L/min): 2  Oxygen Concentration (%): 97  BP: 100/80  MAP (mmHg): 86  BP Location: Left arm  BP Method: Automatic  Patient Position: Lying    Invasive Hemodynamic Monitoring  CVP (mean): 14 mmHg     24 Hour VS Range    Temp:  [98.2 °F (36.8 °C)-98.6 °F (37 °C)]   Pulse:  [55-76]   Resp:  [11-74]   BP: (100-140)/(56-80)   SpO2:  [95 %-100 %]     Intake/Output Summary (Last 24 hours) at 1/17/2024 0959  Last data filed at 1/17/2024 0957  Gross per 24 hour   Intake 730.83 ml   Output 1350 ml   Net -619.17 ml         Physical Exam:   Constitutional: no acute distress  HEENT: NCAT, EOMI, no scleral icterus  Cardiovascular: Regular rate and rhythm  Pulmonary: Normal respiratory effort   Abdomen: nontender, non-distended   Neuro: alert and oriented, no focal deficits  Extremities: warm, no edema   MSK: no deformities  Integument: intact, no rashes       Data       Recent Labs   Lab 01/13/24  0322 01/14/24  0316  "01/15/24  0237 01/16/24  0252 01/17/24  0300   WBC 10.54 11.66 14.89* 18.08* 17.68*   HGB 8.5* 9.0* 9.3* 9.7* 9.2*   HCT 26.3* 27.3* 29.1* 30.4* 30.1*    361 457* 515* 393        Recent Labs   Lab 01/15/24  1416 01/16/24  0252 01/16/24  1111 01/16/24  1956 01/17/24  0300   INR 1.8* 1.9* 2.0* 1.9* 2.2*        Recent Labs   Lab 01/13/24  0322 01/13/24  2014 01/14/24  0316 01/14/24  1640 01/15/24  0237 01/16/24  0252 01/17/24  0300   *  --  137  --  133* 134* 134*   K 3.8   < > 4.3 4.3 4.3 4.5 4.0     --  100  --  98 98 100   CO2 29  --  27  --  26 25 25   BUN 10  --  9  --  10 10 10   CREATININE 0.6  --  0.6  --  0.6 0.6 0.6   ANIONGAP 5*  --  10  --  9 11 9   CALCIUM 8.9  --  9.0  --  9.3 9.5 9.3    < > = values in this interval not displayed.        Recent Labs   Lab 01/13/24  0322 01/14/24  0316 01/15/24  0237 01/16/24  0252 01/17/24  0300   PROT 6.1 6.3 6.8 7.2 6.6   ALBUMIN 2.9* 2.9* 3.1* 3.2* 3.0*   BILITOT 0.9 0.8 0.8 0.8 0.8   ALKPHOS 53* 60 61 65 66   AST 25 20 19 20 22   ALT 19 16 17 15 16        No results for input(s): "TROPONINI" in the last 168 hours.     BNP (pg/mL)   Date Value   08/14/2023 215 (H)   06/19/2023 221 (H)   05/24/2023 193 (H)   05/08/2023 338 (H)   05/04/2023 424 (H)       No results for input(s): "LABBLOO" in the last 168 hours.         Assessment & Plan     #Complete heart block   #s/p MVR with mechical valve POD  #Atrial Flutter    1)  MITRAL VALVE REPLACEMENT, with a 25 mm [employer]omedics Standard mechanical prosthesis  2)  VALVULOPLASTY, TRICUSPID REPAIR with a 26 mm Medtronic Contour annuloplasty band     TTE 1/4/24    Left Ventricle: The left ventricle is normal in size. Normal wall thickness. Normal wall motion. There is normal systolic function with a visually estimated ejection fraction of 60 - 65%. There is normal diastolic function.    Right Ventricle: Normal right ventricular cavity size. Wall thickness is normal. Right ventricle wall motion  is normal. " Systolic function is normal.    Left Atrium: Left atrium is severely dilated.    Right Atrium: Right atrium is mildly dilated.    Mitral Valve: There is severe bileaflet sclerosis. Severely thickened leaflets. Moderately calcified leaflets. Severely restricted motion. There is severe stenosis with MVA  1.16 and MG 13 mmHg at HR 84.. There is trace regurgitation.    Tricuspid Valve: There is mild regurgitation.    Pulmonary Artery: The estimated pulmonary artery systolic pressure is 28 mmHg.    IVC/SVC: Normal venous pressure at 3 mmHg.    -No absolute contraindications of esophageal stricture, tumor, perforation, laceration,or diverticulum and/or active GI bleed  -The risks, benefits & alternatives of the procedure were explained to the patient.   -The risks of transesophageal echo include but are not limited to:  Dental trauma, esophageal trauma/perforation, bleeding, laryngospasm/brochospasm, aspiration, sore throat/hoarseness, & dislodgement of the endotracheal tube/nasogastric tube (where applicable).    -The risks of moderate sedation include hypotension, respiratory depression, arrhythmias, bronchospasm, & death.    -Informed consent was obtained. The patient is agreeable to proceed with the procedure and all questions and concerns addressed    Aditya Sanon MD  [facility]   Cardiovascular Disease PGY-V

## 2024-01-17 NOTE — PLAN OF CARE
NAEON. Pt OOB to chair. Plan for PPM. Pt NPO @ midnight and heparin gtt discontinued for procedure.      Pt afebrile. HR 60s. V wires in place with backup rate set @ 40bpm.  MAP >65. SpO2>90% on room air. Patient following commands and moving all extremities.      Pt OOB to bedside commode. Voids spontaneously. 400cc UOP/shift   BM x1 overnight      PoC reviewed with patient and spouse. All questions/concerns addressed.         Skin: No new skin breakdown. Foams in place. Pt repositions independently. Midsternal incision clean and dry.

## 2024-01-17 NOTE — PROGRESS NOTES
[facility] - Surgical Intensive Care  Cardiac Electrophysiology  Progress Note    Admission Date: 1/8/2024  Code Status: Full Code   Attending Physician: Thierno Liu MD   Expected Discharge Date: 1/19/2024  Principal Problem:Nonrheumatic mitral valve stenosis    Subjective:     Interval History:  No events overnight. Appears to be in atrial flutter but still with AV dissociation consistent with CHB.     Review of Systems   Constitutional: Positive for malaise/fatigue.   Cardiovascular:  Positive for dyspnea on exertion and palpitations. Negative for chest pain, irregular heartbeat, near-syncope and syncope.     Objective:     Vital Signs (Most Recent):  Temp: 98.3 °F (36.8 °C) (01/17/24 1056)  Pulse: 72 (01/17/24 1056)  Resp: (!) 26 (01/17/24 1056)  BP: (!) 145/73 (01/17/24 1000)  SpO2: 100 % (01/17/24 1056) Vital Signs (24h Range):  Temp:  [98.2 °F (36.8 °C)-98.6 °F (37 °C)] 98.3 °F (36.8 °C)  Pulse:  [55-76] 72  Resp:  [11-74] 26  SpO2:  [95 %-100 %] 100 %  BP: (100-145)/(56-80) 145/73     Weight: 80.7 kg (177 lb 12.8 oz)  Body mass index is 33.6 kg/m².     SpO2: 100 %        Physical Exam  Vitals and nursing note reviewed.   Constitutional:       General: She is not in acute distress.     Appearance: She is not ill-appearing.   HENT:      Head: Normocephalic and atraumatic.      Right Ear: External ear normal.      Left Ear: External ear normal.      Mouth/Throat:      Mouth: Mucous membranes are moist.      Pharynx: No oropharyngeal exudate.   Eyes:      Extraocular Movements: Extraocular movements intact.      Pupils: Pupils are equal, round, and reactive to light.   Cardiovascular:      Rate and Rhythm: Normal rate and regular rhythm.      Pulses: Normal pulses.      Heart sounds: Normal heart sounds. No murmur heard.     Comments: Epicardial pacer wires in place  Pulmonary:      Effort: Pulmonary effort is normal. No respiratory distress.      Breath sounds: Normal breath sounds. No wheezing or rales.    Abdominal:      General: Abdomen is flat. Bowel sounds are normal. There is no distension.      Palpations: Abdomen is soft.      Tenderness: There is no abdominal tenderness.   Musculoskeletal:         General: No signs of injury. Normal range of motion.      Cervical back: Normal range of motion.      Right lower leg: No edema.      Left lower leg: No edema.   Skin:     General: Skin is warm and dry.   Neurological:      General: No focal deficit present.      Mental Status: She is alert and oriented to person, place, and time.      Sensory: No sensory deficit.      Motor: No weakness.   Psychiatric:         Mood and Affect: Mood normal.         Behavior: Behavior normal.         Thought Content: Thought content normal.            Significant Labs: EP:   Recent Labs   Lab 01/16/24  0252 01/16/24  1111 01/16/24  1956 01/17/24  0300   *  --   --  134*   K 4.5  --   --  4.0   CL 98  --   --  100   CO2 25  --   --  25   GLU 96  --   --  103   BUN 10  --   --  10   CREATININE 0.6  --   --  0.6   CALCIUM 9.5  --   --  9.3   PROT 7.2  --   --  6.6   ALBUMIN 3.2*  --   --  3.0*   BILITOT 0.8  --   --  0.8   ALKPHOS 65  --   --  66   AST 20  --   --  22   ALT 15  --   --  16   ANIONGAP 11  --   --  9   WBC 18.08*  --   --  17.68*   HGB 9.7*  --   --  9.2*   HCT 30.4*  --   --  30.1*   *  --   --  393   INR 1.9* 2.0* 1.9* 2.2*         Significant Imaging:Reviewed  Assessment and Plan:     CHB (complete heart block)  Patient is POD 2 s/p MVR (revision with mechanical valve) and TV banding. Patient was found to be Sinus w/ CHB w/ a junctional escape rhythm this AM via tele and confirmed with 12 lead ECG. Review of Tele shows this arrhythm appearing as early as 1/08/2024. Patient received Amiodarone from 1/08/24 to 1/10/24. Currently having palpitations and BLANK, Unclear if related to this arrhythmia.    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  INR 2.2 but consistently uptrending on boosted  dose    Recommendations  Going for dcPPM today  MINERVA DCCV prior to pacemaker placement  Continue ICU care, epicardial lead until PPM placement        Connor M Gillies, MD  Cardiac Electrophysiology  [facility] - Surgical Intensive Care

## 2024-01-17 NOTE — CONSULTS
Letter regarding Phase II cardiac rehab was sent to patient.  Will contact patient following appointment for follow up.  Rebecca Fernandez RN  Cardiac Rehab Nurse

## 2024-01-17 NOTE — ASSESSMENT & PLAN NOTE
38 y.o. female with a past medical history of MVP and MR s/p mitral annuloplasty in [place] (2003),  HTN, bradycardia now s/p Mechanical Mitral Valve Replacement on 1/8/2024 with Dr. Liu      Neuro/Psych:     - Sedation: n/a    - Pain:    - PRN liquid Acetaminophen 1g q6h    - Oxy 2.5 liquid PRN             Cardiac:     - S/P Mechanical Mitral Valve Replacement with Dr. Liu on 1/8/2024    - BP Goal: MAP 60-80    - initially placed on Amio for suspected A-fib, d/c'd 1/9/24    - Pressors: off    - Anti-HTNs: Will start when appropriate     - Rhythm: Complete Heart block with occasional junctional     - Beta blocker: Hold, heart block   - back up pac  ed with temporary v-wires     - EP involved for Complete Heart Block and PPM   - Going for PPM today    - Statin: Not indicated    - EKG daily    - INR goal 2.5-3.5   - Coumadin 4mg     Pulmonary:     - Goal SpO2 >92%    - on RA    - CXR stable    - Chest Tubes 2 Meds     - pulled on 1/11/24    - IS    - ABGs PRN      Renal:    - Trend BUN/Cr     - lasix 20mg IV BID    - Soares out    Recent Labs   Lab 01/15/24  0237 01/16/24  0252 01/17/24  0300   BUN 10 10 10   CREATININE 0.6 0.6 0.6           FEN / GI:     - Daily CMP, PRN K/Mag/Phos per protocol     - Replace electrolytes as needed    - scheduled potassium supplement    - Nutrition: cardiac thin, meds crushed   - follow speech recs    - Bowel Regimen: Miralax, docusate      ID:     - Afebrile    - WBC elevated. No signs of infection. Will continue to monitor     - Abx: Completed perioperative cefazolin 2g Q8H x 5 doses    Recent Labs   Lab 01/15/24  0237 01/16/24  0252 01/17/24  0300   WBC 14.89* 18.08* 17.68*           Heme/Onc:     - Hgb 13.0 pre-operatively    - CBC daily    - ASA 81mg     - Heparin d/c'd as therapeutic INR achieved    - PO Coumadin 4mg QD, INR 3 (goal is 2.5-3.5)    - Transfuse Hb <7 and symptomatic anemia    - 1u pRBC on 1/9/24      Recent Labs   Lab 01/15/24  0237 01/15/24  2605  01/16/24  0252 01/16/24  1111 01/16/24  1415 01/16/24  1956 01/17/24  0300   HGB 9.3*  --  9.7*  --   --   --  9.2*   *  --  515*  --   --   --  393   APTT 34.8*  --  32.8*  --  32.2* 35.8* 32.5*   INR 2.3*   < > 1.9* 2.0*  --  1.9* 2.2*    < > = values in this interval not displayed.           Endocrine:     - CTS Goal -140    - HgbA1c: 5.2    - Insulin SSI    - Endocrinology consulted for insulin management      PPx:   Feeding: Cardiac diet, Speech following  Analgesia/Sedation: Multimodal controlled  Thromboembolic Prevention: SCD's   HOB >30: Yes  Stress Ulcer: Famotidine  Glucose Control: Yes, insulin management per Endocrinology     Lines/Drains/Airway:   Pacing Wires: Temporary ventricular pacing wires              PIVs         Dispo/Code Status/Palliative:     - Continue SICU Care    - Full Code

## 2024-01-17 NOTE — PLAN OF CARE
"SICU PLAN OF CARE NOTE    Dx: Nonrheumatic mitral valve stenosis    Goals of Care:  MAP >65    Vital Signs:  /73   Pulse 70   Temp 98.3 °F (36.8 °C) (Oral)   Resp (!) 22   Ht 5' 1" (1.549 m)   Wt 80.7 kg (177 lb 12.8 oz)   LMP 01/07/2024 Comment: on period now  SpO2 98%   Breastfeeding No   BMI 33.60 kg/m²     Cardiac:  A-flutter    Resp:  SpO2 100% on     Neuro:  AAO x4, Follows Commands, and Moves All Extremities    Gtts:  Heparin    Urine Output:  Voids Spontaneously 950 cc/shift    Diet:  Cardiac Diet     Labs/Accuchecks:  Aptt/INR Q6    Skin:  All skin remains free from injury.  Patient up in chair during the shift. ICU bed working correctly.    Shift Events:  See flowsheet for further assessment/details.  Family updated on current condition/plan of care, questions answered, and emotional support provided.  MD updated on current condition, vitals, labs, and gtts.  No new orders received, will continue to monitor.    "

## 2024-01-18 ENCOUNTER — PATIENT MESSAGE (OUTPATIENT)
Dept: CARDIOLOGY | Facility: CLINIC | Age: 39
End: 2024-01-18
Payer: COMMERCIAL

## 2024-01-18 ENCOUNTER — ANTI-COAG VISIT (OUTPATIENT)
Dept: CARDIOLOGY | Facility: CLINIC | Age: 39
End: 2024-01-18
Payer: COMMERCIAL

## 2024-01-18 VITALS
OXYGEN SATURATION: 99 % | SYSTOLIC BLOOD PRESSURE: 153 MMHG | RESPIRATION RATE: 48 BRPM | HEART RATE: 65 BPM | HEIGHT: 61 IN | WEIGHT: 177 LBS | TEMPERATURE: 98 F | BODY MASS INDEX: 33.42 KG/M2 | DIASTOLIC BLOOD PRESSURE: 74 MMHG

## 2024-01-18 DIAGNOSIS — Z95.2 S/P MVR (MITRAL VALVE REPLACEMENT): ICD-10-CM

## 2024-01-18 DIAGNOSIS — Z79.01 LONG TERM (CURRENT) USE OF ANTICOAGULANTS: Primary | ICD-10-CM

## 2024-01-18 LAB
ALBUMIN SERPL BCP-MCNC: 3 G/DL (ref 3.5–5.2)
ALP SERPL-CCNC: 73 U/L (ref 55–135)
ALT SERPL W/O P-5'-P-CCNC: 18 U/L (ref 10–44)
ANION GAP SERPL CALC-SCNC: 11 MMOL/L (ref 8–16)
APTT PPP: 30 SEC (ref 21–32)
AST SERPL-CCNC: 29 U/L (ref 10–40)
BASOPHILS # BLD AUTO: 0.05 K/UL (ref 0–0.2)
BASOPHILS NFR BLD: 0.4 % (ref 0–1.9)
BILIRUB SERPL-MCNC: 0.7 MG/DL (ref 0.1–1)
BUN SERPL-MCNC: 11 MG/DL (ref 6–20)
CALCIUM SERPL-MCNC: 9.1 MG/DL (ref 8.7–10.5)
CHLORIDE SERPL-SCNC: 101 MMOL/L (ref 95–110)
CO2 SERPL-SCNC: 24 MMOL/L (ref 23–29)
CREAT SERPL-MCNC: 0.6 MG/DL (ref 0.5–1.4)
DIFFERENTIAL METHOD BLD: ABNORMAL
EOSINOPHIL # BLD AUTO: 0.5 K/UL (ref 0–0.5)
EOSINOPHIL NFR BLD: 3.2 % (ref 0–8)
ERYTHROCYTE [DISTWIDTH] IN BLOOD BY AUTOMATED COUNT: 16.2 % (ref 11.5–14.5)
EST. GFR  (NO RACE VARIABLE): >60 ML/MIN/1.73 M^2
GLUCOSE SERPL-MCNC: 91 MG/DL (ref 70–110)
HCT VFR BLD AUTO: 27.9 % (ref 37–48.5)
HGB BLD-MCNC: 8.8 G/DL (ref 12–16)
IMM GRANULOCYTES # BLD AUTO: 0.14 K/UL (ref 0–0.04)
IMM GRANULOCYTES NFR BLD AUTO: 1 % (ref 0–0.5)
INR PPP: 2.9 (ref 0.8–1.2)
LYMPHOCYTES # BLD AUTO: 2 K/UL (ref 1–4.8)
LYMPHOCYTES NFR BLD: 14.5 % (ref 18–48)
MAGNESIUM SERPL-MCNC: 2.1 MG/DL (ref 1.6–2.6)
MCH RBC QN AUTO: 27.1 PG (ref 27–31)
MCHC RBC AUTO-ENTMCNC: 31.5 G/DL (ref 32–36)
MCV RBC AUTO: 86 FL (ref 82–98)
MONOCYTES # BLD AUTO: 1.1 K/UL (ref 0.3–1)
MONOCYTES NFR BLD: 8 % (ref 4–15)
NEUTROPHILS # BLD AUTO: 10.2 K/UL (ref 1.8–7.7)
NEUTROPHILS NFR BLD: 72.9 % (ref 38–73)
NRBC BLD-RTO: 0 /100 WBC
PHOSPHATE SERPL-MCNC: 3.6 MG/DL (ref 2.7–4.5)
PLATELET # BLD AUTO: 416 K/UL (ref 150–450)
PMV BLD AUTO: 9.8 FL (ref 9.2–12.9)
POTASSIUM SERPL-SCNC: 4 MMOL/L (ref 3.5–5.1)
PROT SERPL-MCNC: 6.6 G/DL (ref 6–8.4)
PROTHROMBIN TIME: 29.2 SEC (ref 9–12.5)
RBC # BLD AUTO: 3.25 M/UL (ref 4–5.4)
SODIUM SERPL-SCNC: 136 MMOL/L (ref 136–145)
WBC # BLD AUTO: 13.93 K/UL (ref 3.9–12.7)

## 2024-01-18 PROCEDURE — 25000003 PHARM REV CODE 250: Performed by: THORACIC SURGERY (CARDIOTHORACIC VASCULAR SURGERY)

## 2024-01-18 PROCEDURE — 99499 UNLISTED E&M SERVICE: CPT | Mod: ,,, | Performed by: ANESTHESIOLOGY

## 2024-01-18 PROCEDURE — 85025 COMPLETE CBC W/AUTO DIFF WBC: CPT

## 2024-01-18 PROCEDURE — 99024 POSTOP FOLLOW-UP VISIT: CPT | Mod: ,,, | Performed by: STUDENT IN AN ORGANIZED HEALTH CARE EDUCATION/TRAINING PROGRAM

## 2024-01-18 PROCEDURE — 25000003 PHARM REV CODE 250

## 2024-01-18 PROCEDURE — 94761 N-INVAS EAR/PLS OXIMETRY MLT: CPT

## 2024-01-18 PROCEDURE — 25000003 PHARM REV CODE 250: Performed by: STUDENT IN AN ORGANIZED HEALTH CARE EDUCATION/TRAINING PROGRAM

## 2024-01-18 PROCEDURE — 80053 COMPREHEN METABOLIC PANEL: CPT | Performed by: STUDENT IN AN ORGANIZED HEALTH CARE EDUCATION/TRAINING PROGRAM

## 2024-01-18 PROCEDURE — 63600175 PHARM REV CODE 636 W HCPCS

## 2024-01-18 PROCEDURE — 83735 ASSAY OF MAGNESIUM: CPT

## 2024-01-18 PROCEDURE — 84100 ASSAY OF PHOSPHORUS: CPT

## 2024-01-18 PROCEDURE — 93010 ELECTROCARDIOGRAM REPORT: CPT | Mod: ,,, | Performed by: INTERNAL MEDICINE

## 2024-01-18 PROCEDURE — 85610 PROTHROMBIN TIME: CPT

## 2024-01-18 PROCEDURE — 93005 ELECTROCARDIOGRAM TRACING: CPT

## 2024-01-18 PROCEDURE — 85730 THROMBOPLASTIN TIME PARTIAL: CPT

## 2024-01-18 PROCEDURE — 25000003 PHARM REV CODE 250: Performed by: INTERNAL MEDICINE

## 2024-01-18 RX ORDER — OXYCODONE HYDROCHLORIDE 5 MG/1
5 TABLET ORAL EVERY 4 HOURS PRN
Qty: 42 TABLET | Refills: 0 | Status: SHIPPED | OUTPATIENT
Start: 2024-01-18 | End: 2024-02-06

## 2024-01-18 RX ORDER — DOCUSATE SODIUM 100 MG/1
100 CAPSULE, LIQUID FILLED ORAL 2 TIMES DAILY PRN
Qty: 30 CAPSULE | Refills: 0 | Status: SHIPPED | OUTPATIENT
Start: 2024-01-18 | End: 2024-02-06

## 2024-01-18 RX ORDER — FUROSEMIDE 20 MG/1
TABLET ORAL
Qty: 60 TABLET | Refills: 11 | Status: SHIPPED | OUTPATIENT
Start: 2024-01-18 | End: 2024-02-06

## 2024-01-18 RX ORDER — POTASSIUM CHLORIDE 750 MG/1
CAPSULE, EXTENDED RELEASE ORAL
Qty: 2 CAPSULE | Refills: 0 | Status: SHIPPED | OUTPATIENT
Start: 2024-01-18 | End: 2024-01-18

## 2024-01-18 RX ORDER — ACETAMINOPHEN 500 MG
1000 TABLET ORAL EVERY 8 HOURS PRN
Qty: 30 TABLET | Refills: 0 | Status: SHIPPED | OUTPATIENT
Start: 2024-01-18 | End: 2024-02-06 | Stop reason: ALTCHOICE

## 2024-01-18 RX ORDER — POTASSIUM CHLORIDE 750 MG/1
CAPSULE, EXTENDED RELEASE ORAL
Qty: 45 CAPSULE | Refills: 11 | Status: SHIPPED | OUTPATIENT
Start: 2024-01-18 | End: 2024-01-22

## 2024-01-18 RX ORDER — WARFARIN 1 MG/1
4 TABLET ORAL DAILY
Qty: 120 TABLET | Refills: 11 | Status: ON HOLD | OUTPATIENT
Start: 2024-01-18 | End: 2024-05-02 | Stop reason: HOSPADM

## 2024-01-18 RX ORDER — ASPIRIN 81 MG/1
81 TABLET ORAL DAILY
Qty: 30 TABLET | Refills: 11 | Status: SHIPPED | OUTPATIENT
Start: 2024-01-18 | End: 2025-01-17

## 2024-01-18 RX ORDER — POTASSIUM CHLORIDE 20 MEQ/1
TABLET, EXTENDED RELEASE ORAL
Qty: 30 TABLET | Refills: 11 | Status: SHIPPED | OUTPATIENT
Start: 2024-01-18 | End: 2024-01-18 | Stop reason: HOSPADM

## 2024-01-18 RX ORDER — LIDOCAINE 50 MG/G
1 PATCH TOPICAL DAILY PRN
Qty: 5 PATCH | Refills: 0 | Status: SHIPPED | OUTPATIENT
Start: 2024-01-18 | End: 2024-02-06

## 2024-01-18 RX ORDER — FAMOTIDINE 20 MG/1
20 TABLET, FILM COATED ORAL 2 TIMES DAILY
Qty: 60 TABLET | Refills: 11 | Status: SHIPPED | OUTPATIENT
Start: 2024-01-18 | End: 2024-02-06

## 2024-01-18 RX ORDER — ONDANSETRON 4 MG/1
8 TABLET, FILM COATED ORAL EVERY 6 HOURS PRN
Qty: 30 TABLET | Refills: 0 | Status: SHIPPED | OUTPATIENT
Start: 2024-01-18 | End: 2024-02-06

## 2024-01-18 RX ADMIN — FUROSEMIDE 20 MG: 10 INJECTION, SOLUTION INTRAMUSCULAR; INTRAVENOUS at 08:01

## 2024-01-18 RX ADMIN — POTASSIUM BICARBONATE 40 MEQ: 391 TABLET, EFFERVESCENT ORAL at 08:01

## 2024-01-18 RX ADMIN — ASPIRIN 81 MG CHEWABLE TABLET 81 MG: 81 TABLET CHEWABLE at 08:01

## 2024-01-18 RX ADMIN — SULFAMETHOXAZOLE AND TRIMETHOPRIM 1 TABLET: 400; 80 TABLET ORAL at 08:01

## 2024-01-18 RX ADMIN — ACETAMINOPHEN 999.01 MG: 650 SOLUTION ORAL at 03:01

## 2024-01-18 RX ADMIN — FAMOTIDINE 20 MG: 20 TABLET, FILM COATED ORAL at 08:01

## 2024-01-18 NOTE — ASSESSMENT & PLAN NOTE
Patient is POD 2 s/p MVR (revision with mechanical valve) and TV banding. Patient was found to be Sinus w/ CHB w/ a junctional escape rhythm this AM via tele and confirmed with 12 lead ECG. Review of Tele shows this arrhythm appearing as early as 1/08/2024. Patient received Amiodarone from 1/08/24 to 1/10/24. Currently having palpitations and BLANK, Unclear if related to this arrhythmia.    S/p successful MINERVA DCCV on 1/17, followed by dcPPM placement    Recommendations  dcPPM on 1/17  Patient back in atrial flutter on 1/18. Anxious to leave, very stable and asymptomatic  Okay to discharge from EP standpoint. F/u in 4 weeks for evaluation for ablation  5 days abx post PPM placement

## 2024-01-18 NOTE — PROGRESS NOTES
Patient verified Coumadin as 1 mg tablet.pt advised on dosing per calendar.  Patient to take Coumadin as prescribed until instructed otherwise and INR due date 1/22/24. Orders sent to [facility].    Patient education completed regarding vitamin K diet, when to contact Coumadin Clinic, and Coumadin must be taken after 5 pm. Diet plan is to avoid high vitamin K foods. Pt drinks alcohol on occasion, but does not plan to anytime soon.

## 2024-01-18 NOTE — ASSESSMENT & PLAN NOTE
38 y.o. female with a past medical history of MVP and MR s/p mitral annuloplasty in [place] (2003),  HTN, bradycardia now s/p Mechanical Mitral Valve Replacement on 1/8/2024 with Dr. Liu      Neuro/Psych:     - Sedation: n/a    - Pain:    - PRN liquid Acetaminophen 1g q6h              Cardiac:     - S/P Mechanical Mitral Valve Replacement with Dr. Liu on 1/8/2024    - BP Goal: MAP 60-80    - initially placed on Amio for suspected A-fib, d/c'd 1/9/24    - Pressors: off    - Anti-HTNs: Will start when appropriate     - Rhythm: Complete Heart block with occasional junctional     - Beta blocker: Hold, heart block     - EP placed PPM on 1/17/24    - V-wires to be pulled today    - Statin: Not indicated    - EKG daily    - INR goal 2.5-3.5   - Coumadin 4mg     Pulmonary:     - Goal SpO2 >92%    - on RA    - CXR stable    - Chest Tubes 2 Meds     - pulled on 1/11/24    - IS    - ABGs PRN      Renal:    - Trend BUN/Cr     - Soares out    Recent Labs   Lab 01/16/24  0252 01/17/24  0300 01/18/24  0249   BUN 10 10 11   CREATININE 0.6 0.6 0.6           FEN / GI:     - Daily CMP, PRN K/Mag/Phos per protocol     - Replace electrolytes as needed    - scheduled potassium supplement    - Nutrition: cardiac thin, meds crushed   - follow speech recs    - Bowel Regimen: Miralax, docusate      ID:     - Afebrile    - WBC elevated. No signs of infection. Will continue to monitor     - Abx: Completed perioperative cefazolin 2g Q8H x 5 doses    Recent Labs   Lab 01/16/24  0252 01/17/24  0300 01/18/24  0249   WBC 18.08* 17.68* 13.93*           Heme/Onc:     - Hgb 13.0 pre-operatively    - CBC daily    - ASA 81mg     - Heparin d/c'd as therapeutic INR achieved    - PO Coumadin 4mg QD, INR 3 (goal is 2.5-3.5)    - Transfuse Hb <7 and symptomatic anemia    - 1u pRBC on 1/9/24      Recent Labs   Lab 01/16/24  0252 01/16/24  1111 01/16/24  1956 01/17/24  0300 01/18/24  0249   HGB 9.7*  --   --  9.2* 8.8*   *  --   --  856 540    APTT 32.8*   < > 35.8* 32.5* 30.0   INR 1.9*   < > 1.9* 2.2* 2.9*    < > = values in this interval not displayed.           Endocrine:     - CTS Goal -140    - HgbA1c: 5.2    - Insulin SSI    - Endocrinology consulted for insulin management      PPx:   Feeding: Cardiac diet, Speech following  Analgesia/Sedation: Multimodal controlled  Thromboembolic Prevention: SCD's   HOB >30: Yes  Stress Ulcer: Famotidine  Glucose Control: Yes, insulin management per Endocrinology     Lines/Drains/Airway:              PIVs         Dispo/Code Status/Palliative:     - Discharge to home today    - Full Code

## 2024-01-18 NOTE — PROGRESS NOTES
HPI: King Botello is a 38 y.o. female with a past medical history of MVP and MR s/p mitral annuloplasty in [place] (2003),  HTN, bradycardia, PONV. Patient is followed by Dr. Cordero. Patient reports chronic fatigue and BLANK before pregnancy.  After delivery of baby in May, patient was discharged and a few days later readmitted for CHF exacerbation which improved with lasix. TTE LVEF 60-66%. moderate right atrial enlargement. LA severely dilated. There is moderate to severe mitral stenosis. Javier score is 10, the mean diastolic gradient across the mitral valve is 11 mmHg at a heart rate of 77 bpm; MVA 1.33.      The patient presents to the SICU s/p Mechanical Mitral Valve Replacement with Dr. Liu on 01/08/2024.    Patient was discharged home on 4mg daily and is therapeutic on day of discharge, therefore not planning to need Lovenox for bridging therapy. Patent's INR does appear to be uptrending quickly with 4mg daily, so will trial a lower weekly dose to see if maintains her therapeutic range.

## 2024-01-18 NOTE — PROGRESS NOTES
[facility] - [facility] Int[facility]  Cardiac Electrophysiology  Progress Note    Admission Date: 1/8/2024  Code Status: Full Code   Attending Physician: Thierno Liu MD   Expected Discharge Date: 1/19/2024  Principal Problem:Nonrheumatic mitral valve stenosis    Subjective:     Interval History:  No events overnight. Appears to be in atrial flutter but still with AV dissociation consistent with CHB.     Review of Systems   Constitutional: Positive for malaise/fatigue.   Cardiovascular:  Positive for dyspnea on exertion and palpitations. Negative for chest pain, irregular heartbeat, near-syncope and syncope.     Objective:     Vital Signs (Most Recent):  Temp: 97.8 °F (36.6 °C) (01/18/24 0700)  Pulse: 64 (01/18/24 0900)  Resp: (!) 39 (01/18/24 0900)  BP: 131/69 (01/18/24 0900)  SpO2: 99 % (01/18/24 0900) Vital Signs (24h Range):  Temp:  [97.5 °F (36.4 °C)-98.6 °F (37 °C)] 97.8 °F (36.6 °C)  Pulse:  [] 64  Resp:  [18-39] 39  SpO2:  [94 %-100 %] 99 %  BP: (107-175)/(57-83) 131/69     Weight: 80.3 kg (177 lb)  Body mass index is 33.44 kg/m².     SpO2: 99 %        Physical Exam  Vitals and nursing note reviewed.   Constitutional:       General: She is not in acute distress.     Appearance: She is not ill-appearing.   HENT:      Head: Normocephalic and atraumatic.      Right Ear: External ear normal.      Left Ear: External ear normal.      Mouth/Throat:      Mouth: Mucous membranes are moist.      Pharynx: No oropharyngeal exudate.   Eyes:      Extraocular Movements: Extraocular movements intact.      Pupils: Pupils are equal, round, and reactive to light.   Cardiovascular:      Rate and Rhythm: Normal rate and regular rhythm.      Pulses: Normal pulses.      Heart sounds: Normal heart sounds. No murmur heard.     Comments: Epicardial pacer wires in place  Pulmonary:      Effort: Pulmonary effort is normal. No respiratory distress.      Breath sounds: Normal breath sounds. No wheezing or rales.   Abdominal:       General: Abdomen is flat. Bowel sounds are normal. There is no distension.      Palpations: Abdomen is soft.      Tenderness: There is no abdominal tenderness.   Musculoskeletal:         General: No signs of injury. Normal range of motion.      Cervical back: Normal range of motion.      Right lower leg: No edema.      Left lower leg: No edema.   Skin:     General: Skin is warm and dry.   Neurological:      General: No focal deficit present.      Mental Status: She is alert and oriented to person, place, and time.      Sensory: No sensory deficit.      Motor: No weakness.   Psychiatric:         Mood and Affect: Mood normal.         Behavior: Behavior normal.         Thought Content: Thought content normal.            Significant Labs: EP:   Recent Labs   Lab 01/16/24 1956 01/17/24  0300 01/17/24  0300 01/18/24  0249   NA  --  134*  --  136   K  --  4.0  --  4.0   CL  --  100  --  101   CO2  --  25  --  24   GLU  --  103  --  91   BUN  --  10  --  11   CREATININE  --  0.6  --  0.6   CALCIUM  --  9.3  --  9.1   PROT  --  6.6  --  6.6   ALBUMIN  --  3.0*  --  3.0*   BILITOT  --  0.8  --  0.7   ALKPHOS  --  66  --  73   AST  --  22  --  29   ALT  --  16  --  18   ANIONGAP  --  9  --  11   WBC  --  17.68*  --  13.93*   HGB  --  9.2*  --  8.8*   HCT  --  30.1*   < > 27.9*   PLT  --  393  --  416   INR 1.9* 2.2*  --  2.9*    < > = values in this interval not displayed.         Significant Imaging:Reviewed  Assessment and Plan:     CHB (complete heart block)  Patient is POD 2 s/p MVR (revision with mechanical valve) and TV banding. Patient was found to be Sinus w/ CHB w/ a junctional escape rhythm this AM via tele and confirmed with 12 lead ECG. Review of Tele shows this arrhythm appearing as early as 1/08/2024. Patient received Amiodarone from 1/08/24 to 1/10/24. Currently having palpitations and BLANK, Unclear if related to this arrhythmia.    S/p successful MINERVA DCCV on 1/17, followed by dcPPM  placement    Recommendations  dcPPM on 1/17  Patient back in atrial flutter on 1/18. Anxious to leave, very stable and asymptomatic  Okay to discharge from EP standpoint. F/u in 4 weeks for evaluation for ablation  5 days abx post PPM placement        Connor M Gillies, MD  Cardiac Electrophysiology  [facility] - Surgical Intensive Care

## 2024-01-18 NOTE — NURSING
SICU PLAN OF CARE NOTE    Dx: Nonrheumatic mitral valve stenosis    Goals of Care: SBP <160    Vital Signs (last 12 hours):   Temp:  [97.5 °F (36.4 °C)-98.3 °F (36.8 °C)]   Pulse:  []   Resp:  [20-40]   BP: (100-145)/(66-83)   SpO2:  [95 %-100 %]      Neuro: AAO x4, Arouses to Voice, Follows Commands, and Moves All Extremities    Cardiac: NSR    Respiratory: Room Air    Urine Output: Voids Spontaneously 400 cc/shift    Diet: Cardiac Diet    Labs/Accuchecks: Daily labs    Skin:  All skin remains free from injury.  Patient turned Q2, waffle mattress inflated, and bed working correctly.    Shift Events:  Patient tolerated MINERVA w/ cardioversion and pacemaker placement. Patient to possibly d/c home tomorrow.  See flowsheet for further assessment/details.  Family updated on current condition/plan of care, questions answered, and emotional support provided.  MD updated on current condition, vitals, labs, and gtts.  No new orders received, will continue to monitor.

## 2024-01-18 NOTE — PLAN OF CARE
In basket message sent to Dr. Cordero's staff alerting to pt's discharge and need for Coumadin follow.      Vince Clancy RN CM  Case Management  [id number]

## 2024-01-18 NOTE — PLAN OF CARE
01/18/24 1204   Post-Acute Status   Post-Acute Authorization Home Health   Home Health Status Referrals Sent     Met with patient to review discharge recommendation of home health and is agreeable to plan    Patient/family provided list of facilities in-network with patient's payor plan. Providers that are owned, operated, or affiliated with [facility] are included on the list.     Notified that referral sent to below listed facilities from in-network list based on proximity to home/family support:   [facility]   2. [facility]       Patient/family instructed to identify preference.    Preferred Facility: (if more than 1, listed in order of descending preference)  [facility]    If an additional preferred facility not listed above is identified, additional referral to be sent. If above facilities unable to accept, will send additional referrals to in-network providers.     Gerald Posadas LMSW  Case Management [facility]

## 2024-01-18 NOTE — PROGRESS NOTES
Food & Nutrition  Education     Diet Education: Vit K in foods  Time Spent: 5 minutes   Learners: Patient      Handouts provided: Vitamin K in Foods     Comments: Discussed importance of a consistent Vit K intake. Discussed the foods that are higher in Vit K. Also discussed that cooking and freezing does affect Vit K levels in the food.  General healthy diet encouraged. All questions/concerns were addressed.     Follow-Up: Yes     Please Re-consult as needed.   Thanks!    Genna Powell, MS, RD, LDN

## 2024-01-18 NOTE — PLAN OF CARE
01/18/24 1243   Post-Acute Status   Post-Acute Authorization Home Health   Home Health Status Set-up Complete/Auth obtained     [facility] has accepted this patient.     1:22 PM  The SW met with the patient at bedside and provided a updated regarding [facility] acceptance for home health services.      Gerald Posadas LMSW  Case Management [facility]

## 2024-01-18 NOTE — HOSPITAL COURSE
On 01/08/24, the patient was taken to the Operating Room for the above stated procedure. Please see the previously dictated operative report for complete details. Postoperatively, the patient was taken from the Operating Room to the ICU where the vital signs were monitored and pain was kept under control. The patient was weaned from the drips and extubated in the ICU per protocol. Patient developed complete heart block post-op, requiring PPM insertion with EP. Once hemodynamically stable, the patient continued working on strengthening and ambulation. On postoperative day 10, the patient was ready for discharge to home. At the time of discharge, the patient was ambulating unassisted. Pain was well controlled with oral analgesics and the patient was tolerating the diet. MOBILITY AND ACTIVITY: As tolerated. Patient may shower. No heavy lifting of greater than 5 pounds and no driving. DIET: An 1800-calorie ADA with a 1500 mL fluid restriction. WOUND CARE INSTRUCTIONS: Check for redness, swelling and drainage around the incision or wound. Patient is to call for any obvious bleeding, drainage, pus from the wound, unusual problems or difficulties or temperature of greater than 101 degrees. FOLLOWUP: Follow up with Dr. Liu in approximately 3 weeks. Prior to this appointment, the patient will have a chest x-ray and EKG. Follow up with [facility] within 4 weeks. DISCHARGE CONDITION: At the time of discharge, the patient was in sinus rhythm and afebrile with stable vital signs.

## 2024-01-18 NOTE — DISCHARGE SUMMARY
[facility] - [facility]  Critical Care - Surgery  Discharge Summary      Patient Name: King Botello  MRN: [id number]  Admission Date: 1/8/2024  Hospital Length of Stay: 10 days  Discharge Date and Time:  01/18/2024 12:24 PM  Attending Physician: Thierno Liu MD   Discharging Provider: Umer Herrera MD  Primary Care Provider: Mayco Hansen MD    HPI:  King Botello is a 38 y.o. female with a past medical history of MVP and MR s/p mitral annuloplasty in [place] (2003),  HTN, bradycardia, PONV. Patient is followed by Dr. Cordero. Patient reports chronic fatigue and BLANK before pregnancy.  After delivery of baby in May, patient was discharged and a few days later readmitted for CHF exacerbation which improved with lasix. TTE LVEF 60-66%. moderate right atrial enlargement. LA severely dilated. There is moderate to severe mitral stenosis. Javier score is 10, the mean diastolic gradient across the mitral valve is 11 mmHg at a heart rate of 77 bpm; MVA 1.33.     The patient presents to the SICU s/p Mechanical Mitral Valve Replacement with Dr. Liu on 01/08/2024. On admission, they are intubated, sedated with propofol, and in stable condition. Inotropic and vasopressor requirements upon admission are 0.04 mcg/kg/min of epinephrine, 0.02 mcg/kg/min of norepinephrine, to maintain blood pressure at a MAP 60-80. Central access includes a RIJ CVC, arterial access includes a left radial arterial line. They also have 1 pleural and 2 mediastinal chest tubes with appropriate output.    Intraoperatively, they received 3.8L of crystalloid, 575 of cell saver, 2 units of Autologous whole blood. Urine output intraoperatively was 625cc. The pre-operative echocardiogram was notable for Mitral stenosis, moderate to severe tricuspid regurgitation, and a mild AI Jet. Post-operative echo was resolution of Mitral stenosis with mean gradient of 5, resolution of tricuspid regurgitation.    Immediate  post-operative plans include hemodynamic stabilization and weaning of cardiac and respiratory support. Plan to wean vasopressors and inotropes as tolerated, wean ventilator support with goal of early extubation, and closely monitor fluid status with strict Is/Os and continued fluid resuscitation as needed.      Procedure(s) (LRB):  INSERTION, CARDIAC PACEMAKER, DUAL CHAMBER (N/A)  Cardioversion or Defibrillation (N/A)  ECHOCARDIOGRAM, TRANSESOPHAGEAL (N/A)    Indwelling Lines/Drains at Time of Discharge:   Lines/Drains/Airways       None                   Hospital Course:  On 01/08/24, the patient was taken to the Operating Room for the above stated procedure. Please see the previously dictated operative report for complete details. Postoperatively, the patient was taken from the Operating Room to the ICU where the vital signs were monitored and pain was kept under control. The patient was weaned from the drips and extubated in the ICU per protocol. Patient developed complete heart block post-op, requiring PPM insertion with EP. Once hemodynamically stable, the patient continued working on strengthening and ambulation. On postoperative day 10, the patient was ready for discharge to home. At the time of discharge, the patient was ambulating unassisted. Pain was well controlled with oral analgesics and the patient was tolerating the diet. MOBILITY AND ACTIVITY: As tolerated. Patient may shower. No heavy lifting of greater than 5 pounds and no driving. DIET: An 1800-calorie ADA with a 1500 mL fluid restriction. WOUND CARE INSTRUCTIONS: Check for redness, swelling and drainage around the incision or wound. Patient is to call for any obvious bleeding, drainage, pus from the wound, unusual problems or difficulties or temperature of greater than 101 degrees. FOLLOWUP: Follow up with Dr. Liu in approximately 3 weeks. Prior to this appointment, the patient will have a chest x-ray and EKG. Follow up with Dr. [facility] within 4 weeks. DISCHARGE CONDITION: At the time of discharge, the patient was in sinus rhythm and afebrile with stable vital signs.     Goals of Care Treatment Preferences:  Code Status: Full Code      Consults (From admission, onward)          Status Ordering Provider     Inpatient consult to Midline team  Once        Provider:  (Not yet assigned)    Completed MARY KENDALL     Inpatient consult to Cardiac Rehab  Once        Provider:  (Not yet assigned)    Completed ALEC CLINTON     Inpatient consult to Registered Dietitian/Nutritionist  Once        Provider:  (Not yet assigned)    Completed ALEC CLINTON     Inpatient consult to Midline team  Once        Provider:  (Not yet assigned)    Completed CONSUEOL KOWALSKI     Inpatient consult to Electrophysiology  Once        Provider:  (Not yet assigned)    Completed GARFIELD ROTHMAN     Consult to Endocrinology  Once        Provider:  (Not yet assigned)    Completed ALEC CLINTON     Consult Case Management/Social Work  Once        Provider:  (Not yet assigned)    Completed ALEC CLINTON.            Significant Labs:  All pertinent labs within the past 24 hours have been reviewed.    Significant Imaging:  I have reviewed all pertinent imaging results/findings within the past 24 hours.    Pending Diagnostic Studies:       None          Final Active Diagnoses:    Diagnosis Date Noted POA    PRINCIPAL PROBLEM:  Nonrheumatic mitral valve stenosis [I34.2] 02/12/2023 Yes    CHB (complete heart block) [I44.2] 01/10/2024 No    Acute postoperative anemia due to expected blood loss [D62] 01/08/2024 No    Transient hyperglycemia post procedure [R73.9] 01/08/2024 No    S/P MVR (mitral valve replacement) [Z95.2] 01/08/2024 Not Applicable    Hypertension, essential [I10] 03/16/2021 Yes    Mitral valve prolapse [I34.1] 07/25/2012 Yes     Chronic      Problems Resolved During this Admission:       Discharged Condition:  stable    Disposition:     Follow Up:   Follow-up Information       [facility] Follow up in 1 week(s).               CRISTI Moore MD Follow up in 3 month(s).    Specialty: Electrophysiology  Contact information:  [address]  [facility] [address]  [phone]               Coumadin Clinic. Call.    Why: As needed  Contact information:  [phone]                         Patient Instructions:      Ambulatory referral/consult to Outpatient Case Management   Referral Priority: Routine Referral Type: Consultation   Referral Reason: Specialty Services Required   Number of Visits Requested: 1     Ambulatory referral/consult to Anticoagulation Monitoring (PHARMACIST MANAGED)   Standing Status: Future   Referral Priority: Routine Referral Type: Consultation   Referral Reason: Specialty Services Required   Requested Specialty: Cardiology   Number of Visits Requested: 1     Ambulatory referral/consult to Electrophysiology   Standing Status: Future   Referral Priority: Routine Referral Type: Consultation   Referral Reason: Specialty Services Required   Referred to Provider: CRISTI MOORE Requested Specialty: Electrophysiology   Number of Visits Requested: 1     Ambulatory referral/consult to Home Health   Standing Status: Future   Referral Priority: Routine Referral Type: Home Health   Referral Reason: Specialty Services Required   Requested Specialty: Home Health Services   Number of Visits Requested: 1     Medications:  Reconciled Home Medications:      Medication List        START taking these medications      acetaminophen 500 MG tablet  Commonly known as: TYLENOL  Take 2 tablets (1,000 mg total) by mouth every 8 (eight) hours as needed for Pain.     aspirin 81 MG EC tablet  Commonly known as: ECOTRIN  Take 1 tablet (81 mg total) by mouth once daily.     docusate sodium 100 MG capsule  Commonly known as: COLACE  Take 1 capsule (100 mg total) by mouth 2 (two) times daily as needed for  Constipation.     famotidine 20 MG tablet  Commonly known as: PEPCID  Take 1 tablet (20 mg total) by mouth 2 (two) times daily.     LIDOcaine 5 %  Commonly known as: LIDODERM  Place 1 patch onto the skin daily as needed (pain). Cut in half and place on sides of surgical incision, do not place on top. Remove & Discard patch within 12 hours or as directed by MD     ondansetron 4 MG tablet  Commonly known as: ZOFRAN  Take 2 tablets (8 mg total) by mouth every 6 (six) hours as needed for Nausea.     oxyCODONE 5 MG immediate release tablet  Commonly known as: ROXICODONE  Take 1 tablet (5 mg total) by mouth every 4 (four) hours as needed for Pain.     potassium chloride 10 MEQ Cpsr  Commonly known as: MICRO-K  Take 4 tablet by mouth twice daily for 7 days then decrease to two tab daily  Replaces: potassium chloride SA 10 MEQ tablet     warfarin 1 MG tablet  Commonly known as: COUMADIN  Take 4 tablets (4 mg total) by mouth Daily.            CHANGE how you take these medications      furosemide 20 MG tablet  Commonly known as: LASIX  Take one tablet by mouth twice daily for 7 days then decrease to once daily  What changed: additional instructions            STOP taking these medications      amoxicillin 500 MG Tab  Commonly known as: AMOXIL     metoprolol succinate 25 MG 24 hr tablet  Commonly known as: TOPROL-XL     potassium chloride SA 10 MEQ tablet  Commonly known as: EMI-DUR,KLOR-CON M  Replaced by: potassium chloride 10 MEQ Cpsr              Umer Herrera MD  Critical Care - Surgery  [facility] - Surgical Int[facility]

## 2024-01-18 NOTE — SUBJECTIVE & OBJECTIVE
Interval History/Significant Events: NAEON. PPM placed yesterday with EP. Appears to functioning appropriately. INR this morning is 2.9. Will continue with coumadin 4mg outpatient. Will pull v-wires today and plan for d/c pending EP approval.    Follow-up For: Procedure(s) (LRB):  INSERTION, CARDIAC PACEMAKER, DUAL CHAMBER (N/A)  Cardioversion or Defibrillation (N/A)  ECHOCARDIOGRAM, TRANSESOPHAGEAL (N/A)    Post-Operative Day: 1 Day Post-Op    Objective:     Vital Signs (Most Recent):  Temp: 98.6 °F (37 °C) (01/18/24 0300)  Pulse: 64 (01/18/24 0855)  Resp: (!) 23 (01/18/24 0855)  BP: 126/64 (01/18/24 0530)  SpO2: 99 % (01/18/24 0855) Vital Signs (24h Range):  Temp:  [97.5 °F (36.4 °C)-98.6 °F (37 °C)] 98.6 °F (37 °C)  Pulse:  [] 64  Resp:  [18-32] 23  SpO2:  [94 %-100 %] 99 %  BP: (107-175)/(57-83) 126/64     Weight: 80.3 kg (177 lb)  Body mass index is 33.44 kg/m².      Intake/Output Summary (Last 24 hours) at 1/18/2024 0949  Last data filed at 1/17/2024 2100  Gross per 24 hour   Intake 1200 ml   Output 850 ml   Net 350 ml          Physical Exam  Vitals and nursing note reviewed.   Constitutional:       General: She is not in acute distress.     Appearance: Normal appearance. She is obese. She is not toxic-appearing.      Interventions: She is sedated.   HENT:      Head: Normocephalic and atraumatic.      Right Ear: External ear normal.      Left Ear: External ear normal.      Nose: Nose normal.   Eyes:      General: No scleral icterus.        Right eye: No discharge.         Left eye: No discharge.      Extraocular Movements: Extraocular movements intact.   Cardiovascular:      Rate and Rhythm: Normal rate and regular rhythm.      Pulses: Normal pulses.      Comments: V Wires    Dressing from recent PPM.  Pulmonary:      Effort: Pulmonary effort is normal. No respiratory distress.   Abdominal:      General: Abdomen is flat. There is no distension.      Palpations: Abdomen is soft.      Tenderness: There is  no abdominal tenderness.   Musculoskeletal:         General: No swelling. Normal range of motion.      Cervical back: Normal range of motion.      Right lower leg: No edema.      Left lower leg: No edema.      Comments: Sling on left arm while recovering from PPM placement   Skin:     General: Skin is warm and dry.      Comments: MSI CDI     Neurological:      General: No focal deficit present.      Mental Status: She is alert and oriented to person, place, and time. Mental status is at baseline.   Psychiatric:         Mood and Affect: Mood normal.         Behavior: Behavior normal.         Thought Content: Thought content normal.          Vents:  Vent Mode: Spont (01/08/24 2052)  Ventilator Initiated: Yes (01/08/24 1427)  Set Rate: 20 BPM (01/08/24 1804)  Vt Set: 380 mL (01/08/24 1804)  PEEP/CPAP: 5 cmH20 (01/08/24 2052)  Oxygen Concentration (%): 97 (01/15/24 0600)  Peak Airway Pressure: 9.9 cmH20 (01/08/24 2052)  Plateau Pressure: 0 cmH20 (01/08/24 2052)  Total Ve: 7.34 L/m (01/08/24 2052)  Negative Inspiratory Force (cm H2O): -15 (01/08/24 2137)  F/VT Ratio<105 (RSBI): (!) 96.3 (01/08/24 2052)    Lines/Drains/Airways       Drain  Duration             Female External Urinary Catheter w/ Suction 01/17/24 1853 <1 day              Line  Duration                  Pacer Wires 01/08/24 1259 9 days              Peripheral Intravenous Line  Duration                  Peripheral IV - Single Lumen 01/13/24 0330 22 G Posterior;Right Wrist 5 days         Peripheral IV - Single Lumen 01/16/24 1055 20 G;1 3/4 in Anterior;Left Forearm 1 day                    Significant Labs:    CBC/Anemia Profile:  Recent Labs   Lab 01/17/24  0300 01/18/24  0249   WBC 17.68* 13.93*   HGB 9.2* 8.8*   HCT 30.1* 27.9*    416   MCV 88 86   RDW 16.3* 16.2*        Chemistries:  Recent Labs   Lab 01/17/24  0300 01/18/24  0249   * 136   K 4.0 4.0    101   CO2 25 24   BUN 10 11   CREATININE 0.6 0.6   CALCIUM 9.3 9.1   ALBUMIN 3.0*  3.0*   PROT 6.6 6.6   BILITOT 0.8 0.7   ALKPHOS 66 73   ALT 16 18   AST 22 29   MG 2.1 2.1   PHOS 3.7 3.6       All pertinent labs within the past 24 hours have been reviewed.    Significant Imaging:  I have reviewed all pertinent imaging results/findings within the past 24 hours.

## 2024-01-18 NOTE — PROGRESS NOTES
[facility]  Critical Care - Surgery  Progress Note    Patient Name: King Botello  MRN: [id number]  Admission Date: 1/8/2024  Hospital Length of Stay: 10 days  Code Status: Full Code  Attending Provider: Thierno Liu MD  Primary Care Provider: Mayco Hansen MD   Principal Problem: Nonrheumatic mitral valve stenosis    Subjective:       Interval History/Significant Events: NAEON. PPM placed yesterday with EP. Appears to functioning appropriately. INR this morning is 2.9. Will continue with coumadin 4mg outpatient. Will pull v-wires today and plan for d/c pending EP approval.    Follow-up For: Procedure(s) (LRB):  INSERTION, CARDIAC PACEMAKER, DUAL CHAMBER (N/A)  Cardioversion or Defibrillation (N/A)  ECHOCARDIOGRAM, TRANSESOPHAGEAL (N/A)    Post-Operative Day: 1 Day Post-Op    Objective:     Vital Signs (Most Recent):  Temp: 98.6 °F (37 °C) (01/18/24 0300)  Pulse: 64 (01/18/24 0855)  Resp: (!) 23 (01/18/24 0855)  BP: 126/64 (01/18/24 0530)  SpO2: 99 % (01/18/24 0855) Vital Signs (24h Range):  Temp:  [97.5 °F (36.4 °C)-98.6 °F (37 °C)] 98.6 °F (37 °C)  Pulse:  [] 64  Resp:  [18-32] 23  SpO2:  [94 %-100 %] 99 %  BP: (107-175)/(57-83) 126/64     Weight: 80.3 kg (177 lb)  Body mass index is 33.44 kg/m².      Intake/Output Summary (Last 24 hours) at 1/18/2024 0949  Last data filed at 1/17/2024 2100  Gross per 24 hour   Intake 1200 ml   Output 850 ml   Net 350 ml          Physical Exam  Vitals and nursing note reviewed.   Constitutional:       General: She is not in acute distress.     Appearance: Normal appearance. She is obese. She is not toxic-appearing.      Interventions: She is sedated.   HENT:      Head: Normocephalic and atraumatic.      Right Ear: External ear normal.      Left Ear: External ear normal.      Nose: Nose normal.   Eyes:      General: No scleral icterus.        Right eye: No discharge.         Left eye: No discharge.      Extraocular Movements:  Extraocular movements intact.   Cardiovascular:      Rate and Rhythm: Normal rate and regular rhythm.      Pulses: Normal pulses.      Comments: V Wires    Dressing from recent PPM.  Pulmonary:      Effort: Pulmonary effort is normal. No respiratory distress.   Abdominal:      General: Abdomen is flat. There is no distension.      Palpations: Abdomen is soft.      Tenderness: There is no abdominal tenderness.   Musculoskeletal:         General: No swelling. Normal range of motion.      Cervical back: Normal range of motion.      Right lower leg: No edema.      Left lower leg: No edema.      Comments: Sling on left arm while recovering from PPM placement   Skin:     General: Skin is warm and dry.      Comments: MSI CDI     Neurological:      General: No focal deficit present.      Mental Status: She is alert and oriented to person, place, and time. Mental status is at baseline.   Psychiatric:         Mood and Affect: Mood normal.         Behavior: Behavior normal.         Thought Content: Thought content normal.          Vents:  Vent Mode: Spont (01/08/24 2052)  Ventilator Initiated: Yes (01/08/24 1427)  Set Rate: 20 BPM (01/08/24 1804)  Vt Set: 380 mL (01/08/24 1804)  PEEP/CPAP: 5 cmH20 (01/08/24 2052)  Oxygen Concentration (%): 97 (01/15/24 0600)  Peak Airway Pressure: 9.9 cmH20 (01/08/24 2052)  Plateau Pressure: 0 cmH20 (01/08/24 2052)  Total Ve: 7.34 L/m (01/08/24 2052)  Negative Inspiratory Force (cm H2O): -15 (01/08/24 2137)  F/VT Ratio<105 (RSBI): (!) 96.3 (01/08/24 2052)    Lines/Drains/Airways       Drain  Duration             Female External Urinary Catheter w/ Suction 01/17/24 1853 <1 day              Line  Duration                  Pacer Wires 01/08/24 1259 9 days              Peripheral Intravenous Line  Duration                  Peripheral IV - Single Lumen 01/13/24 0330 22 G Posterior;Right Wrist 5 days         Peripheral IV - Single Lumen 01/16/24 1055 20 G;1 3/4 in Anterior;Left Forearm 1 day                     Significant Labs:    CBC/Anemia Profile:  Recent Labs   Lab 01/17/24  0300 01/18/24  0249   WBC 17.68* 13.93*   HGB 9.2* 8.8*   HCT 30.1* 27.9*    416   MCV 88 86   RDW 16.3* 16.2*        Chemistries:  Recent Labs   Lab 01/17/24  0300 01/18/24  0249   * 136   K 4.0 4.0    101   CO2 25 24   BUN 10 11   CREATININE 0.6 0.6   CALCIUM 9.3 9.1   ALBUMIN 3.0* 3.0*   PROT 6.6 6.6   BILITOT 0.8 0.7   ALKPHOS 66 73   ALT 16 18   AST 22 29   MG 2.1 2.1   PHOS 3.7 3.6       All pertinent labs within the past 24 hours have been reviewed.    Significant Imaging:  I have reviewed all pertinent imaging results/findings within the past 24 hours.   Assessment/Plan:     Cardiac/Vascular  * Nonrheumatic mitral valve stenosis  38 y.o. female with a past medical history of MVP and MR s/p mitral annuloplasty in [place] (2003),  HTN, bradycardia now s/p Mechanical Mitral Valve Replacement on 1/8/2024 with Dr. Liu      Neuro/Psych:     - Sedation: n/a    - Pain:    - PRN liquid Acetaminophen 1g q6h              Cardiac:     - S/P Mechanical Mitral Valve Replacement with Dr. Liu on 1/8/2024    - BP Goal: MAP 60-80    - initially placed on Amio for suspected A-fib, d/c'd 1/9/24    - Pressors: off    - Anti-HTNs: Will start when appropriate     - Rhythm: Complete Heart block with occasional junctional     - Beta blocker: Hold, heart block     - EP placed PPM on 1/17/24    - V-wires to be pulled today    - Statin: Not indicated    - EKG daily    - INR goal 2.5-3.5   - Coumadin 4mg     Pulmonary:     - Goal SpO2 >92%    - on RA    - CXR stable    - Chest Tubes 2 Meds     - pulled on 1/11/24    - IS    - ABGs PRN      Renal:    - Trend BUN/Cr     - Soares out    Recent Labs   Lab 01/16/24  0252 01/17/24  0300 01/18/24  0249   BUN 10 10 11   CREATININE 0.6 0.6 0.6           FEN / GI:     - Daily CMP, PRN K/Mag/Phos per protocol     - Replace electrolytes as needed    - scheduled potassium supplement    -  Nutrition: cardiac thin, meds crushed   - follow speech recs    - Bowel Regimen: Miralax, docusate      ID:     - Afebrile    - WBC elevated. No signs of infection. Will continue to monitor     - Abx: Completed perioperative cefazolin 2g Q8H x 5 doses    Recent Labs   Lab 01/16/24  0252 01/17/24  0300 01/18/24  0249   WBC 18.08* 17.68* 13.93*           Heme/Onc:     - Hgb 13.0 pre-operatively    - CBC daily    - ASA 81mg     - Heparin d/c'd as therapeutic INR achieved    - PO Coumadin 4mg QD, INR 3 (goal is 2.5-3.5)    - Transfuse Hb <7 and symptomatic anemia    - 1u pRBC on 1/9/24      Recent Labs   Lab 01/16/24 0252 01/16/24  1111 01/16/24  1956 01/17/24  0300 01/18/24  0249   HGB 9.7*  --   --  9.2* 8.8*   *  --   --  393 416   APTT 32.8*   < > 35.8* 32.5* 30.0   INR 1.9*   < > 1.9* 2.2* 2.9*    < > = values in this interval not displayed.           Endocrine:     - CTS Goal -140    - HgbA1c: 5.2    - Insulin SSI    - Endocrinology consulted for insulin management      PPx:   Feeding: Cardiac diet, Speech following  Analgesia/Sedation: Multimodal controlled  Thromboembolic Prevention: SCD's   HOB >30: Yes  Stress Ulcer: Famotidine  Glucose Control: Yes, insulin management per Endocrinology     Lines/Drains/Airway:              PIVs         Dispo/Code Status/Palliative:     - Discharge to home today    - Full Code         Critical care was time spent personally by me on the following activities: development of treatment plan with patient or surrogate and bedside caregivers, discussions with consultants, evaluation of patient's response to treatment, examination of patient, ordering and performing treatments and interventions, ordering and review of laboratory studies, ordering and review of radiographic studies, pulse oximetry, re-evaluation of patient's condition.  This critical care time did not overlap with that of any other provider or involve time for any procedures.     Umer Herrera,  MD  Critical Care - Surgery  [facility]

## 2024-01-18 NOTE — PLAN OF CARE
[facility]  Discharge Final Note    Primary Care Provider: Mayco Hansen MD    Expected Discharge Date: 1/18/2024    Final Discharge Note (most recent)       Final Note - 01/18/24 1404          Final Note    Assessment Type Final Discharge Note     Anticipated Discharge Disposition Home-Health Care [facility] Resources/Appts/Education Provided Post-Acute resouces added to AVS        Post-Acute Status    Post-Acute Authorization Home Health;Other     Home Health Status Referrals Sent     Other Status --   Coumadin Clinic                    Important Message from Medicare      The patient was set-up with [facility] and [facility].       Gerald Posadas LMSW  Case Management [facility]

## 2024-01-18 NOTE — NURSING
AVS printed and reviewed with pt and spouse. Copy of AVS sent with patient. All questions answered. All present PIV d/c. No signs of distress or discomfort noted at discharge. All medications received from in patient pharmacy and sent with pt at discharge. All vss at time of discharge.

## 2024-01-18 NOTE — SUBJECTIVE & OBJECTIVE
Interval History:  No events overnight. Appears to be in atrial flutter but still with AV dissociation consistent with CHB.     Review of Systems   Constitutional: Positive for malaise/fatigue.   Cardiovascular:  Positive for dyspnea on exertion and palpitations. Negative for chest pain, irregular heartbeat, near-syncope and syncope.     Objective:     Vital Signs (Most Recent):  Temp: 97.8 °F (36.6 °C) (01/18/24 0700)  Pulse: 64 (01/18/24 0900)  Resp: (!) 39 (01/18/24 0900)  BP: 131/69 (01/18/24 0900)  SpO2: 99 % (01/18/24 0900) Vital Signs (24h Range):  Temp:  [97.5 °F (36.4 °C)-98.6 °F (37 °C)] 97.8 °F (36.6 °C)  Pulse:  [] 64  Resp:  [18-39] 39  SpO2:  [94 %-100 %] 99 %  BP: (107-175)/(57-83) 131/69     Weight: 80.3 kg (177 lb)  Body mass index is 33.44 kg/m².     SpO2: 99 %        Physical Exam  Vitals and nursing note reviewed.   Constitutional:       General: She is not in acute distress.     Appearance: She is not ill-appearing.   HENT:      Head: Normocephalic and atraumatic.      Right Ear: External ear normal.      Left Ear: External ear normal.      Mouth/Throat:      Mouth: Mucous membranes are moist.      Pharynx: No oropharyngeal exudate.   Eyes:      Extraocular Movements: Extraocular movements intact.      Pupils: Pupils are equal, round, and reactive to light.   Cardiovascular:      Rate and Rhythm: Normal rate and regular rhythm.      Pulses: Normal pulses.      Heart sounds: Normal heart sounds. No murmur heard.     Comments: Epicardial pacer wires in place  Pulmonary:      Effort: Pulmonary effort is normal. No respiratory distress.      Breath sounds: Normal breath sounds. No wheezing or rales.   Abdominal:      General: Abdomen is flat. Bowel sounds are normal. There is no distension.      Palpations: Abdomen is soft.      Tenderness: There is no abdominal tenderness.   Musculoskeletal:         General: No signs of injury. Normal range of motion.      Cervical back: Normal range of  motion.      Right lower leg: No edema.      Left lower leg: No edema.   Skin:     General: Skin is warm and dry.   Neurological:      General: No focal deficit present.      Mental Status: She is alert and oriented to person, place, and time.      Sensory: No sensory deficit.      Motor: No weakness.   Psychiatric:         Mood and Affect: Mood normal.         Behavior: Behavior normal.         Thought Content: Thought content normal.            Significant Labs: EP:   Recent Labs   Lab 01/16/24 1956 01/17/24  0300 01/17/24  0300 01/18/24  0249   NA  --  134*  --  136   K  --  4.0  --  4.0   CL  --  100  --  101   CO2  --  25  --  24   GLU  --  103  --  91   BUN  --  10  --  11   CREATININE  --  0.6  --  0.6   CALCIUM  --  9.3  --  9.1   PROT  --  6.6  --  6.6   ALBUMIN  --  3.0*  --  3.0*   BILITOT  --  0.8  --  0.7   ALKPHOS  --  66  --  73   AST  --  22  --  29   ALT  --  16  --  18   ANIONGAP  --  9  --  11   WBC  --  17.68*  --  13.93*   HGB  --  9.2*  --  8.8*   HCT  --  30.1*   < > 27.9*   PLT  --  393  --  416   INR 1.9* 2.2*  --  2.9*    < > = values in this interval not displayed.         Significant Imaging:Reviewed

## 2024-01-18 NOTE — PLAN OF CARE
11:44 AM  The SW contacted the [facility] at [phone] to follow-up regarding the patient's Coumadin follow-up with the clinic. The representative confirmed receipt that the [facility] had confirmed receipt of this patient's Coumadin referral.  The SW informed the representative that this patient is scheduled for d/c today 1-18-24. The representative reported that she was going to work up the patient's paperwork and contact the patient directly.       Gerald Posadas LMSW  Case Management [facility]

## 2024-01-18 NOTE — PT/OT/SLP PROGRESS
Physical Therapy      Patient Name:  King Botello   MRN:  [id number]    Patient not seen today secondary to  (pt not seen due to discharge orders in chart and pt gait training over 400ft.).     1/18/2024

## 2024-01-19 ENCOUNTER — TELEPHONE (OUTPATIENT)
Dept: CARDIOTHORACIC SURGERY | Facility: CLINIC | Age: 39
End: 2024-01-19
Payer: COMMERCIAL

## 2024-01-19 ENCOUNTER — PATIENT MESSAGE (OUTPATIENT)
Dept: CARDIOTHORACIC SURGERY | Facility: CLINIC | Age: 39
End: 2024-01-19
Payer: COMMERCIAL

## 2024-01-19 DIAGNOSIS — I44.2 CHB (COMPLETE HEART BLOCK): ICD-10-CM

## 2024-01-19 DIAGNOSIS — Z95.0 CARDIAC PACEMAKER IN SITU: Primary | ICD-10-CM

## 2024-01-19 RX ORDER — ACETAMINOPHEN 160 MG/5ML
650 LIQUID ORAL EVERY 6 HOURS PRN
Qty: 236 ML | Refills: 0 | Status: SHIPPED | OUTPATIENT
Start: 2024-01-19 | End: 2024-01-31 | Stop reason: SDUPTHER

## 2024-01-19 NOTE — TELEPHONE ENCOUNTER
Called pt following hospital discharge.  Reiterated the need for pt to clean her incision everyday with soap and water, and to walk as much as she can.  Reminded pt not to drive for the first 4 weeks after surgery, and to refrain from lifting, pushing, and pulling anything greater than 5 pounds for the first 6 weeks following her surgery.  Instructed pt to perform daily weights.  Pt instructed to notify the clinic if she has a weight gain greater than 3 pounds in one day.  Pt instructed to call the clinic with any questions or concerns.  Pt verbalized understanding.

## 2024-01-22 ENCOUNTER — PATIENT MESSAGE (OUTPATIENT)
Dept: CARDIOTHORACIC SURGERY | Facility: CLINIC | Age: 39
End: 2024-01-22
Payer: COMMERCIAL

## 2024-01-22 ENCOUNTER — ANTI-COAG VISIT (OUTPATIENT)
Dept: CARDIOLOGY | Facility: CLINIC | Age: 39
End: 2024-01-22
Payer: COMMERCIAL

## 2024-01-22 DIAGNOSIS — Z79.01 LONG TERM (CURRENT) USE OF ANTICOAGULANTS: Primary | ICD-10-CM

## 2024-01-22 LAB — INR PPP: 2.7

## 2024-01-22 PROCEDURE — 93793 ANTICOAG MGMT PT WARFARIN: CPT | Mod: S$GLB,,,

## 2024-01-22 RX ORDER — POTASSIUM CHLORIDE 750 MG/1
20 TABLET, EXTENDED RELEASE ORAL DAILY
Qty: 42 TABLET | Refills: 0 | Status: SHIPPED | OUTPATIENT
Start: 2024-01-22 | End: 2024-01-25

## 2024-01-22 NOTE — PROGRESS NOTES
[facility]    01/22/2024 2:08 PM    King Botello (38 y.o.) is followed by the [facility].      Assessment/Plan:    King Botello presents today with therapeutic INR. Goal INR 2.5-3.5    Assessment of patient findings per MA/LPN and chart review:   The following significant findings were found:  None    Recommendation for patient's warfarin regimen:   No change was made to warfarin therapy during this visit and patient has been instructed to continue their current warfarin regimen.    Recommended repeat INR in 3 days      Laine Glynn, PharmD, BCPS  Clinical Pharmacist - Coumadin Clinic  Preferred Contact: Secure Messaging or In Basket Message

## 2024-01-24 ENCOUNTER — CLINICAL SUPPORT (OUTPATIENT)
Dept: CARDIOLOGY | Facility: HOSPITAL | Age: 39
End: 2024-01-24
Attending: STUDENT IN AN ORGANIZED HEALTH CARE EDUCATION/TRAINING PROGRAM
Payer: COMMERCIAL

## 2024-01-24 ENCOUNTER — PATIENT MESSAGE (OUTPATIENT)
Dept: CARDIOTHORACIC SURGERY | Facility: CLINIC | Age: 39
End: 2024-01-24
Payer: COMMERCIAL

## 2024-01-24 ENCOUNTER — ANTI-COAG VISIT (OUTPATIENT)
Dept: CARDIOLOGY | Facility: CLINIC | Age: 39
End: 2024-01-24
Payer: COMMERCIAL

## 2024-01-24 ENCOUNTER — OUTPATIENT CASE MANAGEMENT (OUTPATIENT)
Dept: ADMINISTRATIVE | Facility: OTHER | Age: 39
End: 2024-01-24
Payer: COMMERCIAL

## 2024-01-24 DIAGNOSIS — I48.92 UNSPECIFIED ATRIAL FLUTTER: ICD-10-CM

## 2024-01-24 DIAGNOSIS — Z79.01 LONG TERM (CURRENT) USE OF ANTICOAGULANTS: Primary | ICD-10-CM

## 2024-01-24 DIAGNOSIS — Z95.0 PACEMAKER: ICD-10-CM

## 2024-01-24 DIAGNOSIS — I44.2 CHB (COMPLETE HEART BLOCK): ICD-10-CM

## 2024-01-24 DIAGNOSIS — Z95.0 CARDIAC PACEMAKER IN SITU: ICD-10-CM

## 2024-01-24 DIAGNOSIS — Z95.0 PACEMAKER: Primary | ICD-10-CM

## 2024-01-24 LAB — INR PPP: 2.2

## 2024-01-24 PROCEDURE — 93280 PM DEVICE PROGR EVAL DUAL: CPT

## 2024-01-24 NOTE — PROGRESS NOTES
1/24/24  Mary with [facility] called regarding INR due 1/25, reports Patient is being seen on Mondays/Wednesday/Fridays and inquiring if INR should be drawn today or Friday 1/26, also being discharged from home health 2/02, Mary's call back # [phone]

## 2024-01-24 NOTE — PROGRESS NOTES
Outpatient Care Management  Initial Patient Assessment    Patient: King Botello  MRN: [id number]  Date of Service: 01/24/2024  Completed by: Anika Beard RN  Referral Date: 01/09/2024  Date of Eligibility: 1/10/2024  Program: High Risk  Status: Ongoing  Effective Dates: 1/24/2024 - present  Responsible Staff: Anika Beard RN        Reason for Visit   Patient presents with    OPCM Enrollment Call     1/24/24    Nursing Assessment     1/24/24       Brief Summary:  King Botello was referred by LUIS E Liu for Chronic diastolic heart failure. Patient qualifies for program based on identified as high risk.   Active problem list, medical, surgical and social history reviewed. Active comorbidities include Chronic diastolic heart failure, post recent open heart surgery. Areas of need identified by patient include management of Chronic diastolic heart failure.   Next steps: Discuss management of Chronic diastolic heart failure further.     Disability Status  Is the patient alert and oriented (person, place, time, and situation)?: Alert and oriented x 4  Hearing Difficulty or Deaf: no  Visual Difficulty or Blind: no  Visual and Hearing Needs Conclusion: No issues  Difficulty Concentrating, Remembering or Making Decisions: no  Communication Difficulty: no  Eating/Swallowing Difficulty: no  Walking or Climbing Stairs Difficulty: yes  Walking or Climbing Stairs: stair climbing difficulty, dependent  Mobility Management: Pt on restriction due to recent open heart surgery  Dressing/Bathing Difficulty: yes  Dressing/Bathing: bathing difficulty, assistance 1 person; dressing difficulty, assistance 1 person  Dressing/Bathing Management: Spouse assists  Toileting : Independent  Continence : Continence - Not a problem  Difficulty Managing Errands Independently: yes  Errands Management: Spouse assists  Equipment Currently Used at Home: shower chair  ADL Conclusion Statement: Independent with most ADLs, spouse  assists  Change in Functional Status Since Onset of Current Illness/Injury: yes        Spiritual Beliefs  Spiritual, Cultural Beliefs, Gnosticist Practices, Values that Affect Care: no      Social History     Socioeconomic History    Marital status:    Occupational History    Occupation: xray tech   Tobacco Use    Smoking status: Never    Smokeless tobacco: Never   Substance and Sexual Activity    Alcohol use: Not Currently     Comment: not since +UPT    Drug use: No    Sexual activity: Yes     Partners: Male     Birth control/protection: None     Social Determinants of Health     Financial Resource Strain: Low Risk  (1/9/2024)    Overall Financial Resource Strain (CARDIA)     Difficulty of Paying Living Expenses: Not hard at all   Food Insecurity: No Food Insecurity (1/9/2024)    Hunger Vital Sign     Worried About Running Out of Food in the Last Year: Never true     Ran Out of Food in the Last Year: Never true   Transportation Needs: No Transportation Needs (1/9/2024)    PRAPARE - Transportation     Lack of Transportation (Medical): No     Lack of Transportation (Non-Medical): No   Physical Activity: Unknown (1/9/2024)    Exercise Vital Sign     Days of Exercise per Week: Patient declined     Minutes of Exercise per Session: 30 min   Recent Concern: Physical Activity - Insufficiently Active (1/4/2024)    Exercise Vital Sign     Days of Exercise per Week: 2 days     Minutes of Exercise per Session: 30 min   Stress: Patient Declined (1/9/2024)    [employer] - Occupational Stress Questionnaire     Feeling of Stress : Patient declined   Social Connections: Moderately Isolated (1/9/2024)    Social Connection and Isolation Panel [NHANES]     Frequency of Communication with Friends and Family: Patient unable to answer     Frequency of Social Gatherings with Friends and Family: Patient unable to answer     Attends Gnosticist Services: Never     Active Member of Clubs or Organizations: No      Attends Club or Organization Meetings: Never     Marital Status:    Housing Stability: Low Risk  (1/9/2024)    Housing Stability Vital Sign     Unable to Pay for Housing in the Last Year: No     Number of Places Lived in the Last Year: 1     Unstable Housing in the Last Year: No       Roles and Relationships  Primary Source of Support/Comfort: spouse  Name of Support/Comfort Primary Source: Umer Botello  Primary Roles/Responsibilities: wage earner, full-time      Advance Directives (For Healthcare)  Advance Directive  (If Adv Dir status is received, view document under Adv Dir in header or Chart Review Media tab): Unable to assess        Patient Reported Insurance  Verified current insurance plan:: [employer]            1/24/2024     5:15 PM 10/18/2023     1:44 PM 7/26/2023     2:29 PM 5/12/2023     2:19 PM 4/21/2023     9:04 AM 4/14/2023     1:26 PM 4/5/2023     8:55 AM   Depression Patient Health Questionnaire   Over the last two weeks how often have you been bothered by little interest or pleasure in doing things Not at all Not at all Not at all Not at all Not at all Not at all Not at all   Over the last two weeks how often have you been bothered by feeling down, depressed or hopeless Not at all Not at all Not at all Not at all Not at all Not at all Not at all   PHQ-2 Total Score 0 0 0 0 0 0 0       Learning Assessment       01/12/2024 0747 [facility] - [facility] (1/8/2024 - 1/18/2024)   Created by Bobby Baker, RN - RN (Nurse) Status: Complete                 PRIMARY LEARNER     Primary Learner Name:  jamie rocha corneliatomas GC - 01/12/2024 0747    Relationship:  Patient GC - 01/12/2024 0747    Does the primary learner have any barriers to learning?:  No Barriers GC - 01/12/2024 0747    What is the preferred language of the primary learner?:  English GC - 01/12/2024 0747    How does the primary learner prefer to learn new concepts?:  Listening GC - 01/12/2024 0747    How often do you need  to have someone help you read instructions, pamphlets, or written material from your doctor or pharmacy?:  Never GC - 01/12/2024 0747        CO-LEARNER #1     No question answered        CO-LEARNER #2     No question answered        SPECIAL TOPICS     No question answered        ANSWERED BY:     -:  Patient GC - 01/12/2024 0747        Edit History       Bobby Baker, RN - RN (Nurse)   01/12/2024 0747

## 2024-01-24 NOTE — LETTER
January 24, 2024             Dear King,    Welcome to [facility].  It was a pleasure talking with you today.  My name is Anika Beard RN and I look forward to being your Care Manager.  My goal is to help you function at the healthiest and highest level possible.  You can contact me directly at [phone].    As an [facility] patient, some of the services we may be able to provide include:     Development of an individualized care plan with a Registered Nurse   Connection with a   Connection with available resources and services    Coordinate communication among your care team members   Provide coaching and education   Help you understand your doctors treatment plan  Help you obtain information about your insurance coverage.     All services provided by [facility] Complex Care Managers and other care team members are coordinated with and communicated to your primary care team.      As part of your enrollment, you will be receiving education materials and more information about these services in your [facility] account, by phone or through the mail.  If you do not wish to participate or receive information, please contact our office at [phone].      Sincerely,        Anika Beard RN  [facility]   Out-patient RN Complex Care Manager  [phone]

## 2024-01-25 RX ORDER — POTASSIUM CHLORIDE 750 MG/1
20 CAPSULE, EXTENDED RELEASE ORAL DAILY
Qty: 42 CAPSULE | Refills: 0 | Status: SHIPPED | OUTPATIENT
Start: 2024-01-25 | End: 2024-02-06

## 2024-01-26 ENCOUNTER — PATIENT MESSAGE (OUTPATIENT)
Dept: CARDIOTHORACIC SURGERY | Facility: CLINIC | Age: 39
End: 2024-01-26
Payer: COMMERCIAL

## 2024-01-26 RX ORDER — FLUCONAZOLE 150 MG/1
150 TABLET ORAL DAILY
Qty: 1 TABLET | Refills: 0 | Status: SHIPPED | OUTPATIENT
Start: 2024-01-26 | End: 2024-02-06

## 2024-01-28 ENCOUNTER — HOSPITAL ENCOUNTER (EMERGENCY)
Facility: HOSPITAL | Age: 39
Discharge: HOME | End: 2024-01-29
Attending: EMERGENCY MEDICINE
Payer: COMMERCIAL

## 2024-01-28 DIAGNOSIS — R00.0 TACHYCARDIA: ICD-10-CM

## 2024-01-28 LAB
ALBUMIN SERPL BCP-MCNC: 3.5 G/DL (ref 3.5–5.2)
ALP SERPL-CCNC: 89 U/L (ref 55–135)
ALT SERPL W/O P-5'-P-CCNC: 9 U/L (ref 10–44)
ANION GAP SERPL CALC-SCNC: 9 MMOL/L (ref 8–16)
AST SERPL-CCNC: 21 U/L (ref 10–40)
BASOPHILS # BLD AUTO: 0.08 K/UL (ref 0–0.2)
BASOPHILS NFR BLD: 0.5 % (ref 0–1.9)
BILIRUB SERPL-MCNC: 0.4 MG/DL (ref 0.1–1)
BUN SERPL-MCNC: 7 MG/DL (ref 6–20)
CALCIUM SERPL-MCNC: 9.6 MG/DL (ref 8.7–10.5)
CHLORIDE SERPL-SCNC: 105 MMOL/L (ref 95–110)
CO2 SERPL-SCNC: 21 MMOL/L (ref 23–29)
CREAT SERPL-MCNC: 0.6 MG/DL (ref 0.5–1.4)
DIFFERENTIAL METHOD BLD: ABNORMAL
EOSINOPHIL # BLD AUTO: 0.1 K/UL (ref 0–0.5)
EOSINOPHIL NFR BLD: 0.7 % (ref 0–8)
ERYTHROCYTE [DISTWIDTH] IN BLOOD BY AUTOMATED COUNT: 15.3 % (ref 11.5–14.5)
EST. GFR  (NO RACE VARIABLE): >60 ML/MIN/1.73 M^2
GLUCOSE SERPL-MCNC: 102 MG/DL (ref 70–110)
HCT VFR BLD AUTO: 31.8 % (ref 37–48.5)
HGB BLD-MCNC: 9.8 G/DL (ref 12–16)
IMM GRANULOCYTES # BLD AUTO: 0.06 K/UL (ref 0–0.04)
IMM GRANULOCYTES NFR BLD AUTO: 0.4 % (ref 0–0.5)
LYMPHOCYTES # BLD AUTO: 1.6 K/UL (ref 1–4.8)
LYMPHOCYTES NFR BLD: 10.6 % (ref 18–48)
MAGNESIUM SERPL-MCNC: 1.7 MG/DL (ref 1.6–2.6)
MCH RBC QN AUTO: 26.7 PG (ref 27–31)
MCHC RBC AUTO-ENTMCNC: 30.8 G/DL (ref 32–36)
MCV RBC AUTO: 87 FL (ref 82–98)
MONOCYTES # BLD AUTO: 0.8 K/UL (ref 0.3–1)
MONOCYTES NFR BLD: 5.1 % (ref 4–15)
NEUTROPHILS # BLD AUTO: 12.2 K/UL (ref 1.8–7.7)
NEUTROPHILS NFR BLD: 82.7 % (ref 38–73)
NRBC BLD-RTO: 0 /100 WBC
PLATELET # BLD AUTO: 556 K/UL (ref 150–450)
PMV BLD AUTO: 9.4 FL (ref 9.2–12.9)
POTASSIUM SERPL-SCNC: 3.9 MMOL/L (ref 3.5–5.1)
PROT SERPL-MCNC: 7.4 G/DL (ref 6–8.4)
RBC # BLD AUTO: 3.67 M/UL (ref 4–5.4)
SODIUM SERPL-SCNC: 135 MMOL/L (ref 136–145)
TROPONIN I SERPL DL<=0.01 NG/ML-MCNC: 0.09 NG/ML (ref 0–0.03)
WBC # BLD AUTO: 14.81 K/UL (ref 3.9–12.7)

## 2024-01-28 PROCEDURE — 83735 ASSAY OF MAGNESIUM: CPT | Performed by: EMERGENCY MEDICINE

## 2024-01-28 PROCEDURE — 99284 EMERGENCY DEPT VISIT MOD MDM: CPT | Mod: 25

## 2024-01-28 PROCEDURE — 93005 ELECTROCARDIOGRAM TRACING: CPT

## 2024-01-28 PROCEDURE — 81025 URINE PREGNANCY TEST: CPT | Performed by: EMERGENCY MEDICINE

## 2024-01-28 PROCEDURE — 84484 ASSAY OF TROPONIN QUANT: CPT | Performed by: EMERGENCY MEDICINE

## 2024-01-28 PROCEDURE — 85025 COMPLETE CBC W/AUTO DIFF WBC: CPT | Performed by: EMERGENCY MEDICINE

## 2024-01-28 PROCEDURE — 80053 COMPREHEN METABOLIC PANEL: CPT | Performed by: EMERGENCY MEDICINE

## 2024-01-28 PROCEDURE — 85610 PROTHROMBIN TIME: CPT | Performed by: EMERGENCY MEDICINE

## 2024-01-28 PROCEDURE — 93010 ELECTROCARDIOGRAM REPORT: CPT | Mod: ,,, | Performed by: INTERNAL MEDICINE

## 2024-01-29 ENCOUNTER — ANTI-COAG VISIT (OUTPATIENT)
Dept: CARDIOLOGY | Facility: CLINIC | Age: 39
End: 2024-01-29
Payer: COMMERCIAL

## 2024-01-29 ENCOUNTER — PATIENT MESSAGE (OUTPATIENT)
Dept: CARDIOTHORACIC SURGERY | Facility: CLINIC | Age: 39
End: 2024-01-29
Payer: COMMERCIAL

## 2024-01-29 VITALS
SYSTOLIC BLOOD PRESSURE: 146 MMHG | TEMPERATURE: 98 F | HEART RATE: 101 BPM | RESPIRATION RATE: 19 BRPM | DIASTOLIC BLOOD PRESSURE: 93 MMHG | OXYGEN SATURATION: 100 %

## 2024-01-29 DIAGNOSIS — Z79.01 LONG TERM (CURRENT) USE OF ANTICOAGULANTS: Primary | ICD-10-CM

## 2024-01-29 LAB
B-HCG UR QL: NEGATIVE
CTP QC/QA: YES
INR PPP: 4.2 (ref 0.8–1.2)
PROTHROMBIN TIME: 41.2 SEC (ref 9–12.5)
TROPONIN I SERPL DL<=0.01 NG/ML-MCNC: 0.09 NG/ML (ref 0–0.03)

## 2024-01-29 PROCEDURE — 84484 ASSAY OF TROPONIN QUANT: CPT

## 2024-01-29 PROCEDURE — 93793 ANTICOAG MGMT PT WARFARIN: CPT | Mod: S$GLB,,,

## 2024-01-29 NOTE — PROGRESS NOTES
[facility]    01/29/2024 1:06 PM    King Botello (38 y.o.) is followed by the [facility].      Assessment/Plan:    King Botello presents today with supratherapeutic INR. Goal INR 2.5-3.5    Assessment of patient findings per MA/LPN and chart review:   The following significant findings were found:  Patient reports diarrhea on Satuday and Sunday (can cause increase in INR)  Went to ED on 1/28 for tachycardia, no significant findings noted    Recommendation for patient's warfarin regimen:   Due to supratherapeutic INR, patient was instructed to SKIP their next 1 warfarin doses.    Recommended repeat INR in 1 week      Laine Glynn, PharmD, BCPS  Clinical Pharmacist - Coumadin Clinic  Preferred Contact: Secure Messaging or In Basket Message

## 2024-01-29 NOTE — ED PROVIDER NOTES
Encounter Date: 1/28/2024       History     Chief Complaint   Patient presents with    Tachycardia     Pt has history of A-fib and had mitral valve replacement surgery on Jan 8th. Pt states she stood up to use the bathroom and felt her heart rate increase. Pt denies any dizziness, vomiting or nausea     The history is provided by the patient.     King Botello is a 38 y.o. female with a past medical history of MVP and MR s/p mitral annuloplasty, HTN, and recent pacemaker placement last week who presents due to an episode of tachycardia.  She states that she was getting up to go to the bathroom this evening shortly before going to sleep when she suddenly felt a butterfly feeling in her chest, and after checking her apple watch noted that her heart rate was in the 130s. She states this lasted approximately 15 minutes, and then subsequently subsided, however wanted to come to the emergency department to be evaluated.  Here in the emergency department she states she has not experiencing this feeling anymore, and that her heart rate has been normal since this episode resolved.  Of note, the patient had a pacemaker placed approximately 1 week ago, after she had episodes of third-degree AV block after having mitral valve replacement surgery.  Prior to the onset of the symptoms she states she was in her otherwise normal state of health, and denies any recent fever, chills, nausea, vomiting, diarrhea or any change with bowel movements or urination.  She also denies chest/abdominal pain as well as shortness of breath.    Review of patient's allergies indicates:   Allergen Reactions    Cinnamon Itching    Doxycycline      Abdomen pain severe     Past Medical History:   Diagnosis Date    Abnormal Pap smear     Colposcopy    Bradycardia 6/4/2020    Class 2 obesity due to excess calories with body mass index (BMI) of 35.0 to 35.9 in adult 3/9/2021    Hypertension, essential 3/16/2021    Menarche     Age of onset 11     Missed ab 2021    Mitral valve prolapse     Previous  delivery affecting pregnancy, antepartum 2020    S/P suction D&C 2021    Status post mitral valve annuloplasty 2017     Past Surgical History:   Procedure Laterality Date    A-V CARDIAC PACEMAKER INSERTION N/A 2024    Procedure: INSERTION, CARDIAC PACEMAKER, DUAL CHAMBER;  Surgeon: CRISTI Moore MD;  Location: [facility] EP LAB;  Service: Cardiology;  Laterality: N/A;  CHB, dPPM with LBBP, MDT, ANES, [place], [place] [id number]     SECTION       SECTION WITH TUBAL LIGATION Bilateral 2023    Procedure:  SECTION, WITH TUBAL LIGATION;  Surgeon: Mane Moreno MD;  Location: [facility] L&D;  Service: OB/GYN;  Laterality: Bilateral;    DILATION AND CURETTAGE OF UTERUS USING SUCTION N/A 2021    Procedure: DILATION AND CURETTAGE, UTERUS, USING SUCTION;  Surgeon: Mane Moreno MD;  Location: [facility];  Service: OB/GYN;  Laterality: N/A;    MITRAL VALVE REPAIR      MITRAL VALVE REPLACEMENT N/A 2024    Procedure: MITRAL VALVE REPLACEMENT, REDO STERNOTOMY;  Surgeon: Thierno Liu MD;  Location: [address];  Service: Cardiothoracic;  Laterality: N/A;    TRANSESOPHAGEAL ECHOCARDIOGRAPHY N/A 2024    Procedure: ECHOCARDIOGRAM, TRANSESOPHAGEAL;  Surgeon: Jessee Ahumada MD;  Location: [facility] EP LAB;  Service: Cardiology;  Laterality: N/A;    TREATMENT OF CARDIAC ARRHYTHMIA N/A 2024    Procedure: Cardioversion or Defibrillation;  Surgeon: CRISTI Moore MD;  Location: [facility] EP LAB;  Service: Cardiology;  Laterality: N/A;    VALVULOPLASTY, TRICUSPID N/A 2024    Procedure: VALVULOPLASTY, TRICUSPID REPAIR;  Surgeon: Thierno Liu MD;  [address];  Service: Cardiothoracic;  Laterality: N/A;     Family History   Problem Relation Age of Onset    Heart disease Mother     Hypertension Mother     Hyperlipidemia Mother     Diabetes Mother     Cancer Mother     Hyperlipidemia Father      Hypertension Father     Breast cancer Paternal Aunt     Colon cancer Neg Hx     Ovarian cancer Neg Hx      Social History     Tobacco Use    Smoking status: Never    Smokeless tobacco: Never   Substance Use Topics    Alcohol use: Not Currently     Comment: not since +UPT    Drug use: No       Physical Exam     Initial Vitals [01/28/24 2045]   BP Pulse Resp Temp SpO2   138/80 (!) 120 18 98.7 °F (37.1 °C) 98 %      MAP       --         Physical Exam    Nursing note and vitals reviewed.  Constitutional: She appears well-developed. No distress.   HENT:   Head: Normocephalic and atraumatic.   Mouth/Throat: Oropharynx is clear and moist and mucous membranes are normal.   Eyes: EOM are normal. Pupils are equal, round, and reactive to light.   Neck:   Normal range of motion.  Cardiovascular:  Normal rate and regular rhythm.           A diastolic heart murmur is auscultated    Pulmonary/Chest: Breath sounds normal. No respiratory distress. She has no wheezes. She has no rhonchi.   Abdominal: She exhibits no distension. There is no abdominal tenderness. There is no rebound.   Musculoskeletal:      Cervical back: Normal range of motion.     Neurological: She is alert and oriented to person, place, and time.   Skin: Skin is warm.   Psychiatric: She has a normal mood and affect. Her behavior is normal. Thought content normal.         ED Course   Procedures  Labs Reviewed   CBC W/ AUTO DIFFERENTIAL - Abnormal; Notable for the following components:       Result Value    WBC 14.81 (*)     RBC 3.67 (*)     Hemoglobin 9.8 (*)     Hematocrit 31.8 (*)     MCH 26.7 (*)     MCHC 30.8 (*)     RDW 15.3 (*)     Platelets 556 (*)     Gran # (ANC) 12.2 (*)     Immature Grans (Abs) 0.06 (*)     Gran % 82.7 (*)     Lymph % 10.6 (*)     All other components within normal limits   COMPREHENSIVE METABOLIC PANEL - Abnormal; Notable for the following components:    Sodium 135 (*)     CO2 21 (*)     ALT 9 (*)     All other components within  normal limits   TROPONIN I - Abnormal; Notable for the following components:    Troponin I 0.090 (*)     All other components within normal limits   PROTIME-INR - Abnormal; Notable for the following components:    Prothrombin Time 41.2 (*)     INR 4.2 (*)     All other components within normal limits   TROPONIN I - Abnormal; Notable for the following components:    Troponin I 0.089 (*)     All other components within normal limits   MAGNESIUM   POCT URINE PREGNANCY     EKG Readings: (Independently Interpreted)   Patient initially sinus tachycardic, however subsequent EKGs shown to be in normal sinus rhythm.  P waves before every QRS complex, QT segments within normal limits, no STEMI, patient not in AFib.       Imaging Results              X-Ray Chest AP Portable (Final result)  Result time 01/29/24 00:16:17      Final result by Yue Redding MD (01/29/24 00:16:17)                   Impression:      Please see above.      Electronically signed by: Yue Redding MD  Date:    01/29/2024  Time:    00:16               Narrative:    EXAMINATION:  XR CHEST AP PORTABLE    CLINICAL HISTORY:  Tachycardia, unspecified    TECHNIQUE:  Single frontal view of the chest was performed.    COMPARISON:  01/17/2024    FINDINGS:  Cardiac monitoring leads overlie the chest.  There is postoperative change of the thorax and cardiac valve replacement.  Stably positioned left-sided cardiac pacing device in place.  There is unchanged prominence of the cardiomediastinal silhouette.  Mediastinal structures are midline.  The lungs are symmetrically expanded without evidence of confluent airspace consolidation, substantial volume of pleural fluid or pneumothorax.  Osseous structures are intact.                                    X-Rays:   Independently Interpreted Readings:   Other Readings:  Chest x-ray significant for a pacemaker being in place, with no other acute findings.    Medications - No data to display  Medical Decision Making  Ms.  Rosmery is a 38-year-old female who presents s/p an episode of tachycardia for 15 minutes at home.  Differential diagnosis includes but is not limited to AFib with RVR, atrial flutter, arrhythmia, ACS.  It is unclear as to what rhythm she was in prior to arriving to the ER, however upon my initial assessment of her EKG, she was noted to be in normal sinus rhythm and not in AFib. EKGs obtained by EMS also noted the patient to be in normal sinus rhythm.  We will evaluate her pathologies with a EKG CBC, CMP, chest x-ray, troponin and INR.    Troponin was found to be elevated 0.09, without a prior troponin to compare it to.  I gave a call to the Cardiology team and spoke with Dr. Connor Gillies, who believes that this is likely related to the multiple cardiac surgeries that she has had in the past few weeks.  He recommended we obtain a follow up troponin a few hours later, which we did and was shown to be 0.089.  Given that there was no acute change, patient deemed stable for discharge with strict follow up with her cardiologist/electrophysiologist.    Amount and/or Complexity of Data Reviewed  External Data Reviewed: notes.     Details: Previous medical records, specifically from her cardiology visit/procedures in 01/2024 were reviewed to better understand the patient's past/current medical problems.  Labs: ordered.  Radiology: ordered.    Risk  Prescription drug management.                                      Clinical Impression:  Final diagnoses:  [R00.0] Tachycardia          ED Disposition Condition    Discharge           ED Prescriptions    None       Follow-up Information       Follow up With Specialties Details Why Contact Info    Misael Quigley - Cardiology Cardiology In 1 week Interogation/evaluation of tachycardia episode [address]  [facility] [id number] [phone]             Gerber Randall MD  Resident  01/29/24 0004

## 2024-01-29 NOTE — DISCHARGE INSTRUCTIONS
Please return to the emergency department if you experience acute onset chest pain or worsening episodes of tachycardia for further evaluation.

## 2024-01-29 NOTE — ED TRIAGE NOTES
Patient reports that she stood up to use the restroom, and began to feel her heart race. States that she had mitral valve replacement on Jan 8th. Reports that she then had pacemaker placed due to being in A Flutter. Patient reports that all she felt was her heart racing, denies any other symptoms associated.

## 2024-01-30 ENCOUNTER — PATIENT OUTREACH (OUTPATIENT)
Dept: ADMINISTRATIVE | Facility: OTHER | Age: 39
End: 2024-01-30
Payer: COMMERCIAL

## 2024-01-30 NOTE — PROGRESS NOTES
This Community Health Worker (CHW) completed Social Determinant of Health (SDOH)  Questionnaire with patient/caregiver via telephone today.  Notified OPCM DL ZUNIGA RN, of completion.      Patient denied any SDOH needs at this time.

## 2024-01-31 ENCOUNTER — PATIENT MESSAGE (OUTPATIENT)
Dept: CARDIOTHORACIC SURGERY | Facility: CLINIC | Age: 39
End: 2024-01-31
Payer: COMMERCIAL

## 2024-01-31 LAB
OHS CV AF BURDEN PERCENT: 96
OHS CV DC REMOTE DEVICE TYPE: NORMAL

## 2024-01-31 RX ORDER — ACETAMINOPHEN 160 MG/5ML
650 LIQUID ORAL EVERY 6 HOURS PRN
Qty: 473 ML | Refills: 0 | Status: SHIPPED | OUTPATIENT
Start: 2024-01-31 | End: 2024-02-06

## 2024-02-01 ENCOUNTER — ANTI-COAG VISIT (OUTPATIENT)
Dept: CARDIOLOGY | Facility: CLINIC | Age: 39
End: 2024-02-01
Payer: COMMERCIAL

## 2024-02-01 ENCOUNTER — OUTPATIENT CASE MANAGEMENT (OUTPATIENT)
Dept: ADMINISTRATIVE | Facility: OTHER | Age: 39
End: 2024-02-01

## 2024-02-01 ENCOUNTER — TELEPHONE (OUTPATIENT)
Dept: CARDIOTHORACIC SURGERY | Facility: CLINIC | Age: 39
End: 2024-02-01
Payer: COMMERCIAL

## 2024-02-01 DIAGNOSIS — Z79.01 LONG TERM (CURRENT) USE OF ANTICOAGULANTS: Primary | ICD-10-CM

## 2024-02-01 LAB — INR PPP: 3.7

## 2024-02-01 PROCEDURE — 93793 ANTICOAG MGMT PT WARFARIN: CPT | Mod: S$GLB,,,

## 2024-02-01 NOTE — PROGRESS NOTES
[facility]    02/01/2024 4:12 PM    King Botello (38 y.o.) is followed by the [facility].      Assessment/Plan:    King Botello presents today with supratherapeutic INR. Goal INR 2.5-3.5    Assessment of patient findings per MA/LPN and chart review:   The following significant findings were found:  None     Recommendation for patient's warfarin regimen:   Weekly warfarin dose decreased due to repeated supratherapeutic INRs.    Recommended repeat INR in 1 day per  protocol to check INR 3x/week for 2 weeks      Laine Glynn, PharmD, BCPS  Clinical Pharmacist - Coumadin Clinic  Preferred Contact: Secure Messaging or In Basket Message

## 2024-02-01 NOTE — PROGRESS NOTES
INR was due 2/05Ile[facility] with [facility]  called in a verbal result as: INR 3.7 dated today 2/01/24, reports has orders to draw Monday/Wednesday/Friday for 2 weeks, due to see Patient again tomorrow Friday

## 2024-02-02 ENCOUNTER — ANTI-COAG VISIT (OUTPATIENT)
Dept: CARDIOLOGY | Facility: CLINIC | Age: 39
End: 2024-02-02
Payer: COMMERCIAL

## 2024-02-02 ENCOUNTER — PATIENT MESSAGE (OUTPATIENT)
Dept: CARDIOTHORACIC SURGERY | Facility: CLINIC | Age: 39
End: 2024-02-02
Payer: COMMERCIAL

## 2024-02-02 DIAGNOSIS — Z79.01 LONG TERM (CURRENT) USE OF ANTICOAGULANTS: Primary | ICD-10-CM

## 2024-02-02 LAB — INR PPP: 3.6

## 2024-02-02 PROCEDURE — 93793 ANTICOAG MGMT PT WARFARIN: CPT | Mod: S$GLB,,,

## 2024-02-02 NOTE — PROGRESS NOTES
[facility]    02/02/2024 1:31 PM    King Botello (38 y.o.) is followed by the [facility].      Assessment/Plan:    King Botello presents today with supratherapeutic INR. Goal INR 2.5-3.5    Assessment of patient findings per MA/LPN and chart review:   The following significant findings were found:  None    Recommendation for patient's warfarin regimen:   Due to supratherapeutic INR, patient was instructed to take a reduced warfarin dose today, but will resume their normal weekly dose.    Recommended repeat INR in 4 days      Laine Glynn, PharmD, BCPS  Clinical Pharmacist - Coumadin Clinic  Preferred Contact: Secure Messaging or In Basket Message

## 2024-02-05 ENCOUNTER — PATIENT MESSAGE (OUTPATIENT)
Dept: CARDIOTHORACIC SURGERY | Facility: CLINIC | Age: 39
End: 2024-02-05
Payer: COMMERCIAL

## 2024-02-06 ENCOUNTER — ANTI-COAG VISIT (OUTPATIENT)
Dept: CARDIOLOGY | Facility: CLINIC | Age: 39
End: 2024-02-06
Payer: COMMERCIAL

## 2024-02-06 ENCOUNTER — OFFICE VISIT (OUTPATIENT)
Dept: CARDIOTHORACIC SURGERY | Facility: CLINIC | Age: 39
End: 2024-02-06
Payer: COMMERCIAL

## 2024-02-06 ENCOUNTER — HOSPITAL ENCOUNTER (OUTPATIENT)
Dept: RADIOLOGY | Facility: HOSPITAL | Age: 39
Discharge: HOME | End: 2024-02-06
Attending: THORACIC SURGERY (CARDIOTHORACIC VASCULAR SURGERY)
Payer: COMMERCIAL

## 2024-02-06 ENCOUNTER — HOSPITAL ENCOUNTER (OUTPATIENT)
Dept: CARDIOLOGY | Facility: CLINIC | Age: 39
Discharge: HOME | End: 2024-02-06
Payer: COMMERCIAL

## 2024-02-06 VITALS
DIASTOLIC BLOOD PRESSURE: 67 MMHG | WEIGHT: 172.19 LBS | SYSTOLIC BLOOD PRESSURE: 131 MMHG | HEART RATE: 89 BPM | BODY MASS INDEX: 32.53 KG/M2

## 2024-02-06 DIAGNOSIS — Z95.2 S/P MITRAL VALVE REPLACEMENT: ICD-10-CM

## 2024-02-06 DIAGNOSIS — Z95.2 S/P MVR (MITRAL VALVE REPLACEMENT): Primary | ICD-10-CM

## 2024-02-06 DIAGNOSIS — Z79.01 LONG TERM (CURRENT) USE OF ANTICOAGULANTS: Primary | ICD-10-CM

## 2024-02-06 LAB
OHS QRS DURATION: 74 MS
OHS QTC CALCULATION: 455 MS

## 2024-02-06 PROCEDURE — 93010 ELECTROCARDIOGRAM REPORT: CPT | Mod: S$GLB,,, | Performed by: INTERNAL MEDICINE

## 2024-02-06 PROCEDURE — 99999 PR PBB SHADOW E&M-EST. PATIENT-LVL III: CPT | Mod: PBBFAC,,, | Performed by: THORACIC SURGERY (CARDIOTHORACIC VASCULAR SURGERY)

## 2024-02-06 PROCEDURE — 3078F DIAST BP <80 MM HG: CPT | Mod: CPTII,S$GLB,, | Performed by: THORACIC SURGERY (CARDIOTHORACIC VASCULAR SURGERY)

## 2024-02-06 PROCEDURE — 99024 POSTOP FOLLOW-UP VISIT: CPT | Mod: S$GLB,,, | Performed by: THORACIC SURGERY (CARDIOTHORACIC VASCULAR SURGERY)

## 2024-02-06 PROCEDURE — 1159F MED LIST DOCD IN RCRD: CPT | Mod: CPTII,S$GLB,, | Performed by: THORACIC SURGERY (CARDIOTHORACIC VASCULAR SURGERY)

## 2024-02-06 PROCEDURE — 93793 ANTICOAG MGMT PT WARFARIN: CPT | Mod: S$GLB,,,

## 2024-02-06 PROCEDURE — 71046 X-RAY EXAM CHEST 2 VIEWS: CPT | Mod: TC,FY

## 2024-02-06 PROCEDURE — 71046 X-RAY EXAM CHEST 2 VIEWS: CPT | Mod: 26,,, | Performed by: STUDENT IN AN ORGANIZED HEALTH CARE EDUCATION/TRAINING PROGRAM

## 2024-02-06 PROCEDURE — 93005 ELECTROCARDIOGRAM TRACING: CPT | Mod: S$GLB,,, | Performed by: THORACIC SURGERY (CARDIOTHORACIC VASCULAR SURGERY)

## 2024-02-06 PROCEDURE — 3044F HG A1C LEVEL LT 7.0%: CPT | Mod: CPTII,S$GLB,, | Performed by: THORACIC SURGERY (CARDIOTHORACIC VASCULAR SURGERY)

## 2024-02-06 PROCEDURE — 3075F SYST BP GE 130 - 139MM HG: CPT | Mod: CPTII,S$GLB,, | Performed by: THORACIC SURGERY (CARDIOTHORACIC VASCULAR SURGERY)

## 2024-02-06 RX ORDER — METOPROLOL TARTRATE 25 MG/1
25 TABLET, FILM COATED ORAL 2 TIMES DAILY
Qty: 60 TABLET | Refills: 11 | Status: SHIPPED | OUTPATIENT
Start: 2024-02-06 | End: 2025-02-05

## 2024-02-06 NOTE — PROGRESS NOTES
Patient seen and examined. Patient is progressively increasing activity. No significant complaints.     Sternum: stable, incision CDI  Chest xray: Acceptable post op chest  EKG: NSR     Assessment: 1/8/2024  1)  Mitral valve replacement with a 25 mm [employer] standard   mechanical prosthesis.  2)  Tricuspid valve repair with a 26 mm Medtronic Contour annuloplasty band  3)  Reoperation     Plan:  Can begin driving   Can begin cardiac rehab 6 weeks after operation   We will refer to cardiology to assume care   DC lasix, potassium  Adding BB for heart rate control  Sees Dr. Ahumada this week who will manage her medications from this point      No scheduled appointment, RTC prn    CTS Attending Note:    I have personally taken the history and examined this patient and agree with the OBINNA's note as stated above.  She is aware of and concerned by her heart rate variability.  On EKG today she is sinus in the 90s.  With her pacemaker in place, I believe would be reasonable to start a beta-blocker today to see if that helps to smooth out her heart rate variability.  We will do so, and then defer to Dr. Ahumada for further management.

## 2024-02-06 NOTE — PROGRESS NOTES
[facility]    02/06/2024 3:53 PM    King Botello (38 y.o.) is followed by the [facility].      Assessment/Plan:    King Botello presents today with subtherapeutic  INR. Goal INR 2.5-3.5    Assessment of patient findings per MA/LPN and chart review:   The following significant findings were found:  None    Recommendation for patient's warfarin regimen:   Due to subtherapeutic INR, patient was instructed to take a booster dose today, but will resume their normal weekly dose.    Recommended repeat INR in 3 days      Laine Glynn, PharmD, BCPS  Clinical Pharmacist - Coumadin Clinic  Preferred Contact: Secure Messaging or In Basket Message

## 2024-02-06 NOTE — LETTER
February 6, 2024        Huey Wooten MD  [address]  [facility] [address]             [facility] - Cardiovasc Surg 2nd Fl  [address]  [facility] [address]  Phone: [phone]   Patient: King Botello   MR Number: [id number]   YOB: 1985   Date of Visit: 2/6/2024       Dear Dr. Zoë Wooten:    Thank you for referring King Botello to me for evaluation. Below are the relevant portions of my assessment and plan of care.            If you have questions, please do not hesitate to call me. I look forward to following King along with you.    Sincerely,      Thierno Liu MD           CC  Jessee Ahumada MD

## 2024-02-06 NOTE — PATIENT INSTRUCTIONS
Please make appointments to check in with your PCP and Cardiologist in the next 2 to 6 weeks.    Continue walking during cooler parts of the day or early evening to build up your endurance.     You may drive, if you have power steering.    Your lifting restriction is still in place until you are 12 weeks out from your surgery:     ** 5 pounds first 6 weeks after surgery **   **20 pounds for weeks 7 - 12 after surgery **    COVID Precautions:    Continue to take precautions against COVID. Mask up when around unknown people, wash / sanitize hands frequently. Avoid large groups of people until at least 12 weeks post op.    Cardiac Rehab:    You are eligible for cardiac rehab, a 12-week exercise program designed for heart surgery patients. Please let us know if you are interested and we will send over orders for your referral. Below is a list of local facilities offering this program:    Facility Address Phone   [facility] [address] [place], [place] [address]vd., [place] [phone]   [facility] [address] [place] [address] [phone]   [facility] [address] [place] [address], [place] [address] [phone]   [facility] [address] [place] [address] [phone]   [facility] [address] [place] [phone]   [facility] [address] [place] [phone]   [address] [place] [phone]   [address] [phone]   [address] [place] [phone]

## 2024-02-07 ENCOUNTER — PATIENT MESSAGE (OUTPATIENT)
Dept: CARDIOTHORACIC SURGERY | Facility: CLINIC | Age: 39
End: 2024-02-07
Payer: COMMERCIAL

## 2024-02-07 ENCOUNTER — TELEPHONE (OUTPATIENT)
Dept: ELECTROPHYSIOLOGY | Facility: CLINIC | Age: 39
End: 2024-02-07
Payer: COMMERCIAL

## 2024-02-07 NOTE — TELEPHONE ENCOUNTER
[facility] remote transmission received on 2/6/24.  Presenting rhythm c/w AFL//Vs.     AFL ongoing since 1/17/24    Next EP appt:  5/3/2024

## 2024-02-09 ENCOUNTER — LAB VISIT (OUTPATIENT)
Dept: LAB | Facility: HOSPITAL | Age: 39
End: 2024-02-09
Attending: INTERNAL MEDICINE
Payer: COMMERCIAL

## 2024-02-09 ENCOUNTER — PATIENT MESSAGE (OUTPATIENT)
Dept: CARDIOTHORACIC SURGERY | Facility: CLINIC | Age: 39
End: 2024-02-09
Payer: COMMERCIAL

## 2024-02-09 ENCOUNTER — ANTI-COAG VISIT (OUTPATIENT)
Dept: CARDIOLOGY | Facility: CLINIC | Age: 39
End: 2024-02-09
Payer: COMMERCIAL

## 2024-02-09 DIAGNOSIS — Z79.01 LONG TERM (CURRENT) USE OF ANTICOAGULANTS: Primary | ICD-10-CM

## 2024-02-09 DIAGNOSIS — Z79.01 LONG TERM (CURRENT) USE OF ANTICOAGULANTS: ICD-10-CM

## 2024-02-09 LAB
INR PPP: 1.7 (ref 0.8–1.2)
PROTHROMBIN TIME: 17.7 SEC (ref 9–12.5)

## 2024-02-09 PROCEDURE — 93793 ANTICOAG MGMT PT WARFARIN: CPT | Mod: S$GLB,,,

## 2024-02-09 PROCEDURE — 85610 PROTHROMBIN TIME: CPT | Performed by: INTERNAL MEDICINE

## 2024-02-09 PROCEDURE — 36415 COLL VENOUS BLD VENIPUNCTURE: CPT | Performed by: INTERNAL MEDICINE

## 2024-02-09 RX ORDER — ENOXAPARIN SODIUM 100 MG/ML
1 INJECTION SUBCUTANEOUS EVERY 12 HOURS
Qty: 8 ML | Refills: 0 | Status: SHIPPED | OUTPATIENT
Start: 2024-02-09 | End: 2024-02-09 | Stop reason: SDUPTHER

## 2024-02-09 RX ORDER — ENOXAPARIN SODIUM 100 MG/ML
1 INJECTION SUBCUTANEOUS EVERY 12 HOURS
Qty: 8 ML | Refills: 0 | Status: SHIPPED | OUTPATIENT
Start: 2024-02-09 | End: 2024-02-12 | Stop reason: SDUPTHER

## 2024-02-09 NOTE — PROGRESS NOTES
[facility]    02/09/2024 12:47 PM    King Botello (38 y.o.) is followed by the [facility].      Assessment/Plan:    King Botello presents today with subtherapeutic  INR. Goal INR 2.5-3.5    Assessment of patient findings per MA/LPN and chart review:   The following significant findings were found:  Reports taking docusate sodium for constipation; otherwise, no new changes.     Recommendation for patient's warfarin regimen:   Due to subtherapeutic INR, patient was instructed to take a booster dose today (2/09) of 4 mg and tomorrow (2/10) of 6 mg.   Weekly warfarin dose increased due to repeated subtherapeutic INRs.  New regimen will be 3 mg on Monday & Friday, as well as 4 mg on all other days (Tuesday, Wednesday, Thursday, Saturday, and Sunday).   Will bridge patient with Lovenox 80 mg (1 mg/kg) SQ every 12 hours until next INR follow-up.     Recommended repeat INR in 3 days.       David Dooley, PharmD.   Preferred Contact: Secure Messaging or In Basket Message

## 2024-02-09 NOTE — PROGRESS NOTES
Patient called to report she needs Lovenox Rx re sent to [facility] pharmacy so she can be taught how to do the injections

## 2024-02-12 ENCOUNTER — LAB VISIT (OUTPATIENT)
Dept: LAB | Facility: HOSPITAL | Age: 39
End: 2024-02-12
Attending: INTERNAL MEDICINE
Payer: COMMERCIAL

## 2024-02-12 ENCOUNTER — ANTI-COAG VISIT (OUTPATIENT)
Dept: CARDIOLOGY | Facility: CLINIC | Age: 39
End: 2024-02-12
Payer: COMMERCIAL

## 2024-02-12 DIAGNOSIS — Z79.01 LONG TERM (CURRENT) USE OF ANTICOAGULANTS: Primary | ICD-10-CM

## 2024-02-12 DIAGNOSIS — Z79.01 LONG TERM (CURRENT) USE OF ANTICOAGULANTS: ICD-10-CM

## 2024-02-12 LAB
INR PPP: 2 (ref 0.8–1.2)
PROTHROMBIN TIME: 20.5 SEC (ref 9–12.5)

## 2024-02-12 PROCEDURE — 93793 ANTICOAG MGMT PT WARFARIN: CPT | Mod: S$GLB,,,

## 2024-02-12 PROCEDURE — 85610 PROTHROMBIN TIME: CPT | Performed by: INTERNAL MEDICINE

## 2024-02-12 PROCEDURE — 36415 COLL VENOUS BLD VENIPUNCTURE: CPT | Performed by: INTERNAL MEDICINE

## 2024-02-12 RX ORDER — ENOXAPARIN SODIUM 100 MG/ML
1 INJECTION SUBCUTANEOUS EVERY 12 HOURS
Qty: 10 EACH | Refills: 0 | Status: SHIPPED | OUTPATIENT
Start: 2024-02-12 | End: 2024-02-19 | Stop reason: SDUPTHER

## 2024-02-12 NOTE — PROGRESS NOTES
[facility]    02/12/2024 9:59 AM    King Botello (38 y.o.) is followed by the [facility].      Assessment/Plan:    King Botello presents today with subtherapeutic  INR. Goal INR 2.5-3.5    Assessment of patient findings per MA/LPN and chart review:   The following significant findings were found:  Patient reports bruising from injection sites    Recommendation for patient's warfarin regimen:   Patient's weekly dose will be increased  She will take 6mg on 2/13 and will need to continue taking her Lovenox injections until her next visit.  Recommend that patient avoid rubbing the injection site after injection as it can lead to bruising and to rotate injections sites as well. If bruise sites become very dark in color or increase in size to let us know    Recommended repeat INR in 3 days      Laine Glynn, PharmD, BCPS  Clinical Pharmacist - Coumadin Clinic  Preferred Contact: Secure Messaging or In Basket Message

## 2024-02-14 NOTE — TELEPHONE ENCOUNTER
Spoke with patient regarding heart rhythm and she indicated that as far as she knew she was back in sinus rhythm. Her visit with Dr Liu on 2/6 revealed NSR but she is concerned about her HR variability. She will see Dr Ahumada on Monday who will manage medications. Her big concern is that she has not been able to get cardiac rehab approved and she is ready as she is almost 6 weeks postop. Spoke with [facility] which is where she would rather go but [facility]. Made her aware and she will contact if she develops any problems. Cha Sow RN

## 2024-02-15 ENCOUNTER — LAB VISIT (OUTPATIENT)
Dept: LAB | Facility: HOSPITAL | Age: 39
End: 2024-02-15
Attending: INTERNAL MEDICINE
Payer: COMMERCIAL

## 2024-02-15 ENCOUNTER — ANTI-COAG VISIT (OUTPATIENT)
Dept: CARDIOLOGY | Facility: CLINIC | Age: 39
End: 2024-02-15
Payer: COMMERCIAL

## 2024-02-15 DIAGNOSIS — Z79.01 LONG TERM (CURRENT) USE OF ANTICOAGULANTS: Primary | ICD-10-CM

## 2024-02-15 DIAGNOSIS — Z79.01 LONG TERM (CURRENT) USE OF ANTICOAGULANTS: ICD-10-CM

## 2024-02-15 LAB
INR PPP: 2.2 (ref 0.8–1.2)
PROTHROMBIN TIME: 22.7 SEC (ref 9–12.5)

## 2024-02-15 PROCEDURE — 36415 COLL VENOUS BLD VENIPUNCTURE: CPT | Performed by: INTERNAL MEDICINE

## 2024-02-15 PROCEDURE — 85610 PROTHROMBIN TIME: CPT | Performed by: INTERNAL MEDICINE

## 2024-02-15 PROCEDURE — 93793 ANTICOAG MGMT PT WARFARIN: CPT | Mod: S$GLB,,,

## 2024-02-15 NOTE — PROGRESS NOTES
[facility]    02/15/2024 2:47 PM    King Botello (38 y.o.) is followed by the [facility].      Assessment/Plan:    King Botello presents today with subtherapeutic  INR. Goal INR 2.5-3.5    Assessment of patient findings per MA/LPN and chart review:   The following significant findings were found:  Patient self reports taking warfarin as per calendar  Continues taking enoxaparin injections, last dose administered this morning.     Recommendation for patient's warfarin regimen:   Due to subtherapeutic INR, patient was instructed to take a booster dose today (2/15).   Weekly warfarin dose increased due to repeated subtherapeutic INRs.  Will continue with enoxaparin injections (80 mg SC every 12 hours) through Saturday.     Recommended repeat INR in 4 days      David Dooley, PharmD.   Preferred Contact: Secure Messaging or In Basket Message

## 2024-02-19 ENCOUNTER — PATIENT MESSAGE (OUTPATIENT)
Dept: CARDIOLOGY | Facility: CLINIC | Age: 39
End: 2024-02-19

## 2024-02-19 ENCOUNTER — OFFICE VISIT (OUTPATIENT)
Dept: CARDIOLOGY | Facility: CLINIC | Age: 39
End: 2024-02-19
Payer: COMMERCIAL

## 2024-02-19 ENCOUNTER — PATIENT MESSAGE (OUTPATIENT)
Dept: CARDIOTHORACIC SURGERY | Facility: CLINIC | Age: 39
End: 2024-02-19
Payer: COMMERCIAL

## 2024-02-19 ENCOUNTER — HOSPITAL ENCOUNTER (OUTPATIENT)
Dept: CARDIOLOGY | Facility: CLINIC | Age: 39
Discharge: HOME | End: 2024-02-19
Payer: COMMERCIAL

## 2024-02-19 ENCOUNTER — ANTI-COAG VISIT (OUTPATIENT)
Dept: CARDIOLOGY | Facility: CLINIC | Age: 39
End: 2024-02-19
Payer: COMMERCIAL

## 2024-02-19 ENCOUNTER — LAB VISIT (OUTPATIENT)
Dept: LAB | Facility: HOSPITAL | Age: 39
End: 2024-02-19
Payer: COMMERCIAL

## 2024-02-19 VITALS
HEART RATE: 70 BPM | DIASTOLIC BLOOD PRESSURE: 70 MMHG | OXYGEN SATURATION: 98 % | WEIGHT: 177 LBS | SYSTOLIC BLOOD PRESSURE: 100 MMHG | HEIGHT: 61 IN | BODY MASS INDEX: 33.42 KG/M2

## 2024-02-19 DIAGNOSIS — Z95.2 S/P MVR (MITRAL VALVE REPLACEMENT): ICD-10-CM

## 2024-02-19 DIAGNOSIS — Z98.890 S/P MITRAL VALVE REPAIR: ICD-10-CM

## 2024-02-19 DIAGNOSIS — I44.2 COMPLETE HEART BLOCK: ICD-10-CM

## 2024-02-19 DIAGNOSIS — I50.33 HEART FAILURE, DIASTOLIC, ACUTE ON CHRONIC: Primary | ICD-10-CM

## 2024-02-19 DIAGNOSIS — Z79.01 LONG TERM (CURRENT) USE OF ANTICOAGULANTS: ICD-10-CM

## 2024-02-19 DIAGNOSIS — E66.01 CLASS 2 SEVERE OBESITY DUE TO EXCESS CALORIES WITH SERIOUS COMORBIDITY AND BODY MASS INDEX (BMI) OF 35.0 TO 35.9 IN ADULT: ICD-10-CM

## 2024-02-19 DIAGNOSIS — I10 HYPERTENSION, ESSENTIAL: ICD-10-CM

## 2024-02-19 DIAGNOSIS — Z79.01 LONG TERM (CURRENT) USE OF ANTICOAGULANTS: Primary | ICD-10-CM

## 2024-02-19 PROBLEM — E66.812 CLASS 2 SEVERE OBESITY DUE TO EXCESS CALORIES WITH SERIOUS COMORBIDITY AND BODY MASS INDEX (BMI) OF 35.0 TO 35.9 IN ADULT: Status: ACTIVE | Noted: 2024-02-19

## 2024-02-19 LAB
INR PPP: 1.9 (ref 0.8–1.2)
PROTHROMBIN TIME: 19.6 SEC (ref 9–12.5)

## 2024-02-19 PROCEDURE — 1159F MED LIST DOCD IN RCRD: CPT | Mod: CPTII,S$GLB,, | Performed by: INTERNAL MEDICINE

## 2024-02-19 PROCEDURE — 85610 PROTHROMBIN TIME: CPT | Performed by: INTERNAL MEDICINE

## 2024-02-19 PROCEDURE — 99214 OFFICE O/P EST MOD 30 MIN: CPT | Mod: S$GLB,,, | Performed by: INTERNAL MEDICINE

## 2024-02-19 PROCEDURE — 3008F BODY MASS INDEX DOCD: CPT | Mod: CPTII,S$GLB,, | Performed by: INTERNAL MEDICINE

## 2024-02-19 PROCEDURE — 3078F DIAST BP <80 MM HG: CPT | Mod: CPTII,S$GLB,, | Performed by: INTERNAL MEDICINE

## 2024-02-19 PROCEDURE — 3074F SYST BP LT 130 MM HG: CPT | Mod: CPTII,S$GLB,, | Performed by: INTERNAL MEDICINE

## 2024-02-19 PROCEDURE — 99999 PR PBB SHADOW E&M-EST. PATIENT-LVL IV: CPT | Mod: PBBFAC,,, | Performed by: INTERNAL MEDICINE

## 2024-02-19 PROCEDURE — 3044F HG A1C LEVEL LT 7.0%: CPT | Mod: CPTII,S$GLB,, | Performed by: INTERNAL MEDICINE

## 2024-02-19 PROCEDURE — 36415 COLL VENOUS BLD VENIPUNCTURE: CPT | Performed by: INTERNAL MEDICINE

## 2024-02-19 PROCEDURE — 1160F RVW MEDS BY RX/DR IN RCRD: CPT | Mod: CPTII,S$GLB,, | Performed by: INTERNAL MEDICINE

## 2024-02-19 PROCEDURE — 93000 ELECTROCARDIOGRAM COMPLETE: CPT | Mod: S$GLB,,, | Performed by: INTERNAL MEDICINE

## 2024-02-19 RX ORDER — ENOXAPARIN SODIUM 100 MG/ML
1 INJECTION SUBCUTANEOUS EVERY 12 HOURS
Qty: 10 EACH | Refills: 0 | Status: SHIPPED | OUTPATIENT
Start: 2024-02-19 | End: 2024-02-20 | Stop reason: SDUPTHER

## 2024-02-19 NOTE — PROGRESS NOTES
[facility]    02/19/2024 3:06 PM    King Botello (38 y.o.) is followed by the [facility].      Assessment/Plan:    King Botello presents today with subtherapeutic  INR. Goal INR 2.5-3.5    Assessment of patient findings per MA/LPN and chart review:   The following significant findings were found:  None    Recommendation for patient's warfarin regimen:   Weekly warfarin dose increased due to repeated subtherapeutic INRs.  Booster dose given today and tomorrow  Patient to continue Lovenox injections until next appointment    Recommended repeat INR in 3 days      Laine Glynn, PharmD, BCPS  Clinical Pharmacist - Coumadin Clinic  Preferred Contact: Secure Messaging or In Basket Message

## 2024-02-19 NOTE — PROGRESS NOTES
Subjective:   Patient ID:  King Botello is a 38 y.o. female who presents for follow up of Valvular Heart Disease      HPI: Very pleasant woman with mitral valve disease now s/p MVR with a 25mm Carbomedics standard mechanical prosthesis plus TVr in early January complicated by complete heart block requiring PPM despite a robust junctional escape rhythm.  She has seen Dr. Cordero for years but I am quite familiar with her case having recently seen her echos, done her MINERVA prior to PPM implantation, and reading her ECGs.    She's struggling with getting her INR up to goal and has currently been on Lovenox injections to bridge.    She's slated to start cardiac rehab soon.      Re-operation 2024  1)  Mitral valve replacement with a 25 mm Carbomedics standard   mechanical prosthesis.  2)  Tricuspid valve repair with a 26 mm Medtronic Contour annuloplasty band        Patient Active Problem List   Diagnosis    Mitral valve prolapse    S/P mitral valve repair    Bradycardia    Vitamin D deficiency disease    Low back pain    Class 2 obesity due to excess calories with body mass index (BMI) of 35.0 to 35.9 in adult    Hypertension, essential    Chronic fatigue    Ankle swelling    Pregnancy    Pre-existing essential hypertension during pregnancy, antepartum    Nonrheumatic mitral valve stenosis    S/P  section    Chronic diastolic heart failure    Pre-op testing    Acute postoperative anemia due to expected blood loss    Transient hyperglycemia post procedure    S/P MVR (mitral valve replacement)    CHB (complete heart block)    Long term (current) use of anticoagulants    Heart failure, diastolic, acute on chronic    Class 2 severe obesity due to excess calories with serious comorbidity and body mass index (BMI) of 35.0 to 35.9 in adult       Current Outpatient Medications   Medication Sig    aspirin (ECOTRIN) 81 MG EC tablet Take 1 tablet (81 mg total) by mouth once daily.    enoxaparin (LOVENOX) 80  mg/0.8 mL Syrg Inject 0.8 mLs (80 mg total) into the skin every 12 (twelve) hours. As directed by the [facility]    metoprolol tartrate (LOPRESSOR) 25 MG tablet Take 1 tablet (25 mg total) by mouth 2 (two) times daily.    warfarin (COUMADIN) 1 MG tablet Take 4 tablets (4 mg total) by mouth Daily.     No current facility-administered medications for this visit.       ROS  The review of systems is negative except as above.     Objective:   Physical Exam  Vitals reviewed.   Constitutional:       Appearance: She is well-developed.   HENT:      Head: Normocephalic and atraumatic.   Eyes:      General: No scleral icterus.     Conjunctiva/sclera: Conjunctivae normal.   Neck:      Vascular: No JVD.   Cardiovascular:      Rate and Rhythm: Normal rate and regular rhythm.      Pulses: Intact distal pulses.      Heart sounds: No murmur heard.     No friction rub. No gallop.      Comments: S1 crisp click, normal S2  Pulmonary:      Effort: Pulmonary effort is normal.      Breath sounds: Normal breath sounds. No wheezing or rales.   Abdominal:      General: Bowel sounds are normal. There is no distension.      Palpations: Abdomen is soft.      Tenderness: There is no abdominal tenderness.   Musculoskeletal:         General: Normal range of motion.      Cervical back: Normal range of motion and neck supple.   Skin:     General: Skin is warm and dry.      Findings: No erythema or rash.   Neurological:      Mental Status: She is alert and oriented to person, place, and time.   Psychiatric:         Behavior: Behavior normal.         Thought Content: Thought content normal.         Judgment: Judgment normal.         Lab Results   Component Value Date    WBC 14.81 (H) 01/28/2024    HGB 9.8 (L) 01/28/2024    HCT 31.8 (L) 01/28/2024    MCV 87 01/28/2024     (H) 01/28/2024         Chemistry        Component Value Date/Time     (L) 01/28/2024 2156    K 3.9 01/28/2024 2156     01/28/2024 2156    CO2 21 (L)  01/28/2024 2156    BUN 7 01/28/2024 2156    CREATININE 0.6 01/28/2024 2156     01/28/2024 2156        Component Value Date/Time    CALCIUM 9.6 01/28/2024 2156    ALKPHOS 89 01/28/2024 2156    AST 21 01/28/2024 2156    ALT 9 (L) 01/28/2024 2156    BILITOT 0.4 01/28/2024 2156    ESTGFRAFRICA >60.0 06/28/2022 0743    EGFRNONAA >60.0 06/28/2022 0743            Lab Results   Component Value Date    CHOL 212 (H) 08/14/2023    CHOL 199 06/28/2022    CHOL 198 03/28/2022     Lab Results   Component Value Date    HDL 47 08/14/2023    HDL 54 06/28/2022    HDL 42 03/28/2022     Lab Results   Component Value Date    LDLCALC 129.6 08/14/2023    LDLCALC 124.0 06/28/2022    LDLCALC 106.8 03/28/2022     Lab Results   Component Value Date    TRIG 177 (H) 08/14/2023    TRIG 105 06/28/2022    TRIG 246 (H) 03/28/2022     Lab Results   Component Value Date    CHOLHDL 22.2 08/14/2023    CHOLHDL 27.1 06/28/2022    CHOLHDL 21.2 03/28/2022       Lab Results   Component Value Date    TSH 0.819 08/14/2023       Lab Results   Component Value Date    HGBA1C 5.2 01/04/2024       Assessment:     1. Heart failure, diastolic, acute on chronic    2. Class 2 severe obesity due to excess calories with serious comorbidity and body mass index (BMI) of 35.0 to 35.9 in adult    3. S/P mitral valve repair    4. Long term (current) use of anticoagulants    5. S/P MVR (mitral valve replacement)    6. Hypertension, essential        Plan:     Continue current medicines.    Continue working with coumadin clinic    Cardiac rehab    Diet/exercise goals reinforced.    F/U in about 4 months with Dr. Cordero after cardiac rehab

## 2024-02-20 LAB
OHS QRS DURATION: 82 MS
OHS QTC CALCULATION: 428 MS

## 2024-02-20 RX ORDER — ENOXAPARIN SODIUM 100 MG/ML
1 INJECTION SUBCUTANEOUS EVERY 12 HOURS
Qty: 8 ML | Refills: 0 | Status: ON HOLD | OUTPATIENT
Start: 2024-02-20 | End: 2024-05-02 | Stop reason: HOSPADM

## 2024-02-20 NOTE — PROGRESS NOTES
Pt called to report that Lovenox Rx is out of stock at [facility]. Pt only has 1 injection left for tonight. Can you please call in Rx to [facility]? Pharmacy changed on file.

## 2024-02-22 ENCOUNTER — LAB VISIT (OUTPATIENT)
Dept: LAB | Facility: HOSPITAL | Age: 39
End: 2024-02-22
Attending: INTERNAL MEDICINE
Payer: COMMERCIAL

## 2024-02-22 ENCOUNTER — PATIENT MESSAGE (OUTPATIENT)
Dept: CARDIOLOGY | Facility: CLINIC | Age: 39
End: 2024-02-22
Payer: COMMERCIAL

## 2024-02-22 ENCOUNTER — ANTI-COAG VISIT (OUTPATIENT)
Dept: CARDIOLOGY | Facility: CLINIC | Age: 39
End: 2024-02-22
Payer: COMMERCIAL

## 2024-02-22 ENCOUNTER — PATIENT MESSAGE (OUTPATIENT)
Dept: INTERNAL MEDICINE | Facility: CLINIC | Age: 39
End: 2024-02-22
Payer: COMMERCIAL

## 2024-02-22 DIAGNOSIS — Z79.01 LONG TERM (CURRENT) USE OF ANTICOAGULANTS: ICD-10-CM

## 2024-02-22 DIAGNOSIS — Z79.01 LONG TERM (CURRENT) USE OF ANTICOAGULANTS: Primary | ICD-10-CM

## 2024-02-22 LAB
INR PPP: 2.7 (ref 0.8–1.2)
PROTHROMBIN TIME: 27.7 SEC (ref 9–12.5)

## 2024-02-22 PROCEDURE — 85610 PROTHROMBIN TIME: CPT | Performed by: INTERNAL MEDICINE

## 2024-02-22 PROCEDURE — 93793 ANTICOAG MGMT PT WARFARIN: CPT | Mod: S$GLB,,,

## 2024-02-22 PROCEDURE — 36415 COLL VENOUS BLD VENIPUNCTURE: CPT | Performed by: INTERNAL MEDICINE

## 2024-02-22 NOTE — PROGRESS NOTES
[facility]    02/22/2024 9:23 AM    King Botello (38 y.o.) is followed by the [facility].      Assessment/Plan:    King Botello presents today with therapeutic INR. Goal INR 2.5-3.5    Assessment of patient findings per MA/LPN and chart review:   The following significant findings were found:  None    Recommendation for patient's warfarin regimen:   No change was made to warfarin therapy during this visit and patient has been instructed to continue their current warfarin regimen.    Recommended repeat INR in 1 week      Laine Glynn, PharmD, BCPS  Clinical Pharmacist - [facility]madin Clinic  Preferred Contact: Secure Messaging or In Basket Message

## 2024-02-22 NOTE — PROGRESS NOTES
Your results look fine and do not require any change in treatment. INR 2.7 so finally good     Please contact me if you have any additional concerns.    Sincerely,    Eugene Cordero

## 2024-02-23 ENCOUNTER — OUTPATIENT CASE MANAGEMENT (OUTPATIENT)
Dept: ADMINISTRATIVE | Facility: OTHER | Age: 39
End: 2024-02-23
Payer: COMMERCIAL

## 2024-02-23 ENCOUNTER — PATIENT MESSAGE (OUTPATIENT)
Dept: ELECTROPHYSIOLOGY | Facility: CLINIC | Age: 39
End: 2024-02-23
Payer: COMMERCIAL

## 2024-02-26 ENCOUNTER — TELEPHONE (OUTPATIENT)
Dept: CARDIAC REHAB | Facility: CLINIC | Age: 39
End: 2024-02-26
Payer: COMMERCIAL

## 2024-02-26 ENCOUNTER — PATIENT MESSAGE (OUTPATIENT)
Dept: CARDIOTHORACIC SURGERY | Facility: CLINIC | Age: 39
End: 2024-02-26
Payer: COMMERCIAL

## 2024-02-26 ENCOUNTER — HOSPITAL ENCOUNTER (OUTPATIENT)
Dept: CARDIOLOGY | Facility: HOSPITAL | Age: 39
Discharge: HOME | End: 2024-02-26
Attending: INTERNAL MEDICINE
Payer: COMMERCIAL

## 2024-02-26 VITALS — BODY MASS INDEX: 33.42 KG/M2 | WEIGHT: 177 LBS | HEIGHT: 61 IN

## 2024-02-26 DIAGNOSIS — Z95.2 S/P MVR (MITRAL VALVE REPLACEMENT): ICD-10-CM

## 2024-02-26 DIAGNOSIS — Z95.2 S/P MVR (MITRAL VALVE REPLACEMENT): Primary | ICD-10-CM

## 2024-02-26 LAB
ASCENDING AORTA: 2.83 CM
AV INDEX (PROSTH): 0.75
AV MEAN GRADIENT: 3 MMHG
AV PEAK GRADIENT: 6 MMHG
AV VALVE AREA BY VELOCITY RATIO: 4.39 CM²
AV VALVE AREA: 3.76 CM²
AV VELOCITY RATIO: 0.88
BSA FOR ECHO PROCEDURE: 1.86 M2
CV ECHO LV RWT: 0.29 CM
DOP CALC AO PEAK VEL: 1.18 M/S
DOP CALC AO VTI: 25.36 CM
DOP CALC LVOT AREA: 5 CM2
DOP CALC LVOT DIAMETER: 2.52 CM
DOP CALC LVOT PEAK VEL: 1.04 M/S
DOP CALC LVOT STROKE VOLUME: 95.41 CM3
DOP CALC MV VTI: 31.4 CM
DOP CALCLVOT PEAK VEL VTI: 19.14 CM
E/E' RATIO: 14.35 M/S
ECHO LV POSTERIOR WALL: 0.65 CM (ref 0.6–1.1)
EJECTION FRACTION: 63 %
FRACTIONAL SHORTENING: 34 % (ref 28–44)
INTERVENTRICULAR SEPTUM: 0.9 CM (ref 0.6–1.1)
LA MAJOR: 5.39 CM
LA MINOR: 5.05 CM
LA WIDTH: 3.97 CM
LEFT ATRIUM SIZE: 3.46 CM
LEFT ATRIUM VOLUME INDEX MOD: 38.3 ML/M2
LEFT ATRIUM VOLUME INDEX: 34 ML/M2
LEFT ATRIUM VOLUME MOD: 68.47 CM3
LEFT ATRIUM VOLUME: 60.88 CM3
LEFT INTERNAL DIMENSION IN SYSTOLE: 2.97 CM (ref 2.1–4)
LEFT VENTRICLE DIASTOLIC VOLUME INDEX: 52.32 ML/M2
LEFT VENTRICLE DIASTOLIC VOLUME: 93.66 ML
LEFT VENTRICLE MASS INDEX: 62 G/M2
LEFT VENTRICLE SYSTOLIC VOLUME INDEX: 19.1 ML/M2
LEFT VENTRICLE SYSTOLIC VOLUME: 34.27 ML
LEFT VENTRICULAR INTERNAL DIMENSION IN DIASTOLE: 4.53 CM (ref 3.5–6)
LEFT VENTRICULAR MASS: 110.25 G
LV LATERAL E/E' RATIO: 11 M/S
LV SEPTAL E/E' RATIO: 20.63 M/S
MV MEAN GRADIENT: 5 MMHG
MV PEAK E VEL: 1.65 M/S
MV PEAK GRADIENT: 11 MMHG
MV STENOSIS PRESSURE HALF TIME: 71 MS
MV VALVE AREA BY CONTINUITY EQUATION: 3.04 CM2
MV VALVE AREA P 1/2 METHOD: 3.1 CM2
RA MAJOR: 4.95 CM
RA PRESSURE ESTIMATED: 3 MMHG
RA WIDTH: 3.64 CM
RIGHT VENTRICULAR END-DIASTOLIC DIMENSION: 3.27 CM
SINUS: 3.21 CM
STJ: 2.42 CM
TDI LATERAL: 0.15 M/S
TDI SEPTAL: 0.08 M/S
TDI: 0.12 M/S
TRICUSPID ANNULAR PLANE SYSTOLIC EXCURSION: 1.48 CM
TV MEAN GRADIENT: 4 MMHG
TV STENOSIS PRESSURE HALF TIME: 74 MS
TV VALVE AREA P 1/2 METHOD: 2.57 CM2
Z-SCORE OF LEFT VENTRICULAR DIMENSION IN END DIASTOLE: -0.88
Z-SCORE OF LEFT VENTRICULAR DIMENSION IN END SYSTOLE: -0.23

## 2024-02-26 PROCEDURE — 93306 TTE W/DOPPLER COMPLETE: CPT

## 2024-02-26 PROCEDURE — 93306 TTE W/DOPPLER COMPLETE: CPT | Mod: 26,,, | Performed by: INTERNAL MEDICINE

## 2024-02-26 NOTE — TELEPHONE ENCOUNTER
Letter regarding Phase II cardiac rehab was sent to patient.  Will contact patient in 2 weeks to see if interested.  Also, information letter sent to [facility].  Roger Tovar,  RN  Cardiac Rehab Nurse

## 2024-02-28 ENCOUNTER — DOCUMENT SCAN (OUTPATIENT)
Dept: HOME HEALTH SERVICES | Facility: HOSPITAL | Age: 39
End: 2024-02-28
Payer: COMMERCIAL

## 2024-02-29 ENCOUNTER — ANTI-COAG VISIT (OUTPATIENT)
Dept: CARDIOLOGY | Facility: CLINIC | Age: 39
End: 2024-02-29
Payer: COMMERCIAL

## 2024-02-29 ENCOUNTER — LAB VISIT (OUTPATIENT)
Dept: LAB | Facility: HOSPITAL | Age: 39
End: 2024-02-29
Attending: INTERNAL MEDICINE
Payer: COMMERCIAL

## 2024-02-29 ENCOUNTER — OFFICE VISIT (OUTPATIENT)
Dept: INTERNAL MEDICINE | Facility: CLINIC | Age: 39
End: 2024-02-29
Attending: FAMILY MEDICINE
Payer: COMMERCIAL

## 2024-02-29 VITALS
SYSTOLIC BLOOD PRESSURE: 116 MMHG | DIASTOLIC BLOOD PRESSURE: 76 MMHG | HEART RATE: 107 BPM | BODY MASS INDEX: 33.14 KG/M2 | WEIGHT: 175.5 LBS | HEIGHT: 61 IN | OXYGEN SATURATION: 99 %

## 2024-02-29 DIAGNOSIS — Z00.00 ANNUAL PHYSICAL EXAM: ICD-10-CM

## 2024-02-29 DIAGNOSIS — Z79.01 LONG TERM (CURRENT) USE OF ANTICOAGULANTS: Primary | ICD-10-CM

## 2024-02-29 DIAGNOSIS — I44.2 CHB (COMPLETE HEART BLOCK): ICD-10-CM

## 2024-02-29 DIAGNOSIS — Z79.01 LONG TERM (CURRENT) USE OF ANTICOAGULANTS: ICD-10-CM

## 2024-02-29 DIAGNOSIS — Z95.2 S/P MVR (MITRAL VALVE REPLACEMENT): ICD-10-CM

## 2024-02-29 DIAGNOSIS — Z00.00 ANNUAL PHYSICAL EXAM: Primary | ICD-10-CM

## 2024-02-29 DIAGNOSIS — I50.33 HEART FAILURE, DIASTOLIC, ACUTE ON CHRONIC: ICD-10-CM

## 2024-02-29 DIAGNOSIS — I50.32 CHRONIC DIASTOLIC HEART FAILURE: ICD-10-CM

## 2024-02-29 DIAGNOSIS — I48.92 ATRIAL FIBRILLATION AND FLUTTER: ICD-10-CM

## 2024-02-29 DIAGNOSIS — I34.2 NONRHEUMATIC MITRAL VALVE STENOSIS: ICD-10-CM

## 2024-02-29 DIAGNOSIS — I48.91 ATRIAL FIBRILLATION AND FLUTTER: ICD-10-CM

## 2024-02-29 DIAGNOSIS — Z12.31 ENCOUNTER FOR SCREENING MAMMOGRAM FOR MALIGNANT NEOPLASM OF BREAST: ICD-10-CM

## 2024-02-29 PROBLEM — Z34.90 PREGNANCY: Status: RESOLVED | Noted: 2022-09-29 | Resolved: 2024-02-29

## 2024-02-29 PROBLEM — R00.1 BRADYCARDIA: Status: RESOLVED | Noted: 2020-06-04 | Resolved: 2024-02-29

## 2024-02-29 PROBLEM — M25.473 ANKLE SWELLING: Status: RESOLVED | Noted: 2022-06-27 | Resolved: 2024-02-29

## 2024-02-29 PROBLEM — Z98.890 S/P MITRAL VALVE REPAIR: Status: RESOLVED | Noted: 2017-09-05 | Resolved: 2024-02-29

## 2024-02-29 PROBLEM — M54.50 LOW BACK PAIN: Status: RESOLVED | Noted: 2020-09-01 | Resolved: 2024-02-29

## 2024-02-29 PROBLEM — Z01.818 PRE-OP TESTING: Status: RESOLVED | Noted: 2024-01-04 | Resolved: 2024-02-29

## 2024-02-29 LAB
CHOLEST SERPL-MCNC: 215 MG/DL (ref 120–199)
CHOLEST/HDLC SERPL: 5.2 {RATIO} (ref 2–5)
ESTIMATED AVG GLUCOSE: 97 MG/DL (ref 68–131)
HBA1C MFR BLD: 5 % (ref 4–5.6)
HDLC SERPL-MCNC: 41 MG/DL (ref 40–75)
HDLC SERPL: 19.1 % (ref 20–50)
INR PPP: 2.2 (ref 0.8–1.2)
LDLC SERPL CALC-MCNC: 142.4 MG/DL (ref 63–159)
NONHDLC SERPL-MCNC: 174 MG/DL
PROTHROMBIN TIME: 22.3 SEC (ref 9–12.5)
TRIGL SERPL-MCNC: 158 MG/DL (ref 30–150)

## 2024-02-29 PROCEDURE — 83036 HEMOGLOBIN GLYCOSYLATED A1C: CPT | Performed by: FAMILY MEDICINE

## 2024-02-29 PROCEDURE — 3008F BODY MASS INDEX DOCD: CPT | Mod: CPTII,S$GLB,, | Performed by: FAMILY MEDICINE

## 2024-02-29 PROCEDURE — 93793 ANTICOAG MGMT PT WARFARIN: CPT | Mod: S$GLB,,,

## 2024-02-29 PROCEDURE — 3044F HG A1C LEVEL LT 7.0%: CPT | Mod: CPTII,S$GLB,, | Performed by: FAMILY MEDICINE

## 2024-02-29 PROCEDURE — 85610 PROTHROMBIN TIME: CPT | Performed by: INTERNAL MEDICINE

## 2024-02-29 PROCEDURE — 36415 COLL VENOUS BLD VENIPUNCTURE: CPT | Performed by: INTERNAL MEDICINE

## 2024-02-29 PROCEDURE — 1160F RVW MEDS BY RX/DR IN RCRD: CPT | Mod: CPTII,S$GLB,, | Performed by: FAMILY MEDICINE

## 2024-02-29 PROCEDURE — 99999 PR PBB SHADOW E&M-EST. PATIENT-LVL IV: CPT | Mod: PBBFAC,,, | Performed by: FAMILY MEDICINE

## 2024-02-29 PROCEDURE — 99395 PREV VISIT EST AGE 18-39: CPT | Mod: S$GLB,,, | Performed by: FAMILY MEDICINE

## 2024-02-29 PROCEDURE — 3074F SYST BP LT 130 MM HG: CPT | Mod: CPTII,S$GLB,, | Performed by: FAMILY MEDICINE

## 2024-02-29 PROCEDURE — 3078F DIAST BP <80 MM HG: CPT | Mod: CPTII,S$GLB,, | Performed by: FAMILY MEDICINE

## 2024-02-29 PROCEDURE — 1159F MED LIST DOCD IN RCRD: CPT | Mod: CPTII,S$GLB,, | Performed by: FAMILY MEDICINE

## 2024-02-29 PROCEDURE — 80061 LIPID PANEL: CPT | Performed by: FAMILY MEDICINE

## 2024-02-29 NOTE — PROGRESS NOTES
[facility]    02/29/2024 12:01 PM    King Botello (38 y.o.) is followed by the [facility].      Assessment/Plan:    King Botello presents today with subtherapeutic  INR. Goal INR 2.5-3.5    Assessment of patient findings per MA/LPN and chart review:   The following significant findings were found:  Patient self reports taking warfarin as per calendar.     Recommendation for patient's warfarin regimen:   Due to subtherapeutic INR, patient was instructed to take a booster dose today (2/29)  Weekly warfarin dose increased due to subtherapeutic INRs.  New regimen is to take 8 mg on Tuesday and Thursday, along with 6 mg on all other days.     Recommended repeat INR in 1 week      David Dooley, PharmD.   Preferred Contact: Secure Messaging or In Basket Message

## 2024-02-29 NOTE — PROGRESS NOTES
Subjective:       Patient ID: King Botello is a 38 y.o. female.    Chief Complaint: Annual Exam (F/u open heart surgery )    Established patient for an annual wellness check/physical exam and also chronic disease management. Specific complaints - see dictation, M*model entries and please see ROS.  Past, Surgical, Family, Social Histories; Medications, Allergies reviewed and reconciled.  Health maintenance file reviewed and addressed items due. Recent applicable lab, imaging and cardiovascular results reviewed.  Problem list items reviewed and modified or added entries (in the overview section) may not be transcribed into this encounter note due to note writer format.    Had baby daughter 4/24/23. s/p MVR with a 25mm Carbomedics standard mechanical prosthesis plus TVr 1/8/2024 complicated by complete heart block requiring PPM 1/17/2024 despite a robust junctional escape rhythm. Waiting on cardiac rehab.    Adjusting to coumadin.        Review of Systems   Constitutional:  Positive for activity change. Negative for unexpected weight change.   HENT:  Negative for hearing loss, rhinorrhea and trouble swallowing.    Eyes:  Negative for discharge and visual disturbance.   Respiratory:  Negative for chest tightness and wheezing.    Cardiovascular:  Negative for chest pain and palpitations.   Gastrointestinal:  Negative for blood in stool, constipation, diarrhea and vomiting.   Endocrine: Negative for polydipsia and polyuria.   Genitourinary:  Negative for difficulty urinating, dysuria, hematuria and menstrual problem.   Musculoskeletal:  Negative for arthralgias, joint swelling and neck pain.   Neurological:  Negative for weakness and headaches.   Psychiatric/Behavioral:  Negative for confusion and dysphoric mood.        Objective:      Physical Exam  Vitals and nursing note reviewed.   Constitutional:       Appearance: She is well-developed. She is not diaphoretic.   Eyes:      General: No scleral icterus.  Neck:       Thyroid: No thyromegaly.      Vascular: No JVD.      Trachea: No tracheal deviation.   Cardiovascular:      Rate and Rhythm: Normal rate. Rhythm irregular.      Heart sounds: Normal heart sounds. No murmur heard.     No friction rub. No gallop.      Comments: Valve click  Pulmonary:      Effort: Pulmonary effort is normal. No respiratory distress.      Breath sounds: Normal breath sounds. No wheezing or rales.   Abdominal:      General: There is no distension or abdominal bruit.      Palpations: Abdomen is soft. There is no mass.      Tenderness: There is no abdominal tenderness. There is no guarding or rebound.   Musculoskeletal:      Cervical back: Normal range of motion and neck supple.   Lymphadenopathy:      Cervical: No cervical adenopathy.   Skin:     General: Skin is warm and dry.      Findings: No erythema or rash.   Neurological:      Mental Status: She is alert and oriented to person, place, and time.      Cranial Nerves: No cranial nerve deficit.      Motor: No tremor.      Coordination: Coordination normal.      Gait: Gait normal.   Psychiatric:         Behavior: Behavior normal.         Thought Content: Thought content normal.         Judgment: Judgment normal.         Assessment:       1. Annual physical exam    2. S/P MVR (mitral valve replacement)    3. Long term (current) use of anticoagulants    4. Heart failure, diastolic, acute on chronic    5. Chronic diastolic heart failure    6. Nonrheumatic mitral valve stenosis    7. CHB (complete heart block)    8. Atrial fibrillation and flutter    9. Encounter for screening mammogram for malignant neoplasm of breast        Plan:     Medication List with Changes/Refills   Current Medications    ASPIRIN (ECOTRIN) 81 MG EC TABLET    Take 1 tablet (81 mg total) by mouth once daily.    ENOXAPARIN (LOVENOX) 80 MG/0.8 ML SYRG    Inject 0.8 mLs (80 mg total) into the skin every 12 (twelve) hours. As directed by the [facility]mad[facility] Clinic    METOPROLOL TARTRATE  (LOPRESSOR) 25 MG TABLET    Take 1 tablet (25 mg total) by mouth 2 (two) times daily.    WARFARIN (COUMADIN) 1 MG TABLET    Take 4 tablets (4 mg total) by mouth Daily.     1. Annual physical exam  -     Hemoglobin A1C; Future; Expected date: 02/29/2024  -     Lipid Panel; Future; Expected date: 02/29/2024    2. S/P MVR (mitral valve replacement)  Overview:  -hx MV repair 2003  -s/p MVR with a 25mm Carbomedics standard mechanical prosthesis plus TVr 1/8/2024 complicated by complete heart block requiring PPM 1/17/2024 despite a robust junctional escape rhythm  -followed by cardiology      3. Long term (current) use of anticoagulants  Overview:  -followed by cardiology  -s/p MVR with a 25mm Carbomedics standard mechanical prosthesis plus TVr 1/8/2024 complicated by complete heart block requiring PPM 1/17/2024 despite a robust junctional escape rhythm      4. Heart failure, diastolic, acute on chronic  Overview:  -7867-4399 d/t MVR  -followed by cardiology  -s/p MVR with a 25mm Carbomedics standard mechanical prosthesis plus TVr 1/8/2024 complicated by complete heart block requiring PPM 1/17/2024 despite a robust junctional escape rhythm   -EF 55/70% 2/26/2024 echo      5. Chronic diastolic heart failure  Overview:  -3431-5513 d/t MVR  -followed by cardiology  -s/p MVR with a 25mm Carbomedics standard mechanical prosthesis plus TVr 1/8/2024 complicated by complete heart block requiring PPM 1/17/2024 despite a robust junctional escape rhythm   -EF 55/70% 2/26/2024 echo      6. Nonrheumatic mitral valve stenosis  Overview:  -hx MV repair 2003  -s/p MVR with a 25mm Carbomedics standard mechanical prosthesis plus TVr 1/8/2024 complicated by complete heart block requiring PPM 1/17/2024 despite a robust junctional escape rhythm  -followed by cardiology      7. CHB (complete heart block)  Overview:  -followed by cardiology and EPS  -s/p MVR with a 25mm Carbomedics standard mechanical prosthesis plus TVr 1/8/2024 complicated by  complete heart block requiring PPM 1/17/2024 despite a robust junctional escape rhythm       8. Atrial fibrillation and flutter  Overview:  -s/p MVR with a 25mm Carbomedics standard mechanical prosthesis plus TVr 1/8/2024 complicated by complete heart block requiring PPM 1/17/2024 despite a robust junctional escape rhythm  -followed by cardiology and EPS      9. Encounter for screening mammogram for malignant neoplasm of breast  -     Mammo Digital Screening Bilat w/ Jason; Future; Expected date: 04/30/2024      See meds, orders, follow up, routing and instructions sections of encounter and AVS. Discussed with patient and provided on AVS.    Lab Results   Component Value Date     (L) 01/28/2024    K 3.9 01/28/2024     01/28/2024    BUN 7 01/28/2024    CREATININE 0.6 01/28/2024     01/28/2024    HGBA1C 5.2 01/04/2024    MG 1.7 01/28/2024    AST 21 01/28/2024    ALT 9 (L) 01/28/2024    ALBUMIN 3.5 01/28/2024    PROT 7.4 01/28/2024    BILITOT 0.4 01/28/2024    CHOL 212 (H) 08/14/2023    HDL 47 08/14/2023    LDLCALC 129.6 08/14/2023    TRIG 177 (H) 08/14/2023    WBC 14.81 (H) 01/28/2024    HGB 9.8 (L) 01/28/2024    HCT 31.8 (L) 01/28/2024    HCT 20 (LL) 01/09/2024     (H) 01/28/2024    TSH 0.819 08/14/2023     (H) 08/14/2023    URICACID 5.0 04/15/2010

## 2024-03-01 ENCOUNTER — PATIENT MESSAGE (OUTPATIENT)
Dept: CARDIOLOGY | Facility: CLINIC | Age: 39
End: 2024-03-01
Payer: COMMERCIAL

## 2024-03-07 ENCOUNTER — ANTI-COAG VISIT (OUTPATIENT)
Dept: CARDIOLOGY | Facility: CLINIC | Age: 39
End: 2024-03-07
Payer: COMMERCIAL

## 2024-03-07 ENCOUNTER — LAB VISIT (OUTPATIENT)
Dept: LAB | Facility: HOSPITAL | Age: 39
End: 2024-03-07
Attending: INTERNAL MEDICINE
Payer: COMMERCIAL

## 2024-03-07 DIAGNOSIS — Z79.01 LONG TERM (CURRENT) USE OF ANTICOAGULANTS: ICD-10-CM

## 2024-03-07 DIAGNOSIS — Z79.01 LONG TERM (CURRENT) USE OF ANTICOAGULANTS: Primary | ICD-10-CM

## 2024-03-07 LAB
INR PPP: 3.7 (ref 0.8–1.2)
PROTHROMBIN TIME: 36.6 SEC (ref 9–12.5)

## 2024-03-07 PROCEDURE — 93793 ANTICOAG MGMT PT WARFARIN: CPT | Mod: S$GLB,,,

## 2024-03-07 PROCEDURE — 85610 PROTHROMBIN TIME: CPT | Performed by: INTERNAL MEDICINE

## 2024-03-07 PROCEDURE — 36415 COLL VENOUS BLD VENIPUNCTURE: CPT | Performed by: INTERNAL MEDICINE

## 2024-03-07 RX ORDER — WARFARIN 4 MG/1
4-8 TABLET ORAL DAILY
Qty: 60 TABLET | Refills: 5 | Status: ON HOLD | OUTPATIENT
Start: 2024-03-07 | End: 2024-05-02 | Stop reason: HOSPADM

## 2024-03-07 NOTE — PROGRESS NOTES
[facility]    03/07/2024 11:15 AM    King Botello (38 y.o.) is followed by the [facility].      Assessment/Plan:    King Botello presents today with supratherapeutic INR. Goal INR 2.5-3.5    Assessment of patient findings per MA/LPN and chart review:   The following significant findings were found:  Patient recently spoke with her cardiologist on 3/4/24 and informed him that she wants to eat healthier and incorporate vegetables into her diet.   Patient advised that she can start eating vegetables but will need to be consistent with it and inform us of how much/when she wants to eat the vegetables. Also advised to let us know if at any point she decides to make changes in the amount of vegetables she chooses to eat.     Recommendation for patient's warfarin regimen:   Due to supratherapeutic INR, patient was instructed to SKIP their 3/7/24 warfarin dose.    Recommended repeat INR in 1 week      Cata Morocho, PharmD  PGY1 Pharmacy Resident  Preferred Contact: Secure Messaging or In Basket Message

## 2024-03-11 ENCOUNTER — TELEPHONE (OUTPATIENT)
Dept: CARDIAC REHAB | Facility: CLINIC | Age: 39
End: 2024-03-11
Payer: COMMERCIAL

## 2024-03-12 ENCOUNTER — PATIENT MESSAGE (OUTPATIENT)
Dept: CARDIOTHORACIC SURGERY | Facility: CLINIC | Age: 39
End: 2024-03-12
Payer: COMMERCIAL

## 2024-03-14 ENCOUNTER — PATIENT MESSAGE (OUTPATIENT)
Dept: CARDIOLOGY | Facility: CLINIC | Age: 39
End: 2024-03-14
Payer: COMMERCIAL

## 2024-03-14 ENCOUNTER — ANTI-COAG VISIT (OUTPATIENT)
Dept: CARDIOLOGY | Facility: CLINIC | Age: 39
End: 2024-03-14
Payer: COMMERCIAL

## 2024-03-14 ENCOUNTER — LAB VISIT (OUTPATIENT)
Dept: LAB | Facility: HOSPITAL | Age: 39
End: 2024-03-14
Attending: INTERNAL MEDICINE
Payer: COMMERCIAL

## 2024-03-14 DIAGNOSIS — Z79.01 LONG TERM (CURRENT) USE OF ANTICOAGULANTS: ICD-10-CM

## 2024-03-14 DIAGNOSIS — Z79.01 LONG TERM (CURRENT) USE OF ANTICOAGULANTS: Primary | ICD-10-CM

## 2024-03-14 LAB
INR PPP: 2.6 (ref 0.8–1.2)
PROTHROMBIN TIME: 25.9 SEC (ref 9–12.5)

## 2024-03-14 PROCEDURE — 85610 PROTHROMBIN TIME: CPT | Performed by: INTERNAL MEDICINE

## 2024-03-14 PROCEDURE — 36415 COLL VENOUS BLD VENIPUNCTURE: CPT | Performed by: INTERNAL MEDICINE

## 2024-03-14 PROCEDURE — 93793 ANTICOAG MGMT PT WARFARIN: CPT | Mod: S$GLB,,,

## 2024-03-14 NOTE — PROGRESS NOTES
[facility]    03/14/2024 10:48 AM    King Botello (38 y.o.) is followed by the [facility].      Assessment/Plan:    King Botello presents today with therapeutic INR. Goal INR 2.5-3.5    Assessment of patient findings per MA/LPN and chart review:   The following significant findings were found:  None    Recommendation for patient's warfarin regimen:   No change was made to warfarin therapy during this visit and patient has been instructed to continue their current warfarin regimen.    Recommended repeat INR in 1 week      Cata Morocho, PharmD  PGY1 Pharmacy Resident  Preferred Contact: Secure Messaging or In Basket Message

## 2024-03-15 ENCOUNTER — PATIENT MESSAGE (OUTPATIENT)
Dept: CARDIOLOGY | Facility: CLINIC | Age: 39
End: 2024-03-15
Payer: COMMERCIAL

## 2024-03-15 ENCOUNTER — OUTPATIENT CASE MANAGEMENT (OUTPATIENT)
Dept: ADMINISTRATIVE | Facility: OTHER | Age: 39
End: 2024-03-15
Payer: COMMERCIAL

## 2024-03-15 NOTE — TELEPHONE ENCOUNTER
Called pt. She is feeling fine right now, HR 70s. She had at least 2 episodes of elevated HR up to 130, feeling like her heart was beating fast (valve replaced) and she was sitting down on one of those occasions. Afib as per Apple watch. Some brain fog associated w. event.  Otherwise she has been feeling fine.  She is scheduled for [facility] today.    Please advise

## 2024-03-15 NOTE — TELEPHONE ENCOUNTER
Dr Ahumada updated and stated that it was not a major concern (not in those words) as she has a PM. If episodes are bothersome, she can double her metoprolol tartrate to 50 bid. She should f/u up w. EP.   Pt updated and verbalized understanding. She stated that she only had rare episodes. I told her that I will update EP. I also told her that she can go to [facility] today. She verbalized understanding.

## 2024-03-18 ENCOUNTER — TELEPHONE (OUTPATIENT)
Dept: ELECTROPHYSIOLOGY | Facility: CLINIC | Age: 39
End: 2024-03-18
Payer: COMMERCIAL

## 2024-03-18 ENCOUNTER — PATIENT MESSAGE (OUTPATIENT)
Dept: CARDIOLOGY | Facility: CLINIC | Age: 39
End: 2024-03-18
Payer: COMMERCIAL

## 2024-03-18 NOTE — TELEPHONE ENCOUNTER
Message left on [employer] to return call. Cha philippe RN    ----- Message from Kylah Nichols sent at 3/18/2024 12:27 PM CDT -----  Regarding: Questions  Patient have some questions and asking to speak with staff. Please call back @ [phone]. Thank you Kylah

## 2024-03-20 ENCOUNTER — PATIENT MESSAGE (OUTPATIENT)
Dept: ELECTROPHYSIOLOGY | Facility: CLINIC | Age: 39
End: 2024-03-20
Payer: COMMERCIAL

## 2024-03-20 ENCOUNTER — TELEPHONE (OUTPATIENT)
Dept: ELECTROPHYSIOLOGY | Facility: CLINIC | Age: 39
End: 2024-03-20
Payer: COMMERCIAL

## 2024-03-20 NOTE — TELEPHONE ENCOUNTER
Attempted to reach patient but unable to leave a message because vmail not set up. Cha philippe RN    ----- Message from Paula Watson sent at 3/20/2024 10:19 AM CDT -----  Regarding: Return call  Pt [phone] returning your missed call from yesterday.    Thanks

## 2024-03-20 NOTE — TELEPHONE ENCOUNTER
Attempted to reach patient again but vmail not set up to leave a message. Cha Sow RN----- Message from Anna Jaquez MA sent at 3/18/2024  3:07 PM CDT -----  Regarding: FW: Returning call    ----- Message -----  From: Kylah Nichols  Sent: 3/18/2024   2:48 PM CDT  To: [facility] Hsu Staff  Subject: Returning call                                   Patient returning call, please call back @ [phone]. Thank you Kylah

## 2024-03-22 ENCOUNTER — TELEPHONE (OUTPATIENT)
Dept: ELECTROPHYSIOLOGY | Facility: CLINIC | Age: 39
End: 2024-03-22
Payer: COMMERCIAL

## 2024-03-22 NOTE — TELEPHONE ENCOUNTER
Spoke with Ms Morrissey regarding wearing ear buds and playing the X-box and is concerned about interference with her pacemaker. Will get some information emailed to her regarding pacer implants/ Cha Sow RN

## 2024-03-25 ENCOUNTER — ANTI-COAG VISIT (OUTPATIENT)
Dept: CARDIOLOGY | Facility: CLINIC | Age: 39
End: 2024-03-25
Payer: COMMERCIAL

## 2024-03-25 ENCOUNTER — LAB VISIT (OUTPATIENT)
Dept: LAB | Facility: HOSPITAL | Age: 39
End: 2024-03-25
Attending: INTERNAL MEDICINE
Payer: COMMERCIAL

## 2024-03-25 DIAGNOSIS — Z79.01 LONG TERM (CURRENT) USE OF ANTICOAGULANTS: ICD-10-CM

## 2024-03-25 DIAGNOSIS — Z79.01 LONG TERM (CURRENT) USE OF ANTICOAGULANTS: Primary | ICD-10-CM

## 2024-03-25 LAB
INR PPP: 2.1 (ref 0.8–1.2)
PROTHROMBIN TIME: 21.9 SEC (ref 9–12.5)

## 2024-03-25 PROCEDURE — 36415 COLL VENOUS BLD VENIPUNCTURE: CPT | Performed by: INTERNAL MEDICINE

## 2024-03-25 PROCEDURE — 85610 PROTHROMBIN TIME: CPT | Performed by: INTERNAL MEDICINE

## 2024-03-25 PROCEDURE — 93793 ANTICOAG MGMT PT WARFARIN: CPT | Mod: S$GLB,,,

## 2024-03-25 NOTE — PROGRESS NOTES
[facility]    03/25/2024 11:24 AM    King Botello (38 y.o.) is followed by the [facility].      Assessment/Plan:    King Botello presents today with subtherapeutic  INR. Goal INR 2.5-3.5    Assessment of patient findings per MA/LPN and chart review:   The following significant findings were found:  None    Recommendation for patient's warfarin regimen:   Weekly warfarin dose increased due to repeated subtherapeutic INRs.    Recommended repeat INR in  1.5  weeks      Laine Glynn, PharmD, BCPS  Clinical Pharmacist - Coumadin Clinic  Preferred Contact: Secure Messaging or In Basket Message

## 2024-03-27 ENCOUNTER — PATIENT MESSAGE (OUTPATIENT)
Dept: CARDIOLOGY | Facility: CLINIC | Age: 39
End: 2024-03-27
Payer: COMMERCIAL

## 2024-03-29 ENCOUNTER — OUTPATIENT CASE MANAGEMENT (OUTPATIENT)
Dept: ADMINISTRATIVE | Facility: OTHER | Age: 39
End: 2024-03-29

## 2024-04-04 ENCOUNTER — ANTI-COAG VISIT (OUTPATIENT)
Dept: CARDIOLOGY | Facility: CLINIC | Age: 39
End: 2024-04-04
Payer: COMMERCIAL

## 2024-04-04 ENCOUNTER — LAB VISIT (OUTPATIENT)
Dept: LAB | Facility: HOSPITAL | Age: 39
End: 2024-04-04
Attending: INTERNAL MEDICINE
Payer: COMMERCIAL

## 2024-04-04 DIAGNOSIS — Z79.01 LONG TERM (CURRENT) USE OF ANTICOAGULANTS: ICD-10-CM

## 2024-04-04 DIAGNOSIS — Z79.01 LONG TERM (CURRENT) USE OF ANTICOAGULANTS: Primary | ICD-10-CM

## 2024-04-04 LAB
INR PPP: 3 (ref 0.8–1.2)
PROTHROMBIN TIME: 29.8 SEC (ref 9–12.5)

## 2024-04-04 PROCEDURE — 93793 ANTICOAG MGMT PT WARFARIN: CPT | Mod: S$GLB,,,

## 2024-04-04 PROCEDURE — 36415 COLL VENOUS BLD VENIPUNCTURE: CPT | Performed by: INTERNAL MEDICINE

## 2024-04-04 PROCEDURE — 85610 PROTHROMBIN TIME: CPT | Performed by: INTERNAL MEDICINE

## 2024-04-04 NOTE — PROGRESS NOTES
[facility]    04/04/2024 1:45 PM    King Botello (38 y.o.) is followed by the [facility].      Assessment/Plan:    King Botello presents today with therapeutic INR. Goal INR 2.5-3.5    Assessment of patient findings per MA/LPN and chart review:   The following significant findings were found:  None    Recommendation for patient's warfarin regimen:   No change was made to warfarin therapy during this visit and patient has been instructed to continue their current warfarin regimen.    Recommended repeat INR in 2 weeks      Laine Glynn, PharmD, BCPS  Clinical Pharmacist - Coumadin Clinic  Preferred Contact: Secure Messaging or In Basket Message

## 2024-04-08 ENCOUNTER — TELEPHONE (OUTPATIENT)
Dept: ELECTROPHYSIOLOGY | Facility: CLINIC | Age: 39
End: 2024-04-08
Payer: COMMERCIAL

## 2024-04-08 ENCOUNTER — PATIENT MESSAGE (OUTPATIENT)
Dept: ELECTROPHYSIOLOGY | Facility: CLINIC | Age: 39
End: 2024-04-08
Payer: COMMERCIAL

## 2024-04-08 ENCOUNTER — PATIENT MESSAGE (OUTPATIENT)
Dept: CARDIOLOGY | Facility: CLINIC | Age: 39
End: 2024-04-08
Payer: COMMERCIAL

## 2024-04-08 NOTE — TELEPHONE ENCOUNTER
"Message received from patient that she felt "out of rhythm" this weekend with irregular heart rate noted on apple watch and digital BP cuff.  Review of manual transmission reveals AF burden 100% since 1/17/24 with controlled ventricular rates.  Patient describes last night as feeling "sense of fogginess and lightness".  Notified patient she is persistent AF with controlled rates.  Compliant with coumadin and Metoprolol tartrate 25mg BID.  No VHRs noted.  Clinic follow up scheduled 5/3/24.  Advised next time she gets unusual heart rate from a digital device, to manually check pulse rate for 1 min.  Verbalized understanding.             "

## 2024-04-09 DIAGNOSIS — I48.91 ATRIAL FIBRILLATION AND FLUTTER: Primary | ICD-10-CM

## 2024-04-09 DIAGNOSIS — I48.92 ATRIAL FIBRILLATION AND FLUTTER: Primary | ICD-10-CM

## 2024-04-10 ENCOUNTER — TELEPHONE (OUTPATIENT)
Dept: ELECTROPHYSIOLOGY | Facility: CLINIC | Age: 39
End: 2024-04-10
Payer: COMMERCIAL

## 2024-04-10 NOTE — TELEPHONE ENCOUNTER
Spoke with patient and MINERVA/DCCV has been scheduled and instructions will be sent to the portal. Cha Sow RN    ----- Message from Paula Watson sent at 4/10/2024 12:46 PM CDT -----  Regarding: Returning call  Pt [phone] returning your missed call.    Thanks

## 2024-04-11 ENCOUNTER — LAB VISIT (OUTPATIENT)
Dept: LAB | Facility: HOSPITAL | Age: 39
End: 2024-04-11
Attending: INTERNAL MEDICINE
Payer: COMMERCIAL

## 2024-04-11 ENCOUNTER — PATIENT MESSAGE (OUTPATIENT)
Dept: ELECTROPHYSIOLOGY | Facility: CLINIC | Age: 39
End: 2024-04-11
Payer: COMMERCIAL

## 2024-04-11 DIAGNOSIS — I48.91 ATRIAL FIBRILLATION AND FLUTTER: ICD-10-CM

## 2024-04-11 DIAGNOSIS — I48.92 ATRIAL FIBRILLATION AND FLUTTER: ICD-10-CM

## 2024-04-11 LAB
ANION GAP SERPL CALC-SCNC: 9 MMOL/L (ref 8–16)
APTT PPP: 39.1 SEC (ref 21–32)
BUN SERPL-MCNC: 11 MG/DL (ref 6–20)
CALCIUM SERPL-MCNC: 10.2 MG/DL (ref 8.7–10.5)
CHLORIDE SERPL-SCNC: 106 MMOL/L (ref 95–110)
CO2 SERPL-SCNC: 25 MMOL/L (ref 23–29)
CREAT SERPL-MCNC: 0.7 MG/DL (ref 0.5–1.4)
ERYTHROCYTE [DISTWIDTH] IN BLOOD BY AUTOMATED COUNT: 15.6 % (ref 11.5–14.5)
EST. GFR  (NO RACE VARIABLE): >60 ML/MIN/1.73 M^2
GLUCOSE SERPL-MCNC: 88 MG/DL (ref 70–110)
HCT VFR BLD AUTO: 39.9 % (ref 37–48.5)
HGB BLD-MCNC: 12.3 G/DL (ref 12–16)
MCH RBC QN AUTO: 25.6 PG (ref 27–31)
MCHC RBC AUTO-ENTMCNC: 30.8 G/DL (ref 32–36)
MCV RBC AUTO: 83 FL (ref 82–98)
PLATELET # BLD AUTO: 410 K/UL (ref 150–450)
PMV BLD AUTO: 10.7 FL (ref 9.2–12.9)
POTASSIUM SERPL-SCNC: 4 MMOL/L (ref 3.5–5.1)
RBC # BLD AUTO: 4.81 M/UL (ref 4–5.4)
SODIUM SERPL-SCNC: 140 MMOL/L (ref 136–145)
WBC # BLD AUTO: 7.28 K/UL (ref 3.9–12.7)

## 2024-04-11 PROCEDURE — 80048 BASIC METABOLIC PNL TOTAL CA: CPT | Performed by: STUDENT IN AN ORGANIZED HEALTH CARE EDUCATION/TRAINING PROGRAM

## 2024-04-11 PROCEDURE — 36415 COLL VENOUS BLD VENIPUNCTURE: CPT | Performed by: STUDENT IN AN ORGANIZED HEALTH CARE EDUCATION/TRAINING PROGRAM

## 2024-04-11 PROCEDURE — 85730 THROMBOPLASTIN TIME PARTIAL: CPT | Performed by: STUDENT IN AN ORGANIZED HEALTH CARE EDUCATION/TRAINING PROGRAM

## 2024-04-11 PROCEDURE — 85027 COMPLETE CBC AUTOMATED: CPT | Performed by: STUDENT IN AN ORGANIZED HEALTH CARE EDUCATION/TRAINING PROGRAM

## 2024-04-16 ENCOUNTER — ANTI-COAG VISIT (OUTPATIENT)
Dept: CARDIOLOGY | Facility: CLINIC | Age: 39
End: 2024-04-16
Payer: COMMERCIAL

## 2024-04-16 ENCOUNTER — LAB VISIT (OUTPATIENT)
Dept: LAB | Facility: HOSPITAL | Age: 39
End: 2024-04-16
Attending: STUDENT IN AN ORGANIZED HEALTH CARE EDUCATION/TRAINING PROGRAM
Payer: COMMERCIAL

## 2024-04-16 DIAGNOSIS — Z79.01 LONG TERM (CURRENT) USE OF ANTICOAGULANTS: Primary | ICD-10-CM

## 2024-04-16 DIAGNOSIS — Z79.01 LONG TERM (CURRENT) USE OF ANTICOAGULANTS: ICD-10-CM

## 2024-04-16 LAB
INR PPP: 1.9 (ref 0.8–1.2)
PROTHROMBIN TIME: 19.9 SEC (ref 9–12.5)

## 2024-04-16 PROCEDURE — 85610 PROTHROMBIN TIME: CPT | Performed by: INTERNAL MEDICINE

## 2024-04-16 PROCEDURE — 36415 COLL VENOUS BLD VENIPUNCTURE: CPT | Performed by: INTERNAL MEDICINE

## 2024-04-16 PROCEDURE — 93793 ANTICOAG MGMT PT WARFARIN: CPT | Mod: S$GLB,,,

## 2024-04-16 NOTE — PROGRESS NOTES
[facility]    04/16/2024 2:43 PM    King Botello (38 y.o.) is followed by the [facility].      Assessment/Plan:    King Botello presents today with subtherapeutic  INR. Goal INR 2.5-3.5    Assessment of patient findings per MA/LPN and chart review:   The following significant findings were found:  Patient has a MINERVA/DCCV scheduled on 4/18 and will need INR in goal    Recommendation for patient's warfarin regimen:   Due to subtherapeutic INR, patient was instructed to take a booster dose tomorrow, weekly warfarin dose increased due to repeated subtherapeutic INRs.    Recommended repeat INR in 2 days      Laine Glynn, PharmD, BCPS  Clinical Pharmacist - Coumadin Clinic  Preferred Contact: Secure Messaging or In Basket Message

## 2024-04-17 ENCOUNTER — PATIENT MESSAGE (OUTPATIENT)
Dept: ELECTROPHYSIOLOGY | Facility: CLINIC | Age: 39
End: 2024-04-17
Payer: COMMERCIAL

## 2024-04-17 ENCOUNTER — TELEPHONE (OUTPATIENT)
Dept: ELECTROPHYSIOLOGY | Facility: CLINIC | Age: 39
End: 2024-04-17
Payer: COMMERCIAL

## 2024-04-17 NOTE — TELEPHONE ENCOUNTER
Message left on vmail to return call to review tomorrow's procedure. Cha Sow RN    Spoke to patient    CONFIRMED procedure arrival time of 8 AM on 4/18 for 10 AM procedure    Reiterated instructions including:  -Directions to check in desk  -NPO after midnight night prior to procedure  -Confirmed compliance of maintaining Coumadin as prescribed  -Pre-procedure LABS were reviewed and no alerts noted  -Confirmed  presence of implanted device/stimulator  - MDT - DC PPM  -Confirmed no fever, cough, or shortness of breath in the past 30 days  -Confirmed no redness, rash, irritation, or yeast infection to groin area.   --Do not wear mascara day of procedure  --Reviewed current visitor policy    Patient verbalized understanding of above and appreciated the call.

## 2024-04-18 ENCOUNTER — PATIENT MESSAGE (OUTPATIENT)
Dept: MEDSURG UNIT | Facility: HOSPITAL | Age: 39
End: 2024-04-18
Payer: COMMERCIAL

## 2024-04-18 ENCOUNTER — ANESTHESIA (OUTPATIENT)
Dept: MEDSURG UNIT | Facility: HOSPITAL | Age: 39
End: 2024-04-18
Payer: COMMERCIAL

## 2024-04-18 ENCOUNTER — CLINICAL SUPPORT (OUTPATIENT)
Dept: CARDIOLOGY | Facility: HOSPITAL | Age: 39
End: 2024-04-18
Payer: COMMERCIAL

## 2024-04-18 ENCOUNTER — CLINICAL SUPPORT (OUTPATIENT)
Dept: CARDIOLOGY | Facility: HOSPITAL | Age: 39
End: 2024-04-18
Attending: STUDENT IN AN ORGANIZED HEALTH CARE EDUCATION/TRAINING PROGRAM
Payer: COMMERCIAL

## 2024-04-18 ENCOUNTER — HOSPITAL ENCOUNTER (OUTPATIENT)
Dept: CARDIOLOGY | Facility: HOSPITAL | Age: 39
Discharge: HOME | End: 2024-04-18
Attending: STUDENT IN AN ORGANIZED HEALTH CARE EDUCATION/TRAINING PROGRAM | Admitting: STUDENT IN AN ORGANIZED HEALTH CARE EDUCATION/TRAINING PROGRAM
Payer: COMMERCIAL

## 2024-04-18 ENCOUNTER — ANESTHESIA EVENT (OUTPATIENT)
Dept: MEDSURG UNIT | Facility: HOSPITAL | Age: 39
End: 2024-04-18
Payer: COMMERCIAL

## 2024-04-18 ENCOUNTER — HOSPITAL ENCOUNTER (OUTPATIENT)
Facility: HOSPITAL | Age: 39
Discharge: HOME | End: 2024-04-18
Attending: STUDENT IN AN ORGANIZED HEALTH CARE EDUCATION/TRAINING PROGRAM | Admitting: STUDENT IN AN ORGANIZED HEALTH CARE EDUCATION/TRAINING PROGRAM
Payer: COMMERCIAL

## 2024-04-18 ENCOUNTER — ANTI-COAG VISIT (OUTPATIENT)
Dept: CARDIOLOGY | Facility: CLINIC | Age: 39
End: 2024-04-18
Payer: COMMERCIAL

## 2024-04-18 VITALS
SYSTOLIC BLOOD PRESSURE: 100 MMHG | BODY MASS INDEX: 33.04 KG/M2 | HEIGHT: 61 IN | WEIGHT: 175 LBS | DIASTOLIC BLOOD PRESSURE: 70 MMHG

## 2024-04-18 VITALS
DIASTOLIC BLOOD PRESSURE: 73 MMHG | HEART RATE: 87 BPM | OXYGEN SATURATION: 100 % | TEMPERATURE: 98 F | RESPIRATION RATE: 16 BRPM | SYSTOLIC BLOOD PRESSURE: 123 MMHG

## 2024-04-18 DIAGNOSIS — I48.92 ATRIAL FIBRILLATION AND FLUTTER: ICD-10-CM

## 2024-04-18 DIAGNOSIS — I48.91 ATRIAL FIBRILLATION AND FLUTTER: Primary | ICD-10-CM

## 2024-04-18 DIAGNOSIS — I48.91 ATRIAL FIBRILLATION: ICD-10-CM

## 2024-04-18 DIAGNOSIS — Z95.0 PRESENCE OF CARDIAC PACEMAKER: ICD-10-CM

## 2024-04-18 DIAGNOSIS — I44.30 UNSPECIFIED ATRIOVENTRICULAR BLOCK: ICD-10-CM

## 2024-04-18 DIAGNOSIS — Z79.01 LONG TERM (CURRENT) USE OF ANTICOAGULANTS: Primary | ICD-10-CM

## 2024-04-18 DIAGNOSIS — I48.92 ATRIAL FIBRILLATION AND FLUTTER: Primary | ICD-10-CM

## 2024-04-18 DIAGNOSIS — I48.19 PERSISTENT ATRIAL FIBRILLATION: ICD-10-CM

## 2024-04-18 DIAGNOSIS — I48.91 ATRIAL FIBRILLATION AND FLUTTER: ICD-10-CM

## 2024-04-18 LAB
APTT PPP: 38.6 SEC (ref 21–32)
ASCENDING AORTA: 2.4 CM
B-HCG UR QL: NEGATIVE
BSA FOR ECHO PROCEDURE: 1.85 M2
CTP QC/QA: YES
INR PPP: 2.4 (ref 0.8–1.2)
INR PPP: 2.4 (ref 0.8–1.2)
OHS QRS DURATION: 76 MS
OHS QRS DURATION: 94 MS
OHS QTC CALCULATION: 425 MS
OHS QTC CALCULATION: 427 MS
PROTHROMBIN TIME: 24.6 SEC (ref 9–12.5)
PROTHROMBIN TIME: 24.6 SEC (ref 9–12.5)
SINUS: 2.8 CM
STJ: 2.1 CM

## 2024-04-18 PROCEDURE — 93325 DOPPLER ECHO COLOR FLOW MAPG: CPT | Mod: 26,,, | Performed by: INTERNAL MEDICINE

## 2024-04-18 PROCEDURE — 93005 ELECTROCARDIOGRAM TRACING: CPT

## 2024-04-18 PROCEDURE — 63600175 PHARM REV CODE 636 W HCPCS: Performed by: NURSE ANESTHETIST, CERTIFIED REGISTERED

## 2024-04-18 PROCEDURE — 93312 ECHO TRANSESOPHAGEAL: CPT | Mod: 26,,, | Performed by: INTERNAL MEDICINE

## 2024-04-18 PROCEDURE — 85730 THROMBOPLASTIN TIME PARTIAL: CPT | Performed by: NURSE PRACTITIONER

## 2024-04-18 PROCEDURE — 25000003 PHARM REV CODE 250: Performed by: PHYSICIAN ASSISTANT

## 2024-04-18 PROCEDURE — 93010 ELECTROCARDIOGRAM REPORT: CPT | Mod: ,,, | Performed by: INTERNAL MEDICINE

## 2024-04-18 PROCEDURE — 92960 CARDIOVERSION ELECTRIC EXT: CPT | Performed by: STUDENT IN AN ORGANIZED HEALTH CARE EDUCATION/TRAINING PROGRAM

## 2024-04-18 PROCEDURE — 93294 REM INTERROG EVL PM/LDLS PM: CPT | Mod: ,,, | Performed by: STUDENT IN AN ORGANIZED HEALTH CARE EDUCATION/TRAINING PROGRAM

## 2024-04-18 PROCEDURE — D9220A PRA ANESTHESIA: Mod: ANES,,, | Performed by: ANESTHESIOLOGY

## 2024-04-18 PROCEDURE — 93793 ANTICOAG MGMT PT WARFARIN: CPT | Mod: S$GLB,,,

## 2024-04-18 PROCEDURE — 25500020 PHARM REV CODE 255: Performed by: INTERNAL MEDICINE

## 2024-04-18 PROCEDURE — 93320 DOPPLER ECHO COMPLETE: CPT | Mod: 26,,, | Performed by: INTERNAL MEDICINE

## 2024-04-18 PROCEDURE — 37000009 HC ANESTHESIA EA ADD 15 MINS: Performed by: STUDENT IN AN ORGANIZED HEALTH CARE EDUCATION/TRAINING PROGRAM

## 2024-04-18 PROCEDURE — 85610 PROTHROMBIN TIME: CPT | Performed by: NURSE PRACTITIONER

## 2024-04-18 PROCEDURE — 37000008 HC ANESTHESIA 1ST 15 MINUTES: Performed by: STUDENT IN AN ORGANIZED HEALTH CARE EDUCATION/TRAINING PROGRAM

## 2024-04-18 PROCEDURE — 25000003 PHARM REV CODE 250: Performed by: NURSE ANESTHETIST, CERTIFIED REGISTERED

## 2024-04-18 PROCEDURE — 92960 CARDIOVERSION ELECTRIC EXT: CPT | Mod: ,,, | Performed by: STUDENT IN AN ORGANIZED HEALTH CARE EDUCATION/TRAINING PROGRAM

## 2024-04-18 PROCEDURE — 81025 URINE PREGNANCY TEST: CPT | Performed by: NURSE PRACTITIONER

## 2024-04-18 PROCEDURE — 93296 REM INTERROG EVL PM/IDS: CPT | Performed by: STUDENT IN AN ORGANIZED HEALTH CARE EDUCATION/TRAINING PROGRAM

## 2024-04-18 PROCEDURE — 93320 DOPPLER ECHO COMPLETE: CPT

## 2024-04-18 PROCEDURE — D9220A PRA ANESTHESIA: Mod: CRNA,,, | Performed by: NURSE ANESTHETIST, CERTIFIED REGISTERED

## 2024-04-18 RX ORDER — LIDOCAINE HYDROCHLORIDE 20 MG/ML
SOLUTION OROPHARYNGEAL
Status: DISCONTINUED | OUTPATIENT
Start: 2024-04-18 | End: 2024-04-18

## 2024-04-18 RX ORDER — PROPOFOL 10 MG/ML
VIAL (ML) INTRAVENOUS
Status: DISCONTINUED | OUTPATIENT
Start: 2024-04-18 | End: 2024-04-18

## 2024-04-18 RX ORDER — ETOMIDATE 2 MG/ML
INJECTION INTRAVENOUS
Status: DISCONTINUED | OUTPATIENT
Start: 2024-04-18 | End: 2024-04-18

## 2024-04-18 RX ORDER — SODIUM CHLORIDE 0.9 % (FLUSH) 0.9 %
3 SYRINGE (ML) INJECTION
Status: DISCONTINUED | OUTPATIENT
Start: 2024-04-18 | End: 2024-04-18 | Stop reason: HOSPADM

## 2024-04-18 RX ORDER — LIDOCAINE HYDROCHLORIDE 20 MG/ML
INJECTION, SOLUTION EPIDURAL; INFILTRATION; INTRACAUDAL; PERINEURAL
Status: DISCONTINUED | OUTPATIENT
Start: 2024-04-18 | End: 2024-04-18

## 2024-04-18 RX ORDER — SILVER SULFADIAZINE 10 G/1000G
CREAM TOPICAL DAILY
Status: DISCONTINUED | OUTPATIENT
Start: 2024-04-18 | End: 2024-04-18 | Stop reason: HOSPADM

## 2024-04-18 RX ORDER — PROCHLORPERAZINE EDISYLATE 5 MG/ML
5 INJECTION INTRAMUSCULAR; INTRAVENOUS EVERY 30 MIN PRN
Status: DISCONTINUED | OUTPATIENT
Start: 2024-04-18 | End: 2024-04-18 | Stop reason: HOSPADM

## 2024-04-18 RX ORDER — FENTANYL CITRATE 50 UG/ML
25 INJECTION, SOLUTION INTRAMUSCULAR; INTRAVENOUS EVERY 5 MIN PRN
Status: DISCONTINUED | OUTPATIENT
Start: 2024-04-18 | End: 2024-04-18 | Stop reason: HOSPADM

## 2024-04-18 RX ADMIN — SILVER SULFADIAZINE: 10 CREAM TOPICAL at 12:04

## 2024-04-18 RX ADMIN — HUMAN ALBUMIN MICROSPHERES AND PERFLUTREN 0.66 MG: 10; .22 INJECTION, SOLUTION INTRAVENOUS at 10:04

## 2024-04-18 RX ADMIN — PROPOFOL 70 MG: 10 INJECTION, EMULSION INTRAVENOUS at 10:04

## 2024-04-18 RX ADMIN — LIDOCAINE HYDROCHLORIDE 100 MG: 20 INJECTION, SOLUTION EPIDURAL; INFILTRATION; INTRACAUDAL at 10:04

## 2024-04-18 RX ADMIN — SODIUM CHLORIDE: 0.9 INJECTION, SOLUTION INTRAVENOUS at 10:04

## 2024-04-18 RX ADMIN — ETOMIDATE 4 MG: 2 INJECTION INTRAVENOUS at 10:04

## 2024-04-18 RX ADMIN — LIDOCAINE HYDROCHLORIDE 15 ML: 20 SOLUTION OROPHARYNGEAL at 10:04

## 2024-04-18 NOTE — ANESTHESIA POSTPROCEDURE EVALUATION
Anesthesia Post Evaluation    Patient: King Botello    Procedure(s) Performed: Procedure(s) (LRB):  Cardioversion or Defibrillation (N/A)  Transesophageal echo (MINERVA) intra-procedure log documentation (N/A)    Final Anesthesia Type: general      Patient location during evaluation: PACU  Patient participation: Yes- Able to Participate  Level of consciousness: awake and alert  Post-procedure vital signs: reviewed and stable  Pain management: adequate  Airway patency: patent    PONV status at discharge: No PONV  Anesthetic complications: no      Cardiovascular status: blood pressure returned to baseline  Respiratory status: unassisted  Hydration status: euvolemic  Follow-up not needed.              Vitals Value Taken Time   /51 04/18/24 1131   Temp 36.3 °C (97.3 °F) 04/18/24 1059   Pulse 72 04/18/24 1137   Resp 16 04/18/24 1100   SpO2 100 % 04/18/24 1137   Vitals shown include unfiled device data.      No case tracking events are documented in the log.      Pain/Katerine Score: Katerine Score: 9 (4/18/2024 11:00 AM)

## 2024-04-18 NOTE — NURSING TRANSFER
Nursing Transfer Note      4/18/2024   12:02 PM    Nurse giving handoff:dilcia hook   Nurse receiving handoff:elias hook     Reason patient is being transferred: d/c criteria met     Transfer : 7b    Transfer via stretcher    Transfer with dilcia     Transported by dilcia rn     Transfer Vital Signs:  Blood Pressure:se epic   Heart Rate:see epic   O2:room air   Temperature:see epic   Respirations:see epic     Telemetry: in recovery   Order for Tele Monitor? In recovery     Additional Lines: none     4eyes on Skin: yes in recovery     Medicines sent: silvadene cream     Any special needs or follow-up needed: pa speaking with patient 1203pm     Patient belongings transferred with patient: none     Chart send with patient: yes     Notified: reported to elias hook     Patient reassessed at: see epic  (date, time)  1  Upon arrival to floor: to room no complaints no distress noted.

## 2024-04-18 NOTE — PROGRESS NOTES
Patient admitted to recovery see [facility] for complete assessment pacu bcg's maintained safety measures verified patient instructed on pain scale and patient verbalized understanding. Called for EKG and also called patient's  and updated on patient location.

## 2024-04-18 NOTE — ASSESSMENT & PLAN NOTE
Here today for MINERVA and DCCV.   -The risks, benefits & alternatives of the procedure were explained to the patient.    -The risks of transesophageal echo include but are not limited to:  Dental trauma, esophageal trauma/perforation, bleeding, laryngospasm/brochospasm, aspiration, sore throat/hoarseness, & dislodgement of the endotracheal tube/nasogastric tube (where applicable).    -The risks of moderate sedation include hypotension, respiratory depression, arrhythmias, bronchospasm, & death.    -Prior to procedure, extensive discussion with patient regarding risks and benefits of DCCV at bedside today.   -The patient voices understanding, all questions have been answered, and patient would like to proceed.   -Informed consent was obtained & the patient is agreeable to proceed with the procedure.      Further recommendations per attending addendum

## 2024-04-18 NOTE — HOSPITAL COURSE
Patient underwent MINERVA without evidence of LOBO thrombus. Proceeded with DCCV, converting from atrial flutter to sinus rhythm. Patient tolerated the procedure without any acute complications. Post-DCCV ECG revealed normal sinus rhythm at 72 bpm. Plan to continue all home medications including Coumadin and Metoprolol. Instructed to return in 1 week for follow up ECG and with Dr. Moore in a couple weeks as scheduled.    Patient was assessed at bedside prior to discharge, they reported feeling well and denied chest discomfort, shortness of breath, palpitations, lightheadedness, or any other acute symptoms. Discharge instructions were discussed with patient and all questions were answered. Patient was discharged home in stable condition.

## 2024-04-18 NOTE — PLAN OF CARE
Received pt back to SSCU 7B via stretcher from EP PACU via stretcher accompanied by staff.  AAO x 4.  Denies pain or discomfort.  Respirations even and unlabored. No distress noted. Pt stable.  Pt oriented to room and call bell placed within reach.

## 2024-04-18 NOTE — ANESTHESIA PREPROCEDURE EVALUATION
2024  King Botello is a 38 y.o., female.Pre-operative evaluation for Procedure(s) (LRB):  Cardioversion or Defibrillation (N/A)  Transesophageal echo (MINERVA) intra-procedure log documentation (N/A)    King Botello is a 38 y.o. female     Patient Active Problem List   Diagnosis    Vitamin D deficiency disease    Class 2 obesity due to excess calories with body mass index (BMI) of 35.0 to 35.9 in adult    Hypertension, essential    Chronic fatigue    Pre-existing essential hypertension during pregnancy, antepartum    Nonrheumatic mitral valve stenosis    S/P  section    Chronic diastolic heart failure    Acute postoperative anemia due to expected blood loss    Transient hyperglycemia post procedure    S/P MVR (mitral valve replacement)    CHB (complete heart block)    Long term (current) use of anticoagulants    Heart failure, diastolic, acute on chronic    Class 2 severe obesity due to excess calories with serious comorbidity and body mass index (BMI) of 35.0 to 35.9 in adult    Atrial fibrillation and flutter       Review of patient's allergies indicates:   Allergen Reactions    Cinnamon Itching    Doxycycline      Abdomen pain severe       No current facility-administered medications on file prior to encounter.     Current Outpatient Medications on File Prior to Encounter   Medication Sig Dispense Refill    aspirin (ECOTRIN) 81 MG EC tablet Take 1 tablet (81 mg total) by mouth once daily. 30 tablet 11    metoprolol tartrate (LOPRESSOR) 25 MG tablet Take 1 tablet (25 mg total) by mouth 2 (two) times daily. 60 tablet 11    warfarin (COUMADIN) 4 MG tablet Take 1-2 tablets (4-8 mg total) by mouth Daily. As instructed by Coumadin Clinic. 60 tablet 5    enoxaparin (LOVENOX) 80 mg/0.8 mL Syrg Inject 0.8 mLs (80 mg total) into the skin every 12 (twelve) hours. As directed by the Coumadin  Clinic 8 mL 0    warfarin (COUMADIN) 1 MG tablet Take 4 tablets (4 mg total) by mouth Daily. 120 tablet 11       Past Surgical History:   Procedure Laterality Date    A-V CARDIAC PACEMAKER INSERTION N/A 2024    Procedure: INSERTION, CARDIAC PACEMAKER, DUAL CHAMBER;  Surgeon: CRISTI Moore MD;  Location: [facility] EP LAB;  Service: Cardiology;  Laterality: N/A;  CHB, dPPM with LBBP, MDT, ANES, EH, RM [id number]     SECTION       SECTION WITH TUBAL LIGATION Bilateral 2023    Procedure:  SECTION, WITH TUBAL LIGATION;  Surgeon: Mane Moreno MD;  Location: [facility] L&D;  Service: OB/GYN;  Laterality: Bilateral;    DILATION AND CURETTAGE OF UTERUS USING SUCTION N/A 2021    Procedure: DILATION AND CURETTAGE, UTERUS, USING SUCTION;  Surgeon: Mane Moreno MD;  Location: [facility] OR;  Service: OB/GYN;  Laterality: N/A;    MITRAL VALVE REPAIR      MITRAL VALVE REPLACEMENT N/A 2024    Procedure: MITRAL VALVE REPLACEMENT, REDO STERNOTOMY;  Surgeon: Thierno Liu MD;  Location: [address];  Service: Cardiothoracic;  Laterality: N/A;    TRANSESOPHAGEAL ECHOCARDIOGRAPHY N/A 2024    Procedure: ECHOCARDIOGRAM, TRANSESOPHAGEAL;  Surgeon: Jessee Ahumada MD;  Location: [facility] EP LAB;  Service: Cardiology;  Laterality: N/A;    TREATMENT OF CARDIAC ARRHYTHMIA N/A 2024    Procedure: Cardioversion or Defibrillation;  Surgeon: CRISTI Moore MD;  Location: [facility] EP LAB;  Service: Cardiology;  Laterality: N/A;    VALVULOPLASTY, TRICUSPID N/A 2024    Procedure: VALVULOPLASTY, TRICUSPID REPAIR;  Surgeon: Thierno Liu MD;  Location: [address];  Service: Cardiothoracic;  Laterality: N/A;       Social History     Socioeconomic History    Marital status:    Occupational History    Occupation: xray tech   Tobacco Use    Smoking status: Never    Smokeless tobacco: Never   Substance and Sexual Activity    Alcohol use: Not Currently     Comment: not since  +UPT    Drug use: No    Sexual activity: Yes     Partners: Male     Birth control/protection: None     Social Determinants of Health     Financial Resource Strain: Low Risk  (2/29/2024)    Overall Financial Resource Strain (CARDIA)     Difficulty of Paying Living Expenses: Not hard at all   Food Insecurity: No Food Insecurity (2/29/2024)    Hunger Vital Sign     Worried About Running Out of Food in the Last Year: Never true     Ran Out of Food in the Last Year: Never true   Transportation Needs: No Transportation Needs (2/29/2024)    PRAPARE - Transportation     Lack of Transportation (Medical): No     Lack of Transportation (Non-Medical): No   Physical Activity: Unknown (2/29/2024)    Exercise Vital Sign     Days of Exercise per Week: Patient declined     Minutes of Exercise per Session: 0 min   Recent Concern: Physical Activity - Inactive (1/30/2024)    Exercise Vital Sign     Days of Exercise per Week: 0 days     Minutes of Exercise per Session: 0 min   Stress: Patient Declined (2/29/2024)    [employer] [place] [employer] - Occupational Stress Questionnaire     Feeling of Stress : Patient declined   Recent Concern: Stress - Stress Concern Present (1/30/2024)    [employer] [place] [employer] - Occupational Stress Questionnaire     Feeling of Stress : Very much   Social Connections: Moderately Isolated (2/29/2024)    Social Connection and Isolation Panel [NHANES]     Frequency of Communication with Friends and Family: More than three times a week     Frequency of Social Gatherings with Friends and Family: Once a week     Attends [employer] Services: Never     Active Member of Clubs or Organizations: No     Attends Club or Organization Meetings: Never     Marital Status:    Housing Stability: Low Risk  (2/29/2024)    Housing Stability Vital Sign     Unable to Pay for Housing in the Last Year: No     Number of Places Lived in the Last Year: 1     Unstable Housing in the Last Year: No  "        CBC: No results for input(s): "WBC", "RBC", "HGB", "HCT", "PLT", "MCV", "MCH", "MCHC" in the last 72 hours.    CMP: No results for input(s): "NA", "K", "CL", "CO2", "BUN", "CREATININE", "GLU", "MG", "PHOS", "CALCIUM", "ALBUMIN", "PROT", "ALKPHOS", "ALT", "AST", "BILITOT" in the last 72 hours.    INR  Recent Labs     24  0945 24  0811   INR 1.9* 2.4*  2.4*   APTT  --  38.6*           Diagnostic Studies:      EKD Echo:  Results for orders placed or performed during the hospital encounter of 17   2D Echo w/ Color Flow Doppler   Result Value Ref Range    EF + QEF 60 55 - 65    Mitral Valve Regurgitation TRIVIAL     Est. PA Systolic Pressure 22.28     Mitral Valve Mobility MILDLY RESTRICTED     Mitral Valve Stenosis MILD (A)     Tricuspid Valve Regurgitation TRIVIAL TO MILD           Pre-op Assessment    I have reviewed the Patient Summary Reports.    I have reviewed the NPO Status.      Review of Systems  Anesthesia Hx:  No problems with previous Anesthesia   History of prior surgery of interest to airway management or planning: heart surgery. Previous anesthesia: General        Denies Family Hx of Anesthesia complications.    Denies Personal Hx of Anesthesia complications.                    Cardiovascular:    Pacemaker Hypertension    Dysrhythmias           S/P MVR                             Physical Exam  General: Well nourished, Cooperative, Alert and Oriented    Airway:  Mallampati: II   Mouth Opening: Normal  TM Distance: Normal  Tongue: Normal  Neck ROM: Normal ROM        Anesthesia Plan  Type of Anesthesia, risks & benefits discussed:    Anesthesia Type: Gen Natural Airway  Intra-op Monitoring Plan: Standard ASA Monitors  Post Op Pain Control Plan: multimodal analgesia  Induction:  IV  Informed Consent: Informed consent signed with the Patient and all parties understand the risks and agree with anesthesia plan.  All questions answered.   ASA Score: 3    Ready For Surgery From " Anesthesia Perspective.     .

## 2024-04-18 NOTE — DISCHARGE SUMMARY
[facility]  Cardiology  Discharge Summary      Patient Name: King Botello  MRN: [id number]  Admission Date: 4/18/2024  Hospital Length of Stay: 0 days  Discharge Date and Time:  04/18/2024 11:59 AM  Attending Physician: CRISTI Moore MD    Discharging Provider: Gissel Early PA-C  Primary Care Physician: Mayco Hansen MD    HPI:   Ms. Botello is a 37 y/o with mitral valve disease now s/p MVR with a 25mm Carbomedics standard mechanical prosthesis plus TVr in early January complicated by complete heart block requiring PPM 1/17/2024. Device interrogation shows persistent atrial fibrillation since device implantation. She presents today for MINERVA and DCCV.     MINERVA 1/17/2024    MINERVA prior to DCCV (and pacemaker implantation).  There is no evidence of intracardiac thrombus.    Left Ventricle: The left ventricle is normal in size. Normal wall thickness. Normal wall motion. There is normal systolic function with a visually estimated ejection fraction of 60 - 65%. There is normal diastolic function.    Right Ventricle: Normal right ventricular cavity size. Systolic function is normal.    Left Atrium: Left atrium is dilated. The left atrial appendage appears normal. The left atrial appendage has a windsock morphology. Appendage velocity is reduced at less than 40 cm/sec. There is no thrombus in the left atrial appendage.    Right Atrium: Right atrium is dilated.    Mitral Valve: There is a mechanical valve in the mitral position. It is reported to be a 25mm Carbomedics Standard valve.    Tricuspid Valve: The tricuspid valve is s/p repair with a 26mm Medtronic Contour annuloplasty band.    Anticoagulant/antiplatelets: Coumadin  ECG: Atrial fibrillation     Dysphagia or odynophagia:  No  Liver Disease, esophageal disease, or known varices:  No  Upper GI Bleeding: No  Snoring:  No  Sleep Apnea:  No  Prior neck surgery or radiation:  No  History of anesthetic difficulties:  No  Family history of  anesthetic difficulties:  No  Last oral intake: yesterday before midnight  Able to move neck in all directions:  Yes  Platelet count: 410k  INR: Pending      Procedure(s) (LRB):  Cardioversion or Defibrillation (N/A)  Transesophageal echo (MINERVA) intra-procedure log documentation (N/A)     Indwelling Lines/Drains at time of discharge:  Lines/Drains/Airways       None                   Hospital Course:  Patient underwent MINERVA without evidence of LOBO thrombus. Proceeded with DCCV, converting from atrial flutter to sinus rhythm. Patient tolerated the procedure without any acute complications. Post-DCCV ECG revealed normal sinus rhythm at 72 bpm. Plan to continue all home medications including Coumadin and Metoprolol. Instructed to return in 1 week for follow up ECG and with Dr. Moore in a couple weeks as scheduled.    Patient was assessed at bedside prior to discharge, they reported feeling well and denied chest discomfort, shortness of breath, palpitations, lightheadedness, or any other acute symptoms. Discharge instructions were discussed with patient and all questions were answered. Patient was discharged home in stable condition.       Goals of Care Treatment Preferences:  Code Status: Full Code      Significant Diagnostic Studies: MINERVA - final report pending - prelim no LOBO thrombus, proceeded with DCCV     Pending Diagnostic Studies:       None            Final Active Diagnoses:    Diagnosis Date Noted POA    Atrial fibrillation and flutter [I48.91, I48.92] 02/29/2024 Yes      Problems Resolved During this Admission:     No new Assessment & Plan notes have been filed under this hospital service since the last note was generated.  Service: Cardiology      Discharged Condition: stable    Disposition: Home or Self Care    Follow Up:   Follow-up Information       CRISTI Moore MD. Go on 5/3/2024.    Specialties: Electrophysiology, Cardiology  Contact information:  [address]  [facility]  [address]  [phone]               [facility] [address] [address] Go on 4/25/2024.    Specialty: Cardiology  Why: Go to EKG appointment  Contact information:  [address] [facility]  [facility] [address] [phone]  Additional information:  [facility] - Main Building, [facility] [address]rd Floor   Please park in [address] and use Atrium elevator                         Patient Instructions:   No discharge procedures on file.  Medications:  Reconciled Home Medications:      Medication List        CONTINUE taking these medications      aspirin 81 MG EC tablet  Commonly known as: ECOTRIN  Take 1 tablet (81 mg total) by mouth once daily.     enoxaparin 80 mg/0.8 mL Syrg  Commonly known as: LOVENOX  Inject 0.8 mLs (80 mg total) into the skin every 12 (twelve) hours. As directed by the Coumadin Clinic     metoprolol tartrate 25 MG tablet  Commonly known as: LOPRESSOR  Take 1 tablet (25 mg total) by mouth 2 (two) times daily.     * warfarin 1 MG tablet  Commonly known as: COUMADIN  Take 4 tablets (4 mg total) by mouth Daily.     * warfarin 4 MG tablet  Commonly known as: COUMADIN  Take 1-2 tablets (4-8 mg total) by mouth Daily. As instructed by Coumadin Clinic.           * This list has 2 medication(s) that are the same as other medications prescribed for you. Read the directions carefully, and ask your doctor or other care provider to review them with you.                  Time spent on the discharge of patient: 35 minutes    Gissel Early PA-C  Cardiology  [facility]

## 2024-04-18 NOTE — PROGRESS NOTES
[facility]    04/18/2024 11:45 AM    King Botello (38 y.o.) is followed by the [facility].      Assessment/Plan:    King Botello presents today with subtherapeutic  INR. Goal INR 2.5-3.5    Assessment of patient findings per MA/LPN and chart review:   The following significant findings were found:  Patient completed her MINERVA/DCCV today    Recommendation for patient's warfarin regimen:   No change was made to warfarin therapy during this visit and patient has been instructed to continue their current warfarin regimen.  Patient's weekly regimen was adjusted on 4/16, so expect that her INR will continue to rise within goal without additional booster dose today    Recommended repeat INR in 1 week      Laine Glynn, PharmD, [facility]  Clinical Pharmacist - Coumadin Clinic  Preferred Contact: Secure Messaging or In Basket Message

## 2024-04-18 NOTE — NURSING
Pt discharged to home.  Discharge instructions given to pt who voiced understanding.  IV removed catheter intact.  Denies pain or discomfort.  Respirations even and unlabored. No distress noted. Pt stable.

## 2024-04-18 NOTE — SUBJECTIVE & OBJECTIVE
Past Medical History:   Diagnosis Date    Abnormal Pap smear     Colposcopy    Atrial fibrillation and flutter 2024    Bradycardia 2020    Class 2 obesity due to excess calories with body mass index (BMI) of 35.0 to 35.9 in adult 3/9/2021    Hypertension, essential 3/16/2021    Menarche     Age of onset 11    Missed ab 2021    Mitral valve prolapse     Previous  delivery affecting pregnancy, antepartum 2020    S/P suction D&C 2021    Status post mitral valve annuloplasty 2017       Past Surgical History:   Procedure Laterality Date    A-V CARDIAC PACEMAKER INSERTION N/A 2024    Procedure: INSERTION, CARDIAC PACEMAKER, DUAL CHAMBER;  Surgeon: CRISTI Moore MD;  Location: [facility] EP LAB;  Service: Cardiology;  Laterality: N/A;  CHB, dPPM with LBBP, MDT, ANES, [place], [place] [id number]     SECTION       SECTION WITH TUBAL LIGATION Bilateral 2023    Procedure:  SECTION, WITH TUBAL LIGATION;  Surgeon: Mane Moreno MD;  Location: [facility] L&D;  Service: OB/GYN;  Laterality: Bilateral;    DILATION AND CURETTAGE OF UTERUS USING SUCTION N/A 2021    Procedure: DILATION AND CURETTAGE, UTERUS, USING SUCTION;  Surgeon: Mane Moreno MD;  Location: [facility];  Service: OB/GYN;  Laterality: N/A;    MITRAL VALVE REPAIR      MITRAL VALVE REPLACEMENT N/A 2024    Procedure: MITRAL VALVE REPLACEMENT, REDO STERNOTOMY;  Surgeon: Thierno Liu MD;  [address];  Service: Cardiothoracic;  Laterality: N/A;    TRANSESOPHAGEAL ECHOCARDIOGRAPHY N/A 2024    Procedure: ECHOCARDIOGRAM, TRANSESOPHAGEAL;  Surgeon: Jessee Ahumada MD;  Location: [facility] EP LAB;  Service: Cardiology;  Laterality: N/A;    TREATMENT OF CARDIAC ARRHYTHMIA N/A 2024    Procedure: Cardioversion or Defibrillation;  Surgeon: CRISTI Moore MD;  Location: [facility] EP LAB;  Service: Cardiology;  Laterality: N/A;    VALVULOPLASTY, TRICUSPID N/A 2024    Procedure:  VALVULOPLASTY, TRICUSPID REPAIR;  Surgeon: Thierno Liu MD;  Location: [facility] OR [address] [place] [address];  Service: Cardiothoracic;  Laterality: N/A;       Review of patient's allergies indicates:   Allergen Reactions    Cinnamon Itching    Doxycycline      Abdomen pain severe       No current facility-administered medications for this encounter.     Family History       Problem Relation (Age of Onset)    Breast cancer Paternal Aunt    Cancer Mother    Diabetes Mother    Heart disease Mother    Hyperlipidemia Mother, Father    Hypertension Mother, Father          Tobacco Use    Smoking status: Never    Smokeless tobacco: Never   Substance and Sexual Activity    Alcohol use: Not Currently     Comment: not since +UPT    Drug use: No    Sexual activity: Yes     Partners: Male     Birth control/protection: None     Review of Systems   Constitutional: Negative for diaphoresis, malaise/fatigue, weight gain and weight loss.   HENT:  Negative for nosebleeds.    Eyes:  Negative for vision loss in left eye, vision loss in right eye and visual disturbance.   Cardiovascular:  Positive for palpitations. Negative for chest pain, claudication, dyspnea on exertion, irregular heartbeat, leg swelling, near-syncope, orthopnea, paroxysmal nocturnal dyspnea and syncope.   Respiratory:  Negative for cough, shortness of breath, sleep disturbances due to breathing, snoring and wheezing.    Hematologic/Lymphatic: Negative for bleeding problem. Does not bruise/bleed easily.   Skin:  Negative for poor wound healing and rash.   Musculoskeletal:  Negative for muscle cramps and myalgias.   Gastrointestinal:  Negative for bloating, abdominal pain, diarrhea, heartburn, melena, nausea and vomiting.   Genitourinary:  Negative for hematuria and nocturia.   Neurological:  Negative for brief paralysis, dizziness, headaches, light-headedness, numbness and weakness.   Psychiatric/Behavioral:  Negative for depression.    Allergic/Immunologic: Negative for hives.      Objective:     Vital Signs (Most Recent):    Vital Signs (24h Range):           There is no height or weight on file to calculate BMI.            No intake or output data in the 24 hours ending 04/18/24 0856    Lines/Drains/Airways       Peripheral Intravenous Line  Duration                  Peripheral IV - Single Lumen 04/18/24 0854 20 G Left Antecubital <1 day                     Physical Exam  Vitals and nursing note reviewed.   Constitutional:       Appearance: She is well-developed. She is not diaphoretic.   HENT:      Head: Normocephalic and atraumatic.   Eyes:      Conjunctiva/sclera: Conjunctivae normal.   Neck:      Vascular: No carotid bruit or JVD.   Cardiovascular:      Rate and Rhythm: Normal rate. Rhythm irregular.      Pulses: Normal pulses and intact distal pulses.      Heart sounds: Normal heart sounds. No murmur heard.     No friction rub. No gallop.   Pulmonary:      Effort: Pulmonary effort is normal.      Breath sounds: Normal breath sounds. No rales.   Chest:      Chest wall: No tenderness.   Abdominal:      General: There is no distension.      Palpations: Abdomen is soft. There is no mass.      Tenderness: There is no abdominal tenderness.   Skin:     General: Skin is warm and dry.      Coloration: Skin is not pale.   Neurological:      Mental Status: She is alert and oriented to person, place, and time.          Significant Labs: All pertinent lab results from the last 24 hours have been reviewed.

## 2024-04-18 NOTE — HPI
Ms. Botello is a 39 y/o with mitral valve disease now s/p MVR with a 25mm Carbomedics standard mechanical prosthesis plus TVr in early January complicated by complete heart block requiring PPM 1/17/2024. Device interrogation shows persistent atrial fibrillation since device implantation. She presents today for MINERVA and DCCV.     MINERVA 1/17/2024    MINERVA prior to DCCV (and pacemaker implantation).  There is no evidence of intracardiac thrombus.    Left Ventricle: The left ventricle is normal in size. Normal wall thickness. Normal wall motion. There is normal systolic function with a visually estimated ejection fraction of 60 - 65%. There is normal diastolic function.    Right Ventricle: Normal right ventricular cavity size. Systolic function is normal.    Left Atrium: Left atrium is dilated. The left atrial appendage appears normal. The left atrial appendage has a windsock morphology. Appendage velocity is reduced at less than 40 cm/sec. There is no thrombus in the left atrial appendage.    Right Atrium: Right atrium is dilated.    Mitral Valve: There is a mechanical valve in the mitral position. It is reported to be a 25mm Carbomedics Standard valve.    Tricuspid Valve: The tricuspid valve is s/p repair with a 26mm Medtronic Contour annuloplasty band.    Anticoagulant/antiplatelets: Coumadin  ECG: Atrial fibrillation     Dysphagia or odynophagia:  No  Liver Disease, esophageal disease, or known varices:  No  Upper GI Bleeding: No  Snoring:  No  Sleep Apnea:  No  Prior neck surgery or radiation:  No  History of anesthetic difficulties:  No  Family history of anesthetic difficulties:  No  Last oral intake: yesterday before midnight  Able to move neck in all directions:  Yes  Platelet count: 410k  INR: Pending

## 2024-04-18 NOTE — H&P
[facility] Short [facility] Card[facility]  Cardiology  History and Physical     Patient Name: King Botello  MRN: [id number]  Admission Date: 4/18/2024  Code Status: Prior   Attending Provider: CRISTI Moore MD   Primary Care Physician: Mayco Hansen MD  Principal Problem:<principal problem not specified>    Patient information was obtained from patient and past medical records.     Subjective:     Chief Complaint:  Atrial fibrillation     HPI:  Ms. Botello is a 39 y/o with mitral valve disease now s/p MVR with a 25mm Carbomedics standard mechanical prosthesis plus TVr in early January complicated by complete heart block requiring PPM 1/17/2024. Device interrogation shows persistent atrial fibrillation since device implantation. She presents today for MINERVA and DCCV.     MINERVA 1/17/2024    MINERVA prior to DCCV (and pacemaker implantation).  There is no evidence of intracardiac thrombus.    Left Ventricle: The left ventricle is normal in size. Normal wall thickness. Normal wall motion. There is normal systolic function with a visually estimated ejection fraction of 60 - 65%. There is normal diastolic function.    Right Ventricle: Normal right ventricular cavity size. Systolic function is normal.    Left Atrium: Left atrium is dilated. The left atrial appendage appears normal. The left atrial appendage has a windsock morphology. Appendage velocity is reduced at less than 40 cm/sec. There is no thrombus in the left atrial appendage.    Right Atrium: Right atrium is dilated.    Mitral Valve: There is a mechanical valve in the mitral position. It is reported to be a 25mm Carbomedics Standard valve.    Tricuspid Valve: The tricuspid valve is s/p repair with a 26mm Medtronic Contour annuloplasty band.    Anticoagulant/antiplatelets: Coumadin  ECG: Atrial fibrillation     Dysphagia or odynophagia:  No  Liver Disease, esophageal disease, or known varices:  No  Upper GI Bleeding: No  Snoring:  No  Sleep Apnea:  No  Prior  neck surgery or radiation:  No  History of anesthetic difficulties:  No  Family history of anesthetic difficulties:  No  Last oral intake: yesterday before midnight  Able to move neck in all directions:  Yes  Platelet count: 410k  INR: Pending      Past Medical History:   Diagnosis Date    Abnormal Pap smear     Colposcopy    Atrial fibrillation and flutter 2024    Bradycardia 2020    Class 2 obesity due to excess calories with body mass index (BMI) of 35.0 to 35.9 in adult 3/9/2021    Hypertension, essential 3/16/2021    Menarche     Age of onset 11    Missed ab 2021    Mitral valve prolapse     Previous  delivery affecting pregnancy, antepartum 2020    S/P suction D&C 2021    Status post mitral valve annuloplasty 2017       Past Surgical History:   Procedure Laterality Date    A-V CARDIAC PACEMAKER INSERTION N/A 2024    Procedure: INSERTION, CARDIAC PACEMAKER, DUAL CHAMBER;  Surgeon: CRISTI Moore MD;  Location: [facility] EP LAB;  Service: Cardiology;  Laterality: N/A;  CHB, dPPM with LBBP, MDT, ANES, [place], [place] [id number]     SECTION       SECTION WITH TUBAL LIGATION Bilateral 2023    Procedure:  SECTION, WITH TUBAL LIGATION;  Surgeon: Mane Moreno MD;  Location: [facility] L&D;  Service: OB/GYN;  Laterality: Bilateral;    DILATION AND CURETTAGE OF UTERUS USING SUCTION N/A 2021    Procedure: DILATION AND CURETTAGE, UTERUS, USING SUCTION;  Surgeon: Mane Moreno MD;  Location: [facility] OR;  Service: OB/GYN;  Laterality: N/A;    MITRAL VALVE REPAIR      MITRAL VALVE REPLACEMENT N/A 2024    Procedure: MITRAL VALVE REPLACEMENT, REDO STERNOTOMY;  Surgeon: Thierno Liu MD;  Location: [facility] OR [facility] FLR;  Service: Cardiothoracic;  Laterality: N/A;    TRANSESOPHAGEAL ECHOCARDIOGRAPHY N/A 2024    Procedure: ECHOCARDIOGRAM, TRANSESOPHAGEAL;  Surgeon: Jessee Ahumada MD;  Location: [facility] EP LAB;  Service: Cardiology;  Laterality: N/A;     TREATMENT OF CARDIAC ARRHYTHMIA N/A 1/17/2024    Procedure: Cardioversion or Defibrillation;  Surgeon: CRISTI Moore MD;  Location: [facility] EP LAB;  Service: Cardiology;  Laterality: N/A;    VALVULOPLASTY, TRICUSPID N/A 1/8/2024    Procedure: VALVULOPLASTY, TRICUSPID REPAIR;  Surgeon: Thierno Liu MD;  [facility];  Service: Cardiothoracic;  Laterality: N/A;       Review of patient's allergies indicates:   Allergen Reactions    Cinnamon Itching    Doxycycline      Abdomen pain severe       No current facility-administered medications for this encounter.     Family History       Problem Relation (Age of Onset)    Breast cancer Paternal Aunt    Cancer Mother    Diabetes Mother    Heart disease Mother    Hyperlipidemia Mother, Father    Hypertension Mother, Father          Tobacco Use    Smoking status: Never    Smokeless tobacco: Never   Substance and Sexual Activity    Alcohol use: Not Currently     Comment: not since +UPT    Drug use: No    Sexual activity: Yes     Partners: Male     Birth control/protection: None     Review of Systems   Constitutional: Negative for diaphoresis, malaise/fatigue, weight gain and weight loss.   HENT:  Negative for nosebleeds.    Eyes:  Negative for vision loss in left eye, vision loss in right eye and visual disturbance.   Cardiovascular:  Positive for palpitations. Negative for chest pain, claudication, dyspnea on exertion, irregular heartbeat, leg swelling, near-syncope, orthopnea, paroxysmal nocturnal dyspnea and syncope.   Respiratory:  Negative for cough, shortness of breath, sleep disturbances due to breathing, snoring and wheezing.    Hematologic/Lymphatic: Negative for bleeding problem. Does not bruise/bleed easily.   Skin:  Negative for poor wound healing and rash.   Musculoskeletal:  Negative for muscle cramps and myalgias.   Gastrointestinal:  Negative for bloating, abdominal pain, diarrhea, heartburn, melena, nausea and vomiting.   Genitourinary:   Negative for hematuria and nocturia.   Neurological:  Negative for brief paralysis, dizziness, headaches, light-headedness, numbness and weakness.   Psychiatric/Behavioral:  Negative for depression.    Allergic/Immunologic: Negative for hives.     Objective:     Vital Signs (Most Recent):    Vital Signs (24h Range):           There is no height or weight on file to calculate BMI.            No intake or output data in the 24 hours ending 04/18/24 0856    Lines/Drains/Airways       Peripheral Intravenous Line  Duration                  Peripheral IV - Single Lumen 04/18/24 0854 20 G Left Antecubital <1 day                     Physical Exam  Vitals and nursing note reviewed.   Constitutional:       Appearance: She is well-developed. She is not diaphoretic.   HENT:      Head: Normocephalic and atraumatic.   Eyes:      Conjunctiva/sclera: Conjunctivae normal.   Neck:      Vascular: No carotid bruit or JVD.   Cardiovascular:      Rate and Rhythm: Normal rate. Rhythm irregular.      Pulses: Normal pulses and intact distal pulses.      Heart sounds: Normal heart sounds. No murmur heard.     No friction rub. No gallop.   Pulmonary:      Effort: Pulmonary effort is normal.      Breath sounds: Normal breath sounds. No rales.   Chest:      Chest wall: No tenderness.   Abdominal:      General: There is no distension.      Palpations: Abdomen is soft. There is no mass.      Tenderness: There is no abdominal tenderness.   Skin:     General: Skin is warm and dry.      Coloration: Skin is not pale.   Neurological:      Mental Status: She is alert and oriented to person, place, and time.          Significant Labs: All pertinent lab results from the last 24 hours have been reviewed.  Assessment and Plan:     Atrial fibrillation and flutter  Here today for MINERVA and DCCV.   -The risks, benefits & alternatives of the procedure were explained to the patient.    -The risks of transesophageal echo include but are not limited to:  Dental  trauma, esophageal trauma/perforation, bleeding, laryngospasm/brochospasm, aspiration, sore throat/hoarseness, & dislodgement of the endotracheal tube/nasogastric tube (where applicable).    -The risks of moderate sedation include hypotension, respiratory depression, arrhythmias, bronchospasm, & death.    -Prior to procedure, extensive discussion with patient regarding risks and benefits of DCCV at bedside today.   -The patient voices understanding, all questions have been answered, and patient would like to proceed.   -Informed consent was obtained & the patient is agreeable to proceed with the procedure.      Further recommendations per attending addendum                 VTE Risk Mitigation (From admission, onward)      None            Gissel Early PA-C  Cardiology   [facility] - Short Stay Cardiac Unit

## 2024-04-18 NOTE — TRANSFER OF CARE
Anesthesia Transfer of Care Note    Patient: iKng Botello    Procedure(s) Performed: Procedure(s) (LRB):  Cardioversion or Defibrillation (N/A)  Transesophageal echo (MINERVA) intra-procedure log documentation (N/A)    Patient location: PACU    Anesthesia Type: general    Transport from OR: Transported from OR on 2-3 L/min O2 by NC with adequate spontaneous ventilation    Post pain: adequate analgesia    Post assessment: no apparent anesthetic complications and tolerated procedure well    Post vital signs: stable    Level of consciousness: awake, alert and oriented    Nausea/Vomiting: no nausea/vomiting    Complications: none    Transfer of care protocol was followed      Last vitals: Visit Vitals  Breastfeeding No

## 2024-04-18 NOTE — DISCHARGE INSTRUCTIONS
Medications:  -Continue to take your home medications as listed on your medication list after you are discharged.    New Medications:  No new medications. Please continue your current medications.     Diet  -You may resume oral intake after you are discharged, as long you have no swallowing difficulties.    Because you have received sedation for this procedure:  -Limit activity for the remainder of the day.  -Do not smoke for at least 6 hours and until you are fully awake and alert.  -Do not drink alcoholic beverage for 24 hours.  -Do not drive for 24 hours.  -Defer important decision making until the following day.     Go to the Emergency Department if you develop:   -Bleeding  -Weakness or numbness  -Visual, gait or speech disturbance  -New chest pain, palpitations, shortness of breath, rapid heart beat, or fainting  -Fever    Follow up:  -EKG in 1 week.  -Dr. Moore as scheduled next month.     If you have a pacemaker, defibrillator or loop recorder that is monitored remotely by the [facility], you may be eligible to send in a remote transmission on the day of your EKG appointment in lieu of the EKG. You will be informed at discharge if this is an option. If you are instructed to send in a remote transmission, please send a manual transmission from your home monitor one week after your procedure and then call the clinic at [phone], option 4, to confirm that your remote transmission was received.    Any need to reschedule or cancel procedures, or any questions regarding your procedures should be addressed directly with the Arrhythmia Department Nurses at the following phone number: [phone].

## 2024-04-19 ENCOUNTER — PATIENT MESSAGE (OUTPATIENT)
Dept: ELECTROPHYSIOLOGY | Facility: CLINIC | Age: 39
End: 2024-04-19
Payer: COMMERCIAL

## 2024-04-19 ENCOUNTER — OUTPATIENT CASE MANAGEMENT (OUTPATIENT)
Dept: ADMINISTRATIVE | Facility: OTHER | Age: 39
End: 2024-04-19

## 2024-04-19 LAB
OHS CV AF BURDEN PERCENT: 2.7
OHS CV DC REMOTE DEVICE TYPE: NORMAL
OHS CV ICD SHOCK: NO
OHS CV RV PACING PERCENT: 2 %

## 2024-04-19 NOTE — TELEPHONE ENCOUNTER
Reviewed transmission today and patient had 2 minutes 33 secs of 1:1 AT and presenting shows 2:1 with a V rate 76 bpm.  Reviewed with Dr. Moore and Cha.

## 2024-04-19 NOTE — TELEPHONE ENCOUNTER
Spoke with patient regarding SVT episode she was having this morning when she was symptomatic. The episode lasted about 2 min 30 seconds and rate is now controlled in 70's and Dr Moore has reviewed and feels like she is now in slow attial  flutter . Dr Moore considering admit for Tikosyn initiation and will check with insurance nest week and consider that possibility for the week of 4/29 as she has family event next week and doesn't want to miss that. In the event that she has prolonged episodes of rapid atrial rate this weekend she was encouraged to come into the ER if symptomatic and Dr Moore on call this weekend. Will check back with her on Monday. Cha Sow RN

## 2024-04-22 ENCOUNTER — PATIENT MESSAGE (OUTPATIENT)
Dept: ELECTROPHYSIOLOGY | Facility: CLINIC | Age: 39
End: 2024-04-22
Payer: COMMERCIAL

## 2024-04-22 NOTE — TELEPHONE ENCOUNTER
Spoke with Ms Botello regarding increase in Hr even at rest. No complaints of lightheadedness or dizziness. She still wants to move forward with Tikosyn admission next week. She will send another transmission today/ She will refrain from cardiac rehab this week until her Hr under better control. Cha philippe RN

## 2024-04-23 ENCOUNTER — TELEPHONE (OUTPATIENT)
Dept: ELECTROPHYSIOLOGY | Facility: CLINIC | Age: 39
End: 2024-04-23
Payer: COMMERCIAL

## 2024-04-23 NOTE — TELEPHONE ENCOUNTER
Message left on vmail to return call regarding admission next week for Tikosyn initiation. Cha Sow RN

## 2024-04-24 ENCOUNTER — TELEPHONE (OUTPATIENT)
Dept: ELECTROPHYSIOLOGY | Facility: CLINIC | Age: 39
End: 2024-04-24
Payer: COMMERCIAL

## 2024-04-24 NOTE — TELEPHONE ENCOUNTER
Spoke with patient and she will add an extra dose of metoprolol 25 mg po midday for increased in HR and hopefully this will help until she gets admitted next Monday for dofetilide initiation. Cha Sow RN    ----- Message from CRISTI Moore MD sent at 4/24/2024  3:24 PM CDT -----  Regarding: RE: Increae beta blocker  Marlen Cha,     Yes, that would be just fine to help with her high hear rates until we can start a little dofetilide for her.    Thank you!  ----- Message -----  From: Cha Sow RN  Sent: 4/23/2024   3:08 PM CDT  To: CRISTI Moore MD  Subject: Increae beta blocker                             Hey there. Spoke with Kylie again about her admission next week and she continues to have episodes where HR into 150's. She is currently on metoprolol 25 mg BID and wondered if she could take another dose around 12 noon to get her through the afternoon until her night dose because she seems to be having those increases in HR in afternoon or any other suggestion. Cha

## 2024-04-25 ENCOUNTER — ANTI-COAG VISIT (OUTPATIENT)
Dept: CARDIOLOGY | Facility: CLINIC | Age: 39
End: 2024-04-25
Payer: COMMERCIAL

## 2024-04-25 ENCOUNTER — PATIENT MESSAGE (OUTPATIENT)
Dept: ELECTROPHYSIOLOGY | Facility: CLINIC | Age: 39
End: 2024-04-25
Payer: COMMERCIAL

## 2024-04-25 ENCOUNTER — LAB VISIT (OUTPATIENT)
Dept: LAB | Facility: HOSPITAL | Age: 39
End: 2024-04-25
Attending: INTERNAL MEDICINE
Payer: COMMERCIAL

## 2024-04-25 DIAGNOSIS — Z79.01 LONG TERM (CURRENT) USE OF ANTICOAGULANTS: ICD-10-CM

## 2024-04-25 DIAGNOSIS — Z79.01 LONG TERM (CURRENT) USE OF ANTICOAGULANTS: Primary | ICD-10-CM

## 2024-04-25 LAB
INR PPP: 2.2 (ref 0.8–1.2)
PROTHROMBIN TIME: 22.9 SEC (ref 9–12.5)

## 2024-04-25 PROCEDURE — 93793 ANTICOAG MGMT PT WARFARIN: CPT | Mod: S$GLB,,,

## 2024-04-25 PROCEDURE — 85610 PROTHROMBIN TIME: CPT | Performed by: INTERNAL MEDICINE

## 2024-04-25 PROCEDURE — 36415 COLL VENOUS BLD VENIPUNCTURE: CPT | Performed by: INTERNAL MEDICINE

## 2024-04-25 NOTE — PROGRESS NOTES
[facility] Virtual Anticoagulation Management Program    04/25/2024 11:48 AM    King Botello (38 y.o.) is followed by the [employer] Anticoagulation Management Program.      Assessment/Plan:    King Botello presents today with subtherapeutic  INR. Goal INR 2.5-3.5    Assessment of patient findings per MA/LPN and chart review:   The following significant findings were found:  Patient reports not eating usual greens due to concern over low INR  Patient will be admitted next week, 4/29-5/2 for Tikosyn initiation    Recommendation for patient's warfarin regimen:   Weekly warfarin dose increased due to repeated subtherapeutic INRs.    Recommended repeat INR in 1 week      Laine Glynn, PharmD, BCPS  Clinical Pharmacist - Coumadin Clinic  Preferred Contact: Secure Messaging or In Basket Message

## 2024-04-29 ENCOUNTER — HOSPITAL ENCOUNTER (INPATIENT)
Facility: HOSPITAL | Age: 39
LOS: 3 days | Discharge: HOME | DRG: 309 | End: 2024-05-02
Attending: STUDENT IN AN ORGANIZED HEALTH CARE EDUCATION/TRAINING PROGRAM | Admitting: STUDENT IN AN ORGANIZED HEALTH CARE EDUCATION/TRAINING PROGRAM
Payer: COMMERCIAL

## 2024-04-29 DIAGNOSIS — I48.91 ATRIAL FIBRILLATION: ICD-10-CM

## 2024-04-29 DIAGNOSIS — I48.92 ATRIAL FIBRILLATION AND FLUTTER: ICD-10-CM

## 2024-04-29 DIAGNOSIS — I48.91 A-FIB: ICD-10-CM

## 2024-04-29 DIAGNOSIS — Z95.2 S/P MVR (MITRAL VALVE REPLACEMENT): Primary | ICD-10-CM

## 2024-04-29 DIAGNOSIS — Z95.2 S/P MVR (MITRAL VALVE REPLACEMENT): ICD-10-CM

## 2024-04-29 DIAGNOSIS — Z79.899 ENCOUNTER FOR MONITORING DOFETILIDE THERAPY: Primary | ICD-10-CM

## 2024-04-29 DIAGNOSIS — I48.91 ATRIAL FIBRILLATION AND FLUTTER: ICD-10-CM

## 2024-04-29 DIAGNOSIS — Z51.81 ENCOUNTER FOR MONITORING DOFETILIDE THERAPY: Primary | ICD-10-CM

## 2024-04-29 DIAGNOSIS — Z95.0 PRESENCE OF CARDIAC PACEMAKER: ICD-10-CM

## 2024-04-29 DIAGNOSIS — Z51.81 VISIT FOR MONITORING TIKOSYN THERAPY: ICD-10-CM

## 2024-04-29 DIAGNOSIS — Z79.899 VISIT FOR MONITORING TIKOSYN THERAPY: ICD-10-CM

## 2024-04-29 DIAGNOSIS — I10 HYPERTENSION, ESSENTIAL: ICD-10-CM

## 2024-04-29 DIAGNOSIS — R07.9 CHEST PAIN: ICD-10-CM

## 2024-04-29 PROCEDURE — 25000003 PHARM REV CODE 250: Performed by: NURSE PRACTITIONER

## 2024-04-29 PROCEDURE — 93010 ELECTROCARDIOGRAM REPORT: CPT | Mod: ,,, | Performed by: INTERNAL MEDICINE

## 2024-04-29 PROCEDURE — 20600001 HC STEP DOWN PRIVATE ROOM

## 2024-04-29 PROCEDURE — 25000003 PHARM REV CODE 250: Performed by: INTERNAL MEDICINE

## 2024-04-29 PROCEDURE — 93005 ELECTROCARDIOGRAM TRACING: CPT

## 2024-04-29 RX ORDER — ACETAMINOPHEN 325 MG/1
650 TABLET ORAL EVERY 6 HOURS PRN
Status: DISCONTINUED | OUTPATIENT
Start: 2024-04-29 | End: 2024-05-02 | Stop reason: HOSPADM

## 2024-04-29 RX ORDER — IBUPROFEN 200 MG
16 TABLET ORAL
Status: DISCONTINUED | OUTPATIENT
Start: 2024-04-29 | End: 2024-05-02 | Stop reason: HOSPADM

## 2024-04-29 RX ORDER — ONDANSETRON HYDROCHLORIDE 2 MG/ML
4 INJECTION, SOLUTION INTRAVENOUS EVERY 8 HOURS PRN
Status: DISCONTINUED | OUTPATIENT
Start: 2024-04-29 | End: 2024-05-02 | Stop reason: HOSPADM

## 2024-04-29 RX ORDER — NALOXONE HCL 0.4 MG/ML
0.02 VIAL (ML) INJECTION
Status: DISCONTINUED | OUTPATIENT
Start: 2024-04-29 | End: 2024-05-02 | Stop reason: HOSPADM

## 2024-04-29 RX ORDER — TALC
6 POWDER (GRAM) TOPICAL NIGHTLY PRN
Status: DISCONTINUED | OUTPATIENT
Start: 2024-04-29 | End: 2024-05-02 | Stop reason: HOSPADM

## 2024-04-29 RX ORDER — IBUPROFEN 200 MG
24 TABLET ORAL
Status: DISCONTINUED | OUTPATIENT
Start: 2024-04-29 | End: 2024-05-02 | Stop reason: HOSPADM

## 2024-04-29 RX ORDER — SODIUM CHLORIDE 0.9 % (FLUSH) 0.9 %
10 SYRINGE (ML) INJECTION EVERY 12 HOURS PRN
Status: DISCONTINUED | OUTPATIENT
Start: 2024-04-29 | End: 2024-05-02 | Stop reason: HOSPADM

## 2024-04-29 RX ORDER — METOPROLOL TARTRATE 25 MG/1
25 TABLET, FILM COATED ORAL 2 TIMES DAILY
Status: DISCONTINUED | OUTPATIENT
Start: 2024-04-29 | End: 2024-05-02 | Stop reason: HOSPADM

## 2024-04-29 RX ORDER — ASPIRIN 81 MG/1
81 TABLET ORAL NIGHTLY
Status: DISCONTINUED | OUTPATIENT
Start: 2024-04-29 | End: 2024-04-29

## 2024-04-29 RX ORDER — DOFETILIDE 0.25 MG/1
500 CAPSULE ORAL EVERY 12 HOURS
Status: DISCONTINUED | OUTPATIENT
Start: 2024-04-29 | End: 2024-04-30

## 2024-04-29 RX ORDER — ASPIRIN 81 MG/1
81 TABLET ORAL DAILY
Status: DISCONTINUED | OUTPATIENT
Start: 2024-04-30 | End: 2024-04-29

## 2024-04-29 RX ORDER — ACETAMINOPHEN 500 MG
1000 TABLET ORAL EVERY 8 HOURS PRN
Status: DISCONTINUED | OUTPATIENT
Start: 2024-04-29 | End: 2024-05-02 | Stop reason: HOSPADM

## 2024-04-29 RX ORDER — ASPIRIN 81 MG/1
81 TABLET ORAL DAILY
Status: DISCONTINUED | OUTPATIENT
Start: 2024-04-30 | End: 2024-05-02 | Stop reason: HOSPADM

## 2024-04-29 RX ORDER — WARFARIN 4 MG/1
12 TABLET ORAL ONCE
Status: DISCONTINUED | OUTPATIENT
Start: 2024-04-30 | End: 2024-04-30

## 2024-04-29 RX ORDER — WARFARIN 4 MG/1
8 TABLET ORAL ONCE
Status: COMPLETED | OUTPATIENT
Start: 2024-04-29 | End: 2024-04-29

## 2024-04-29 RX ORDER — GLUCAGON 1 MG
1 KIT INJECTION
Status: DISCONTINUED | OUTPATIENT
Start: 2024-04-29 | End: 2024-05-02 | Stop reason: HOSPADM

## 2024-04-29 RX ADMIN — DOFETILIDE 500 MCG: 0.25 CAPSULE ORAL at 08:04

## 2024-04-29 RX ADMIN — METOPROLOL TARTRATE 25 MG: 25 TABLET, FILM COATED ORAL at 08:04

## 2024-04-29 RX ADMIN — WARFARIN SODIUM 8 MG: 4 TABLET ORAL at 08:04

## 2024-04-29 NOTE — SUBJECTIVE & OBJECTIVE
Past Medical History:   Diagnosis Date    Abnormal Pap smear     Colposcopy    Atrial fibrillation and flutter 2024    Bradycardia 2020    Class 2 obesity due to excess calories with body mass index (BMI) of 35.0 to 35.9 in adult 3/9/2021    Hypertension, essential 3/16/2021    Menarche     Age of onset 11    Missed ab 2021    Mitral valve prolapse     Previous  delivery affecting pregnancy, antepartum 2020    S/P suction D&C 2021    Status post mitral valve annuloplasty 2017       Past Surgical History:   Procedure Laterality Date    A-V CARDIAC PACEMAKER INSERTION N/A 2024    Procedure: INSERTION, CARDIAC PACEMAKER, DUAL CHAMBER;  Surgeon: CRISTI Moore MD;  Location: [facility] EP LAB;  Service: Cardiology;  Laterality: N/A;  CHB, dPPM with LBBP, MDT, ANES, EH, RM [id number]     SECTION       SECTION WITH TUBAL LIGATION Bilateral 2023    Procedure:  SECTION, WITH TUBAL LIGATION;  Surgeon: Mane Moreno MD;  Location: [facility] L&D;  Service: OB/GYN;  Laterality: Bilateral;    DILATION AND CURETTAGE OF UTERUS USING SUCTION N/A 2021    Procedure: DILATION AND CURETTAGE, UTERUS, USING SUCTION;  Surgeon: Mane Moreno MD;  Location: [facility] OR;  Service: OB/GYN;  Laterality: N/A;    ECHOCARDIOGRAM,TRANSESOPHAGEAL N/A 2024    Procedure: Transesophageal echo (MINERVA) intra-procedure log documentation;  Surgeon: Page George MD;  Location: [facility] EP LAB;  Service: Cardiology;  Laterality: N/A;  AF, MINERVA/DCCV, MAC, EH, 3prep *MDT D-C PPM in situ*    MITRAL VALVE REPAIR      MITRAL VALVE REPLACEMENT N/A 2024    Procedure: MITRAL VALVE REPLACEMENT, REDO STERNOTOMY;  Surgeon: Thierno Liu MD;  Location: [facility] OR 2ND FLR;  Service: Cardiothoracic;  Laterality: N/A;    TRANSESOPHAGEAL ECHOCARDIOGRAPHY N/A 2024    Procedure: ECHOCARDIOGRAM, TRANSESOPHAGEAL;  Surgeon: Jessee Ahumada MD;  Location: [facility] EP LAB;  Service: Cardiology;   Laterality: N/A;    TREATMENT OF CARDIAC ARRHYTHMIA N/A 1/17/2024    Procedure: Cardioversion or Defibrillation;  Surgeon: CRISTI Moore MD;  Location: [facility] EP LAB;  Service: Cardiology;  Laterality: N/A;    TREATMENT OF CARDIAC ARRHYTHMIA N/A 4/18/2024    Procedure: Cardioversion or Defibrillation;  Surgeon: CRISTI Moore MD;  Location: [facility] EP LAB;  Service: Cardiology;  Laterality: N/A;  AF, MINERVA/DCCV, MAC, EH, 3prep *MDT D-C PPM in situ*    VALVULOPLASTY, TRICUSPID N/A 1/8/2024    Procedure: VALVULOPLASTY, TRICUSPID REPAIR;  Surgeon: Thierno Lui MD;  Location: [facility] OR [address] [place] [address];  Service: Cardiothoracic;  Laterality: N/A;       Review of patient's allergies indicates:   Allergen Reactions    Cinnamon Itching    Doxycycline      Abdomen pain severe       Current Facility-Administered Medications   Medication Dose Route Frequency Provider Last Rate Last Admin    acetaminophen tablet 1,000 mg  1,000 mg Oral Q8H PRN Bailey Avalos, CHARISSA        acetaminophen tablet 650 mg  650 mg Oral Q6H PRN Bailey Avalos, NP        [START ON 4/30/2024] aspirin EC tablet 81 mg  81 mg Oral Daily Bailey Avalos NP        dextrose 10% bolus 125 mL 125 mL  12.5 g Intravenous PRN Bailey Avalos, NP        dextrose 10% bolus 250 mL 250 mL  25 g Intravenous PRN Bailey Avalos, NP        dofetilide capsule 500 mcg  500 mcg Oral Q12H Westley Rosas MD        glucagon (human recombinant) injection 1 mg  1 mg Intramuscular PRN Bailey Avalos, NP        glucose chewable tablet 16 g  16 g Oral PRN Bailey Avalos, NP        glucose chewable tablet 24 g  24 g Oral PRN Bailey Avalos NP        melatonin tablet 6 mg  6 mg Oral Nightly PRBailey Gongora, NP        naloxone 0.4 mg/mL injection 0.02 mg  0.02 mg Intravenous PRN Bailey Avalos, CHARISSA        ondansetron injection 4 mg  4 mg Intravenous Q8H PRN Bailey Avalos, NP        sodium chloride 0.9% flush 10 mL  10 mL Intravenous Q12H  Bailey Monte NP         Family History       Problem Relation (Age of Onset)    Breast cancer Paternal Aunt    Cancer Mother    Diabetes Mother    Heart disease Mother    Hyperlipidemia Mother, Father    Hypertension Mother, Father          Tobacco Use    Smoking status: Never    Smokeless tobacco: Never   Substance and Sexual Activity    Alcohol use: Not Currently     Comment: not since +UPT    Drug use: No    Sexual activity: Yes     Partners: Male     Birth control/protection: None     Review of Systems   Constitutional:  Positive for fatigue. Negative for appetite change, chills, diaphoresis and fever.   HENT:  Negative for congestion, rhinorrhea and sore throat.    Eyes:  Negative for photophobia and visual disturbance.   Respiratory:  Negative for cough, shortness of breath and wheezing.    Cardiovascular:  Positive for palpitations (intermittent). Negative for chest pain and leg swelling.   Gastrointestinal:  Negative for abdominal distention, abdominal pain, diarrhea, nausea and vomiting.   Genitourinary:  Negative for dysuria, frequency and hematuria.   Musculoskeletal:  Negative for gait problem and myalgias.   Skin:  Negative for color change, pallor, rash and wound.   Neurological:  Negative for dizziness, syncope, weakness, light-headedness and headaches.   Psychiatric/Behavioral:  Negative for confusion and hallucinations. The patient is nervous/anxious.      Objective:     Vital Signs (Most Recent):  Temp: 98.4 °F (36.9 °C) (04/29/24 1829)  Pulse: 75 (04/29/24 1839)  Resp: 18 (04/29/24 1829)  BP: (!) 168/89 (04/29/24 1829)  SpO2: 99 % (04/29/24 1829) Vital Signs (24h Range):  Temp:  [98.4 °F (36.9 °C)] 98.4 °F (36.9 °C)  Pulse:  [] 75  Resp:  [18] 18  SpO2:  [99 %] 99 %  BP: (168)/(89) 168/89        There is no height or weight on file to calculate BMI.     Physical Exam  Vitals and nursing note reviewed.   Constitutional:       General: She is not in acute distress.     Appearance:  She is not toxic-appearing or diaphoretic.   HENT:      Head: Normocephalic and atraumatic.      Nose: Nose normal.      Mouth/Throat:      Mouth: Mucous membranes are moist.   Eyes:      Pupils: Pupils are equal, round, and reactive to light.   Cardiovascular:      Rate and Rhythm: Normal rate. Rhythm irregular.      Pulses: Normal pulses.      Comments: Mechanical valve click noted  Pulmonary:      Effort: Pulmonary effort is normal. No respiratory distress.      Breath sounds: No wheezing, rhonchi or rales.   Abdominal:      General: Bowel sounds are normal. There is no distension.      Palpations: Abdomen is soft.      Tenderness: There is no abdominal tenderness. There is no guarding.   Musculoskeletal:         General: Normal range of motion.      Cervical back: Normal range of motion.      Right lower leg: No edema.      Left lower leg: No edema.   Skin:     General: Skin is warm and dry.      Capillary Refill: Capillary refill takes less than 2 seconds.   Neurological:      General: No focal deficit present.      Mental Status: She is alert and oriented to person, place, and time.   Psychiatric:         Mood and Affect: Mood normal.         Behavior: Behavior normal.              CRANIAL NERVES     CN III, IV, VI   Pupils are equal, round, and reactive to light.       Significant Labs: All pertinent labs within the past 24 hours have been reviewed.    Significant Imaging: I have reviewed all pertinent imaging results/findings within the past 24 hours.

## 2024-04-29 NOTE — H&P
[facility] - Cardiology [facility] Medicine  History & Physical    Patient Name: King Botello  MRN: [id number]  Patient Class: IP- Inpatient  Admission Date: 2024  Attending Physician: Phyllis Coffey MD   Primary Care Provider: Mayco Hansen MD         Patient information was obtained from patient and past medical records.     Subjective:     Principal Problem:Visit for monitoring Tikosyn therapy    Chief Complaint:   Chief Complaint   Patient presents with    Atrial Fibrillation        HPI: King Botello is a 38 y.o. female with a PMHx of obesity, HTN, mitral valve disease now s/p MVR with a 25mm Carbomed[employer] standard mechanical prosthesis plus TVr in early January complicated by complete heart block requiring PPM 2024, and a fib/flutter who presents as a direct admit for initiation of Tikosyn therapy. The patient reports intermittent palpitations associated with going in and out of a fib/flutter along with fatigue. She denies any chest pain, SOB, BLE edema, lightheadedness/dizziness, syncope, cough, abdominal pain, N/V/D, or dysuria.     Past Medical History:   Diagnosis Date    Abnormal Pap smear     Colposcopy    Atrial fibrillation and flutter 2024    Bradycardia 2020    Class 2 obesity due to excess calories with body mass index (BMI) of 35.0 to 35.9 in adult 3/9/2021    Hypertension, essential 3/16/2021    Menarche     Age of onset 11    Missed ab 2021    Mitral valve prolapse     Previous  delivery affecting pregnancy, antepartum 2020    S/P suction D&C 2021    Status post mitral valve annuloplasty 2017       Past Surgical History:   Procedure Laterality Date    A-V CARDIAC PACEMAKER INSERTION N/A 2024    Procedure: INSERTION, CARDIAC PACEMAKER, DUAL CHAMBER;  Surgeon: CRISTI Moore MD;  Location: [facility] LAB;  Service: Cardiology;  Laterality: N/A;  ANTONINA, dPPM with LBBP, MDT, ANES, [place]H, [place] [id number]     SECTION        SECTION WITH TUBAL LIGATION Bilateral 2023    Procedure:  SECTION, WITH TUBAL LIGATION;  Surgeon: Mane Moreno MD;  Location: [facility] L&D;  Service: OB/GYN;  Laterality: Bilateral;    DILATION AND CURETTAGE OF UTERUS USING SUCTION N/A 2021    Procedure: DILATION AND CURETTAGE, UTERUS, USING SUCTION;  Surgeon: Mane Moreno MD;  Location: [facility] OR;  Service: OB/GYN;  Laterality: N/A;    ECHOCARDIOGRAM,TRANSESOPHAGEAL N/A 2024    Procedure: Transesophageal echo (MINERVA) intra-procedure log documentation;  Surgeon: Page George MD;  Location: [facility] EP LAB;  Service: Cardiology;  Laterality: N/A;  AF, MINERVA/DCCV, MAC, EH, 3prep *MDT D-C PPM in situ*    MITRAL VALVE REPAIR      MITRAL VALVE REPLACEMENT N/A 2024    Procedure: MITRAL VALVE REPLACEMENT, REDO STERNOTOMY;  Surgeon: Thierno Liu MD;  Location: [address];  Service: Cardiothoracic;  Laterality: N/A;    TRANSESOPHAGEAL ECHOCARDIOGRAPHY N/A 2024    Procedure: ECHOCARDIOGRAM, TRANSESOPHAGEAL;  Surgeon: Jessee Ahumada MD;  Location: [facility] EP LAB;  Service: Cardiology;  Laterality: N/A;    TREATMENT OF CARDIAC ARRHYTHMIA N/A 2024    Procedure: Cardioversion or Defibrillation;  Surgeon: CRISTI Moore MD;  Location: [facility] EP LAB;  Service: Cardiology;  Laterality: N/A;    TREATMENT OF CARDIAC ARRHYTHMIA N/A 2024    Procedure: Cardioversion or Defibrillation;  Surgeon: CRISTI Moore MD;  Location: [facility] EP LAB;  Service: Cardiology;  Laterality: N/A;  AF, MINERVA/DCCV, MAC, EH, 3prep *MDT D-C PPM in situ*    VALVULOPLASTY, TRICUSPID N/A 2024    Procedure: VALVULOPLASTY, TRICUSPID REPAIR;  Surgeon: Thierno Liu MD;  [address]  Service: Cardiothoracic;  Laterality: N/A;       Review of patient's allergies indicates:   Allergen Reactions    Cinnamon Itching    Doxycycline      Abdomen pain severe       Current Facility-Administered Medications   Medication Dose Route  Frequency Provider Last Rate Last Admin    acetaminophen tablet 1,000 mg  1,000 mg Oral Q8H PRN Bailey Avalos NP        acetaminophen tablet 650 mg  650 mg Oral Q6H PRN Bailey Avalos NP        [START ON 4/30/2024] aspirin EC tablet 81 mg  81 mg Oral Daily Bailey Avalos NP        dextrose 10% bolus 125 mL 125 mL  12.5 g Intravenous PRN Bailey Avalos, NP        dextrose 10% bolus 250 mL 250 mL  25 g Intravenous PRN Bailey Avalos NP        dofetilide capsule 500 mcg  500 mcg Oral Q12H Westley Rosas MD        glucagon (human recombinant) injection 1 mg  1 mg Intramuscular PRN Bailey Avalos NP        glucose chewable tablet 16 g  16 g Oral PRN Bailey Avalos, NP        glucose chewable tablet 24 g  24 g Oral PRN Bailey Avalos NP        melatonin tablet 6 mg  6 mg Oral Nightly PRN Bailey Avalos NP        naloxone 0.4 mg/mL injection 0.02 mg  0.02 mg Intravenous PRN Bailey Avalos, CHARISSA        ondansetron injection 4 mg  4 mg Intravenous Q8H PRBailey Gongora, NP        sodium chloride 0.9% flush 10 mL  10 mL Intravenous Q12H PRBailey Gongora NP         Family History       Problem Relation (Age of Onset)    Breast cancer Paternal Aunt    Cancer Mother    Diabetes Mother    Heart disease Mother    Hyperlipidemia Mother, Father    Hypertension Mother, Father          Tobacco Use    Smoking status: Never    Smokeless tobacco: Never   Substance and Sexual Activity    Alcohol use: Not Currently     Comment: not since +UPT    Drug use: No    Sexual activity: Yes     Partners: Male     Birth control/protection: None     Review of Systems   Constitutional:  Positive for fatigue. Negative for appetite change, chills, diaphoresis and fever.   HENT:  Negative for congestion, rhinorrhea and sore throat.    Eyes:  Negative for photophobia and visual disturbance.   Respiratory:  Negative for cough, shortness of breath and wheezing.    Cardiovascular:  Positive for  palpitations (intermittent). Negative for chest pain and leg swelling.   Gastrointestinal:  Negative for abdominal distention, abdominal pain, diarrhea, nausea and vomiting.   Genitourinary:  Negative for dysuria, frequency and hematuria.   Musculoskeletal:  Negative for gait problem and myalgias.   Skin:  Negative for color change, pallor, rash and wound.   Neurological:  Negative for dizziness, syncope, weakness, light-headedness and headaches.   Psychiatric/Behavioral:  Negative for confusion and hallucinations. The patient is nervous/anxious.      Objective:     Vital Signs (Most Recent):  Temp: 98.4 °F (36.9 °C) (04/29/24 1829)  Pulse: 75 (04/29/24 1839)  Resp: 18 (04/29/24 1829)  BP: (!) 168/89 (04/29/24 1829)  SpO2: 99 % (04/29/24 1829) Vital Signs (24h Range):  Temp:  [98.4 °F (36.9 °C)] 98.4 °F (36.9 °C)  Pulse:  [] 75  Resp:  [18] 18  SpO2:  [99 %] 99 %  BP: (168)/(89) 168/89        There is no height or weight on file to calculate BMI.     Physical Exam  Vitals and nursing note reviewed.   Constitutional:       General: She is not in acute distress.     Appearance: She is not toxic-appearing or diaphoretic.   HENT:      Head: Normocephalic and atraumatic.      Nose: Nose normal.      Mouth/Throat:      Mouth: Mucous membranes are moist.   Eyes:      Pupils: Pupils are equal, round, and reactive to light.   Cardiovascular:      Rate and Rhythm: Normal rate. Rhythm irregular.      Pulses: Normal pulses.      Comments: Mechanical valve click noted  Pulmonary:      Effort: Pulmonary effort is normal. No respiratory distress.      Breath sounds: No wheezing, rhonchi or rales.   Abdominal:      General: Bowel sounds are normal. There is no distension.      Palpations: Abdomen is soft.      Tenderness: There is no abdominal tenderness. There is no guarding.   Musculoskeletal:         General: Normal range of motion.      Cervical back: Normal range of motion.      Right lower leg: No edema.      Left lower  "leg: No edema.   Skin:     General: Skin is warm and dry.      Capillary Refill: Capillary refill takes less than 2 seconds.   Neurological:      General: No focal deficit present.      Mental Status: She is alert and oriented to person, place, and time.   Psychiatric:         Mood and Affect: Mood normal.         Behavior: Behavior normal.              CRANIAL NERVES     CN III, IV, VI   Pupils are equal, round, and reactive to light.       Significant Labs: All pertinent labs within the past 24 hours have been reviewed.    Significant Imaging: I have reviewed all pertinent imaging results/findings within the past 24 hours.  Assessment/Plan:     * Visit for monitoring Tikosyn therapy  Pt is a 38 y.o. female with a PMHx  mitral valve disease now s/p MVR with a 25mm Carbomed[employer] standard mechanical prosthesis plus TVr in early January complicated by complete heart block requiring PPM 1/17/2024, and a fib/flutter who presents as a direct admit for initiation of Tikosyn therapy for rhythm management of a fib. Per EP "Her creatinine clearance is well above 60 so an appropriate starting dose is 500 mcg BID. No IVCD or bundle branch block at baseline. Please obtain EKG now for baseline Qtc, as well as 2-3 hours after each dose of tikosyn."    -Initial QTc 445  -Cardiac monitoring.  -Okay to continue metoprolol per EP.  -Rest of care per EP.    Atrial fibrillation and flutter  Patient with Paroxysmal (<7 days) atrial fibrillation which is controlled currently with Beta Blocker. Patient is currently in sinus rhythm.[id number] Score: 1. Anticoagulation indicated. Anticoagulation done with coumadin .    CHB (complete heart block)  Presence of cardiac pacemaker   Hx of complete heart block requiring PPM 1/17/2024   -No acute issues.  -Next device check scheduled at the end of this week per pt report.    S/P MVR (mitral valve replacement)  Long term (current) use of anticoagulants   -Last INR 2.2, will give 8mg of coumadin " "tonight per coumadin clinic recs.  -PT/INR daily, goal 2.5-3.5  -Pharmacy consulted to assist with coumadin dosing.    Chronic diastolic heart failure  Patient is identified as having Diastolic (HFpEF) heart failure that is Chronic. CHF is currently controlled. Latest ECHO performed and demonstrates- Results for orders placed during the hospital encounter of 02/26/24    Echo    Interpretation Summary    Left Ventricle: The left ventricle is normal in size. Normal wall thickness. Normal wall motion. There is normal systolic function with a visually estimated ejection fraction of 55 - 70%. Ejection fraction by visual approximation is 63%. Diastolic function cannot be reliably determined in the presence of mitral valve disease.    Right Ventricle: Normal right ventricular cavity size. Wall thickness is normal. Right ventricle wall motion  is normal. Systolic function is normal. Pacemaker lead present in the ventricle.    Right Atrium: Right atrium is mildly dilated. Lead present in the right atrium.    Mitral Valve: There is a mechanical valve in the mitral position. There is normal leaflet mobility. The mean pressure gradient across the mitral valve is 5 mmHg at a heart rate of 85  bpm; MVA 3. There is no significant regurgitation.    Tricuspid Valve: The tricuspid valve is repaired. The MG was 4 mmHg at PHR 85; TVA 2.57    IVC/SVC: Normal venous pressure at 3 mmHg.  . Continue Beta Blocker and monitor clinical status closely. Monitor on telemetry. Patient is off CHF pathway.  Monitor strict Is&Os and daily weights.  Place on fluid restriction of 1.5 L. Continue to stress to patient importance of self efficacy and  on diet for CHF. Last BNP reviewed- and noted below No results for input(s): "BNP", "BNPTRIAGEBLO" in the last 168 hours..    Hypertension, essential  Chronic, controlled. Latest blood pressure and vitals reviewed-     Temp:  [98.4 °F (36.9 °C)]   Pulse:  []   Resp:  [18]   BP: (168)/(89) "   SpO2:  [99 %] .   Home meds for hypertension were reviewed and noted below.   Hypertension Medications               metoprolol tartrate (LOPRESSOR) 25 MG tablet Take 1 tablet (25 mg total) by mouth 2 (two) times daily.            While in the hospital, will manage blood pressure as follows; Continue home antihypertensive regimen    Will utilize p.r.n. blood pressure medication only if patient's blood pressure greater than 180/110 and she develops symptoms such as worsening chest pain or shortness of breath.    Class 2 obesity due to excess calories with body mass index (BMI) of 35.0 to 35.9 in adult  Body mass index is 33.07 kg/m². Obesity complicates all aspects of disease management from diagnostic modalities to treatment.      VTE Risk Mitigation (From admission, onward)           Ordered     warfarin (COUMADIN) tablet 12 mg  Once         04/29/24 1911     IP VTE HIGH RISK PATIENT  Once         04/29/24 1847     Place sequential compression device  Until discontinued         04/29/24 1847                                    Bailey Avalos NP  Department of Hospital Medicine  [facility] - Cardiology Stepdown

## 2024-04-29 NOTE — NURSING
Patient admitted to CSU room 306. No evidence of distress; patient AAO x4 at this time. Patient placed on tele. Vital signs obtained. Patient voices no complaints at this time. Plan of care initiated with patient. Bed in lowest position, locked, SR up x2, call bell in reach. Will continue to monitor patient. MD Magana Notified.

## 2024-04-29 NOTE — CARE UPDATE
Electrophysiology Plan of Care:     Patient admitted for tikosyn load for rhythm control management of atrial fibrillation. Her creatinine clearance is well above 60 so an appropriate starting dose is 500 mcg BID. No IVCD or bundle branch block at baseline. Please obtain EKG now for baseline Qtc, as well as 2-3 hours after each dose of tikosyn.     Westley Rosas MD  [facility]  Cardiovascular Disease, PGY-VI      Complex Repair And Ftsg Text: The defect edges were debeveled with a #15 scalpel blade.  The primary defect was closed partially with a complex linear closure.  Given the location of the defect, shape of the defect and the proximity to free margins a full thickness skin graft was deemed most appropriate to repair the remaining defect.  The graft was trimmed to fit the size of the remaining defect.  The graft was then placed in the primary defect, oriented appropriately, and sutured into place.

## 2024-04-30 LAB
ALBUMIN SERPL BCP-MCNC: 3.5 G/DL (ref 3.5–5.2)
ALP SERPL-CCNC: 56 U/L (ref 55–135)
ALT SERPL W/O P-5'-P-CCNC: 10 U/L (ref 10–44)
ANION GAP SERPL CALC-SCNC: 9 MMOL/L (ref 8–16)
AST SERPL-CCNC: 15 U/L (ref 10–40)
BASOPHILS # BLD AUTO: 0.04 K/UL (ref 0–0.2)
BASOPHILS NFR BLD: 0.6 % (ref 0–1.9)
BILIRUB SERPL-MCNC: 0.3 MG/DL (ref 0.1–1)
BUN SERPL-MCNC: 10 MG/DL (ref 6–20)
CALCIUM SERPL-MCNC: 9.1 MG/DL (ref 8.7–10.5)
CHLORIDE SERPL-SCNC: 109 MMOL/L (ref 95–110)
CO2 SERPL-SCNC: 22 MMOL/L (ref 23–29)
CREAT SERPL-MCNC: 0.6 MG/DL (ref 0.5–1.4)
DIFFERENTIAL METHOD BLD: ABNORMAL
EOSINOPHIL # BLD AUTO: 0.1 K/UL (ref 0–0.5)
EOSINOPHIL NFR BLD: 1.5 % (ref 0–8)
ERYTHROCYTE [DISTWIDTH] IN BLOOD BY AUTOMATED COUNT: 15.9 % (ref 11.5–14.5)
EST. GFR  (NO RACE VARIABLE): >60 ML/MIN/1.73 M^2
GLUCOSE SERPL-MCNC: 84 MG/DL (ref 70–110)
HCT VFR BLD AUTO: 35.7 % (ref 37–48.5)
HGB BLD-MCNC: 11.2 G/DL (ref 12–16)
IMM GRANULOCYTES # BLD AUTO: 0.02 K/UL (ref 0–0.04)
IMM GRANULOCYTES NFR BLD AUTO: 0.3 % (ref 0–0.5)
INR PPP: 3 (ref 0.8–1.2)
LYMPHOCYTES # BLD AUTO: 2.4 K/UL (ref 1–4.8)
LYMPHOCYTES NFR BLD: 36.2 % (ref 18–48)
MAGNESIUM SERPL-MCNC: 1.9 MG/DL (ref 1.6–2.6)
MCH RBC QN AUTO: 25.8 PG (ref 27–31)
MCHC RBC AUTO-ENTMCNC: 31.4 G/DL (ref 32–36)
MCV RBC AUTO: 82 FL (ref 82–98)
MONOCYTES # BLD AUTO: 0.6 K/UL (ref 0.3–1)
MONOCYTES NFR BLD: 9.5 % (ref 4–15)
NEUTROPHILS # BLD AUTO: 3.5 K/UL (ref 1.8–7.7)
NEUTROPHILS NFR BLD: 51.9 % (ref 38–73)
NRBC BLD-RTO: 0 /100 WBC
OHS QRS DURATION: 76 MS
OHS QRS DURATION: 84 MS
OHS QRS DURATION: 86 MS
OHS QTC CALCULATION: 445 MS
OHS QTC CALCULATION: 497 MS
OHS QTC CALCULATION: 500 MS
PLATELET # BLD AUTO: 291 K/UL (ref 150–450)
PMV BLD AUTO: 10.4 FL (ref 9.2–12.9)
POTASSIUM SERPL-SCNC: 3.7 MMOL/L (ref 3.5–5.1)
PROT SERPL-MCNC: 6.3 G/DL (ref 6–8.4)
PROTHROMBIN TIME: 30.8 SEC (ref 9–12.5)
RBC # BLD AUTO: 4.34 M/UL (ref 4–5.4)
SODIUM SERPL-SCNC: 140 MMOL/L (ref 136–145)
WBC # BLD AUTO: 6.74 K/UL (ref 3.9–12.7)

## 2024-04-30 PROCEDURE — 25000003 PHARM REV CODE 250: Performed by: STUDENT IN AN ORGANIZED HEALTH CARE EDUCATION/TRAINING PROGRAM

## 2024-04-30 PROCEDURE — 93010 ELECTROCARDIOGRAM REPORT: CPT | Mod: ,,, | Performed by: INTERNAL MEDICINE

## 2024-04-30 PROCEDURE — 20600001 HC STEP DOWN PRIVATE ROOM

## 2024-04-30 PROCEDURE — 80053 COMPREHEN METABOLIC PANEL: CPT | Performed by: NURSE PRACTITIONER

## 2024-04-30 PROCEDURE — 25000003 PHARM REV CODE 250: Performed by: NURSE PRACTITIONER

## 2024-04-30 PROCEDURE — 93005 ELECTROCARDIOGRAM TRACING: CPT

## 2024-04-30 PROCEDURE — 85610 PROTHROMBIN TIME: CPT | Performed by: NURSE PRACTITIONER

## 2024-04-30 PROCEDURE — 99223 1ST HOSP IP/OBS HIGH 75: CPT | Mod: ,,, | Performed by: STUDENT IN AN ORGANIZED HEALTH CARE EDUCATION/TRAINING PROGRAM

## 2024-04-30 PROCEDURE — 36415 COLL VENOUS BLD VENIPUNCTURE: CPT | Performed by: NURSE PRACTITIONER

## 2024-04-30 PROCEDURE — 85025 COMPLETE CBC W/AUTO DIFF WBC: CPT | Performed by: NURSE PRACTITIONER

## 2024-04-30 PROCEDURE — 83735 ASSAY OF MAGNESIUM: CPT | Performed by: NURSE PRACTITIONER

## 2024-04-30 RX ORDER — WARFARIN 4 MG/1
12 TABLET ORAL
Status: DISCONTINUED | OUTPATIENT
Start: 2024-04-30 | End: 2024-05-02 | Stop reason: HOSPADM

## 2024-04-30 RX ORDER — POTASSIUM CHLORIDE 750 MG/1
30 CAPSULE, EXTENDED RELEASE ORAL ONCE
Status: COMPLETED | OUTPATIENT
Start: 2024-04-30 | End: 2024-04-30

## 2024-04-30 RX ORDER — WARFARIN 4 MG/1
8 TABLET ORAL
Status: DISCONTINUED | OUTPATIENT
Start: 2024-05-01 | End: 2024-05-02 | Stop reason: HOSPADM

## 2024-04-30 RX ORDER — DOFETILIDE 0.25 MG/1
250 CAPSULE ORAL EVERY 12 HOURS
Status: DISCONTINUED | OUTPATIENT
Start: 2024-04-30 | End: 2024-05-02 | Stop reason: HOSPADM

## 2024-04-30 RX ORDER — DOFETILIDE 0.25 MG/1
250 CAPSULE ORAL EVERY 12 HOURS
Qty: 60 CAPSULE | Refills: 11 | Status: SHIPPED | OUTPATIENT
Start: 2024-04-30 | End: 2025-04-30

## 2024-04-30 RX ADMIN — WARFARIN SODIUM 12 MG: 4 TABLET ORAL at 06:04

## 2024-04-30 RX ADMIN — POTASSIUM CHLORIDE 30 MEQ: 750 CAPSULE, EXTENDED RELEASE ORAL at 03:04

## 2024-04-30 RX ADMIN — DOFETILIDE 250 MCG: 0.25 CAPSULE ORAL at 08:04

## 2024-04-30 RX ADMIN — METOPROLOL TARTRATE 25 MG: 25 TABLET, FILM COATED ORAL at 08:04

## 2024-04-30 RX ADMIN — ASPIRIN 81 MG: 81 TABLET, COATED ORAL at 09:04

## 2024-04-30 RX ADMIN — DOFETILIDE 250 MCG: 0.25 CAPSULE ORAL at 09:04

## 2024-04-30 RX ADMIN — METOPROLOL TARTRATE 25 MG: 25 TABLET, FILM COATED ORAL at 09:04

## 2024-04-30 NOTE — ASSESSMENT & PLAN NOTE
Long term (current) use of anticoagulants   -Last INR 2.2, will give 8mg of coumadin tonight per coumadin clinic recs.  -PT/INR daily, goal 2.5-3.5  -Pharmacy consulted to assist with coumadin dosing.

## 2024-04-30 NOTE — PLAN OF CARE
[facility] - Cardiology Stepdown  Initial Discharge Assessment       Primary Care Provider: Mayco Hansen MD    Admission Diagnosis: Visit for monitoring Tikosyn therapy [Z51.81, Z79.899]    Admission Date: 4/29/2024  Expected Discharge Date: 5/2/2024    Transition of Care Barriers: None    Payor: BLUE CROSS [employer] / Plan: [employer] BLUE CROSS [place] / Product Type: Self Funded /     Extended Emergency Contact Information  Primary Emergency Contact: FeUmer marin  Address: [facility] [employer] [place] [id number] [place]  Mobile Phone: [phone]  Relation: Spouse    Discharge Plan A: Home with family  Discharge Plan B: Home with family      [id number] - [place], [place] - [address]  [address] [address]  Phone: [phone] Fax: [phone]    [facility]  [address]  [place] [address]  Phone: [phone] Fax: [phone]    [facility]  [address] [address]  Phone: [phone] Fax: [phone]      Initial Assessment (most recent)       Adult Discharge Assessment - 04/30/24 1318          Discharge Assessment    Assessment Type Discharge Planning Assessment     Confirmed/corrected address, phone number and insurance Yes     Confirmed Demographics Correct on Facesheet     Source of Information patient     Communicated ROBERT with patient/caregiver Date not available/Unable to determine     People in Home spouse     Facility Arrived From: home     Do you expect to return to your current living situation? Yes     Do you have help at home or someone to help you manage your care at home? Yes     Who are your caregiver(s) and their phone number(s)? Umer Botello (spouse) [phone]     Prior to hospitilization cognitive status: Alert/Oriented     Current cognitive status: Alert/Oriented     Equipment Currently Used at Home none     Readmission within 30 days?  No     Patient currently being followed by outpatient case management? No     Do you currently have service(s) that help you manage your care at home? No     Do you take prescription medications? Yes     Do you have prescription coverage? Yes     Coverage [employer]     Do you have any problems affording any of your prescribed medications? No     Is the patient taking medications as prescribed? yes     Who is going to help you get home at discharge? Umer Botello (spouse) [phone]     How do you get to doctors appointments? family or friend will provide;car, drives self     Are you on dialysis? No     Do you take coumadin? Yes     Who monitors your labs? Established with [facility] and Dr Cordero follows her.     Discharge Plan A Home with family     Discharge Plan B Home with family     DME Needed Upon Discharge  none     Discharge Plan discussed with: Patient     Transition of Care Barriers None        OTHER    Name(s) of People in Home Umer Botello (spouse) [phone] and 2 minor children                        CM met with the patient at the bedside and discussed the discharge plan. Gave him/her the discharge booklet and placed contact numbers on the white board in the room. Patient alert and sitting up in bed.  Patient verified her name , , PCP, Insurance and Pharmacy . Stated she lives with Umer Botello (spouse) [phone] and 2 minor daughters in a single story house  and has 1 steps to point of entry . She is on coumadin and followed with our coumadin clinic and her cardiologist is Dr Alayna Cordero.She is not  a dialysis patient . In the past she had [facility] and is not current with them but they would be her first choice. DME's include:none.  Stated she takes  medications as prescribed. She is interested in bed side delivery.     Discharge Plan A and Plan B have been determined by review of patient's clinical status, future medical and therapeutic needs, and coverage/benefits  for post-acute care in coordination with multidisciplinary team members.    Garret Hairston RN         [phone]

## 2024-04-30 NOTE — PLAN OF CARE
Pharmacy Tikosyn Education Note     Patient was instructed on how to take Tikosyn as well as side effects, compliance, and drug interactions  REMS handouts given to patient  Patient will fill first dose from [facility] for $0 and was educated on future fills/notifying retail pharmacies at least 2 weeks prior to needing a refill to ensure they can stock the medication without interruption of therapy  Home medications were reviewed  The patient was educated on DDI - none identified  All questions and concerns were addressed     Omari Mccormack Pharm.D., BCPS  [id number]

## 2024-04-30 NOTE — SUBJECTIVE & OBJECTIVE
Interval History:   No overnight acute events. Currently on Tikoysn 500 q12 and Lopressor 25 mg, BID.    Objective:     Vital Signs (Most Recent):  Temp: 97.7 °F (36.5 °C) (04/30/24 0442)  Pulse: 81 (04/30/24 0442)  Resp: 18 (04/30/24 0442)  BP: 122/72 (04/30/24 0442)  SpO2: 100 % (04/30/24 0442) Vital Signs (24h Range):  Temp:  [97.7 °F (36.5 °C)-98.4 °F (36.9 °C)] 97.7 °F (36.5 °C)  Pulse:  [] 81  Resp:  [16-18] 18  SpO2:  [99 %-100 %] 100 %  BP: (122-168)/(68-89) 122/72     Weight: 79.4 kg (175 lb 0.7 oz)  Body mass index is 33.07 kg/m².     SpO2: 100 %        Physical Exam  Vitals and nursing note reviewed.   Eyes:      Conjunctiva/sclera: Conjunctivae normal.   Cardiovascular:      Rate and Rhythm: Normal rate and regular rhythm.      Heart sounds: No murmur heard.  Pulmonary:      Effort: Pulmonary effort is normal.      Breath sounds: Normal breath sounds. No wheezing.   Abdominal:      General: There is no distension.      Palpations: Abdomen is soft.      Tenderness: There is no abdominal tenderness.   Musculoskeletal:      Right lower leg: No edema.      Left lower leg: No edema.   Skin:     General: Skin is warm.   Neurological:      Mental Status: She is alert.   Psychiatric:         Mood and Affect: Mood normal.       Significant Labs: All pertinent lab results from the last 24 hours have been reviewed.    Significant Imaging:     Echocardiogram (2/26/2024):       Left Ventricle: The left ventricle is normal in size. Normal wall thickness. Normal wall motion. There is normal systolic function with a visually estimated ejection fraction of 55 - 70%. Ejection fraction by visual approximation is 63%. Diastolic function cannot be reliably determined in the presence of mitral valve disease.    Right Ventricle: Normal right ventricular cavity size. Wall thickness is normal. Right ventricle wall motion  is normal. Systolic function is normal. Pacemaker lead present in the ventricle.    Right Atrium:  Right atrium is mildly dilated. Lead present in the right atrium.    Mitral Valve: There is a mechanical valve in the mitral position. There is normal leaflet mobility. The mean pressure gradient across the mitral valve is 5 mmHg at a heart rate of 85  bpm; MVA 3. There is no significant regurgitation.    Tricuspid Valve: The tricuspid valve is repaired. The MG was 4 mmHg at PHR 85; TVA 2.57    IVC/SVC: Normal venous pressure at 3 mmHg.      MINERVA (4/18/2024)    Left Atrium: Left atrium is dilated. The left atrial appendage has a windsock morphology. Appendage velocity is reduced at less than 40 cm/sec. There is no thrombus in the left atrial appendage. Moderate SEC without any thrombus seen in LOBO. contrast was used.    Left Ventricle: There is normal systolic function with a visually estimated ejection fraction of 55 - 60%.    Right Ventricle: Pacemaker lead present in the ventricle.    Right Atrium: Right atrium is dilated. Lead present in the right atrium.    Mitral Valve: There is a mechanical valve in the mitral position. It is reported to be a 25 mm carbomedics valve. There is mild regurgitation. M gradient not obstained.    Tricuspid Valve: The tricuspid valve is repaired by annular ring(26 Medtronic band). Mean gradient is 2 mmHg at HR of 99bpm. .    Aorta: Aortic root is normal in size measuring 2.8 cm. Ascending aorta is normal measuring 2.4 cm.     1)  Mitral valve replacement with a 25 mm Carbomedics standard  2)  Tricuspid valve repair with a 26 mm Medtronic Contour annuloplasty band   3)  Reoperation

## 2024-04-30 NOTE — ASSESSMENT & PLAN NOTE
Chronic, controlled. Latest blood pressure and vitals reviewed-     Temp:  [98.4 °F (36.9 °C)]   Pulse:  []   Resp:  [18]   BP: (168)/(89)   SpO2:  [99 %] .   Home meds for hypertension were reviewed and noted below.   Hypertension Medications               metoprolol tartrate (LOPRESSOR) 25 MG tablet Take 1 tablet (25 mg total) by mouth 2 (two) times daily.            While in the hospital, will manage blood pressure as follows; Continue home antihypertensive regimen    Will utilize p.r.n. blood pressure medication only if patient's blood pressure greater than 180/110 and she develops symptoms such as worsening chest pain or shortness of breath.

## 2024-04-30 NOTE — NURSING
Received report from SEBASTIAN Trejo. Patient alert and oriented. No  pain. No sing of distress. Iv line in place- saline locked. Tele monitor in place.On room air. Call bell given on his reach. Instructed to call for any concerns or assistance. Bed on lowest position. Non skid socks on. Plan of care discussed with patient , question answered.

## 2024-04-30 NOTE — HPI
King Botello is a 38 y.o. female with a PMHx of obesity, HTN, mitral valve disease now s/p MVR with a 25mm Carbomedics standard mechanical prosthesis plus TVr in early January complicated by complete heart block requiring PPM 1/17/2024, and a fib/flutter who presents as a direct admit for initiation of Tikosyn therapy. The patient reports intermittent palpitations associated with going in and out of a fib/flutter along with fatigue. She denies any chest pain, SOB, BLE edema, lightheadedness/dizziness, syncope, cough, abdominal pain, N/V/D, or dysuria.

## 2024-04-30 NOTE — PROGRESS NOTES
[facility] - Cardiology Stepdown  Cardiac Electrophysiology  Progress Note    Admission Date: 4/29/2024  Code Status: Full Code   Attending Physician: Phyllis Coffey MD   Expected Discharge Date:   Principal Problem:Visit for monitoring Tikosyn therapy    Subjective:     Interval History:   No overnight acute events. Currently on Tikoysn 500 q12 and Lopressor 25 mg, BID.    Objective:     Vital Signs (Most Recent):  Temp: 97.7 °F (36.5 °C) (04/30/24 0442)  Pulse: 81 (04/30/24 0442)  Resp: 18 (04/30/24 0442)  BP: 122/72 (04/30/24 0442)  SpO2: 100 % (04/30/24 0442) Vital Signs (24h Range):  Temp:  [97.7 °F (36.5 °C)-98.4 °F (36.9 °C)] 97.7 °F (36.5 °C)  Pulse:  [] 81  Resp:  [16-18] 18  SpO2:  [99 %-100 %] 100 %  BP: (122-168)/(68-89) 122/72     Weight: 79.4 kg (175 lb 0.7 oz)  Body mass index is 33.07 kg/m².     SpO2: 100 %        Physical Exam  Vitals and nursing note reviewed.   Eyes:      Conjunctiva/sclera: Conjunctivae normal.   Cardiovascular:      Rate and Rhythm: Normal rate and regular rhythm.      Heart sounds: No murmur heard.  Pulmonary:      Effort: Pulmonary effort is normal.      Breath sounds: Normal breath sounds. No wheezing.   Abdominal:      General: There is no distension.      Palpations: Abdomen is soft.      Tenderness: There is no abdominal tenderness.   Musculoskeletal:      Right lower leg: No edema.      Left lower leg: No edema.   Skin:     General: Skin is warm.   Neurological:      Mental Status: She is alert.   Psychiatric:         Mood and Affect: Mood normal.       Significant Labs: All pertinent lab results from the last 24 hours have been reviewed.    Significant Imaging:     Echocardiogram (2/26/2024):       Left Ventricle: The left ventricle is normal in size. Normal wall thickness. Normal wall motion. There is normal systolic function with a visually estimated ejection fraction of 55 - 70%. Ejection fraction by visual approximation is 63%. Diastolic function cannot be  reliably determined in the presence of mitral valve disease.    Right Ventricle: Normal right ventricular cavity size. Wall thickness is normal. Right ventricle wall motion  is normal. Systolic function is normal. Pacemaker lead present in the ventricle.    Right Atrium: Right atrium is mildly dilated. Lead present in the right atrium.    Mitral Valve: There is a mechanical valve in the mitral position. There is normal leaflet mobility. The mean pressure gradient across the mitral valve is 5 mmHg at a heart rate of 85  bpm; MVA 3. There is no significant regurgitation.    Tricuspid Valve: The tricuspid valve is repaired. The MG was 4 mmHg at PHR 85; TVA 2.57    IVC/SVC: Normal venous pressure at 3 mmHg.      MINERVA (4/18/2024)    Left Atrium: Left atrium is dilated. The left atrial appendage has a windsock morphology. Appendage velocity is reduced at less than 40 cm/sec. There is no thrombus in the left atrial appendage. Moderate SEC without any thrombus seen in LOBO. contrast was used.    Left Ventricle: There is normal systolic function with a visually estimated ejection fraction of 55 - 60%.    Right Ventricle: Pacemaker lead present in the ventricle.    Right Atrium: Right atrium is dilated. Lead present in the right atrium.    Mitral Valve: There is a mechanical valve in the mitral position. It is reported to be a 25 mm carbomedics valve. There is mild regurgitation. M gradient not obstained.    Tricuspid Valve: The tricuspid valve is repaired by annular ring(26 Medtronic band). Mean gradient is 2 mmHg at HR of 99bpm. .    Aorta: Aortic root is normal in size measuring 2.8 cm. Ascending aorta is normal measuring 2.4 cm.     1)  Mitral valve replacement with a 25 mm Carbomedics standard  2)  Tricuspid valve repair with a 26 mm Medtronic Contour annuloplasty band   3)  Reoperation     Assessment and Plan:     * Visit for monitoring Tikosyn therapy  Patient admitted for tikosyn load for rhythm control management of  atrial fibrillation. Her creatinine clearance is well above 60 so an appropriate starting dose is 500 mcg BID.   - No IVCD or bundle branch block at baseline.   - Evaluate QTc at 2 hours after the dose and if QTc interval increases to more than 15% above baseline QTc or if the QTc is >500 msec notify EP   - Will decrease Tikosyn dose this morning to 250mcg     Goal to continue ECG after each dose of Dofetilide (tikosyn) and only provide the dose based on new order for Nursing communication   Baseline EKG: QT//445  Dose 1 post EKG: QT/QTC: 428/500  Dose 2 post EKG: QT    Atrial fibrillation and flutter  - See Tikosyn therapy plan of care above        Mariel Campbell MD  Cardiac Electrophysiology  [facility] - Cardiology Stepdown

## 2024-04-30 NOTE — ASSESSMENT & PLAN NOTE
Presence of cardiac pacemaker   Hx of complete heart block requiring PPM 1/17/2024   -No acute issues.  -Next device check scheduled at the end of this week per pt report.

## 2024-04-30 NOTE — ASSESSMENT & PLAN NOTE
Body mass index is 33.07 kg/m². Obesity complicates all aspects of disease management from diagnostic modalities to treatment.

## 2024-04-30 NOTE — ASSESSMENT & PLAN NOTE
Patient admitted for tikosyn load for rhythm control management of atrial fibrillation. Her creatinine clearance is well above 60 so an appropriate starting dose is 500 mcg BID.   - No IVCD or bundle branch block at baseline.   - Evaluate QTc at 2 hours after the dose and if QTc interval increases to more than 15% above baseline QTc or if the QTc is >500 msec notify EP   - Will decrease Tikosyn dose this morning to 250mcg     Goal to continue ECG after each dose of Dofetilide (tikosyn) and only provide the dose based on new order for Nursing communication   Baseline EKG: QT//445  Dose 1 post EKG: QT/QTC: 428/500  Dose 2 post EKG: QT

## 2024-04-30 NOTE — PLAN OF CARE
Problem: Dysrhythmia  Goal: Normalized Cardiac Rhythm  Outcome: Progressing  Intervention: Monitor and Manage Cardiac Rhythm Effect  Flowsheets (Taken 4/30/2024 0023)  VTE Prevention/Management: (anti coag given as ordered)   ambulation promoted   other (see comments)  Stabilization Measures: airway opened

## 2024-04-30 NOTE — ASSESSMENT & PLAN NOTE
Patient with Paroxysmal (<7 days) atrial fibrillation which is controlled currently with Beta Blocker. Patient is currently in sinus rhythm.[id number] Score: 1. Anticoagulation indicated. Anticoagulation done with coumadin .

## 2024-04-30 NOTE — CONSULTS
PHARMACY CONSULT NOTE: WARFARIN  King Botello is a 39 y.o. female on warfarin therapy for mechanical MVR, AF/AFL    Home dose: warfarin 12 mg Tues/Thurs/Sat, 8 mg all other days  Coumadin clinic enrollment: enrolled with [facility]  INR goal: 2.5-3.5    Lab Results   Component Value Date    INR 3.0 (H) 04/30/2024    INR 2.2 (H) 04/25/2024    INR 2.4 (H) 04/18/2024    INR 2.4 (H) 04/18/2024     Significant drug interactions: none      Plan  Warfarin 12 mg Tues/Thurs/Sat, 8 mg all other days  Bridge with LMWH 1 mg/kg q12h for any INR < 2.5  PT/INR daily    Thank you for the consult, will continue to follow    Omari Mccormack, Pharm.D., BCPS  [id number]      **Note: Consults are reviewed Monday-Friday 7:00am-3:30pm. THE ABOVE RECOMMENDATIONS ARE ONLY SUGGESTED.THE RECOMMENDATIONS SHOULD BE CONSIDERED IN CONJUNCTION WITH ALL PATIENT FACTORS. **

## 2024-04-30 NOTE — ASSESSMENT & PLAN NOTE
"Pt is a 38 y.o. female with a PMHx  mitral valve disease now s/p MVR with a 25mm Carbomedics standard mechanical prosthesis plus TVr in early January complicated by complete heart block requiring PPM 1/17/2024, and a fib/flutter who presents as a direct admit for initiation of Tikosyn therapy for rhythm management of a fib. Per EP "Her creatinine clearance is well above 60 so an appropriate starting dose is 500 mcg BID. No IVCD or bundle branch block at baseline. Please obtain EKG now for baseline Qtc, as well as 2-3 hours after each dose of tikosyn."    -Initial QTc 445  -Cardiac monitoring.  -Okay to continue metoprolol per EP.  -Rest of care per EP.  "

## 2024-04-30 NOTE — ASSESSMENT & PLAN NOTE
"Patient is identified as having Diastolic (HFpEF) heart failure that is Chronic. CHF is currently controlled. Latest ECHO performed and demonstrates- Results for orders placed during the hospital encounter of 02/26/24    Echo    Interpretation Summary    Left Ventricle: The left ventricle is normal in size. Normal wall thickness. Normal wall motion. There is normal systolic function with a visually estimated ejection fraction of 55 - 70%. Ejection fraction by visual approximation is 63%. Diastolic function cannot be reliably determined in the presence of mitral valve disease.    Right Ventricle: Normal right ventricular cavity size. Wall thickness is normal. Right ventricle wall motion  is normal. Systolic function is normal. Pacemaker lead present in the ventricle.    Right Atrium: Right atrium is mildly dilated. Lead present in the right atrium.    Mitral Valve: There is a mechanical valve in the mitral position. There is normal leaflet mobility. The mean pressure gradient across the mitral valve is 5 mmHg at a heart rate of 85  bpm; MVA 3. There is no significant regurgitation.    Tricuspid Valve: The tricuspid valve is repaired. The MG was 4 mmHg at PHR 85; TVA 2.57    IVC/SVC: Normal venous pressure at 3 mmHg.  . Continue Beta Blocker and monitor clinical status closely. Monitor on telemetry. Patient is off CHF pathway.  Monitor strict Is&Os and daily weights.  Place on fluid restriction of 1.5 L. Continue to stress to patient importance of self efficacy and  on diet for CHF. Last BNP reviewed- and noted below No results for input(s): "BNP", "BNPTRIAGEBLO" in the last 168 hours..  "

## 2024-05-01 LAB
ANION GAP SERPL CALC-SCNC: 8 MMOL/L (ref 8–16)
BUN SERPL-MCNC: 14 MG/DL (ref 6–20)
CALCIUM SERPL-MCNC: 9.5 MG/DL (ref 8.7–10.5)
CHLORIDE SERPL-SCNC: 106 MMOL/L (ref 95–110)
CO2 SERPL-SCNC: 24 MMOL/L (ref 23–29)
CREAT SERPL-MCNC: 0.7 MG/DL (ref 0.5–1.4)
ERYTHROCYTE [DISTWIDTH] IN BLOOD BY AUTOMATED COUNT: 16.4 % (ref 11.5–14.5)
EST. GFR  (NO RACE VARIABLE): >60 ML/MIN/1.73 M^2
GLUCOSE SERPL-MCNC: 87 MG/DL (ref 70–110)
HCT VFR BLD AUTO: 37.5 % (ref 37–48.5)
HGB BLD-MCNC: 11.2 G/DL (ref 12–16)
INR PPP: 3.2 (ref 0.8–1.2)
MAGNESIUM SERPL-MCNC: 1.8 MG/DL (ref 1.6–2.6)
MCH RBC QN AUTO: 24.8 PG (ref 27–31)
MCHC RBC AUTO-ENTMCNC: 29.9 G/DL (ref 32–36)
MCV RBC AUTO: 83 FL (ref 82–98)
OHS QRS DURATION: 78 MS
OHS QRS DURATION: 84 MS
OHS QTC CALCULATION: 480 MS
OHS QTC CALCULATION: 486 MS
PLATELET # BLD AUTO: 317 K/UL (ref 150–450)
PMV BLD AUTO: 10.8 FL (ref 9.2–12.9)
POTASSIUM SERPL-SCNC: 4.4 MMOL/L (ref 3.5–5.1)
PROTHROMBIN TIME: 32.9 SEC (ref 9–12.5)
RBC # BLD AUTO: 4.52 M/UL (ref 4–5.4)
SODIUM SERPL-SCNC: 138 MMOL/L (ref 136–145)
WBC # BLD AUTO: 7.52 K/UL (ref 3.9–12.7)

## 2024-05-01 PROCEDURE — 85027 COMPLETE CBC AUTOMATED: CPT | Performed by: STUDENT IN AN ORGANIZED HEALTH CARE EDUCATION/TRAINING PROGRAM

## 2024-05-01 PROCEDURE — 85610 PROTHROMBIN TIME: CPT | Performed by: STUDENT IN AN ORGANIZED HEALTH CARE EDUCATION/TRAINING PROGRAM

## 2024-05-01 PROCEDURE — 93005 ELECTROCARDIOGRAM TRACING: CPT

## 2024-05-01 PROCEDURE — 63600175 PHARM REV CODE 636 W HCPCS: Performed by: STUDENT IN AN ORGANIZED HEALTH CARE EDUCATION/TRAINING PROGRAM

## 2024-05-01 PROCEDURE — 20600001 HC STEP DOWN PRIVATE ROOM

## 2024-05-01 PROCEDURE — 25000003 PHARM REV CODE 250: Performed by: STUDENT IN AN ORGANIZED HEALTH CARE EDUCATION/TRAINING PROGRAM

## 2024-05-01 PROCEDURE — 83735 ASSAY OF MAGNESIUM: CPT | Performed by: STUDENT IN AN ORGANIZED HEALTH CARE EDUCATION/TRAINING PROGRAM

## 2024-05-01 PROCEDURE — 93010 ELECTROCARDIOGRAM REPORT: CPT | Mod: ,,, | Performed by: INTERNAL MEDICINE

## 2024-05-01 PROCEDURE — 99233 SBSQ HOSP IP/OBS HIGH 50: CPT | Mod: ,,, | Performed by: STUDENT IN AN ORGANIZED HEALTH CARE EDUCATION/TRAINING PROGRAM

## 2024-05-01 PROCEDURE — 36415 COLL VENOUS BLD VENIPUNCTURE: CPT | Performed by: STUDENT IN AN ORGANIZED HEALTH CARE EDUCATION/TRAINING PROGRAM

## 2024-05-01 PROCEDURE — 80048 BASIC METABOLIC PNL TOTAL CA: CPT | Performed by: STUDENT IN AN ORGANIZED HEALTH CARE EDUCATION/TRAINING PROGRAM

## 2024-05-01 PROCEDURE — 25000003 PHARM REV CODE 250: Performed by: NURSE PRACTITIONER

## 2024-05-01 RX ORDER — MAGNESIUM SULFATE HEPTAHYDRATE 40 MG/ML
2 INJECTION, SOLUTION INTRAVENOUS ONCE
Status: COMPLETED | OUTPATIENT
Start: 2024-05-01 | End: 2024-05-01

## 2024-05-01 RX ORDER — TRIAMCINOLONE ACETONIDE 1 MG/G
CREAM TOPICAL 3 TIMES DAILY
Status: DISCONTINUED | OUTPATIENT
Start: 2024-05-01 | End: 2024-05-02 | Stop reason: HOSPADM

## 2024-05-01 RX ADMIN — METOPROLOL TARTRATE 25 MG: 25 TABLET, FILM COATED ORAL at 08:05

## 2024-05-01 RX ADMIN — TRIAMCINOLONE ACETONIDE: 1 CREAM TOPICAL at 05:05

## 2024-05-01 RX ADMIN — WARFARIN SODIUM 8 MG: 4 TABLET ORAL at 05:05

## 2024-05-01 RX ADMIN — DOFETILIDE 250 MCG: 0.25 CAPSULE ORAL at 09:05

## 2024-05-01 RX ADMIN — DOFETILIDE 250 MCG: 0.25 CAPSULE ORAL at 08:05

## 2024-05-01 RX ADMIN — ASPIRIN 81 MG: 81 TABLET, COATED ORAL at 09:05

## 2024-05-01 RX ADMIN — MAGNESIUM SULFATE HEPTAHYDRATE 2 G: 40 INJECTION, SOLUTION INTRAVENOUS at 09:05

## 2024-05-01 RX ADMIN — TRIAMCINOLONE ACETONIDE: 1 CREAM TOPICAL at 08:05

## 2024-05-01 RX ADMIN — METOPROLOL TARTRATE 25 MG: 25 TABLET, FILM COATED ORAL at 09:05

## 2024-05-01 NOTE — ASSESSMENT & PLAN NOTE
Patient admitted for tikosyn load for rhythm control management of atrial fibrillation. Her creatinine clearance is well above 60 so an appropriate starting dose is 500 mcg BID.   - No IVCD or bundle branch block at baseline.   - Evaluate QTc at 2 hours after the dose and if QTc interval increases to more than 15% above baseline QTc or if the QTc is >500 msec notify EP   - Will decrease Tikosyn dose this morning to 250mcg   - Plan to DC after last dose on 5/2    Goal to continue ECG after each dose of Dofetilide (tikosyn) and only provide the dose based on new order for Nursing communication   Baseline EKG: QT//445  Dose 3 post EKG: QT/QTC: 404/480

## 2024-05-01 NOTE — SUBJECTIVE & OBJECTIVE
Interval History:   No overnight acute events. Denies any complaints. Currently on Tikoysn 250 q12 and Lopressor 25 mg, BID.    Objective:     Vital Signs (Most Recent):  Temp: 97.6 °F (36.4 °C) (05/01/24 0430)  Pulse: 82 (05/01/24 0430)  Resp: 18 (05/01/24 0430)  BP: 107/66 (05/01/24 0430)  SpO2: 96 % (05/01/24 0430) Vital Signs (24h Range):  Temp:  [97.6 °F (36.4 °C)-98.3 °F (36.8 °C)] 97.6 °F (36.4 °C)  Pulse:  [71-85] 82  Resp:  [18-19] 18  SpO2:  [95 %-99 %] 96 %  BP: (107-125)/(62-84) 107/66     Weight: 79.4 kg (175 lb 0.7 oz)  Body mass index is 33.07 kg/m².     SpO2: 96 %        Physical Exam  Vitals and nursing note reviewed.   Eyes:      Conjunctiva/sclera: Conjunctivae normal.   Cardiovascular:      Rate and Rhythm: Normal rate and regular rhythm.      Heart sounds: No murmur heard.  Pulmonary:      Effort: Pulmonary effort is normal.      Breath sounds: Normal breath sounds. No wheezing.   Abdominal:      General: There is no distension.      Palpations: Abdomen is soft.      Tenderness: There is no abdominal tenderness.   Musculoskeletal:      Right lower leg: No edema.      Left lower leg: No edema.   Skin:     General: Skin is warm.   Neurological:      Mental Status: She is alert.   Psychiatric:         Mood and Affect: Mood normal.       Significant Labs: All pertinent lab results from the last 24 hours have been reviewed.    Significant Imaging:     Echocardiogram (2/26/2024):       Left Ventricle: The left ventricle is normal in size. Normal wall thickness. Normal wall motion. There is normal systolic function with a visually estimated ejection fraction of 55 - 70%. Ejection fraction by visual approximation is 63%. Diastolic function cannot be reliably determined in the presence of mitral valve disease.    Right Ventricle: Normal right ventricular cavity size. Wall thickness is normal. Right ventricle wall motion  is normal. Systolic function is normal. Pacemaker lead present in the ventricle.     Right Atrium: Right atrium is mildly dilated. Lead present in the right atrium.    Mitral Valve: There is a mechanical valve in the mitral position. There is normal leaflet mobility. The mean pressure gradient across the mitral valve is 5 mmHg at a heart rate of 85  bpm; MVA 3. There is no significant regurgitation.    Tricuspid Valve: The tricuspid valve is repaired. The MG was 4 mmHg at PHR 85; TVA 2.57    IVC/SVC: Normal venous pressure at 3 mmHg.      MINERVA (4/18/2024)    Left Atrium: Left atrium is dilated. The left atrial appendage has a windsock morphology. Appendage velocity is reduced at less than 40 cm/sec. There is no thrombus in the left atrial appendage. Moderate SEC without any thrombus seen in OLBO. contrast was used.    Left Ventricle: There is normal systolic function with a visually estimated ejection fraction of 55 - 60%.    Right Ventricle: Pacemaker lead present in the ventricle.    Right Atrium: Right atrium is dilated. Lead present in the right atrium.    Mitral Valve: There is a mechanical valve in the mitral position. It is reported to be a 25 mm carbomedics valve. There is mild regurgitation. M gradient not obstained.    Tricuspid Valve: The tricuspid valve is repaired by annular ring(26 Medtronic band). Mean gradient is 2 mmHg at HR of 99bpm. .    Aorta: Aortic root is normal in size measuring 2.8 cm. Ascending aorta is normal measuring 2.4 cm.     1)  Mitral valve replacement with a 25 mm Carbomedics standard  2)  Tricuspid valve repair with a 26 mm Medtronic Contour annuloplasty band   3)  Reoperation

## 2024-05-01 NOTE — PROGRESS NOTES
[facility] - Cardiology Stepdown  Cardiac Electrophysiology  Progress Note    Admission Date: 4/29/2024  Code Status: Full Code   Attending Physician: Phyllis Coffey MD   Expected Discharge Date: 5/2/2024  Principal Problem:Visit for monitoring Tikosyn therapy    Subjective:     Interval History:   No overnight acute events. Denies any complaints. Currently on Tikoysn 250 q12 and Lopressor 25 mg, BID.    Objective:     Vital Signs (Most Recent):  Temp: 97.6 °F (36.4 °C) (05/01/24 0430)  Pulse: 82 (05/01/24 0430)  Resp: 18 (05/01/24 0430)  BP: 107/66 (05/01/24 0430)  SpO2: 96 % (05/01/24 0430) Vital Signs (24h Range):  Temp:  [97.6 °F (36.4 °C)-98.3 °F (36.8 °C)] 97.6 °F (36.4 °C)  Pulse:  [71-85] 82  Resp:  [18-19] 18  SpO2:  [95 %-99 %] 96 %  BP: (107-125)/(62-84) 107/66     Weight: 79.4 kg (175 lb 0.7 oz)  Body mass index is 33.07 kg/m².     SpO2: 96 %        Physical Exam  Vitals and nursing note reviewed.   Eyes:      Conjunctiva/sclera: Conjunctivae normal.   Cardiovascular:      Rate and Rhythm: Normal rate and regular rhythm.      Heart sounds: No murmur heard.  Pulmonary:      Effort: Pulmonary effort is normal.      Breath sounds: Normal breath sounds. No wheezing.   Abdominal:      General: There is no distension.      Palpations: Abdomen is soft.      Tenderness: There is no abdominal tenderness.   Musculoskeletal:      Right lower leg: No edema.      Left lower leg: No edema.   Skin:     General: Skin is warm.   Neurological:      Mental Status: She is alert.   Psychiatric:         Mood and Affect: Mood normal.       Significant Labs: All pertinent lab results from the last 24 hours have been reviewed.    Significant Imaging:     Echocardiogram (2/26/2024):       Left Ventricle: The left ventricle is normal in size. Normal wall thickness. Normal wall motion. There is normal systolic function with a visually estimated ejection fraction of 55 - 70%. Ejection fraction by visual approximation is 63%.  Diastolic function cannot be reliably determined in the presence of mitral valve disease.    Right Ventricle: Normal right ventricular cavity size. Wall thickness is normal. Right ventricle wall motion  is normal. Systolic function is normal. Pacemaker lead present in the ventricle.    Right Atrium: Right atrium is mildly dilated. Lead present in the right atrium.    Mitral Valve: There is a mechanical valve in the mitral position. There is normal leaflet mobility. The mean pressure gradient across the mitral valve is 5 mmHg at a heart rate of 85  bpm; MVA 3. There is no significant regurgitation.    Tricuspid Valve: The tricuspid valve is repaired. The MG was 4 mmHg at PHR 85; TVA 2.57    IVC/SVC: Normal venous pressure at 3 mmHg.      MINERVA (4/18/2024)    Left Atrium: Left atrium is dilated. The left atrial appendage has a windsock morphology. Appendage velocity is reduced at less than 40 cm/sec. There is no thrombus in the left atrial appendage. Moderate SEC without any thrombus seen in LOBO. contrast was used.    Left Ventricle: There is normal systolic function with a visually estimated ejection fraction of 55 - 60%.    Right Ventricle: Pacemaker lead present in the ventricle.    Right Atrium: Right atrium is dilated. Lead present in the right atrium.    Mitral Valve: There is a mechanical valve in the mitral position. It is reported to be a 25 mm carbomedics valve. There is mild regurgitation. M gradient not obstained.    Tricuspid Valve: The tricuspid valve is repaired by annular ring(26 Medtronic band). Mean gradient is 2 mmHg at HR of 99bpm. .    Aorta: Aortic root is normal in size measuring 2.8 cm. Ascending aorta is normal measuring 2.4 cm.     1)  Mitral valve replacement with a 25 mm Carbomedics standard  2)  Tricuspid valve repair with a 26 mm Medtronic Contour annuloplasty band   3)  Reoperation  Assessment and Plan:     * Visit for monitoring Tikosyn therapy  Patient admitted for tikosyn load for  rhythm control management of atrial fibrillation. Her creatinine clearance is well above 60 so an appropriate starting dose is 500 mcg BID.   - No IVCD or bundle branch block at baseline.   - Evaluate QTc at 2 hours after the dose and if QTc interval increases to more than 15% above baseline QTc or if the QTc is >500 msec notify EP   - Will decrease Tikosyn dose this morning to 250mcg   - Plan to DC after last dose on 5/2    Goal to continue ECG after each dose of Dofetilide (tikosyn) and only provide the dose based on new order for Nursing communication   Baseline EKG: QT//445  Dose 3 post EKG: QT/QTC: 404/480    Atrial fibrillation and flutter  - See Tikosyn therapy plan of care above        Mariel Campbell MD  Cardiac Electrophysiology  [facility] - [facility] Stepdown

## 2024-05-01 NOTE — PROGRESS NOTES
"[facility] - Cardiology [facility] Medicine  Progress Note    Patient Name: King Botello  MRN: [id number]  Patient Class: IP- Inpatient   Admission Date: 4/29/2024  Length of Stay: 1 days  Attending Physician: Phyllis Coffey MD  Primary Care Provider: Mayco Hansen MD    Subjective:     Principal Problem: Visit for monitoring Tikosyn therapy    HPI:  King Botello is a 38 y.o. female with a PMHx of obesity, HTN, mitral valve disease now s/p MVR with a 25mm Carbomedics standard mechanical prosthesis plus TVr in early January complicated by complete heart block requiring PPM 1/17/2024, and a fib/flutter who presents as a direct admit for initiation of Tikosyn therapy. The patient reports intermittent palpitations associated with going in and out of a fib/flutter along with fatigue. She denies any chest pain, SOB, BLE edema, lightheadedness/dizziness, syncope, cough, abdominal pain, N/V/D, or dysuria.     Overview/Hospital Course:  As below.     Interval History: Admitted overnight for initiation of Tikosyn therapy. Remains HDS. Qtc 445 on admission (--> 500 --> 497). Reducing dose to 250mcg today per EP recs. Pt feeling well & asymptomatic this AM, stating it's "nice when my heart is on autopilot." Pharmacy assisting with coordinating Rx fill on discharge.     Review of Systems   Constitutional:  Negative for appetite change, chills, diaphoresis, fatigue and fever.   HENT:  Negative for congestion, rhinorrhea and sore throat.    Eyes:  Negative for photophobia and visual disturbance.   Respiratory:  Negative for cough, shortness of breath and wheezing.    Cardiovascular:  Negative for chest pain, palpitations and leg swelling.   Gastrointestinal:  Negative for abdominal distention, abdominal pain, diarrhea, nausea and vomiting.   Genitourinary:  Negative for dysuria, frequency and hematuria.   Musculoskeletal:  Negative for gait problem and myalgias.   Skin:  Negative for color change, pallor, " rash and wound.   Neurological:  Negative for dizziness, syncope, weakness, light-headedness and headaches.   Psychiatric/Behavioral:  Negative for confusion and hallucinations. The patient is not nervous/anxious.        Objective:     Vital Signs (Most Recent):  Temp: 98.1 °F (36.7 °C) (04/30/24 2005)  Pulse: 85 (04/30/24 2005)  Resp: 19 (04/30/24 2005)  BP: 125/70 (04/30/24 2005)  SpO2: 99 % (04/30/24 2005) Vital Signs (24h Range):  Temp:  [97.7 °F (36.5 °C)-98.3 °F (36.8 °C)] 98.1 °F (36.7 °C)  Pulse:  [71-85] 85  Resp:  [18-19] 19  SpO2:  [96 %-100 %] 99 %  BP: (118-129)/(68-84) 125/70     Weight: 79.4 kg (175 lb 0.7 oz)  Body mass index is 33.07 kg/m².    Intake/Output Summary (Last 24 hours) at 4/30/2024 2210  Last data filed at 4/30/2024 0900  Gross per 24 hour   Intake 560 ml   Output 370 ml   Net 190 ml         Physical Exam  Constitutional:       General: She is not in acute distress.     Appearance: She is not toxic-appearing or diaphoretic.   HENT:      Head: Normocephalic and atraumatic.   Eyes:      Conjunctiva/sclera: Conjunctivae normal.   Cardiovascular:      Rate and Rhythm: Normal rate and regular rhythm.      Pulses: Normal pulses.      Comments: Mechanical valve click noted  Pulmonary:      Effort: Pulmonary effort is normal. No respiratory distress.      Breath sounds: Normal breath sounds. No rales.   Abdominal:      General: Bowel sounds are normal. There is no distension.      Palpations: Abdomen is soft.      Tenderness: There is no abdominal tenderness. There is no guarding.   Musculoskeletal:      Right lower leg: No edema.      Left lower leg: No edema.   Skin:     General: Skin is warm and dry.   Neurological:      General: No focal deficit present.      Mental Status: She is alert and oriented to person, place, and time. Mental status is at baseline.   Psychiatric:         Mood and Affect: Mood normal.         Behavior: Behavior normal.           Significant Labs: All pertinent labs  "within the past 24 hours have been reviewed.    Significant Imaging: I have reviewed all pertinent imaging results/findings within the past 24 hours.    Assessment/Plan:      * Visit for monitoring Tikosyn therapy  Pt is a 39 y.o. female with a PMHx  mitral valve disease now s/p MVR with a 25mm Carbomedics standard mechanical prosthesis plus TVr in early January complicated by complete heart block requiring PPM 1/17/2024, and a fib/flutter who presents as a direct admit for initiation of Tikosyn therapy for rhythm management of a fib. Per EP "Her creatinine clearance is well above 60 so an appropriate starting dose is 500 mcg BID. No IVCD or bundle branch block at baseline. Please obtain EKG now for baseline Qtc, as well as 2-3 hours after each dose of tikosyn."    -Initial QTc 445  -Cardiac monitoring.   -Okay to continue metoprolol per EP.  - Decrease Tikosyn to 250mcg   -Rest of care per EP.    Atrial fibrillation and flutter  Patient with Paroxysmal (<7 days) atrial fibrillation which is controlled currently with Beta Blocker. Patient is currently in sinus rhythm.[id number] Score: 1. Anticoagulation indicated. Anticoagulation done with coumadin .    CHB (complete heart block)  Presence of cardiac pacemaker   Hx of complete heart block requiring PPM 1/17/2024   -No acute issues.  -Next device check scheduled at the end of this week per pt report.    S/P MVR (mitral valve replacement)  Long term (current) use of anticoagulants   -Last INR 2.2, will give 8mg of coumadin tonight per coumadin clinic recs.  -PT/INR daily, goal 2.5-3.5  -Pharmacy consulted to assist with coumadin dosing.    Chronic diastolic heart failure  Patient is identified as having Diastolic (HFpEF) heart failure that is Chronic. CHF is currently controlled. Latest ECHO performed and demonstrates- Results for orders placed during the hospital encounter of 02/26/24    Echo    Interpretation Summary    Left Ventricle: The left ventricle is normal " "in size. Normal wall thickness. Normal wall motion. There is normal systolic function with a visually estimated ejection fraction of 55 - 70%. Ejection fraction by visual approximation is 63%. Diastolic function cannot be reliably determined in the presence of mitral valve disease.    Right Ventricle: Normal right ventricular cavity size. Wall thickness is normal. Right ventricle wall motion  is normal. Systolic function is normal. Pacemaker lead present in the ventricle.    Right Atrium: Right atrium is mildly dilated. Lead present in the right atrium.    Mitral Valve: There is a mechanical valve in the mitral position. There is normal leaflet mobility. The mean pressure gradient across the mitral valve is 5 mmHg at a heart rate of 85  bpm; MVA 3. There is no significant regurgitation.    Tricuspid Valve: The tricuspid valve is repaired. The MG was 4 mmHg at PHR 85; TVA 2.57    IVC/SVC: Normal venous pressure at 3 mmHg.  . Continue Beta Blocker and monitor clinical status closely. Monitor on telemetry. Patient is off CHF pathway.  Monitor strict Is&Os and daily weights.  Place on fluid restriction of 1.5 L. Continue to stress to patient importance of self efficacy and  on diet for CHF. Last BNP reviewed- and noted below No results for input(s): "BNP", "BNPTRIAGEBLO" in the last 168 hours..    Hypertension, essential  Chronic, controlled. Latest blood pressure and vitals reviewed-     Temp:  [98.4 °F (36.9 °C)]   Pulse:  []   Resp:  [18]   BP: (168)/(89)   SpO2:  [99 %] .   Home meds for hypertension were reviewed and noted below.   Hypertension Medications               metoprolol tartrate (LOPRESSOR) 25 MG tablet Take 1 tablet (25 mg total) by mouth 2 (two) times daily.            While in the hospital, will manage blood pressure as follows; Continue home antihypertensive regimen    Will utilize p.r.n. blood pressure medication only if patient's blood pressure greater than 180/110 and she develops " "symptoms such as worsening chest pain or shortness of breath.    Class 2 obesity due to excess calories with body mass index (BMI) of 35.0 to 35.9 in adult  Body mass index is 33.07 kg/m². Obesity complicates all aspects of disease management from diagnostic modalities to treatment.      VTE Risk Mitigation (From admission, onward)           Ordered     warfarin (COUMADIN) tablet 8 mg  Once per day on Sunday Monday Wednesday Friday        Placed in "And" Linked Group    04/30/24 0937     warfarin (COUMADIN) tablet 12 mg  Once per day on Tuesday Thursday Saturday        Placed in "And" Linked Group    04/30/24 0937     IP VTE HIGH RISK PATIENT  Once         04/29/24 1847     Place sequential compression device  Until discontinued         04/29/24 1847                    Discharge Planning   ROBERT: 5/2/2024     Code Status: Full Code   Is the patient medically ready for discharge?:     Reason for patient still in hospital (select all that apply): Patient trending condition, Treatment, and Consult recommendations  Discharge Plan A: Home with family            Phyllis Coffey MD  Department of Hospital Medicine   [facility] [address] [facility] - Cardiology Stepdown    "

## 2024-05-01 NOTE — SUBJECTIVE & OBJECTIVE
"Interval History: Admitted overnight for initiation of Tikosyn therapy. Remains HDS. Qtc 445 on admission (--> 500 --> 497). Reducing dose to 250mcg today per EP recs. Pt feeling well & asymptomatic this AM, stating it's "nice when my heart is on autopilot." Pharmacy assisting with coordinating Rx fill on discharge.     Review of Systems   Constitutional:  Negative for appetite change, chills, diaphoresis, fatigue and fever.   HENT:  Negative for congestion, rhinorrhea and sore throat.    Eyes:  Negative for photophobia and visual disturbance.   Respiratory:  Negative for cough, shortness of breath and wheezing.    Cardiovascular:  Negative for chest pain, palpitations and leg swelling.   Gastrointestinal:  Negative for abdominal distention, abdominal pain, diarrhea, nausea and vomiting.   Genitourinary:  Negative for dysuria, frequency and hematuria.   Musculoskeletal:  Negative for gait problem and myalgias.   Skin:  Negative for color change, pallor, rash and wound.   Neurological:  Negative for dizziness, syncope, weakness, light-headedness and headaches.   Psychiatric/Behavioral:  Negative for confusion and hallucinations. The patient is not nervous/anxious.        Objective:     Vital Signs (Most Recent):  Temp: 98.1 °F (36.7 °C) (04/30/24 2005)  Pulse: 85 (04/30/24 2005)  Resp: 19 (04/30/24 2005)  BP: 125/70 (04/30/24 2005)  SpO2: 99 % (04/30/24 2005) Vital Signs (24h Range):  Temp:  [97.7 °F (36.5 °C)-98.3 °F (36.8 °C)] 98.1 °F (36.7 °C)  Pulse:  [71-85] 85  Resp:  [18-19] 19  SpO2:  [96 %-100 %] 99 %  BP: (118-129)/(68-84) 125/70     Weight: 79.4 kg (175 lb 0.7 oz)  Body mass index is 33.07 kg/m².    Intake/Output Summary (Last 24 hours) at 4/30/2024 2210  Last data filed at 4/30/2024 0900  Gross per 24 hour   Intake 560 ml   Output 370 ml   Net 190 ml         Physical Exam  Constitutional:       General: She is not in acute distress.     Appearance: She is not toxic-appearing or diaphoretic.   HENT:      " Head: Normocephalic and atraumatic.   Eyes:      Conjunctiva/sclera: Conjunctivae normal.   Cardiovascular:      Rate and Rhythm: Normal rate and regular rhythm.      Pulses: Normal pulses.      Comments: Mechanical valve click noted  Pulmonary:      Effort: Pulmonary effort is normal. No respiratory distress.      Breath sounds: Normal breath sounds. No rales.   Abdominal:      General: Bowel sounds are normal. There is no distension.      Palpations: Abdomen is soft.      Tenderness: There is no abdominal tenderness. There is no guarding.   Musculoskeletal:      Right lower leg: No edema.      Left lower leg: No edema.   Skin:     General: Skin is warm and dry.   Neurological:      General: No focal deficit present.      Mental Status: She is alert and oriented to person, place, and time. Mental status is at baseline.   Psychiatric:         Mood and Affect: Mood normal.         Behavior: Behavior normal.           Significant Labs: All pertinent labs within the past 24 hours have been reviewed.    Significant Imaging: I have reviewed all pertinent imaging results/findings within the past 24 hours.

## 2024-05-01 NOTE — ASSESSMENT & PLAN NOTE
"Pt is a 39 y.o. female with a PMHx  mitral valve disease now s/p MVR with a 25mm Carbomedics standard mechanical prosthesis plus TVr in early January complicated by complete heart block requiring PPM 1/17/2024, and a fib/flutter who presents as a direct admit for initiation of Tikosyn therapy for rhythm management of a fib. Per EP "Her creatinine clearance is well above 60 so an appropriate starting dose is 500 mcg BID. No IVCD or bundle branch block at baseline. Please obtain EKG now for baseline Qtc, as well as 2-3 hours after each dose of tikosyn."    -Initial QTc 445  -Cardiac monitoring.   -Okay to continue metoprolol per EP.  - Decrease Tikosyn to 250mcg   -Rest of care per EP.  "

## 2024-05-01 NOTE — CONSULTS
PHARMACY CONSULT NOTE: WARFARIN  King Botello is a 39 y.o. female on warfarin therapy for mechanical MVR, AF/AFL    Home dose: warfarin 12 mg Tues/Thurs/Sat, 8 mg all other days  Coumadin clinic enrollment: enrolled with [facility]  INR goal: 2.5-3.5    Lab Results   Component Value Date    INR 3.2 (H) 05/01/2024    INR 3.0 (H) 04/30/2024    INR 2.2 (H) 04/25/2024     Significant drug interactions: none      Plan  Continue warfarin 12 mg Tues/Thurs/Sat, 8 mg all other days  Bridge with LMWH 1 mg/kg q12h for any INR < 2.5  PT/INR daily    Thank you for the consult, will continue to follow    Omari Mccormack, Pharm.D., BCPS  [id number]      **Note: Consults are reviewed Monday-Friday 7:00am-3:30pm. THE ABOVE RECOMMENDATIONS ARE ONLY SUGGESTED.THE RECOMMENDATIONS SHOULD BE CONSIDERED IN CONJUNCTION WITH ALL PATIENT FACTORS. **

## 2024-05-02 ENCOUNTER — DOCUMENTATION ONLY (OUTPATIENT)
Dept: ELECTROPHYSIOLOGY | Facility: CLINIC | Age: 39
End: 2024-05-02
Payer: COMMERCIAL

## 2024-05-02 VITALS
WEIGHT: 175.5 LBS | BODY MASS INDEX: 33.14 KG/M2 | RESPIRATION RATE: 18 BRPM | TEMPERATURE: 98 F | OXYGEN SATURATION: 98 % | SYSTOLIC BLOOD PRESSURE: 108 MMHG | HEIGHT: 61 IN | DIASTOLIC BLOOD PRESSURE: 59 MMHG | HEART RATE: 70 BPM

## 2024-05-02 LAB
ANION GAP SERPL CALC-SCNC: 7 MMOL/L (ref 8–16)
BUN SERPL-MCNC: 14 MG/DL (ref 6–20)
CALCIUM SERPL-MCNC: 8.9 MG/DL (ref 8.7–10.5)
CHLORIDE SERPL-SCNC: 106 MMOL/L (ref 95–110)
CO2 SERPL-SCNC: 23 MMOL/L (ref 23–29)
CREAT SERPL-MCNC: 0.7 MG/DL (ref 0.5–1.4)
ERYTHROCYTE [DISTWIDTH] IN BLOOD BY AUTOMATED COUNT: 16.5 % (ref 11.5–14.5)
EST. GFR  (NO RACE VARIABLE): >60 ML/MIN/1.73 M^2
GLUCOSE SERPL-MCNC: 89 MG/DL (ref 70–110)
HCT VFR BLD AUTO: 39.3 % (ref 37–48.5)
HGB BLD-MCNC: 11.9 G/DL (ref 12–16)
INR PPP: 3.3 (ref 0.8–1.2)
MAGNESIUM SERPL-MCNC: 2 MG/DL (ref 1.6–2.6)
MCH RBC QN AUTO: 25.3 PG (ref 27–31)
MCHC RBC AUTO-ENTMCNC: 30.3 G/DL (ref 32–36)
MCV RBC AUTO: 84 FL (ref 82–98)
OHS QRS DURATION: 76 MS
OHS QRS DURATION: 78 MS
OHS QTC CALCULATION: 470 MS
OHS QTC CALCULATION: 498 MS
PLATELET # BLD AUTO: 336 K/UL (ref 150–450)
PMV BLD AUTO: 10.8 FL (ref 9.2–12.9)
POTASSIUM SERPL-SCNC: 4 MMOL/L (ref 3.5–5.1)
PROTHROMBIN TIME: 33.2 SEC (ref 9–12.5)
RBC # BLD AUTO: 4.7 M/UL (ref 4–5.4)
SODIUM SERPL-SCNC: 136 MMOL/L (ref 136–145)
WBC # BLD AUTO: 8.16 K/UL (ref 3.9–12.7)

## 2024-05-02 PROCEDURE — 25000003 PHARM REV CODE 250: Performed by: NURSE PRACTITIONER

## 2024-05-02 PROCEDURE — 36415 COLL VENOUS BLD VENIPUNCTURE: CPT | Performed by: STUDENT IN AN ORGANIZED HEALTH CARE EDUCATION/TRAINING PROGRAM

## 2024-05-02 PROCEDURE — 83735 ASSAY OF MAGNESIUM: CPT | Performed by: STUDENT IN AN ORGANIZED HEALTH CARE EDUCATION/TRAINING PROGRAM

## 2024-05-02 PROCEDURE — 85027 COMPLETE CBC AUTOMATED: CPT | Performed by: STUDENT IN AN ORGANIZED HEALTH CARE EDUCATION/TRAINING PROGRAM

## 2024-05-02 PROCEDURE — 25000003 PHARM REV CODE 250: Performed by: STUDENT IN AN ORGANIZED HEALTH CARE EDUCATION/TRAINING PROGRAM

## 2024-05-02 PROCEDURE — 80048 BASIC METABOLIC PNL TOTAL CA: CPT | Performed by: STUDENT IN AN ORGANIZED HEALTH CARE EDUCATION/TRAINING PROGRAM

## 2024-05-02 PROCEDURE — 99233 SBSQ HOSP IP/OBS HIGH 50: CPT | Mod: ,,, | Performed by: STUDENT IN AN ORGANIZED HEALTH CARE EDUCATION/TRAINING PROGRAM

## 2024-05-02 PROCEDURE — 85610 PROTHROMBIN TIME: CPT | Performed by: STUDENT IN AN ORGANIZED HEALTH CARE EDUCATION/TRAINING PROGRAM

## 2024-05-02 PROCEDURE — 93010 ELECTROCARDIOGRAM REPORT: CPT | Mod: ,,, | Performed by: INTERNAL MEDICINE

## 2024-05-02 PROCEDURE — 93005 ELECTROCARDIOGRAM TRACING: CPT

## 2024-05-02 RX ORDER — WARFARIN 4 MG/1
TABLET ORAL
Start: 2024-05-02 | End: 2024-05-29 | Stop reason: SDUPTHER

## 2024-05-02 RX ADMIN — METOPROLOL TARTRATE 25 MG: 25 TABLET, FILM COATED ORAL at 08:05

## 2024-05-02 RX ADMIN — TRIAMCINOLONE ACETONIDE: 1 CREAM TOPICAL at 08:05

## 2024-05-02 RX ADMIN — DOFETILIDE 250 MCG: 0.25 CAPSULE ORAL at 08:05

## 2024-05-02 RX ADMIN — ASPIRIN 81 MG: 81 TABLET, COATED ORAL at 08:05

## 2024-05-02 NOTE — DISCHARGE INSTRUCTIONS
Return to lab for INR check on Monday, 05/06/24. Follow-up with Dr. Moore. In the case of worsening signs/symptoms of atrial fibrillation- I.e., chest pain, shortness of breath, palpitations- return to ED for further evaluation.

## 2024-05-02 NOTE — DISCHARGE SUMMARY
[facility] - Cardiology [facility] Medicine  Discharge Summary      Patient Name: King Botello  MRN: [id number]  VERONICA: [id number]  Patient Class: IP- Inpatient  Admission Date: 4/29/2024  Hospital Length of Stay: 3 days  Discharge Date and Time: 5/2/2024 12:48 PM  Attending Physician: No att. providers found   Discharging Provider: Suyapa Lu MD  Primary Care Provider: Mayco Hansen MD  Hospital Medicine Team: [facility]  Suyapa Lu MD  Primary Care Team: [facility]    HPI:   King Botello is a 38 y.o. female with a PMHx of obesity, HTN, mitral valve disease now s/p MVR with a 25mm Carbomedics standard mechanical prosthesis plus TVr in early January complicated by complete heart block requiring PPM 1/17/2024, and a fib/flutter who presents as a direct admit for initiation of Tikosyn therapy. The patient reports intermittent palpitations associated with going in and out of a fib/flutter along with fatigue. She denies any chest pain, SOB, BLE edema, lightheadedness/dizziness, syncope, cough, abdominal pain, N/V/D, or dysuria.     * No surgery found *      Hospital Course:   Initiated on tikosyn with dose reduction after subsequent increase in QTc. Patient appeared to tolerate tikosyn dosing. She received 6 doses total. She was deemed stable for discharge to home w/close f/u with Dr. Moore in the EP clinic. Medications were delivered to bedside.     Pt deemed appropriate for discharge. Plan discussed with pt, who was agreeable and amenable; medications were discussed and reviewed, outpatient follow-up scheduled, ER precautions were given, all questions were answered to the pt's satisfaction, and Mrs. Botello was subsequently discharged.    Physical Exam  Gen: in NAD, appears stated age  Neuro: AAOx3, motor, sensory, and strength grossly intact BL  HEENT: NTNC, EOMI, PERRL, MMM  CVS: RRR, no m/r/g; S1/S2 auscultated with no S3 or S4; capillary refill < 2 sec  Resp: lungs  CTAB, no w/r/r; no belabored breathing or accessory muscle use appreciated   Abd: BS+ in all 4 quadrants; NTND, soft to palpation; no organomegaly appreciated   Extrem: pulses full, equal, and regular over all 4 extremities; no UE or LE edema BL         Goals of Care Treatment Preferences:  Code Status: Full Code      Consults:   Consults (From admission, onward)          Status Ordering Provider     Inpatient consult to Midline team  Once        Provider:  (Not yet assigned)    MARY Wilson            No new Assessment & Plan notes have been filed under this hospital service since the last note was generated.  Service: Hospital Medicine    Final Active Diagnoses:    Diagnosis Date Noted POA    PRINCIPAL PROBLEM:  Visit for monitoring Tikosyn therapy [Z51.81, Z79.899] 01/04/2024 Not Applicable    Presence of cardiac pacemaker [Z95.0] 04/29/2024 Yes    Atrial fibrillation and flutter [I48.91, I48.92] 02/29/2024 Yes    Long term (current) use of anticoagulants [Z79.01] 01/18/2024 Not Applicable    CHB (complete heart block) [I44.2] 01/10/2024 Yes    S/P MVR (mitral valve replacement) [Z95.2] 01/08/2024 Not Applicable    Chronic diastolic heart failure [I50.32] 05/03/2023 Yes    Hypertension, essential [I10] 03/16/2021 Yes    Class 2 obesity due to excess calories with body mass index (BMI) of 35.0 to 35.9 in adult [E66.09, Z68.35] 03/09/2021 Not Applicable      Problems Resolved During this Admission:       Discharged Condition: stable    Disposition: Home or Self Care    Follow Up:    Patient Instructions:      Ambulatory referral/consult to Anticoagulation Monitoring (Renewal)   Standing Status: Future   Referral Priority: Routine Referral Type: Consultation   Referral Reason: Specialty Services Required   Requested Specialty: Cardiology   Number of Visits Requested: 1     Diet Cardiac     Notify your health care provider if you experience any of the following:  persistent nausea and vomiting or  "diarrhea     Notify your health care provider if you experience any of the following:  severe uncontrolled pain     Notify your health care provider if you experience any of the following:  difficulty breathing or increased cough     POCT INR   Standing Status: Future Standing Exp. Date: 07/01/25     Activity as tolerated       Significant Diagnostic Studies: Labs: CMP   Recent Labs   Lab 05/01/24 0313 05/02/24  0445    136   K 4.4 4.0    106   CO2 24 23   GLU 87 89   BUN 14 14   CREATININE 0.7 0.7   CALCIUM 9.5 8.9   ANIONGAP 8 7*   , CBC   Recent Labs   Lab 05/01/24 0313 05/02/24  0445   WBC 7.52 8.16   HGB 11.2* 11.9*   HCT 37.5 39.3    336   , Troponin No results for input(s): "TROPONINI" in the last 168 hours., and All labs within the past 24 hours have been reviewed    Pending Diagnostic Studies:       Procedure Component Value Units Date/Time    EKG 12-lead [[id number]]     Order Status: Sent Lab Status: No result            Medications:  Reconciled Home Medications:      Medication List        START taking these medications      dofetilide 250 MCG Cap  Commonly known as: TIKOSYN  Take 1 capsule (250 mcg total) by mouth every 12 (twelve) hours.            CHANGE how you take these medications      warfarin 4 MG tablet  Commonly known as: COUMADIN  Take 3 tablets (12 mg total) by mouth every Tuesday, Thursday, Saturday, Sunday AND 2 tablets (8 mg total) every Mon, Wed, Fri.  What changed:   medication strength  See the new instructions.  Another medication with the same name was removed. Continue taking this medication, and follow the directions you see here.            CONTINUE taking these medications      aspirin 81 MG EC tablet  Commonly known as: ECOTRIN  Take 1 tablet (81 mg total) by mouth once daily.     metoprolol tartrate 25 MG tablet  Commonly known as: LOPRESSOR  Take 1 tablet (25 mg total) by mouth 2 (two) times daily.            STOP taking these medications      enoxaparin " 80 mg/0.8 mL Syrg  Commonly known as: LOVENOX              Indwelling Lines/Drains at time of discharge:   Lines/Drains/Airways       None                   Time spent on the discharge of patient: 45 minutes           Suyapa Lu MD  Department of Hospital Medicine  [facility] - Cardiology Stepdown

## 2024-05-02 NOTE — ASSESSMENT & PLAN NOTE
Long term (current) use of anticoagulants   - Warfarin dosing per pharmacy; appreciate assistance  - PT/INR daily, goal 2.5-3.5

## 2024-05-02 NOTE — PLAN OF CARE
Problem: Adult Inpatient Plan of Care  Goal: Plan of Care Review  Outcome: Progressing  Goal: Patient-Specific Goal (Individualized)  Outcome: Progressing  Goal: Absence of Hospital-Acquired Illness or Injury  Outcome: Progressing  Goal: Optimal Comfort and Wellbeing  Outcome: Progressing  Goal: Readiness for Transition of Care  Outcome: Progressing     Problem: Dysrhythmia  Goal: Normalized Cardiac Rhythm  Outcome: Progressing

## 2024-05-02 NOTE — SUBJECTIVE & OBJECTIVE
Interval History: Tikosyn dose decreased yesterday to 250mg. NAEON. Remains HDS. Qtc 486 this AM (on admission (445 on admit, peaked at 500 after initial tikosyn dose). Pt feeling well & remains asymptomatic this AM.    Review of Systems   Constitutional:  Negative for appetite change, chills, diaphoresis, fatigue and fever.   HENT:  Negative for congestion, rhinorrhea and sore throat.    Eyes:  Negative for photophobia and visual disturbance.   Respiratory:  Negative for cough, shortness of breath and wheezing.    Cardiovascular:  Negative for chest pain, palpitations and leg swelling.   Gastrointestinal:  Negative for abdominal distention, abdominal pain, diarrhea, nausea and vomiting.   Genitourinary:  Negative for dysuria, frequency and hematuria.   Musculoskeletal:  Negative for gait problem and myalgias.   Skin:  Negative for color change, pallor, rash and wound.   Neurological:  Negative for dizziness, syncope, weakness, light-headedness and headaches.   Psychiatric/Behavioral:  Negative for confusion and hallucinations. The patient is not nervous/anxious.        Objective:     Vital Signs (Most Recent):  Temp: 97.7 °F (36.5 °C) (05/01/24 1501)  Pulse: 86 (05/01/24 1501)  Resp: 18 (05/01/24 1501)  BP: 106/73 (05/01/24 1501)  SpO2: 97 % (05/01/24 1501) Vital Signs (24h Range):  Temp:  [97.6 °F (36.4 °C)-98.1 °F (36.7 °C)] 97.7 °F (36.5 °C)  Pulse:  [71-87] 86  Resp:  [18-19] 18  SpO2:  [95 %-99 %] 97 %  BP: (103-125)/(62-73) 106/73     Weight: 79.4 kg (175 lb 0.7 oz)  Body mass index is 33.07 kg/m².  No intake or output data in the 24 hours ending 05/01/24 2000        Physical Exam  Constitutional:       General: She is not in acute distress.     Appearance: She is not toxic-appearing or diaphoretic.   HENT:      Head: Normocephalic and atraumatic.   Eyes:      Conjunctiva/sclera: Conjunctivae normal.   Cardiovascular:      Rate and Rhythm: Normal rate and regular rhythm.      Pulses: Normal pulses.       Comments: Mechanical valve click noted  Pulmonary:      Effort: Pulmonary effort is normal. No respiratory distress.      Breath sounds: Normal breath sounds. No rales.   Abdominal:      General: Bowel sounds are normal. There is no distension.      Palpations: Abdomen is soft.      Tenderness: There is no abdominal tenderness. There is no guarding.   Musculoskeletal:      Right lower leg: No edema.      Left lower leg: No edema.   Skin:     General: Skin is warm and dry.   Neurological:      General: No focal deficit present.      Mental Status: She is alert and oriented to person, place, and time. Mental status is at baseline.   Psychiatric:         Mood and Affect: Mood normal.         Behavior: Behavior normal.           Significant Labs: All pertinent labs within the past 24 hours have been reviewed.    Significant Imaging: I have reviewed all pertinent imaging results/findings within the past 24 hours.

## 2024-05-02 NOTE — CONSULTS
PHARMACY CONSULT NOTE: WARFARIN  King Botello is a 39 y.o. female on warfarin therapy for mechanical MVR, AF/AFL    Home dose: warfarin 12 mg Tues/Thurs/Sat, 8 mg all other days  Coumadin clinic enrollment: enrolled with [facility]  INR goal: 2.5-3.5    Lab Results   Component Value Date    INR 3.3 (H) 05/02/2024    INR 3.2 (H) 05/01/2024    INR 3.0 (H) 04/30/2024     Significant drug interactions: none    Plan  Continue warfarin 12 mg Tues/Thurs/Sat, 8 mg all other days  Bridge with LMWH 1 mg/kg q12h for any INR < 2.5  PT/INR daily    Thank you for the consult, will continue to follow    Brenda Cerna, PharmD, BCPS  [phone]     **Note: Consults are reviewed Monday-Friday 7:00am-3:30pm. THE ABOVE RECOMMENDATIONS ARE ONLY SUGGESTED.THE RECOMMENDATIONS SHOULD BE CONSIDERED IN CONJUNCTION WITH ALL PATIENT FACTORS. **

## 2024-05-02 NOTE — PROGRESS NOTES
Hosp discharge on 5/2: Patient was admitted for initiation of Tikosyn and was sent home today. Will get repeat INR on 5/7 to verify INR still at goal. No interaction expected with Tikosyn.

## 2024-05-02 NOTE — PROGRESS NOTES
[facility] - Cardiology Stepdown  Cardiac Electrophysiology  Progress Note    Admission Date: 4/29/2024  Code Status: Full Code   Attending Physician: Suyapa Lu MD   Expected Discharge Date: 5/2/2024  Principal Problem:Visit for monitoring Tikosyn therapy    Subjective:     Interval History:   No overnight acute events. Denies any complaints. Will obtain last Tikosyn dose this morning with follow up ECG. Plan to discharge today     Objective:     Vital Signs (Most Recent):  Temp: 97.7 °F (36.5 °C) (05/02/24 0406)  Pulse: 74 (05/02/24 0406)  Resp: 19 (05/02/24 0406)  BP: 96/62 (05/02/24 0406)  SpO2: 97 % (05/02/24 0406) Vital Signs (24h Range):  Temp:  [97.5 °F (36.4 °C)-97.9 °F (36.6 °C)] 97.7 °F (36.5 °C)  Pulse:  [71-88] 74  Resp:  [16-19] 19  SpO2:  [96 %-98 %] 97 %  BP: ()/(62-73) 96/62     Weight: 79.4 kg (175 lb 0.7 oz)  Body mass index is 33.07 kg/m².     SpO2: 97 %        Physical Exam  Vitals and nursing note reviewed.   Eyes:      Conjunctiva/sclera: Conjunctivae normal.   Cardiovascular:      Rate and Rhythm: Normal rate and regular rhythm.      Heart sounds: No murmur heard.  Pulmonary:      Effort: Pulmonary effort is normal.      Breath sounds: Normal breath sounds. No wheezing.   Abdominal:      General: There is no distension.      Palpations: Abdomen is soft.      Tenderness: There is no abdominal tenderness.   Musculoskeletal:      Right lower leg: No edema.      Left lower leg: No edema.   Skin:     General: Skin is warm.   Neurological:      Mental Status: She is alert.   Psychiatric:         Mood and Affect: Mood normal.       Significant Labs: All pertinent lab results from the last 24 hours have been reviewed.    Significant Imaging:     Echocardiogram (2/26/2024):       Left Ventricle: The left ventricle is normal in size. Normal wall thickness. Normal wall motion. There is normal systolic function with a visually estimated ejection fraction of 55 - 70%. Ejection fraction by  visual approximation is 63%. Diastolic function cannot be reliably determined in the presence of mitral valve disease.    Right Ventricle: Normal right ventricular cavity size. Wall thickness is normal. Right ventricle wall motion  is normal. Systolic function is normal. Pacemaker lead present in the ventricle.    Right Atrium: Right atrium is mildly dilated. Lead present in the right atrium.    Mitral Valve: There is a mechanical valve in the mitral position. There is normal leaflet mobility. The mean pressure gradient across the mitral valve is 5 mmHg at a heart rate of 85  bpm; MVA 3. There is no significant regurgitation.    Tricuspid Valve: The tricuspid valve is repaired. The MG was 4 mmHg at PHR 85; TVA 2.57    IVC/SVC: Normal venous pressure at 3 mmHg.      MINERVA (4/18/2024)    Left Atrium: Left atrium is dilated. The left atrial appendage has a windsock morphology. Appendage velocity is reduced at less than 40 cm/sec. There is no thrombus in the left atrial appendage. Moderate SEC without any thrombus seen in LOBO. contrast was used.    Left Ventricle: There is normal systolic function with a visually estimated ejection fraction of 55 - 60%.    Right Ventricle: Pacemaker lead present in the ventricle.    Right Atrium: Right atrium is dilated. Lead present in the right atrium.    Mitral Valve: There is a mechanical valve in the mitral position. It is reported to be a 25 mm carbomedics valve. There is mild regurgitation. M gradient not obstained.    Tricuspid Valve: The tricuspid valve is repaired by annular ring(26 Medtronic band). Mean gradient is 2 mmHg at HR of 99bpm. .    Aorta: Aortic root is normal in size measuring 2.8 cm. Ascending aorta is normal measuring 2.4 cm.     1)  Mitral valve replacement with a 25 mm Carbomedics standard  2)  Tricuspid valve repair with a 26 mm Medtronic Contour annuloplasty band   3)  Reoperation    Assessment and Plan:     * Visit for monitoring Tikosyn therapy  Patient  admitted for tikosyn load for rhythm control management of atrial fibrillation. Her creatinine clearance is well above 60 so an appropriate starting dose is 500 mcg BID.   - No IVCD or bundle branch block at baseline.   - Evaluate QTc at 2 hours after the dose and if QTc interval increases to more than 15% above baseline QTc or if the QTc is >500 msec notify EP   - Continue Tikosyn 250mcg   - Plan to Discharge today after last dose and ECG this morning      Atrial fibrillation and flutter  - See Tikosyn therapy plan of care above    Please follow up Attending Attestation for further recommendations. Please call if any questions.       Mariel Campbell MD  Cardiac Electrophysiology  [facility] - Cardiology Stepdown

## 2024-05-02 NOTE — NURSING
Patient is ready for discharge. Patient stable alert and oriented. IVs removed. No complaints of pain, palpitations, SOB, HA,dizziness, lightheadedness. Discussed discharge plan with patient and spouse. Reviewed medications and side effects, appointments, and answered questions with patient and spouse. __ RX delivered to bedside to patient.

## 2024-05-02 NOTE — PLAN OF CARE
AAOX4,VSS,O2 sats>98% on room air.Plan of care discussed with patient. Patient ready for discharge.Tikosyn therapy successful. Patient NSR on monitor.Patient has no complaints of pain/SOB, palpitations, dizziness, lightheadedness. Spirometer usage reviewed with patient.Device interrogation complete. Discussed medications and care. Patient has no questions at this time.Pt visualised and stable, no acute distress noted.Call light within reach.Pt resting,bed at lowest position.No family present at bedside. No farther orders at this time, plan of care ongoing.        Problem: Adult Inpatient Plan of Care  Goal: Plan of Care Review  Outcome: Met  Goal: Patient-Specific Goal (Individualized)  Outcome: Met  Goal: Absence of Hospital-Acquired Illness or Injury  Outcome: Met  Goal: Optimal Comfort and Wellbeing  Outcome: Met  Goal: Readiness for Transition of Care  Outcome: Met     Problem: Fall Injury Risk  Goal: Absence of Fall and Fall-Related Injury  Outcome: Met     Problem: Dysrhythmia  Goal: Normalized Cardiac Rhythm  Outcome: Met

## 2024-05-02 NOTE — HOSPITAL COURSE
Initiated on tikosyn with dose reduction after subsequent increase in QTc. Patient appeared to tolerate tikosyn dosing. She received 6 doses total. She was deemed stable for discharge to home w/close f/u with Dr. Moore in the EP clinic. Medications were delivered to bedside.     Pt deemed appropriate for discharge. Plan discussed with pt, who was agreeable and amenable; medications were discussed and reviewed, outpatient follow-up scheduled, ER precautions were given, all questions were answered to the pt's satisfaction, and Mrs. Botello was subsequently discharged.    Physical Exam  Gen: in NAD, appears stated age  Neuro: AAOx3, motor, sensory, and strength grossly intact BL  HEENT: NTNC, EOMI, PERRL, MMM  CVS: RRR, no m/r/g; S1/S2 auscultated with no S3 or S4; capillary refill < 2 sec  Resp: lungs CTAB, no w/r/r; no belabored breathing or accessory muscle use appreciated   Abd: BS+ in all 4 quadrants; NTND, soft to palpation; no organomegaly appreciated   Extrem: pulses full, equal, and regular over all 4 extremities; no UE or LE edema BL

## 2024-05-02 NOTE — SUBJECTIVE & OBJECTIVE
Interval History:   No overnight acute events. Denies any complaints. Will obtain last Tikosyn dose this morning with follow up ECG. Plan to discharge today     Objective:     Vital Signs (Most Recent):  Temp: 97.7 °F (36.5 °C) (05/02/24 0406)  Pulse: 74 (05/02/24 0406)  Resp: 19 (05/02/24 0406)  BP: 96/62 (05/02/24 0406)  SpO2: 97 % (05/02/24 0406) Vital Signs (24h Range):  Temp:  [97.5 °F (36.4 °C)-97.9 °F (36.6 °C)] 97.7 °F (36.5 °C)  Pulse:  [71-88] 74  Resp:  [16-19] 19  SpO2:  [96 %-98 %] 97 %  BP: ()/(62-73) 96/62     Weight: 79.4 kg (175 lb 0.7 oz)  Body mass index is 33.07 kg/m².     SpO2: 97 %        Physical Exam  Vitals and nursing note reviewed.   Eyes:      Conjunctiva/sclera: Conjunctivae normal.   Cardiovascular:      Rate and Rhythm: Normal rate and regular rhythm.      Heart sounds: No murmur heard.  Pulmonary:      Effort: Pulmonary effort is normal.      Breath sounds: Normal breath sounds. No wheezing.   Abdominal:      General: There is no distension.      Palpations: Abdomen is soft.      Tenderness: There is no abdominal tenderness.   Musculoskeletal:      Right lower leg: No edema.      Left lower leg: No edema.   Skin:     General: Skin is warm.   Neurological:      Mental Status: She is alert.   Psychiatric:         Mood and Affect: Mood normal.       Significant Labs: All pertinent lab results from the last 24 hours have been reviewed.    Significant Imaging:     Echocardiogram (2/26/2024):       Left Ventricle: The left ventricle is normal in size. Normal wall thickness. Normal wall motion. There is normal systolic function with a visually estimated ejection fraction of 55 - 70%. Ejection fraction by visual approximation is 63%. Diastolic function cannot be reliably determined in the presence of mitral valve disease.    Right Ventricle: Normal right ventricular cavity size. Wall thickness is normal. Right ventricle wall motion  is normal. Systolic function is normal. Pacemaker lead  present in the ventricle.    Right Atrium: Right atrium is mildly dilated. Lead present in the right atrium.    Mitral Valve: There is a mechanical valve in the mitral position. There is normal leaflet mobility. The mean pressure gradient across the mitral valve is 5 mmHg at a heart rate of 85  bpm; MVA 3. There is no significant regurgitation.    Tricuspid Valve: The tricuspid valve is repaired. The MG was 4 mmHg at PHR 85; TVA 2.57    IVC/SVC: Normal venous pressure at 3 mmHg.      MINERVA (4/18/2024)    Left Atrium: Left atrium is dilated. The left atrial appendage has a windsock morphology. Appendage velocity is reduced at less than 40 cm/sec. There is no thrombus in the left atrial appendage. Moderate SEC without any thrombus seen in LOBO. contrast was used.    Left Ventricle: There is normal systolic function with a visually estimated ejection fraction of 55 - 60%.    Right Ventricle: Pacemaker lead present in the ventricle.    Right Atrium: Right atrium is dilated. Lead present in the right atrium.    Mitral Valve: There is a mechanical valve in the mitral position. It is reported to be a 25 mm carbomedics valve. There is mild regurgitation. M gradient not obstained.    Tricuspid Valve: The tricuspid valve is repaired by annular ring(26 Medtronic band). Mean gradient is 2 mmHg at HR of 99bpm. .    Aorta: Aortic root is normal in size measuring 2.8 cm. Ascending aorta is normal measuring 2.4 cm.     1)  Mitral valve replacement with a 25 mm Carbomedics standard  2)  Tricuspid valve repair with a 26 mm Medtronic Contour annuloplasty band   3)  Reoperation

## 2024-05-02 NOTE — PROGRESS NOTES
5/02/24 Patient called to report she has been in the hospital last couple of days due to starting Tikosyn and needing monitoring, next INR is due 5/03, being discharged today and inquiring if she still needs to keep that appointment

## 2024-05-02 NOTE — ASSESSMENT & PLAN NOTE
Hx of afib/flutter, admitted for initiation of Tikosyn for rhythm management. Initial QTc 445. Tx started at 500mcg BID. Qtc increased to 500 after initial dose. Dose decreased w/ stabilization of Qtc.   - EP following; appreciate assistance   - Continue Tikosyn 250mcg BID   - Okay to continue metoprolol per EP  - EKG Q12H (2-3hrs after each dose of Tikosyn)   - Telemetry

## 2024-05-02 NOTE — PLAN OF CARE
05/02/24 1252   Final Note   Assessment Type Final Discharge Note   Anticipated Discharge Disposition Home   What phone number can be called within the next 1-3 days to see how you are doing after discharge? [phone]   Hospital Resources/Appts/Education Provided Provided patient/caregiver with written discharge plan information;Provided education on problems/symptoms using teachback   Post-Acute Status   Discharge Delays None known at this time     Patient discharging home / self care , no needs . Will follow up with EP and they set up their own appointments. Left unit in wheelchair with all belongings. Family at side. Alert.     Garret Hairston RN    [phone]    Future Appointments   Date Time Provider Department Center   5/3/2024  7:40 AM COORDINATED DEVICE CHECK [facility] KAREN [facility]   5/3/2024  8:20 AM CRISTI Moore MD [facility] SANDY [facility]   8/19/2024  3:00 PM Lacy Glez, ELISE [facility] OPTICLDIAZ Douglas sugar        Please refer back to clinic staff for prior authorization. CPAP machines are considered DME and will be processed through patient's medical benefits.  Central Prior Authorization team initiates prior authorizations for medications that are dispensed from a retail pharmacy and self administered at home.

## 2024-05-02 NOTE — PROGRESS NOTES
[facility] - Cardiology [facility] Medicine  Progress Note    Patient Name: King Botello  MRN: [id number]  Patient Class: IP- Inpatient   Admission Date: 4/29/2024  Length of Stay: 2 days  Attending Physician: Phyllis Coffey MD  Primary Care Provider: Mayco Hansen MD    Subjective:     Principal Problem: Visit for monitoring Tikosyn therapy    HPI:  King Botello is a 38 y.o. female with a PMHx of obesity, HTN, mitral valve disease now s/p MVR with a 25mm Carbomedics standard mechanical prosthesis plus TVr in early January complicated by complete heart block requiring PPM 1/17/2024, and a fib/flutter who presents as a direct admit for initiation of Tikosyn therapy. The patient reports intermittent palpitations associated with going in and out of a fib/flutter along with fatigue. She denies any chest pain, SOB, BLE edema, lightheadedness/dizziness, syncope, cough, abdominal pain, N/V/D, or dysuria.     Overview/Hospital Course:  Initiated on tikosyn with dose reduction after subsequent increase in QTc.    Interval History: Tikosyn dose decreased yesterday to 250mg. NAEON. Remains HDS. Qtc 486 this AM (on admission (445 on admit, peaked at 500 after initial tikosyn dose). Pt feeling well & remains asymptomatic this AM.    Review of Systems   Constitutional:  Negative for appetite change, chills, diaphoresis, fatigue and fever.   HENT:  Negative for congestion, rhinorrhea and sore throat.    Eyes:  Negative for photophobia and visual disturbance.   Respiratory:  Negative for cough, shortness of breath and wheezing.    Cardiovascular:  Negative for chest pain, palpitations and leg swelling.   Gastrointestinal:  Negative for abdominal distention, abdominal pain, diarrhea, nausea and vomiting.   Genitourinary:  Negative for dysuria, frequency and hematuria.   Musculoskeletal:  Negative for gait problem and myalgias.   Skin:  Negative for color change, pallor, rash and wound.   Neurological:   Negative for dizziness, syncope, weakness, light-headedness and headaches.   Psychiatric/Behavioral:  Negative for confusion and hallucinations. The patient is not nervous/anxious.        Objective:     Vital Signs (Most Recent):  Temp: 97.7 °F (36.5 °C) (05/01/24 1501)  Pulse: 86 (05/01/24 1501)  Resp: 18 (05/01/24 1501)  BP: 106/73 (05/01/24 1501)  SpO2: 97 % (05/01/24 1501) Vital Signs (24h Range):  Temp:  [97.6 °F (36.4 °C)-98.1 °F (36.7 °C)] 97.7 °F (36.5 °C)  Pulse:  [71-87] 86  Resp:  [18-19] 18  SpO2:  [95 %-99 %] 97 %  BP: (103-125)/(62-73) 106/73     Weight: 79.4 kg (175 lb 0.7 oz)  Body mass index is 33.07 kg/m².  No intake or output data in the 24 hours ending 05/01/24 2000        Physical Exam  Constitutional:       General: She is not in acute distress.     Appearance: She is not toxic-appearing or diaphoretic.   HENT:      Head: Normocephalic and atraumatic.   Eyes:      Conjunctiva/sclera: Conjunctivae normal.   Cardiovascular:      Rate and Rhythm: Normal rate and regular rhythm.      Pulses: Normal pulses.      Comments: Mechanical valve click noted  Pulmonary:      Effort: Pulmonary effort is normal. No respiratory distress.      Breath sounds: Normal breath sounds. No rales.   Abdominal:      General: Bowel sounds are normal. There is no distension.      Palpations: Abdomen is soft.      Tenderness: There is no abdominal tenderness. There is no guarding.   Musculoskeletal:      Right lower leg: No edema.      Left lower leg: No edema.   Skin:     General: Skin is warm and dry.   Neurological:      General: No focal deficit present.      Mental Status: She is alert and oriented to person, place, and time. Mental status is at baseline.   Psychiatric:         Mood and Affect: Mood normal.         Behavior: Behavior normal.           Significant Labs: All pertinent labs within the past 24 hours have been reviewed.    Significant Imaging: I have reviewed all pertinent imaging results/findings within  the past 24 hours.    Assessment/Plan:      * Visit for monitoring Tikosyn therapy  Hx of afib/flutter, admitted for initiation of Tikosyn for rhythm management. Initial QTc 445. Tx started at 500mcg BID. Qtc increased to 500 after initial dose. Dose decreased w/ stabilization of Qtc.   - EP following; appreciate assistance   - Continue Tikosyn 250mcg BID   - Okay to continue metoprolol per EP  - EKG Q12H (2-3hrs after each dose of Tikosyn)   - Telemetry     Atrial fibrillation and flutter  Patient with Paroxysmal (<7 days) atrial fibrillation which is controlled currently with Beta Blocker. Patient is currently in sinus rhythm.[id number] Score: 1. Anticoagulation indicated. Anticoagulation done with coumadin .    CHB (complete heart block)  Presence of cardiac pacemaker   Hx of complete heart block requiring PPM 1/17/2024   -No acute issues.  -Next device check scheduled at the end of this week per pt report.    S/P MVR (mitral valve replacement)  Long term (current) use of anticoagulants   - Warfarin dosing per pharmacy; appreciate assistance  - PT/INR daily, goal 2.5-3.5    Chronic diastolic heart failure  Patient is identified as having Diastolic (HFpEF) heart failure that is Chronic. CHF is currently controlled. Latest ECHO performed and demonstrates- Results for orders placed during the hospital encounter of 02/26/24    Echo    Interpretation Summary    Left Ventricle: The left ventricle is normal in size. Normal wall thickness. Normal wall motion. There is normal systolic function with a visually estimated ejection fraction of 55 - 70%. Ejection fraction by visual approximation is 63%. Diastolic function cannot be reliably determined in the presence of mitral valve disease.    Right Ventricle: Normal right ventricular cavity size. Wall thickness is normal. Right ventricle wall motion  is normal. Systolic function is normal. Pacemaker lead present in the ventricle.    Right Atrium: Right atrium is mildly  "dilated. Lead present in the right atrium.    Mitral Valve: There is a mechanical valve in the mitral position. There is normal leaflet mobility. The mean pressure gradient across the mitral valve is 5 mmHg at a heart rate of 85  bpm; MVA 3. There is no significant regurgitation.    Tricuspid Valve: The tricuspid valve is repaired. The MG was 4 mmHg at PHR 85; TVA 2.57    IVC/SVC: Normal venous pressure at 3 mmHg.  . Continue Beta Blocker and monitor clinical status closely. Monitor on telemetry. Patient is off CHF pathway.  Monitor strict Is&Os and daily weights.  Place on fluid restriction of 1.5 L. Continue to stress to patient importance of self efficacy and  on diet for CHF. Last BNP reviewed- and noted below No results for input(s): "BNP", "BNPTRIAGEBLO" in the last 168 hours..    Hypertension, essential  Chronic, controlled. Latest blood pressure and vitals reviewed-     Temp:  [98.4 °F (36.9 °C)]   Pulse:  []   Resp:  [18]   BP: (168)/(89)   SpO2:  [99 %] .   Home meds for hypertension were reviewed and noted below.   Hypertension Medications               metoprolol tartrate (LOPRESSOR) 25 MG tablet Take 1 tablet (25 mg total) by mouth 2 (two) times daily.            While in the hospital, will manage blood pressure as follows; Continue home antihypertensive regimen    Will utilize p.r.n. blood pressure medication only if patient's blood pressure greater than 180/110 and she develops symptoms such as worsening chest pain or shortness of breath.    Class 2 obesity due to excess calories with body mass index (BMI) of 35.0 to 35.9 in adult  Body mass index is 33.07 kg/m². Obesity complicates all aspects of disease management from diagnostic modalities to treatment.      VTE Risk Mitigation (From admission, onward)           Ordered     warfarin (COUMADIN) tablet 8 mg  Once per day on Sunday Monday Wednesday Friday        Placed in "And" Linked Group    04/30/24 0937     warfarin (COUMADIN) tablet " "12 mg  Once per day on Tuesday Thursday Saturday        Placed in "And" Linked Group    04/30/24 0937     IP VTE HIGH RISK PATIENT  Once         04/29/24 1847     Place sequential compression device  Until discontinued         04/29/24 1847                    Discharge Planning   ROBERT: 5/2/2024     Code Status: Full Code   Is the patient medically ready for discharge?:     Reason for patient still in hospital (select all that apply): Patient trending condition and Treatment  Discharge Plan A: Home with family            Phyllis Coffey MD  Department of Hospital Medicine   [facility] - Cardiology Stepdown    "

## 2024-05-03 ENCOUNTER — PATIENT MESSAGE (OUTPATIENT)
Dept: ELECTROPHYSIOLOGY | Facility: CLINIC | Age: 39
End: 2024-05-03
Payer: COMMERCIAL

## 2024-05-03 ENCOUNTER — PATIENT MESSAGE (OUTPATIENT)
Dept: ELECTROPHYSIOLOGY | Facility: CLINIC | Age: 39
End: 2024-05-03

## 2024-05-03 NOTE — TELEPHONE ENCOUNTER
After reviewing transmission it appears the presenting EGM reflects AFL-     ChaRogerio Mirian has discussed this with Dr Moore and she would like patient to send another transmission on Monday- if still in AFL we will let Dr Moore know and they will discuss DCCV then. The hopes is that she converts on her own before then.    Mau- can you look for transmission from patient on Monday and assess presenting ECG- notify Dr Moore if still in AF/AFL      I will call the patient and let her know the plan!    Thanks!

## 2024-05-06 ENCOUNTER — TELEPHONE (OUTPATIENT)
Dept: ELECTROPHYSIOLOGY | Facility: CLINIC | Age: 39
End: 2024-05-06
Payer: COMMERCIAL

## 2024-05-06 ENCOUNTER — PATIENT OUTREACH (OUTPATIENT)
Dept: ADMINISTRATIVE | Facility: CLINIC | Age: 39
End: 2024-05-06
Payer: COMMERCIAL

## 2024-05-06 NOTE — TELEPHONE ENCOUNTER
Spoke with patient regarding today's transmissions that Dr Moore has reviewed and she remains in NSR. Periods of increased HR may continue and are probably AFlutter. If the episodes become more frequent and cause symptoms to increase I have asked that she send a transmission and call the clinic. She voiced understanding. Cha Sow RN

## 2024-05-06 NOTE — TELEPHONE ENCOUNTER
Review of [facility] transmission from today at 6am shows a presenting rhythm of ASVS.  No AF recorded by device.  S/P DCCV 4/18/24.    1 month followup has NOT been scheduled yet.

## 2024-05-06 NOTE — PROGRESS NOTES
C3 nurse spoke with King Botello for a TCC post hospital discharge follow up call. Nurse offered to scheduled TCC hospital follow-up appointment. The patient declined appointment at this time stating she will follow up with Dr Garcia (cardiology)

## 2024-05-07 ENCOUNTER — LAB VISIT (OUTPATIENT)
Dept: LAB | Facility: HOSPITAL | Age: 39
End: 2024-05-07
Attending: INTERNAL MEDICINE
Payer: COMMERCIAL

## 2024-05-07 ENCOUNTER — ANTI-COAG VISIT (OUTPATIENT)
Dept: CARDIOLOGY | Facility: CLINIC | Age: 39
End: 2024-05-07
Payer: COMMERCIAL

## 2024-05-07 DIAGNOSIS — Z95.2 S/P MVR (MITRAL VALVE REPLACEMENT): ICD-10-CM

## 2024-05-07 DIAGNOSIS — Z79.01 LONG TERM (CURRENT) USE OF ANTICOAGULANTS: ICD-10-CM

## 2024-05-07 DIAGNOSIS — Z79.01 LONG TERM (CURRENT) USE OF ANTICOAGULANTS: Primary | ICD-10-CM

## 2024-05-07 DIAGNOSIS — I48.92 ATRIAL FIBRILLATION AND FLUTTER: ICD-10-CM

## 2024-05-07 DIAGNOSIS — I48.91 ATRIAL FIBRILLATION AND FLUTTER: ICD-10-CM

## 2024-05-07 LAB
INR PPP: 2.5 (ref 0.8–1.2)
PROTHROMBIN TIME: 26 SEC (ref 9–12.5)

## 2024-05-07 PROCEDURE — 36415 COLL VENOUS BLD VENIPUNCTURE: CPT | Performed by: INTERNAL MEDICINE

## 2024-05-07 PROCEDURE — 93793 ANTICOAG MGMT PT WARFARIN: CPT | Mod: S$GLB,,,

## 2024-05-07 PROCEDURE — 85610 PROTHROMBIN TIME: CPT | Performed by: INTERNAL MEDICINE

## 2024-05-07 NOTE — PROGRESS NOTES
[facility]    05/07/2024 11:37 AM    King Botello (39 y.o.) is followed by the [employer] [facility].      Assessment/Plan:    King Botello presents today with therapeutic INR. Goal INR 2.5-3.5    Assessment of patient findings per MA/LPN and chart review:   The following significant findings were found:  None    Recommendation for patient's warfarin regimen:   No change was made to warfarin therapy during this visit and patient has been instructed to continue their current warfarin regimen.    Recommended repeat INR in 1 week      Genna Omer, PharmD   Clinical Pharmacist - Coumadin Clinic

## 2024-05-09 ENCOUNTER — PATIENT MESSAGE (OUTPATIENT)
Dept: ELECTROPHYSIOLOGY | Facility: CLINIC | Age: 39
End: 2024-05-09
Payer: COMMERCIAL

## 2024-05-09 ENCOUNTER — OUTPATIENT CASE MANAGEMENT (OUTPATIENT)
Dept: ADMINISTRATIVE | Facility: OTHER | Age: 39
End: 2024-05-09

## 2024-05-13 ENCOUNTER — OUTPATIENT CASE MANAGEMENT (OUTPATIENT)
Dept: ADMINISTRATIVE | Facility: OTHER | Age: 39
End: 2024-05-13
Payer: COMMERCIAL

## 2024-05-14 ENCOUNTER — ANTI-COAG VISIT (OUTPATIENT)
Dept: CARDIOLOGY | Facility: CLINIC | Age: 39
End: 2024-05-14
Payer: COMMERCIAL

## 2024-05-14 ENCOUNTER — LAB VISIT (OUTPATIENT)
Dept: LAB | Facility: HOSPITAL | Age: 39
End: 2024-05-14
Attending: INTERNAL MEDICINE
Payer: COMMERCIAL

## 2024-05-14 DIAGNOSIS — Z95.2 S/P MVR (MITRAL VALVE REPLACEMENT): ICD-10-CM

## 2024-05-14 DIAGNOSIS — I48.92 ATRIAL FIBRILLATION AND FLUTTER: ICD-10-CM

## 2024-05-14 DIAGNOSIS — Z79.01 LONG TERM (CURRENT) USE OF ANTICOAGULANTS: Primary | ICD-10-CM

## 2024-05-14 DIAGNOSIS — I48.91 ATRIAL FIBRILLATION AND FLUTTER: ICD-10-CM

## 2024-05-14 DIAGNOSIS — Z79.01 LONG TERM (CURRENT) USE OF ANTICOAGULANTS: ICD-10-CM

## 2024-05-14 LAB
INR PPP: 2.8 (ref 0.8–1.2)
PROTHROMBIN TIME: 28.9 SEC (ref 9–12.5)

## 2024-05-14 PROCEDURE — 85610 PROTHROMBIN TIME: CPT | Performed by: INTERNAL MEDICINE

## 2024-05-14 PROCEDURE — 36415 COLL VENOUS BLD VENIPUNCTURE: CPT | Performed by: INTERNAL MEDICINE

## 2024-05-14 PROCEDURE — 93793 ANTICOAG MGMT PT WARFARIN: CPT | Mod: S$GLB,,,

## 2024-05-14 NOTE — PROGRESS NOTES
Pt consented to be processed for INR meter through home monitoring company. I instructed her on meter set up, meter protocol in Coumadin Clinic, must continue going to lab appointments until trained by home monitoring company on meter, call Coumadin Clinic with meter training date ( only train Monday- Thursday), and difference in cost/test frequency related to using INR meter versus going to lab appointments for INR results which she verbalized understanding. She verified insurance and address for meter and supplies to be shipped to by home monitoring company.

## 2024-05-14 NOTE — PROGRESS NOTES
[facility]    05/14/2024 12:12 PM    King Botello (39 y.o.) is followed by the [employer] [facility].      Assessment/Plan:    King Botello presents today with therapeutic INR. Goal INR 2.5-3.5    Assessment of patient findings per MA/LPN and chart review:   The following significant findings were found:  None    Recommendation for patient's warfarin regimen:   No change was made to warfarin therapy during this visit and patient has been instructed to continue their current warfarin regimen.    Recommended repeat INR in 2 weeks      Laine Glynn, PharmD, BCPS  Clinical Pharmacist - Coumadin Clinic  Preferred Contact: Secure Messaging or In Basket Message

## 2024-05-16 ENCOUNTER — PATIENT MESSAGE (OUTPATIENT)
Dept: ELECTROPHYSIOLOGY | Facility: CLINIC | Age: 39
End: 2024-05-16
Payer: COMMERCIAL

## 2024-05-17 ENCOUNTER — HOSPITAL ENCOUNTER (OUTPATIENT)
Facility: HOSPITAL | Age: 39
Discharge: HOME | End: 2024-05-17
Attending: STUDENT IN AN ORGANIZED HEALTH CARE EDUCATION/TRAINING PROGRAM | Admitting: STUDENT IN AN ORGANIZED HEALTH CARE EDUCATION/TRAINING PROGRAM
Payer: COMMERCIAL

## 2024-05-17 ENCOUNTER — PATIENT MESSAGE (OUTPATIENT)
Dept: ELECTROPHYSIOLOGY | Facility: CLINIC | Age: 39
End: 2024-05-17
Payer: COMMERCIAL

## 2024-05-17 ENCOUNTER — CLINICAL SUPPORT (OUTPATIENT)
Dept: CARDIOLOGY | Facility: HOSPITAL | Age: 39
End: 2024-05-17
Attending: STUDENT IN AN ORGANIZED HEALTH CARE EDUCATION/TRAINING PROGRAM
Payer: COMMERCIAL

## 2024-05-17 ENCOUNTER — TELEPHONE (OUTPATIENT)
Dept: ELECTROPHYSIOLOGY | Facility: CLINIC | Age: 39
End: 2024-05-17
Payer: COMMERCIAL

## 2024-05-17 ENCOUNTER — ANESTHESIA EVENT (OUTPATIENT)
Dept: MEDSURG UNIT | Facility: HOSPITAL | Age: 39
End: 2024-05-17
Payer: COMMERCIAL

## 2024-05-17 ENCOUNTER — ANESTHESIA (OUTPATIENT)
Dept: MEDSURG UNIT | Facility: HOSPITAL | Age: 39
End: 2024-05-17
Payer: COMMERCIAL

## 2024-05-17 VITALS
DIASTOLIC BLOOD PRESSURE: 61 MMHG | WEIGHT: 174 LBS | BODY MASS INDEX: 32.85 KG/M2 | RESPIRATION RATE: 18 BRPM | SYSTOLIC BLOOD PRESSURE: 101 MMHG | OXYGEN SATURATION: 98 % | TEMPERATURE: 98 F | HEIGHT: 61 IN | HEART RATE: 87 BPM

## 2024-05-17 DIAGNOSIS — I48.91 ATRIAL FIBRILLATION AND FLUTTER: Primary | ICD-10-CM

## 2024-05-17 DIAGNOSIS — I48.92 ATRIAL FIBRILLATION AND FLUTTER: ICD-10-CM

## 2024-05-17 DIAGNOSIS — I48.92 ATRIAL FIBRILLATION AND FLUTTER: Primary | ICD-10-CM

## 2024-05-17 DIAGNOSIS — I44.2 CHB (COMPLETE HEART BLOCK): ICD-10-CM

## 2024-05-17 DIAGNOSIS — I48.91 ATRIAL FIBRILLATION AND FLUTTER: ICD-10-CM

## 2024-05-17 DIAGNOSIS — Z79.01 LONG TERM (CURRENT) USE OF ANTICOAGULANTS: Primary | ICD-10-CM

## 2024-05-17 DIAGNOSIS — Z95.0 CARDIAC PACEMAKER IN SITU: ICD-10-CM

## 2024-05-17 DIAGNOSIS — I49.9 ARRHYTHMIA: ICD-10-CM

## 2024-05-17 DIAGNOSIS — I48.91 ATRIAL FIBRILLATION WITH RVR: ICD-10-CM

## 2024-05-17 DIAGNOSIS — I48.92 ATRIAL FLUTTER WITH RAPID VENTRICULAR RESPONSE: ICD-10-CM

## 2024-05-17 LAB
B-HCG UR QL: NEGATIVE
CTP QC/QA: YES
OHS QRS DURATION: 78 MS
OHS QRS DURATION: 78 MS
OHS QTC CALCULATION: 448 MS
OHS QTC CALCULATION: 469 MS

## 2024-05-17 PROCEDURE — 93010 ELECTROCARDIOGRAM REPORT: CPT | Mod: ,,, | Performed by: INTERNAL MEDICINE

## 2024-05-17 PROCEDURE — 81025 URINE PREGNANCY TEST: CPT | Performed by: STUDENT IN AN ORGANIZED HEALTH CARE EDUCATION/TRAINING PROGRAM

## 2024-05-17 PROCEDURE — 93280 PM DEVICE PROGR EVAL DUAL: CPT

## 2024-05-17 PROCEDURE — 93005 ELECTROCARDIOGRAM TRACING: CPT

## 2024-05-17 RX ORDER — ONDANSETRON HYDROCHLORIDE 2 MG/ML
4 INJECTION, SOLUTION INTRAVENOUS ONCE AS NEEDED
Status: CANCELLED | OUTPATIENT
Start: 2024-05-17 | End: 2035-10-14

## 2024-05-17 RX ORDER — DIPHENHYDRAMINE HYDROCHLORIDE 50 MG/ML
25 INJECTION INTRAMUSCULAR; INTRAVENOUS EVERY 6 HOURS PRN
Status: CANCELLED | OUTPATIENT
Start: 2024-05-17

## 2024-05-17 RX ORDER — FENTANYL CITRATE 50 UG/ML
25 INJECTION, SOLUTION INTRAMUSCULAR; INTRAVENOUS EVERY 5 MIN PRN
Status: CANCELLED | OUTPATIENT
Start: 2024-05-17

## 2024-05-17 RX ORDER — HYDROMORPHONE HYDROCHLORIDE 1 MG/ML
0.2 INJECTION, SOLUTION INTRAMUSCULAR; INTRAVENOUS; SUBCUTANEOUS EVERY 5 MIN PRN
Status: CANCELLED | OUTPATIENT
Start: 2024-05-17

## 2024-05-17 NOTE — TELEPHONE ENCOUNTER
Spoke to patient    CONFIRMED procedure arrival time of 1:00 PM on 5/17/24 for 1 PM procedure    Reiterated instructions including:  -Directions to check in desk  -NPO after midnight night prior to procedure  -High importance of HOLDING - no meds to hold  -Confirmed compliance of maintaining Coumadin as previously prescribed  -Pre-procedure LABS were reviewed and no alerts noted  -Confirmed  presence of implanted device/stimulator MDT D-C PPM  -Confirmed no fever, cough, or shortness of breath in the past 30 days  -Do not wear mascara day of procedure  --Reviewed current visitor policy    Patient verbalized understanding of above and appreciated the call.

## 2024-05-17 NOTE — DISCHARGE SUMMARY
[facility] - Short Stay Cardiac Unit  Cardiology  Discharge Summary      Patient Name: King Botello  MRN: [id number]  Admission Date: 5/17/2024  Hospital Length of Stay: 0 days  Discharge Date and Time:  05/17/2024 2:46 PM  Attending Physician: CRISTI Moore MD    Discharging Provider: Rebecca Michaels PA-C  Primary Care Physician: Mayco Hansen MD    HPI:   King Botello is a 38 y.o. female with a PMHx of obesity, HTN, mitral valve disease now s/p MVR with a 25mm Carbomedics standard mechanical prosthesis plus TVr in early January complicated by complete heart block requiring PPM 1/17/2024, and a fib/flutter who presented to [facility] on 04/29 as a direct admit for initiation of Tikosyn therapy with successful completion of Tikosyn loading with stable QT/Qtc parameters. Patient noted elevated HR yesterday with remote transmission that noted patient to be in AFL with some SVT episodes in the 150's.     Procedure(s) (LRB):  Cardioversion or Defibrillation (N/A)     Indwelling Lines/Drains at time of discharge:  Lines/Drains/Airways       None     Hospital Course:  Patient presented for MINERVA/DCCV. Patient spontaneously converted to sinus rhythm. MINERVA/DCCV aborted. Post-DCCV ECG revealed normal sinus rhythm at 87 bpm. Plan to continue all home medications including ASA 81 mg daily, Tikosyn 250 mg BID, metoprolol 25 mg BID., coumadin 4 mg. Instructed to send in remote transmission if she feels like she is out of rhythm. Return for clinic follow up with Dr. Moore as scheduled.   Patient was assessed at bedside prior to discharge, they reported feeling well and denied chest discomfort, shortness of breath, palpitations, lightheadedness, or any other acute symptoms. Discharge instructions were discussed with patient and all questions were answered. Patient was discharged home in stable condition. '    Goals of Care Treatment Preferences:  Code Status: Full Code      Significant Diagnostic Studies: Cardiac  Graphics: ECG: Normal sinus rhythm 87 bpm    Pending Diagnostic Studies:       None            There are no hospital problems to display for this patient.    No new Assessment & Plan notes have been filed under this hospital service since the last note was generated.  Service: Cardiology      Discharged Condition: stable    Disposition: Home or Self Care    Follow Up:   Follow-up Information       CRISTI Moore MD. Go on 7/22/2024.    Specialties: Electrophysiology, Cardiology  Contact information:  [address]  [facility] [address]  [phone]                           Patient Instructions:   No discharge procedures on file.  Medications:  Reconciled Home Medications:      Medication List        CONTINUE taking these medications      aspirin 81 MG EC tablet  Commonly known as: ECOTRIN  Take 1 tablet (81 mg total) by mouth once daily.     dofetilide 250 MCG Cap  Commonly known as: TIKOSYN  Take 1 capsule (250 mcg total) by mouth every 12 (twelve) hours.     metoprolol tartrate 25 MG tablet  Commonly known as: LOPRESSOR  Take 1 tablet (25 mg total) by mouth 2 (two) times daily.     warfarin 4 MG tablet  Commonly known as: COUMADIN  Take 3 tablets (12 mg total) by mouth every Tuesday, Thursday, Saturday, Sunday AND 2 tablets (8 mg total) every Mon, Wed, Fri.              Time spent on the discharge of patient: 35 minutes    Rebecca Michaels PA-C  Cardiology  [facility] - Cardiology

## 2024-05-17 NOTE — DISCHARGE INSTRUCTIONS
Medications:  -Continue to take your home medications as listed on your medication list after you are discharged.    New Medications:  -None     Follow up:  -Dr. Moore as scheduled     If you have a pacemaker, defibrillator or loop recorder that is monitored remotely by the [facility], you may be eligible to send in a remote transmission on the day of your EKG appointment in lieu of the EKG. You will be informed at discharge if this is an option. If you are instructed to send in a remote transmission, please send a manual transmission from your home monitor one week after your procedure and then call the clinic at [phone], option 4, to confirm that your remote transmission was received.    Any need to reschedule or cancel procedures, or any questions regarding your procedures should be addressed directly with the Arrhythmia Department Nurses at the following phone number: [phone].

## 2024-05-17 NOTE — ANESTHESIA PREPROCEDURE EVALUATION
05/17/2024  King Botello is a 39 y.o., female.      Pre-op Assessment    I have reviewed the Patient Summary Reports.       I have reviewed the Medications.     Review of Systems  Anesthesia Hx:  No problems with previous Anesthesia               Denies Personal Hx of Anesthesia complications.                    Cardiovascular:     Hypertension    Dysrhythmias                              Hypertension     Disorder of Cardiac Rhythm, Atrial Fibrillation         Physical Exam    Airway:  No airway management difficulties anticipated  Dental:  No active dental issues noted  Chest/Lungs:  Clear to auscultation    Heart:  Rate: Normal  Rhythm: Regular Rhythm  Sounds: Normal        Anesthesia Plan  Type of Anesthesia, risks & benefits discussed:    Anesthesia Type: Gen Natural Airway  Informed Consent: Informed consent signed with the Patient and all parties understand the risks and agree with anesthesia plan.  All questions answered.   ASA Score: 3  Anesthesia Plan Notes: Chart reviewed. Patient seen and examined. One waffle at 8 am. 6 hour fast is appropriate. Anesthesia plan discussed and questions answered. E-consent signed. Omari Matamoros MD    Ready For Surgery From Anesthesia Perspective.     .

## 2024-05-17 NOTE — H&P
[facility]  Cardiology  MINERVA/DCCV History & Physical      King Botello  YOB: 1985  Medical Record Number:  [id number]  Attending Physician:  CRISTI Moore MD   Date of Admission: 5/17/2024       Hospital Day:  0  Current Principal Problem:  Atrial flutter    Patient information was obtained from patient and past medical records.  History     Cc: DCCV for AFL    HPI  King Botello is a 38 y.o. female with a PMHx of obesity, HTN, mitral valve disease now s/p MVR with a 25mm Carbomedics standard mechanical prosthesis plus TVr in early January complicated by complete heart block requiring PPM 1/17/2024, and a fib/flutter who [place] [facility] [place] a direct admit for initiation of Tikosyn therapy with successful completion of Tikosyn loading with stable QT/Qtc parameters. Patient noted elevated HR yesterday with remote transmission that noted patient to be in AFL with some SVT episodes in the 150's.    Today, she presents for reattempt DCCV     Rate/rhythm control: metoprolol 25 mg BID/Tikosyn 250 mg BID  Anticoagulant/antiplatelets: ASA 81 mg/Coumadin 4 mg  ECG: Atrial flutter 103 bpm   Platelet count: 366  INR: 2.6    History of stroke:  no  Dysphagia or odynophagia:  no  Liver Disease, esophageal disease, or known varices:  no  Upper GI Bleeding:  no  Snoring:  no   Sleep Apnea:  no  Prior neck surgery or radiation:  no  History of anesthetic difficulties:  no  Family history of anesthetic difficulties:  no  Last oral intake: this morning, anesthesia aware   Able to move neck in all directions:  yes  GLP-1 Use: no      Medications - Outpatient  Prior to Admission medications    Medication Sig Start Date End Date Taking? Authorizing Provider   aspirin (ECOTRIN) 81 MG EC tablet Take 1 tablet (81 mg total) by mouth once daily. 1/18/24 1/17/25  Radha Hussein PA-C   dofetilide (TIKOSYN) 250 MCG Cap Take 1 capsule (250 mcg total) by mouth every 12  (twelve) hours. 24  Phyllis Coffey MD   metoprolol tartrate (LOPRESSOR) 25 MG tablet Take 1 tablet (25 mg total) by mouth 2 (two) times daily. 24  Mallory Fulton NP   warfarin (COUMADIN) 4 MG tablet Take 3 tablets (12 mg total) by mouth every Tuesday, Thursday, Saturday,  AND 2 tablets (8 mg total) every Mon, Wed, Fri. 24   Suyapa Lu MD       Medications - Current  Scheduled Meds:  Continuous Infusions:  PRN Meds:.      Allergies  Review of patient's allergies indicates:   Allergen Reactions    Cinnamon Itching    Doxycycline      Abdomen pain severe       Past Medical History  Past Medical History:   Diagnosis Date    Abnormal Pap smear     Colposcopy    Atrial fibrillation and flutter 2024    Bradycardia 2020    Class 2 obesity due to excess calories with body mass index (BMI) of 35.0 to 35.9 in adult 3/9/2021    Hypertension, essential 3/16/2021    Menarche     Age of onset 11    Missed ab 2021    Mitral valve prolapse     Previous  delivery affecting pregnancy, antepartum 2020    S/P suction D&C 2021    Status post mitral valve annuloplasty 2017       Past Surgical History  Past Surgical History:   Procedure Laterality Date    A-V CARDIAC PACEMAKER INSERTION N/A 2024    Procedure: INSERTION, CARDIAC PACEMAKER, DUAL CHAMBER;  Surgeon: CRISTI Moore MD;  Location: [facility] EP LAB;  Service: Cardiology;  Laterality: N/A;  CHB, dPPM with LBBP, MDT, ANES, [place] [id number]     SECTION       SECTION WITH TUBAL LIGATION Bilateral 2023    Procedure:  SECTION, WITH TUBAL LIGATION;  Surgeon: Mane Moreno MD;  Location: [facility] L&D;  Service: OB/GYN;  Laterality: Bilateral;    DILATION AND CURETTAGE OF UTERUS USING SUCTION N/A 2021    Procedure: DILATION AND CURETTAGE, UTERUS, USING SUCTION;  Surgeon: Mane Moreno MD;  Location: [facility] OR;  Service: OB/GYN;  Laterality: N/A;     ECHOCARDIOGRAM,TRANSESOPHAGEAL N/A 4/18/2024    Procedure: Transesophageal echo (MINERVA) intra-procedure log documentation;  Surgeon: Page George MD;  Location: [facility] EP LAB;  Service: Cardiology;  Laterality: N/A;  AF, MINERVA/DCCV, MAC, EH, 3prep *MDT D-C PPM in situ*    MITRAL VALVE REPAIR      MITRAL VALVE REPLACEMENT N/A 1/8/2024    Procedure: MITRAL VALVE REPLACEMENT, REDO STERNOTOMY;  Surgeon: Thierno Liu MD;  Location: [facility] OR [address] [place] [address];  Service: Cardiothoracic;  Laterality: N/A;    TRANSESOPHAGEAL ECHOCARDIOGRAPHY N/A 1/17/2024    Procedure: ECHOCARDIOGRAM, TRANSESOPHAGEAL;  Surgeon: Jessee Ahumada MD;  Location: [facility] EP LAB;  Service: Cardiology;  Laterality: N/A;    TREATMENT OF CARDIAC ARRHYTHMIA N/A 1/17/2024    Procedure: Cardioversion or Defibrillation;  Surgeon: CRISTI Moore MD;  Location: [facility] EP LAB;  Service: Cardiology;  Laterality: N/A;    TREATMENT OF CARDIAC ARRHYTHMIA N/A 4/18/2024    Procedure: Cardioversion or Defibrillation;  Surgeon: CRISTI Moore MD;  Location: [facility] EP LAB;  Service: Cardiology;  Laterality: N/A;  AF, MINERVA/DCCV, MAC, EH, 3prep *MDT D-C PPM in situ*    VALVULOPLASTY, TRICUSPID N/A 1/8/2024    Procedure: VALVULOPLASTY, TRICUSPID REPAIR;  Surgeon: Thierno Liu MD;  Location: [facility] OR [address] [place] [address];  Service: Cardiothoracic;  Laterality: N/A;       Social History  Social History     Socioeconomic History    Marital status:    Occupational History    Occupation: xray tech   Tobacco Use    Smoking status: Never    Smokeless tobacco: Never   Substance and Sexual Activity    Alcohol use: Not Currently     Comment: not since +UPT    Drug use: No    Sexual activity: Yes     Partners: Male     Birth control/protection: None     Social Determinants of Health     Financial Resource Strain: Low Risk  (2/29/2024)    Overall Financial Resource Strain (CARDIA)     Difficulty of Paying Living Expenses: Not hard at all   Food Insecurity: No Food Insecurity  (2/29/2024)    Hunger Vital Sign     Worried About Running Out of Food in the Last Year: Never true     Ran Out of Food in the Last Year: Never true   Transportation Needs: No Transportation Needs (2/29/2024)    PRAPARE - Transportation     Lack of Transportation (Medical): No     Lack of Transportation (Non-Medical): No   Physical Activity: Unknown (2/29/2024)    Exercise Vital Sign     Days of Exercise per Week: Patient declined     Minutes of Exercise per Session: 0 min   Recent Concern: Physical Activity - Inactive (1/30/2024)    Exercise Vital Sign     Days of Exercise per Week: 0 days     Minutes of Exercise per Session: 0 min   Stress: Patient Declined (2/29/2024)    [employer] [employer] - Occupational Stress Questionnaire     Feeling of Stress : Patient declined   Recent Concern: Stress - Stress Concern Present (1/30/2024)    [employer] [place] [employer] - Occupational Stress Questionnaire     Feeling of Stress : Very much   Housing Stability: Low Risk  (2/29/2024)    Housing Stability Vital Sign     Unable to Pay for Housing in the Last Year: No     Number of Places Lived in the Last Year: 1     Unstable Housing in the Last Year: No       ROS  Review of Systems   Constitutional: Negative for chills.   HENT: Negative.     Eyes: Negative.    Cardiovascular:  Negative for chest pain, dyspnea on exertion and palpitations.   Respiratory: Negative.  Negative for shortness of breath and sleep disturbances due to breathing.    Endocrine: Negative.    Musculoskeletal: Negative.    Gastrointestinal:  Negative for hematemesis, melena, nausea and vomiting.   Genitourinary: Negative.    Neurological: Negative.    Psychiatric/Behavioral: Negative.  Negative for altered mental status.    Allergic/Immunologic: Negative.    Physical Examination     Vital Signs  24 Hour VS Range    Temp:  [98.6 °F (37 °C)]   Pulse:  [95]   Resp:  [17]   BP: (101-102)/(64-65)   SpO2:  [96 %]   No intake or  "output data in the 24 hours ending 05/17/24 1348      Physical Exam:   Physical Exam  Constitutional:       General: She is not in acute distress.     Appearance: Normal appearance. She is not ill-appearing or toxic-appearing.   HENT:      Head: Normocephalic and atraumatic.      Nose: Nose normal. No congestion or rhinorrhea.      Mouth/Throat:      Mouth: Mucous membranes are moist.      Pharynx: No oropharyngeal exudate or posterior oropharyngeal erythema.   Eyes:      General:         Right eye: No discharge.         Left eye: No discharge.      Extraocular Movements: Extraocular movements intact.      Conjunctiva/sclera: Conjunctivae normal.   Cardiovascular:      Rate and Rhythm: Rhythm irregular.   Pulmonary:      Effort: Pulmonary effort is normal. No respiratory distress.      Breath sounds: Normal breath sounds.   Abdominal:      General: Abdomen is flat.      Palpations: Abdomen is soft.   Musculoskeletal:         General: No swelling. Normal range of motion.      Cervical back: Normal range of motion. No rigidity or tenderness.      Right lower leg: No edema.      Left lower leg: No edema.   Skin:     General: Skin is warm and dry.      Coloration: Skin is not jaundiced.      Findings: No bruising.   Neurological:      General: No focal deficit present.      Mental Status: She is alert and oriented to person, place, and time. Mental status is at baseline.   Psychiatric:         Mood and Affect: Mood normal.         Behavior: Behavior normal.         Thought Content: Thought content normal.         Data       Recent Labs   Lab 05/17/24  1005   WBC 12.74*   HGB 12.3   HCT 40.9           Recent Labs   Lab 05/14/24  1025 05/17/24  1005   INR 2.8* 2.6*        Recent Labs   Lab 05/17/24  1005      K 4.5      CO2 23   BUN 8   CREATININE 0.7   ANIONGAP 10   CALCIUM 9.9        No results for input(s): "PROT", "ALBUMIN", "BILITOT", "ALKPHOS", "AST", "ALT" in the last 168 hours.     No results " "for input(s): "TROPONINI" in the last 168 hours.     BNP (pg/mL)   Date Value   08/14/2023 215 (H)   06/19/2023 221 (H)   05/24/2023 193 (H)   05/08/2023 338 (H)   05/04/2023 424 (H)       No results for input(s): "LABBLOO" in the last 168 hours.       Assessment & Plan     #Atrial flutter   -patient presents for reattempt DCCV   -on coumadin for CVA prophylaxis, INR 2.6 today     Dr. Moore Plan from 05/16/24:   Orders to adjust AT/AF detection rate from 171 --> the 140s.  Patient scheduled for 5/17/24 to make programming changes.     MINERVA 04/18/24    Left Atrium: Left atrium is dilated. The left atrial appendage has a windsock morphology. Appendage velocity is reduced at less than 40 cm/sec. There is no thrombus in the left atrial appendage. Moderate SEC without any thrombus seen in LOBO. contrast was used.    Left Ventricle: There is normal systolic function with a visually estimated ejection fraction of 55 - 60%.    Right Ventricle: Pacemaker lead present in the ventricle.    Right Atrium: Right atrium is dilated. Lead present in the right atrium.    Mitral Valve: There is a mechanical valve in the mitral position. It is reported to be a 25 mm carbomedics valve. There is mild regurgitation. M gradient not obstained.    Tricuspid Valve: The tricuspid valve is repaired by annular ring(26 Medtronic band). Mean gradient is 2 mmHg at HR of 99bpm. .    Aorta: Aortic root is normal in size measuring 2.8 cm. Ascending aorta is normal measuring 2.4 cm.     1)  Mitral valve replacement with a 25 mm Carbomedics standard  2)  Tricuspid valve repair with a 26 mm Medtronic Contour annuloplasty band   3)  Reoperation    TTE 02/26/24    Left Ventricle: The left ventricle is normal in size. Normal wall thickness. Normal wall motion. There is normal systolic function with a visually estimated ejection fraction of 55 - 70%. Ejection fraction by visual approximation is 63%. Diastolic function cannot be reliably determined in the " presence of mitral valve disease.    Right Ventricle: Normal right ventricular cavity size. Wall thickness is normal. Right ventricle wall motion  is normal. Systolic function is normal. Pacemaker lead present in the ventricle.    Right Atrium: Right atrium is mildly dilated. Lead present in the right atrium.    Mitral Valve: There is a mechanical valve in the mitral position. There is normal leaflet mobility. The mean pressure gradient across the mitral valve is 5 mmHg at a heart rate of 85  bpm; MVA 3. There is no significant regurgitation.    Tricuspid Valve: The tricuspid valve is repaired. The MG was 4 mmHg at PHR 85; TVA 2.57    IVC/SVC: Normal venous pressure at 3 mmHg.     -The risks of moderate sedation include hypotension, respiratory depression, arrhythmias, bronchospasm, & death.    -Prior to procedure, extensive discussion with patient regarding risks and benefits of DCCV at bedside today. The patient voices understanding, all questions have been answered, and patient would like to proceed.  -Informed consent was obtained. The patient is agreeable to proceed with the procedure and all questions and concerns addressed.    Case was discussed with an attending physician prior to procedure.    Rebecca Michaels PA-C  [facility]

## 2024-05-17 NOTE — PROGRESS NOTES
Patient discharged per MD order. Discharge instructions reviewed. Patient verbalizes understanding. Patient declined need for AVS. Patient ambulated off unit with family.

## 2024-05-17 NOTE — PROGRESS NOTES
Patient brought to procedure and reported to be found in normal rhythm. Procedure cancelled and patient returned to room.

## 2024-05-17 NOTE — HOSPITAL COURSE
Patient presented for MINERVA/DCCV. Patient spontaneously converted to sinus rhythm. MINERVA/DCCV aborted. Post-DCCV ECG revealed normal sinus rhythm at 87 bpm. Plan to continue all home medications including ASA 81 mg daily, Tikosyn 250 mg BID, metoprolol 25 mg BID., coumadin 4 mg. Instructed to send in remote transmission if she feels like she is out of rhythm. Return for clinic follow up with Dr. Moore as scheduled.   Patient was assessed at bedside prior to discharge, they reported feeling well and denied chest discomfort, shortness of breath, palpitations, lightheadedness, or any other acute symptoms. Discharge instructions were discussed with patient and all questions were answered. Patient was discharged home in stable condition. '

## 2024-05-18 LAB
OHS CV AF BURDEN PERCENT: < 1
OHS CV DC REMOTE DEVICE TYPE: NORMAL

## 2024-05-20 ENCOUNTER — PATIENT MESSAGE (OUTPATIENT)
Dept: ELECTROPHYSIOLOGY | Facility: CLINIC | Age: 39
End: 2024-05-20
Payer: COMMERCIAL

## 2024-05-20 ENCOUNTER — TELEPHONE (OUTPATIENT)
Dept: ELECTROPHYSIOLOGY | Facility: CLINIC | Age: 39
End: 2024-05-20
Payer: COMMERCIAL

## 2024-05-20 NOTE — TELEPHONE ENCOUNTER
LVM for patient to send a manual transmission from her cardiac device to see if AF/AFL episode is still in progress. Left clinic phone number.

## 2024-05-21 ENCOUNTER — PATIENT MESSAGE (OUTPATIENT)
Dept: ELECTROPHYSIOLOGY | Facility: CLINIC | Age: 39
End: 2024-05-21
Payer: COMMERCIAL

## 2024-05-22 ENCOUNTER — PATIENT MESSAGE (OUTPATIENT)
Dept: ELECTROPHYSIOLOGY | Facility: CLINIC | Age: 39
End: 2024-05-22
Payer: COMMERCIAL

## 2024-05-25 ENCOUNTER — PATIENT MESSAGE (OUTPATIENT)
Dept: ADMINISTRATIVE | Facility: OTHER | Age: 39
End: 2024-05-25
Payer: COMMERCIAL

## 2024-05-28 ENCOUNTER — LAB VISIT (OUTPATIENT)
Dept: LAB | Facility: HOSPITAL | Age: 39
End: 2024-05-28
Attending: INTERNAL MEDICINE
Payer: COMMERCIAL

## 2024-05-28 ENCOUNTER — ANTI-COAG VISIT (OUTPATIENT)
Dept: CARDIOLOGY | Facility: CLINIC | Age: 39
End: 2024-05-28
Payer: COMMERCIAL

## 2024-05-28 DIAGNOSIS — Z95.2 S/P MVR (MITRAL VALVE REPLACEMENT): ICD-10-CM

## 2024-05-28 DIAGNOSIS — Z79.01 LONG TERM (CURRENT) USE OF ANTICOAGULANTS: Primary | ICD-10-CM

## 2024-05-28 DIAGNOSIS — Z79.01 LONG TERM (CURRENT) USE OF ANTICOAGULANTS: ICD-10-CM

## 2024-05-28 DIAGNOSIS — I48.91 ATRIAL FIBRILLATION AND FLUTTER: ICD-10-CM

## 2024-05-28 DIAGNOSIS — I48.92 ATRIAL FIBRILLATION AND FLUTTER: ICD-10-CM

## 2024-05-28 LAB
INR PPP: 2.5 (ref 0.8–1.2)
PROTHROMBIN TIME: 25.6 SEC (ref 9–12.5)

## 2024-05-28 PROCEDURE — 85610 PROTHROMBIN TIME: CPT | Performed by: INTERNAL MEDICINE

## 2024-05-28 PROCEDURE — 36415 COLL VENOUS BLD VENIPUNCTURE: CPT | Performed by: INTERNAL MEDICINE

## 2024-05-28 PROCEDURE — 93793 ANTICOAG MGMT PT WARFARIN: CPT | Mod: S$GLB,,,

## 2024-05-28 NOTE — PROGRESS NOTES
[facility]    05/28/2024 10:50 AM    King Botello (39 y.o.) is followed by the [facility].      Assessment/Plan:    King Botello presents today with therapeutic INR. Goal INR 2.5-3.5    Assessment of patient findings per MA/LPN and chart review:   The following significant findings were found:  None    Recommendation for patient's warfarin regimen:   No change was made to warfarin therapy during this visit and patient has been instructed to continue their current warfarin regimen.    Recommended repeat INR in 3 weeks      Laine Glynn, PharmD, BCPS  Clinical Pharmacist - [facility] Clinic  Preferred Contact: Secure Messaging or In Basket Message

## 2024-05-29 RX ORDER — WARFARIN 4 MG/1
8-12 TABLET ORAL DAILY
Qty: 90 TABLET | Refills: 5 | Status: SHIPPED | OUTPATIENT
Start: 2024-05-29 | End: 2024-11-25

## 2024-05-31 ENCOUNTER — PATIENT MESSAGE (OUTPATIENT)
Dept: CARDIOLOGY | Facility: CLINIC | Age: 39
End: 2024-05-31
Payer: COMMERCIAL

## 2024-05-31 ENCOUNTER — ANTI-COAG VISIT (OUTPATIENT)
Dept: CARDIOLOGY | Facility: CLINIC | Age: 39
End: 2024-05-31
Payer: COMMERCIAL

## 2024-05-31 DIAGNOSIS — I48.92 ATRIAL FIBRILLATION AND FLUTTER: ICD-10-CM

## 2024-05-31 DIAGNOSIS — I48.91 ATRIAL FIBRILLATION AND FLUTTER: ICD-10-CM

## 2024-05-31 DIAGNOSIS — Z79.01 LONG TERM (CURRENT) USE OF ANTICOAGULANTS: Primary | ICD-10-CM

## 2024-05-31 DIAGNOSIS — Z95.2 S/P MVR (MITRAL VALVE REPLACEMENT): ICD-10-CM

## 2024-05-31 LAB — INR PPP: 3.2

## 2024-05-31 PROCEDURE — 93793 ANTICOAG MGMT PT WARFARIN: CPT | Mod: S$GLB,,,

## 2024-05-31 NOTE — PROGRESS NOTES
[facility]    05/31/2024    King Botello (39 y.o.) is followed by the [facility].      Assessment/Plan:    King Botello presents with a therapeutic INR. Goal INR: 2.5-3.5    Lab Results   Component Value Date    INR 3.2 05/31/2024    INR 2.5 (H) 05/28/2024    INR 2.6 (H) 05/17/2024       Assessment of patient findings per MA/LPN and chart review:   The following significant findings were found:   None     Recommendation for patient's warfarin regimen:   No change was made to warfarin therapy during this visit and patient has been instructed to continue their current warfarin regimen.    Recommended repeat INR in 1 week      Pat FontenotD, BCPS  Clinical Pharmacist - [facility]  Preferred Contact: Secure Messaging or In Basket Message

## 2024-06-05 ENCOUNTER — PATIENT MESSAGE (OUTPATIENT)
Dept: ELECTROPHYSIOLOGY | Facility: CLINIC | Age: 39
End: 2024-06-05
Payer: COMMERCIAL

## 2024-06-06 ENCOUNTER — ANTI-COAG VISIT (OUTPATIENT)
Dept: CARDIOLOGY | Facility: CLINIC | Age: 39
End: 2024-06-06
Payer: COMMERCIAL

## 2024-06-06 DIAGNOSIS — I48.91 ATRIAL FIBRILLATION AND FLUTTER: ICD-10-CM

## 2024-06-06 DIAGNOSIS — Z95.2 S/P MVR (MITRAL VALVE REPLACEMENT): ICD-10-CM

## 2024-06-06 DIAGNOSIS — I48.92 ATRIAL FIBRILLATION AND FLUTTER: ICD-10-CM

## 2024-06-06 DIAGNOSIS — Z79.01 LONG TERM (CURRENT) USE OF ANTICOAGULANTS: Primary | ICD-10-CM

## 2024-06-06 LAB — INR PPP: 3.2

## 2024-06-06 PROCEDURE — 93793 ANTICOAG MGMT PT WARFARIN: CPT | Mod: S$GLB,,,

## 2024-06-06 NOTE — TELEPHONE ENCOUNTER
Reviewed transmission from yesterday  and  today.  Called and informed patient she is in a NSR.  Pt appreciated the call.  Presenting rhythm shows As/Vs.  Pt with known AT/AFL.  Pt taking Tilosyn and Coumadin.  Will cont to monitor.

## 2024-06-06 NOTE — PROGRESS NOTES
[facility]    06/06/2024    King Botello (39 y.o.) is followed by the [facility].      Assessment/Plan:    King Botello presents with a therapeutic INR. Goal INR: 2.5-3.5    Lab Results   Component Value Date    INR 3.2 06/06/2024    INR 3.2 05/31/2024    INR 2.5 (H) 05/28/2024       Assessment of patient findings per MA/LPN and chart review:   The following significant findings were found:   None    Recommendation for patient's warfarin regimen:   No change was made to warfarin therapy during this visit and patient has been instructed to continue their current warfarin regimen.    Recommended repeat INR in 1 week      Laine Glynn PharmD, BCPS  Clinical Pharmacist - [facility]  Preferred Contact: Secure Messaging or In Basket Message

## 2024-06-07 ENCOUNTER — PATIENT MESSAGE (OUTPATIENT)
Dept: ELECTROPHYSIOLOGY | Facility: CLINIC | Age: 39
End: 2024-06-07
Payer: COMMERCIAL

## 2024-06-10 ENCOUNTER — PATIENT MESSAGE (OUTPATIENT)
Dept: CARDIOLOGY | Facility: CLINIC | Age: 39
End: 2024-06-10
Payer: COMMERCIAL

## 2024-06-10 ENCOUNTER — PATIENT MESSAGE (OUTPATIENT)
Dept: INTERNAL MEDICINE | Facility: CLINIC | Age: 39
End: 2024-06-10
Payer: COMMERCIAL

## 2024-06-10 ENCOUNTER — PATIENT MESSAGE (OUTPATIENT)
Dept: ELECTROPHYSIOLOGY | Facility: CLINIC | Age: 39
End: 2024-06-10
Payer: COMMERCIAL

## 2024-06-11 ENCOUNTER — HOSPITAL ENCOUNTER (OUTPATIENT)
Dept: CARDIOLOGY | Facility: CLINIC | Age: 39
Discharge: HOME | End: 2024-06-11
Payer: COMMERCIAL

## 2024-06-11 ENCOUNTER — OFFICE VISIT (OUTPATIENT)
Dept: CARDIOLOGY | Facility: CLINIC | Age: 39
End: 2024-06-11
Payer: COMMERCIAL

## 2024-06-11 VITALS — WEIGHT: 177.69 LBS | BODY MASS INDEX: 33.55 KG/M2 | HEART RATE: 79 BPM | HEIGHT: 61 IN

## 2024-06-11 DIAGNOSIS — I48.92 ATRIAL FIBRILLATION AND FLUTTER: ICD-10-CM

## 2024-06-11 DIAGNOSIS — I48.91 ATRIAL FIBRILLATION AND FLUTTER: ICD-10-CM

## 2024-06-11 DIAGNOSIS — Z95.2 S/P MVR (MITRAL VALVE REPLACEMENT): ICD-10-CM

## 2024-06-11 DIAGNOSIS — Z79.01 LONG TERM (CURRENT) USE OF ANTICOAGULANTS: ICD-10-CM

## 2024-06-11 DIAGNOSIS — I34.2 NONRHEUMATIC MITRAL VALVE STENOSIS: Primary | ICD-10-CM

## 2024-06-11 DIAGNOSIS — I44.2 CHB (COMPLETE HEART BLOCK): ICD-10-CM

## 2024-06-11 DIAGNOSIS — R60.9 EDEMA, UNSPECIFIED TYPE: ICD-10-CM

## 2024-06-11 DIAGNOSIS — I48.92 ATRIAL FIBRILLATION AND FLUTTER: Primary | ICD-10-CM

## 2024-06-11 DIAGNOSIS — I48.91 ATRIAL FIBRILLATION AND FLUTTER: Primary | ICD-10-CM

## 2024-06-11 DIAGNOSIS — Z95.0 PRESENCE OF CARDIAC PACEMAKER: ICD-10-CM

## 2024-06-11 PROBLEM — E66.09 CLASS 2 OBESITY DUE TO EXCESS CALORIES WITH BODY MASS INDEX (BMI) OF 35.0 TO 35.9 IN ADULT: Status: RESOLVED | Noted: 2021-03-09 | Resolved: 2024-06-11

## 2024-06-11 PROBLEM — E66.01 CLASS 2 SEVERE OBESITY DUE TO EXCESS CALORIES WITH SERIOUS COMORBIDITY AND BODY MASS INDEX (BMI) OF 35.0 TO 35.9 IN ADULT: Status: RESOLVED | Noted: 2024-02-19 | Resolved: 2024-06-11

## 2024-06-11 PROBLEM — E66.812 CLASS 2 OBESITY DUE TO EXCESS CALORIES WITH BODY MASS INDEX (BMI) OF 35.0 TO 35.9 IN ADULT: Status: RESOLVED | Noted: 2021-03-09 | Resolved: 2024-06-11

## 2024-06-11 PROBLEM — E66.812 CLASS 2 SEVERE OBESITY DUE TO EXCESS CALORIES WITH SERIOUS COMORBIDITY AND BODY MASS INDEX (BMI) OF 35.0 TO 35.9 IN ADULT: Status: RESOLVED | Noted: 2024-02-19 | Resolved: 2024-06-11

## 2024-06-11 LAB
OHS QRS DURATION: 78 MS
OHS QTC CALCULATION: 474 MS

## 2024-06-11 PROCEDURE — 93010 ELECTROCARDIOGRAM REPORT: CPT | Mod: S$GLB,,, | Performed by: INTERNAL MEDICINE

## 2024-06-11 PROCEDURE — 93005 ELECTROCARDIOGRAM TRACING: CPT | Mod: S$GLB,,, | Performed by: INTERNAL MEDICINE

## 2024-06-11 PROCEDURE — 99214 OFFICE O/P EST MOD 30 MIN: CPT | Mod: S$GLB,,, | Performed by: INTERNAL MEDICINE

## 2024-06-11 PROCEDURE — 3008F BODY MASS INDEX DOCD: CPT | Mod: CPTII,S$GLB,, | Performed by: INTERNAL MEDICINE

## 2024-06-11 PROCEDURE — 99999 PR PBB SHADOW E&M-EST. PATIENT-LVL III: CPT | Mod: PBBFAC,,, | Performed by: INTERNAL MEDICINE

## 2024-06-11 PROCEDURE — 3044F HG A1C LEVEL LT 7.0%: CPT | Mod: CPTII,S$GLB,, | Performed by: INTERNAL MEDICINE

## 2024-06-11 PROCEDURE — 1159F MED LIST DOCD IN RCRD: CPT | Mod: CPTII,S$GLB,, | Performed by: INTERNAL MEDICINE

## 2024-06-11 NOTE — PROGRESS NOTES
Subjective:   Patient ID:  King Botlelo is a 39 y.o. female who presents for follow up of No chief complaint on file.      HPI: Urgent f/u for perception of new leg swelling.  It occurred over the weekend, left worse than right, but now it's resolved.    She had a spike in her HR on  and apparently had a few hours of AF (or flutter) that resolved spontaneously.  She noticed the leg swelling on the afternoon of the .  On the , the swelling was improving.  No further AF.    She's been on dofetilide since the end of April.      ECG today: NSR, 1st degree AV block, otherwise normal.      Lab Results   Component Value Date    INR 3.2 2024    INR 3.2 2024    INR 2.5 (H) 2024         My 2024 HPI: Very pleasant woman with mitral valve disease now s/p MVR with a 25mm Carbomedics standard mechanical prosthesis plus TVr in early January complicated by complete heart block requiring PPM despite a robust junctional escape rhythm.  She has seen Dr. Cordero for years but I am quite familiar with her case having recently seen her echos, done her MINERVA prior to PPM implantation, and reading her ECGs.     She's struggling with getting her INR up to goal and has currently been on Lovenox injections to bridge.     She's slated to start cardiac rehab soon.        Re-operation 2024  1)  Mitral valve replacement with a 25 mm Carbomedics standard   mechanical prosthesis.  2)  Tricuspid valve repair with a 26 mm Medtronic Contour annuloplasty band        Patient Active Problem List   Diagnosis    Vitamin D deficiency disease    Hypertension, essential    Chronic fatigue    Pre-existing essential hypertension during pregnancy, antepartum    Nonrheumatic mitral valve stenosis    S/P  section    Chronic diastolic heart failure    Visit for monitoring Tikosyn therapy    Acute postoperative anemia due to expected blood loss    Transient hyperglycemia post procedure    S/P MVR  (mitral valve replacement)    CHB (complete heart block)    Long term (current) use of anticoagulants    Heart failure, diastolic, acute on chronic    Atrial fibrillation and flutter    Presence of cardiac pacemaker       Current Outpatient Medications   Medication Sig    aspirin (ECOTRIN) 81 MG EC tablet Take 1 tablet (81 mg total) by mouth once daily.    dofetilide (TIKOSYN) 250 MCG Cap Take 1 capsule (250 mcg total) by mouth every 12 (twelve) hours.    metoprolol tartrate (LOPRESSOR) 25 MG tablet Take 1 tablet (25 mg total) by mouth 2 (two) times daily.    warfarin (COUMADIN) 4 MG tablet Take 2-3 tablets (8-12 mg total) by mouth Daily. As directed by the [facility]     No current facility-administered medications for this visit.       ROS  The review of systems is negative except as above.     Objective:   Physical Exam  Vitals reviewed.   Constitutional:       Appearance: She is well-developed.   HENT:      Head: Normocephalic and atraumatic.   Eyes:      General: No scleral icterus.     Conjunctiva/sclera: Conjunctivae normal.   Neck:      Vascular: No JVD.   Cardiovascular:      Rate and Rhythm: Normal rate and regular rhythm.      Pulses: Intact distal pulses.      Heart sounds: No murmur heard.     No friction rub. No gallop.      Comments: S1 crisp click, normal S2  Pulmonary:      Effort: Pulmonary effort is normal.      Breath sounds: Normal breath sounds. No wheezing or rales.   Abdominal:      General: Bowel sounds are normal. There is no distension.      Palpations: Abdomen is soft.      Tenderness: There is no abdominal tenderness.   Musculoskeletal:         General: Normal range of motion.      Cervical back: Normal range of motion and neck supple.   Skin:     General: Skin is warm and dry.      Findings: No erythema or rash.   Neurological:      Mental Status: She is alert and oriented to person, place, and time.   Psychiatric:         Behavior: Behavior normal.         Thought  Content: Thought content normal.         Judgment: Judgment normal.     Lab Results   Component Value Date    WBC 12.74 (H) 05/17/2024    HGB 12.3 05/17/2024    HCT 40.9 05/17/2024    MCV 84 05/17/2024     05/17/2024         Chemistry        Component Value Date/Time     05/17/2024 1005    K 4.5 05/17/2024 1005     05/17/2024 1005    CO2 23 05/17/2024 1005    BUN 8 05/17/2024 1005    CREATININE 0.7 05/17/2024 1005    GLU 96 05/17/2024 1005        Component Value Date/Time    CALCIUM 9.9 05/17/2024 1005    ALKPHOS 56 04/30/2024 0337    AST 15 04/30/2024 0337    ALT 10 04/30/2024 0337    BILITOT 0.3 04/30/2024 0337    ESTGFRAFRICA >60.0 06/28/2022 0743    EGFRNONAA >60.0 06/28/2022 0743            Lab Results   Component Value Date    CHOL 215 (H) 02/29/2024    CHOL 212 (H) 08/14/2023    CHOL 199 06/28/2022     Lab Results   Component Value Date    HDL 41 02/29/2024    HDL 47 08/14/2023    HDL 54 06/28/2022     Lab Results   Component Value Date    LDLCALC 142.4 02/29/2024    LDLCALC 129.6 08/14/2023    LDLCALC 124.0 06/28/2022     Lab Results   Component Value Date    TRIG 158 (H) 02/29/2024    TRIG 177 (H) 08/14/2023    TRIG 105 06/28/2022     Lab Results   Component Value Date    CHOLHDL 19.1 (L) 02/29/2024    CHOLHDL 22.2 08/14/2023    CHOLHDL 27.1 06/28/2022       Lab Results   Component Value Date    TSH 0.819 08/14/2023       Lab Results   Component Value Date    HGBA1C 5.0 02/29/2024       Assessment:     1. Nonrheumatic mitral valve stenosis    2. CHB (complete heart block)    3. Atrial fibrillation and flutter    4. S/P MVR (mitral valve replacement)    5. Long term (current) use of anticoagulants    6. Presence of cardiac pacemaker        Plan:     Continue current medicines.    Diet/exercise goals reinforced.    F/U w/ Natalie Moore and Gil as scheduled

## 2024-06-12 ENCOUNTER — OFFICE VISIT (OUTPATIENT)
Dept: INTERNAL MEDICINE | Facility: CLINIC | Age: 39
End: 2024-06-12
Payer: COMMERCIAL

## 2024-06-12 VITALS
HEART RATE: 73 BPM | SYSTOLIC BLOOD PRESSURE: 102 MMHG | DIASTOLIC BLOOD PRESSURE: 78 MMHG | WEIGHT: 176.81 LBS | BODY MASS INDEX: 33.38 KG/M2 | OXYGEN SATURATION: 98 % | HEIGHT: 61 IN

## 2024-06-12 DIAGNOSIS — H00.19 CHALAZION, UNSPECIFIED LATERALITY: Primary | ICD-10-CM

## 2024-06-12 PROCEDURE — 99999 PR PBB SHADOW E&M-EST. PATIENT-LVL IV: CPT | Mod: PBBFAC,,, | Performed by: NURSE PRACTITIONER

## 2024-06-12 PROCEDURE — 3078F DIAST BP <80 MM HG: CPT | Mod: CPTII,S$GLB,, | Performed by: NURSE PRACTITIONER

## 2024-06-12 PROCEDURE — 3008F BODY MASS INDEX DOCD: CPT | Mod: CPTII,S$GLB,, | Performed by: NURSE PRACTITIONER

## 2024-06-12 PROCEDURE — 1159F MED LIST DOCD IN RCRD: CPT | Mod: CPTII,S$GLB,, | Performed by: NURSE PRACTITIONER

## 2024-06-12 PROCEDURE — 3074F SYST BP LT 130 MM HG: CPT | Mod: CPTII,S$GLB,, | Performed by: NURSE PRACTITIONER

## 2024-06-12 PROCEDURE — 3044F HG A1C LEVEL LT 7.0%: CPT | Mod: CPTII,S$GLB,, | Performed by: NURSE PRACTITIONER

## 2024-06-12 PROCEDURE — 99214 OFFICE O/P EST MOD 30 MIN: CPT | Mod: S$GLB,,, | Performed by: NURSE PRACTITIONER

## 2024-06-12 RX ORDER — ERYTHROMYCIN 5 MG/G
OINTMENT OPHTHALMIC NIGHTLY
Qty: 3.5 G | Refills: 0 | Status: SHIPPED | OUTPATIENT
Start: 2024-06-12 | End: 2024-06-22

## 2024-06-12 RX ORDER — POLYMYXIN B SULFATE AND TRIMETHOPRIM 1; 10000 MG/ML; [USP'U]/ML
1 SOLUTION OPHTHALMIC EVERY 6 HOURS
Qty: 10 ML | Refills: 0 | Status: SHIPPED | OUTPATIENT
Start: 2024-06-12 | End: 2024-06-13 | Stop reason: ALTCHOICE

## 2024-06-12 NOTE — PROGRESS NOTES
INTERNAL MEDICINE CLINIC - SAME DAY APPOINTMENT  Progress Note    PRESENTING HISTORY     PCP: Mayco Hansen MD    Chief Complaint/Reason for Visit:   No chief complaint on file.    History of Present Illness & ROS : Ms. King Botello is a 39 y.o. female.    Same day apt.   New to me.   Est'd with Dr. Hansen.   Very pleasant lady.   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   Reports that about a 'week ago, had what was thought to have been a clogged pimple on right upper eyelid, but has since progressed to increase in swelling to the eyelid, sensitive to touch and redness to eyelid'. No pain to eyeball itself endorsed.   No change in vision, no itching to involved eye, and no light sensitivity.   No cold symptoms.     Review of Systems:  Eyes: denies visual changes at this time denies floaters   ENT: no nasal congestion or sore throat  Respiratory: no cough or shorness of breath  Cardiovascular: no chest pain or palpitations  Gastrointestinal: no nausea or vomiting, no abdominal pain or change in bowel habits  Genitourinary: no hematuria or dysuria; denies frequency  Hematologic/Lymphatic: no easy bruising or lymphadenopathy  Musculoskeletal: no arthralgias or myalgias  Neurological: no seizures or tremors  Endocrine: no heat or cold intolerance      PAST HISTORY:     Past Medical History:   Diagnosis Date    Abnormal Pap smear     Colposcopy    Atrial fibrillation and flutter 2024    Bradycardia 2020    Class 2 obesity due to excess calories with body mass index (BMI) of 35.0 to 35.9 in adult 3/9/2021    Hypertension, essential 3/16/2021    Menarche     Age of onset 11    Missed ab 2021    Mitral valve prolapse     Previous  delivery affecting pregnancy, antepartum 2020    S/P suction D&C 2021    Status post mitral valve annuloplasty 2017       Past Surgical History:   Procedure Laterality Date    A-V CARDIAC PACEMAKER INSERTION N/A 2024    Procedure:  INSERTION, CARDIAC PACEMAKER, DUAL CHAMBER;  Surgeon: CRISTI Moore MD;  Location: [facility] EP LAB;  Service: Cardiology;  Laterality: N/A;  CHB, dPPM with LBBP, MDT, ANES, [place], [place] [id number]     SECTION       SECTION WITH TUBAL LIGATION Bilateral 2023    Procedure:  SECTION, WITH TUBAL LIGATION;  Surgeon: Mane Moreno MD;  Location: [facility] [place] L&D;  Service: OB/GYN;  Laterality: Bilateral;    DILATION AND CURETTAGE OF UTERUS USING SUCTION N/A 2021    Procedure: DILATION AND CURETTAGE, UTERUS, USING SUCTION;  Surgeon: Mane Moreno MD;  Location: [facility] [place] OR;  Service: OB/GYN;  Laterality: N/A;    ECHOCARDIOGRAM,TRANSESOPHAGEAL N/A 2024    Procedure: Transesophageal echo (MINERVA) intra-procedure log documentation;  Surgeon: Page George MD;  Location: [facility] EP LAB;  Service: Cardiology;  Laterality: N/A;  AF, MINERVA/DCCV, MAC, EH, 3prep *MDT D-C PPM in situ*    MITRAL VALVE REPAIR      MITRAL VALVE REPLACEMENT N/A 2024    Procedure: MITRAL VALVE REPLACEMENT, REDO STERNOTOMY;  Surgeon: Thierno Liu MD;  Location: [address];  Service: Cardiothoracic;  Laterality: N/A;    TRANSESOPHAGEAL ECHOCARDIOGRAPHY N/A 2024    Procedure: ECHOCARDIOGRAM, TRANSESOPHAGEAL;  Surgeon: Jessee Ahumada MD;  Location: [facility] EP LAB;  Service: Cardiology;  Laterality: N/A;    TREATMENT OF CARDIAC ARRHYTHMIA N/A 2024    Procedure: Cardioversion or Defibrillation;  Surgeon: CRISTI Moore MD;  Location: [facility] EP LAB;  Service: Cardiology;  Laterality: N/A;    TREATMENT OF CARDIAC ARRHYTHMIA N/A 2024    Procedure: Cardioversion or Defibrillation;  Surgeon: CRISTI Moore MD;  Location: [facility] EP LAB;  Service: Cardiology;  Laterality: N/A;  AF, MINERVA/DCCV, MAC, EH, 3prep *MDT D-C PPM in situ*    VALVULOPLASTY, TRICUSPID N/A 2024    Procedure: VALVULOPLASTY, TRICUSPID REPAIR;  Surgeon: Thierno Liu MD;  Location: [facility] OR [address] [place] [address];  Service: Cardiothoracic;   Laterality: N/A;       Family History   Problem Relation Name Age of Onset    Heart disease Mother      Hypertension Mother      Hyperlipidemia Mother      Diabetes Mother      Cancer Mother      Hyperlipidemia Father      Hypertension Father      Breast cancer Paternal Aunt      Colon cancer Neg Hx      Ovarian cancer Neg Hx         Social History     Socioeconomic History    Marital status:    Occupational History    Occupation: xray tech   Tobacco Use    Smoking status: Never    Smokeless tobacco: Never   Substance and Sexual Activity    Alcohol use: Not Currently     Comment: not since +UPT    Drug use: No    Sexual activity: Yes     Partners: Male     Birth control/protection: None     Social Determinants of Health     Financial Resource Strain: Low Risk  (5/31/2024)    Received from [facility]    Overall Financial Resource Strain (CARDIA)     Difficulty of Paying Living Expenses: Not very hard   Food Insecurity: No Food Insecurity (5/31/2024)    Received from [facility]    Hunger Vital Sign     Worried About Running Out of Food in the Last Year: Never true     Ran Out of Food in the Last Year: Never true   Transportation Needs: No Transportation Needs (5/31/2024)    Received from [facility]    PRAPARE - Transportation     Lack of Transportation (Medical): No     Lack of Transportation (Non-Medical): No   Physical Activity: Unknown (2/29/2024)    Exercise Vital Sign     Days of Exercise per Week: Patient declined     Minutes of Exercise per Session: 0 min   Recent Concern: Physical Activity - Inactive (1/30/2024)    Exercise Vital Sign     Days of Exercise per Week: 0 days     Minutes of Exercise per Session: 0 min   Stress: Stress Concern Present (5/31/2024)    Received from [facility]    [employer] - Occupational Stress Questionnaire     Feeling of Stress : To some extent    Housing Stability: Low Risk  (2/29/2024)    Housing Stability Vital Sign     Unable to Pay for Housing in the Last Year: No     Number of Places Lived in the Last Year: 1     Unstable Housing in the Last Year: No       MEDICATIONS & ALLERGIES:     Current Outpatient Medications on File Prior to Visit   Medication Sig Dispense Refill    aspirin (ECOTRIN) 81 MG EC tablet Take 1 tablet (81 mg total) by mouth once daily. 30 tablet 11    dofetilide (TIKOSYN) 250 MCG Cap Take 1 capsule (250 mcg total) by mouth every 12 (twelve) hours. 60 capsule 11    metoprolol tartrate (LOPRESSOR) 25 MG tablet Take 1 tablet (25 mg total) by mouth 2 (two) times daily. 60 tablet 11    warfarin (COUMADIN) 4 MG tablet Take 2-3 tablets (8-12 mg total) by mouth Daily. As directed by the [facility] 90 tablet 5     No current facility-administered medications on file prior to visit.        Review of patient's allergies indicates:   Allergen Reactions    Cinnamon Itching    Doxycycline      Abdomen pain severe       Medications Reconciliation:   I have reconciled the patient's home medications with the patient/family. I have updated all changes.  Refer to After-Visit Medication List.    OBJECTIVE:     Vital Signs:  There were no vitals filed for this visit.  Wt Readings from Last 3 Encounters:   06/11/24 1539 80.6 kg (177 lb 11.1 oz)   05/17/24 1315 78.9 kg (174 lb)   05/02/24 0754 79.6 kg (175 lb 7.8 oz)   04/29/24 1953 79.4 kg (175 lb 0.7 oz)     There is no height or weight on file to calculate BMI.   Wt Readings from Last 3 Encounters:   06/12/24 80.2 kg (176 lb 12.9 oz)   06/11/24 80.6 kg (177 lb 11.1 oz)   05/17/24 78.9 kg (174 lb)     Temp Readings from Last 3 Encounters:   05/17/24 97.9 °F (36.6 °C) (Temporal)   05/02/24 98.1 °F (36.7 °C)   04/18/24 97.9 °F (36.6 °C) (Temporal)     BP Readings from Last 3 Encounters:   06/12/24 102/78   05/17/24 101/61   05/02/24 (!) 108/59     Pulse Readings from Last 3 Encounters:   06/12/24 73    06/11/24 79   05/17/24 87       Physical Exam:  General: Well developed, well nourished. No distress.  HEENT: Head is normocephalic, atraumatic  Eyes:  OD: eyelid with localized erythema, localized edema. KATHERINE. Mildly injected sclera   OS: unremarkable.   Neck: Supple, symmetrical neck; trachea midline.  Lungs: Clear to auscultation bilaterally and normal respiratory effort.  Cardiovascular: Heart with regular rate and rhythm. No murmurs, gallops or rubs  Extremities: No LE edema. Pulses 2+ and symmetric.   Skin: Skin color, texture, turgor normal. No rashes.  Musculoskeletal: Normal gait.   Neurologic: Normal strength and tone. No focal numbness or weakness.   Psychiatric: Not depressed.      Laboratory  Lab Results   Component Value Date    WBC 12.74 (H) 05/17/2024    HGB 12.3 05/17/2024    HCT 40.9 05/17/2024     05/17/2024    CHOL 215 (H) 02/29/2024    TRIG 158 (H) 02/29/2024    HDL 41 02/29/2024    ALT 10 04/30/2024    AST 15 04/30/2024     05/17/2024    K 4.5 05/17/2024     05/17/2024    CREATININE 0.7 05/17/2024    BUN 8 05/17/2024    CO2 23 05/17/2024    TSH 0.819 08/14/2023    INR 3.2 06/06/2024    HGBA1C 5.0 02/29/2024         ASSESSMENT & PLAN:     Same day apt.     [place]al[place], unspecified laterality  -     Ambulatory referral/consult to Optometry; Future; Expected date: 06/19/2024  -     polymyxin B sulf-trimethoprim (POLYTRIM) 10,000 unit- 1 mg/mL Drop; Place 1 drop into the right eye every 6 (six) hours. for 10 days  Dispense: 10 mL; Refill: 0  -     erythromycin (ROMYCIN) ophthalmic ointment; Place into the right eye every evening. for 10 days  Dispense: 3.5 g; Refill: 0    Future Appointments   Date Time Provider Department Center   6/13/2024  8:20 AM Steve Daley, OD [facility] OPTOMTY [facility]   7/22/2024 12:45 PM EKG, APPT [facility] EKG [address]   7/22/2024  1:40 PM CRISTI Moore MD [facility]HYTH [facility]   8/19/2024  3:00 PM Lacy Glez, OD [facility] OPTICLB [facility]         Medication List            Accurate as of June 12, 2024  8:55 AM. If you have any questions, ask your nurse or doctor.                START taking these medications      erythromycin ophthalmic ointment  Commonly known as: ROMYCIN  Place into the right eye every evening. for 10 days  Started by: LESLEY Ibarra     polymyxin B sulf-trimethoprim 10,000 unit- 1 mg/mL Drop  Commonly known as: POLYTRIM  Place 1 drop into the right eye every 6 (six) hours. for 10 days  Started by: LESLEY Ibarra            CONTINUE taking these medications      aspirin 81 MG EC tablet  Commonly known as: ECOTRIN  Take 1 tablet (81 mg total) by mouth once daily.     dofetilide 250 MCG Cap  Commonly known as: TIKOSYN  Take 1 capsule (250 mcg total) by mouth every 12 (twelve) hours.     metoprolol tartrate 25 MG tablet  Commonly known as: LOPRESSOR  Take 1 tablet (25 mg total) by mouth 2 (two) times daily.     warfarin 4 MG tablet  Commonly known as: COUMADIN  Take 2-3 tablets (8-12 mg total) by mouth Daily. As directed by the [facility]               Where to Get Your Medications        These medications were sent to [facility]  [address] [place] [id number]      Hours: Mon-Fri, 8a-5:30p Phone: [phone]   erythromycin ophthalmic ointment  polymyxin B sulf-trimethoprim 10,000 unit- 1 mg/mL Drop       Signing Physician:  LESLEY Ibarra

## 2024-06-13 ENCOUNTER — OFFICE VISIT (OUTPATIENT)
Dept: OPTOMETRY | Facility: CLINIC | Age: 39
End: 2024-06-13
Payer: COMMERCIAL

## 2024-06-13 ENCOUNTER — PATIENT MESSAGE (OUTPATIENT)
Dept: ELECTROPHYSIOLOGY | Facility: CLINIC | Age: 39
End: 2024-06-13
Payer: COMMERCIAL

## 2024-06-13 ENCOUNTER — PATIENT MESSAGE (OUTPATIENT)
Dept: CARDIOLOGY | Facility: CLINIC | Age: 39
End: 2024-06-13
Payer: COMMERCIAL

## 2024-06-13 ENCOUNTER — ANTI-COAG VISIT (OUTPATIENT)
Dept: CARDIOLOGY | Facility: CLINIC | Age: 39
End: 2024-06-13
Payer: COMMERCIAL

## 2024-06-13 DIAGNOSIS — Z79.01 LONG TERM (CURRENT) USE OF ANTICOAGULANTS: Primary | ICD-10-CM

## 2024-06-13 DIAGNOSIS — H00.033 EYELID CELLULITIS, RIGHT: ICD-10-CM

## 2024-06-13 DIAGNOSIS — Z95.2 S/P MVR (MITRAL VALVE REPLACEMENT): ICD-10-CM

## 2024-06-13 DIAGNOSIS — H00.021 HORDEOLUM INTERNUM RIGHT UPPER EYELID: Primary | ICD-10-CM

## 2024-06-13 DIAGNOSIS — I48.91 ATRIAL FIBRILLATION AND FLUTTER: ICD-10-CM

## 2024-06-13 DIAGNOSIS — I48.92 ATRIAL FIBRILLATION AND FLUTTER: ICD-10-CM

## 2024-06-13 LAB — INR PPP: 3.5

## 2024-06-13 PROCEDURE — 99204 OFFICE O/P NEW MOD 45 MIN: CPT | Mod: S$GLB,,, | Performed by: OPTOMETRIST

## 2024-06-13 PROCEDURE — 1159F MED LIST DOCD IN RCRD: CPT | Mod: CPTII,S$GLB,, | Performed by: OPTOMETRIST

## 2024-06-13 PROCEDURE — 3044F HG A1C LEVEL LT 7.0%: CPT | Mod: CPTII,S$GLB,, | Performed by: OPTOMETRIST

## 2024-06-13 PROCEDURE — 99999 PR PBB SHADOW E&M-EST. PATIENT-LVL III: CPT | Mod: PBBFAC,,, | Performed by: OPTOMETRIST

## 2024-06-13 RX ORDER — AMOXICILLIN AND CLAVULANATE POTASSIUM 875; 125 MG/1; MG/1
1 TABLET, FILM COATED ORAL 2 TIMES DAILY
Qty: 20 TABLET | Refills: 0 | Status: SHIPPED | OUTPATIENT
Start: 2024-06-13 | End: 2024-06-23

## 2024-06-13 NOTE — PROGRESS NOTES
[facility]    06/13/2024    King Botello (39 y.o.) is followed by the [facility].      Assessment/Plan:    King Botello presents with a therapeutic INR. Goal INR: 2.5-3.5    Lab Results   Component Value Date    INR 3.5 06/13/2024    INR 3.2 06/06/2024    INR 3.2 05/31/2024       Assessment of patient findings per MA/LPN and chart review:   The following significant findings were found:   none    Recommendation for patient's warfarin regimen:   No change was made to warfarin therapy during this visit and patient has been instructed to continue their current warfarin regimen.    Recommended repeat INR in 1 week      Laine Glynn, PharmD, BCPS  Clinical Pharmacist - [facility]  Preferred Contact: Secure Messaging or In Basket Message

## 2024-06-13 NOTE — PROGRESS NOTES
HPI    Pt presents today for stye   Pt reports symptoms started 1 week ago   Pt reports swelling/ tender/ redness   Pt reports went to urgent care yesterday   Pt here for a follow up   Erythromycin QHS   Polymyxin BID   Pt reports no relief   Last edited by Juliann Velazquez on 6/13/2024  8:23 AM.            Assessment /Plan     For exam results, see Encounter Report.    Hordeolum internum right upper eyelid  -     Ambulatory referral/consult to Optometry  -     amoxicillin-clavulanate 875-125mg (AUGMENTIN) 875-125 mg per tablet; Take 1 tablet by mouth 2 (two) times daily. for 10 days  Dispense: 20 tablet; Refill: 0  Eyelid cellulitis, right  -Pt to confirm with Cardiology before starting Augmentin (unable to take Doxycycline because of prior adverse reaction and Keflex was flagged with current med interaction)  -Abx to benefit by speeding up healing process not urgent especially if adverse event, ok to wait for clearance   -Stop Polytrim no benefit, ok Erythro QHS  -Hot compresses BID+    RTC PRN, annual as scheduled

## 2024-06-19 ENCOUNTER — OUTPATIENT CASE MANAGEMENT (OUTPATIENT)
Dept: ADMINISTRATIVE | Facility: OTHER | Age: 39
End: 2024-06-19
Payer: COMMERCIAL

## 2024-06-19 ENCOUNTER — PATIENT MESSAGE (OUTPATIENT)
Dept: ELECTROPHYSIOLOGY | Facility: CLINIC | Age: 39
End: 2024-06-19
Payer: COMMERCIAL

## 2024-06-20 ENCOUNTER — PATIENT MESSAGE (OUTPATIENT)
Dept: ADMINISTRATIVE | Facility: OTHER | Age: 39
End: 2024-06-20
Payer: COMMERCIAL

## 2024-06-20 ENCOUNTER — ANTI-COAG VISIT (OUTPATIENT)
Dept: CARDIOLOGY | Facility: CLINIC | Age: 39
End: 2024-06-20
Payer: COMMERCIAL

## 2024-06-20 ENCOUNTER — PATIENT MESSAGE (OUTPATIENT)
Dept: ELECTROPHYSIOLOGY | Facility: CLINIC | Age: 39
End: 2024-06-20
Payer: COMMERCIAL

## 2024-06-20 ENCOUNTER — TELEPHONE (OUTPATIENT)
Dept: CARDIOLOGY | Facility: HOSPITAL | Age: 39
End: 2024-06-20
Payer: COMMERCIAL

## 2024-06-20 DIAGNOSIS — I48.91 ATRIAL FIBRILLATION AND FLUTTER: ICD-10-CM

## 2024-06-20 DIAGNOSIS — Z95.2 S/P MVR (MITRAL VALVE REPLACEMENT): ICD-10-CM

## 2024-06-20 DIAGNOSIS — I48.92 ATRIAL FIBRILLATION AND FLUTTER: ICD-10-CM

## 2024-06-20 DIAGNOSIS — Z79.01 LONG TERM (CURRENT) USE OF ANTICOAGULANTS: Primary | ICD-10-CM

## 2024-06-20 LAB — INR PPP: 2

## 2024-06-20 PROCEDURE — 93793 ANTICOAG MGMT PT WARFARIN: CPT | Mod: S$GLB,,,

## 2024-06-20 NOTE — TELEPHONE ENCOUNTER
Called pt on this afternoon in relation to message received via the pt portal today.  Informed the pt that all has been reviewed with Dr. Moore.  Pt with known symptomatic pAF, is on Coumadin.  Pt will contact the clinic and send a manual transmission for symptoms that are new or persistent.  Understanding was verbalized.  Pt appreciated the call.

## 2024-06-21 NOTE — PROGRESS NOTES
[facility]    06/21/2024    King Botello (39 y.o.) is followed by the [facility].      Assessment/Plan:    King Botello presents with a subtherapeutic  INR. Goal INR: 2.5-3.5    Lab Results   Component Value Date    INR 2.0 06/20/2024    INR 3.5 06/13/2024    INR 3.2 06/06/2024       Assessment of patient findings per MA/LPN and chart review:   The following significant findings were found:   Reports being on Augmentin since last week  Reports not eating her usual servings of greens, but would expect this to cause an increase in INR    Recommendation for patient's warfarin regimen:   Due to subtherapeutic INR, patient was instructed to take a booster dose today. Patient to resume their normal weekly dose.    Recommended repeat INR in 1 week      Laine Glynn, PharmD, BCPS  Clinical Pharmacist - [facility]  Preferred Contact: Secure Messaging or In Basket Message

## 2024-06-26 ENCOUNTER — ANTI-COAG VISIT (OUTPATIENT)
Dept: CARDIOLOGY | Facility: CLINIC | Age: 39
End: 2024-06-26
Payer: COMMERCIAL

## 2024-06-26 ENCOUNTER — HOSPITAL ENCOUNTER (EMERGENCY)
Facility: HOSPITAL | Age: 39
Discharge: HOME | End: 2024-06-26
Attending: EMERGENCY MEDICINE
Payer: COMMERCIAL

## 2024-06-26 VITALS
DIASTOLIC BLOOD PRESSURE: 77 MMHG | RESPIRATION RATE: 20 BRPM | BODY MASS INDEX: 33.41 KG/M2 | OXYGEN SATURATION: 100 % | TEMPERATURE: 98 F | WEIGHT: 176.81 LBS | HEART RATE: 72 BPM | SYSTOLIC BLOOD PRESSURE: 121 MMHG

## 2024-06-26 DIAGNOSIS — I48.91 ATRIAL FIBRILLATION AND FLUTTER: ICD-10-CM

## 2024-06-26 DIAGNOSIS — Z95.2 S/P MVR (MITRAL VALVE REPLACEMENT): ICD-10-CM

## 2024-06-26 DIAGNOSIS — R04.0 EPISTAXIS: ICD-10-CM

## 2024-06-26 DIAGNOSIS — I48.92 ATRIAL FIBRILLATION AND FLUTTER: ICD-10-CM

## 2024-06-26 DIAGNOSIS — Z79.01 LONG TERM (CURRENT) USE OF ANTICOAGULANTS: Primary | ICD-10-CM

## 2024-06-26 LAB
ABO + RH BLD: NORMAL
ALBUMIN SERPL BCP-MCNC: 3.7 G/DL (ref 3.5–5.2)
ALP SERPL-CCNC: 57 U/L (ref 55–135)
ALT SERPL W/O P-5'-P-CCNC: 11 U/L (ref 10–44)
ANION GAP SERPL CALC-SCNC: 10 MMOL/L (ref 8–16)
APTT PPP: 40.8 SEC (ref 21–32)
AST SERPL-CCNC: 25 U/L (ref 10–40)
BASOPHILS # BLD AUTO: 0.03 K/UL (ref 0–0.2)
BASOPHILS NFR BLD: 0.5 % (ref 0–1.9)
BILIRUB SERPL-MCNC: 0.5 MG/DL (ref 0.1–1)
BLD GP AB SCN CELLS X3 SERPL QL: NORMAL
BUN SERPL-MCNC: 11 MG/DL (ref 6–20)
CALCIUM SERPL-MCNC: 9.3 MG/DL (ref 8.7–10.5)
CHLORIDE SERPL-SCNC: 110 MMOL/L (ref 95–110)
CO2 SERPL-SCNC: 20 MMOL/L (ref 23–29)
CREAT SERPL-MCNC: 0.6 MG/DL (ref 0.5–1.4)
DIFFERENTIAL METHOD BLD: ABNORMAL
EOSINOPHIL # BLD AUTO: 0.1 K/UL (ref 0–0.5)
EOSINOPHIL NFR BLD: 1 % (ref 0–8)
ERYTHROCYTE [DISTWIDTH] IN BLOOD BY AUTOMATED COUNT: 17.6 % (ref 11.5–14.5)
EST. GFR  (NO RACE VARIABLE): >60 ML/MIN/1.73 M^2
GLUCOSE SERPL-MCNC: 95 MG/DL (ref 70–110)
HCT VFR BLD AUTO: 37.3 % (ref 37–48.5)
HCV AB SERPL QL IA: NORMAL
HGB BLD-MCNC: 12.1 G/DL (ref 12–16)
HIV 1+2 AB+HIV1 P24 AG SERPL QL IA: NORMAL
IMM GRANULOCYTES # BLD AUTO: 0.01 K/UL (ref 0–0.04)
IMM GRANULOCYTES NFR BLD AUTO: 0.2 % (ref 0–0.5)
INR PPP: 2.5 (ref 0.8–1.2)
LYMPHOCYTES # BLD AUTO: 1.8 K/UL (ref 1–4.8)
LYMPHOCYTES NFR BLD: 30.5 % (ref 18–48)
MCH RBC QN AUTO: 26.5 PG (ref 27–31)
MCHC RBC AUTO-ENTMCNC: 32.4 G/DL (ref 32–36)
MCV RBC AUTO: 82 FL (ref 82–98)
MONOCYTES # BLD AUTO: 0.4 K/UL (ref 0.3–1)
MONOCYTES NFR BLD: 7.2 % (ref 4–15)
NEUTROPHILS # BLD AUTO: 3.6 K/UL (ref 1.8–7.7)
NEUTROPHILS NFR BLD: 60.6 % (ref 38–73)
NRBC BLD-RTO: 0 /100 WBC
OHS QRS DURATION: 76 MS
OHS QTC CALCULATION: 456 MS
PLATELET # BLD AUTO: 343 K/UL (ref 150–450)
PMV BLD AUTO: 10.7 FL (ref 9.2–12.9)
POTASSIUM SERPL-SCNC: 4.9 MMOL/L (ref 3.5–5.1)
PROT SERPL-MCNC: 7.4 G/DL (ref 6–8.4)
PROTHROMBIN TIME: 26 SEC (ref 9–12.5)
RBC # BLD AUTO: 4.57 M/UL (ref 4–5.4)
SODIUM SERPL-SCNC: 140 MMOL/L (ref 136–145)
SPECIMEN OUTDATE: NORMAL
WBC # BLD AUTO: 5.94 K/UL (ref 3.9–12.7)

## 2024-06-26 PROCEDURE — 85025 COMPLETE CBC W/AUTO DIFF WBC: CPT

## 2024-06-26 PROCEDURE — 85610 PROTHROMBIN TIME: CPT

## 2024-06-26 PROCEDURE — 86901 BLOOD TYPING SEROLOGIC RH(D): CPT

## 2024-06-26 PROCEDURE — 93005 ELECTROCARDIOGRAM TRACING: CPT

## 2024-06-26 PROCEDURE — 93010 ELECTROCARDIOGRAM REPORT: CPT | Mod: ,,, | Performed by: INTERNAL MEDICINE

## 2024-06-26 PROCEDURE — 86850 RBC ANTIBODY SCREEN: CPT

## 2024-06-26 PROCEDURE — 87389 HIV-1 AG W/HIV-1&-2 AB AG IA: CPT | Performed by: PHYSICIAN ASSISTANT

## 2024-06-26 PROCEDURE — 86803 HEPATITIS C AB TEST: CPT | Performed by: PHYSICIAN ASSISTANT

## 2024-06-26 PROCEDURE — 85730 THROMBOPLASTIN TIME PARTIAL: CPT

## 2024-06-26 PROCEDURE — 80053 COMPREHEN METABOLIC PANEL: CPT

## 2024-06-26 PROCEDURE — 93793 ANTICOAG MGMT PT WARFARIN: CPT | Mod: S$GLB,,,

## 2024-06-26 PROCEDURE — 99284 EMERGENCY DEPT VISIT MOD MDM: CPT | Mod: 25

## 2024-06-26 NOTE — Clinical Note
"King Moraie" Rosmery was seen and treated in our emergency department on 6/26/2024.  She may return to work on 06/28/2024.       If you have any questions or concerns, please don't hesitate to call.      Harriet Adamson MD"

## 2024-06-26 NOTE — ED PROVIDER NOTES
Encounter Date: 2024       History     Chief Complaint   Patient presents with    Bleeding/Bruising     Pt. Is on Coumadin. Reports woke up this morning and felt like she had a post nasal drip. She began coughing and reports coughing up large blood clots. Blood also coming from pt.'s nose. Pt. Denies recent trauma.      Patient is a 39-year-old female who presents to the [facility] ED for emergent evaluation of nosebleeds.  Patient states that her right nostril began bleeding earlier this morning and persisted into the afternoon with clots starting to form in the back of her throat.  Patient is on long-term warfarin status post mitral valve replacement.  As per chart review, PMHx of obesity, HTN, mitral valve disease now s/p MVR with a 25mm Carbomedics standard mechanical prosthesis plus TVr in early January complicated by complete heart block requiring PPM.  Patient denies shortness of breath, inability to tolerate own secretions, stridor, chest pain, dizziness, faintness, respiratory distress.  She states much of the nosebleed has stopped since being in ED. she is never experienced nosebleeds to this extent in the past.    The history is provided by the patient, medical records and the spouse. No  was used.     Review of patient's allergies indicates:   Allergen Reactions    Cinnamon Itching    Doxycycline      Abdomen pain severe     Past Medical History:   Diagnosis Date    Abnormal Pap smear     Colposcopy    Atrial fibrillation and flutter 2024    Bradycardia 2020    Class 2 obesity due to excess calories with body mass index (BMI) of 35.0 to 35.9 in adult 3/9/2021    Hypertension, essential 3/16/2021    Menarche     Age of onset 11    Missed ab 2021    Mitral valve prolapse     Previous  delivery affecting pregnancy, antepartum 2020    S/P suction D&C 2021    Status post mitral valve annuloplasty 2017     Past Surgical History:   Procedure Laterality Date     A-V CARDIAC PACEMAKER INSERTION N/A 2024    Procedure: INSERTION, CARDIAC PACEMAKER, DUAL CHAMBER;  Surgeon: CRISTI Moore MD;  Location: [facility] EP LAB;  Service: Cardiology;  Laterality: N/A;  CHB, dPPM with LBBP, MDT, ANES, [place], [place] [id number]     SECTION       SECTION WITH TUBAL LIGATION Bilateral 2023    Procedure:  SECTION, WITH TUBAL LIGATION;  Surgeon: Mane Moreno MD;  Location: [facility] L&D;  Service: OB/GYN;  Laterality: Bilateral;    DILATION AND CURETTAGE OF UTERUS USING SUCTION N/A 2021    Procedure: DILATION AND CURETTAGE, UTERUS, USING SUCTION;  Surgeon: Mane Moreno MD;  Location: [facility] OR;  Service: OB/GYN;  Laterality: N/A;    ECHOCARDIOGRAM,TRANSESOPHAGEAL N/A 2024    Procedure: Transesophageal echo (MINERVA) intra-procedure log documentation;  Surgeon: Page George MD;  Location: [facility] EP LAB;  Service: Cardiology;  Laterality: N/A;  AF, MINERVA/DCCV, MAC, EH, 3prep *MDT D-C PPM in situ*    MITRAL VALVE REPAIR      MITRAL VALVE REPLACEMENT N/A 2024    Procedure: MITRAL VALVE REPLACEMENT, REDO STERNOTOMY;  Surgeon: Thierno Liu MD;  Location: [address];  Service: Cardiothoracic;  Laterality: N/A;    TRANSESOPHAGEAL ECHOCARDIOGRAPHY N/A 2024    Procedure: ECHOCARDIOGRAM, TRANSESOPHAGEAL;  Surgeon: Jessee Ahumada MD;  Location: [facility] EP LAB;  Service: Cardiology;  Laterality: N/A;    TREATMENT OF CARDIAC ARRHYTHMIA N/A 2024    Procedure: Cardioversion or Defibrillation;  Surgeon: CRISTI Moore MD;  Location: [facility] EP LAB;  Service: Cardiology;  Laterality: N/A;    TREATMENT OF CARDIAC ARRHYTHMIA N/A 2024    Procedure: Cardioversion or Defibrillation;  Surgeon: CRISTI Moore MD;  Location: [facility] EP LAB;  Service: Cardiology;  Laterality: N/A;  AF, MINERVA/DCCV, MAC, EH, 3prep *MDT D-C PPM in situ*    VALVULOPLASTY, TRICUSPID N/A 2024    Procedure: VALVULOPLASTY, TRICUSPID REPAIR;  Surgeon: Thierno Liu  MD ISAIAS;  Location: [facility] OR [address] [place] [address];  Service: Cardiothoracic;  Laterality: N/A;     Family History   Problem Relation Name Age of Onset    Heart disease Mother      Hypertension Mother      Hyperlipidemia Mother      Diabetes Mother      Cancer Mother      Hyperlipidemia Father      Hypertension Father      Breast cancer Paternal Aunt      Colon cancer Neg Hx      Ovarian cancer Neg Hx       Social History     Tobacco Use    Smoking status: Never    Smokeless tobacco: Never   Substance Use Topics    Alcohol use: Not Currently     Comment: not since +UPT    Drug use: No     Review of Systems    Physical Exam     Initial Vitals [06/26/24 0855]   BP Pulse Resp Temp SpO2   116/70 81 18 98 °F (36.7 °C) 98 %      MAP       --         Physical Exam    Nursing note and vitals reviewed.  Constitutional: She appears well-developed and well-nourished.   HENT:   Head: Normocephalic and atraumatic.   Nose: Nose normal.   Mouth/Throat: Oropharynx is clear and moist.   Scant blood in the right turbinate and clear in the left.  Both nares patent without obvious clot.   Eyes: Conjunctivae and EOM are normal. Pupils are equal, round, and reactive to light.   Neck: Neck supple.   Normal range of motion.  Cardiovascular:  Normal rate, regular rhythm, normal heart sounds and intact distal pulses.           Pulmonary/Chest: Breath sounds normal.   Abdominal: Abdomen is soft. Bowel sounds are normal.   Musculoskeletal:         General: Normal range of motion.      Cervical back: Normal range of motion and neck supple.     Neurological: She is alert and oriented to person, place, and time. She has normal strength. GCS score is 15. GCS eye subscore is 4. GCS verbal subscore is 5. GCS motor subscore is 6.   Skin: Skin is warm and dry. Capillary refill takes less than 2 seconds.   Psychiatric: She has a normal mood and affect. Her behavior is normal. Judgment and thought content normal.         ED Course   Procedures  Labs Reviewed   CBC W/  AUTO DIFFERENTIAL - Abnormal; Notable for the following components:       Result Value    MCH 26.5 (*)     RDW 17.6 (*)     All other components within normal limits   COMPREHENSIVE METABOLIC PANEL - Abnormal; Notable for the following components:    CO2 20 (*)     All other components within normal limits   PROTIME-INR - Abnormal; Notable for the following components:    Prothrombin Time 26.0 (*)     INR 2.5 (*)     All other components within normal limits   APTT - Abnormal; Notable for the following components:    aPTT 40.8 (*)     All other components within normal limits   HIV 1 / 2 ANTIBODY    Narrative:     Release to patient->Immediate   HEPATITIS C ANTIBODY    Narrative:     Release to patient->Immediate   TYPE & SCREEN     EKG Readings: (Independently Interpreted)   Initial Reading: No STEMI.     ECG Results              EKG 12-lead (Final result)        Collection Time Result Time QRS Duration OHS QTC Calculation    06/26/24 10:06:55 06/26/24 12:35:51 76 456                     Final result by Interface, Lab In [facility] (06/26/24 12:35:57)                   Narrative:    Test Reason : R04.0,    Vent. Rate : 071 BPM     Atrial Rate : 071 BPM     P-R Int : 238 ms          QRS Dur : 076 ms      QT Int : 420 ms       P-R-T Axes : 073 037 061 degrees     QTc Int : 456 ms    Sinus rhythm with 1st degree A-V block  Otherwise normal ECG  When compared with ECG of 11-JUN-2024 15:14,  No significant change was found  Confirmed by Christina Kern MD ([id number]) on 6/26/2024 12:35:47 PM    Referred By: [email or web address]   SELF           Confirmed By:Christina Kern MD                                  Imaging Results    None          Medications - No data to display  Medical Decision Making  39-year-old female as described above presenting with persistent epistaxis that is since self resolved.  Given patient's long-term anticoagulation status we will order coagulation studies in addition to basic labs to assess for anemia or electrolyte  derangements.      Update/re-evaluation:  Patient continues to be asymptomatic without return of nosebleed.  Physical exam reassuring with no posterior pharynx clots or clots in either [place].  Visual inspection is reassuring anterior Kiesselbach plexus dried blood.  Most likely a result of changes in weather or possible trauma to the turbinate.  Patient's laboratory studies within normal limits and adequately therapeutic on warfarin 2.5 INR.  Patient will be discharged home with return precautions and instructions on home care should epistaxis reoccur.    Amount and/or Complexity of Data Reviewed  External Data Reviewed: notes.     Details: 38 y.o. female with a PMHx of obesity, HTN, mitral valve disease now s/p MVR with a 25mm Carbomedics standard mechanical prosthesis plus TVr in early January complicated by complete heart block requiring PPM  Labs: ordered.  ECG/medicine tests: ordered.              Attending Attestation:   Physician Attestation Statement for Resident:  As the supervising MD   Physician Attestation Statement: I have personally seen and examined this patient.   I agree with the above history.  -:   As the supervising MD I agree with the above PE.     As the supervising MD I agree with the above treatment, course, plan, and disposition.                       I have reviewed and concur with the resident's history, physical, assessment, and plan.  I have personally interviewed and examined the patient at bedside.   I did supervise any and all procedures and was present for any critical portion, and was always immediately available for help and as a resource.     The above history physical, review of symptoms, HPI and physical exam reflect my independent interpretation and evaluation.    Complexity: High Risk    Final diagnoses:  [R04.0] Epistaxis     Jeff Gregorio DO, FAAEM  Emergency Staff Physician   Dept of Emergency Medicine   [facility]  Spectralink: [id number]        Disclaimer: This  note has been generated using voice-recognition software. There may be typographical errors that have been missed during proof-reading.                              Clinical Impression:  Final diagnoses:  [R04.0] Epistaxis          ED Disposition Condition    Discharge Stable          ED Prescriptions    None       Follow-up Information       Follow up With Specialties Details Why Contact Info    Mayco Hansen MD Family Medicine, Sports Medicine Schedule an appointment as soon as possible for a visit in 1 day  [address]  [place] [address]  [phone]      [facility] - Emergency Dept Emergency Medicine  As needed, If symptoms worsen [address] [facility] [id number] [phone]             Harriet Adamson MD  Resident  06/26/24 1507       Jeff Gregorio,   06/27/24 0548

## 2024-06-26 NOTE — ED NOTES
Pt arrived to the ER complaining of coughing up blood clots and nose bleeding starting this morning. Pt reports she is on warfarin. Pt reports having INR checked last week.

## 2024-06-26 NOTE — DISCHARGE INSTRUCTIONS
You were seen in the emergency department for nosebleeds while on blood thinners.  Your laboratory studies were reassuring and your INR reflects proper therapeutic range.  Your nosebleed resolved on its own while you were in the ED and did not require interventions.  Your nosebleeds may be due to changes in weather, dry heat, allergies or blunt trauma from excessive nose clearing.  Please return to the ED if your nosebleed is to reoccur or worsen.  Please follow up with the primary care provider regarding your ED visit.     Thank you for letting us take care of you

## 2024-06-26 NOTE — PROGRESS NOTES
[facility]    06/26/2024    King Botello (39 y.o.) is followed by the [facility].      Assessment/Plan:    King Botello presents with a therapeutic INR. Goal INR: 2.5-3.5    Lab Results   Component Value Date    INR 2.5 (H) 06/26/2024    INR 2.0 06/20/2024    INR 3.5 06/13/2024       Assessment of patient findings per MA/LPN and chart review:   The following significant findings were found:   None    Recommendation for patient's warfarin regimen:   No change was made to warfarin therapy during this visit and patient has been instructed to continue their current warfarin regimen.    Recommended repeat INR in 1 week      Laine Glynn PharmD, BCPS  Clinical Pharmacist - [facility]  Preferred Contact: Secure Messaging or In Basket Message

## 2024-06-27 ENCOUNTER — PATIENT OUTREACH (OUTPATIENT)
Dept: EMERGENCY MEDICINE | Facility: HOSPITAL | Age: 39
End: 2024-06-27
Payer: COMMERCIAL

## 2024-06-27 NOTE — PROGRESS NOTES
Spoke with Pt regarding her recent ED visit for a nose bleed. Pt states she is doing  ok with no symptoms at this time.  She does not want to schedule anything at this time. ED navigator will follow-up with patient to assist as needed and to remind of upcoming appt's.

## 2024-07-04 LAB — INR PPP: 3.7

## 2024-07-05 ENCOUNTER — ANTI-COAG VISIT (OUTPATIENT)
Dept: CARDIOLOGY | Facility: CLINIC | Age: 39
End: 2024-07-05
Payer: COMMERCIAL

## 2024-07-05 DIAGNOSIS — Z79.01 LONG TERM (CURRENT) USE OF ANTICOAGULANTS: Primary | ICD-10-CM

## 2024-07-05 DIAGNOSIS — Z95.2 S/P MVR (MITRAL VALVE REPLACEMENT): ICD-10-CM

## 2024-07-05 DIAGNOSIS — I48.91 ATRIAL FIBRILLATION AND FLUTTER: ICD-10-CM

## 2024-07-05 DIAGNOSIS — I48.92 ATRIAL FIBRILLATION AND FLUTTER: ICD-10-CM

## 2024-07-05 NOTE — PROGRESS NOTES
[facility]    07/05/2024 8:42 AM    Assessment/Plan:    Patient presents today with supratherapeutic INR.    Assessment of patient findings and chart review: no significant findings     Recommendation for patient's warfarin regimen: Lower dose today to 6mg then resume current maintenance dose    Recommend repeat INR in 1 week  _________________________________________________________________    King Botello (39 y.o.) is followed by the [facility].    Anticoagulation Summary  As of 7/5/2024      INR goal:  2.5-3.5   TTR:  52.9% (5 mo)   INR used for dosing:  3.7 (7/4/2024)   Warfarin maintenance plan:  12 mg (4 mg x 3) every Tue, Thu, Sat; 8 mg (4 mg x 2) all other days   Weekly warfarin total:  68 mg   Plan last modified:  Laine Glynn, PharmD (4/25/2024)   Next INR check:  7/11/2024   Target end date:      Indications    Long term (current) use of anticoagulants [Z79.01]  S/P MVR (mitral valve replacement) [Z95.2]  Atrial fibrillation and flutter [I48.91  I48.92]                 Anticoagulation Episode Summary       INR check location:  Clinic Lab    Preferred lab:      Send INR reminders to:  [facility] COUMADIN MONITORING POOL    Comments:  Meter- tests on Thursday// [facility] Internal Med Lab           Anticoagulation Care Providers       Provider Role Specialty Phone number    Radha Hussein PA-C Referring Cardiothoracic Surgery [phone]    Eugene Cordero MD Responsible Cardiology [phone]

## 2024-07-08 ENCOUNTER — PATIENT MESSAGE (OUTPATIENT)
Dept: ELECTROPHYSIOLOGY | Facility: CLINIC | Age: 39
End: 2024-07-08
Payer: COMMERCIAL

## 2024-07-09 ENCOUNTER — PATIENT MESSAGE (OUTPATIENT)
Dept: CARDIOLOGY | Facility: CLINIC | Age: 39
End: 2024-07-09
Payer: COMMERCIAL

## 2024-07-09 ENCOUNTER — PATIENT MESSAGE (OUTPATIENT)
Dept: INTERNAL MEDICINE | Facility: CLINIC | Age: 39
End: 2024-07-09
Payer: COMMERCIAL

## 2024-07-09 NOTE — TELEPHONE ENCOUNTER
Pt states she was summoned for jury duty but is experiencing issues with afib and other cardiology issues and thinks this might happen while she is there. Pt is asking if Dr. Hansen will be able to excuse her

## 2024-07-11 ENCOUNTER — PATIENT MESSAGE (OUTPATIENT)
Dept: ELECTROPHYSIOLOGY | Facility: CLINIC | Age: 39
End: 2024-07-11
Payer: COMMERCIAL

## 2024-07-11 ENCOUNTER — TELEPHONE (OUTPATIENT)
Dept: ELECTROPHYSIOLOGY | Facility: CLINIC | Age: 39
End: 2024-07-11
Payer: COMMERCIAL

## 2024-07-11 LAB — INR PPP: 2.8

## 2024-07-11 NOTE — TELEPHONE ENCOUNTER
Remote alert received.  S/p DCCV 4/18/24 for AFL  On Tikosyn, Lopressor and Coumadin     Having PAFL  1% burden, max on 7/10/2024 for 4 hrs, average v rate 74 bpm.    Overall V rates for episodes over 1 hr are rate controlled.    Pt sent msg thru Portal stating she is having them in evening at work, which is correct based on times.    Tried to call pt to, LVM to let her know I added a device check to her upcoming appts on 7/22/2024 at 1100.         ___________________________

## 2024-07-12 ENCOUNTER — ANTI-COAG VISIT (OUTPATIENT)
Dept: CARDIOLOGY | Facility: CLINIC | Age: 39
End: 2024-07-12
Payer: COMMERCIAL

## 2024-07-12 DIAGNOSIS — Z95.2 S/P MVR (MITRAL VALVE REPLACEMENT): ICD-10-CM

## 2024-07-12 DIAGNOSIS — Z79.01 LONG TERM (CURRENT) USE OF ANTICOAGULANTS: Primary | ICD-10-CM

## 2024-07-12 DIAGNOSIS — I48.91 ATRIAL FIBRILLATION AND FLUTTER: ICD-10-CM

## 2024-07-12 DIAGNOSIS — I48.92 ATRIAL FIBRILLATION AND FLUTTER: ICD-10-CM

## 2024-07-12 NOTE — PROGRESS NOTES
[facility]    2024 8:14 AM    Assessment/Plan:    Patient presents today with therapeutic INR.    Assessment of patient findings and chart review: no changes noted    Recommendation for patient's warfarin regimen: Continue current maintenance dose    Recommend repeat INR in 1 week  _________________________________________________________________    King Botello (39 y.o.) is followed by the [facility].    Anticoagulation Summary  As of 2024      INR goal:  2.5-3.5   TTR:  54.0% (5.3 mo)   INR used for dosin.8 (2024)   Warfarin maintenance plan:  12 mg (4 mg x 3) every e, Thu, Sat; 8 mg (4 mg x 2) all other days   Weekly warfarin total:  68 mg   Plan last modified:  Laine Glynn, PharmD (2024)   Next INR check:  2024   Target end date:      Indications    Long term (current) use of anticoagulants [Z79.01]  S/P MVR (mitral valve replacement) [Z95.2]  Atrial fibrillation and flutter [I48.91  I48.92]                 Anticoagulation Episode Summary       INR check location:  Clinic Lab    Preferred lab:      Send INR reminders to:  [facility] COUMADIN MONITORING POOL    Comments:  Meter- tests on Thursday// [facility] Med Lab           Anticoagulation Care Providers       Provider Role Specialty Phone number    Radha Hussein PA-C Referring Cardiothoracic Surgery [phone]    Eugene Cordero MD Responsible Cardiology [phone]

## 2024-07-18 ENCOUNTER — CLINICAL SUPPORT (OUTPATIENT)
Dept: CARDIOLOGY | Facility: HOSPITAL | Age: 39
End: 2024-07-18
Payer: COMMERCIAL

## 2024-07-18 ENCOUNTER — CLINICAL SUPPORT (OUTPATIENT)
Dept: CARDIOLOGY | Facility: HOSPITAL | Age: 39
End: 2024-07-18
Attending: STUDENT IN AN ORGANIZED HEALTH CARE EDUCATION/TRAINING PROGRAM
Payer: COMMERCIAL

## 2024-07-18 ENCOUNTER — LAB VISIT (OUTPATIENT)
Dept: LAB | Facility: HOSPITAL | Age: 39
End: 2024-07-18
Payer: COMMERCIAL

## 2024-07-18 ENCOUNTER — ANTI-COAG VISIT (OUTPATIENT)
Dept: CARDIOLOGY | Facility: CLINIC | Age: 39
End: 2024-07-18
Payer: COMMERCIAL

## 2024-07-18 DIAGNOSIS — Z95.2 S/P MVR (MITRAL VALVE REPLACEMENT): ICD-10-CM

## 2024-07-18 DIAGNOSIS — I48.91 ATRIAL FIBRILLATION AND FLUTTER: ICD-10-CM

## 2024-07-18 DIAGNOSIS — Z79.01 LONG TERM (CURRENT) USE OF ANTICOAGULANTS: Primary | ICD-10-CM

## 2024-07-18 DIAGNOSIS — I48.92 ATRIAL FIBRILLATION AND FLUTTER: ICD-10-CM

## 2024-07-18 DIAGNOSIS — I48.92 UNSPECIFIED ATRIAL FLUTTER: ICD-10-CM

## 2024-07-18 DIAGNOSIS — Z79.01 LONG TERM (CURRENT) USE OF ANTICOAGULANTS: ICD-10-CM

## 2024-07-18 LAB
INR PPP: 2.4 (ref 0.8–1.2)
PROTHROMBIN TIME: 25 SEC (ref 9–12.5)

## 2024-07-18 PROCEDURE — 93296 REM INTERROG EVL PM/IDS: CPT | Performed by: STUDENT IN AN ORGANIZED HEALTH CARE EDUCATION/TRAINING PROGRAM

## 2024-07-18 PROCEDURE — 85610 PROTHROMBIN TIME: CPT | Performed by: INTERNAL MEDICINE

## 2024-07-18 PROCEDURE — 93294 REM INTERROG EVL PM/LDLS PM: CPT | Mod: ,,, | Performed by: STUDENT IN AN ORGANIZED HEALTH CARE EDUCATION/TRAINING PROGRAM

## 2024-07-18 PROCEDURE — 36415 COLL VENOUS BLD VENIPUNCTURE: CPT | Mod: PO | Performed by: INTERNAL MEDICINE

## 2024-07-18 PROCEDURE — 93793 ANTICOAG MGMT PT WARFARIN: CPT | Mod: S$GLB,,,

## 2024-07-18 NOTE — PROGRESS NOTES
[facility]    2024 4:29 PM    Assessment/Plan:    Patient presents today with subtherapeutic  INR.    Assessment of patient findings and chart review: reviewed    Recommendation for patient's warfarin regimen: Boost dose today to 14mg then resume current maintenance dose    Recommend repeat INR in 1 week with meter  _________________________________________________________________    King Botello (39 y.o.) is followed by the [facility].    Anticoagulation Summary  As of 2024      INR goal:  2.5-3.5   TTR:  54.9% (5.5 mo)   INR used for dosin.4 (2024)   Warfarin maintenance plan:  12 mg (4 mg x 3) every Tue, Thu, Sat; 8 mg (4 mg x 2) all other days   Weekly warfarin total:  68 mg   Plan last modified:  Laine Glynn, PharmD (2024)   Next INR check:  2024   Target end date:      Indications    Long term (current) use of anticoagulants [Z79.01]  S/P MVR (mitral valve replacement) [Z95.2]  Atrial fibrillation and flutter [I48.91  I48.92]                 Anticoagulation Episode Summary       INR check location:  Clinic Lab    Preferred lab:      Send INR reminders to:  [facility] COUMADIN MONITORING POOL    Comments:  Meter- tests on Thursday// [facility] Internal Med Lab           Anticoagulation Care Providers       Provider Role Specialty Phone number    Radha Hussein PA-C Referring Cardiothoracic Surgery [phone]    Eugene Cordero MD Responsible Cardiology [phone]

## 2024-07-22 ENCOUNTER — CLINICAL SUPPORT (OUTPATIENT)
Dept: CARDIOLOGY | Facility: HOSPITAL | Age: 39
End: 2024-07-22
Attending: STUDENT IN AN ORGANIZED HEALTH CARE EDUCATION/TRAINING PROGRAM
Payer: COMMERCIAL

## 2024-07-22 ENCOUNTER — OFFICE VISIT (OUTPATIENT)
Dept: ELECTROPHYSIOLOGY | Facility: CLINIC | Age: 39
End: 2024-07-22
Payer: COMMERCIAL

## 2024-07-22 VITALS
WEIGHT: 180.56 LBS | HEART RATE: 74 BPM | BODY MASS INDEX: 34.12 KG/M2 | SYSTOLIC BLOOD PRESSURE: 120 MMHG | DIASTOLIC BLOOD PRESSURE: 78 MMHG

## 2024-07-22 DIAGNOSIS — Z95.0 PACEMAKER: ICD-10-CM

## 2024-07-22 DIAGNOSIS — Z95.0 CARDIAC PACEMAKER IN SITU: ICD-10-CM

## 2024-07-22 DIAGNOSIS — Z79.899 ENCOUNTER FOR MONITORING DOFETILIDE THERAPY: ICD-10-CM

## 2024-07-22 DIAGNOSIS — I44.2 CHB (COMPLETE HEART BLOCK): ICD-10-CM

## 2024-07-22 DIAGNOSIS — I48.0 PAROXYSMAL ATRIAL FIBRILLATION: Primary | ICD-10-CM

## 2024-07-22 DIAGNOSIS — I49.49 JUNCTIONAL ESCAPE BEATS: ICD-10-CM

## 2024-07-22 DIAGNOSIS — Z51.81 ENCOUNTER FOR MONITORING COUMADIN THERAPY: ICD-10-CM

## 2024-07-22 DIAGNOSIS — Z95.2 S/P MVR (MITRAL VALVE REPLACEMENT): ICD-10-CM

## 2024-07-22 DIAGNOSIS — I05.0 SEVERE MITRAL STENOSIS BY PRIOR ECHOCARDIOGRAM: ICD-10-CM

## 2024-07-22 DIAGNOSIS — I48.4 ATYPICAL ATRIAL FLUTTER: ICD-10-CM

## 2024-07-22 DIAGNOSIS — Z79.01 ENCOUNTER FOR MONITORING COUMADIN THERAPY: ICD-10-CM

## 2024-07-22 DIAGNOSIS — I44.2 COMPLETE HEART BLOCK: ICD-10-CM

## 2024-07-22 DIAGNOSIS — E66.9 CLASS 1 OBESITY WITH BODY MASS INDEX (BMI) OF 33.0 TO 33.9 IN ADULT, UNSPECIFIED OBESITY TYPE, UNSPECIFIED WHETHER SERIOUS COMORBIDITY PRESENT: ICD-10-CM

## 2024-07-22 DIAGNOSIS — Z51.81 ENCOUNTER FOR MONITORING DOFETILIDE THERAPY: ICD-10-CM

## 2024-07-22 DIAGNOSIS — Z95.2 H/O MITRAL VALVE REPLACEMENT WITH MECHANICAL VALVE: ICD-10-CM

## 2024-07-22 DIAGNOSIS — I10 PRIMARY HYPERTENSION: ICD-10-CM

## 2024-07-22 DIAGNOSIS — I50.32 CHRONIC HEART FAILURE WITH PRESERVED EJECTION FRACTION: ICD-10-CM

## 2024-07-22 DIAGNOSIS — Z98.890 H/O TRICUSPID VALVE ANNULOPLASTY: ICD-10-CM

## 2024-07-22 PROCEDURE — 3044F HG A1C LEVEL LT 7.0%: CPT | Mod: CPTII,S$GLB,, | Performed by: STUDENT IN AN ORGANIZED HEALTH CARE EDUCATION/TRAINING PROGRAM

## 2024-07-22 PROCEDURE — 3074F SYST BP LT 130 MM HG: CPT | Mod: CPTII,S$GLB,, | Performed by: STUDENT IN AN ORGANIZED HEALTH CARE EDUCATION/TRAINING PROGRAM

## 2024-07-22 PROCEDURE — 3078F DIAST BP <80 MM HG: CPT | Mod: CPTII,S$GLB,, | Performed by: STUDENT IN AN ORGANIZED HEALTH CARE EDUCATION/TRAINING PROGRAM

## 2024-07-22 PROCEDURE — 3008F BODY MASS INDEX DOCD: CPT | Mod: CPTII,S$GLB,, | Performed by: STUDENT IN AN ORGANIZED HEALTH CARE EDUCATION/TRAINING PROGRAM

## 2024-07-22 PROCEDURE — 99214 OFFICE O/P EST MOD 30 MIN: CPT | Mod: S$GLB,,, | Performed by: STUDENT IN AN ORGANIZED HEALTH CARE EDUCATION/TRAINING PROGRAM

## 2024-07-22 PROCEDURE — 93280 PM DEVICE PROGR EVAL DUAL: CPT

## 2024-07-22 PROCEDURE — 99999 PR PBB SHADOW E&M-EST. PATIENT-LVL II: CPT | Mod: PBBFAC,,, | Performed by: STUDENT IN AN ORGANIZED HEALTH CARE EDUCATION/TRAINING PROGRAM

## 2024-07-22 RX ORDER — DOFETILIDE 0.25 MG/1
250 CAPSULE ORAL EVERY 12 HOURS
Qty: 180 CAPSULE | Refills: 3 | Status: SHIPPED | OUTPATIENT
Start: 2024-07-22 | End: 2025-07-22

## 2024-07-22 RX ORDER — ASPIRIN 81 MG/1
81 TABLET ORAL DAILY
Qty: 30 TABLET | Refills: 11 | Status: SHIPPED | OUTPATIENT
Start: 2024-07-22 | End: 2025-07-22

## 2024-07-22 RX ORDER — METOPROLOL TARTRATE 25 MG/1
25 TABLET, FILM COATED ORAL 2 TIMES DAILY PRN
Qty: 180 TABLET | Refills: 3 | Status: SHIPPED | OUTPATIENT
Start: 2024-07-22 | End: 2025-07-22

## 2024-07-22 RX ORDER — METOPROLOL SUCCINATE 100 MG/1
100 TABLET, EXTENDED RELEASE ORAL DAILY
Qty: 90 TABLET | Refills: 3 | Status: SHIPPED | OUTPATIENT
Start: 2024-07-22 | End: 2025-07-22

## 2024-07-22 NOTE — PROGRESS NOTES
Electrophysiology Clinic Note    Reason for follow-up patient visit: Ongoing evaluation and routine device surveillance of a Medtronic dual-chamber pacemaker with a left bundle branch area pacing (LBBAP) lead, implanted 2024 in the setting of postoperative atrial flutter and complete heart block with a junctional escape of ~50 bpm with persistent AV dissociation following a mitral valve replacement with a mechanical mitral valve and a tricuspid valve annuloplasty on 1/10/2024.    PRESENTING HISTORY:     History of Present Illness:  Ms. King Botello is a hermelindo 39-year-old woman who returns to clinic today for ongoing evaluation and routine device surveillance of a Medtronic dual-chamber pacemaker with a left bundle branch area pacing (LBBAP) lead, implanted 2024 in the setting of postoperative atrial flutter and complete heart block with a junctional escape of ~50 bpm with persistent AV dissociation following a mitral valve replacement with a mechanical mitral valve and a tricuspid valve annuloplasty on 1/10/2024. She has a past medical history significant for mitral valve prolapse with mitral insufficiency with a previous mitral valve annuloplasty in [place] in , subsequent severe mitral valve stenosis, s/p mitral valve replacement with a mechanical mitral valve and a tricuspid valve annuloplasty on 1/10/2024 with Dr. Liu, postoperative atrial flutter and complete heart block with a junctional escape of ~50 bpm with persistent AV dissociation following her surgery, s/p implantation of a Medtronic dual-chamber pacemaker with a left bundle branch area pacing (LBBAP) lead for physiologic pacing on 2024, hypertension, previous episodes of decompensated heart failure with preserved systolic function in the postpartum period with her most recent LVEF 55-60% following her  on 2023, and obesity.     Unfortunately, she continued to evidence atrial flutter following her surgery,  and underwent a successful MINERVA and cardioversion on 4/18/2024. She has remained on coumadin therapy. She returned to symptomatic atrial flutter intermittently on repeat device interrogations, and given her preserved systolic function and young age, we avoided amiodarone administration. She was admitted for dofetilide loading and is presently well-maintained in sinus rhythm on dofetilide 250mcg po BID with stable and acceptable QT/QTc intervals. She has continued to voice intermittent palpitations, and can tell when she is experiencing an event of atrial fibrillation. Her overall burden of AT/AF is 5.8% on device interrogation today, significantly improved following dofetilide initiation.      In discussion with Ms. Botello today, she tells me that she is feeling overall quite well. She denies any episodes of dizziness, lightheadedness, syncope/presyncope, chest pain or chest discomfort, nausea or vomiting, orthopnea, or PND. She reports brief, intermittent palpitations that do not limit her functioning, lasting for about 3-4 hours during her events of paroxysmal atrial fibrillation. She reports baseline shortness of breath and dyspnea with exertion that has remained stable and is improving following her prior surgery. She can climb one flight of stairs prior to needing to take a break and continues to attempt to increase her level of physical activity. 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     Review of Systems:  Review of Systems   Constitutional:  Negative for activity change.   HENT:  Negative for nasal congestion, nosebleeds, postnasal drip, rhinorrhea, sinus pressure/congestion, sneezing and sore throat.    Respiratory:  Positive for shortness of breath. Negative for apnea, cough, chest tightness and wheezing.    Cardiovascular:  Positive for palpitations. Negative for chest pain, leg swelling and claudication.   Gastrointestinal:  Negative for abdominal distention, abdominal pain, blood in  stool, change in bowel habit, constipation, diarrhea, nausea and vomiting.   Genitourinary:  Negative for dysuria and hematuria.   Musculoskeletal:  Positive for arthralgias and back pain. Negative for gait problem.   Neurological:  Negative for dizziness, seizures, syncope, weakness, light-headedness, headaches and coordination difficulties.        PAST HISTORY:     Past Medical History:   Diagnosis Date    Abnormal Pap smear     Colposcopy    Atrial fibrillation and flutter 2024    Bradycardia 2020    Class 2 obesity due to excess calories with body mass index (BMI) of 35.0 to 35.9 in adult 3/9/2021    Hypertension, essential 3/16/2021    Menarche     Age of onset 11    Missed ab 2021    Mitral valve prolapse     Previous  delivery affecting pregnancy, antepartum 2020    S/P suction D&C 2021    Status post mitral valve annuloplasty 2017       Past Surgical History:   Procedure Laterality Date    A-V CARDIAC PACEMAKER INSERTION N/A 2024    Procedure: INSERTION, CARDIAC PACEMAKER, DUAL CHAMBER;  Surgeon: CRISTI Moore MD;  Location: [facility] EP LAB;  Service: Cardiology;  Laterality: N/A;  CHB, dPPM with LBBP, MDT, ANES, EH, RM [id number]     SECTION       SECTION WITH TUBAL LIGATION Bilateral 2023    Procedure:  SECTION, WITH TUBAL LIGATION;  Surgeon: Mane Moreno MD;  Location: [facility] L&D;  Service: OB/GYN;  Laterality: Bilateral;    DILATION AND CURETTAGE OF UTERUS USING SUCTION N/A 2021    Procedure: DILATION AND CURETTAGE, UTERUS, USING SUCTION;  Surgeon: Mane Moreno MD;  Location: [facility] OR;  Service: OB/GYN;  Laterality: N/A;    ECHOCARDIOGRAM,TRANSESOPHAGEAL N/A 2024    Procedure: Transesophageal echo (MINERVA) intra-procedure log documentation;  Surgeon: Page George MD;  Location: [facility] EP LAB;  Service: Cardiology;  Laterality: N/A;  AF, MINERVA/DCCV, MAC, EH, 3prep *MDT D-C PPM in situ*    MITRAL VALVE REPAIR      MITRAL  VALVE REPLACEMENT N/A 1/8/2024    Procedure: MITRAL VALVE REPLACEMENT, REDO STERNOTOMY;  Surgeon: Thierno Liu MD;  Location: [facility] OR [address] [place] [address];  Service: Cardiothoracic;  Laterality: N/A;    TRANSESOPHAGEAL ECHOCARDIOGRAPHY N/A 1/17/2024    Procedure: ECHOCARDIOGRAM, TRANSESOPHAGEAL;  Surgeon: Jessee Ahumada MD;  Location: [facility] EP LAB;  Service: Cardiology;  Laterality: N/A;    TREATMENT OF CARDIAC ARRHYTHMIA N/A 1/17/2024    Procedure: Cardioversion or Defibrillation;  Surgeon: CRISTI Moore MD;  Location: [facility] EP LAB;  Service: Cardiology;  Laterality: N/A;    TREATMENT OF CARDIAC ARRHYTHMIA N/A 4/18/2024    Procedure: Cardioversion or Defibrillation;  Surgeon: CRISTI Moore MD;  Location: [facility] EP LAB;  Service: Cardiology;  Laterality: N/A;  AF, MINERVA/DCCV, MAC, EH, 3prep *MDT D-C PPM in situ*    VALVULOPLASTY, TRICUSPID N/A 1/8/2024    Procedure: VALVULOPLASTY, TRICUSPID REPAIR;  Surgeon: Thierno Liu MD;  Location: [facility] OR [address] [place] [address];  Service: Cardiothoracic;  Laterality: N/A;       Family History:  Family History   Problem Relation Name Age of Onset    Heart disease Mother      Hypertension Mother      Hyperlipidemia Mother      Diabetes Mother      Cancer Mother      Hyperlipidemia Father      Hypertension Father      Breast cancer Paternal Aunt      Colon cancer Neg Hx      Ovarian cancer Neg Hx         Social History:  She  reports that she has never smoked. She has never used smokeless tobacco. She reports that she does not currently use alcohol. She reports that she does not use drugs.      MEDICATIONS & ALLERGIES:     Review of patient's allergies indicates:   Allergen Reactions    Cinnamon Itching    Doxycycline      Abdomen pain severe       Current Outpatient Medications on File Prior to Visit   Medication Sig Dispense Refill    aspirin (ECOTRIN) 81 MG EC tablet Take 1 tablet (81 mg total) by mouth once daily. 30 tablet 11    dofetilide (TIKOSYN) 250 MCG Cap Take 1 capsule  (250 mcg total) by mouth every 12 (twelve) hours. 60 capsule 11    metoprolol tartrate (LOPRESSOR) 25 MG tablet Take 1 tablet (25 mg total) by mouth 2 (two) times daily. 60 tablet 11    warfarin (COUMADIN) 4 MG tablet Take 2-3 tablets (8-12 mg total) by mouth Daily. As directed by the [facility] 90 tablet 5     No current facility-administered medications on file prior to visit.        OBJECTIVE:     Vital Signs:  /78   Pulse 74   Wt 81.9 kg (180 lb 8.9 oz)   BMI 34.12 kg/m²     Physical Exam:  Physical Exam  Constitutional:       General: She is not in acute distress.     Appearance: Normal appearance. She is obese. She is not ill-appearing or diaphoretic.      Comments: Well-appearing woman in NAD.   HENT:      Head: Normocephalic and atraumatic.      Nose: Nose normal.      Mouth/Throat:      Mouth: Mucous membranes are moist.      Pharynx: Oropharynx is clear.   Eyes:      Pupils: Pupils are equal, round, and reactive to light.   Cardiovascular:      Rate and Rhythm: Normal rate and regular rhythm.      Pulses: Normal pulses.      Heart sounds: Normal heart sounds. No murmur heard.     No friction rub. No gallop.      Comments: Crisp mechanical valve sounds appreciated. Her left anterior chest wall incision remains in excellent repair, with her device freely mobile within the pocket with no evidence of device adhesion or erosion. Her sternotomy incision is in excellent repair.   Pulmonary:      Effort: Pulmonary effort is normal. No respiratory distress.      Breath sounds: Normal breath sounds. No wheezing, rhonchi or rales.   Chest:      Chest wall: No tenderness.   Abdominal:      General: There is no distension.      Palpations: Abdomen is soft.      Tenderness: There is no abdominal tenderness.   Musculoskeletal:         General: No swelling or tenderness.      Cervical back: Normal range of motion.      Right lower leg: No edema.      Left lower leg: No edema.   Skin:     General: Skin is  warm and dry.      Findings: No erythema, lesion or rash.   Neurological:      General: No focal deficit present.      Mental Status: She is alert and oriented to person, place, and time. Mental status is at baseline.      Motor: No weakness.      Gait: Gait normal.   Psychiatric:         Mood and Affect: Mood normal.         Behavior: Behavior normal.          Laboratory Data:  Lab Results   Component Value Date    WBC 5.94 06/26/2024    HGB 12.1 06/26/2024    HCT 37.3 06/26/2024    MCV 82 06/26/2024     06/26/2024     Lab Results   Component Value Date    GLU 95 06/26/2024     06/26/2024    K 4.9 06/26/2024     06/26/2024    CO2 20 (L) 06/26/2024    BUN 11 06/26/2024    CREATININE 0.6 06/26/2024    CALCIUM 9.3 06/26/2024    MG 2.0 05/02/2024     Lab Results   Component Value Date    INR 2.4 (H) 07/18/2024    INR 2.8 07/11/2024    INR 3.7 07/04/2024       Pertinent Cardiac Data:  ECG: Atrially-sensed, LBBAP-paced rhythm with rate of 90 bpm, AV delay approximately 160 ms, LBBAP-paced QRS 94 ms, QT/QTc 414/506 ms.     Resting 2D Transthoracic Echocardiogram - 1/4/2024:    Left Ventricle: The left ventricle is normal in size. Normal wall thickness. Normal wall motion. There is normal systolic function with a visually estimated ejection fraction of 60 - 65%. There is normal diastolic function.    Right Ventricle: Normal right ventricular cavity size. Wall thickness is normal. Right ventricle wall motion  is normal. Systolic function is normal.    Left Atrium: Left atrium is severely dilated.    Right Atrium: Right atrium is mildly dilated.    Mitral Valve: There is severe bileaflet sclerosis. Severely thickened leaflets. Moderately calcified leaflets. Severely restricted motion. There is severe stenosis with MVA  1.16 and MG 13 mmHg at HR 84.. There is trace regurgitation.    Tricuspid Valve: There is mild regurgitation.    Pulmonary Artery: The estimated pulmonary artery systolic pressure is 28  mmHg.    IVC/SVC: Normal venous pressure at 3 mmHg.    Resting 2D Transthoracic Echocardiogram - 2/26/2024:    Left Ventricle: The left ventricle is normal in size. Normal wall thickness. Normal wall motion. There is normal systolic function with a visually estimated ejection fraction of 55 - 70%. Ejection fraction by visual approximation is 63%. Diastolic function cannot be reliably determined in the presence of mitral valve disease.    Right Ventricle: Normal right ventricular cavity size. Wall thickness is normal. Right ventricle wall motion  is normal. Systolic function is normal. Pacemaker lead present in the ventricle.    Right Atrium: Right atrium is mildly dilated. Lead present in the right atrium.    Mitral Valve: There is a mechanical valve in the mitral position. There is normal leaflet mobility. The mean pressure gradient across the mitral valve is 5 mmHg at a heart rate of 85  bpm; MVA 3. There is no significant regurgitation.    Tricuspid Valve: The tricuspid valve is repaired. The MG was 4 mmHg at PHR 85; TVA 2.57    IVC/SVC: Normal venous pressure at 3 mmHg.    MINERVA - 4/18/2024:    Left Atrium: Left atrium is dilated. The left atrial appendage has a windsock morphology. Appendage velocity is reduced at less than 40 cm/sec. There is no thrombus in the left atrial appendage. Moderate SEC without any thrombus seen in LOBO. contrast was used.    Left Ventricle: There is normal systolic function with a visually estimated ejection fraction of 55 - 60%.    Right Ventricle: Pacemaker lead present in the ventricle.    Right Atrium: Right atrium is dilated. Lead present in the right atrium.    Mitral Valve: There is a mechanical valve in the mitral position. It is reported to be a 25 mm carbomedics valve. There is mild regurgitation. M gradient not obstained.    Tricuspid Valve: The tricuspid valve is repaired by annular ring(26 Medtronic band). Mean gradient is 2 mmHg at HR of 99bpm. .    Aorta: Aortic root is  normal in size measuring 2.8 cm. Ascending aorta is normal measuring 2.4 cm.  1)  Mitral valve replacement with a 25 mm Carbomedics standard  2)  Tricuspid valve repair with a 26 mm Medtronic Contour annuloplasty band   3)  Reoperation    Device Interrogation: Personal review of her device interrogation report (Medtronic [place] S  RHODA, implanted 1/17/2024) reveals adequate battery longevity with an estimated 14.7 years of battery life remaining. Her leads were interrogated with acceptable and stable lead parameters with monitoring at both high and low output and unipolar and bipolar settings on her LBBAP. She is currently AS-LBBAS with underlying normal sinus rhythm at a rate of 75 bpm. She is RA paced 0.8% and LBBAP paced 0.3%. There are no concerning AHR or VHR episodes noted. She has continued to have periods of paroxysmal atrial fibrillation and atrial flutter with an overall burden of AT/AF of 5.8%.        ASSESSMENT & PLAN:   Ms. King Botello is a hermelindo 39-year-old woman who returns to clinic today for ongoing evaluation and routine device surveillance of a Medtronic dual-chamber pacemaker with a left bundle branch area pacing (LBBAP) lead, implanted 1/17/2024 in the setting of postoperative atrial flutter and complete heart block with a junctional escape of ~50 bpm with persistent AV dissociation following a mitral valve replacement with a mechanical mitral valve and a tricuspid valve annuloplasty on 1/10/2024. She has a past medical history significant for mitral valve prolapse with mitral insufficiency with a previous mitral valve annuloplasty in [place] in 2003, subsequent severe mitral valve stenosis, s/p mitral valve replacement with a mechanical mitral valve and a tricuspid valve annuloplasty on 1/10/2024 with Dr. Liu, postoperative atrial flutter and complete heart block with a junctional escape of ~50 bpm with persistent AV dissociation following her surgery, s/p implantation of a  Medtronic dual-chamber pacemaker with a left bundle branch area pacing (LBBAP) lead for physiologic pacing on 2024, hypertension, previous episodes of decompensated heart failure with preserved systolic function in the postpartum period with her most recent LVEF 55-60% following her  on 2023, and obesity.     - She has a normally functioning dual-chamber pacemaker utilizing a LBBAP lead on device interrogation today. She remains in underlying normal sinus rhythm and is presently AP/AS-LBBAS. She is RA paced 0.8% and LBBAP paced 0.3%. There are no concerning AHR or VHR episodes noted. She has continued to record episodes of paroxysmal atrial fibrillation and atrial flutter with an overall burden of AT/AF of 5.8%, significantly improved while on dofetilide therapy. Her paced QRSd remains narrow at 94ms, with her intrinsic QRSd remaining 76ms. Her systolic function remains preserved, with no indication for device upgrade.   - We continue to discussed the pathophysiology of atrial fibrillation; specifically, we discussed paroxysmal atrial fibrillation and the concept that some patients may experience paroxysms of atrial fibrillation interrupting periods of sinus rhythm. We discussed that even a relatively low burden of atrial fibrillation continues to have an increased risk of CVA.   - She remains on metoprolol tartrate 25mg po TID for rate control. She would prefer to transition to succinate for ease of use. We will prescribe metoprolol succinate 100mg po daily.   - She remains on dofetilide 250mcg po BID with significant improvement in her overall burden of atrial fibrillation. Her most recent echocardiogram was reviewed, with preserved systolic function, LVEF 55-60%. Her renal function remains preserved, with acceptable electrolytes and ECG parameters while on dofetilide therapy. A repeat surveillance echocardiogram was ordered for ongoing surveillance of her systolic function and recent mechanical  mitral valve.   - She remains on coumadin and aspirin therapy in the setting of her mechanical mitral valve with no adverse bleeding events reported.   - Possible underlying drivers of atrial fibrillation were addressed at this appointment, including recommendations for maintenance of a healthy BMI - now a class I recommendation. Review of laboratory data revealed acceptable TSH at 0.819.   - She will continue to follow with her PCP, general cardiology team, and valve specialist for ongoing management of her comorbid conditions.       This patient will return to clinic with me in six months with continued dofetilide and metoprolol succinate therapy, with uninterrupted coumadin anticoagulation. We will continue remote transmissions with her next in-office interrogation in six months. All questions and concerns were addressed at this encounter.     Signing Physician:       NEMESIO Moore MD  Electrophysiology Attending

## 2024-07-22 NOTE — PROGRESS NOTES
7/22-Pt's Metoprolol was changed to lopressor as of 7/22 and also states that she is coming down with a cold and wants to know if she can take Tylenol Cold and Flu for it.

## 2024-07-23 ENCOUNTER — PATIENT MESSAGE (OUTPATIENT)
Dept: ELECTROPHYSIOLOGY | Facility: CLINIC | Age: 39
End: 2024-07-23
Payer: COMMERCIAL

## 2024-07-23 LAB
OHS QRS DURATION: 76 MS
OHS QTC CALCULATION: 468 MS

## 2024-07-25 ENCOUNTER — ANTI-COAG VISIT (OUTPATIENT)
Dept: CARDIOLOGY | Facility: CLINIC | Age: 39
End: 2024-07-25
Payer: COMMERCIAL

## 2024-07-25 DIAGNOSIS — I48.91 ATRIAL FIBRILLATION AND FLUTTER: ICD-10-CM

## 2024-07-25 DIAGNOSIS — I48.92 ATRIAL FIBRILLATION AND FLUTTER: ICD-10-CM

## 2024-07-25 DIAGNOSIS — Z95.2 S/P MVR (MITRAL VALVE REPLACEMENT): ICD-10-CM

## 2024-07-25 DIAGNOSIS — Z79.01 LONG TERM (CURRENT) USE OF ANTICOAGULANTS: Primary | ICD-10-CM

## 2024-07-25 LAB
INR PPP: 3.7
OHS CV AF BURDEN PERCENT: 5.8
OHS CV AF BURDEN PERCENT: 9.1
OHS CV DC REMOTE DEVICE TYPE: NORMAL
OHS CV DC REMOTE DEVICE TYPE: NORMAL
OHS CV ICD SHOCK: NO
OHS CV RV PACING PERCENT: 0.3 %
OHS CV RV PACING PERCENT: 0.3 %

## 2024-07-25 PROCEDURE — 93793 ANTICOAG MGMT PT WARFARIN: CPT | Mod: S$GLB,,,

## 2024-07-25 NOTE — PROGRESS NOTES
[facility]    07/25/2024 3:51 PM    Assessment/Plan:    Patient presents today with supratherapeutic INR.    Assessment of patient findings and chart review: INR not at goal. Per questioning, patient has been taking 500 to 1000 mg of Acetaminophen per day for aches/pain/cold since 7/22. She plans on continuing for a few more days. Informed her that we will test more frequently to be able to monitor her closely.     Recommendation for patient's warfarin regimen: Lower dose today to 4mg then resume current maintenance dose    Recommend repeat INR in 4 days  _________________________________________________________________    iKng Botello (39 y.o.) is followed by the [facility].    Anticoagulation Summary  As of 7/25/2024      INR goal:  2.5-3.5   TTR:  55.7% (5.7 mo)   INR used for dosing:  3.7 (7/25/2024)   Warfarin maintenance plan:  12 mg (4 mg x 3) every Tue, Thu, Sat; 8 mg (4 mg x 2) all other days   Weekly warfarin total:  68 mg   Plan last modified:  Laine Glynn, PharmD (4/25/2024)   Next INR check:  7/29/2024   Target end date:      Indications    Long term (current) use of anticoagulants [Z79.01]  S/P MVR (mitral valve replacement) [Z95.2]  Atrial fibrillation and flutter [I48.91  I48.92]                 Anticoagulation Episode Summary       INR check location:  Clinic Lab    Preferred lab:      Send INR reminders to:  [facility] COUMADIN MONITORING POOL    Comments:  Meter- tests on Thursday// [facility]           Anticoagulation Care Providers       Provider Role Specialty Phone number    Radha Hussein PA-C Referring Cardiothoracic Surgery [phone]    Eugene Cordero MD Responsible Cardiology [phone]

## 2024-07-26 ENCOUNTER — OFFICE VISIT (OUTPATIENT)
Dept: INTERNAL MEDICINE | Facility: CLINIC | Age: 39
End: 2024-07-26
Payer: COMMERCIAL

## 2024-07-26 VITALS
DIASTOLIC BLOOD PRESSURE: 62 MMHG | WEIGHT: 178.13 LBS | HEIGHT: 61 IN | SYSTOLIC BLOOD PRESSURE: 100 MMHG | BODY MASS INDEX: 33.63 KG/M2

## 2024-07-26 DIAGNOSIS — R09.81 NASAL CONGESTION: ICD-10-CM

## 2024-07-26 DIAGNOSIS — J02.9 SORE THROAT: Primary | ICD-10-CM

## 2024-07-26 LAB
CTP QC/QA: YES
CTP QC/QA: YES
MOLECULAR STREP A: NEGATIVE
SARS-COV-2 RDRP RESP QL NAA+PROBE: NEGATIVE

## 2024-07-26 PROCEDURE — 99999 PR PBB SHADOW E&M-EST. PATIENT-LVL III: CPT | Mod: PBBFAC,,, | Performed by: INTERNAL MEDICINE

## 2024-07-26 RX ORDER — IBUPROFEN 100 MG/5ML
1000 SUSPENSION, ORAL (FINAL DOSE FORM) ORAL DAILY PRN
COMMUNITY
Start: 2024-07-26

## 2024-07-26 RX ORDER — LORATADINE 10 MG/1
10 TABLET ORAL 2 TIMES DAILY PRN
COMMUNITY
Start: 2024-07-26

## 2024-07-26 RX ORDER — IPRATROPIUM BROMIDE 21 UG/1
2 SPRAY, METERED NASAL 2 TIMES DAILY PRN
Qty: 30 ML | Refills: 0 | Status: SHIPPED | OUTPATIENT
Start: 2024-07-26

## 2024-07-26 NOTE — PROGRESS NOTES
INTERNAL MEDICINE CLINIC - SAME DAY APPOINTMENT  Progress Note    PRESENTING HISTORY     PCP: Mayco Hansen MD    Chief Complaint/Reason for Visit:     Chief Complaint   Patient presents with    Sore Throat      History of Present Illness & ROS : Ms. King Botello is a 39 y.o. female.      Started with cold symptoms Monday.  She has sore throat and stuffy nose.   Occasional green mucous.  No chest pain or SOB.  No fever.    Took Tylenol for pain.      PAST HISTORY:     Past Medical History:   Diagnosis Date    Abnormal Pap smear     Colposcopy    Atrial fibrillation and flutter 2024    Bradycardia 2020    Class 2 obesity due to excess calories with body mass index (BMI) of 35.0 to 35.9 in adult 3/9/2021    Hypertension, essential 3/16/2021    Menarche     Age of onset 11    Missed ab 2021    Mitral valve prolapse     Previous  delivery affecting pregnancy, antepartum 2020    S/P suction D&C 2021    Status post mitral valve annuloplasty 2017       Past Surgical History:   Procedure Laterality Date    A-V CARDIAC PACEMAKER INSERTION N/A 2024    Procedure: INSERTION, CARDIAC PACEMAKER, DUAL CHAMBER;  Surgeon: CRISTI Moore MD;  Location: [facility] EP LAB;  Service: Cardiology;  Laterality: N/A;  CHB, dPPM with LBBP, MDT, ANES, [place], [place] [id number]     SECTION       SECTION WITH TUBAL LIGATION Bilateral 2023    Procedure:  SECTION, WITH TUBAL LIGATION;  Surgeon: Mane Moreno MD;  Location: [facility] L&D;  Service: OB/GYN;  Laterality: Bilateral;    DILATION AND CURETTAGE OF UTERUS USING SUCTION N/A 2021    Procedure: DILATION AND CURETTAGE, UTERUS, USING SUCTION;  Surgeon: Mane Moreno MD;  Location: [facility] OR;  Service: OB/GYN;  Laterality: N/A;    ECHOCARDIOGRAM,TRANSESOPHAGEAL N/A 2024    Procedure: Transesophageal echo (MINERVA) intra-procedure log documentation;  Surgeon: Page George MD;  Location: [facility] EP LAB;  Service:  Cardiology;  Laterality: N/A;  AF, MINERVA/DCCV, MAC, EH, 3prep *MDT D-C PPM in situ*    MITRAL VALVE REPAIR      MITRAL VALVE REPLACEMENT N/A 1/8/2024    Procedure: MITRAL VALVE REPLACEMENT, REDO STERNOTOMY;  Surgeon: Thierno Liu MD;  Location: [facility] OR [address] [place] [address];  Service: Cardiothoracic;  Laterality: N/A;    TRANSESOPHAGEAL ECHOCARDIOGRAPHY N/A 1/17/2024    Procedure: ECHOCARDIOGRAM, TRANSESOPHAGEAL;  Surgeon: Jessee Ahumada MD;  Location: [facility] EP LAB;  Service: Cardiology;  Laterality: N/A;    TREATMENT OF CARDIAC ARRHYTHMIA N/A 1/17/2024    Procedure: Cardioversion or Defibrillation;  Surgeon: CRISTI Moore MD;  Location: [facility] EP LAB;  Service: Cardiology;  Laterality: N/A;    TREATMENT OF CARDIAC ARRHYTHMIA N/A 4/18/2024    Procedure: Cardioversion or Defibrillation;  Surgeon: CRISTI Moore MD;  Location: [facility] EP LAB;  Service: Cardiology;  Laterality: N/A;  AF, MINERVA/DCCV, MAC, EH, 3prep *MDT D-C PPM in situ*    VALVULOPLASTY, TRICUSPID N/A 1/8/2024    Procedure: VALVULOPLASTY, TRICUSPID REPAIR;  Surgeon: Thierno Liu MD;  Location: [facility] OR [address] [place] [address];  Service: Cardiothoracic;  Laterality: N/A;       Family History   Problem Relation Name Age of Onset    Heart disease Mother      Hypertension Mother      Hyperlipidemia Mother      Diabetes Mother      Cancer Mother      Hyperlipidemia Father      Hypertension Father      Breast cancer Paternal Aunt      Colon cancer Neg Hx      Ovarian cancer Neg Hx         Social History     Socioeconomic History    Marital status:    Occupational History    Occupation: xray tech   Tobacco Use    Smoking status: Never    Smokeless tobacco: Never   Substance and Sexual Activity    Alcohol use: Not Currently     Comment: not since +UPT    Drug use: No    Sexual activity: Yes     Partners: Male     Birth control/protection: None     Social Determinants of Health     Financial Resource Strain: Low Risk  (6/27/2024)    Overall Financial Resource Strain  (CARDIA)     Difficulty of Paying Living Expenses: Not very hard   Food Insecurity: No Food Insecurity (6/27/2024)    Hunger Vital Sign     Worried About Running Out of Food in the Last Year: Never true     Ran Out of Food in the Last Year: Never true   Transportation Needs: No Transportation Needs (6/27/2024)    PRAPARE - Transportation     Lack of Transportation (Medical): No     Lack of Transportation (Non-Medical): No   Physical Activity: Unknown (2/29/2024)    Exercise Vital Sign     Days of Exercise per Week: Patient declined     Minutes of Exercise per Session: 0 min   Recent Concern: Physical Activity - Inactive (1/30/2024)    Exercise Vital Sign     Days of Exercise per Week: 0 days     Minutes of Exercise per Session: 0 min   Stress: No Stress Concern Present (6/27/2024)    Citizen of [place] of Occupational Health - Occupational Stress Questionnaire     Feeling of Stress : Not at all   Recent Concern: Stress - Stress Concern Present (5/31/2024)    Received from [facility]    Citizen of [place] of Occupational Health - Occupational Stress Questionnaire     Feeling of Stress : To some extent   Housing Stability: Low Risk  (6/27/2024)    Housing Stability Vital Sign     Unable to Pay for Housing in the Last Year: No     Homeless in the Last Year: No       MEDICATIONS & ALLERGIES:     Current Outpatient Medications on File Prior to Visit   Medication Sig Dispense Refill    aspirin (ECOTRIN) 81 MG EC tablet Take 1 tablet (81 mg total) by mouth once daily. 30 tablet 11    dofetilide (TIKOSYN) 250 MCG Cap Take 1 capsule (250 mcg total) by mouth every 12 (twelve) hours. 180 capsule 3    metoprolol succinate (TOPROL-XL) 100 MG 24 hr tablet Take 1 tablet (100 mg total) by mouth once daily. 90 tablet 3    metoprolol tartrate (LOPRESSOR) 25 MG tablet Take 1 tablet (25 mg total) by mouth 2 (two) times daily as needed (Palpitations). 180 tablet 3    warfarin (COUMADIN) 4 MG tablet Take 2-3  tablets (8-12 mg total) by mouth Daily. As directed by the [facility] 90 tablet 5     No current facility-administered medications on file prior to visit.        Review of patient's allergies indicates:   Allergen Reactions    Cinnamon Itching    Doxycycline      Abdomen pain severe       Medications Reconciliation:   I have reconciled the patient's home medications with the patient/family. I have updated all changes.  Refer to After-Visit Medication List.    OBJECTIVE:     Vital Signs:  Vitals:    07/26/24 1109   BP: 100/62     Wt Readings from Last 3 Encounters:   07/26/24 1109 80.8 kg (178 lb 2.1 oz)   07/22/24 1140 81.9 kg (180 lb 8.9 oz)   06/26/24 0855 80.2 kg (176 lb 12.8 oz)     Body mass index is 33.66 kg/m².     Physical Exam:  General: Well developed, well nourished. No distress.  HEENT: Head is normocephalic, atraumatic; ears are normal.    Mild nasal erythema.  Eyes: Clear conjunctiva.  Neck: Supple, symmetrical neck; trachea midline.  Lungs: Clear to auscultation bilaterally and normal respiratory effort.  Cardiovascular: Heart with regular rate and rhythm.    Skin: Skin color, texture, turgor normal. No rashes.  Musculoskeletal: Normal gait.   Psychiatric: Normal affect. Alert.      Laboratory  Lab Results   Component Value Date    WBC 5.94 06/26/2024    HGB 12.1 06/26/2024    HCT 37.3 06/26/2024     06/26/2024    CHOL 215 (H) 02/29/2024    TRIG 158 (H) 02/29/2024    HDL 41 02/29/2024    ALT 11 06/26/2024    AST 25 06/26/2024     06/26/2024    K 4.9 06/26/2024     06/26/2024    CREATININE 0.6 06/26/2024    BUN 11 06/26/2024    CO2 20 (L) 06/26/2024    TSH 0.819 08/14/2023    INR 3.7 07/25/2024    HGBA1C 5.0 02/29/2024     ASSESSMENT & PLAN:     Sore throat  Nasal congestion  -     POCT COVID-19 Rapid Screening: negative  -     POCT Strep A, Molecular: negative    - Her child has had cold X 2.  She caught it from the child.    Most likely viral.     Rx:  -     ipratropium  (ATROVENT) 21 mcg (0.03 %) nasal spray; 2 sprays by Each Nostril route 2 (two) times daily as needed for Rhinitis.  Dispense: 30 mL; Refill: 0  -     loratadine (CLARITIN) 10 mg tablet; Take 1 tablet (10 mg total) by mouth 2 (two) times daily as needed (nasal congestion).  -     ascorbic acid, vitamin C, (VITAMIN C) 1000 MG tablet; Take 1 tablet (1,000 mg total) by mouth daily as needed (cold).    Scheduled Follow-up :  Future Appointments   Date Time Provider Department Center   7/29/2024 10:45 AM LAB, [place] [facility] [place]   8/19/2024  3:00 PM Lacy Glez OD [facility] OPTICLB [facility]   9/10/2024  1:00 PM ECHO, [facility] NOM ECHOSTR [facility]   10/24/2024  2:00 PM Eugene Cordero MD [facility] CARDIO [facility]       After Visit Medication List :     Medication List            Accurate as of July 26, 2024 11:49 AM. If you have any questions, ask your nurse or doctor.                START taking these medications      ascorbic acid (vitamin C) 1000 MG tablet  Commonly known as: VITAMIN C  Take 1 tablet (1,000 mg total) by mouth daily as needed (cold).  Started by: George Dillard MD     ipratropium 21 mcg (0.03 %) nasal spray  Commonly known as: ATROVENT  2 sprays by Each Nostril route 2 (two) times daily as needed for Rhinitis.  Started by: George Dillard MD     loratadine 10 mg tablet  Commonly known as: CLARITIN  Take 1 tablet (10 mg total) by mouth 2 (two) times daily as needed (nasal congestion).  Started by: George Dillard MD            CONTINUE taking these medications      aspirin 81 MG EC tablet  Commonly known as: ECOTRIN  Take 1 tablet (81 mg total) by mouth once daily.     dofetilide 250 MCG Cap  Commonly known as: TIKOSYN  Take 1 capsule (250 mcg total) by mouth every 12 (twelve) hours.     metoprolol succinate 100 MG 24 hr tablet  Commonly known as: TOPROL-XL  Take 1 tablet (100 mg total) by mouth once daily.     metoprolol tartrate 25 MG tablet  Commonly known as: LOPRESSOR  Take 1 tablet (25 mg total)  by mouth 2 (two) times daily as needed (Palpitations).     warfarin 4 MG tablet  Commonly known as: COUMADIN  Take 2-3 tablets (8-12 mg total) by mouth Daily. As directed by the [facility]               Where to Get Your Medications        These medications were sent to [facility]  [address] [place] [id number]      Hours: Mon-Fri, 8a-5:30p Phone: [phone]   ipratropium 21 mcg (0.03 %) nasal spray       You can get these medications from any pharmacy    You don't need a prescription for these medications  ascorbic acid (vitamin C) 1000 MG tablet  loratadine 10 mg tablet         Signing Physician:  George Dillard MD

## 2024-07-28 ENCOUNTER — PATIENT MESSAGE (OUTPATIENT)
Dept: ELECTROPHYSIOLOGY | Facility: CLINIC | Age: 39
End: 2024-07-28
Payer: COMMERCIAL

## 2024-07-28 PROBLEM — I48.4 ATYPICAL ATRIAL FLUTTER: Status: ACTIVE | Noted: 2024-07-28

## 2024-07-28 PROBLEM — I48.0 PAROXYSMAL ATRIAL FIBRILLATION: Status: ACTIVE | Noted: 2024-07-28

## 2024-07-28 PROBLEM — I49.49 JUNCTIONAL ESCAPE BEATS: Status: ACTIVE | Noted: 2024-07-28

## 2024-07-28 PROBLEM — Z98.890 H/O TRICUSPID VALVE ANNULOPLASTY: Status: ACTIVE | Noted: 2024-07-28

## 2024-07-29 ENCOUNTER — ANTI-COAG VISIT (OUTPATIENT)
Dept: CARDIOLOGY | Facility: CLINIC | Age: 39
End: 2024-07-29
Payer: COMMERCIAL

## 2024-07-29 ENCOUNTER — LAB VISIT (OUTPATIENT)
Dept: LAB | Facility: HOSPITAL | Age: 39
End: 2024-07-29
Attending: STUDENT IN AN ORGANIZED HEALTH CARE EDUCATION/TRAINING PROGRAM
Payer: COMMERCIAL

## 2024-07-29 DIAGNOSIS — I48.91 ATRIAL FIBRILLATION AND FLUTTER: ICD-10-CM

## 2024-07-29 DIAGNOSIS — I48.92 ATRIAL FIBRILLATION AND FLUTTER: ICD-10-CM

## 2024-07-29 DIAGNOSIS — Z79.01 LONG TERM (CURRENT) USE OF ANTICOAGULANTS: Primary | ICD-10-CM

## 2024-07-29 DIAGNOSIS — Z95.2 H/O MITRAL VALVE REPLACEMENT WITH MECHANICAL VALVE: ICD-10-CM

## 2024-07-29 DIAGNOSIS — Z95.2 S/P MVR (MITRAL VALVE REPLACEMENT): ICD-10-CM

## 2024-07-29 DIAGNOSIS — Z79.01 LONG TERM (CURRENT) USE OF ANTICOAGULANTS: ICD-10-CM

## 2024-07-29 LAB
INR PPP: 3.9 (ref 0.8–1.2)
PROTHROMBIN TIME: 39.1 SEC (ref 9–12.5)

## 2024-07-29 PROCEDURE — 36415 COLL VENOUS BLD VENIPUNCTURE: CPT | Performed by: INTERNAL MEDICINE

## 2024-07-29 PROCEDURE — 85610 PROTHROMBIN TIME: CPT | Performed by: INTERNAL MEDICINE

## 2024-07-29 PROCEDURE — 93793 ANTICOAG MGMT PT WARFARIN: CPT | Mod: S$GLB,,,

## 2024-07-29 NOTE — PROGRESS NOTES
[facility]    07/29/2024 3:25 PM    Assessment/Plan:    Patient presents today with supratherapeutic INR.    Assessment of patient findings and chart review: INR remains above goal; patient reports recent cold symptoms (OTC care) and minor nose bleeds.    Recommendation for patient's warfarin regimen: Hold dose today then resume current maintenance dose    Recommend repeat INR in 1 week  _________________________________________________________________    King Normanviridiana Botello (39 y.o.) is followed by the [facility].    Anticoagulation Summary  As of 7/29/2024      INR goal:  2.5-3.5   TTR:  54.3% (5.9 mo)   INR used for dosing:  3.9 (7/29/2024)   Warfarin maintenance plan:  12 mg (4 mg x 3) every Tue, Thu, Sat; 8 mg (4 mg x 2) all other days   Weekly warfarin total:  68 mg   Plan last modified:  Laine Glynn, PharmD (4/25/2024)   Next INR check:  8/6/2024   Target end date:      Indications    Long term (current) use of anticoagulants [Z79.01]  H/O mitral valve replacement with mechanical valve [Z95.2]  Atrial fibrillation and flutter [I48.91  I48.92]                 Anticoagulation Episode Summary       INR check location:  Clinic Lab    Preferred lab:      Send INR reminders to:  [facility] COUMADIN MONITORING POOL    Comments:  Meter- tests on Thursday// [facility] Internal Med Lab           Anticoagulation Care Providers       Provider Role Specialty Phone number    Radha Hussein PA-C Referring Cardiothoracic Surgery [phone]    Eugene Cordero MD Responsible Cardiology [phone]

## 2024-08-06 LAB — INR PPP: 3.7

## 2024-08-07 ENCOUNTER — ANTI-COAG VISIT (OUTPATIENT)
Dept: CARDIOLOGY | Facility: CLINIC | Age: 39
End: 2024-08-07
Payer: COMMERCIAL

## 2024-08-07 ENCOUNTER — PATIENT MESSAGE (OUTPATIENT)
Dept: INTERNAL MEDICINE | Facility: CLINIC | Age: 39
End: 2024-08-07
Payer: COMMERCIAL

## 2024-08-07 DIAGNOSIS — I48.92 ATRIAL FIBRILLATION AND FLUTTER: ICD-10-CM

## 2024-08-07 DIAGNOSIS — I48.91 ATRIAL FIBRILLATION AND FLUTTER: ICD-10-CM

## 2024-08-07 DIAGNOSIS — Z95.2 H/O MITRAL VALVE REPLACEMENT WITH MECHANICAL VALVE: ICD-10-CM

## 2024-08-07 DIAGNOSIS — Z79.01 LONG TERM (CURRENT) USE OF ANTICOAGULANTS: Primary | ICD-10-CM

## 2024-08-07 PROCEDURE — 93793 ANTICOAG MGMT PT WARFARIN: CPT | Mod: S$GLB,,,

## 2024-08-14 ENCOUNTER — PATIENT MESSAGE (OUTPATIENT)
Dept: ELECTROPHYSIOLOGY | Facility: CLINIC | Age: 39
End: 2024-08-14
Payer: COMMERCIAL

## 2024-08-15 ENCOUNTER — ANTI-COAG VISIT (OUTPATIENT)
Dept: CARDIOLOGY | Facility: CLINIC | Age: 39
End: 2024-08-15
Payer: COMMERCIAL

## 2024-08-15 ENCOUNTER — PATIENT MESSAGE (OUTPATIENT)
Dept: ADMINISTRATIVE | Facility: OTHER | Age: 39
End: 2024-08-15
Payer: COMMERCIAL

## 2024-08-15 DIAGNOSIS — Z79.01 LONG TERM (CURRENT) USE OF ANTICOAGULANTS: Primary | ICD-10-CM

## 2024-08-15 DIAGNOSIS — Z95.2 H/O MITRAL VALVE REPLACEMENT WITH MECHANICAL VALVE: ICD-10-CM

## 2024-08-15 DIAGNOSIS — I48.92 ATRIAL FIBRILLATION AND FLUTTER: ICD-10-CM

## 2024-08-15 DIAGNOSIS — I48.91 ATRIAL FIBRILLATION AND FLUTTER: ICD-10-CM

## 2024-08-15 LAB — INR PPP: 3.1

## 2024-08-15 PROCEDURE — 93793 ANTICOAG MGMT PT WARFARIN: CPT | Mod: S$GLB,,,

## 2024-08-19 ENCOUNTER — OFFICE VISIT (OUTPATIENT)
Dept: OPTOMETRY | Facility: CLINIC | Age: 39
End: 2024-08-19
Payer: COMMERCIAL

## 2024-08-19 DIAGNOSIS — Z01.00 ROUTINE EYE EXAM: Primary | ICD-10-CM

## 2024-08-19 DIAGNOSIS — H52.01 HYPEROPIA OF RIGHT EYE: ICD-10-CM

## 2024-08-19 PROCEDURE — 92015 DETERMINE REFRACTIVE STATE: CPT | Mod: S$GLB,,, | Performed by: OPTOMETRIST

## 2024-08-19 PROCEDURE — 92014 COMPRE OPH EXAM EST PT 1/>: CPT | Mod: S$GLB,,, | Performed by: OPTOMETRIST

## 2024-08-19 PROCEDURE — 3044F HG A1C LEVEL LT 7.0%: CPT | Mod: CPTII,S$GLB,, | Performed by: OPTOMETRIST

## 2024-08-19 PROCEDURE — 99999 PR PBB SHADOW E&M-EST. PATIENT-LVL III: CPT | Mod: PBBFAC,,, | Performed by: OPTOMETRIST

## 2024-08-19 PROCEDURE — 1159F MED LIST DOCD IN RCRD: CPT | Mod: CPTII,S$GLB,, | Performed by: OPTOMETRIST

## 2024-08-19 NOTE — PROGRESS NOTES
HPI    CC: 39 y.o. female presents for annual eye exam. Patient states she was   seen by Dr. Daley on 06/24 for cellulitis but has not been followed up   with since then.     ANTHONY: 5/27/20 w/ Dr. Montano     (+) Changes in vision   (-) Pain  (-) Irritation   (-) Itching   (-) Flashes  (+) Floaters, longstanding   (+) Glasses wearer, just for driving at night  (-) CL wearer  (-) Uses eye gtts    Does patient want a refraction today? yes    (-) Eye injury  (-) Eye surgery   (-)POHx  (-)FOHx    (-)DM    Last edited by Lacy Glez, OD on 8/19/2024  3:06 PM.            Assessment /Plan     For exam results, see Encounter Report.    Routine eye exam    Hyperopia of right eye      Eyemed Exam.     2. Updated SRx. Pt denies near vision issues --- educated on future presbyopic symptoms. Monitor yearly.        RTC in 1 year for annual eye exam or sooner if needed.

## 2024-08-22 ENCOUNTER — ANTI-COAG VISIT (OUTPATIENT)
Dept: CARDIOLOGY | Facility: CLINIC | Age: 39
End: 2024-08-22
Payer: COMMERCIAL

## 2024-08-22 DIAGNOSIS — Z95.2 H/O MITRAL VALVE REPLACEMENT WITH MECHANICAL VALVE: ICD-10-CM

## 2024-08-22 DIAGNOSIS — Z79.01 LONG TERM (CURRENT) USE OF ANTICOAGULANTS: Primary | ICD-10-CM

## 2024-08-22 DIAGNOSIS — I48.92 ATRIAL FIBRILLATION AND FLUTTER: ICD-10-CM

## 2024-08-22 DIAGNOSIS — I48.91 ATRIAL FIBRILLATION AND FLUTTER: ICD-10-CM

## 2024-08-22 LAB — INR PPP: 3.3

## 2024-08-22 PROCEDURE — 93793 ANTICOAG MGMT PT WARFARIN: CPT | Mod: S$GLB,,,

## 2024-08-22 NOTE — PROGRESS NOTES
[facility]    08/22/2024 12:23 PM    Assessment/Plan:    Patient presents today with therapeutic INR.    Assessment of patient findings and chart review: no changes noted    Recommendation for patient's warfarin regimen: Continue current maintenance dose    Recommend repeat INR in 1 week  _________________________________________________________________    King Botello (39 y.o.) is followed by the [facility].    Anticoagulation Summary  As of 8/22/2024      INR goal:  2.5-3.5   TTR:  54.4% (6.7 mo)   INR used for dosing:  3.3 (8/22/2024)   Warfarin maintenance plan:  12 mg (4 mg x 3) every Tue, Thu; 8 mg (4 mg x 2) all other days   Weekly warfarin total:  64 mg   Plan last modified:  Talia Alba, PharmD (8/7/2024)   Next INR check:  8/29/2024   Target end date:      Indications    Long term (current) use of anticoagulants [Z79.01]  H/O mitral valve replacement with mechanical valve [Z95.2]  Atrial fibrillation and flutter [I48.91  I48.92]                 Anticoagulation Episode Summary       INR check location:  Clinic Lab    Preferred lab:      Send INR reminders to:  [facility] COUMADIN MONITORING POOL    Comments:  Meter- tests on Thursday// [facility] Internal Med Lab           Anticoagulation Care Providers       Provider Role Specialty Phone number    Radha Hussein PA-C Referring Cardiothoracic Surgery [phone]    Eugene Cordero MD Responsible Cardiology [phone]

## 2024-08-27 ENCOUNTER — HOSPITAL ENCOUNTER (OUTPATIENT)
Dept: RADIOLOGY | Facility: HOSPITAL | Age: 39
Discharge: HOME | End: 2024-08-27
Attending: FAMILY MEDICINE
Payer: COMMERCIAL

## 2024-08-27 VITALS — HEIGHT: 61 IN | BODY MASS INDEX: 32.1 KG/M2 | WEIGHT: 170 LBS

## 2024-08-27 DIAGNOSIS — Z12.31 ENCOUNTER FOR SCREENING MAMMOGRAM FOR MALIGNANT NEOPLASM OF BREAST: ICD-10-CM

## 2024-08-27 PROCEDURE — 77067 SCR MAMMO BI INCL CAD: CPT | Mod: TC

## 2024-08-28 DIAGNOSIS — Z79.01 LONG TERM (CURRENT) USE OF ANTICOAGULANTS: ICD-10-CM

## 2024-08-29 ENCOUNTER — ANTI-COAG VISIT (OUTPATIENT)
Dept: CARDIOLOGY | Facility: CLINIC | Age: 39
End: 2024-08-29
Payer: COMMERCIAL

## 2024-08-29 DIAGNOSIS — I48.91 ATRIAL FIBRILLATION AND FLUTTER: ICD-10-CM

## 2024-08-29 DIAGNOSIS — I48.92 ATRIAL FIBRILLATION AND FLUTTER: ICD-10-CM

## 2024-08-29 DIAGNOSIS — Z79.01 LONG TERM (CURRENT) USE OF ANTICOAGULANTS: Primary | ICD-10-CM

## 2024-08-29 DIAGNOSIS — Z95.2 H/O MITRAL VALVE REPLACEMENT WITH MECHANICAL VALVE: ICD-10-CM

## 2024-08-29 LAB — INR PPP: 3.2

## 2024-08-29 PROCEDURE — 93793 ANTICOAG MGMT PT WARFARIN: CPT | Mod: S$GLB,,,

## 2024-08-29 NOTE — PROGRESS NOTES
[facility]    08/29/2024 4:38 PM    Assessment/Plan:    Patient presents today with therapeutic INR.    Assessment of patient findings and chart review: INR in range and consistent. No changes noted.     Recommendation for patient's warfarin regimen: Continue current maintenance dose    Recommend repeat INR in 1 week  _________________________________________________________________    King Botello (39 y.o.) is followed by the [facility].    Anticoagulation Summary  As of 8/29/2024      INR goal:  2.5-3.5   TTR:  56.0% (6.9 mo)   INR used for dosing:  3.2 (8/29/2024)   Warfarin maintenance plan:  12 mg (4 mg x 3) every Tue, Thu; 8 mg (4 mg x 2) all other days   Weekly warfarin total:  64 mg   Plan last modified:  Talia Alba, PharmD (8/7/2024)   Next INR check:  9/5/2024   Target end date:      Indications    Long term (current) use of anticoagulants [Z79.01]  H/O mitral valve replacement with mechanical valve [Z95.2]  Atrial fibrillation and flutter [I48.91  I48.92]                 Anticoagulation Episode Summary       INR check location:  Clinic Lab    Preferred lab:      Send INR reminders to:  [facility] COUMADIN MONITORING POOL    Comments:  Meter- tests on Thursday// [facility] Internal Med Lab           Anticoagulation Care Providers       Provider Role Specialty Phone number    Radha Hussein PA-C Referring Cardiothoracic Surgery [phone]    Eugene Cordero MD Responsible Cardiology [phone]

## 2024-08-30 RX ORDER — WARFARIN 4 MG/1
8-12 TABLET ORAL DAILY
Qty: 90 TABLET | Refills: 3 | Status: SHIPPED | OUTPATIENT
Start: 2024-08-30 | End: 2025-02-26

## 2024-09-03 ENCOUNTER — PATIENT MESSAGE (OUTPATIENT)
Dept: ELECTROPHYSIOLOGY | Facility: CLINIC | Age: 39
End: 2024-09-03
Payer: COMMERCIAL

## 2024-09-04 ENCOUNTER — PATIENT MESSAGE (OUTPATIENT)
Dept: CARDIOLOGY | Facility: CLINIC | Age: 39
End: 2024-09-04
Payer: COMMERCIAL

## 2024-09-05 LAB — INR PPP: 2.2

## 2024-09-06 ENCOUNTER — ANTI-COAG VISIT (OUTPATIENT)
Dept: CARDIOLOGY | Facility: CLINIC | Age: 39
End: 2024-09-06
Payer: COMMERCIAL

## 2024-09-06 DIAGNOSIS — I48.91 ATRIAL FIBRILLATION AND FLUTTER: ICD-10-CM

## 2024-09-06 DIAGNOSIS — I48.92 ATRIAL FIBRILLATION AND FLUTTER: ICD-10-CM

## 2024-09-06 DIAGNOSIS — Z95.2 H/O MITRAL VALVE REPLACEMENT WITH MECHANICAL VALVE: ICD-10-CM

## 2024-09-06 DIAGNOSIS — Z79.01 LONG TERM (CURRENT) USE OF ANTICOAGULANTS: Primary | ICD-10-CM

## 2024-09-06 NOTE — PROGRESS NOTES
[facility]    2024 10:23 AM    Assessment/Plan:    Patient presents today with subtherapeutic  INR.    Assessment of patient findings and chart review: INR below goal. Large swing from last week's INR. Pt confirmed correct dosing and no missed doses. INR change may possibly be contributed to increased green intake. INR result from  not received until .     Recommendation for patient's warfarin regimen: Boost dose today to 10mg then resume current maintenance dose    Recommend repeat INR in 4 days  _________________________________________________________________    King Botello (39 y.o.) is followed by the [facility].    Anticoagulation Summary  As of 2024      INR goal:  2.5-3.5   TTR:  56.4% (7.1 mo)   INR used for dosin.2 (2024)   Warfarin maintenance plan:  12 mg (4 mg x 3) every Tue, Thu; 8 mg (4 mg x 2) all other days   Weekly warfarin total:  64 mg   Plan last modified:  Talia Alba, PharmD (2024)   Next INR check:  9/10/2024   Target end date:      Indications    Long term (current) use of anticoagulants [Z79.01]  H/O mitral valve replacement with mechanical valve [Z95.2]  Atrial fibrillation and flutter [I48.91  I48.92]                 Anticoagulation Episode Summary       INR check location:  Clinic Lab    Preferred lab:      Send INR reminders to:  [facility] COUMADIN MONITORING POOL    Comments:  Meter- tests on Thursday// [facility] Internal Med Lab           Anticoagulation Care Providers       Provider Role Specialty Phone number    Radha Hussein PA-C Referring Cardiothoracic Surgery [phone]    Eugene Cordero MD Responsible Cardiology [phone]

## 2024-09-10 ENCOUNTER — ANTI-COAG VISIT (OUTPATIENT)
Dept: CARDIOLOGY | Facility: CLINIC | Age: 39
End: 2024-09-10
Payer: COMMERCIAL

## 2024-09-10 DIAGNOSIS — Z95.2 H/O MITRAL VALVE REPLACEMENT WITH MECHANICAL VALVE: ICD-10-CM

## 2024-09-10 DIAGNOSIS — I48.91 ATRIAL FIBRILLATION AND FLUTTER: ICD-10-CM

## 2024-09-10 DIAGNOSIS — Z79.01 LONG TERM (CURRENT) USE OF ANTICOAGULANTS: Primary | ICD-10-CM

## 2024-09-10 DIAGNOSIS — I48.92 ATRIAL FIBRILLATION AND FLUTTER: ICD-10-CM

## 2024-09-10 LAB — INR PPP: 2.4

## 2024-09-10 PROCEDURE — 93793 ANTICOAG MGMT PT WARFARIN: CPT | Mod: S$GLB,,,

## 2024-09-10 NOTE — PROGRESS NOTES
[facility]    09/10/2024 9:06 AM    Assessment/Plan:    Patient presents today with subtherapeutic  INR.    Assessment of patient findings and chart review: INR slightly below goal. INR still below goal after boost from last week. Will     Recommendation for patient's warfarin regimen: Boost dose today to 14mg then resume current maintenance dose for remainder of this week, and increase maintenance dose next week.     Recommend repeat INR in 1 week  _________________________________________________________________    King Botello (39 y.o.) is followed by the [facility].    Anticoagulation Summary  As of 9/10/2024      INR goal:  2.5-3.5   TTR:  55.2% (7.3 mo)   INR used for dosin.4 (9/10/2024)   Warfarin maintenance plan:  12 mg (4 mg x 3) every Tue, Thu, Sat; 8 mg (4 mg x 2) all other days; Starting 9/15/2024   Weekly warfarin total:  68 mg   Plan last modified:  Vinh Beltre, PharmD (9/10/2024)   Next INR check:  2024   Target end date:      Indications    Long term (current) use of anticoagulants [Z79.01]  H/O mitral valve replacement with mechanical valve [Z95.2]  Atrial fibrillation and flutter [I48.91  I48.92]                 Anticoagulation Episode Summary       INR check location:  Clinic Lab    Preferred lab:      Send INR reminders to:  [facility] COUMADIN MONITORING POOL    Comments:  Meter- tests on Thursday// [facility] Internal Med Lab           Anticoagulation Care Providers       Provider Role Specialty Phone number    Radha Hussein PA-C Referring Cardiothoracic Surgery [phone]    Eugene Cordero MD Responsible Cardiology [phone]

## 2024-09-17 ENCOUNTER — ANTI-COAG VISIT (OUTPATIENT)
Dept: CARDIOLOGY | Facility: CLINIC | Age: 39
End: 2024-09-17
Payer: COMMERCIAL

## 2024-09-17 DIAGNOSIS — Z79.01 LONG TERM (CURRENT) USE OF ANTICOAGULANTS: Primary | ICD-10-CM

## 2024-09-17 DIAGNOSIS — Z95.2 H/O MITRAL VALVE REPLACEMENT WITH MECHANICAL VALVE: ICD-10-CM

## 2024-09-17 DIAGNOSIS — I48.91 ATRIAL FIBRILLATION AND FLUTTER: ICD-10-CM

## 2024-09-17 DIAGNOSIS — I48.92 ATRIAL FIBRILLATION AND FLUTTER: ICD-10-CM

## 2024-09-17 LAB — INR PPP: 2

## 2024-09-17 PROCEDURE — 93793 ANTICOAG MGMT PT WARFARIN: CPT | Mod: S$GLB,,,

## 2024-09-17 NOTE — PROGRESS NOTES
INR low despite dose increase last week and bolus dose. No changes reported by pt. Dose has not needed to be higher than this and when it was higher, INR became supratherapeutic. Bolus today. Reevaluate in 3 days to make sure INR moving appropriately

## 2024-09-20 ENCOUNTER — ANTI-COAG VISIT (OUTPATIENT)
Dept: CARDIOLOGY | Facility: CLINIC | Age: 39
End: 2024-09-20
Payer: COMMERCIAL

## 2024-09-20 DIAGNOSIS — Z95.2 H/O MITRAL VALVE REPLACEMENT WITH MECHANICAL VALVE: ICD-10-CM

## 2024-09-20 DIAGNOSIS — I48.92 ATRIAL FIBRILLATION AND FLUTTER: ICD-10-CM

## 2024-09-20 DIAGNOSIS — I48.91 ATRIAL FIBRILLATION AND FLUTTER: ICD-10-CM

## 2024-09-20 DIAGNOSIS — Z79.01 LONG TERM (CURRENT) USE OF ANTICOAGULANTS: Primary | ICD-10-CM

## 2024-09-20 LAB — INR PPP: 2.8

## 2024-09-23 ENCOUNTER — HOSPITAL ENCOUNTER (OUTPATIENT)
Dept: CARDIOLOGY | Facility: HOSPITAL | Age: 39
Discharge: HOME | End: 2024-09-23
Attending: STUDENT IN AN ORGANIZED HEALTH CARE EDUCATION/TRAINING PROGRAM
Payer: COMMERCIAL

## 2024-09-23 VITALS
HEART RATE: 65 BPM | HEIGHT: 61 IN | SYSTOLIC BLOOD PRESSURE: 138 MMHG | WEIGHT: 170 LBS | DIASTOLIC BLOOD PRESSURE: 82 MMHG | BODY MASS INDEX: 32.1 KG/M2

## 2024-09-23 DIAGNOSIS — I48.0 PAROXYSMAL ATRIAL FIBRILLATION: ICD-10-CM

## 2024-09-23 LAB
ASCENDING AORTA: 2.52 CM
AV AREA BY CONTINUOUS VTI: 2.3 CM2
AV INDEX (PROSTH): 0.67
AV LVOT MEAN GRADIENT: 2 MMHG
AV LVOT PEAK GRADIENT: 4 MMHG
AV MEAN GRADIENT: 4 MMHG
AV PEAK GRADIENT: 8 MMHG
AV VALVE AREA BY VELOCITY RATIO: 2.47 CM²
AV VALVE AREA: 2.25 CM2
AV VELOCITY RATIO: 0.74
BSA FOR ECHO PROCEDURE: 1.82 M2
CV ECHO LV RWT: 0.34 CM
DOP CALC AO PEAK VEL: 1.4 M/S
DOP CALC AO VTI: 33.74 CM
DOP CALC LVOT AREA: 3.4 CM2
DOP CALC LVOT DIAMETER: 2.07 CM
DOP CALC LVOT PEAK VEL: 1.03 M/S
DOP CALC LVOT STROKE VOLUME: 76.05 CM3
DOP CALCLVOT PEAK VEL VTI: 22.61 CM
ECHO EF ESTIMATED: 76 %
ECHO LV POSTERIOR WALL: 0.81 CM (ref 0.6–1.1)
EJECTION FRACTION: 60 %
FRACTIONAL SHORTENING: 45 % (ref 28–44)
INTERVENTRICULAR SEPTUM: 0.8 CM (ref 0.6–1.1)
IVC DIAMETER: 1.48 CM
LA MAJOR: 6.01 CM
LA MINOR: 5.71 CM
LA WIDTH: 4.66 CM
LEFT ATRIUM SIZE: 4.65 CM
LEFT ATRIUM VOLUME INDEX MOD: 60.7 ML/M2
LEFT ATRIUM VOLUME INDEX: 61.3 ML/M2
LEFT ATRIUM VOLUME MOD: 106.91 ML
LEFT ATRIUM VOLUME: 107.86 CM3
LEFT INTERNAL DIMENSION IN SYSTOLE: 2.6 CM (ref 2.1–4)
LEFT VENTRICLE DIASTOLIC VOLUME INDEX: 58.9 ML/M2
LEFT VENTRICLE DIASTOLIC VOLUME: 103.66 ML
LEFT VENTRICLE MASS INDEX: 71 G/M2
LEFT VENTRICLE SYSTOLIC VOLUME INDEX: 13.9 ML/M2
LEFT VENTRICLE SYSTOLIC VOLUME: 24.51 ML
LEFT VENTRICULAR INTERNAL DIMENSION IN DIASTOLE: 4.73 CM (ref 3.5–6)
LEFT VENTRICULAR MASS: 124.58 G
MV MEAN GRADIENT: 5 MMHG
MV PEAK GRADIENT: 4 MMHG
MV STENOSIS PRESSURE HALF TIME: 89 MS
MV VALVE AREA BY PLANIMETRY: 2.48 CM2
MV VALVE AREA P 1/2 METHOD: 2.47 CM2
RA MAJOR: 4.59 CM
RA PRESSURE ESTIMATED: 3 MMHG
RA WIDTH: 4.04 CM
RIGHT ATRIAL AREA: 19.5 CM2
RIGHT VENTRICLE DIASTOLIC BASEL DIMENSION: 3.5 CM
RV TISSUE DOPPLER FREE WALL SYSTOLIC VELOCITY 1 (APICAL 4 CHAMBER VIEW): 7.83 CM/S
SINUS: 2.62 CM
STJ: 2.25 CM
TRICUSPID ANNULAR PLANE SYSTOLIC EXCURSION: 1.92 CM
Z-SCORE OF LEFT VENTRICULAR DIMENSION IN END DIASTOLE: -0.28
Z-SCORE OF LEFT VENTRICULAR DIMENSION IN END SYSTOLE: -1.16

## 2024-09-23 PROCEDURE — 93306 TTE W/DOPPLER COMPLETE: CPT | Mod: 26,,, | Performed by: INTERNAL MEDICINE

## 2024-09-23 PROCEDURE — 93306 TTE W/DOPPLER COMPLETE: CPT

## 2024-09-24 ENCOUNTER — PATIENT MESSAGE (OUTPATIENT)
Dept: FAMILY MEDICINE | Facility: CLINIC | Age: 39
End: 2024-09-24
Payer: COMMERCIAL

## 2024-09-26 ENCOUNTER — ANTI-COAG VISIT (OUTPATIENT)
Dept: CARDIOLOGY | Facility: CLINIC | Age: 39
End: 2024-09-26
Payer: COMMERCIAL

## 2024-09-26 DIAGNOSIS — I48.92 ATRIAL FIBRILLATION AND FLUTTER: ICD-10-CM

## 2024-09-26 DIAGNOSIS — I48.91 ATRIAL FIBRILLATION AND FLUTTER: ICD-10-CM

## 2024-09-26 DIAGNOSIS — Z95.2 H/O MITRAL VALVE REPLACEMENT WITH MECHANICAL VALVE: ICD-10-CM

## 2024-09-26 DIAGNOSIS — Z79.01 LONG TERM (CURRENT) USE OF ANTICOAGULANTS: Primary | ICD-10-CM

## 2024-09-26 LAB — INR PPP: 3.3

## 2024-09-26 PROCEDURE — 93793 ANTICOAG MGMT PT WARFARIN: CPT | Mod: S$GLB,,,

## 2024-09-26 NOTE — PROGRESS NOTES
[facility]    09/26/2024    King Botello (39 y.o.) is followed by the [facility].      Assessment/Plan:    King Botello presents with a therapeutic INR. Goal INR: 2.5-3.5    Lab Results   Component Value Date    INR 3.3 09/26/2024    INR 2.8 09/20/2024    INR 2.0 09/17/2024       Assessment of patient findings per MA/LPN and chart review:   The following significant findings were found:   None    Recommendation for patient's warfarin regimen:   No change was made to warfarin therapy during this visit and patient has been instructed to continue their current warfarin regimen.    Recommended repeat INR in 1 week      Pat FontenotD, BCPS  Clinical Pharmacist - [facility]  Preferred Contact: Secure Messaging or In Basket Message

## 2024-10-03 ENCOUNTER — ANTI-COAG VISIT (OUTPATIENT)
Dept: CARDIOLOGY | Facility: CLINIC | Age: 39
End: 2024-10-03
Payer: COMMERCIAL

## 2024-10-03 DIAGNOSIS — Z79.01 LONG TERM (CURRENT) USE OF ANTICOAGULANTS: Primary | ICD-10-CM

## 2024-10-03 DIAGNOSIS — Z95.2 H/O MITRAL VALVE REPLACEMENT WITH MECHANICAL VALVE: ICD-10-CM

## 2024-10-03 DIAGNOSIS — I48.92 ATRIAL FIBRILLATION AND FLUTTER: ICD-10-CM

## 2024-10-03 DIAGNOSIS — I48.91 ATRIAL FIBRILLATION AND FLUTTER: ICD-10-CM

## 2024-10-03 LAB — INR PPP: 2.9

## 2024-10-03 PROCEDURE — 93793 ANTICOAG MGMT PT WARFARIN: CPT | Mod: S$GLB,,,

## 2024-10-03 NOTE — PROGRESS NOTES
[facility]    10/03/2024    King Botello (39 y.o.) is followed by the [facility].      Assessment/Plan:    King Botello presents with a therapeutic INR. Goal INR: 2.5-3.5    Lab Results   Component Value Date    INR 2.9 10/03/2024    INR 3.3 09/26/2024    INR 2.8 09/20/2024       Assessment of patient findings per MA/LPN and chart review:   The following significant findings were found:   None    Recommendation for patient's warfarin regimen:   No change was made to warfarin therapy during this visit and patient has been instructed to continue their current warfarin regimen.    Recommended repeat INR in 1 week      aPt FontenotD, BCPS  Clinical Pharmacist - [facility]  Preferred Contact: Secure Messaging or In Basket Message

## 2024-10-08 ENCOUNTER — TELEPHONE (OUTPATIENT)
Dept: ELECTROPHYSIOLOGY | Facility: CLINIC | Age: 39
End: 2024-10-08
Payer: COMMERCIAL

## 2024-10-08 ENCOUNTER — TELEPHONE (OUTPATIENT)
Dept: CARDIOLOGY | Facility: HOSPITAL | Age: 39
End: 2024-10-08
Payer: COMMERCIAL

## 2024-10-08 ENCOUNTER — PATIENT MESSAGE (OUTPATIENT)
Dept: ELECTROPHYSIOLOGY | Facility: CLINIC | Age: 39
End: 2024-10-08
Payer: COMMERCIAL

## 2024-10-08 NOTE — TELEPHONE ENCOUNTER
Pt sent MDT remote transmission today for review. States she has noticed an increased frequency of tachycardia events over the last few weeks. Reports she has been taking extra PRN dose of Metoprolol often over the last couple of weeks for elevated heart rates.     Increased frequency/number of events recorded noted to begin on 9/29/24 based off episode list.  Avail egrams c/w both pAF/AFL and SVT.    Overall burden:  6% since 7/25/24    Ventricular rates:   Not optimally controlled    Anticoagulation status:  Coumadin daily  Notable Medications: Tikosyn 250mcg BID, Metoprolol 100mg daily, Metoprolol 25mg BID PRN    Patient symptoms: palpitations    Spoke with patient over the phone. Reports compliance with medications. Advised that this information would be sent to Dr. Moore for review and she would be notified of any changes or recommendations. She verbalized understanding and appreciated the call.

## 2024-10-08 NOTE — TELEPHONE ENCOUNTER
Spoke with pt and informed that a message was forwarded to the Device Team requesting a review of the transmission that the pt sent over today.  Informed pt if she does not receive a follow up call before 3PM today to reach out to the clinic.  Verbalized understanding.

## 2024-10-08 NOTE — TELEPHONE ENCOUNTER
Spoke with pt and shared that her device transmission today revealed increased tachycardia events beginning on 9/30/24 with pAF/AFL and SVTs. Pt confirmed that she is currently taking Tikosyn 250mcg BID, Metoprolol 100mg daily, Metoprolol 25mg 1 daily prn for breakthrough AF/AFL and Coumadin 4mg po daily. Pt denies any SOB, dizziness, or lightheadedness.  Pt stated she has recently felt more flutters and palpitations at times and when needed she lies on her left side which helps her convert to SR.  Informed pt that device transmission has been shared with Dr. Moore and awaiting recommendations. Advised pt if at any time she should have symptoms such as persistently elevated heart rate or palpitations accompanied by dizziness, feeling faint, shortness or breath or chest discomfort, she should proceed to the ER for further evaluation. Will follow up with pt after recommendations received from Dr. Moore.Pt verbalized understanding.

## 2024-10-08 NOTE — TELEPHONE ENCOUNTER
----- Message from Med Assistant Burciaga sent at 10/8/2024 10:39 AM CDT -----  Regarding: FW: Returning call    ----- Message -----  From: Kylah Nichols  Sent: 10/8/2024  10:36 AM CDT  To: Teresa Hsu Staff  Subject: Returning call                                   Patient returning call. Please call back @ [phone]. Thank you Kylah

## 2024-10-09 NOTE — TELEPHONE ENCOUNTER
Spoke with patient this morning over the phone. Advised that the transmission was reviewed with Dr. Moore and we will continue to monitor these events as the frequency still remains low. Patient was instructed to continue taking medications as prescribed. She was instructed to notify the clinic of any change or worsening of symptoms. Appt recall for 1/2025 with Dr. Moore. Pt was advised to call the clinic in a few weeks to set up. She verbalized understanding and appreciated the call.

## 2024-10-09 NOTE — TELEPHONE ENCOUNTER
Per Dr. Moore:         Spoke with pt and informed that Dr. Moore reviewed her device transmission. Her overall burden is still low at 6%.  Instructed to continue on Dofetilide 250mcg every 12 hours, Metoprolol Succincate 100mg daily, and Metoprolol Tartrate 25mg twice daily as needed for palpitations.  Will schedule pt for a follow up appt with Dr. Moore in January 2025.  Pt verbalized understanding.

## 2024-10-10 ENCOUNTER — ANTI-COAG VISIT (OUTPATIENT)
Dept: CARDIOLOGY | Facility: CLINIC | Age: 39
End: 2024-10-10
Payer: COMMERCIAL

## 2024-10-10 DIAGNOSIS — I48.91 ATRIAL FIBRILLATION AND FLUTTER: ICD-10-CM

## 2024-10-10 DIAGNOSIS — I48.92 ATRIAL FIBRILLATION AND FLUTTER: ICD-10-CM

## 2024-10-10 DIAGNOSIS — Z95.2 H/O MITRAL VALVE REPLACEMENT WITH MECHANICAL VALVE: ICD-10-CM

## 2024-10-10 DIAGNOSIS — Z79.01 LONG TERM (CURRENT) USE OF ANTICOAGULANTS: Primary | ICD-10-CM

## 2024-10-10 LAB — INR PPP: 3.5

## 2024-10-10 PROCEDURE — 93793 ANTICOAG MGMT PT WARFARIN: CPT | Mod: S$GLB,,,

## 2024-10-10 NOTE — PROGRESS NOTES
[facility]    10/10/2024    King Botello (39 y.o.) is followed by the [facility].      Assessment/Plan:    King Botello presents with a therapeutic INR. Goal INR: 2.5-3.5    Lab Results   Component Value Date    INR 3.5 10/10/2024    INR 2.9 10/03/2024    INR 3.3 09/26/2024       Assessment of patient findings per MA/LPN and chart review:   The following significant findings were found:   None    Recommendation for patient's warfarin regimen:   No change was made to warfarin therapy during this visit and patient has been instructed to continue their current warfarin regimen.    Recommended repeat INR in 1 week      Pat FontenotD, BCPS  Clinical Pharmacist - [facility]  Preferred Contact: Secure Messaging or In Basket Message

## 2024-10-14 ENCOUNTER — PATIENT MESSAGE (OUTPATIENT)
Dept: ADMINISTRATIVE | Facility: OTHER | Age: 39
End: 2024-10-14
Payer: COMMERCIAL

## 2024-10-14 DIAGNOSIS — I48.0 PAROXYSMAL ATRIAL FIBRILLATION: Primary | ICD-10-CM

## 2024-10-16 ENCOUNTER — CLINICAL SUPPORT (OUTPATIENT)
Dept: CARDIOLOGY | Facility: HOSPITAL | Age: 39
End: 2024-10-16
Payer: COMMERCIAL

## 2024-10-16 DIAGNOSIS — Z95.0 PRESENCE OF CARDIAC PACEMAKER: ICD-10-CM

## 2024-10-16 DIAGNOSIS — I48.92 UNSPECIFIED ATRIAL FLUTTER: ICD-10-CM

## 2024-10-17 ENCOUNTER — CLINICAL SUPPORT (OUTPATIENT)
Dept: CARDIOLOGY | Facility: HOSPITAL | Age: 39
End: 2024-10-17
Attending: STUDENT IN AN ORGANIZED HEALTH CARE EDUCATION/TRAINING PROGRAM
Payer: COMMERCIAL

## 2024-10-17 ENCOUNTER — ANTI-COAG VISIT (OUTPATIENT)
Dept: CARDIOLOGY | Facility: CLINIC | Age: 39
End: 2024-10-17
Payer: COMMERCIAL

## 2024-10-17 DIAGNOSIS — Z95.0 PRESENCE OF CARDIAC PACEMAKER: ICD-10-CM

## 2024-10-17 DIAGNOSIS — I48.92 ATRIAL FIBRILLATION AND FLUTTER: ICD-10-CM

## 2024-10-17 DIAGNOSIS — I48.91 ATRIAL FIBRILLATION AND FLUTTER: ICD-10-CM

## 2024-10-17 DIAGNOSIS — Z95.2 H/O MITRAL VALVE REPLACEMENT WITH MECHANICAL VALVE: ICD-10-CM

## 2024-10-17 DIAGNOSIS — I48.92 UNSPECIFIED ATRIAL FLUTTER: ICD-10-CM

## 2024-10-17 DIAGNOSIS — Z79.01 LONG TERM (CURRENT) USE OF ANTICOAGULANTS: Primary | ICD-10-CM

## 2024-10-17 LAB — INR PPP: 3.6

## 2024-10-17 PROCEDURE — 93294 REM INTERROG EVL PM/LDLS PM: CPT | Mod: ,,, | Performed by: STUDENT IN AN ORGANIZED HEALTH CARE EDUCATION/TRAINING PROGRAM

## 2024-10-17 PROCEDURE — 93296 REM INTERROG EVL PM/IDS: CPT | Performed by: STUDENT IN AN ORGANIZED HEALTH CARE EDUCATION/TRAINING PROGRAM

## 2024-10-17 PROCEDURE — 93793 ANTICOAG MGMT PT WARFARIN: CPT | Mod: S$GLB,,,

## 2024-10-17 NOTE — PROGRESS NOTES
[facility]    10/17/2024    King Botello (39 y.o.) is followed by the [facility].      Assessment/Plan:    King Botello presents with a supratherapeutic INR. Goal INR: 2.5-3.5    Lab Results   Component Value Date    INR 3.6 10/17/2024    INR 3.5 10/10/2024    INR 2.9 10/03/2024       Assessment of patient findings per MA/LPN and chart review:   The following significant findings were found:   None    Recommendation for patient's warfarin regimen:   Due to supratherapeutic INR, patient was instructed to take a reduced warfarin dose today. Patient to resume their normal weekly dose.    Recommended repeat INR in 1 week      Pat FontenotD, BCPS  Clinical Pharmacist - [facility]  Preferred Contact: Secure Messaging or In Basket Message

## 2024-10-21 NOTE — PROGRESS NOTES
Subjective:   Patient ID:  King Botello is a 39 y.o. female who presents for follow-up of CVD    HPI:Patient is here for valvular heart disease/AF/CHF.    The patient has no chest pain, SOB, TIA,  syncope or pre-syncope.Patient does not exercise but active.She feels bad with AF/Flutter and especially if it goes mid 100s making it hard for her to exercise.        Review of Systems   Constitutional: Positive for weight gain. Negative for chills, decreased appetite, diaphoresis, fever, malaise/fatigue, night sweats and weight loss.   HENT:  Negative for congestion, hoarse voice, nosebleeds, sore throat and tinnitus.    Eyes:  Negative for blurred vision, double vision, vision loss in left eye, vision loss in right eye, visual disturbance and visual halos.   Cardiovascular:  Positive for irregular heartbeat and palpitations. Negative for chest pain, claudication, cyanosis, dyspnea on exertion, leg swelling, near-syncope, orthopnea, paroxysmal nocturnal dyspnea and syncope.   Respiratory:  Negative for cough, hemoptysis, shortness of breath, sleep disturbances due to breathing, snoring, sputum production and wheezing.    Endocrine: Negative for cold intolerance, heat intolerance, polydipsia, polyphagia and polyuria.   Hematologic/Lymphatic: Negative for adenopathy and bleeding problem. Does not bruise/bleed easily.   Skin:  Negative for color change, dry skin, flushing, itching, nail changes, poor wound healing, rash, skin cancer, suspicious lesions and unusual hair distribution.   Musculoskeletal:  Negative for arthritis, back pain, falls, gout, joint pain, joint swelling, muscle cramps, muscle weakness, myalgias and stiffness.   Gastrointestinal:  Negative for abdominal pain, anorexia, change in bowel habit, constipation, diarrhea, dysphagia, heartburn, hematemesis, hematochezia, melena and vomiting.   Genitourinary:  Negative for decreased libido, dysuria, hematuria, hesitancy and urgency.   Neurological:   "Negative for excessive daytime sleepiness, dizziness, focal weakness, headaches, light-headedness, loss of balance, numbness, paresthesias, seizures, sensory change, tremors, vertigo and weakness.   Psychiatric/Behavioral:  Negative for altered mental status, depression, hallucinations, memory loss, substance abuse and suicidal ideas. The patient does not have insomnia and is not nervous/anxious.    Allergic/Immunologic: Negative for environmental allergies and hives.       Objective: /65 (BP Location: Left arm, Patient Position: Sitting)   Pulse 84   Ht 5' 1" (1.549 m)   Wt 85 kg (187 lb 6.3 oz)   BMI 35.41 kg/m²      Physical Exam  Constitutional:       Appearance: She is well-developed.   HENT:      Head: Normocephalic.   Eyes:      Pupils: Pupils are equal, round, and reactive to light.   Neck:      Thyroid: No thyromegaly.      Vascular: Normal carotid pulses. No carotid bruit, hepatojugular reflux or JVD.   Cardiovascular:      Rate and Rhythm: Normal rate and regular rhythm.      Pulses: Intact distal pulses.      Heart sounds: Normal heart sounds. No murmur heard.     No friction rub. No gallop.   Pulmonary:      Effort: Pulmonary effort is normal. No tachypnea or respiratory distress.      Breath sounds: Normal breath sounds. No wheezing or rales.   Chest:      Chest wall: No tenderness.   Abdominal:      General: Bowel sounds are normal. There is no distension.      Palpations: Abdomen is soft. There is no mass.      Tenderness: There is no abdominal tenderness. There is no guarding or rebound.   Musculoskeletal:         General: No tenderness. Normal range of motion.      Cervical back: Normal range of motion.   Lymphadenopathy:      Cervical: No cervical adenopathy.   Skin:     General: Skin is warm.      Findings: No erythema or rash.   Neurological:      Mental Status: She is alert and oriented to person, place, and time.      Cranial Nerves: No cranial nerve deficit.      Coordination: " Coordination normal.   Psychiatric:         Behavior: Behavior normal.         Thought Content: Thought content normal.         Judgment: Judgment normal.     Reviewed recent blood tests and regularly involved with adjusting coumadin and diuretic doses; reviewed recent CFD that I personally read and reported    Assessment:     1. Nonrheumatic mitral valve stenosis    2. H/O mitral valve replacement with mechanical valve    3. Complete heart block    4. Chronic diastolic heart failure    5. Atypical atrial flutter    6. Atrial fibrillation and flutter    7. Pacemaker    8. Primary hypertension    9. Vitamin D deficiency disease        Plan:   Discussed diet , achieving and maintaining ideal body weight, and exercise.   We reviewed meds in detail.  Reassured-Discussed goals, options, plan.  We have discussed the need for endocarditis prophylaxis.  Will adjust diuretic doses based on chem 7 and BNP and Warfarin based on INRs   Try to increase Metoprolol to 100 am and 50 pm  Add Digoxin for help with rate control when she goes very fast .25 daily for 6 days then just half       King was seen today for congestive heart failure.    Diagnoses and all orders for this visit:    Nonrheumatic mitral valve stenosis    H/O mitral valve replacement with mechanical valve  -     Basic Metabolic Panel; Standing  -     BNP; Standing    Complete heart block    Chronic diastolic heart failure  -     Basic Metabolic Panel; Standing  -     BNP; Standing    Atypical atrial flutter  -     Digoxin Level; Standing  -     metoprolol succinate (TOPROL-XL) 100 MG 24 hr tablet; Take 1 tablet (100 mg total) by mouth 2 (two) times daily.  -     digoxin (LANOXIN) 250 mcg tablet; Take 1 tablet (250 mcg total) by mouth once daily.    Atrial fibrillation and flutter  -     Digoxin Level; Standing  -     metoprolol succinate (TOPROL-XL) 100 MG 24 hr tablet; Take 1 tablet (100 mg total) by mouth 2 (two) times daily.  -     digoxin (LANOXIN) 250 mcg  tablet; Take 1 tablet (250 mcg total) by mouth once daily.    Pacemaker    Primary hypertension    Vitamin D deficiency disease            Follow up for chem 7 and BNP now and all labs in 3 and 8 weeks.

## 2024-10-23 ENCOUNTER — IMMUNIZATION (OUTPATIENT)
Dept: INTERNAL MEDICINE | Facility: CLINIC | Age: 39
End: 2024-10-23
Payer: COMMERCIAL

## 2024-10-23 DIAGNOSIS — Z23 NEED FOR VACCINATION: Primary | ICD-10-CM

## 2024-10-23 PROCEDURE — 90656 IIV3 VACC NO PRSV 0.5 ML IM: CPT | Mod: S$GLB,,, | Performed by: INTERNAL MEDICINE

## 2024-10-23 PROCEDURE — 90471 IMMUNIZATION ADMIN: CPT | Mod: S$GLB,,, | Performed by: INTERNAL MEDICINE

## 2024-10-24 ENCOUNTER — OFFICE VISIT (OUTPATIENT)
Dept: CARDIOLOGY | Facility: CLINIC | Age: 39
End: 2024-10-24
Payer: COMMERCIAL

## 2024-10-24 ENCOUNTER — ANTI-COAG VISIT (OUTPATIENT)
Dept: CARDIOLOGY | Facility: CLINIC | Age: 39
End: 2024-10-24
Payer: COMMERCIAL

## 2024-10-24 ENCOUNTER — LAB VISIT (OUTPATIENT)
Dept: LAB | Facility: HOSPITAL | Age: 39
End: 2024-10-24
Attending: INTERNAL MEDICINE
Payer: COMMERCIAL

## 2024-10-24 VITALS
BODY MASS INDEX: 35.38 KG/M2 | HEIGHT: 61 IN | HEART RATE: 84 BPM | SYSTOLIC BLOOD PRESSURE: 120 MMHG | WEIGHT: 187.38 LBS | DIASTOLIC BLOOD PRESSURE: 65 MMHG

## 2024-10-24 DIAGNOSIS — I48.91 ATRIAL FIBRILLATION AND FLUTTER: ICD-10-CM

## 2024-10-24 DIAGNOSIS — I50.32 CHRONIC DIASTOLIC HEART FAILURE: ICD-10-CM

## 2024-10-24 DIAGNOSIS — I44.2 COMPLETE HEART BLOCK: ICD-10-CM

## 2024-10-24 DIAGNOSIS — E55.9 VITAMIN D DEFICIENCY DISEASE: ICD-10-CM

## 2024-10-24 DIAGNOSIS — Z95.2 H/O MITRAL VALVE REPLACEMENT WITH MECHANICAL VALVE: ICD-10-CM

## 2024-10-24 DIAGNOSIS — I48.92 ATRIAL FIBRILLATION AND FLUTTER: ICD-10-CM

## 2024-10-24 DIAGNOSIS — I10 PRIMARY HYPERTENSION: ICD-10-CM

## 2024-10-24 DIAGNOSIS — I34.2 NONRHEUMATIC MITRAL VALVE STENOSIS: Primary | ICD-10-CM

## 2024-10-24 DIAGNOSIS — Z95.0 PACEMAKER: ICD-10-CM

## 2024-10-24 DIAGNOSIS — Z79.01 LONG TERM (CURRENT) USE OF ANTICOAGULANTS: Primary | ICD-10-CM

## 2024-10-24 DIAGNOSIS — I48.4 ATYPICAL ATRIAL FLUTTER: ICD-10-CM

## 2024-10-24 LAB
ANION GAP SERPL CALC-SCNC: 8 MMOL/L (ref 8–16)
BNP SERPL-MCNC: 49 PG/ML (ref 0–99)
BUN SERPL-MCNC: 9 MG/DL (ref 6–20)
CALCIUM SERPL-MCNC: 9.3 MG/DL (ref 8.7–10.5)
CHLORIDE SERPL-SCNC: 103 MMOL/L (ref 95–110)
CO2 SERPL-SCNC: 28 MMOL/L (ref 23–29)
CREAT SERPL-MCNC: 0.6 MG/DL (ref 0.5–1.4)
EST. GFR  (NO RACE VARIABLE): >60 ML/MIN/1.73 M^2
GLUCOSE SERPL-MCNC: 110 MG/DL (ref 70–110)
INR PPP: 4
POTASSIUM SERPL-SCNC: 3.7 MMOL/L (ref 3.5–5.1)
SODIUM SERPL-SCNC: 139 MMOL/L (ref 136–145)

## 2024-10-24 PROCEDURE — 83880 ASSAY OF NATRIURETIC PEPTIDE: CPT | Performed by: INTERNAL MEDICINE

## 2024-10-24 PROCEDURE — 99999 PR PBB SHADOW E&M-EST. PATIENT-LVL IV: CPT | Mod: PBBFAC,,, | Performed by: INTERNAL MEDICINE

## 2024-10-24 PROCEDURE — 80048 BASIC METABOLIC PNL TOTAL CA: CPT | Performed by: INTERNAL MEDICINE

## 2024-10-24 PROCEDURE — 36415 COLL VENOUS BLD VENIPUNCTURE: CPT | Performed by: INTERNAL MEDICINE

## 2024-10-24 RX ORDER — DIGOXIN 250 MCG
250 TABLET ORAL DAILY
Qty: 90 TABLET | Refills: 3 | Status: SHIPPED | OUTPATIENT
Start: 2024-10-24 | End: 2025-10-24

## 2024-10-24 RX ORDER — METOPROLOL SUCCINATE 100 MG/1
100 TABLET, EXTENDED RELEASE ORAL 2 TIMES DAILY
Qty: 180 TABLET | Refills: 3 | Status: SHIPPED | OUTPATIENT
Start: 2024-10-24 | End: 2025-10-24

## 2024-10-24 NOTE — PROGRESS NOTES
[facility]    10/24/2024    King Botello (39 y.o.) is followed by the [facility].      Assessment/Plan:    King Botello presents with a supratherapeutic INR. Goal INR: 2.5-3.5    Lab Results   Component Value Date    INR 4.0 10/24/2024    INR 3.6 10/17/2024    INR 3.5 10/10/2024       Assessment of patient findings per MA/LPN and chart review:   The following significant findings were found:   Patient reports not eating her usual salads this past week (can increase INR)    Recommendation for patient's warfarin regimen:   Weekly warfarin dose increased due to repeated subtherapeutic INRs.    Recommended repeat INR in 1 week      Laine Glynn, PharmD, BCPS  Clinical Pharmacist - [facility]  Preferred Contact: Secure Messaging or In Basket Message

## 2024-10-24 NOTE — PATIENT INSTRUCTIONS
Discussed diet , achieving and maintaining ideal body weight, and exercise.   We reviewed meds in detail.  Reassured-Discussed goals, options, plan.  We have discussed the need for endocarditis prophylaxis.  Will adjust diuretic doses based on chem 7 and BNP and Warfarin based on INRs   Try to increase Metoprolol to 100 am and 50 pm  Add Digoxin for help with rate control when she goes very fast .25 daily for 6 days then just half

## 2024-10-31 ENCOUNTER — ANTI-COAG VISIT (OUTPATIENT)
Dept: CARDIOLOGY | Facility: CLINIC | Age: 39
End: 2024-10-31
Payer: COMMERCIAL

## 2024-10-31 DIAGNOSIS — Z95.2 H/O MITRAL VALVE REPLACEMENT WITH MECHANICAL VALVE: ICD-10-CM

## 2024-10-31 DIAGNOSIS — Z79.01 LONG TERM (CURRENT) USE OF ANTICOAGULANTS: Primary | ICD-10-CM

## 2024-10-31 DIAGNOSIS — I48.91 ATRIAL FIBRILLATION AND FLUTTER: ICD-10-CM

## 2024-10-31 DIAGNOSIS — I48.92 ATRIAL FIBRILLATION AND FLUTTER: ICD-10-CM

## 2024-10-31 LAB — INR PPP: 4.4

## 2024-10-31 PROCEDURE — 93793 ANTICOAG MGMT PT WARFARIN: CPT | Mod: S$GLB,,,

## 2024-11-03 LAB
OHS CV AF BURDEN PERCENT: < 1
OHS CV DC REMOTE DEVICE TYPE: NORMAL
OHS CV RV PACING PERCENT: 0.01 %

## 2024-11-04 ENCOUNTER — PATIENT MESSAGE (OUTPATIENT)
Dept: INTERNAL MEDICINE | Facility: CLINIC | Age: 39
End: 2024-11-04
Payer: COMMERCIAL

## 2024-11-07 ENCOUNTER — ANTI-COAG VISIT (OUTPATIENT)
Dept: CARDIOLOGY | Facility: CLINIC | Age: 39
End: 2024-11-07
Payer: COMMERCIAL

## 2024-11-07 DIAGNOSIS — Z95.2 H/O MITRAL VALVE REPLACEMENT WITH MECHANICAL VALVE: ICD-10-CM

## 2024-11-07 DIAGNOSIS — I48.92 ATRIAL FIBRILLATION AND FLUTTER: ICD-10-CM

## 2024-11-07 DIAGNOSIS — I48.91 ATRIAL FIBRILLATION AND FLUTTER: ICD-10-CM

## 2024-11-07 DIAGNOSIS — Z79.01 LONG TERM (CURRENT) USE OF ANTICOAGULANTS: Primary | ICD-10-CM

## 2024-11-07 LAB — INR PPP: 2.5

## 2024-11-07 PROCEDURE — 93793 ANTICOAG MGMT PT WARFARIN: CPT | Mod: S$GLB,,,

## 2024-11-07 NOTE — PROGRESS NOTES
[facility]    11/07/2024    King Botello (39 y.o.) is followed by the [facility].      Assessment/Plan:    King Botello presents with a therapeutic INR. Goal INR: 2.5-3.5    Lab Results   Component Value Date    INR 2.5 11/07/2024    INR 4.4 10/31/2024    INR 4.0 10/24/2024       Assessment of patient findings per MA/LPN and chart review:   The following significant findings were found:   None    Recommendation for patient's warfarin regimen:   No change was made to warfarin therapy during this visit and patient has been instructed to continue their current warfarin regimen.    Recommended repeat INR in 1 week      Laine Glynn, PharmD, BCPS  Clinical Pharmacist - [facility]  Preferred Contact: Secure Messaging or In Basket Message

## 2024-11-12 ENCOUNTER — TELEPHONE (OUTPATIENT)
Dept: BARIATRICS | Facility: CLINIC | Age: 39
End: 2024-11-12
Payer: COMMERCIAL

## 2024-11-12 ENCOUNTER — OFFICE VISIT (OUTPATIENT)
Dept: INTERNAL MEDICINE | Facility: CLINIC | Age: 39
End: 2024-11-12
Attending: FAMILY MEDICINE
Payer: COMMERCIAL

## 2024-11-12 ENCOUNTER — TELEPHONE (OUTPATIENT)
Dept: INTERNAL MEDICINE | Facility: CLINIC | Age: 39
End: 2024-11-12

## 2024-11-12 ENCOUNTER — PATIENT MESSAGE (OUTPATIENT)
Dept: INTERNAL MEDICINE | Facility: CLINIC | Age: 39
End: 2024-11-12

## 2024-11-12 DIAGNOSIS — E66.9 OBESITY, UNSPECIFIED CLASS, UNSPECIFIED OBESITY TYPE, UNSPECIFIED WHETHER SERIOUS COMORBIDITY PRESENT: Primary | ICD-10-CM

## 2024-11-12 PROCEDURE — 99213 OFFICE O/P EST LOW 20 MIN: CPT | Mod: 95,,, | Performed by: FAMILY MEDICINE

## 2024-11-12 PROCEDURE — 1159F MED LIST DOCD IN RCRD: CPT | Mod: CPTII,95,, | Performed by: FAMILY MEDICINE

## 2024-11-12 PROCEDURE — 1160F RVW MEDS BY RX/DR IN RCRD: CPT | Mod: CPTII,95,, | Performed by: FAMILY MEDICINE

## 2024-11-12 PROCEDURE — 3044F HG A1C LEVEL LT 7.0%: CPT | Mod: CPTII,95,, | Performed by: FAMILY MEDICINE

## 2024-11-12 NOTE — PROGRESS NOTES
HISTORY OF PRESENT ILLNESS:  The patient is admitted to the hospital for increasing  manic symptoms. Patient has had increasing stressors. She has outburst at work where she threw  things at others. Doesn't recall doing this.  Patient has not been functioning well prior to admission. Patient has not  been taking psychiatric medications. The patient is agreeable  to continued inpatient psychiatric admission to get help with these symptoms. The patient denies  any active SI or HI currently.    PAST PSYCHIATRIC HISTORY:  The patient with previous psychiatric admissions and with  history of bipolar illness.  PAST MEDICAL HISTORY:  Per the PCP  FAMILY HISTORY:  Unsure  SOCIAL HISTORY:  The patient's psychosocial history is reviewed  MENTAL STATUS EXAM:    Appearance:  Appears stated age. Slightly disheveled.  Behavior:  Cooperativeness limited  Speech:  Normal rate and volume.  Affect: irritable  Thought Process:  Disorganized, tangential  Thought Content:  Denies SI or HI. denies hallucinations.  Insight and Judgment:  Limited.  ASSESSMENT:  Bipolar disorder, manic episode  PLAN:  Patient admitted for further stabilization of acute psychiatric symtoms.  Started necessary precautions and therapy. Discussed medication options with patient,  and will start medications as appropriate after obtaining informed consent from the patient.  Obtained consent includes discussing benefits, alternatives, and risks of medication including  the black box warnings.   Subjective:       Patient ID: King Botello is a 39 y.o. female.    Chief Complaint: No chief complaint on file.    The patient location is:  Home  The chief complaint leading to consultation is:  Once weight loss medications, weight gain    Visit type: audiovisual    Face to Face time with patient: 10  11 minutes of total time spent on the encounter, which includes face to face time and non-face to face time preparing to see the patient (eg, review of tests), Obtaining and/or reviewing separately obtained history, Documenting clinical information in the electronic or other health record, Independently interpreting results (not separately reported) and communicating results to the patient/family/caregiver, or Care coordination (not separately reported).         Each patient to whom he or she provides medical services by telemedicine is:  (1) informed of the relationship between the physician and patient and the respective role of any other health care provider with respect to management of the patient; and (2) notified that he or she may decline to receive medical services by telemedicine and may withdraw from such care at any time.    Notes:  Patient has gained rate recently.  Likely due to activity limitation, medications etc..  Interested in weight loss medication.  Not interested in bariatric surgery.  No history of pancreatitis, family history of immune, history of thyroid problems.  Nondiabetic by estimate.    Requests seems reasonable, multiple medical and cardiac comorbidities.  Recommended the employee weight loss program.  I am unclear how to access that.  Would recommend contacting HR or benefits to see if they can provide a resource number.  I will provide a bariatric consult in the meantime.  Ask patient to let me know if she has difficulty navigating process and can not find a referral number.          Review of Systems   Constitutional:  Positive for activity change. Negative for unexpected  weight change.   HENT:  Negative for hearing loss, rhinorrhea and trouble swallowing.    Eyes:  Negative for discharge and visual disturbance.   Respiratory:  Negative for chest tightness and wheezing.    Cardiovascular:  Negative for chest pain and palpitations.   Gastrointestinal:  Negative for blood in stool, constipation, diarrhea and vomiting.   Endocrine: Negative for polydipsia and polyuria.   Genitourinary:  Negative for difficulty urinating, hematuria and menstrual problem.   Musculoskeletal:  Negative for arthralgias, joint swelling and neck pain.   Neurological:  Negative for weakness and headaches.   Psychiatric/Behavioral:  Negative for confusion and dysphoric mood.        Objective:      Physical Exam    Assessment:       1. Obesity, unspecified class, unspecified obesity type, unspecified whether serious comorbidity present        Plan:     Medication List with Changes/Refills   Current Medications    ASPIRIN (ECOTRIN) 81 MG EC TABLET    Take 1 tablet (81 mg total) by mouth once daily.    DIGOXIN (LANOXIN) 250 MCG TABLET    Take 1 tablet (250 mcg total) by mouth once daily.    DOFETILIDE (TIKOSYN) 250 MCG CAP    Take 1 capsule (250 mcg total) by mouth every 12 (twelve) hours.    METOPROLOL SUCCINATE (TOPROL-XL) 100 MG 24 HR TABLET    Take 1 tablet (100 mg total) by mouth 2 (two) times daily.    METOPROLOL TARTRATE (LOPRESSOR) 25 MG TABLET    Take 1 tablet (25 mg total) by mouth 2 (two) times daily as needed (Palpitations).    WARFARIN (COUMADIN) 4 MG TABLET    Take 2 to 3 tablets (8-12 mg total) by mouth daily as directed by the [facility]mad[facility]     1. Obesity, unspecified class, unspecified obesity type, unspecified whether serious comorbidity present  -     Ambulatory referral/consult to Bariatric/Obesity Medicine; Future; Expected date: 11/19/2024  -     Ambulatory referral/consult to Weight Management Program; Future; Expected date: 11/19/2024      See meds, orders, follow up, routing and  instructions sections of encounter and AVS. Discussed with patient and provided on AVS.

## 2024-11-14 ENCOUNTER — PATIENT MESSAGE (OUTPATIENT)
Dept: CARDIOLOGY | Facility: CLINIC | Age: 39
End: 2024-11-14
Payer: COMMERCIAL

## 2024-11-14 ENCOUNTER — ANTI-COAG VISIT (OUTPATIENT)
Dept: CARDIOLOGY | Facility: CLINIC | Age: 39
End: 2024-11-14
Payer: COMMERCIAL

## 2024-11-14 ENCOUNTER — LAB VISIT (OUTPATIENT)
Dept: LAB | Facility: HOSPITAL | Age: 39
End: 2024-11-14
Attending: INTERNAL MEDICINE
Payer: COMMERCIAL

## 2024-11-14 DIAGNOSIS — Z79.01 LONG TERM (CURRENT) USE OF ANTICOAGULANTS: Primary | ICD-10-CM

## 2024-11-14 DIAGNOSIS — I48.91 ATRIAL FIBRILLATION AND FLUTTER: ICD-10-CM

## 2024-11-14 DIAGNOSIS — Z95.2 H/O MITRAL VALVE REPLACEMENT WITH MECHANICAL VALVE: ICD-10-CM

## 2024-11-14 DIAGNOSIS — I50.32 CHRONIC DIASTOLIC HEART FAILURE: ICD-10-CM

## 2024-11-14 DIAGNOSIS — I48.4 ATYPICAL ATRIAL FLUTTER: ICD-10-CM

## 2024-11-14 DIAGNOSIS — I48.92 ATRIAL FIBRILLATION AND FLUTTER: ICD-10-CM

## 2024-11-14 LAB
ANION GAP SERPL CALC-SCNC: 8 MMOL/L (ref 8–16)
BNP SERPL-MCNC: 95 PG/ML (ref 0–99)
BUN SERPL-MCNC: 8 MG/DL (ref 6–20)
CALCIUM SERPL-MCNC: 9.5 MG/DL (ref 8.7–10.5)
CHLORIDE SERPL-SCNC: 105 MMOL/L (ref 95–110)
CO2 SERPL-SCNC: 26 MMOL/L (ref 23–29)
CREAT SERPL-MCNC: 0.7 MG/DL (ref 0.5–1.4)
DIGOXIN SERPL-MCNC: 0.3 NG/ML (ref 0.8–2)
EST. GFR  (NO RACE VARIABLE): >60 ML/MIN/1.73 M^2
GLUCOSE SERPL-MCNC: 104 MG/DL (ref 70–110)
INR PPP: 2
POTASSIUM SERPL-SCNC: 4.7 MMOL/L (ref 3.5–5.1)
SODIUM SERPL-SCNC: 139 MMOL/L (ref 136–145)

## 2024-11-14 PROCEDURE — 83880 ASSAY OF NATRIURETIC PEPTIDE: CPT | Performed by: INTERNAL MEDICINE

## 2024-11-14 PROCEDURE — 80048 BASIC METABOLIC PNL TOTAL CA: CPT | Performed by: INTERNAL MEDICINE

## 2024-11-14 PROCEDURE — 80162 ASSAY OF DIGOXIN TOTAL: CPT | Performed by: INTERNAL MEDICINE

## 2024-11-14 PROCEDURE — 36415 COLL VENOUS BLD VENIPUNCTURE: CPT | Performed by: INTERNAL MEDICINE

## 2024-11-14 PROCEDURE — 93793 ANTICOAG MGMT PT WARFARIN: CPT | Mod: S$GLB,,,

## 2024-11-14 NOTE — PROGRESS NOTES
[facility]    11/14/2024    King Botello (39 y.o.) is followed by the [facility].      Assessment/Plan:    King Botello presents with a subtherapeutic  INR. Goal INR: 2.5-3.5    Lab Results   Component Value Date    INR 2.0 11/14/2024    INR 2.5 11/07/2024    INR 4.4 10/31/2024       Assessment of patient findings per MA/LPN and chart review:   The following significant findings were found:   None    Recommendation for patient's warfarin regimen:   Due to subtherapeutic INR, patient was instructed to take a booster dose today. Patient to resume their normal weekly dose.    Recommended repeat INR in 1 week      Pat FontenotD, BCPS  Clinical Pharmacist - [facility]  Preferred Contact: Secure Messaging or In Basket Message

## 2024-11-14 NOTE — TELEPHONE ENCOUNTER
Lov 11/13/24    Pt that she can is unable to do a visit until March 2025 for the wt loss program for [facility] employees. States that you asked her to reach out if she ran into any road blocks with the program. States that she does not want to wait that long and is not comfortable with the medicines going thru a pharmacist instead of a provider due to her other health conditions

## 2024-11-14 NOTE — PROGRESS NOTES
Your results look fine and do not require any change in treatment.     Please contact me if you have any additional concerns.    Sincerely,    Eugene Cordero

## 2024-11-15 RX ORDER — SEMAGLUTIDE 0.25 MG/.5ML
0.25 INJECTION, SOLUTION SUBCUTANEOUS
Qty: 2 ML | Refills: 0 | Status: SHIPPED | OUTPATIENT
Start: 2024-11-15

## 2024-11-20 ENCOUNTER — TELEPHONE (OUTPATIENT)
Dept: CARDIOLOGY | Facility: CLINIC | Age: 39
End: 2024-11-20
Payer: COMMERCIAL

## 2024-11-21 ENCOUNTER — PATIENT MESSAGE (OUTPATIENT)
Dept: CARDIOLOGY | Facility: CLINIC | Age: 39
End: 2024-11-21
Payer: COMMERCIAL

## 2024-11-21 ENCOUNTER — ANTI-COAG VISIT (OUTPATIENT)
Dept: CARDIOLOGY | Facility: CLINIC | Age: 39
End: 2024-11-21
Payer: COMMERCIAL

## 2024-11-21 ENCOUNTER — OFFICE VISIT (OUTPATIENT)
Dept: INTERNAL MEDICINE | Facility: CLINIC | Age: 39
End: 2024-11-21
Payer: COMMERCIAL

## 2024-11-21 VITALS
TEMPERATURE: 98 F | HEIGHT: 61 IN | HEART RATE: 60 BPM | BODY MASS INDEX: 35.09 KG/M2 | DIASTOLIC BLOOD PRESSURE: 58 MMHG | OXYGEN SATURATION: 98 % | SYSTOLIC BLOOD PRESSURE: 106 MMHG | WEIGHT: 185.88 LBS

## 2024-11-21 DIAGNOSIS — J32.9 SINUSITIS, UNSPECIFIED CHRONICITY, UNSPECIFIED LOCATION: Primary | ICD-10-CM

## 2024-11-21 DIAGNOSIS — I48.91 ATRIAL FIBRILLATION AND FLUTTER: ICD-10-CM

## 2024-11-21 DIAGNOSIS — I48.92 ATRIAL FIBRILLATION AND FLUTTER: ICD-10-CM

## 2024-11-21 DIAGNOSIS — Z79.01 LONG TERM (CURRENT) USE OF ANTICOAGULANTS: Primary | ICD-10-CM

## 2024-11-21 DIAGNOSIS — Z95.2 H/O MITRAL VALVE REPLACEMENT WITH MECHANICAL VALVE: ICD-10-CM

## 2024-11-21 DIAGNOSIS — R05.9 COUGH, UNSPECIFIED TYPE: ICD-10-CM

## 2024-11-21 LAB
CTP QC/QA: YES
INR PPP: 3.3
POC MOLECULAR INFLUENZA A AGN: NEGATIVE
POC MOLECULAR INFLUENZA B AGN: NEGATIVE
S PYO RRNA THROAT QL PROBE: NEGATIVE
SARS-COV-2 RDRP RESP QL NAA+PROBE: NEGATIVE

## 2024-11-21 PROCEDURE — 87880 STREP A ASSAY W/OPTIC: CPT | Mod: QW,S$GLB,, | Performed by: NURSE PRACTITIONER

## 2024-11-21 PROCEDURE — 87502 INFLUENZA DNA AMP PROBE: CPT | Mod: QW,S$GLB,, | Performed by: NURSE PRACTITIONER

## 2024-11-21 PROCEDURE — 99999 PR PBB SHADOW E&M-EST. PATIENT-LVL III: CPT | Mod: PBBFAC,,, | Performed by: NURSE PRACTITIONER

## 2024-11-21 RX ORDER — FLUTICASONE PROPIONATE 50 MCG
1 SPRAY, SUSPENSION (ML) NASAL DAILY
Qty: 16 G | Refills: 0 | Status: SHIPPED | OUTPATIENT
Start: 2024-11-21

## 2024-11-21 RX ORDER — BENZONATATE 200 MG/1
200 CAPSULE ORAL 3 TIMES DAILY PRN
Qty: 30 CAPSULE | Refills: 0 | Status: SHIPPED | OUTPATIENT
Start: 2024-11-21 | End: 2024-12-01

## 2024-11-21 RX ORDER — LORATADINE 10 MG/1
10 TABLET ORAL DAILY
Qty: 30 TABLET | Refills: 0 | Status: SHIPPED | OUTPATIENT
Start: 2024-11-21 | End: 2025-11-21

## 2024-11-21 RX ORDER — GUAIFENESIN 600 MG/1
1200 TABLET, EXTENDED RELEASE ORAL 2 TIMES DAILY
Qty: 40 TABLET | Refills: 0 | Status: SHIPPED | OUTPATIENT
Start: 2024-11-21 | End: 2024-11-21

## 2024-11-21 NOTE — PROGRESS NOTES
[facility]    11/21/2024    King Botello (39 y.o.) is followed by the [facility].      Assessment/Plan:    King Botello presents with a therapeutic INR. Goal INR: 2.5-3.5    Lab Results   Component Value Date    INR 3.3 11/21/2024    INR 2.0 11/14/2024    INR 2.5 11/07/2024       Assessment of patient findings per MA/LPN and chart review:   The following significant findings were found:   None    Recommendation for patient's warfarin regimen:   No change was made to warfarin therapy during this visit and patient has been instructed to continue their current warfarin regimen.    Recommended repeat INR in 1 week      Laine Glynn, PharmD, BCPS  Clinical Pharmacist - [facility]  Preferred Contact: Secure Messaging or In Basket Message

## 2024-11-21 NOTE — PROGRESS NOTES
INTERNAL MEDICINE PROGRESS/URGENT CARE NOTE    CHIEF COMPLAINT     Chief Complaint   Patient presents with    URI    Sore Throat       HPI     King Botello is a 39 y.o. female who presents for an urgent visit today.    Her symptoms started 2 days ago- sore throat, cough, congestion. No fevers. Some chills.   Family members ill with similar symptoms as well.   No wheezing or sob or cp.  Taking tylenol only    Patient Active Problem List   Diagnosis    Vitamin D deficiency disease    Primary hypertension    Chronic fatigue    Pre-existing essential hypertension during pregnancy, antepartum    Nonrheumatic mitral valve stenosis    S/P  section    Chronic diastolic heart failure    Visit for monitoring Tikosyn therapy    Acute postoperative anemia due to expected blood loss    Transient hyperglycemia post procedure    H/O mitral valve replacement with mechanical valve    Complete heart block    Long term (current) use of anticoagulants    Heart failure, diastolic, acute on chronic    Atrial fibrillation and flutter    Pacemaker    Atypical atrial flutter    Junctional escape beats    H/O tricuspid valve annuloplasty        Past Medical History:  Past Medical History:   Diagnosis Date    Abnormal Pap smear     Colposcopy    Atrial fibrillation and flutter 2024    Bradycardia 2020    Class 2 obesity due to excess calories with body mass index (BMI) of 35.0 to 35.9 in adult 3/9/2021    Hypertension, essential 3/16/2021    Menarche     Age of onset 11    Missed ab 2021    Mitral valve prolapse     Previous  delivery affecting pregnancy, antepartum 2020    S/P suction D&C 2021    Status post mitral valve annuloplasty 2017        Past Surgical History:  Past Surgical History:   Procedure Laterality Date    A-V CARDIAC PACEMAKER INSERTION N/A 2024    Procedure: INSERTION, CARDIAC PACEMAKER, DUAL CHAMBER;  Surgeon: CIRSTI Moore MD;  Location: [facility] EP LAB;  Service:  Cardiology;  Laterality: N/A;  CHB, dPPM with LBBP, MDT, ANES, [place], [place] [id number]     SECTION       SECTION WITH TUBAL LIGATION Bilateral 2023    Procedure:  SECTION, WITH TUBAL LIGATION;  Surgeon: Mane Moreno MD;  Location: [facility] L&D;  Service: OB/GYN;  Laterality: Bilateral;    DILATION AND CURETTAGE OF UTERUS USING SUCTION N/A 2021    Procedure: DILATION AND CURETTAGE, UTERUS, USING SUCTION;  Surgeon: Mane Moreno MD;  Location: [facility] OR;  Service: OB/GYN;  Laterality: N/A;    ECHOCARDIOGRAM,TRANSESOPHAGEAL N/A 2024    Procedure: Transesophageal echo (MINERVA) intra-procedure log documentation;  Surgeon: Page George MD;  Location: [facility] EP LAB;  Service: Cardiology;  Laterality: N/A;  AF, MINERVA/DCCV, MAC, EH, 3prep *MDT D-C PPM in situ*    MITRAL VALVE REPAIR      MITRAL VALVE REPLACEMENT N/A 2024    Procedure: MITRAL VALVE REPLACEMENT, REDO STERNOTOMY;  Surgeon: Thierno Liu MD;  Location: [address];  Service: Cardiothoracic;  Laterality: N/A;    TRANSESOPHAGEAL ECHOCARDIOGRAPHY N/A 2024    Procedure: ECHOCARDIOGRAM, TRANSESOPHAGEAL;  Surgeon: Jessee Ahumada MD;  Location: [facility] EP LAB;  Service: Cardiology;  Laterality: N/A;    TREATMENT OF CARDIAC ARRHYTHMIA N/A 2024    Procedure: Cardioversion or Defibrillation;  Surgeon: CRISTI Moore MD;  Location: [facility] EP LAB;  Service: Cardiology;  Laterality: N/A;    TREATMENT OF CARDIAC ARRHYTHMIA N/A 2024    Procedure: Cardioversion or Defibrillation;  Surgeon: CRISTI Moore MD;  Location: [facility] EP LAB;  Service: Cardiology;  Laterality: N/A;  AF, MINERVA/DCCV, MAC, EH, 3prep *MDT D-C PPM in situ*    VALVULOPLASTY, TRICUSPID N/A 2024    Procedure: VALVULOPLASTY, TRICUSPID REPAIR;  Surgeon: Thierno Liu MD;  [facility];  Service: Cardiothoracic;  Laterality: N/A;        Allergies:  Review of patient's allergies indicates:   Allergen Reactions    Cinnamon  Itching    Doxycycline      Abdomen pain severe       Home Medications:    Current Outpatient Medications:     aspirin (ECOTRIN) 81 MG EC tablet, Take 1 tablet (81 mg total) by mouth once daily., Disp: 30 tablet, Rfl: 11    digoxin (LANOXIN) 250 mcg tablet, Take 1 tablet (250 mcg total) by mouth once daily., Disp: 90 tablet, Rfl: 3    dofetilide (TIKOSYN) 250 MCG Cap, Take 1 capsule (250 mcg total) by mouth every 12 (twelve) hours., Disp: 180 capsule, Rfl: 3    metoprolol succinate (TOPROL-XL) 100 MG 24 hr tablet, Take 1 tablet (100 mg total) by mouth 2 (two) times daily., Disp: 180 tablet, Rfl: 3    metoprolol tartrate (LOPRESSOR) 25 MG tablet, Take 1 tablet (25 mg total) by mouth 2 (two) times daily as needed (Palpitations)., Disp: 180 tablet, Rfl: 3    semaglutide, weight loss, (WEGOVY) 0.25 mg/0.5 mL PnIj, Inject 0.25 mg into the skin every 7 days., Disp: 2 mL, Rfl: 0    warfarin (COUMADIN) 4 MG tablet, Take 2 to 3 tablets (8-12 mg total) by mouth daily as directed by the [facility], Disp: 90 tablet, Rfl: 3    benzonatate (TESSALON) 200 MG capsule, Take 1 capsule (200 mg total) by mouth 3 (three) times daily as needed., Disp: 30 capsule, Rfl: 0    fluticasone propionate (FLONASE) 50 mcg/actuation nasal spray, 1 spray (50 mcg total) by Each Nostril route once daily., Disp: 16 g, Rfl: 0    loratadine (CLARITIN) 10 mg tablet, Take 1 tablet (10 mg total) by mouth once daily., Disp: 30 tablet, Rfl: 0     Review of Systems:  Review of Systems   Constitutional:  Positive for fatigue. Negative for fever.   HENT:  Positive for congestion, postnasal drip, sinus pressure, sneezing and sore throat. Negative for ear pain, mouth sores, trouble swallowing and voice change.    Eyes:  Negative for discharge, redness and itching.   Respiratory:  Positive for cough. Negative for shortness of breath, wheezing and stridor.    Cardiovascular:  Negative for chest pain.   Gastrointestinal:  Negative for abdominal pain, diarrhea,  "nausea and vomiting.   Neurological:  Positive for headaches. Negative for dizziness and weakness.         PHYSICAL EXAM     Vitals:    11/21/24 1029 11/21/24 1050   BP: (!) 100/50 (!) 106/58   BP Location: Right arm    Patient Position: Sitting    Pulse: 60    Temp: 98.1 °F (36.7 °C)    TempSrc: Oral    SpO2: 98%    Weight: 84.3 kg (185 lb 13.6 oz)    Height: 5' 1" (1.549 m)       Body mass index is 35.12 kg/m².     Physical Exam  Vitals reviewed.   Constitutional:       Appearance: Normal appearance.   HENT:      Head: Normocephalic and atraumatic.      Right Ear: Tympanic membrane and ear canal normal.      Left Ear: Tympanic membrane and ear canal normal.      Nose: Congestion present.      Mouth/Throat:      Mouth: Mucous membranes are moist.      Pharynx: Oropharynx is clear. Uvula midline. No oropharyngeal exudate, posterior oropharyngeal erythema or uvula swelling.   Eyes:      Conjunctiva/sclera: Conjunctivae normal.      Pupils: Pupils are equal, round, and reactive to light.   Cardiovascular:      Rate and Rhythm: Normal rate and regular rhythm.   Pulmonary:      Effort: Pulmonary effort is normal.      Breath sounds: Normal breath sounds.   Musculoskeletal:      Cervical back: Normal range of motion and neck supple.   Lymphadenopathy:      Cervical: No cervical adenopathy.   Skin:     General: Skin is warm and dry.      Findings: No rash.   Neurological:      General: No focal deficit present.      Mental Status: She is alert.   Psychiatric:         Mood and Affect: Mood normal.         Behavior: Behavior normal.         LABS     Lab Results   Component Value Date    HGBA1C 5.0 02/29/2024     CMP  Sodium   Date Value Ref Range Status   11/14/2024 139 136 - 145 mmol/L Final     Potassium   Date Value Ref Range Status   11/14/2024 4.7 3.5 - 5.1 mmol/L Final     Chloride   Date Value Ref Range Status   11/14/2024 105 95 - 110 mmol/L Final     CO2   Date Value Ref Range Status   11/14/2024 26 23 - 29 mmol/L " Final     Glucose   Date Value Ref Range Status   11/14/2024 104 70 - 110 mg/dL Final     BUN   Date Value Ref Range Status   11/14/2024 8 6 - 20 mg/dL Final     Creatinine   Date Value Ref Range Status   11/14/2024 0.7 0.5 - 1.4 mg/dL Final     Calcium   Date Value Ref Range Status   11/14/2024 9.5 8.7 - 10.5 mg/dL Final     Total Protein   Date Value Ref Range Status   06/26/2024 7.4 6.0 - 8.4 g/dL Final     Albumin   Date Value Ref Range Status   06/26/2024 3.7 3.5 - 5.2 g/dL Final     Total Bilirubin   Date Value Ref Range Status   06/26/2024 0.5 0.1 - 1.0 mg/dL Final     Comment:     For infants and newborns, interpretation of results should be based  on gestational age, weight and in agreement with clinical  observations.    Premature Infant recommended reference ranges:  Up to 24 hours.............<8.0 mg/dL  Up to 48 hours............<12.0 mg/dL  3-5 days..................<15.0 mg/dL  6-29 days.................<15.0 mg/dL       Alkaline Phosphatase   Date Value Ref Range Status   06/26/2024 57 55 - 135 U/L Final     AST   Date Value Ref Range Status   06/26/2024 25 10 - 40 U/L Final     Comment:     *Result may be interfered by visible hemolysis     ALT   Date Value Ref Range Status   06/26/2024 11 10 - 44 U/L Final     Anion Gap   Date Value Ref Range Status   11/14/2024 8 8 - 16 mmol/L Final     eGFR if    Date Value Ref Range Status   06/28/2022 >60.0 >60 mL/min/1.73 m^2 Final     eGFR if non    Date Value Ref Range Status   06/28/2022 >60.0 >60 mL/min/1.73 m^2 Final     Comment:     Calculation used to obtain the estimated glomerular filtration  rate (eGFR) is the CKD-EPI equation.        Lab Results   Component Value Date    WBC 5.94 06/26/2024    HGB 12.1 06/26/2024    HCT 37.3 06/26/2024    MCV 82 06/26/2024     06/26/2024     Lab Results   Component Value Date    CHOL 215 (H) 02/29/2024    CHOL 212 (H) 08/14/2023    CHOL 199 06/28/2022     Lab Results    Component Value Date    HDL 41 02/29/2024    HDL 47 08/14/2023    HDL 54 06/28/2022     Lab Results   Component Value Date    LDLCALC 142.4 02/29/2024    LDLCALC 129.6 08/14/2023    LDLCALC 124.0 06/28/2022     Lab Results   Component Value Date    TRIG 158 (H) 02/29/2024    TRIG 177 (H) 08/14/2023    TRIG 105 06/28/2022     Lab Results   Component Value Date    CHOLHDL 19.1 (L) 02/29/2024    CHOLHDL 22.2 08/14/2023    CHOLHDL 27.1 06/28/2022     Lab Results   Component Value Date    TSH 0.819 08/14/2023       ASSESSMENT     1. Sinusitis, unspecified chronicity, unspecified location    2. Cough, unspecified type           PLAN  Flu COVID and strep negative.  Suspect other viral process.  Recommend supportive treatment for now with antihistamine, Tessalon, Flonase lots of water and rest.  Tylenol okay as well for pain or fever.  Can trial plain Mucinex to thin out mucus.  If no improvement in the next 3-5 days or any worsening she will let me know.    1. Sinusitis, unspecified chronicity, unspecified location  - POCT rapid strep A  - POCT COVID-19 Rapid Screening  - POCT Influenza A/B Molecular  - fluticasone propionate (FLONASE) 50 mcg/actuation nasal spray; 1 spray (50 mcg total) by Each Nostril route once daily.  Dispense: 16 g; Refill: 0  - loratadine (CLARITIN) 10 mg tablet; Take 1 tablet (10 mg total) by mouth once daily.  Dispense: 30 tablet; Refill: 0  - benzonatate (TESSALON) 200 MG capsule; Take 1 capsule (200 mg total) by mouth 3 (three) times daily as needed.  Dispense: 30 capsule; Refill: 0    2. Cough, unspecified type  - fluticasone propionate (FLONASE) 50 mcg/actuation nasal spray; 1 spray (50 mcg total) by Each Nostril route once daily.  Dispense: 16 g; Refill: 0  - loratadine (CLARITIN) 10 mg tablet; Take 1 tablet (10 mg total) by mouth once daily.  Dispense: 30 tablet; Refill: 0  - benzonatate (TESSALON) 200 MG capsule; Take 1 capsule (200 mg total) by mouth 3 (three) times daily as needed.   Dispense: 30 capsule; Refill: 0       Follow up with PCP     Patient was counseled on when to seek emergent care. Patient's plan/treatment was discussed including medications and possible side effects. Verbalized understanding of all instructions.     This note was partly generated with Mantex voice recognition software. I apologize for any possible typographical errors.          TAQUERIA Bennett  Department of Internal Medicine - [facility]  11/21/2024

## 2024-11-22 ENCOUNTER — PATIENT MESSAGE (OUTPATIENT)
Dept: CARDIOLOGY | Facility: CLINIC | Age: 39
End: 2024-11-22
Payer: COMMERCIAL

## 2024-11-22 ENCOUNTER — PATIENT MESSAGE (OUTPATIENT)
Dept: ELECTROPHYSIOLOGY | Facility: CLINIC | Age: 39
End: 2024-11-22
Payer: COMMERCIAL

## 2024-11-22 NOTE — TELEPHONE ENCOUNTER
Pt updated on dr Cordero's response and verbalized understanding. She did send a transmission to EP.

## 2024-11-22 NOTE — TELEPHONE ENCOUNTER
RITCHIE Fairchild was at visit yesterday and had several low readings, the lowest being 100/50 and was told to f/micha gupta MD. Does not routinely check bp at home. It was 108/74. She admits to feeling sl lightheaded yesterday, ok today. Denies dehydration due to cold, nor fever.   I told her that it is hard to tell right now if she felt bad because of cold or BP.  Asked her to check BP/HR qd/ bid for a few days and send us the readings next week. She verbalized understanding.

## 2024-11-25 ENCOUNTER — PATIENT MESSAGE (OUTPATIENT)
Dept: INTERNAL MEDICINE | Facility: CLINIC | Age: 39
End: 2024-11-25
Payer: COMMERCIAL

## 2024-11-25 ENCOUNTER — HOSPITAL ENCOUNTER (OUTPATIENT)
Dept: RADIOLOGY | Facility: HOSPITAL | Age: 39
Discharge: HOME | End: 2024-11-25
Attending: NURSE PRACTITIONER
Payer: COMMERCIAL

## 2024-11-25 DIAGNOSIS — R05.9 COUGH, UNSPECIFIED TYPE: ICD-10-CM

## 2024-11-25 DIAGNOSIS — R05.9 COUGH, UNSPECIFIED TYPE: Primary | ICD-10-CM

## 2024-11-25 PROCEDURE — 71046 X-RAY EXAM CHEST 2 VIEWS: CPT | Mod: 26,,, | Performed by: RADIOLOGY

## 2024-11-25 PROCEDURE — 71046 X-RAY EXAM CHEST 2 VIEWS: CPT | Mod: TC

## 2024-11-25 RX ORDER — ALBUTEROL SULFATE 90 UG/1
2 INHALANT RESPIRATORY (INHALATION) EVERY 6 HOURS PRN
Qty: 18 G | Refills: 0 | Status: SHIPPED | OUTPATIENT
Start: 2024-11-25 | End: 2025-11-25

## 2024-11-27 LAB — INR PPP: 4.1

## 2024-11-29 ENCOUNTER — ANTI-COAG VISIT (OUTPATIENT)
Dept: CARDIOLOGY | Facility: CLINIC | Age: 39
End: 2024-11-29
Payer: COMMERCIAL

## 2024-11-29 DIAGNOSIS — Z79.01 LONG TERM (CURRENT) USE OF ANTICOAGULANTS: Primary | ICD-10-CM

## 2024-11-29 DIAGNOSIS — Z95.2 H/O MITRAL VALVE REPLACEMENT WITH MECHANICAL VALVE: ICD-10-CM

## 2024-11-29 DIAGNOSIS — I48.91 ATRIAL FIBRILLATION AND FLUTTER: ICD-10-CM

## 2024-11-29 DIAGNOSIS — I48.92 ATRIAL FIBRILLATION AND FLUTTER: ICD-10-CM

## 2024-11-29 NOTE — PROGRESS NOTES
[facility]    2024 11:12 AM    Assessment/Plan:    Patient presents today with supratherapeutic INR.    Assessment of patient findings and chart review: reports being sick with cold; took 4mg on own  &     Recommendation for patient's warfarin regimen: Resume maintenance dose     Recommend repeat INR in 1 week  _________________________________________________________________    King Botello (39 y.o.) is followed by the [facility].    Anticoagulation Summary  As of 2024      INR goal:  2.5-3.5   TTR:  51.0% (9.9 mo)   INR used for dosin.1 (2024)   Warfarin maintenance plan:  4 mg (4 mg x 1) every Fri; 8 mg (4 mg x 2) all other days   Weekly warfarin total:  52 mg   Plan last modified:  Laine Glynn, PharmD (10/31/2024)   Next INR check:  2024   Target end date:  --    Indications    Long term (current) use of anticoagulants [Z79.01]  H/O mitral valve replacement with mechanical valve [Z95.2]  Atrial fibrillation and flutter [I48.91  I48.92]                 Anticoagulation Episode Summary       INR check location:  Clinic Lab    Preferred lab:  --    Send INR reminders to:  [facility] COUMADIN MONITORING POOL    Comments:  Meter- tests on Thursday// [facility] Lab           Anticoagulation Care Providers       Provider Role Specialty Phone number    Radha Hussein PA-C Referring Cardiothoracic Surgery [phone]    Eugene Cordero MD Responsible Cardiology [phone]

## 2024-12-05 ENCOUNTER — ANTI-COAG VISIT (OUTPATIENT)
Dept: CARDIOLOGY | Facility: CLINIC | Age: 39
End: 2024-12-05
Payer: COMMERCIAL

## 2024-12-05 DIAGNOSIS — Z95.2 H/O MITRAL VALVE REPLACEMENT WITH MECHANICAL VALVE: ICD-10-CM

## 2024-12-05 DIAGNOSIS — Z79.01 LONG TERM (CURRENT) USE OF ANTICOAGULANTS: Primary | ICD-10-CM

## 2024-12-05 DIAGNOSIS — I48.92 ATRIAL FIBRILLATION AND FLUTTER: ICD-10-CM

## 2024-12-05 DIAGNOSIS — I48.91 ATRIAL FIBRILLATION AND FLUTTER: ICD-10-CM

## 2024-12-05 LAB — INR PPP: 3.4

## 2024-12-05 PROCEDURE — 93793 ANTICOAG MGMT PT WARFARIN: CPT | Mod: S$GLB,,,

## 2024-12-05 NOTE — PROGRESS NOTES
[facility]    12/05/2024    King Botello (39 y.o.) is followed by the [facility].      Assessment/Plan:    King Botello presents with a therapeutic INR. Goal INR: 2.5-3.5    Lab Results   Component Value Date    INR 3.4 12/05/2024    INR 4.1 11/27/2024    INR 3.3 11/21/2024       Assessment of patient findings per MA/LPN and chart review:   The following significant findings were found:   none    Recommendation for patient's warfarin regimen:   No change was made to warfarin therapy during this visit and patient has been instructed to continue their current warfarin regimen.    Recommended repeat INR in 1 week      Laine Glynn, PharmD, BCPS  Clinical Pharmacist - [facility]  Preferred Contact: Secure Messaging or In Basket Message

## 2024-12-12 ENCOUNTER — ANTI-COAG VISIT (OUTPATIENT)
Dept: CARDIOLOGY | Facility: CLINIC | Age: 39
End: 2024-12-12
Payer: COMMERCIAL

## 2024-12-12 DIAGNOSIS — I48.91 ATRIAL FIBRILLATION AND FLUTTER: ICD-10-CM

## 2024-12-12 DIAGNOSIS — Z95.2 H/O MITRAL VALVE REPLACEMENT WITH MECHANICAL VALVE: ICD-10-CM

## 2024-12-12 DIAGNOSIS — I48.92 ATRIAL FIBRILLATION AND FLUTTER: ICD-10-CM

## 2024-12-12 DIAGNOSIS — Z79.01 LONG TERM (CURRENT) USE OF ANTICOAGULANTS: Primary | ICD-10-CM

## 2024-12-12 DIAGNOSIS — I48.4 ATYPICAL ATRIAL FLUTTER: ICD-10-CM

## 2024-12-12 LAB — INR PPP: 2.7

## 2024-12-12 PROCEDURE — 93793 ANTICOAG MGMT PT WARFARIN: CPT | Mod: S$GLB,,,

## 2024-12-12 RX ORDER — DIGOXIN 250 MCG
250 TABLET ORAL DAILY
Qty: 90 TABLET | Refills: 3 | Status: SHIPPED | OUTPATIENT
Start: 2024-12-12 | End: 2025-12-12

## 2024-12-12 RX ORDER — METOPROLOL SUCCINATE 100 MG/1
100 TABLET, EXTENDED RELEASE ORAL 2 TIMES DAILY
Qty: 180 TABLET | Refills: 3 | Status: SHIPPED | OUTPATIENT
Start: 2024-12-12 | End: 2025-12-12

## 2024-12-12 NOTE — PROGRESS NOTES
[facility]    12/12/2024    King Botello (39 y.o.) is followed by the [facility].      Assessment/Plan:    King Botello presents with a therapeutic INR. Goal INR: 2.5-3.5    Lab Results   Component Value Date    INR 2.7 12/12/2024    INR 3.4 12/05/2024    INR 4.1 11/27/2024       Assessment of patient findings per MA/LPN and chart review:   The following significant findings were found:   none    Recommendation for patient's warfarin regimen:   No change was made to warfarin therapy during this visit and patient has been instructed to continue their current warfarin regimen.    Recommended repeat INR in 1 week      Laine Glynn, PharmD, BCPS  Clinical Pharmacist - [facility]  Preferred Contact: Secure Messaging or In Basket Message

## 2024-12-13 ENCOUNTER — TELEPHONE (OUTPATIENT)
Dept: CARDIOLOGY | Facility: CLINIC | Age: 39
End: 2024-12-13
Payer: COMMERCIAL

## 2024-12-13 NOTE — TELEPHONE ENCOUNTER
----- Message from SEBASTIAN Haynes sent at 11/25/2024  4:46 PM CST -----  Regarding: FW: transmission  a transmission was initiated by pt on 11/22, and I believe the order was attributed to dr Cordero. As far as I know we did not receive it and I don't know if anyone looked at it.  Please advise,      Ivy  General Cardiology Triage RN  ----- Message -----  From: Ivy Gabriel RN  Sent: 11/22/2024   4:54 PM CST  To: Ivy Gabriel, SEBASTIAN; Kaye Carpenter  Subject: transmission                                     Thanks for your help. Actually the pt initiated the transmission. Please fax to [phone]      Ivy  General Cardiology Triage RN  ----- Message -----  From: Eugene Cordero MD  Sent: 11/22/2024   4:48 PM CST  To: Ivy Gabriel RN; Kaye Carpenter    We may as well get it,CJ  ----- Message -----  From: Kaye Carpenter  Sent: 11/22/2024   4:38 PM CST  To: Eugene Cordero MD    Patient called our office stating you requested a transmission of her [employer]tr[employer] device. I informed her that we received the transmission but it will not be uploaded into epic because it is not a scheduled transmission. If you would like me to send you a copy give my your office fax number and I can have it sent to you. You can also [place] our office at [phone].  I will not be in the office on Monday but someone will be here to assist. I am here until 5 PM today.

## 2024-12-19 ENCOUNTER — LAB VISIT (OUTPATIENT)
Dept: LAB | Facility: HOSPITAL | Age: 39
End: 2024-12-19
Attending: INTERNAL MEDICINE
Payer: COMMERCIAL

## 2024-12-19 ENCOUNTER — ANTI-COAG VISIT (OUTPATIENT)
Dept: CARDIOLOGY | Facility: CLINIC | Age: 39
End: 2024-12-19
Payer: COMMERCIAL

## 2024-12-19 DIAGNOSIS — Z95.2 H/O MITRAL VALVE REPLACEMENT WITH MECHANICAL VALVE: ICD-10-CM

## 2024-12-19 DIAGNOSIS — I48.91 ATRIAL FIBRILLATION AND FLUTTER: ICD-10-CM

## 2024-12-19 DIAGNOSIS — I50.32 CHRONIC DIASTOLIC HEART FAILURE: ICD-10-CM

## 2024-12-19 DIAGNOSIS — I48.92 ATRIAL FIBRILLATION AND FLUTTER: ICD-10-CM

## 2024-12-19 DIAGNOSIS — I48.4 ATYPICAL ATRIAL FLUTTER: ICD-10-CM

## 2024-12-19 DIAGNOSIS — Z79.01 LONG TERM (CURRENT) USE OF ANTICOAGULANTS: Primary | ICD-10-CM

## 2024-12-19 LAB
ANION GAP SERPL CALC-SCNC: 7 MMOL/L (ref 8–16)
BNP SERPL-MCNC: 66 PG/ML (ref 0–99)
BUN SERPL-MCNC: 10 MG/DL (ref 6–20)
CALCIUM SERPL-MCNC: 9.3 MG/DL (ref 8.7–10.5)
CHLORIDE SERPL-SCNC: 108 MMOL/L (ref 95–110)
CO2 SERPL-SCNC: 23 MMOL/L (ref 23–29)
CREAT SERPL-MCNC: 0.7 MG/DL (ref 0.5–1.4)
DIGOXIN SERPL-MCNC: 0.3 NG/ML (ref 0.8–2)
EST. GFR  (NO RACE VARIABLE): >60 ML/MIN/1.73 M^2
GLUCOSE SERPL-MCNC: 81 MG/DL (ref 70–110)
INR PPP: 3.1
POTASSIUM SERPL-SCNC: 4.4 MMOL/L (ref 3.5–5.1)
SODIUM SERPL-SCNC: 138 MMOL/L (ref 136–145)

## 2024-12-19 PROCEDURE — 36415 COLL VENOUS BLD VENIPUNCTURE: CPT | Performed by: INTERNAL MEDICINE

## 2024-12-19 PROCEDURE — 93793 ANTICOAG MGMT PT WARFARIN: CPT | Mod: S$GLB,,,

## 2024-12-19 PROCEDURE — 83880 ASSAY OF NATRIURETIC PEPTIDE: CPT | Performed by: INTERNAL MEDICINE

## 2024-12-19 PROCEDURE — 80162 ASSAY OF DIGOXIN TOTAL: CPT | Performed by: INTERNAL MEDICINE

## 2024-12-19 PROCEDURE — 80048 BASIC METABOLIC PNL TOTAL CA: CPT | Performed by: INTERNAL MEDICINE

## 2024-12-19 NOTE — PROGRESS NOTES
[facility]    12/19/2024    King Botello (39 y.o.) is followed by the [facility].      Assessment/Plan:    King Botello presents with a therapeutic INR. Goal INR: 2.5-3.5    Lab Results   Component Value Date    INR 3.1 12/19/2024    INR 2.7 12/12/2024    INR 3.4 12/05/2024       Assessment of patient findings per MA/LPN and chart review:   The following significant findings were found:   None    Recommendation for patient's warfarin regimen:   No change was made to warfarin therapy during this visit and patient has been instructed to continue their current warfarin regimen.    Recommended repeat INR in 1 week      Laine Glynn, PharmD, BCPS  Clinical Pharmacist - [facility]  Preferred Contact: Secure Messaging or In Basket Message

## 2024-12-27 ENCOUNTER — ANTI-COAG VISIT (OUTPATIENT)
Dept: CARDIOLOGY | Facility: CLINIC | Age: 39
End: 2024-12-27
Payer: COMMERCIAL

## 2024-12-27 DIAGNOSIS — I48.92 ATRIAL FIBRILLATION AND FLUTTER: ICD-10-CM

## 2024-12-27 DIAGNOSIS — I48.91 ATRIAL FIBRILLATION AND FLUTTER: ICD-10-CM

## 2024-12-27 DIAGNOSIS — Z79.01 LONG TERM (CURRENT) USE OF ANTICOAGULANTS: Primary | ICD-10-CM

## 2024-12-27 DIAGNOSIS — Z95.2 H/O MITRAL VALVE REPLACEMENT WITH MECHANICAL VALVE: ICD-10-CM

## 2024-12-27 LAB — INR PPP: 3.1

## 2025-01-02 ENCOUNTER — ANTI-COAG VISIT (OUTPATIENT)
Dept: CARDIOLOGY | Facility: CLINIC | Age: 40
End: 2025-01-02
Payer: COMMERCIAL

## 2025-01-02 DIAGNOSIS — Z79.01 LONG TERM (CURRENT) USE OF ANTICOAGULANTS: Primary | ICD-10-CM

## 2025-01-02 DIAGNOSIS — I48.91 ATRIAL FIBRILLATION AND FLUTTER: ICD-10-CM

## 2025-01-02 DIAGNOSIS — Z95.2 H/O MITRAL VALVE REPLACEMENT WITH MECHANICAL VALVE: ICD-10-CM

## 2025-01-02 DIAGNOSIS — I48.92 ATRIAL FIBRILLATION AND FLUTTER: ICD-10-CM

## 2025-01-02 LAB — INR PPP: 2.7

## 2025-01-02 PROCEDURE — 93793 ANTICOAG MGMT PT WARFARIN: CPT | Mod: S$GLB,,,

## 2025-01-02 NOTE — PROGRESS NOTES
[facility]    01/02/2025    King Botello (39 y.o.) is followed by the [facility].      Assessment/Plan:    King Botello presents with a therapeutic INR. Goal INR: 2.5-3.5    Lab Results   Component Value Date    INR 2.7 01/02/2025    INR 3.1 12/26/2024    INR 3.1 12/19/2024       Assessment of patient findings per MA/LPN and chart review:   The following significant findings were found:   none    Recommendation for patient's warfarin regimen:   No change was made to warfarin therapy during this visit and patient has been instructed to continue their current warfarin regimen.    Recommended repeat INR in 1 week      Laine Glynn, PharmD, BCPS  Clinical Pharmacist - [facility]  Preferred Contact: Secure Messaging or In Basket Message

## 2025-01-09 ENCOUNTER — ANTI-COAG VISIT (OUTPATIENT)
Dept: CARDIOLOGY | Facility: CLINIC | Age: 40
End: 2025-01-09
Payer: COMMERCIAL

## 2025-01-09 DIAGNOSIS — Z95.2 H/O MITRAL VALVE REPLACEMENT WITH MECHANICAL VALVE: ICD-10-CM

## 2025-01-09 DIAGNOSIS — I48.92 ATRIAL FIBRILLATION AND FLUTTER: ICD-10-CM

## 2025-01-09 DIAGNOSIS — Z79.01 LONG TERM (CURRENT) USE OF ANTICOAGULANTS: Primary | ICD-10-CM

## 2025-01-09 DIAGNOSIS — I48.91 ATRIAL FIBRILLATION AND FLUTTER: ICD-10-CM

## 2025-01-09 LAB — INR PPP: 2.5

## 2025-01-09 PROCEDURE — 93793 ANTICOAG MGMT PT WARFARIN: CPT | Mod: S$GLB,,,

## 2025-01-09 NOTE — PROGRESS NOTES
[facility]    01/09/2025    King Botello (39 y.o.) is followed by the [facility].      Assessment/Plan:    King Botello presents with a therapeutic INR. Goal INR: 2.5-3.5    Lab Results   Component Value Date    INR 2.5 01/09/2025    INR 2.7 01/02/2025    INR 3.1 12/26/2024       Assessment of patient findings per MA/LPN and chart review:   The following significant findings were found:   None    Recommendation for patient's warfarin regimen:   No change was made to warfarin therapy during this visit and patient has been instructed to continue their current warfarin regimen.    Recommended repeat INR in 1 week      Laine Glynn, PharmD, BCPS  Clinical Pharmacist - [facility]  Preferred Contact: Secure Messaging or In Basket Message

## 2025-01-10 ENCOUNTER — OFFICE VISIT (OUTPATIENT)
Dept: ELECTROPHYSIOLOGY | Facility: CLINIC | Age: 40
End: 2025-01-10
Payer: COMMERCIAL

## 2025-01-10 ENCOUNTER — CLINICAL SUPPORT (OUTPATIENT)
Dept: CARDIOLOGY | Facility: HOSPITAL | Age: 40
End: 2025-01-10
Attending: STUDENT IN AN ORGANIZED HEALTH CARE EDUCATION/TRAINING PROGRAM
Payer: COMMERCIAL

## 2025-01-10 ENCOUNTER — HOSPITAL ENCOUNTER (OUTPATIENT)
Dept: CARDIOLOGY | Facility: CLINIC | Age: 40
Discharge: HOME | End: 2025-01-10
Payer: COMMERCIAL

## 2025-01-10 VITALS
HEIGHT: 61 IN | WEIGHT: 189.13 LBS | BODY MASS INDEX: 35.71 KG/M2 | DIASTOLIC BLOOD PRESSURE: 78 MMHG | HEART RATE: 74 BPM | SYSTOLIC BLOOD PRESSURE: 118 MMHG

## 2025-01-10 DIAGNOSIS — I05.0 SEVERE MITRAL STENOSIS BY PRIOR ECHOCARDIOGRAM: ICD-10-CM

## 2025-01-10 DIAGNOSIS — I49.49 JUNCTIONAL ESCAPE BEATS: ICD-10-CM

## 2025-01-10 DIAGNOSIS — I48.92 ATRIAL FIBRILLATION AND FLUTTER: ICD-10-CM

## 2025-01-10 DIAGNOSIS — Z51.81 ENCOUNTER FOR MONITORING DOFETILIDE THERAPY: ICD-10-CM

## 2025-01-10 DIAGNOSIS — I48.4 ATYPICAL ATRIAL FLUTTER: ICD-10-CM

## 2025-01-10 DIAGNOSIS — I48.0 PAROXYSMAL ATRIAL FIBRILLATION: ICD-10-CM

## 2025-01-10 DIAGNOSIS — Z79.01 ENCOUNTER FOR MONITORING COUMADIN THERAPY: ICD-10-CM

## 2025-01-10 DIAGNOSIS — Z51.81 ENCOUNTER FOR MONITORING COUMADIN THERAPY: ICD-10-CM

## 2025-01-10 DIAGNOSIS — Z98.890 H/O TRICUSPID VALVE ANNULOPLASTY: ICD-10-CM

## 2025-01-10 DIAGNOSIS — I44.2 CHB (COMPLETE HEART BLOCK): ICD-10-CM

## 2025-01-10 DIAGNOSIS — Z95.0 PACEMAKER: ICD-10-CM

## 2025-01-10 DIAGNOSIS — I48.91 ATRIAL FIBRILLATION AND FLUTTER: ICD-10-CM

## 2025-01-10 DIAGNOSIS — I34.2 NONRHEUMATIC MITRAL VALVE STENOSIS: ICD-10-CM

## 2025-01-10 DIAGNOSIS — I48.0 PAROXYSMAL ATRIAL FIBRILLATION: Primary | ICD-10-CM

## 2025-01-10 DIAGNOSIS — I50.32 CHRONIC HEART FAILURE WITH PRESERVED EJECTION FRACTION: ICD-10-CM

## 2025-01-10 DIAGNOSIS — I50.32 CHRONIC DIASTOLIC HEART FAILURE: ICD-10-CM

## 2025-01-10 DIAGNOSIS — Z79.899 ENCOUNTER FOR MONITORING DOFETILIDE THERAPY: ICD-10-CM

## 2025-01-10 DIAGNOSIS — I44.2 COMPLETE HEART BLOCK: ICD-10-CM

## 2025-01-10 DIAGNOSIS — Z95.0 CARDIAC PACEMAKER IN SITU: ICD-10-CM

## 2025-01-10 DIAGNOSIS — Z95.2 H/O MITRAL VALVE REPLACEMENT WITH MECHANICAL VALVE: ICD-10-CM

## 2025-01-10 DIAGNOSIS — I10 PRIMARY HYPERTENSION: ICD-10-CM

## 2025-01-10 DIAGNOSIS — E66.812 CLASS 2 OBESITY WITH BODY MASS INDEX (BMI) OF 35.0 TO 35.9 IN ADULT, UNSPECIFIED OBESITY TYPE, UNSPECIFIED WHETHER SERIOUS COMORBIDITY PRESENT: ICD-10-CM

## 2025-01-10 LAB
OHS CV AF BURDEN PERCENT: 5.9
OHS CV DC REMOTE DEVICE TYPE: NORMAL
OHS CV RV PACING PERCENT: 0.6 %
OHS QRS DURATION: 76 MS
OHS QRS DURATION: 94 MS
OHS QTC CALCULATION: 448 MS
OHS QTC CALCULATION: 501 MS

## 2025-01-10 PROCEDURE — 93010 ELECTROCARDIOGRAM REPORT: CPT | Mod: ,,, | Performed by: INTERNAL MEDICINE

## 2025-01-10 PROCEDURE — 3074F SYST BP LT 130 MM HG: CPT | Mod: CPTII,S$GLB,, | Performed by: STUDENT IN AN ORGANIZED HEALTH CARE EDUCATION/TRAINING PROGRAM

## 2025-01-10 PROCEDURE — 93005 ELECTROCARDIOGRAM TRACING: CPT | Performed by: INTERNAL MEDICINE

## 2025-01-10 PROCEDURE — 93280 PM DEVICE PROGR EVAL DUAL: CPT | Mod: 26,,, | Performed by: STUDENT IN AN ORGANIZED HEALTH CARE EDUCATION/TRAINING PROGRAM

## 2025-01-10 PROCEDURE — 93005 ELECTROCARDIOGRAM TRACING: CPT | Mod: S$GLB,,, | Performed by: STUDENT IN AN ORGANIZED HEALTH CARE EDUCATION/TRAINING PROGRAM

## 2025-01-10 PROCEDURE — 93280 PM DEVICE PROGR EVAL DUAL: CPT

## 2025-01-10 PROCEDURE — 99999 PR PBB SHADOW E&M-EST. PATIENT-LVL III: CPT | Mod: PBBFAC,,, | Performed by: STUDENT IN AN ORGANIZED HEALTH CARE EDUCATION/TRAINING PROGRAM

## 2025-01-10 PROCEDURE — 93010 ELECTROCARDIOGRAM REPORT: CPT | Mod: S$GLB,,, | Performed by: INTERNAL MEDICINE

## 2025-01-10 PROCEDURE — 3078F DIAST BP <80 MM HG: CPT | Mod: CPTII,S$GLB,, | Performed by: STUDENT IN AN ORGANIZED HEALTH CARE EDUCATION/TRAINING PROGRAM

## 2025-01-10 PROCEDURE — 99214 OFFICE O/P EST MOD 30 MIN: CPT | Mod: S$GLB,,, | Performed by: STUDENT IN AN ORGANIZED HEALTH CARE EDUCATION/TRAINING PROGRAM

## 2025-01-10 PROCEDURE — 3008F BODY MASS INDEX DOCD: CPT | Mod: CPTII,S$GLB,, | Performed by: STUDENT IN AN ORGANIZED HEALTH CARE EDUCATION/TRAINING PROGRAM

## 2025-01-10 NOTE — PROGRESS NOTES
Electrophysiology Clinic Note    Reason for follow-up patient visit: Ongoing evaluation and routine device surveillance of a Medtronic dual-chamber pacemaker with a left bundle branch area pacing (LBBAP) lead, implanted 2024 in the setting of postoperative atrial flutter and complete heart block with a junctional escape of ~50 bpm with persistent AV dissociation following a mitral valve replacement with a mechanical mitral valve and a tricuspid valve annuloplasty on 1/10/2024.    PRESENTING HISTORY:     History of Present Illness:  Ms. King Botello is a hermelindo 39-year-old woman who returns to clinic today for ongoing evaluation and routine device surveillance of a Medtronic dual-chamber pacemaker with a left bundle branch area pacing (LBBAP) lead, implanted 2024 in the setting of postoperative atrial flutter and complete heart block with a junctional escape of ~50 bpm with persistent AV dissociation following a mitral valve replacement with a mechanical mitral valve and a tricuspid valve annuloplasty on 1/10/2024. She has a past medical history significant for mitral valve prolapse with mitral insufficiency with a previous mitral valve annuloplasty in [place] in , subsequent severe mitral valve stenosis, s/p mitral valve replacement with a mechanical mitral valve and a tricuspid valve annuloplasty on 1/10/2024 with Dr. Liu, postoperative atrial flutter and complete heart block with a junctional escape of ~50 bpm with persistent AV dissociation following her surgery, s/p implantation of a Medtronic dual-chamber pacemaker with a left bundle branch area pacing (LBBAP) lead for physiologic pacing on 2024, hypertension, previous episodes of decompensated heart failure with preserved systolic function in the postpartum period with her most recent LVEF 55-70% following her  on 2023, and obesity.     Unfortunately, she continued to evidence atrial flutter following her surgery,  and underwent a successful MINERVA and cardioversion on 4/18/2024. She has remained on coumadin therapy. She returned to symptomatic atrial flutter intermittently on repeat device interrogations, and given her preserved systolic function and young age, we avoided amiodarone administration. She was admitted for dofetilide loading and is presently well-maintained in sinus rhythm on dofetilide 250mcg po BID with stable and acceptable QT/QTc intervals. Dr. Cordero recently added digoxin 250mcg po daily with no significant change in her overall arrhythmia on device interrogation. She has continued to voice intermittent palpitations, and can tell when she is experiencing an event of atrial fibrillation. Her overall burden of AT/AF remains slightly improved from her prior interrogations at 5.9% on device interrogation today.      In discussion with Ms. Botello today, she tells me that she is feeling overall quite well. She and her family are planning to go to [place] for [employer], and she denies any limitation with walking for farther distances. She denies any episodes of dizziness, lightheadedness, syncope/presyncope, chest pain or chest discomfort, nausea or vomiting, orthopnea, or PND. She reports brief, intermittent palpitations that do not limit her functioning, lasting for about 3-4 hours during her events of paroxysmal atrial fibrillation. She reports baseline shortness of breath and dyspnea with exertion that has remained stable and is improving following her prior surgery. She can climb one flight of stairs prior to needing to take a break and continues to attempt to increase her level of physical activity. 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     Review of Systems:  Review of Systems   Constitutional:  Negative for activity change.   HENT:  Negative for nasal congestion, nosebleeds, postnasal drip, rhinorrhea, sinus pressure/congestion, sneezing and sore throat.    Respiratory:  Positive for  shortness of breath. Negative for apnea, cough, chest tightness and wheezing.    Cardiovascular:  Positive for palpitations. Negative for chest pain, leg swelling and claudication.   Gastrointestinal:  Negative for abdominal distention, abdominal pain, blood in stool, change in bowel habit, constipation, diarrhea, nausea and vomiting.   Genitourinary:  Negative for dysuria and hematuria.   Musculoskeletal:  Positive for arthralgias and back pain. Negative for gait problem.   Neurological:  Negative for dizziness, seizures, syncope, weakness, light-headedness, headaches and coordination difficulties.        PAST HISTORY:     Past Medical History:   Diagnosis Date    Abnormal Pap smear     Colposcopy    Atrial fibrillation and flutter 2024    Bradycardia 2020    Class 2 obesity due to excess calories with body mass index (BMI) of 35.0 to 35.9 in adult 3/9/2021    Hypertension, essential 3/16/2021    Menarche     Age of onset 11    Missed ab 2021    Mitral valve prolapse     Previous  delivery affecting pregnancy, antepartum 2020    S/P suction D&C 2021    Status post mitral valve annuloplasty 2017       Past Surgical History:   Procedure Laterality Date    A-V CARDIAC PACEMAKER INSERTION N/A 2024    Procedure: INSERTION, CARDIAC PACEMAKER, DUAL CHAMBER;  Surgeon: CRISTI Moore MD;  Location: [facility] EP LAB;  Service: Cardiology;  Laterality: N/A;  CHB, dPPM with LBBP, MDT, ANES, EH, RM [id number]     SECTION       SECTION WITH TUBAL LIGATION Bilateral 2023    Procedure:  SECTION, WITH TUBAL LIGATION;  Surgeon: Mane Moreno MD;  Location: [facility] L&D;  Service: OB/GYN;  Laterality: Bilateral;    DILATION AND CURETTAGE OF UTERUS USING SUCTION N/A 2021    Procedure: DILATION AND CURETTAGE, UTERUS, USING SUCTION;  Surgeon: Mane Moreno MD;  Location: [facility] OR;  Service: OB/GYN;  Laterality: N/A;    ECHOCARDIOGRAM,TRANSESOPHAGEAL N/A  4/18/2024    Procedure: Transesophageal echo (MINERVA) intra-procedure log documentation;  Surgeon: Page George MD;  Location: [facility] EP LAB;  Service: Cardiology;  Laterality: N/A;  AF, MINERVA/DCCV, MAC, EH, 3prep *MDT D-C PPM in situ*    MITRAL VALVE REPAIR      MITRAL VALVE REPLACEMENT N/A 1/8/2024    Procedure: MITRAL VALVE REPLACEMENT, REDO STERNOTOMY;  Surgeon: Thierno Liu MD;  Location: [facility] OR [address] [place] [address];  Service: Cardiothoracic;  Laterality: N/A;    TRANSESOPHAGEAL ECHOCARDIOGRAPHY N/A 1/17/2024    Procedure: ECHOCARDIOGRAM, TRANSESOPHAGEAL;  Surgeon: Jessee Ahumada MD;  Location: [facility] EP LAB;  Service: Cardiology;  Laterality: N/A;    TREATMENT OF CARDIAC ARRHYTHMIA N/A 1/17/2024    Procedure: Cardioversion or Defibrillation;  Surgeon: CRISTI Moore MD;  Location: [facility] EP LAB;  Service: Cardiology;  Laterality: N/A;    TREATMENT OF CARDIAC ARRHYTHMIA N/A 4/18/2024    Procedure: Cardioversion or Defibrillation;  Surgeon: CRISTI Moore MD;  Location: [facility] EP LAB;  Service: Cardiology;  Laterality: N/A;  AF, MINERVA/DCCV, MAC, EH, 3prep *MDT D-C PPM in situ*    VALVULOPLASTY, TRICUSPID N/A 1/8/2024    Procedure: VALVULOPLASTY, TRICUSPID REPAIR;  Surgeon: Thierno Liu MD;  Location: [facility] OR [address] [place] [address];  Service: Cardiothoracic;  Laterality: N/A;       Family History:  Family History   Problem Relation Name Age of Onset    Heart disease Mother      Hypertension Mother      Hyperlipidemia Mother      Diabetes Mother      Cancer Mother      Hyperlipidemia Father      Hypertension Father      Breast cancer Paternal Aunt      Mastectomy Paternal Cousin      Colon cancer Neg Hx      Ovarian cancer Neg Hx         Social History:  She  reports that she has never smoked. She has never used smokeless tobacco. She reports that she does not currently use alcohol. She reports that she does not use drugs.      MEDICATIONS & ALLERGIES:     Review of patient's allergies indicates:   Allergen Reactions     "Cinnamon Itching    Doxycycline      Abdomen pain severe       Current Outpatient Medications on File Prior to Visit   Medication Sig Dispense Refill    albuterol (VENTOLIN HFA) 90 mcg/actuation inhaler Inhale 2 puffs into the lungs every 6 (six) hours as needed for Wheezing. Rescue 18 g 0    aspirin (ECOTRIN) 81 MG EC tablet Take 1 tablet (81 mg total) by mouth once daily. 30 tablet 11    digoxin (LANOXIN) 250 mcg tablet Take 1 tablet (250 mcg total) by mouth once daily. 90 tablet 3    dofetilide (TIKOSYN) 250 MCG Cap Take 1 capsule (250 mcg total) by mouth every 12 (twelve) hours. 180 capsule 3    fluticasone propionate (FLONASE) 50 mcg/actuation nasal spray 1 spray (50 mcg total) by Each Nostril route once daily. 16 g 0    loratadine (CLARITIN) 10 mg tablet Take 1 tablet (10 mg total) by mouth once daily. 30 tablet 0    metoprolol succinate (TOPROL-XL) 100 MG 24 hr tablet Take 1 tablet (100 mg total) by mouth 2 (two) times daily. 180 tablet 3    metoprolol tartrate (LOPRESSOR) 25 MG tablet Take 1 tablet (25 mg total) by mouth 2 (two) times daily as needed (Palpitations). 180 tablet 3    semaglutide, weight loss, (WEGOVY) 0.25 mg/0.5 mL PnIj Inject 0.25 mg into the skin every 7 days. 2 mL 0    warfarin (COUMADIN) 4 MG tablet Take 2 to 3 tablets (8-12 mg total) by mouth daily as directed by the [facility] 90 tablet 3     No current facility-administered medications on file prior to visit.        OBJECTIVE:     Vital Signs:  /78 (Patient Position: Sitting)   Pulse 74   Ht 5' 1" (1.549 m)   Wt 85.8 kg (189 lb 2.5 oz)   BMI 35.74 kg/m²     Physical Exam:  Physical Exam  Constitutional:       General: She is not in acute distress.     Appearance: Normal appearance. She is obese. She is not ill-appearing or diaphoretic.      Comments: Well-appearing woman in NAD.   HENT:      Head: Normocephalic and atraumatic.      Nose: Nose normal.      Mouth/Throat:      Mouth: Mucous membranes are moist.      " Pharynx: Oropharynx is clear.   Eyes:      Pupils: Pupils are equal, round, and reactive to light.   Cardiovascular:      Rate and Rhythm: Normal rate and regular rhythm.      Pulses: Normal pulses.      Heart sounds: Normal heart sounds. No murmur heard.     No friction rub. No gallop.      Comments: Crisp mechanical valve sounds appreciated. Her left anterior chest wall incision remains in excellent repair, with her device freely mobile within the pocket with no evidence of device adhesion or erosion. Her sternotomy incision is in excellent repair.   Pulmonary:      Effort: Pulmonary effort is normal. No respiratory distress.      Breath sounds: Normal breath sounds. No wheezing, rhonchi or rales.   Chest:      Chest wall: No tenderness.   Abdominal:      General: There is no distension.      Palpations: Abdomen is soft.      Tenderness: There is no abdominal tenderness.   Musculoskeletal:         General: No swelling or tenderness.      Cervical back: Normal range of motion.      Right lower leg: No edema.      Left lower leg: No edema.   Skin:     General: Skin is warm and dry.      Findings: No erythema, lesion or rash.   Neurological:      General: No focal deficit present.      Mental Status: She is alert and oriented to person, place, and time. Mental status is at baseline.      Motor: No weakness.      Gait: Gait normal.   Psychiatric:         Mood and Affect: Mood normal.         Behavior: Behavior normal.        Laboratory Data:  Lab Results   Component Value Date    WBC 5.94 06/26/2024    HGB 12.1 06/26/2024    HCT 37.3 06/26/2024    MCV 82 06/26/2024     06/26/2024     Lab Results   Component Value Date    GLU 81 12/19/2024     12/19/2024    K 4.4 12/19/2024     12/19/2024    CO2 23 12/19/2024    BUN 10 12/19/2024    CREATININE 0.7 12/19/2024    CALCIUM 9.3 12/19/2024    MG 2.0 05/02/2024     Lab Results   Component Value Date    INR 2.5 01/09/2025    INR 2.7 01/02/2025    INR 3.1  12/26/2024       Pertinent Cardiac Data:  Personal interpretation of today's ECG: Atrially-sensed, LBBAP-paced rhythm with rate of 74 bpm, AV delay approximately 240 ms, LBBAP-paced QRS 76 ms, QT/QTc 404/448 ms.     Resting 2D Transthoracic Echocardiogram - 1/4/2024:    Left Ventricle: The left ventricle is normal in size. Normal wall thickness. Normal wall motion. There is normal systolic function with a visually estimated ejection fraction of 60 - 65%. There is normal diastolic function.    Right Ventricle: Normal right ventricular cavity size. Wall thickness is normal. Right ventricle wall motion  is normal. Systolic function is normal.    Left Atrium: Left atrium is severely dilated.    Right Atrium: Right atrium is mildly dilated.    Mitral Valve: There is severe bileaflet sclerosis. Severely thickened leaflets. Moderately calcified leaflets. Severely restricted motion. There is severe stenosis with MVA  1.16 and MG 13 mmHg at HR 84.. There is trace regurgitation.    Tricuspid Valve: There is mild regurgitation.    Pulmonary Artery: The estimated pulmonary artery systolic pressure is 28 mmHg.    IVC/SVC: Normal venous pressure at 3 mmHg.    Resting 2D Transthoracic Echocardiogram - 2/26/2024:    Left Ventricle: The left ventricle is normal in size. Normal wall thickness. Normal wall motion. There is normal systolic function with a visually estimated ejection fraction of 55 - 70%. Ejection fraction by visual approximation is 63%. Diastolic function cannot be reliably determined in the presence of mitral valve disease.    Right Ventricle: Normal right ventricular cavity size. Wall thickness is normal. Right ventricle wall motion  is normal. Systolic function is normal. Pacemaker lead present in the ventricle.    Right Atrium: Right atrium is mildly dilated. Lead present in the right atrium.    Mitral Valve: There is a mechanical valve in the mitral position. There is normal leaflet mobility. The mean pressure  gradient across the mitral valve is 5 mmHg at a heart rate of 85  bpm; MVA 3. There is no significant regurgitation.    Tricuspid Valve: The tricuspid valve is repaired. The MG was 4 mmHg at PHR 85; TVA 2.57    IVC/SVC: Normal venous pressure at 3 mmHg.    MINERVA - 4/18/2024:    Left Atrium: Left atrium is dilated. The left atrial appendage has a windsock morphology. Appendage velocity is reduced at less than 40 cm/sec. There is no thrombus in the left atrial appendage. Moderate SEC without any thrombus seen in LOBO. contrast was used.    Left Ventricle: There is normal systolic function with a visually estimated ejection fraction of 55 - 60%.    Right Ventricle: Pacemaker lead present in the ventricle.    Right Atrium: Right atrium is dilated. Lead present in the right atrium.    Mitral Valve: There is a mechanical valve in the mitral position. It is reported to be a 25 mm carbomedics valve. There is mild regurgitation. M gradient not obtained.    Tricuspid Valve: The tricuspid valve is repaired by annular ring(26 Medtronic band). Mean gradient is 2 mmHg at HR of 99bpm. .    Aorta: Aortic root is normal in size measuring 2.8 cm. Ascending aorta is normal measuring 2.4 cm.  1)  Mitral valve replacement with a 25 mm Carbomedics standard  2)  Tricuspid valve repair with a 26 mm Medtronic Contour annuloplasty band   3)  Reoperation    Resting 2D Transthoracic Echocardiogram - 9/23/2024:    Left Ventricle: The left ventricle is normal in size. Normal wall thickness. Normal wall motion. There is normal systolic function with a visually estimated ejection fraction of 55 - 70%. Ejection fraction by visual approximation is 60%. There is normal diastolic function.    Right Ventricle: Normal right ventricular cavity size. Wall thickness is normal. Systolic function is normal.    Mitral Valve: There is a mechanical valve in the mitral position. The mean pressure gradient across the mitral valve is 5 mmHg at a heart rate of 67  bpm.    Tricuspid Valve: The tricuspid valve is repaired.    IVC/SVC: Normal venous pressure at 3 mmHg.    Personal interpretation of today's Device Interrogation: Personal review of her device interrogation (Medtronic Savi S DR DUONG, implanted 1/17/2024) reveals adequate battery longevity with an estimated 14.2 years of battery life remaining. Her leads were interrogated with acceptable and stable lead parameters with monitoring at both high and low output and unipolar and bipolar settings on her LBBAP. She is currently AS-LBBAS with underlying normal sinus rhythm at a rate of 67 bpm. She is RA paced 5.0% and LBBAP paced 0.6%. There are no concerning AHR or VHR episodes noted. She has continued to have periods of paroxysmal atrial fibrillation and atrial flutter with an overall burden of AT/AF of 5.9%. Her longest recorded event of atrial fibrillation occurred on 1/7/2025 and lasted 3 hours and 7 minutes with excellent rate control.      ASSESSMENT & PLAN:   Ms. King Botello is a hermelindo 39-year-old woman who returns to clinic today for ongoing evaluation and routine device surveillance of a Medtronic dual-chamber pacemaker with a left bundle branch area pacing (LBBAP) lead, implanted 1/17/2024 in the setting of postoperative atrial flutter and complete heart block with a junctional escape of ~50 bpm with persistent AV dissociation following a mitral valve replacement with a mechanical mitral valve and a tricuspid valve annuloplasty on 1/10/2024. She has a past medical history significant for mitral valve prolapse with mitral insufficiency with a previous mitral valve annuloplasty in [place] in 2003, subsequent severe mitral valve stenosis, s/p mitral valve replacement with a mechanical mitral valve and a tricuspid valve annuloplasty on 1/10/2024 with Dr. Liu, postoperative atrial flutter and complete heart block with a junctional escape of ~50 bpm with persistent AV dissociation following her surgery,  s/p implantation of a Medtronic dual-chamber pacemaker with a left bundle branch area pacing (LBBAP) lead for physiologic pacing on 2024, hypertension, previous episodes of decompensated heart failure with preserved systolic function in the postpartum period with her most recent LVEF 55-70% following her  on 2023, and obesity.     - She has a normally functioning dual-chamber pacemaker utilizing a LBBAP lead on device interrogation today. She remains in underlying normal sinus rhythm and is presently AP/AS-LBBAS. She is RA paced 5.0% and LBBAP paced 0.6%. There are no concerning AHR or VHR episodes noted. She has continued to have periods of paroxysmal atrial fibrillation and atrial flutter with an overall burden of AT/AF of 5.9%. Her longest recorded event of atrial fibrillation occurred on 2025 and lasted 3 hours and 7 minutes with excellent rate control. This has significantly improved while on dofetilide therapy. Her paced QRSd remains narrow at 76ms, with her intrinsic QRSd remaining 76ms. Her systolic function remains preserved, with no indication for device upgrade.   - Dr. Cordero has recently started digoxin 250mcg po daily with no significant change noted on her interrogation today. We discussed that this agent is likely not very helpful, and discussed possible cessation. She would like to monitor for now, but would be willing to stop this agent in the future in the event this medication is not modifying her burden of atrial fibrillation and atrial tachycardia.  - We continue to discussed the pathophysiology of atrial fibrillation; specifically, we discussed paroxysmal atrial fibrillation and the concept that some patients may experience paroxysms of atrial fibrillation interrupting periods of sinus rhythm. We discussed that even a relatively low burden of atrial fibrillation continues to have an increased risk of CVA.   - She remains on metoprolol succinate 100mg po qAM and metoprolol  tartrate 50mg po qPM with overall excellent rate control. We discussed consolidation to metoprolol succinate for ease of use; however, she would prefer to remain on her current regimen.   - She remains on dofetilide 250mcg po BID with significant improvement in her overall burden of atrial fibrillation. Her most recent echocardiogram was reviewed, with preserved systolic function, LVEF 55-70%. Her renal function remains preserved, with acceptable electrolytes and ECG parameters while on dofetilide therapy.   - She remains on coumadin and aspirin therapy in the setting of her mechanical mitral valve with no adverse bleeding events reported.   - Possible underlying drivers of atrial fibrillation were addressed at this appointment, including recommendations for maintenance of a healthy BMI - now a class I recommendation. Review of laboratory data revealed acceptable TSH at 0.819.   - She will continue to follow with her PCP, general cardiology team, and valve specialist for ongoing management of her comorbid conditions.       This patient will return to clinic with me in six months with continued dofetilide and metoprolol succinate therapy, with uninterrupted coumadin anticoagulation. We will continue remote transmissions with her next in-office interrogation in six months. All questions and concerns were addressed at this encounter. Total time spent in this patient encounter: 39 minutes.     Signing Physician:       NEMESIO Moore MD  Electrophysiology Attending

## 2025-01-11 ENCOUNTER — PATIENT MESSAGE (OUTPATIENT)
Dept: ADMINISTRATIVE | Facility: OTHER | Age: 40
End: 2025-01-11
Payer: COMMERCIAL

## 2025-01-16 ENCOUNTER — CLINICAL SUPPORT (OUTPATIENT)
Dept: CARDIOLOGY | Facility: HOSPITAL | Age: 40
End: 2025-01-16
Attending: STUDENT IN AN ORGANIZED HEALTH CARE EDUCATION/TRAINING PROGRAM
Payer: COMMERCIAL

## 2025-01-16 ENCOUNTER — ANTI-COAG VISIT (OUTPATIENT)
Dept: CARDIOLOGY | Facility: CLINIC | Age: 40
End: 2025-01-16
Payer: COMMERCIAL

## 2025-01-16 DIAGNOSIS — I48.92 ATRIAL FIBRILLATION AND FLUTTER: ICD-10-CM

## 2025-01-16 DIAGNOSIS — Z79.01 LONG TERM (CURRENT) USE OF ANTICOAGULANTS: Primary | ICD-10-CM

## 2025-01-16 DIAGNOSIS — Z95.2 H/O MITRAL VALVE REPLACEMENT WITH MECHANICAL VALVE: ICD-10-CM

## 2025-01-16 DIAGNOSIS — I48.91 ATRIAL FIBRILLATION AND FLUTTER: ICD-10-CM

## 2025-01-16 DIAGNOSIS — I48.92 UNSPECIFIED ATRIAL FLUTTER: ICD-10-CM

## 2025-01-16 DIAGNOSIS — Z95.0 PRESENCE OF CARDIAC PACEMAKER: ICD-10-CM

## 2025-01-16 LAB — INR PPP: 3.5

## 2025-01-16 PROCEDURE — 93294 REM INTERROG EVL PM/LDLS PM: CPT | Mod: ,,, | Performed by: STUDENT IN AN ORGANIZED HEALTH CARE EDUCATION/TRAINING PROGRAM

## 2025-01-16 PROCEDURE — 93296 REM INTERROG EVL PM/IDS: CPT | Performed by: STUDENT IN AN ORGANIZED HEALTH CARE EDUCATION/TRAINING PROGRAM

## 2025-01-16 PROCEDURE — 93793 ANTICOAG MGMT PT WARFARIN: CPT | Mod: S$GLB,,,

## 2025-01-16 NOTE — PROGRESS NOTES
[facility]    01/16/2025    King Botello (39 y.o.) is followed by the [facility].      Assessment/Plan:    King Botello presents with a therapeutic INR. Goal INR: 2.5-3.5    Lab Results   Component Value Date    INR 3.5 01/16/2025    INR 2.5 01/09/2025    INR 2.7 01/02/2025       Assessment of patient findings per MA/LPN and chart review:   The following significant findings were found:   None    Recommendation for patient's warfarin regimen:   No change was made to warfarin therapy during this visit and patient has been instructed to continue their current warfarin regimen.    Recommended repeat INR in 1 week      Laine Glynn, PharmD, BCPS  Clinical Pharmacist - [facility]  Preferred Contact: Secure Messaging or In Basket Message

## 2025-01-21 PROBLEM — I48.91 ATRIAL FIBRILLATION AND FLUTTER: Status: RESOLVED | Noted: 2024-02-29 | Resolved: 2025-01-21

## 2025-01-21 PROBLEM — I50.33 HEART FAILURE, DIASTOLIC, ACUTE ON CHRONIC: Status: RESOLVED | Noted: 2024-02-19 | Resolved: 2025-01-21

## 2025-01-21 PROBLEM — I48.92 ATRIAL FIBRILLATION AND FLUTTER: Status: RESOLVED | Noted: 2024-02-29 | Resolved: 2025-01-21

## 2025-01-21 PROBLEM — I48.0 PAROXYSMAL ATRIAL FIBRILLATION: Status: ACTIVE | Noted: 2025-01-21

## 2025-01-23 ENCOUNTER — ANTI-COAG VISIT (OUTPATIENT)
Dept: CARDIOLOGY | Facility: CLINIC | Age: 40
End: 2025-01-23
Payer: COMMERCIAL

## 2025-01-23 DIAGNOSIS — Z79.01 LONG TERM (CURRENT) USE OF ANTICOAGULANTS: Primary | ICD-10-CM

## 2025-01-23 DIAGNOSIS — Z95.2 H/O MITRAL VALVE REPLACEMENT WITH MECHANICAL VALVE: ICD-10-CM

## 2025-01-23 LAB — INR PPP: 4.6

## 2025-01-23 PROCEDURE — 93793 ANTICOAG MGMT PT WARFARIN: CPT | Mod: S$GLB,,,

## 2025-01-23 NOTE — PROGRESS NOTES
[facility]    01/23/2025    King Botello (39 y.o.) is followed by the [facility].      Assessment/Plan:    King Botello presents with a supratherapeutic INR. Goal INR: 2.5-3.5    Lab Results   Component Value Date    INR 4.6 01/23/2025    INR 3.5 01/16/2025    INR 2.5 01/09/2025       Assessment of patient findings per MA/LPN and chart review:   The following significant findings were found:   Patient reports drinking 2 alcoholic drinks recently, plans to drink 4-5 drinks per week    Recommendation for patient's warfarin regimen:   Due to supratherapeutic INR, patient was instructed to SKIP their next 1 warfarin doses.    Recommended repeat INR in 1 week      Laine Glynn PharmD, BCPS  Clinical Pharmacist - [facility]  Preferred Contact: Secure Messaging or In Basket Message

## 2025-01-24 ENCOUNTER — HOSPITAL ENCOUNTER (OUTPATIENT)
Dept: RADIOLOGY | Facility: HOSPITAL | Age: 40
Discharge: HOME | End: 2025-01-24
Attending: ORTHOPAEDIC SURGERY
Payer: COMMERCIAL

## 2025-01-24 DIAGNOSIS — M79.641 RIGHT HAND PAIN: ICD-10-CM

## 2025-01-24 DIAGNOSIS — M79.641 RIGHT HAND PAIN: Primary | ICD-10-CM

## 2025-01-24 PROCEDURE — 73130 X-RAY EXAM OF HAND: CPT | Mod: TC,RT

## 2025-01-24 PROCEDURE — 73130 X-RAY EXAM OF HAND: CPT | Mod: 26,RT,, | Performed by: INTERNAL MEDICINE

## 2025-01-27 ENCOUNTER — PATIENT MESSAGE (OUTPATIENT)
Dept: ELECTROPHYSIOLOGY | Facility: CLINIC | Age: 40
End: 2025-01-27
Payer: COMMERCIAL

## 2025-01-30 ENCOUNTER — ANTI-COAG VISIT (OUTPATIENT)
Dept: CARDIOLOGY | Facility: CLINIC | Age: 40
End: 2025-01-30
Payer: COMMERCIAL

## 2025-01-30 DIAGNOSIS — Z79.01 LONG TERM (CURRENT) USE OF ANTICOAGULANTS: Primary | ICD-10-CM

## 2025-01-30 DIAGNOSIS — Z95.2 H/O MITRAL VALVE REPLACEMENT WITH MECHANICAL VALVE: ICD-10-CM

## 2025-01-30 LAB — INR PPP: 2.2

## 2025-01-30 PROCEDURE — 93793 ANTICOAG MGMT PT WARFARIN: CPT | Mod: S$GLB,,,

## 2025-01-30 NOTE — PROGRESS NOTES
[facility]    01/30/2025    King Botello (39 y.o.) is followed by the [facility].      Assessment/Plan:    King Botello presents with a subtherapeutic  INR. Goal INR: 2.5-3.5    Lab Results   Component Value Date    INR 2.2 01/30/2025    INR 4.6 01/23/2025    INR 3.5 01/16/2025       Assessment of patient findings per MA/LPN and chart review:   The following significant findings were found:   None    Recommendation for patient's warfarin regimen:   Due to subtherapeutic INR, patient was instructed to take a booster dose today. Patient to resume their normal weekly dose.    Recommended repeat INR in 1 week      Pat FontenotD, BCPS  Clinical Pharmacist - [facility]  Preferred Contact: Secure Messaging or In Basket Message

## 2025-02-06 ENCOUNTER — PATIENT MESSAGE (OUTPATIENT)
Dept: ELECTROPHYSIOLOGY | Facility: CLINIC | Age: 40
End: 2025-02-06
Payer: COMMERCIAL

## 2025-02-06 LAB — INR PPP: 3.3

## 2025-02-07 ENCOUNTER — ANTI-COAG VISIT (OUTPATIENT)
Dept: CARDIOLOGY | Facility: CLINIC | Age: 40
End: 2025-02-07
Payer: COMMERCIAL

## 2025-02-07 DIAGNOSIS — Z95.2 H/O MITRAL VALVE REPLACEMENT WITH MECHANICAL VALVE: ICD-10-CM

## 2025-02-07 DIAGNOSIS — Z79.01 LONG TERM (CURRENT) USE OF ANTICOAGULANTS: Primary | ICD-10-CM

## 2025-02-07 PROCEDURE — 93793 ANTICOAG MGMT PT WARFARIN: CPT | Mod: S$GLB,,,

## 2025-02-07 NOTE — PROGRESS NOTES
[facility]    02/07/2025    King Botello (39 y.o.) is followed by the [facility].      Assessment/Plan:    King Botello presents with a therapeutic INR. Goal INR: 2.5-3.5    Lab Results   Component Value Date    INR 3.3 02/06/2025    INR 2.2 01/30/2025    INR 4.6 01/23/2025       Assessment of patient findings per MA/LPN and chart review:   The following significant findings were found:   none    Recommendation for patient's warfarin regimen:   No change was made to warfarin therapy during this visit and patient has been instructed to continue their current warfarin regimen.    Recommended repeat INR in 1 week      Pat Weinberg, PharmD  PGY1 Pharmacy Resident - [facility]  Preferred Contact: Secure Messaging or In Basket Message

## 2025-02-12 ENCOUNTER — PATIENT MESSAGE (OUTPATIENT)
Dept: ADMINISTRATIVE | Facility: OTHER | Age: 40
End: 2025-02-12
Payer: COMMERCIAL

## 2025-02-13 ENCOUNTER — ANTI-COAG VISIT (OUTPATIENT)
Dept: CARDIOLOGY | Facility: CLINIC | Age: 40
End: 2025-02-13
Payer: COMMERCIAL

## 2025-02-13 DIAGNOSIS — Z79.01 LONG TERM (CURRENT) USE OF ANTICOAGULANTS: Primary | ICD-10-CM

## 2025-02-13 DIAGNOSIS — Z95.2 H/O MITRAL VALVE REPLACEMENT WITH MECHANICAL VALVE: ICD-10-CM

## 2025-02-13 LAB
INR PPP: 3.3
OHS CV AF BURDEN PERCENT: 3.7
OHS CV DC REMOTE DEVICE TYPE: NORMAL
OHS CV ICD SHOCK: NO
OHS CV RV PACING PERCENT: 0.29 %

## 2025-02-13 PROCEDURE — 93793 ANTICOAG MGMT PT WARFARIN: CPT | Mod: S$GLB,,,

## 2025-02-13 NOTE — PROGRESS NOTES
[facility]    02/13/2025    King Botello (39 y.o.) is followed by the [facility].      Assessment/Plan:    King Botello presents with a therapeutic INR. Goal INR: 2.5-3.5    Lab Results   Component Value Date    INR 3.3 02/13/2025    INR 3.3 02/06/2025    INR 2.2 01/30/2025       Assessment of patient findings per MA/LPN and chart review:   The following significant findings were found:   none    Recommendation for patient's warfarin regimen:   No change was made to warfarin therapy during this visit and patient has been instructed to continue their current warfarin regimen.    Recommended repeat INR in 1 week      Pat Weinberg, PharmD  PGY1 Pharmacy Resident - [facility]  Preferred Contact: Secure Messaging or In Basket Message

## 2025-02-20 LAB — INR PPP: 3.9

## 2025-02-21 ENCOUNTER — ANTI-COAG VISIT (OUTPATIENT)
Dept: CARDIOLOGY | Facility: CLINIC | Age: 40
End: 2025-02-21
Payer: COMMERCIAL

## 2025-02-21 DIAGNOSIS — Z79.01 LONG TERM (CURRENT) USE OF ANTICOAGULANTS: Primary | ICD-10-CM

## 2025-02-21 DIAGNOSIS — Z95.2 H/O MITRAL VALVE REPLACEMENT WITH MECHANICAL VALVE: ICD-10-CM

## 2025-02-21 NOTE — PROGRESS NOTES
[facility]    02/21/2025    King Botello (39 y.o.) is followed by the [facility].      Assessment/Plan:    King Botello presents with a supratherapeutic INR. Goal INR: 2.5-3.5    Lab Results   Component Value Date    INR 3.9 02/20/2025    INR 3.3 02/13/2025    INR 3.3 02/06/2025       Assessment of patient findings per MA/LPN and chart review:   The following significant findings were found:   Patient reports taking 4 mg on 2/20.     Recommendation for patient's warfarin regimen:   Due to supratherapeutic INR, patient was instructed to take a reduced warfarin dose today. Patient to resume their normal weekly dose.    Recommended repeat INR in 1 week      Pat Oleta, PharmD  PGY1 Pharmacy Resident - [facility]  Preferred Contact: Secure Messaging or In Basket Message

## 2025-02-27 ENCOUNTER — ANTI-COAG VISIT (OUTPATIENT)
Dept: CARDIOLOGY | Facility: CLINIC | Age: 40
End: 2025-02-27
Payer: COMMERCIAL

## 2025-02-27 DIAGNOSIS — Z79.01 LONG TERM (CURRENT) USE OF ANTICOAGULANTS: Primary | ICD-10-CM

## 2025-02-27 DIAGNOSIS — Z95.2 H/O MITRAL VALVE REPLACEMENT WITH MECHANICAL VALVE: ICD-10-CM

## 2025-02-27 LAB — INR PPP: 4.3 (ref 2.5–3.5)

## 2025-02-27 PROCEDURE — 93793 ANTICOAG MGMT PT WARFARIN: CPT | Mod: S$GLB,,,

## 2025-02-27 NOTE — PROGRESS NOTES
[facility]    02/27/2025    King Botello (39 y.o.) is followed by the [facility].      Assessment/Plan:    King Botello presents with a supratherapeutic INR. Goal INR: 2.5-3.5    Lab Results   Component Value Date    INR 4.3 (A) 02/27/2025    INR 3.9 02/20/2025    INR 3.3 02/13/2025       Assessment of patient findings per MA/LPN and chart review:   The following significant findings were found:   Patient states she will resume normal diet this week and remains consistent with alcohol intake. Patient reports having diarrhia 2/25 & 2/26.  Recommendation for patient's warfarin regimen:   Due to supratherapeutic INR, patient was instructed to SKIP 2/27 warfarin dose today. Patient to also decrease their weekly dose.    Recommended repeat INR in 1 week      Pat Weinberg, PharmD  PGY1 Pharmacy Resident - [facility]  Preferred Contact: Secure Messaging or In Basket Message

## 2025-03-05 NOTE — PROGRESS NOTES
Ileana with [facility]  called in a verbal result as: INR -3.6 dated today 2/02/24     Additional Notes: We will plan to remove the cyst in the future via surgical excision, which we explained involves injecting the area with lidocaine and excising the cyst and implanting sutures to be removed 14 days later.\\n\\nPt may schedule a 30 minute surgery at her own discretion. Detail Level: Simple [facility] Risk Assessment In Note?: no

## 2025-03-06 ENCOUNTER — ANTI-COAG VISIT (OUTPATIENT)
Dept: CARDIOLOGY | Facility: CLINIC | Age: 40
End: 2025-03-06
Payer: COMMERCIAL

## 2025-03-06 DIAGNOSIS — Z95.2 H/O MITRAL VALVE REPLACEMENT WITH MECHANICAL VALVE: ICD-10-CM

## 2025-03-06 DIAGNOSIS — Z79.01 LONG TERM (CURRENT) USE OF ANTICOAGULANTS: Primary | ICD-10-CM

## 2025-03-06 LAB — INR PPP: 3.6

## 2025-03-06 PROCEDURE — 93793 ANTICOAG MGMT PT WARFARIN: CPT | Mod: S$GLB,,,

## 2025-03-06 NOTE — PROGRESS NOTES
[facility]    03/06/2025    King Botello (39 y.o.) is followed by the [facility].      Assessment/Plan:    King Botello presents with a supratherapeutic INR. Goal INR: 2.5-3.5    Lab Results   Component Value Date    INR 3.6 03/06/2025    INR 4.3 (A) 02/27/2025    INR 3.9 02/20/2025       Assessment of patient findings per MA/LPN and chart review:   The following significant findings were found:   none    Recommendation for patient's warfarin regimen:   Due to supratherapeutic INR, patient was instructed to take a reduced warfarin dose today. Patient to also decrease their weekly dose.    Recommended repeat INR in 1 week      Laine Glynn, PharmD, BCPS  Clinical Pharmacist - [facility]  Preferred Contact: Secure Messaging or In Basket Message

## 2025-03-10 ENCOUNTER — PATIENT MESSAGE (OUTPATIENT)
Dept: INTERNAL MEDICINE | Facility: CLINIC | Age: 40
End: 2025-03-10
Payer: COMMERCIAL

## 2025-03-11 ENCOUNTER — PATIENT MESSAGE (OUTPATIENT)
Dept: CARDIOLOGY | Facility: CLINIC | Age: 40
End: 2025-03-11
Payer: COMMERCIAL

## 2025-03-11 ENCOUNTER — PATIENT MESSAGE (OUTPATIENT)
Dept: ADMINISTRATIVE | Facility: OTHER | Age: 40
End: 2025-03-11
Payer: COMMERCIAL

## 2025-03-11 DIAGNOSIS — Z79.01 LONG TERM (CURRENT) USE OF ANTICOAGULANTS: ICD-10-CM

## 2025-03-12 ENCOUNTER — PATIENT MESSAGE (OUTPATIENT)
Dept: INTERNAL MEDICINE | Facility: CLINIC | Age: 40
End: 2025-03-12
Payer: COMMERCIAL

## 2025-03-12 ENCOUNTER — PATIENT MESSAGE (OUTPATIENT)
Dept: CARDIOTHORACIC SURGERY | Facility: CLINIC | Age: 40
End: 2025-03-12
Payer: COMMERCIAL

## 2025-03-12 NOTE — TELEPHONE ENCOUNTER
Pt flu A positive and asking if she should be concerned with it affecting her valve replacement. Pt has also messaged cards.

## 2025-03-13 ENCOUNTER — ANTI-COAG VISIT (OUTPATIENT)
Dept: CARDIOLOGY | Facility: CLINIC | Age: 40
End: 2025-03-13
Payer: COMMERCIAL

## 2025-03-13 DIAGNOSIS — Z79.01 LONG TERM (CURRENT) USE OF ANTICOAGULANTS: Primary | ICD-10-CM

## 2025-03-13 DIAGNOSIS — Z95.2 H/O MITRAL VALVE REPLACEMENT WITH MECHANICAL VALVE: ICD-10-CM

## 2025-03-13 LAB — INR PPP: 1.7

## 2025-03-13 PROCEDURE — 93793 ANTICOAG MGMT PT WARFARIN: CPT | Mod: S$GLB,,,

## 2025-03-13 RX ORDER — WARFARIN 4 MG/1
4-8 TABLET ORAL DAILY
Qty: 60 TABLET | Refills: 11 | Status: SHIPPED | OUTPATIENT
Start: 2025-03-13 | End: 2026-03-13

## 2025-03-13 NOTE — TELEPHONE ENCOUNTER
Thank you for forwarding message, were you treated with Tamiflu?    I can call that in if you are flu diagnosis or symptoms began within the last 24 or 36 hours.  Beyond that, Tamiflu is not that particularly helpful.    Also, concerning the heart valve, as long as you are having routine mild or moderate symptoms and no fluctuations of blood pressure or high fever, usually not an issue.  Do agree that you communicate with the cardiologist for FYI, thank you

## 2025-03-13 NOTE — PROGRESS NOTES
[facility]    03/13/2025    King Botello (39 y.o.) is followed by the [facility].      Assessment/Plan:    King Botello presents with a subtherapeutic  INR. Goal INR: 2.5-3.5    Lab Results   Component Value Date    INR 1.7 03/13/2025    INR 3.6 03/06/2025    INR 4.3 (A) 02/27/2025       Assessment of patient findings per MA/LPN and chart review:   The following significant findings were found:   Patient has the flu but has not taken any medication for it    Recommendation for patient's warfarin regimen:   Due to subtherapeutic INR, patient was instructed to take a booster dose today. Patient to resume their normal weekly dose.    Recommended repeat INR in 1 week      Laine Glynn, PharmD, BCPS  Clinical Pharmacist - [facility]  Preferred Contact: Secure Messaging or In Basket Message

## 2025-03-18 ENCOUNTER — LAB VISIT (OUTPATIENT)
Dept: LAB | Facility: HOSPITAL | Age: 40
End: 2025-03-18
Attending: FAMILY MEDICINE
Payer: COMMERCIAL

## 2025-03-18 ENCOUNTER — RESULTS FOLLOW-UP (OUTPATIENT)
Dept: INTERNAL MEDICINE | Facility: CLINIC | Age: 40
End: 2025-03-18

## 2025-03-18 ENCOUNTER — OFFICE VISIT (OUTPATIENT)
Dept: INTERNAL MEDICINE | Facility: CLINIC | Age: 40
End: 2025-03-18
Attending: FAMILY MEDICINE
Payer: COMMERCIAL

## 2025-03-18 VITALS
SYSTOLIC BLOOD PRESSURE: 110 MMHG | OXYGEN SATURATION: 98 % | HEART RATE: 70 BPM | BODY MASS INDEX: 36.09 KG/M2 | DIASTOLIC BLOOD PRESSURE: 78 MMHG | HEIGHT: 61 IN | WEIGHT: 191.13 LBS

## 2025-03-18 DIAGNOSIS — Z98.890 H/O TRICUSPID VALVE ANNULOPLASTY: ICD-10-CM

## 2025-03-18 DIAGNOSIS — J10.1 INFLUENZA A: ICD-10-CM

## 2025-03-18 DIAGNOSIS — Z12.31 ENCOUNTER FOR SCREENING MAMMOGRAM FOR MALIGNANT NEOPLASM OF BREAST: ICD-10-CM

## 2025-03-18 DIAGNOSIS — Z00.00 ANNUAL PHYSICAL EXAM: Primary | ICD-10-CM

## 2025-03-18 DIAGNOSIS — Z95.2 H/O MITRAL VALVE REPLACEMENT WITH MECHANICAL VALVE: ICD-10-CM

## 2025-03-18 DIAGNOSIS — I48.4 ATYPICAL ATRIAL FLUTTER: ICD-10-CM

## 2025-03-18 DIAGNOSIS — Z95.0 PACEMAKER: ICD-10-CM

## 2025-03-18 DIAGNOSIS — E66.812 CLASS 2 OBESITY WITH BODY MASS INDEX (BMI) OF 35.0 TO 35.9 IN ADULT, UNSPECIFIED OBESITY TYPE, UNSPECIFIED WHETHER SERIOUS COMORBIDITY PRESENT: ICD-10-CM

## 2025-03-18 DIAGNOSIS — Z00.00 ANNUAL PHYSICAL EXAM: ICD-10-CM

## 2025-03-18 DIAGNOSIS — I48.0 PAROXYSMAL ATRIAL FIBRILLATION: ICD-10-CM

## 2025-03-18 PROBLEM — R73.9 TRANSIENT HYPERGLYCEMIA POST PROCEDURE: Status: RESOLVED | Noted: 2024-01-08 | Resolved: 2025-03-18

## 2025-03-18 PROBLEM — O10.019 PRE-EXISTING ESSENTIAL HYPERTENSION DURING PREGNANCY, ANTEPARTUM: Status: RESOLVED | Noted: 2023-01-24 | Resolved: 2025-03-18

## 2025-03-18 PROBLEM — I49.49 JUNCTIONAL ESCAPE BEATS: Status: RESOLVED | Noted: 2024-07-28 | Resolved: 2025-03-18

## 2025-03-18 PROBLEM — D62 ACUTE POSTOPERATIVE ANEMIA DUE TO EXPECTED BLOOD LOSS: Status: RESOLVED | Noted: 2024-01-08 | Resolved: 2025-03-18

## 2025-03-18 PROBLEM — I10 PRIMARY HYPERTENSION: Status: RESOLVED | Noted: 2021-03-16 | Resolved: 2025-03-18

## 2025-03-18 LAB
CHOLEST SERPL-MCNC: 192 MG/DL (ref 120–199)
CHOLEST/HDLC SERPL: 5.5 {RATIO} (ref 2–5)
HDLC SERPL-MCNC: 35 MG/DL (ref 40–75)
HDLC SERPL: 18.2 % (ref 20–50)
LDLC SERPL CALC-MCNC: 123.6 MG/DL (ref 63–159)
NONHDLC SERPL-MCNC: 157 MG/DL
TRIGL SERPL-MCNC: 167 MG/DL (ref 30–150)

## 2025-03-18 PROCEDURE — 1159F MED LIST DOCD IN RCRD: CPT | Mod: CPTII,S$GLB,, | Performed by: FAMILY MEDICINE

## 2025-03-18 PROCEDURE — 80061 LIPID PANEL: CPT | Performed by: FAMILY MEDICINE

## 2025-03-18 PROCEDURE — 1160F RVW MEDS BY RX/DR IN RCRD: CPT | Mod: CPTII,S$GLB,, | Performed by: FAMILY MEDICINE

## 2025-03-18 PROCEDURE — 36415 COLL VENOUS BLD VENIPUNCTURE: CPT | Performed by: FAMILY MEDICINE

## 2025-03-18 PROCEDURE — 99396 PREV VISIT EST AGE 40-64: CPT | Mod: S$GLB,,, | Performed by: FAMILY MEDICINE

## 2025-03-18 PROCEDURE — 99999 PR PBB SHADOW E&M-EST. PATIENT-LVL IV: CPT | Mod: PBBFAC,,, | Performed by: FAMILY MEDICINE

## 2025-03-18 PROCEDURE — 3008F BODY MASS INDEX DOCD: CPT | Mod: CPTII,S$GLB,, | Performed by: FAMILY MEDICINE

## 2025-03-18 PROCEDURE — 3074F SYST BP LT 130 MM HG: CPT | Mod: CPTII,S$GLB,, | Performed by: FAMILY MEDICINE

## 2025-03-18 PROCEDURE — 3078F DIAST BP <80 MM HG: CPT | Mod: CPTII,S$GLB,, | Performed by: FAMILY MEDICINE

## 2025-03-18 RX ORDER — PROMETHAZINE HYDROCHLORIDE 12.5 MG/1
12.5 TABLET ORAL 3 TIMES DAILY PRN
Qty: 24 TABLET | Refills: 0 | Status: SHIPPED | OUTPATIENT
Start: 2025-03-18

## 2025-03-18 NOTE — PROGRESS NOTES
Subjective:       Patient ID: King Botello is a 39 y.o. female.    Chief Complaint: Annual Exam    Established patient for an annual wellness check/physical exam and also chronic disease management. Specific complaints - see dictation, M*model entries and please see ROS.  Past, Surgical, Family, Social Histories; Medications, Allergies reviewed and reconciled.  Health maintenance file reviewed and addressed items due. Recent applicable lab, imaging and cardiovascular results reviewed.  Problem list items reviewed and modified or added entries (in the overview section) may not be transcribed into this encounter note due to note writer format.      Influenza a last week, home test. Persisting nausea and diarrhea. Able to hydrate. Cough and phlegm. Diarrhea p prandial 2-3/day.         Review of Systems   Constitutional:  Negative for activity change and unexpected weight change.   HENT:  Negative for hearing loss, rhinorrhea and trouble swallowing.    Eyes:  Negative for discharge and visual disturbance.   Respiratory:  Negative for chest tightness and wheezing.    Cardiovascular:  Negative for chest pain and palpitations.   Gastrointestinal:  Positive for diarrhea and nausea. Negative for blood in stool, constipation and vomiting.   Endocrine: Negative for polydipsia and polyuria.   Genitourinary:  Negative for difficulty urinating, dysuria, hematuria and menstrual problem.   Musculoskeletal:  Negative for arthralgias, joint swelling and neck pain.   Neurological:  Negative for weakness and headaches.   Psychiatric/Behavioral:  Negative for confusion and dysphoric mood.        Objective:      Physical Exam  Vitals and nursing note reviewed.   Constitutional:       Appearance: She is well-developed. She is obese. She is not diaphoretic.   Eyes:      General: No scleral icterus.  Neck:      Thyroid: No thyromegaly.      Vascular: No JVD.      Trachea: No tracheal deviation.   Cardiovascular:      Rate and Rhythm:  Normal rate.      Heart sounds: Heart sounds are distant. No murmur heard.     No friction rub. No gallop.      Comments: Mechanical click  Pulmonary:      Effort: Pulmonary effort is normal. No respiratory distress.      Breath sounds: Normal breath sounds. No wheezing or rales.   Abdominal:      General: There is no distension or abdominal bruit.      Palpations: Abdomen is soft. There is no mass.      Tenderness: There is no abdominal tenderness. There is no guarding or rebound.   Musculoskeletal:      Cervical back: Normal range of motion and neck supple.   Lymphadenopathy:      Cervical: No cervical adenopathy.   Skin:     General: Skin is warm and dry.      Findings: No erythema or rash.   Neurological:      Mental Status: She is alert and oriented to person, place, and time.      Cranial Nerves: No cranial nerve deficit.      Motor: No tremor.      Coordination: Coordination normal.      Gait: Gait normal.   Psychiatric:         Behavior: Behavior normal.         Thought Content: Thought content normal.         Judgment: Judgment normal.         Assessment:       1. Annual physical exam    2. Influenza A    3. Atypical atrial flutter    4. Paroxysmal atrial fibrillation    5. H/O mitral valve replacement with mechanical valve    6. H/O tricuspid valve annuloplasty    7. Pacemaker    8. Class 2 obesity with body mass index (BMI) of 35.0 to 35.9 in adult, unspecified obesity type, unspecified whether serious comorbidity present    9. Encounter for screening mammogram for malignant neoplasm of breast        Plan:     Medication List with Changes/Refills   New Medications    PROMETHAZINE (PHENERGAN) 12.5 MG TAB    Take 1 tablet (12.5 mg total) by mouth 3 (three) times daily as needed (nausea).   Current Medications    ASPIRIN (ECOTRIN) 81 MG EC TABLET    Take 1 tablet (81 mg total) by mouth once daily.    DIGOXIN (LANOXIN) 250 MCG TABLET    Take 1 tablet (250 mcg total) by mouth once daily.    DOFETILIDE  (TIKOSYN) 250 MCG CAP    Take 1 capsule (250 mcg total) by mouth every 12 (twelve) hours.    METOPROLOL SUCCINATE (TOPROL-XL) 100 MG 24 HR TABLET    Take 1 tablet (100 mg total) by mouth 2 (two) times daily.    METOPROLOL TARTRATE (LOPRESSOR) 25 MG TABLET    Take 1 tablet (25 mg total) by mouth 2 (two) times daily as needed (Palpitations).    SEMAGLUTIDE, WEIGHT LOSS, (WEGOVY) 0.25 MG/0.5 ML PNIJ    Inject 0.25 mg into the skin every 7 days.    WARFARIN (COUMADIN) 4 MG TABLET    Take 1-2 tablets (4-8 mg total) by mouth Daily. As directed by the [facility]   Discontinued Medications    ALBUTEROL (VENTOLIN HFA) 90 MCG/ACTUATION INHALER    Inhale 2 puffs into the lungs every 6 (six) hours as needed for Wheezing. Rescue    LORATADINE (CLARITIN) 10 MG TABLET    Take 1 tablet (10 mg total) by mouth once daily.     1. Annual physical exam  -     Lipid Panel; Future; Expected date: 03/18/2025    2. Influenza A  -     promethazine (PHENERGAN) 12.5 MG Tab; Take 1 tablet (12.5 mg total) by mouth 3 (three) times daily as needed (nausea).  Dispense: 24 tablet; Refill: 0    3. Atypical atrial flutter  Overview:  -followed by [facility]    -Medtronic dual-chamber pacemaker with left bundle branch area pacing (LBBAP) lead, implanted 1/17/2024 in setting of postop atrial flutter and complete heart block with junctional escape of ~50 bpm with persistent AV dissociation following mitral valve replacement with a mechanical mitral valve and a tricuspid valve annuloplasty on 1/10/2024       4. Paroxysmal atrial fibrillation  Overview:  -followed by EPS    -Medtronic dual-chamber pacemaker with left bundle branch area pacing (LBBAP) lead, implanted 1/17/2024 in setting of postop atrial flutter and complete heart block with junctional escape of ~50 bpm with persistent AV dissociation following mitral valve replacement with a mechanical mitral valve and a tricuspid valve annuloplasty on 1/10/2024       5. H/O mitral valve replacement with  mechanical valve  Overview:  -hx MV repair 2003  -s/p MVR with a 25mm [employer] standard mechanical prosthesis plus TVr 1/8/2024 complicated by complete heart block requiring PPM 1/17/2024 despite a robust junctional escape rhythm  -long term AC  -followed by cardiology      6. H/O tricuspid valve annuloplasty  Overview:  -followed by cardiology and EPS    -Medtronic dual-chamber pacemaker with left bundle branch area pacing (LBBAP) lead, implanted 1/17/2024 in setting of postop atrial flutter and complete heart block with junctional escape of ~50 bpm with persistent AV dissociation following mitral valve replacement with a mechanical mitral valve and a tricuspid valve annuloplasty on 1/10/2024       7. Pacemaker  Overview:  -followed by EPS    -Medtronic dual-chamber pacemaker with left bundle branch area pacing (LBBAP) lead, implanted 1/17/2024 in setting of postop atrial flutter and complete heart block with junctional escape of ~50 bpm with persistent AV dissociation following mitral valve replacement with a mechanical mitral valve and a tricuspid valve annuloplasty on 1/10/2024       8. Class 2 obesity with body mass index (BMI) of 35.0 to 35.9 in adult, unspecified obesity type, unspecified whether serious comorbidity present  Assessment & Plan:  -[facility]      9. Encounter for screening mammogram for malignant neoplasm of breast  -     Mammo Digital Screening Bilat w/ Jason (XPD); Future; Expected date: 08/27/2025      See meds, orders, follow up, routing and instructions sections of encounter and AVS. Discussed with patient and provided on AVS.    Discussed diet and exercise as therapeutic modalities for metabolic and other conditions. Provided patient information, which are included as links on the AVS for detailed information.    Lab Results   Component Value Date     12/19/2024    K 4.4 12/19/2024     12/19/2024    BUN 10 12/19/2024    CREATININE 0.7 12/19/2024     GLU 81 12/19/2024    HGBA1C 5.0 02/29/2024    MG 2.0 05/02/2024    AST 25 06/26/2024    ALT 11 06/26/2024    ALBUMIN 3.7 06/26/2024    PROT 7.4 06/26/2024    BILITOT 0.5 06/26/2024    CHOL 215 (H) 02/29/2024    HDL 41 02/29/2024    LDLCALC 142.4 02/29/2024    TRIG 158 (H) 02/29/2024    WBC 5.94 06/26/2024    HGB 12.1 06/26/2024    HCT 37.3 06/26/2024    HCT 20 (LL) 01/09/2024     06/26/2024    TSH 0.819 08/14/2023    BNP 66 12/19/2024    URICACID 5.0 04/15/2010         Phenergan query for interactions w/ tikosyn + warfarin + dig. No interactions popped up.    RTC w/ cardiac team.

## 2025-03-21 ENCOUNTER — ANTI-COAG VISIT (OUTPATIENT)
Dept: CARDIOLOGY | Facility: CLINIC | Age: 40
End: 2025-03-21
Payer: COMMERCIAL

## 2025-03-21 DIAGNOSIS — Z95.2 H/O MITRAL VALVE REPLACEMENT WITH MECHANICAL VALVE: ICD-10-CM

## 2025-03-21 DIAGNOSIS — Z79.01 LONG TERM (CURRENT) USE OF ANTICOAGULANTS: Primary | ICD-10-CM

## 2025-03-21 LAB — INR PPP: 2.8

## 2025-03-21 NOTE — PROGRESS NOTES
[facility]    03/21/2025    King Botello (39 y.o.) is followed by the [facility].      Assessment/Plan:    King Botello presents with a therapeutic INR. Goal INR: 2.5-3.5    Lab Results   Component Value Date    INR 2.8 03/20/2025    INR 1.7 03/13/2025    INR 3.6 03/06/2025       Assessment of patient findings per MA/LPN and chart review:   The following significant findings were found:   none    Recommendation for patient's warfarin regimen:   No change was made to warfarin therapy during this visit and patient has been instructed to continue their current warfarin regimen.    Recommended repeat INR in 1 week      Pat FontenotD, BCPS  Clinical Pharmacist - [facility]  Preferred Contact: Secure Messaging or In Basket Message

## 2025-03-25 ENCOUNTER — OFFICE VISIT (OUTPATIENT)
Dept: INTERNAL MEDICINE | Facility: CLINIC | Age: 40
End: 2025-03-25
Payer: COMMERCIAL

## 2025-03-25 ENCOUNTER — PATIENT MESSAGE (OUTPATIENT)
Dept: INTERNAL MEDICINE | Facility: CLINIC | Age: 40
End: 2025-03-25

## 2025-03-25 VITALS
HEART RATE: 81 BPM | HEIGHT: 61 IN | WEIGHT: 189.63 LBS | OXYGEN SATURATION: 99 % | DIASTOLIC BLOOD PRESSURE: 72 MMHG | BODY MASS INDEX: 35.8 KG/M2 | SYSTOLIC BLOOD PRESSURE: 104 MMHG

## 2025-03-25 DIAGNOSIS — J02.9 PHARYNGITIS, UNSPECIFIED ETIOLOGY: Primary | ICD-10-CM

## 2025-03-25 LAB
CTP QC/QA: YES
S PYO RRNA THROAT QL PROBE: NEGATIVE

## 2025-03-25 PROCEDURE — 99214 OFFICE O/P EST MOD 30 MIN: CPT | Mod: S$GLB,,, | Performed by: PHYSICIAN ASSISTANT

## 2025-03-25 PROCEDURE — 99999 PR PBB SHADOW E&M-EST. PATIENT-LVL III: CPT | Mod: PBBFAC,,, | Performed by: PHYSICIAN ASSISTANT

## 2025-03-25 PROCEDURE — 3078F DIAST BP <80 MM HG: CPT | Mod: CPTII,S$GLB,, | Performed by: PHYSICIAN ASSISTANT

## 2025-03-25 PROCEDURE — 3074F SYST BP LT 130 MM HG: CPT | Mod: CPTII,S$GLB,, | Performed by: PHYSICIAN ASSISTANT

## 2025-03-25 PROCEDURE — 3008F BODY MASS INDEX DOCD: CPT | Mod: CPTII,S$GLB,, | Performed by: PHYSICIAN ASSISTANT

## 2025-03-25 RX ORDER — AMOXICILLIN 500 MG/1
500 CAPSULE ORAL EVERY 12 HOURS
Qty: 20 CAPSULE | Refills: 0 | Status: SHIPPED | OUTPATIENT
Start: 2025-03-25 | End: 2025-04-04

## 2025-03-25 RX ORDER — BENZONATATE 100 MG/1
100 CAPSULE ORAL 3 TIMES DAILY PRN
Qty: 30 CAPSULE | Refills: 0 | Status: SHIPPED | OUTPATIENT
Start: 2025-03-25 | End: 2025-04-04

## 2025-03-25 NOTE — TELEPHONE ENCOUNTER
"Called the pt on this afternoon in relation to message received via the pt portal. (Please see portal message from today).  Assisted the pt in sending a manual transmission via her Seen heart Kenisha.  The transmission was received and reviewed.  Transmission reveals ongoing AF since 3/28/24.  Pt with c/o having to take deep breaths, fast HR at times, a light feeling in her head and like she's in a "fog" especially on last night (4/7/24).  Pt also felt the "light head" on Sat. 4/6/24.  Pt compliant with Metoprolol 25 mg twice daily and is on Coumadin.  Last BP reading which was this afternoon 142/88 which she said that's a little high for her.  Informed the pt this information would be sent to the Device RN to review with Dr. Moore.  Understanding was verbalized.  Pt appreciated the call.     Message routed to Device RN (Mau).  Also verbally s/w Device RN  " If you are a smoker, it is important for your health to stop smoking. Please be aware that second hand smoke is also harmful.

## 2025-03-25 NOTE — PROGRESS NOTES
INTERNAL MEDICINE URGENT VISIT NOTE    CHIEF COMPLAINT     Chief Complaint   Patient presents with    Cough       HPI     King Botello is a 39 y.o. female who presents for an urgent visit today.    Patient with history of mitral valve replacement states that she had Influenza A two weeks ago starting 3/8/2024 with symptoms of cough, body aches, nausea and diarrhea. She states that she took tylenol and phenergan for her symptoms and had relief. She states that this past weekend her cough progressed, along with development of sore throat and pus on tonsils. Patient states that she never developed a fever. She denies SOB, CP, nausea, and vomiting.     PCP is Mayco Hansen MD, patient is new to me.     Past Medical History:  Past Medical History:   Diagnosis Date    Abnormal Pap smear     Colposcopy    Acute postoperative anemia due to expected blood loss 2024    Atrial fibrillation and flutter 2024    Bradycardia 2020    Class 2 obesity due to excess calories with body mass index (BMI) of 35.0 to 35.9 in adult 2021    Hypertension, essential 2021    Junctional escape beats 2024    Menarche     Age of onset 11    Missed ab 2021    Mitral valve prolapse     Pre-existing essential hypertension during pregnancy, antepartum 2023    Previous  delivery affecting pregnancy, antepartum 2020    Primary hypertension 2021    S/P suction D&C 2021    Status post mitral valve annuloplasty 2017    Transient hyperglycemia post procedure 2024       Home Medications:  Prior to Admission medications    Medication Sig Start Date End Date Taking? Authorizing Provider   aspirin (ECOTRIN) 81 MG EC tablet Take 1 tablet (81 mg total) by mouth once daily. 24 Yes CRISTI Moore MD   digoxin (LANOXIN) 250 mcg tablet Take 1 tablet (250 mcg total) by mouth once daily. 24 Yes Eugene Cordero MD   dofetilide (TIKOSYN) 250  MCG Cap Take 1 capsule (250 mcg total) by mouth every 12 (twelve) hours. 7/22/24 7/22/25 Yes CRISTI Moore MD   metoprolol succinate (TOPROL-XL) 100 MG 24 hr tablet Take 1 tablet (100 mg total) by mouth 2 (two) times daily. 12/12/24 12/12/25 Yes Eugene Cordero MD   metoprolol tartrate (LOPRESSOR) 25 MG tablet Take 1 tablet (25 mg total) by mouth 2 (two) times daily as needed (Palpitations). 7/22/24 7/22/25 Yes CRISTI Moore MD   promethazine (PHENERGAN) 12.5 MG Tab Take 1 tablet (12.5 mg total) by mouth 3 (three) times daily as needed (nausea). 3/18/25  Yes Mayco Hansen MD   semaglutide, weight loss, (WEGOVY) 0.25 mg/0.5 mL PnIj Inject 0.25 mg into the skin every 7 days. 11/15/24  Yes Eugene Cordero MD   warfarin (COUMADIN) 4 MG tablet Take 1-2 tablets (4-8 mg total) by mouth Daily. As directed by the [facility] 3/13/25 3/13/26 Yes Eugene Cordero MD       Review of Systems:  Review of Systems   Constitutional:  Positive for chills and fatigue. Negative for appetite change and fever.   HENT:  Positive for congestion and sore throat. Negative for ear pain, rhinorrhea and trouble swallowing.    Respiratory:  Positive for cough. Negative for shortness of breath.    Cardiovascular:  Negative for chest pain.   Gastrointestinal:  Negative for abdominal pain, constipation, diarrhea, nausea and vomiting.   Allergic/Immunologic: Positive for environmental allergies.   Neurological:  Negative for light-headedness.   Psychiatric/Behavioral:  Negative for agitation and behavioral problems.        Health Maintainence:   Immunizations:  Health Maintenance         Date Due Completion Date    Pneumococcal Vaccines (Age 0-49) (1 of 2 - PCV) Never done ---    COVID-19 Vaccine (4 - 2024-25 season) 09/01/2024 5/7/2022    Mammogram 08/27/2025 8/27/2024    Hemoglobin A1c (Diabetic Prevention Screening) 02/28/2027 2/29/2024    Cervical Cancer Screening 09/29/2027 9/29/2022    TETANUS VACCINE 04/21/2033 4/21/2023  "   RSV Vaccine (Age 60+ and Pregnant patients) (1 - 1-dose 75+ series) 04/30/2059 ---             PHYSICAL EXAM     /72 (BP Location: Left arm, Patient Position: Sitting)   Pulse 81   Ht 5' 1" (1.549 m)   Wt 86 kg (189 lb 9.5 oz)   SpO2 99%   BMI 35.82 kg/m²     Physical Exam  Vitals and nursing note reviewed.   Constitutional:       Appearance: Normal appearance.      Comments: Healthy-appearing female in no acute distress or apparent pain.  She makes good eye contact, speaks in clear full sentences and ambulates with ease.  No significant coughing during interview and exam.   HENT:      Head: Normocephalic and atraumatic.      Nose: Nose normal.      Mouth/Throat:      Pharynx: Oropharynx is clear.      Comments: No exudate identified on tonsils  No edema  Uvula is midline  Minimal erythema with posterior cobblestoning  Eyes:      Conjunctiva/sclera: Conjunctivae normal.   Cardiovascular:      Rate and Rhythm: Normal rate and regular rhythm.      Pulses: Normal pulses.   Pulmonary:      Effort: No respiratory distress.   Abdominal:      Tenderness: There is no abdominal tenderness.   Musculoskeletal:         General: Normal range of motion.      Cervical back: No rigidity.   Skin:     General: Skin is warm and dry.      Capillary Refill: Capillary refill takes less than 2 seconds.      Findings: No rash.   Neurological:      General: No focal deficit present.      Mental Status: She is alert.      Gait: Gait normal.   Psychiatric:         Mood and Affect: Mood normal.         LABS     Lab Results   Component Value Date    HGBA1C 5.0 02/29/2024     CMP  Sodium   Date Value Ref Range Status   12/19/2024 138 136 - 145 mmol/L Final     Potassium   Date Value Ref Range Status   12/19/2024 4.4 3.5 - 5.1 mmol/L Final     Chloride   Date Value Ref Range Status   12/19/2024 108 95 - 110 mmol/L Final     CO2   Date Value Ref Range Status   12/19/2024 23 23 - 29 mmol/L Final     Glucose   Date Value Ref Range " Status   12/19/2024 81 70 - 110 mg/dL Final     BUN   Date Value Ref Range Status   12/19/2024 10 6 - 20 mg/dL Final     Creatinine   Date Value Ref Range Status   12/19/2024 0.7 0.5 - 1.4 mg/dL Final     Calcium   Date Value Ref Range Status   12/19/2024 9.3 8.7 - 10.5 mg/dL Final     Total Protein   Date Value Ref Range Status   06/26/2024 7.4 6.0 - 8.4 g/dL Final     Albumin   Date Value Ref Range Status   06/26/2024 3.7 3.5 - 5.2 g/dL Final     Total Bilirubin   Date Value Ref Range Status   06/26/2024 0.5 0.1 - 1.0 mg/dL Final     Comment:     For infants and newborns, interpretation of results should be based  on gestational age, weight and in agreement with clinical  observations.    Premature Infant recommended reference ranges:  Up to 24 hours.............<8.0 mg/dL  Up to 48 hours............<12.0 mg/dL  3-5 days..................<15.0 mg/dL  6-29 days.................<15.0 mg/dL       Alkaline Phosphatase   Date Value Ref Range Status   06/26/2024 57 55 - 135 U/L Final     AST   Date Value Ref Range Status   06/26/2024 25 10 - 40 U/L Final     Comment:     *Result may be interfered by visible hemolysis     ALT   Date Value Ref Range Status   06/26/2024 11 10 - 44 U/L Final     Anion Gap   Date Value Ref Range Status   12/19/2024 7 (L) 8 - 16 mmol/L Final     eGFR if    Date Value Ref Range Status   06/28/2022 >60.0 >60 mL/min/1.73 m^2 Final     eGFR if non    Date Value Ref Range Status   06/28/2022 >60.0 >60 mL/min/1.73 m^2 Final     Comment:     Calculation used to obtain the estimated glomerular filtration  rate (eGFR) is the CKD-EPI equation.        Lab Results   Component Value Date    WBC 5.94 06/26/2024    HGB 12.1 06/26/2024    HCT 37.3 06/26/2024    MCV 82 06/26/2024     06/26/2024     Lab Results   Component Value Date    CHOL 192 03/18/2025    CHOL 215 (H) 02/29/2024    CHOL 212 (H) 08/14/2023     Lab Results   Component Value Date    HDL 35 (L) 03/18/2025     HDL 41 02/29/2024    HDL 47 08/14/2023     Lab Results   Component Value Date    LDLCALC 123.6 03/18/2025    LDLCALC 142.4 02/29/2024    LDLCALC 129.6 08/14/2023     Lab Results   Component Value Date    TRIG 167 (H) 03/18/2025    TRIG 158 (H) 02/29/2024    TRIG 177 (H) 08/14/2023     Lab Results   Component Value Date    CHOLHDL 18.2 (L) 03/18/2025    CHOLHDL 19.1 (L) 02/29/2024    CHOLHDL 22.2 08/14/2023     Lab Results   Component Value Date    TSH 0.819 08/14/2023       ASSESSMENT/PLAN     King Botello is a 39 y.o. female who was seen today for cough.    Patients rapid strep was negative.  Symptoms consistent with acute pharyngitis.   I have concerns for possible development of upper respiratory bacterial infection due to the patient's progressive worsening and prolonged duration of symptoms lasting greater than two weeks.  Will start patient on amoxicillin 500 mg po q12hr for 10 days and magic mouth wash for symptom management.   Patient was counseled on when and how to seek emergent care.     Diagnoses and all orders for this visit:    Pharyngitis, unspecified etiology  -     POCT Rapid Strep A    Other orders  -     amoxicillin (AMOXIL) 500 MG capsule; Take 1 capsule (500 mg total) by mouth every 12 (twelve) hours. for 10 days  -     Discontinue: diphenhydrAMINE-aluminum-magnesium hydroxide-simethicone-LIDOcaine viscous HCl 2%; Swish and spit 15 mLs every 4 (four) hours as needed (throat pain).        Anna Robison PA-C   Department of Internal Medicine - [facility]   Shelli SANTIAGOS  [facility]  8:52 AM

## 2025-03-26 NOTE — PROGRESS NOTES
Patient ID: King Botello is a 39 y.o. White female    Subjective  Chief Complaint: patient presents for medical weight loss management.    Contraindications to GLP-1 receptor agonist therapy:   Denies personal or family history of MTC and personal history of MEN2     Co-morbidities: none    History of weight loss therapy:  Pt denies previous weight management medication use.     Weight loss history:  Starting weight:    3/21/2025   Recent Readings    Weight (lbs) 191 lb    BMI 36.09 BMI      Objective  Lab Results   Component Value Date     12/19/2024     11/14/2024     10/24/2024     Lab Results   Component Value Date    K 4.4 12/19/2024    K 4.7 11/14/2024    K 3.7 10/24/2024     Lab Results   Component Value Date     12/19/2024     11/14/2024     10/24/2024     Lab Results   Component Value Date    CO2 23 12/19/2024    CO2 26 11/14/2024    CO2 28 10/24/2024     Lab Results   Component Value Date    BUN 10 12/19/2024    BUN 8 11/14/2024    BUN 9 10/24/2024     Lab Results   Component Value Date    GLU 81 12/19/2024     11/14/2024     10/24/2024     Lab Results   Component Value Date    CALCIUM 9.3 12/19/2024    CALCIUM 9.5 11/14/2024    CALCIUM 9.3 10/24/2024     Lab Results   Component Value Date    PROT 7.4 06/26/2024    PROT 6.3 04/30/2024    PROT 7.4 01/28/2024     Lab Results   Component Value Date    ALBUMIN 3.7 06/26/2024    ALBUMIN 3.5 04/30/2024    ALBUMIN 3.5 01/28/2024     Lab Results   Component Value Date    BILITOT 0.5 06/26/2024    BILITOT 0.3 04/30/2024    BILITOT 0.4 01/28/2024     Lab Results   Component Value Date    AST 25 06/26/2024    AST 15 04/30/2024    AST 21 01/28/2024     Lab Results   Component Value Date    ALT 11 06/26/2024    ALT 10 04/30/2024    ALT 9 (L) 01/28/2024     Lab Results   Component Value Date    ANIONGAP 7 (L) 12/19/2024    ANIONGAP 8 11/14/2024    ANIONGAP 8 10/24/2024     Lab Results   Component Value Date     CREATININE 0.7 12/19/2024    CREATININE 0.7 11/14/2024    CREATININE 0.6 10/24/2024     Lab Results   Component Value Date    EGFRNORACEVR >60.0 12/19/2024    EGFRNORACEVR >60.0 11/14/2024    EGFRNORACEVR >60.0 10/24/2024     Assessment/Plan  -Pt qualifies for GLP-1 RA therapy based on BMI greater than or equal to 30 kg/m2  - Initiate Wegovy 0.25 mg once weekly for 1 month  - Then increase to Wegovy 0.5 mg once weekly for 1 month  - Then increase to Wegovy 1 mg once weekly  - RTC in 3 months for follow-up evaluation    Patient consented to pharmacist management via collaborative practice.

## 2025-03-27 ENCOUNTER — OFFICE VISIT (OUTPATIENT)
Dept: INTERNAL MEDICINE | Facility: CLINIC | Age: 40
End: 2025-03-27
Payer: COMMERCIAL

## 2025-03-27 ENCOUNTER — ANTI-COAG VISIT (OUTPATIENT)
Dept: CARDIOLOGY | Facility: CLINIC | Age: 40
End: 2025-03-27
Payer: COMMERCIAL

## 2025-03-27 ENCOUNTER — PATIENT MESSAGE (OUTPATIENT)
Dept: INTERNAL MEDICINE | Facility: CLINIC | Age: 40
End: 2025-03-27

## 2025-03-27 DIAGNOSIS — E66.812 OBESITY, CLASS II, BMI 35-39.9: Primary | ICD-10-CM

## 2025-03-27 DIAGNOSIS — Z95.2 H/O MITRAL VALVE REPLACEMENT WITH MECHANICAL VALVE: ICD-10-CM

## 2025-03-27 DIAGNOSIS — Z79.01 LONG TERM (CURRENT) USE OF ANTICOAGULANTS: Primary | ICD-10-CM

## 2025-03-27 LAB — INR PPP: 1.6

## 2025-03-27 PROCEDURE — 93793 ANTICOAG MGMT PT WARFARIN: CPT | Mod: S$GLB,,,

## 2025-03-27 RX ORDER — SEMAGLUTIDE 1 MG/.5ML
1 INJECTION, SOLUTION SUBCUTANEOUS
Qty: 2 ML | Refills: 0 | Status: SHIPPED | OUTPATIENT
Start: 2025-03-27

## 2025-03-27 RX ORDER — SEMAGLUTIDE 0.25 MG/.5ML
0.25 INJECTION, SOLUTION SUBCUTANEOUS
Qty: 2 ML | Refills: 0 | Status: SHIPPED | OUTPATIENT
Start: 2025-03-27

## 2025-03-27 RX ORDER — SEMAGLUTIDE 0.5 MG/.5ML
0.5 INJECTION, SOLUTION SUBCUTANEOUS
Qty: 2 ML | Refills: 0 | Status: SHIPPED | OUTPATIENT
Start: 2025-03-27

## 2025-03-27 NOTE — PROGRESS NOTES
[facility]    03/27/2025    King Botello (39 y.o.) is followed by the [facility].      Assessment/Plan:    King Botello presents with a subtherapeutic  INR. Goal INR: 2.5-3.5    Lab Results   Component Value Date    INR 1.6 03/27/2025    INR 2.8 03/20/2025    INR 1.7 03/13/2025       Assessment of patient findings per MA/LPN and chart review:   The following significant findings were found:   Pt questioned and reported she believes she took 4 mg on 3/22/25 and took the correct doses aside from that. She had nausea and took Phenagran for it last week. She took 2 doses of Mucinex and tessalon pearls this week for a cough. She drank 3 alcoholic beverages on 3/24/25 and was told that she is okay to start Wegovy by her speciality pharmacy. It will take about a week for her to pick it up and and she will call when she does  the medicine     Recommendation for patient's warfarin regimen:   Due to subtherapeutic INR, patient was instructed to take a booster dose today. Patient to also increase their weekly dose.    Recommended repeat INR in 1 week      Laine Glynn, PharmD, BCPS  Clinical Pharmacist - [facility]  Preferred Contact: Secure Messaging or In Basket Message       Patient called in regards to her sleep study results  Please call patient back ASAP

## 2025-04-03 ENCOUNTER — ANTI-COAG VISIT (OUTPATIENT)
Dept: CARDIOLOGY | Facility: CLINIC | Age: 40
End: 2025-04-03
Payer: COMMERCIAL

## 2025-04-03 DIAGNOSIS — Z79.01 LONG TERM (CURRENT) USE OF ANTICOAGULANTS: Primary | ICD-10-CM

## 2025-04-03 DIAGNOSIS — Z95.2 H/O MITRAL VALVE REPLACEMENT WITH MECHANICAL VALVE: ICD-10-CM

## 2025-04-03 LAB — INR PPP: 2

## 2025-04-03 PROCEDURE — 93793 ANTICOAG MGMT PT WARFARIN: CPT | Mod: S$GLB,,,

## 2025-04-03 NOTE — PROGRESS NOTES
[facility]    04/03/2025    King Botello (39 y.o.) is followed by the [facility].      Assessment/Plan:    King Botello presents with a subtherapeutic  INR. Goal INR: 2.5-3.5    Lab Results   Component Value Date    INR 2.0 04/03/2025    INR 1.6 03/27/2025    INR 2.8 03/20/2025       Assessment of patient findings per MA/LPN and chart review:   The following significant findings were found:   None    Recommendation for patient's warfarin regimen:   Due to subtherapeutic INR, patient was instructed to take a booster dose today. Patient to also increase their weekly dose.    Recommended repeat INR in 1 week      Laine Glynn PharmD, BCPS  Clinical Pharmacist - [facility]  Preferred Contact: Secure Messaging or In Basket Message

## 2025-04-08 ENCOUNTER — PATIENT MESSAGE (OUTPATIENT)
Dept: INTERNAL MEDICINE | Facility: CLINIC | Age: 40
End: 2025-04-08
Payer: COMMERCIAL

## 2025-04-09 NOTE — TELEPHONE ENCOUNTER
Override approved by Dr. Hansen's MA yesterday for 4:00 pm on 4/10/25. Spot is held for this patient.

## 2025-04-10 ENCOUNTER — HOSPITAL ENCOUNTER (OUTPATIENT)
Dept: RADIOLOGY | Facility: HOSPITAL | Age: 40
Discharge: HOME | End: 2025-04-10
Attending: FAMILY MEDICINE
Payer: COMMERCIAL

## 2025-04-10 ENCOUNTER — OFFICE VISIT (OUTPATIENT)
Dept: INTERNAL MEDICINE | Facility: CLINIC | Age: 40
End: 2025-04-10
Attending: FAMILY MEDICINE
Payer: COMMERCIAL

## 2025-04-10 ENCOUNTER — ANTI-COAG VISIT (OUTPATIENT)
Dept: CARDIOLOGY | Facility: CLINIC | Age: 40
End: 2025-04-10
Payer: COMMERCIAL

## 2025-04-10 VITALS
SYSTOLIC BLOOD PRESSURE: 108 MMHG | WEIGHT: 188.06 LBS | HEART RATE: 86 BPM | DIASTOLIC BLOOD PRESSURE: 76 MMHG | HEIGHT: 61 IN | BODY MASS INDEX: 35.5 KG/M2 | OXYGEN SATURATION: 97 %

## 2025-04-10 DIAGNOSIS — Z95.2 H/O MITRAL VALVE REPLACEMENT WITH MECHANICAL VALVE: ICD-10-CM

## 2025-04-10 DIAGNOSIS — I48.0 PAROXYSMAL ATRIAL FIBRILLATION: ICD-10-CM

## 2025-04-10 DIAGNOSIS — R53.83 FATIGUE, UNSPECIFIED TYPE: ICD-10-CM

## 2025-04-10 DIAGNOSIS — R05.9 COUGH, UNSPECIFIED TYPE: Primary | ICD-10-CM

## 2025-04-10 DIAGNOSIS — R41.89 COGNITIVE CHANGE: ICD-10-CM

## 2025-04-10 DIAGNOSIS — Z79.01 LONG TERM (CURRENT) USE OF ANTICOAGULANTS: Primary | ICD-10-CM

## 2025-04-10 DIAGNOSIS — I50.32 CHRONIC DIASTOLIC HEART FAILURE: ICD-10-CM

## 2025-04-10 DIAGNOSIS — Z79.01 LONG TERM (CURRENT) USE OF ANTICOAGULANTS: ICD-10-CM

## 2025-04-10 DIAGNOSIS — R05.9 COUGH, UNSPECIFIED TYPE: ICD-10-CM

## 2025-04-10 DIAGNOSIS — I48.4 ATYPICAL ATRIAL FLUTTER: ICD-10-CM

## 2025-04-10 LAB — INR PPP: 2.4

## 2025-04-10 PROCEDURE — 71046 X-RAY EXAM CHEST 2 VIEWS: CPT | Mod: TC

## 2025-04-10 PROCEDURE — 99999 PR PBB SHADOW E&M-EST. PATIENT-LVL V: CPT | Mod: PBBFAC,,, | Performed by: FAMILY MEDICINE

## 2025-04-10 PROCEDURE — 71046 X-RAY EXAM CHEST 2 VIEWS: CPT | Mod: 26,,, | Performed by: RADIOLOGY

## 2025-04-10 RX ORDER — ALBUTEROL SULFATE 90 UG/1
2 INHALANT RESPIRATORY (INHALATION) EVERY 6 HOURS PRN
Qty: 18 G | Refills: 11 | Status: SHIPPED | OUTPATIENT
Start: 2025-04-10

## 2025-04-10 NOTE — PROGRESS NOTES
Subjective:       Patient ID: King Botello is a 39 y.o. female.    Chief Complaint: Cough    Same day urgent care presents - had flu about the time she saw me for physical examination.  Persistent.  Went to urgent Care prescribed antibiotics.  Continuing cough for 3 weeks now.  No shortness a breath.  Does complain of fatigue.  No chest pain fever.    History of heart valve replacement.  On anticoagulation.  No symptoms to suggest any decompensation.    Multiple medications including Lanoxin, Tikosyn and metoprolol that could cause fatigue.    Reports brain fog chronically after operation.  No other focal neurologic deficits.        Review of Systems   Constitutional:  Positive for fatigue. Negative for appetite change, chills, diaphoresis and fever.   HENT:  Negative for congestion, postnasal drip, rhinorrhea, sore throat and trouble swallowing.    Eyes:  Negative for visual disturbance.   Respiratory:  Positive for cough. Negative for choking, chest tightness, shortness of breath and wheezing.    Cardiovascular:  Negative for chest pain and leg swelling.   Gastrointestinal:  Negative for abdominal distention, abdominal pain, diarrhea, nausea and vomiting.   Genitourinary:  Negative for difficulty urinating and hematuria.   Musculoskeletal:  Negative for arthralgias and myalgias.   Skin:  Negative for rash.   Neurological:  Negative for weakness, light-headedness and headaches.   Hematological:  Does not bruise/bleed easily.   Psychiatric/Behavioral:  Positive for decreased concentration. Negative for behavioral problems, confusion and dysphoric mood.        Objective:      Physical Exam  Vitals and nursing note reviewed.   Constitutional:       General: She is not in acute distress.     Appearance: She is well-developed. She is not diaphoretic.   HENT:      Head: Normocephalic.      Right Ear: Tympanic membrane, ear canal and external ear normal.      Left Ear: Tympanic membrane, ear canal and external ear  normal.      Nose: Nose normal.      Mouth/Throat:      Pharynx: Oropharynx is clear. No oropharyngeal exudate.   Eyes:      General: No scleral icterus.        Right eye: No discharge.         Left eye: No discharge.      Conjunctiva/sclera: Conjunctivae normal.   Neck:      Thyroid: No thyromegaly.   Cardiovascular:      Rate and Rhythm: Normal rate and regular rhythm.      Heart sounds: Normal heart sounds. No murmur heard.     No friction rub. No gallop.      Comments: click  Pulmonary:      Effort: Pulmonary effort is normal. No respiratory distress.      Breath sounds: Normal breath sounds. No wheezing or rales.   Chest:      Chest wall: No tenderness.   Abdominal:      Palpations: Abdomen is soft.      Tenderness: There is no abdominal tenderness.   Musculoskeletal:      Cervical back: Normal range of motion and neck supple.   Lymphadenopathy:      Cervical: No cervical adenopathy.   Skin:     General: Skin is warm and dry.      Findings: No rash.   Neurological:      Mental Status: She is alert and oriented to person, place, and time.         Assessment:       1. Cough, unspecified type    2. Fatigue, unspecified type    3. Chronic diastolic heart failure    4. H/O mitral valve replacement with mechanical valve    5. Atypical atrial flutter    6. Paroxysmal atrial fibrillation    7. Long term (current) use of anticoagulants    8. Cognitive change        Plan:     Medication List with Changes/Refills   New Medications    ALBUTEROL (PROVENTIL/VENTOLIN HFA) 90 MCG/ACTUATION INHALER    Inhale 2 puffs into the lungs every 6 (six) hours as needed for Wheezing or Shortness of Breath. Rescue   Current Medications    ASPIRIN (ECOTRIN) 81 MG EC TABLET    Take 1 tablet (81 mg total) by mouth once daily.    DIGOXIN (LANOXIN) 250 MCG TABLET    Take 1 tablet (250 mcg total) by mouth once daily.    DOFETILIDE (TIKOSYN) 250 MCG CAP    Take 1 capsule (250 mcg total) by mouth every 12 (twelve) hours.    METOPROLOL SUCCINATE  (TOPROL-XL) 100 MG 24 HR TABLET    Take 1 tablet (100 mg total) by mouth 2 (two) times daily.    METOPROLOL TARTRATE (LOPRESSOR) 25 MG TABLET    Take 1 tablet (25 mg total) by mouth 2 (two) times daily as needed (Palpitations).    PROMETHAZINE (PHENERGAN) 12.5 MG TAB    Take 1 tablet (12.5 mg total) by mouth 3 (three) times daily as needed (nausea).    SEMAGLUTIDE, WEIGHT LOSS, (WEGOVY) 0.25 MG/0.5 ML PNIJ    Inject 0.25 mg into the skin every 7 days.    SEMAGLUTIDE, WEIGHT LOSS, (WEGOVY) 0.5 MG/0.5 ML PNIJ    Inject 0.5 mg into the skin every 7 days.    SEMAGLUTIDE, WEIGHT LOSS, (WEGOVY) 1 MG/0.5 ML PNIJ    Inject 1 mg into the skin every 7 days.    WARFARIN (COUMADIN) 4 MG TABLET    Take 1-2 tablets (4-8 mg total) by mouth Daily. As directed by the [facility]   Discontinued Medications    MAGIC MOUTHWASH DIPHEN/ANTAC/LIDOC    Swish and spit 15 mL every 4 hours as needed for throat pain     1. Cough, unspecified type  -     X-Ray Chest PA And Lateral; Future; Expected date: 04/10/2025  -     albuterol (PROVENTIL/VENTOLIN HFA) 90 mcg/actuation inhaler; Inhale 2 puffs into the lungs every 6 (six) hours as needed for Wheezing or Shortness of Breath. Rescue  Dispense: 18 g; Refill: 11    2. Fatigue, unspecified type    3. Chronic diastolic heart failure  Overview:  -[id number] d/t MVR  -followed by cardiology  -s/p MVR with a 25mm [employer]omed[employer] standard mechanical prosthesis plus TVr 1/8/2024 complicated by complete heart block requiring PPM 1/17/2024 despite a robust junctional escape rhythm   -EF 55/70% 2/26/2024 echo      4. H/O mitral valve replacement with mechanical valve  Overview:  -hx MV repair 2003  -s/p MVR with a 25mm [employer]omed[employer] standard mechanical prosthesis plus TVr 1/8/2024 complicated by complete heart block requiring PPM 1/17/2024 despite a robust junctional escape rhythm  -long term AC  -followed by cardiology      5. Atypical atrial flutter  Overview:  -followed by EPS    -Medtronic dual-chamber  pacemaker with left bundle branch area pacing (LBBAP) lead, implanted 1/17/2024 in setting of postop atrial flutter and complete heart block with junctional escape of ~50 bpm with persistent AV dissociation following mitral valve replacement with a mechanical mitral valve and a tricuspid valve annuloplasty on 1/10/2024       6. Paroxysmal atrial fibrillation  Overview:  -followed by EPS    -Medtronic dual-chamber pacemaker with left bundle branch area pacing (LBBAP) lead, implanted 1/17/2024 in setting of postop atrial flutter and complete heart block with junctional escape of ~50 bpm with persistent AV dissociation following mitral valve replacement with a mechanical mitral valve and a tricuspid valve annuloplasty on 1/10/2024       7. Long term (current) use of anticoagulants  Overview:  -followed by cardiology  -s/p MVR with a 25mm Carbomedics standard mechanical prosthesis plus TVr 1/8/2024 complicated by complete heart block requiring PPM 1/17/2024 despite a robust junctional escape rhythm      8. Cognitive change  -     Ambulatory referral/consult to Neurology; Future; Expected date: 04/17/2025  -     Vitamin B12; Future; Expected date: 04/10/2025  -     Vitamin B1; Future; Expected date: 04/10/2025  -     TSH; Future; Expected date: 04/10/2025  -     Treponema Pallidium Antibodies IgG, IgM; Future; Expected date: 04/10/2025      See meds, orders, follow up, routing and instructions sections of encounter and AVS. Discussed with patient and provided on AVS.    Lab Results   Component Value Date     12/19/2024    K 4.4 12/19/2024     12/19/2024    BUN 10 12/19/2024    CREATININE 0.7 12/19/2024    GLU 81 12/19/2024    HGBA1C 5.0 02/29/2024    MG 2.0 05/02/2024    AST 25 06/26/2024    ALT 11 06/26/2024    ALBUMIN 3.7 06/26/2024    PROT 7.4 06/26/2024    BILITOT 0.5 06/26/2024    CHOL 192 03/18/2025    HDL 35 (L) 03/18/2025    LDLCALC 123.6 03/18/2025    TRIG 167 (H) 03/18/2025    WBC 5.94 06/26/2024     HGB 12.1 06/26/2024    HCT 37.3 06/26/2024    HCT 20 (LL) 01/09/2024     06/26/2024    TSH 0.819 08/14/2023    BNP 66 12/19/2024    URICACID 5.0 04/15/2010

## 2025-04-10 NOTE — PROGRESS NOTES
[facility]    04/10/2025    King Botello (39 y.o.) is followed by the [facility].      Assessment/Plan:    King Botello presents with a subtherapeutic  INR. Goal INR: 2.5-3.5    Lab Results   Component Value Date    INR 2.4 04/10/2025    INR 2.0 04/03/2025    INR 1.6 03/27/2025       Assessment of patient findings per MA/LPN and chart review:   The following significant findings were found:   none    Recommendation for patient's warfarin regimen:   Due to subtherapeutic INR, patient was instructed to take a booster dose today. Patient to also increase their weekly dose.    Recommended repeat INR in 1 week      Laine Glynn PharmD, BCPS  Clinical Pharmacist - [facility]  Preferred Contact: Secure Messaging or In Basket Message

## 2025-04-13 ENCOUNTER — RESULTS FOLLOW-UP (OUTPATIENT)
Dept: INTERNAL MEDICINE | Facility: CLINIC | Age: 40
End: 2025-04-13

## 2025-04-14 ENCOUNTER — PATIENT MESSAGE (OUTPATIENT)
Dept: INTERNAL MEDICINE | Facility: CLINIC | Age: 40
End: 2025-04-14
Payer: COMMERCIAL

## 2025-04-14 ENCOUNTER — TELEPHONE (OUTPATIENT)
Dept: HEMATOLOGY/ONCOLOGY | Facility: CLINIC | Age: 40
End: 2025-04-14
Payer: COMMERCIAL

## 2025-04-14 DIAGNOSIS — Z95.2 H/O MITRAL VALVE REPLACEMENT WITH MECHANICAL VALVE: Primary | ICD-10-CM

## 2025-04-17 ENCOUNTER — CLINICAL SUPPORT (OUTPATIENT)
Dept: CARDIOLOGY | Facility: HOSPITAL | Age: 40
End: 2025-04-17
Attending: STUDENT IN AN ORGANIZED HEALTH CARE EDUCATION/TRAINING PROGRAM
Payer: COMMERCIAL

## 2025-04-17 ENCOUNTER — LAB VISIT (OUTPATIENT)
Dept: LAB | Facility: HOSPITAL | Age: 40
End: 2025-04-17
Payer: COMMERCIAL

## 2025-04-17 ENCOUNTER — ANTI-COAG VISIT (OUTPATIENT)
Dept: CARDIOLOGY | Facility: CLINIC | Age: 40
End: 2025-04-17
Payer: COMMERCIAL

## 2025-04-17 ENCOUNTER — CLINICAL SUPPORT (OUTPATIENT)
Dept: CARDIOLOGY | Facility: HOSPITAL | Age: 40
End: 2025-04-17
Payer: COMMERCIAL

## 2025-04-17 DIAGNOSIS — Z79.01 LONG TERM (CURRENT) USE OF ANTICOAGULANTS: Primary | ICD-10-CM

## 2025-04-17 DIAGNOSIS — Z95.0 PRESENCE OF CARDIAC PACEMAKER: ICD-10-CM

## 2025-04-17 DIAGNOSIS — I48.92 UNSPECIFIED ATRIAL FLUTTER: ICD-10-CM

## 2025-04-17 DIAGNOSIS — R41.89 COGNITIVE CHANGE: ICD-10-CM

## 2025-04-17 DIAGNOSIS — Z79.01 LONG TERM (CURRENT) USE OF ANTICOAGULANTS: ICD-10-CM

## 2025-04-17 DIAGNOSIS — Z95.2 H/O MITRAL VALVE REPLACEMENT WITH MECHANICAL VALVE: ICD-10-CM

## 2025-04-17 LAB
INR PPP: 3.8 (ref 0.8–1.2)
PROTHROMBIN TIME: 37.9 SECONDS (ref 9–12.5)
T PALLIDUM IGG+IGM SER QL: NORMAL
TSH SERPL-ACNC: 0.4 UIU/ML (ref 0.4–4)
VIT B12 SERPL-MCNC: 423 PG/ML (ref 210–950)

## 2025-04-17 PROCEDURE — 93296 REM INTERROG EVL PM/IDS: CPT | Performed by: STUDENT IN AN ORGANIZED HEALTH CARE EDUCATION/TRAINING PROGRAM

## 2025-04-17 PROCEDURE — 84443 ASSAY THYROID STIM HORMONE: CPT

## 2025-04-17 PROCEDURE — 93793 ANTICOAG MGMT PT WARFARIN: CPT | Mod: S$GLB,,,

## 2025-04-17 PROCEDURE — 82607 VITAMIN B-12: CPT

## 2025-04-17 PROCEDURE — 85610 PROTHROMBIN TIME: CPT

## 2025-04-17 PROCEDURE — 84425 ASSAY OF VITAMIN B-1: CPT

## 2025-04-17 PROCEDURE — 86593 SYPHILIS TEST NON-TREP QUANT: CPT

## 2025-04-17 PROCEDURE — 93294 REM INTERROG EVL PM/LDLS PM: CPT | Mod: ,,, | Performed by: STUDENT IN AN ORGANIZED HEALTH CARE EDUCATION/TRAINING PROGRAM

## 2025-04-17 PROCEDURE — 36415 COLL VENOUS BLD VENIPUNCTURE: CPT

## 2025-04-17 NOTE — PROGRESS NOTES
[facility]    04/17/2025    King Botello (39 y.o.) is followed by the [facility].      Assessment/Plan:    King Botello presents with a supratherapeutic INR. Goal INR: 2.5-3.5    Lab Results   Component Value Date    INR 3.8 (H) 04/17/2025    INR 2.4 04/10/2025    INR 2.0 04/03/2025       Assessment of patient findings per MA/LPN and chart review:   The following significant findings were found:   none    Recommendation for patient's warfarin regimen:   Due to supratherapeutic INR, patient was instructed to take a reduced warfarin dose today. Patient to resume their normal weekly dose.    Recommended repeat INR in 1 week      Laine Glynn, PharmD, BCPS  Clinical Pharmacist - [facility]  Preferred Contact: Secure Messaging or In Basket Message

## 2025-04-23 ENCOUNTER — PATIENT MESSAGE (OUTPATIENT)
Dept: INTERNAL MEDICINE | Facility: CLINIC | Age: 40
End: 2025-04-23
Payer: COMMERCIAL

## 2025-04-23 LAB — INR PPP: 3.7

## 2025-04-24 ENCOUNTER — OFFICE VISIT (OUTPATIENT)
Dept: INTERNAL MEDICINE | Facility: CLINIC | Age: 40
End: 2025-04-24
Payer: COMMERCIAL

## 2025-04-24 ENCOUNTER — PATIENT MESSAGE (OUTPATIENT)
Dept: INTERNAL MEDICINE | Facility: CLINIC | Age: 40
End: 2025-04-24
Payer: COMMERCIAL

## 2025-04-24 ENCOUNTER — LAB VISIT (OUTPATIENT)
Dept: LAB | Facility: HOSPITAL | Age: 40
End: 2025-04-24
Payer: COMMERCIAL

## 2025-04-24 ENCOUNTER — ANTI-COAG VISIT (OUTPATIENT)
Dept: CARDIOLOGY | Facility: CLINIC | Age: 40
End: 2025-04-24
Payer: COMMERCIAL

## 2025-04-24 ENCOUNTER — PATIENT MESSAGE (OUTPATIENT)
Dept: CARDIOLOGY | Facility: CLINIC | Age: 40
End: 2025-04-24
Payer: COMMERCIAL

## 2025-04-24 DIAGNOSIS — Z79.01 LONG TERM (CURRENT) USE OF ANTICOAGULANTS: ICD-10-CM

## 2025-04-24 DIAGNOSIS — K62.5 BRBPR (BRIGHT RED BLOOD PER RECTUM): Primary | ICD-10-CM

## 2025-04-24 DIAGNOSIS — K62.5 BRIGHT RED BLOOD PER RECTUM: Primary | ICD-10-CM

## 2025-04-24 DIAGNOSIS — Z79.01 LONG TERM (CURRENT) USE OF ANTICOAGULANTS: Primary | ICD-10-CM

## 2025-04-24 DIAGNOSIS — K62.5 BRBPR (BRIGHT RED BLOOD PER RECTUM): ICD-10-CM

## 2025-04-24 DIAGNOSIS — Z95.2 H/O MITRAL VALVE REPLACEMENT WITH MECHANICAL VALVE: ICD-10-CM

## 2025-04-24 LAB
ABSOLUTE EOSINOPHIL (OHS): 0.11 K/UL
ABSOLUTE MONOCYTE (OHS): 0.83 K/UL (ref 0.3–1)
ABSOLUTE NEUTROPHIL COUNT (OHS): 7 K/UL (ref 1.8–7.7)
BASOPHILS # BLD AUTO: 0.06 K/UL
BASOPHILS NFR BLD AUTO: 0.6 %
ERYTHROCYTE [DISTWIDTH] IN BLOOD BY AUTOMATED COUNT: 16.1 % (ref 11.5–14.5)
HCT VFR BLD AUTO: 41.7 % (ref 37–48.5)
HGB BLD-MCNC: 13 GM/DL (ref 12–16)
IMM GRANULOCYTES # BLD AUTO: 0.03 K/UL (ref 0–0.04)
IMM GRANULOCYTES NFR BLD AUTO: 0.3 % (ref 0–0.5)
INR PPP: 2.7 (ref 0.8–1.2)
LYMPHOCYTES # BLD AUTO: 2.23 K/UL (ref 1–4.8)
MCH RBC QN AUTO: 26.7 PG (ref 27–31)
MCHC RBC AUTO-ENTMCNC: 31.2 G/DL (ref 32–36)
MCV RBC AUTO: 86 FL (ref 82–98)
NUCLEATED RBC (/100WBC) (OHS): 0 /100 WBC
PLATELET # BLD AUTO: 337 K/UL (ref 150–450)
PMV BLD AUTO: 9.6 FL (ref 9.2–12.9)
PROTHROMBIN TIME: 27.1 SECONDS (ref 9–12.5)
RBC # BLD AUTO: 4.87 M/UL (ref 4–5.4)
RELATIVE EOSINOPHIL (OHS): 1.1 %
RELATIVE LYMPHOCYTE (OHS): 21.7 % (ref 18–48)
RELATIVE MONOCYTE (OHS): 8.1 % (ref 4–15)
RELATIVE NEUTROPHIL (OHS): 68.2 % (ref 38–73)
WBC # BLD AUTO: 10.26 K/UL (ref 3.9–12.7)

## 2025-04-24 PROCEDURE — 36415 COLL VENOUS BLD VENIPUNCTURE: CPT

## 2025-04-24 PROCEDURE — 85610 PROTHROMBIN TIME: CPT

## 2025-04-24 PROCEDURE — 85025 COMPLETE CBC W/AUTO DIFF WBC: CPT

## 2025-04-24 NOTE — PROGRESS NOTES
INTERNAL MEDICINE URGENT VISIT NOTE    CHIEF COMPLAINT     Chief Complaint   Patient presents with    Rectal Bleeding       HPI     King Botello is a 39 y.o. female who presents for an urgent visit today.    PCP is Mayco Hansen MD, patient is known to me.   This has an audio-visual virtual visit    Patient with history of mitral valve replacement with mechanical valve on chronic anticoagulation (Coumadin).  Also with history of PAF, pacemaker, chronic diastolic heart failure incomplete heart block presents with complaints of bright red blood per rectum for the past 2 days.  Reports seeing blood mixed in with stool with each bowel movement.  Bowel movements have been painless.  She does report some lower abdominal cramping with no blood in urine.  Denies irregular vaginal bleeding.  Reports seeing blood clot and blood mixed in with stool rather than just blood smearing toilet paper.  Has no history of external hemorrhoids, internal hemorrhoids, anal fissures.  Denies any rectal trauma or injury.  Denies chest pain, shortness of breath, fatigue, dizziness or lightheadedness.      Currently in [place] during this virtual visit.  Patient works at [employer].   Face time with patient is approximately 8 minutes.    Past Medical History:  Past Medical History:   Diagnosis Date    Abnormal Pap smear     Colposcopy    Acute postoperative anemia due to expected blood loss 2024    Atrial fibrillation and flutter 2024    Bradycardia 2020    Class 2 obesity due to excess calories with body mass index (BMI) of 35.0 to 35.9 in adult 2021    Hypertension, essential 2021    Junctional escape beats 2024    Menarche     Age of onset 11    Missed ab 2021    Mitral valve prolapse     Pre-existing essential hypertension during pregnancy, antepartum 2023    Previous  delivery affecting pregnancy, antepartum 2020    Primary hypertension  03/16/2021    S/P suction D&C 07/01/2021    Status post mitral valve annuloplasty 09/05/2017    Transient hyperglycemia post procedure 01/08/2024       Home Medications:  Prior to Admission medications    Medication Sig Start Date End Date Taking? Authorizing Provider   albuterol (PROVENTIL/VENTOLIN HFA) 90 mcg/actuation inhaler Inhale 2 puffs into the lungs every 6 (six) hours as needed for Wheezing or Shortness of Breath. Rescue 4/10/25   Mayco Hansen MD   aspirin (ECOTRIN) 81 MG EC tablet Take 1 tablet (81 mg total) by mouth once daily. 7/22/24 7/22/25  CRISTI Moore MD   digoxin (LANOXIN) 250 mcg tablet Take 1 tablet (250 mcg total) by mouth once daily. 12/12/24 12/12/25  Eugene Cordero MD   dofetilide (TIKOSYN) 250 MCG Cap Take 1 capsule (250 mcg total) by mouth every 12 (twelve) hours. 7/22/24 7/22/25  CRISTI Moore MD   metoprolol succinate (TOPROL-XL) 100 MG 24 hr tablet Take 1 tablet (100 mg total) by mouth 2 (two) times daily. 12/12/24 12/12/25  Eugene Cordero MD   metoprolol tartrate (LOPRESSOR) 25 MG tablet Take 1 tablet (25 mg total) by mouth 2 (two) times daily as needed (Palpitations). 7/22/24 7/22/25  CRISTI Moore MD   promethazine (PHENERGAN) 12.5 MG Tab Take 1 tablet (12.5 mg total) by mouth 3 (three) times daily as needed (nausea). 3/18/25   Mayco Hansen MD   semaglutide, weight loss, (WEGOVY) 0.25 mg/0.5 mL PnIj Inject 0.25 mg into the skin every 7 days. 3/27/25   Sangita Cabral MD semaglutide, weight loss, (WEGOVY) 0.5 mg/0.5 mL PnIj Inject 0.5 mg into the skin every 7 days. 3/27/25   Dauterive, Sangita S., MD   semaglutide, weight loss, (WEGOVY) 1 mg/0.5 mL PnIj Inject 1 mg into the skin every 7 days. 3/27/25   Sangita Cabral MD   warfarin (COUMADIN) 4 MG tablet Take 1-2 tablets (4-8 mg total) by mouth Daily. As directed by the [facility] 3/13/25 3/13/26  Eugene Cordero MD       Review of Systems:  Review of Systems   Constitutional:   Negative for chills and fever.   HENT:  Negative for sore throat and trouble swallowing.    Eyes:  Negative for visual disturbance.   Respiratory:  Negative for cough and shortness of breath.    Cardiovascular:  Negative for chest pain.   Gastrointestinal:  Positive for abdominal pain. Negative for constipation, diarrhea, nausea and vomiting.        Blood in stool   Genitourinary:  Negative for dysuria, flank pain, frequency and hematuria.   Musculoskeletal:  Negative for arthralgias, back pain, myalgias, neck pain and neck stiffness.   Skin:  Negative for rash.   Neurological:  Negative for dizziness, syncope, weakness and headaches.   Psychiatric/Behavioral:  Negative for confusion.        Health Maintainence:   Immunizations:  Health Maintenance         Date Due Completion Date    Pneumococcal Vaccines (Age 0-49) (1 of 2 - PCV) Never done ---    COVID-19 Vaccine (4 - 2024-25 season) 09/01/2024 5/7/2022    Mammogram 08/27/2025 8/27/2024    Hemoglobin A1c (Diabetic Prevention Screening) 02/28/2027 2/29/2024    Cervical Cancer Screening 09/29/2027 9/29/2022    TETANUS VACCINE 04/21/2033 4/21/2023    RSV Vaccine (Age 60+ and Pregnant patients) (1 - 1-dose 75+ series) 04/30/2059 ---             PHYSICAL EXAM     There were no vitals taken for this visit. VV    Physical Exam  Constitutional:       Comments: Healthy-appearing female in no acute distress or apparent pain.  She makes good eye contact, speaks in clear full sentences and ambulates with ease on this virtual visit.         LABS     Lab Results   Component Value Date    HGBA1C 5.0 02/29/2024     CMP  Sodium   Date Value Ref Range Status   12/19/2024 138 136 - 145 mmol/L Final     Potassium   Date Value Ref Range Status   12/19/2024 4.4 3.5 - 5.1 mmol/L Final     Chloride   Date Value Ref Range Status   12/19/2024 108 95 - 110 mmol/L Final     CO2   Date Value Ref Range Status   12/19/2024 23 23 - 29 mmol/L Final     Glucose   Date Value Ref Range Status    12/19/2024 81 70 - 110 mg/dL Final     BUN   Date Value Ref Range Status   12/19/2024 10 6 - 20 mg/dL Final     Creatinine   Date Value Ref Range Status   12/19/2024 0.7 0.5 - 1.4 mg/dL Final     Calcium   Date Value Ref Range Status   12/19/2024 9.3 8.7 - 10.5 mg/dL Final     Total Protein   Date Value Ref Range Status   06/26/2024 7.4 6.0 - 8.4 g/dL Final     Albumin   Date Value Ref Range Status   06/26/2024 3.7 3.5 - 5.2 g/dL Final     Total Bilirubin   Date Value Ref Range Status   06/26/2024 0.5 0.1 - 1.0 mg/dL Final     Comment:     For infants and newborns, interpretation of results should be based  on gestational age, weight and in agreement with clinical  observations.    Premature Infant recommended reference ranges:  Up to 24 hours.............<8.0 mg/dL  Up to 48 hours............<12.0 mg/dL  3-5 days..................<15.0 mg/dL  6-29 days.................<15.0 mg/dL       Alkaline Phosphatase   Date Value Ref Range Status   06/26/2024 57 55 - 135 U/L Final     AST   Date Value Ref Range Status   06/26/2024 25 10 - 40 U/L Final     Comment:     *Result may be interfered by visible hemolysis     ALT   Date Value Ref Range Status   06/26/2024 11 10 - 44 U/L Final     Anion Gap   Date Value Ref Range Status   12/19/2024 7 (L) 8 - 16 mmol/L Final     eGFR if    Date Value Ref Range Status   06/28/2022 >60.0 >60 mL/min/1.73 m^2 Final     eGFR if non    Date Value Ref Range Status   06/28/2022 >60.0 >60 mL/min/1.73 m^2 Final     Comment:     Calculation used to obtain the estimated glomerular filtration  rate (eGFR) is the CKD-EPI equation.        Lab Results   Component Value Date    WBC 10.26 04/24/2025    HGB 13.0 04/24/2025    HCT 41.7 04/24/2025    MCV 86 04/24/2025     04/24/2025     Lab Results   Component Value Date    CHOL 192 03/18/2025    CHOL 215 (H) 02/29/2024    CHOL 212 (H) 08/14/2023     Lab Results   Component Value Date    HDL 35 (L) 03/18/2025     HDL 41 02/29/2024    HDL 47 08/14/2023     Lab Results   Component Value Date    LDLCALC 123.6 03/18/2025    LDLCALC 142.4 02/29/2024    LDLCALC 129.6 08/14/2023     Lab Results   Component Value Date    TRIG 167 (H) 03/18/2025    TRIG 158 (H) 02/29/2024    TRIG 177 (H) 08/14/2023     Lab Results   Component Value Date    CHOLHDL 18.2 (L) 03/18/2025    CHOLHDL 19.1 (L) 02/29/2024    CHOLHDL 22.2 08/14/2023     Lab Results   Component Value Date    TSH 0.401 04/17/2025       ASSESSMENT/PLAN     King Botello is a 39 y.o. female     Diagnoses and all orders for this visit:  CBC obtained today is within normal limits.  INR is therapeutic.  Patient denies significant abdominal pain only occasional cramping.  Is having normal amount of bowel movements with no increased soft stools or rectal pain.  Recommend that she follow up with Colorectal surgery.  Would like expedited appointment rather than patient waiting until mid May as currently scheduled.  I will reach out to patient's PCP and cardiologist to get anticoagulation recommendations.    Bright red blood per rectum    . Patient was counseled on when and how to seek emergent care.       Anna Robison PA-C   Department of Internal Medicine - [facility]   3:24 PM     Answers submitted by the patient for this visit:  Abdominal Pain Questionnaire (Submitted on 4/24/2025)  Chief Complaint: Abdominal pain  Chronicity: new  Onset: yesterday  Onset quality: sudden  Frequency: constantly  Episode duration: 3 Days  Progression since onset: gradually worsening  Pain location: suprapubic region  Pain - numeric: 3/10  Pain quality: cramping, sharp  anorexia: No  belching: No  flatus: No  hematochezia: Yes  melena: No  weight loss: No  Aggravated by: movement  Relieved by: being still  Pain severity: mild  Treatments tried: nothing  abdominal surgery: No  colon cancer: No  Crohn's disease: No  gallstones: No  GERD: No  irritable bowel  syndrome: No  kidney stones: No  pancreatitis: No  PUD: No  ulcerative colitis: No  UTI: Yes      8:44 AM  Update:  Colorectal surgery appointment scheduled 2 weeks out  Will try to refer to GI for sooner appointment  Patient is still having bright red blood with bowel movements this morning  Denies pain, no fever  Per Dr. Ashley, cardiology patient should not discontinue warfarin because of her mechanical valve but it is okay for her to stop antiplatelet therapy (aspirin).  Patient has been holding aspirin and has been continuing warfarin as directed.   Will consider antibiotics for diverticulitis if patient develops abdominal pain or fever.  Will continue to follow closely  JOHN HUTSON.

## 2025-04-24 NOTE — PROGRESS NOTES
Pt states she did not hear back from PCP so she sent a PA she saw for the flu last month a message and got a call right back. PA had pt get CBC done and colorectal surgery referral was placed. Pt has already contacted them waiting for call back. PA advised pt to d/c her aspirin for now but pt wanted to know Coumadin Clinic input on this. She did already take her aspirin today. Pt also did not take her warfarin at all last night due to the high 3.7 so redose will be needed. Please advise. Thank you!

## 2025-04-24 NOTE — PROGRESS NOTES
[facility]    04/24/2025    King Botello (39 y.o.) is followed by the [facility].      Assessment/Plan:    King Botello presents with a supratherapeutic INR. Goal INR: 2.5-3.5    Lab Results   Component Value Date    INR 3.7 04/23/2025    INR 3.8 (H) 04/17/2025    INR 2.4 04/10/2025       Assessment of patient findings per MA/LPN and chart review:   The following significant findings were found:   Patient reported blood in her stool on 4/24  Recommended she reach out to her PCP or go to Urgent Care for evaluation    Recommendation for patient's warfarin regimen:   Due to supratherapeutic INR, patient was instructed to take a reduced warfarin dose today. Patient to also decrease their weekly dose.    Recommended repeat INR in 1 week      Laine Glynn, PharmD, BCPS  Clinical Pharmacist - [facility]  Preferred Contact: Secure Messaging or In Basket Message

## 2025-04-24 NOTE — Clinical Note
Hi Dr. Cordero and Dr. Hansen,  I just saw King virtually because she is having BRBPR and is on coumadin and ASA (mitral valve replacement).  This is a new problem for her. I have a referral scheduled for her to meet with CRS but this appointment is not until the middle of May.  I am trying to expedite this but in the meantime I wanted to get anticoagulation recommendations from both of you.  CBC is fine at this time and she has no abdominal pain but is reporting that blood is mixed in with her stool.  She is aware of ED prompts.  Please let me know if you have any additional recs. - JOHN HUTSON

## 2025-04-24 NOTE — TELEPHONE ENCOUNTER
Called pt and delivered message. Pt getting bloodwork done asap and agreed to virtual today at 3:30 pm.

## 2025-04-24 NOTE — PROGRESS NOTES
New findings have been reviewed. Would advise patient to also reach out to her cardiologist Dr. Cordero for further guidance on stopping aspirin given her extensive cardiac history. Okay to continue advisement of PA with holding aspirin. Patient is to let our clinic know of any further changes to plan after visit today with PA or speaking with Dr. Cordero.

## 2025-04-24 NOTE — TELEPHONE ENCOUNTER
Plan is to check CBC today and to attempts to expedite a colorectal surgery evaluation.  Patient not currently established with CRS according to chart check.  She should stop daily aspirin but will hold off on discontinuing Coumadin because of her cardiovascular comorbidities.  Very low threshold to report to emergency department if bleeding worsens or continues.  Will schedule virtual appointment with me later today for urgent evaluation.  Please keep me updated or let me know if patient has any questions or concerns.  JOHN HUTSON

## 2025-04-28 ENCOUNTER — TELEPHONE (OUTPATIENT)
Dept: ENDOSCOPY | Facility: HOSPITAL | Age: 40
End: 2025-04-28
Payer: COMMERCIAL

## 2025-04-28 ENCOUNTER — OFFICE VISIT (OUTPATIENT)
Dept: SURGERY | Facility: CLINIC | Age: 40
End: 2025-04-28
Payer: COMMERCIAL

## 2025-04-28 VITALS
SYSTOLIC BLOOD PRESSURE: 116 MMHG | BODY MASS INDEX: 35.5 KG/M2 | WEIGHT: 188 LBS | HEART RATE: 78 BPM | HEIGHT: 61 IN | WEIGHT: 188.69 LBS | BODY MASS INDEX: 35.63 KG/M2 | DIASTOLIC BLOOD PRESSURE: 75 MMHG | HEIGHT: 61 IN

## 2025-04-28 DIAGNOSIS — K62.5 BRIGHT RED BLOOD PER RECTUM: ICD-10-CM

## 2025-04-28 DIAGNOSIS — Z12.11 SCREEN FOR COLON CANCER: Primary | ICD-10-CM

## 2025-04-28 DIAGNOSIS — K62.5 RECTAL BLEEDING: Primary | ICD-10-CM

## 2025-04-28 PROCEDURE — 99999 PR PBB SHADOW E&M-EST. PATIENT-LVL V: CPT | Mod: PBBFAC,,, | Performed by: NURSE PRACTITIONER

## 2025-04-28 PROCEDURE — 3008F BODY MASS INDEX DOCD: CPT | Mod: CPTII,S$GLB,, | Performed by: NURSE PRACTITIONER

## 2025-04-28 PROCEDURE — 99204 OFFICE O/P NEW MOD 45 MIN: CPT | Mod: 25,S$GLB,, | Performed by: NURSE PRACTITIONER

## 2025-04-28 PROCEDURE — 46600 DIAGNOSTIC ANOSCOPY SPX: CPT | Mod: S$GLB,,, | Performed by: NURSE PRACTITIONER

## 2025-04-28 PROCEDURE — 1160F RVW MEDS BY RX/DR IN RCRD: CPT | Mod: CPTII,S$GLB,, | Performed by: NURSE PRACTITIONER

## 2025-04-28 PROCEDURE — 3078F DIAST BP <80 MM HG: CPT | Mod: CPTII,S$GLB,, | Performed by: NURSE PRACTITIONER

## 2025-04-28 PROCEDURE — 1159F MED LIST DOCD IN RCRD: CPT | Mod: CPTII,S$GLB,, | Performed by: NURSE PRACTITIONER

## 2025-04-28 PROCEDURE — 3074F SYST BP LT 130 MM HG: CPT | Mod: CPTII,S$GLB,, | Performed by: NURSE PRACTITIONER

## 2025-04-28 RX ORDER — HYDROCORTISONE ACETATE 25 MG/1
25 SUPPOSITORY RECTAL 2 TIMES DAILY
Qty: 24 SUPPOSITORY | Refills: 1 | Status: SHIPPED | OUTPATIENT
Start: 2025-04-28 | End: 2025-05-22

## 2025-04-28 RX ORDER — SODIUM, POTASSIUM,MAG SULFATES 17.5-3.13G
1 SOLUTION, RECONSTITUTED, ORAL ORAL DAILY
Qty: 1 KIT | Refills: 0 | Status: SHIPPED | OUTPATIENT
Start: 2025-06-14 | End: 2025-06-16

## 2025-04-28 NOTE — TELEPHONE ENCOUNTER
Referral for procedure from [facility]      Spoke to King to schedule procedure(s) Colonoscopy       Physician to perform procedure(s) Dr. CRISTI Valencia  Date of Procedure (s) 6/16/25  Arrival Time 7:00 AM  Time of Procedure(s) 8:00 AM   Location of Procedure(s) [place] 2nd Floor  Type of Rx Prep sent to patient: Suprep  Instructions provided to patient via Copy in hand    Patient was informed on the following information and verbalized understanding. Screening questionnaire reviewed with patient and complete. If procedure requires anesthesia, a responsible adult needs to be present to accompany the patient home, patient cannot drive after receiving anesthesia. Appointment details are tentative, especially check-in time. Patient will receive a prep-op call 7 days prior to confirm check-in time for procedure. If applicable the patient should contact their pharmacy to verify Rx for procedure prep is ready for pick-up. Patient was advised to call the scheduling department at [phone] if pharmacy states no Rx is available. Patient was advised to call the endoscopy scheduling department if any questions or concerns arise.       Endoscopy Scheduling Department

## 2025-04-28 NOTE — PROGRESS NOTES
CRS Office Visit History and Physical    Referring Md:   Anna Robison, [place]  [address] [place] [address]    SUBJECTIVE:     Chief Complaint: rectal bleeding    History of Present Illness:  The patient is new patient to this practice.   Course is as follows:  Patient is a 39 y.o. female history of mitral valve replacement with mechanical valve on chronic anticoagulation (Coumadin).   Also with history of PAF, pacemaker, chronic diastolic heart failure incomplete heart block presents with painless rectal bleeding with bowel movement.  Symptoms were present for a few occurrences. None noted in past 3 days.   Was having some abdominal cramps during this time. Thought to be period-like cramps  Has tried nothing.  Associated bleeding: yes  Previous anorectal procedures: No  denies straining/prolonged time on toilet with bowel movements.  is not currently taking fiber supplement or stool softener  Having 1-2 bm/day of loose stools. Alternating diarrhea/constipation.   Blood thinners: Yes coumadin  GLP: Yes    Last Colonoscopy: none  Family history of colorectal cancer or IBD: none.    Review of patient's allergies indicates:   Allergen Reactions    Cinnamon Itching    Doxycycline      Abdomen pain severe       Past Medical History:   Diagnosis Date    Abnormal Pap smear     Colposcopy    Acute postoperative anemia due to expected blood loss 2024    Atrial fibrillation and flutter 2024    Bradycardia 2020    Class 2 obesity due to excess calories with body mass index (BMI) of 35.0 to 35.9 in adult 2021    Hypertension, essential 2021    Junctional escape beats 2024    Menarche     Age of onset 11    Missed ab 2021    Mitral valve prolapse     Pre-existing essential hypertension during pregnancy, antepartum 2023    Previous  delivery affecting pregnancy, antepartum 2020    Primary hypertension 2021    S/P suction D&C 2021     Status post mitral valve annuloplasty 2017    Transient hyperglycemia post procedure 2024     Past Surgical History:   Procedure Laterality Date    A-V CARDIAC PACEMAKER INSERTION N/A 2024    Procedure: INSERTION, CARDIAC PACEMAKER, DUAL CHAMBER;  Surgeon: CRISTI Moore MD;  Location: [facility] EP LAB;  Service: Cardiology;  Laterality: N/A;  CHB, dPPM with LBBP, MDT, ANES, EH, [place] [id number]     SECTION       SECTION WITH TUBAL LIGATION Bilateral 2023    Procedure:  SECTION, WITH TUBAL LIGATION;  Surgeon: Mane Moreno MD;  Location: [facility] L&D;  Service: OB/GYN;  Laterality: Bilateral;    DILATION AND CURETTAGE OF UTERUS USING SUCTION N/A 2021    Procedure: DILATION AND CURETTAGE, UTERUS, USING SUCTION;  Surgeon: Mane Moreno MD;  Location: [facility] OR;  Service: OB/GYN;  Laterality: N/A;    ECHOCARDIOGRAM,TRANSESOPHAGEAL N/A 2024    Procedure: Transesophageal echo (MINERVA) intra-procedure log documentation;  Surgeon: Page George MD;  Location: [facility] EP LAB;  Service: Cardiology;  Laterality: N/A;  AF, MINERVA/DCCV, MAC, EH, 3prep *MDT D-C PPM in situ*    MITRAL VALVE REPAIR      MITRAL VALVE REPLACEMENT N/A 2024    Procedure: MITRAL VALVE REPLACEMENT, REDO STERNOTOMY;  Surgeon: Thierno Liu MD;  [facility];  Service: Cardiothoracic;  Laterality: N/A;    TRANSESOPHAGEAL ECHOCARDIOGRAPHY N/A 2024    Procedure: ECHOCARDIOGRAM, TRANSESOPHAGEAL;  Surgeon: Jessee Ahumada MD;  Location: [facility] EP LAB;  Service: Cardiology;  Laterality: N/A;    TREATMENT OF CARDIAC ARRHYTHMIA N/A 2024    Procedure: Cardioversion or Defibrillation;  Surgeon: CRISTI Moore MD;  Location: [facility] EP LAB;  Service: Cardiology;  Laterality: N/A;    TREATMENT OF CARDIAC ARRHYTHMIA N/A 2024    Procedure: Cardioversion or Defibrillation;  Surgeon: CRISTI Moore MD;  Location: [facility] EP LAB;  Service: Cardiology;  Laterality: N/A;  AF,  "MINERVA/DCCV, MAC, EH, 3prep *MDT D-C PPM in situ*    VALVULOPLASTY, TRICUSPID N/A 1/8/2024    Procedure: VALVULOPLASTY, TRICUSPID REPAIR;  Surgeon: Thierno Liu MD;  Location: [facility] OR [address] [place] [address];  Service: Cardiothoracic;  Laterality: N/A;     Family History   Problem Relation Name Age of Onset    Heart disease Mother      Hypertension Mother      Hyperlipidemia Mother      Diabetes Mother      Cancer Mother      Hyperlipidemia Father      Hypertension Father      Breast cancer Paternal Aunt      Mastectomy Paternal Cousin      Colon cancer Neg Hx      Ovarian cancer Neg Hx       Social History[1]     Review of Systems:  Review of Systems   Gastrointestinal:  Positive for blood in stool, constipation and diarrhea.       OBJECTIVE:     Vital Signs (Most Recent)  /75   Pulse 78   Ht 5' 1" (1.549 m)   Wt 85.6 kg (188 lb 11.4 oz)   BMI 35.66 kg/m²     Physical Exam:  General: White female in no distress   Neuro: Alert and oriented to person, place, and time.  Moves all extremities.     HEENT: No icterus.  Trachea midline  Respiratory: Respirations are even and unlabored, no cough or audible wheezing  Skin: Warm dry and intact, No visible rashes, no jaundice    Labs reviewed today:  Lab Results   Component Value Date    WBC 10.26 04/24/2025    HGB 13.0 04/24/2025    HCT 41.7 04/24/2025     04/24/2025    CHOL 192 03/18/2025    TRIG 167 (H) 03/18/2025    HDL 35 (L) 03/18/2025    ALT 11 06/26/2024    AST 25 06/26/2024     12/19/2024    K 4.4 12/19/2024     12/19/2024    CREATININE 0.7 12/19/2024    BUN 10 12/19/2024    CO2 23 12/19/2024    TSH 0.401 04/17/2025    INR 2.7 (H) 04/24/2025    HGBA1C 5.0 02/29/2024       Imaging reviewed today:  none    Endoscopy reviewed today:  none    Anorectal Exam:    Anal Skin: Normal    Digital Rectal Exam:  Resting Tone normal  Mass none  Tenderness  absent    Anoscopy:  Verbal consent was obtained.   Clear plastic anoscope inserted.    Hemorrhoids  " present  Stigmata of bleeding  present RPL  Stigmata of prolapsed  absent  Distal rectal mucosa normal      ASSESSMENT/PLAN:     Diagnoses and all orders for this visit:    Bright red blood per rectum  -     Ambulatory referral/consult to Gastroenterology  -     hydrocortisone (ANUSOL-HC) 25 mg suppository; Place 1 suppository (25 mg total) rectally 2 (two) times daily. for 24 days        The patient was instructed to:  Schedule colonoscopy. [phone] if you have any questions or need to reschedule after today.  Daily fiber supplement like citrucel or fibercon  Water intake 64-80 oz/day.  No sitting/straining > 5 mins with bowel movement.  If bleeding returns, anusol suppositories 2x/day for 12 days.      Fabiola Moreno, LESLEY-FEROZ  Colon and Rectal Surgery             [1]   Social History  Tobacco Use    Smoking status: Never    Smokeless tobacco: Never   Substance Use Topics    Alcohol use: Not Currently     Comment: not since +UPT    Drug use: No

## 2025-04-28 NOTE — TELEPHONE ENCOUNTER
Good morning King,     I wanted to let you know that I reviewed the form submitted and have sent the form to our Disability Office for completion.     Do you have another form from the DMV for the tag?     SEBASTIAN Isbell       
Bed/Stretcher in lowest position, wheels locked, appropriate side rails in place/Call bell, personal items and telephone in reach/Instruct patient to call for assistance before getting out of bed/chair/stretcher/Non-slip footwear applied when patient is off stretcher/[place] to call system/Physically safe environment - no spills, clutter or unnecessary equipment/Purposeful proactive rounding/Room/bathroom lighting operational, light cord in reach

## 2025-04-28 NOTE — PATIENT INSTRUCTIONS
Schedule colonoscopy. [phone] if you have any questions or need to reschedule after today.  Daily fiber supplement like citrucel or fibercon  Water intake 64-80 oz/day.  No sitting/straining > 5 mins with bowel movement.  If bleeding returns, anusol suppositories 2x/day for 12 days.

## 2025-04-28 NOTE — TELEPHONE ENCOUNTER
"----- Message from Fabiola Moreno NP sent at 4/28/2025  8:17 AM CDT -----  Procedure: ColonoscopyDiagnosis: Rectal bleedingProcedure Timing: Within 12 weeks#If within 4 weeks selected, please valentina as high priority##If greater than 12 weeks, please select "5-12 weeks" and delay sending until 3 months prior to requested date# Location: Any SiteAdditional Scheduling Information: Blood thinnersPrep Specifications:Standard prepIs the patient taking a GLP-1 Agonist:yesHave you attached a patient to this message: yes  "

## 2025-04-29 ENCOUNTER — TELEPHONE (OUTPATIENT)
Dept: ENDOSCOPY | Facility: HOSPITAL | Age: 40
End: 2025-04-29
Payer: COMMERCIAL

## 2025-04-29 ENCOUNTER — E-CONSULT (OUTPATIENT)
Dept: CARDIOLOGY | Facility: CLINIC | Age: 40
End: 2025-04-29
Payer: COMMERCIAL

## 2025-04-29 ENCOUNTER — E-CONSULT (OUTPATIENT)
Dept: CARDIOLOGY | Facility: CLINIC | Age: 40
End: 2025-04-29

## 2025-04-29 DIAGNOSIS — I48.4 ATYPICAL ATRIAL FLUTTER: ICD-10-CM

## 2025-04-29 DIAGNOSIS — Z98.890 H/O TRICUSPID VALVE ANNULOPLASTY: ICD-10-CM

## 2025-04-29 DIAGNOSIS — Z95.2 H/O MITRAL VALVE REPLACEMENT WITH MECHANICAL VALVE: ICD-10-CM

## 2025-04-29 DIAGNOSIS — I42.0 CONGESTIVE CARDIOMYOPATHY: Primary | ICD-10-CM

## 2025-04-29 DIAGNOSIS — I48.0 PAROXYSMAL ATRIAL FIBRILLATION: ICD-10-CM

## 2025-04-29 DIAGNOSIS — Z95.0 PACEMAKER: ICD-10-CM

## 2025-04-29 DIAGNOSIS — K92.2 LOWER GI BLEED: ICD-10-CM

## 2025-04-29 DIAGNOSIS — Z95.2 H/O MITRAL VALVE REPLACEMENT: ICD-10-CM

## 2025-04-29 DIAGNOSIS — I50.32 CHRONIC DIASTOLIC HEART FAILURE: ICD-10-CM

## 2025-04-29 DIAGNOSIS — E66.812 CLASS 2 OBESITY WITH BODY MASS INDEX (BMI) OF 35.0 TO 35.9 IN ADULT, UNSPECIFIED OBESITY TYPE, UNSPECIFIED WHETHER SERIOUS COMORBIDITY PRESENT: ICD-10-CM

## 2025-04-29 NOTE — TELEPHONE ENCOUNTER
----- Message from Med Assistant Yi sent at 2025  8:59 AM CDT -----  Regardin/16 Bt Cl  The patient is currently under an internal cardiologist, angle [place]. Is patient medically optimized by Cardiology for their upcoming scheduled Colonoscopy on 25. Additional request(s) required:internal cardiologist, Coumadin clinicokay to hold blood thinner Coumadin/Jantoven (warfarin)Notes: patient states Aydinadalidamalia chitraes her pacemaker, Gil is her cardiologist and her Blood thinner is manage by coumadin clinic

## 2025-04-29 NOTE — TELEPHONE ENCOUNTER
Dear Coumadin clinic,    Patient has a scheduled procedure Colonoscopy on 6/16/25 and is currently taking a blood thinner. In order to ensure patient safety, we would like to confirm that the patient can place their blood thinner medication on hold for the procedure. Can he/she discontinue Coumadin/Jantoven (warfarin) for a minimum of 5 days prior to the procedure?     Thank you for your prompt reply.    [facility] Endoscopy Scheduling

## 2025-04-29 NOTE — CONSULTS
[facility] - Cardiology - [facility]  Response for E-Consult     Patient Name: King Botello  MRN: [id number]  Primary Care Provider: Mayco Hansen MD   Requesting Provider: Yadi Thornton NP  Consults    Recommendation: I gave recs previously about holding ASA-I am not clear what is being asked now?    Additional future steps to consider: Not sure what I was being e consulted on    Total time of Consultation: 5-10    I did not speak to the requesting provider verbally about this.     *This eConsult is based on the clinical data available to me and is furnished without benefit of a physical examination. The eConsult will need to be interpreted in light of any clinical issues or changes in patient status not available to me at the time of filing this eConsults. Significant changes in patient condition or level of acuity should result in immediate formal consultation and reevaluation. Please alert me if you have further questions.    Thank you for this eConsult referral.     MD Misael Morales [facility] Cardiology - [facility]

## 2025-04-29 NOTE — CONSULTS
[facility] - Cardiology - [facility]  Response for E-Consult     Patient Name: King Botello  MRN: [id number]  Primary Care Provider: Mayco Hansen MD   Requesting Provider: Ydai Thornton NP  Consults    Recommendation: Cleared from cardiac standpoint for colonoscopy    Additional future steps to consider: Need Coumadin clinic to give Coumadin coordination for Mechanical valve and use Lovenox bridging if needed    Total time of Consultation: 10    I did not speak to the requesting provider verbally about this.     *This eConsult is based on the clinical data available to me and is furnished without benefit of a physical examination. The eConsult will need to be interpreted in light of any clinical issues or changes in patient status not available to me at the time of filing this eConsults. Significant changes in patient condition or level of acuity should result in immediate formal consultation and reevaluation. Please alert me if you have further questions.    Thank you for this eConsult referral.     MD Misael Morales [facility] Cardiology [address]

## 2025-05-01 ENCOUNTER — ANTI-COAG VISIT (OUTPATIENT)
Dept: CARDIOLOGY | Facility: CLINIC | Age: 40
End: 2025-05-01
Payer: COMMERCIAL

## 2025-05-01 DIAGNOSIS — Z95.2 H/O MITRAL VALVE REPLACEMENT WITH MECHANICAL VALVE: ICD-10-CM

## 2025-05-01 DIAGNOSIS — Z79.01 LONG TERM (CURRENT) USE OF ANTICOAGULANTS: Primary | ICD-10-CM

## 2025-05-01 LAB — INR PPP: 2

## 2025-05-01 PROCEDURE — 93793 ANTICOAG MGMT PT WARFARIN: CPT | Mod: S$GLB,,,

## 2025-05-01 NOTE — PROGRESS NOTES
[facility]    05/01/2025    King Botello (40 y.o.) is followed by the [facility].      Assessment/Plan:    King Botello presents with a subtherapeutic  INR. Goal INR: 2.5-3.5    Lab Results   Component Value Date    INR 2.0 05/01/2025    INR 2.7 (H) 04/24/2025    INR 3.7 04/23/2025    PROTIME 27.1 (H) 04/24/2025    PROTIME 37.9 (H) 04/17/2025       Assessment of patient findings per MA/LPN and chart review:   The following significant findings were found:   none    Recommendation for patient's warfarin regimen:   Due to subtherapeutic INR, patient was instructed to take a booster dose today. Patient to resume their normal weekly dose.    Recommended repeat INR in 1 week      Laine Glynn, PharmD, BCPS  Clinical Pharmacist - [facility]  Preferred Contact: Secure Messaging or In Basket Message

## 2025-05-05 ENCOUNTER — PATIENT MESSAGE (OUTPATIENT)
Dept: INTERNAL MEDICINE | Facility: CLINIC | Age: 40
End: 2025-05-05
Payer: COMMERCIAL

## 2025-05-08 ENCOUNTER — ANTI-COAG VISIT (OUTPATIENT)
Dept: CARDIOLOGY | Facility: CLINIC | Age: 40
End: 2025-05-08
Payer: COMMERCIAL

## 2025-05-08 DIAGNOSIS — Z79.01 LONG TERM (CURRENT) USE OF ANTICOAGULANTS: Primary | ICD-10-CM

## 2025-05-08 DIAGNOSIS — Z95.2 H/O MITRAL VALVE REPLACEMENT WITH MECHANICAL VALVE: ICD-10-CM

## 2025-05-08 LAB — INR PPP: 2.1

## 2025-05-08 PROCEDURE — 93793 ANTICOAG MGMT PT WARFARIN: CPT | Mod: S$GLB,,,

## 2025-05-08 NOTE — PROGRESS NOTES
[facility]    05/08/2025    King Botello (40 y.o.) is followed by the [facility].      Assessment/Plan:    King Botello presents with a subtherapeutic  INR. Goal INR: 2.5-3.5    Lab Results   Component Value Date    INR 2.1 05/08/2025    INR 2.0 05/01/2025    INR 2.7 (H) 04/24/2025    PROTIME 27.1 (H) 04/24/2025    PROTIME 37.9 (H) 04/17/2025       Assessment of patient findings per MA/LPN and chart review:   The following significant findings were found:   Reports daily consumption of a protein shake that contains 30 mcg of vitamin K for about a week or so  Patient plans on continuing daily consumption of this protein shake    Recommendation for patient's warfarin regimen:   Due to subtherapeutic INR, patient was instructed to take a booster dose of 10 mg today 5/8.   Patient to also increase their weekly dose from 44 mg to 50 mg starting tomorrow 5/9    Recommended repeat INR in 1 week      Gifty Lemos, PharmD  Preferred Contact: Secure Messaging or In Basket Message

## 2025-05-13 ENCOUNTER — TELEPHONE (OUTPATIENT)
Dept: INTERNAL MEDICINE | Facility: CLINIC | Age: 40
End: 2025-05-13

## 2025-05-13 ENCOUNTER — HOSPITAL ENCOUNTER (OUTPATIENT)
Dept: RADIOLOGY | Facility: HOSPITAL | Age: 40
Discharge: HOME | End: 2025-05-13
Attending: FAMILY MEDICINE
Payer: COMMERCIAL

## 2025-05-13 ENCOUNTER — OFFICE VISIT (OUTPATIENT)
Dept: INTERNAL MEDICINE | Facility: CLINIC | Age: 40
End: 2025-05-13
Attending: FAMILY MEDICINE
Payer: COMMERCIAL

## 2025-05-13 DIAGNOSIS — E04.1 THYROID NODULE: Primary | ICD-10-CM

## 2025-05-13 DIAGNOSIS — E04.1 THYROID NODULE: ICD-10-CM

## 2025-05-13 DIAGNOSIS — R41.89 COGNITIVE CHANGE: ICD-10-CM

## 2025-05-13 PROCEDURE — 1159F MED LIST DOCD IN RCRD: CPT | Mod: CPTII,95,, | Performed by: FAMILY MEDICINE

## 2025-05-13 PROCEDURE — 98005 SYNCH AUDIO-VIDEO EST LOW 20: CPT | Mod: 95,,, | Performed by: FAMILY MEDICINE

## 2025-05-13 PROCEDURE — 76536 US EXAM OF HEAD AND NECK: CPT | Mod: TC

## 2025-05-13 PROCEDURE — 76536 US EXAM OF HEAD AND NECK: CPT | Mod: 26,,, | Performed by: RADIOLOGY

## 2025-05-13 PROCEDURE — 1160F RVW MEDS BY RX/DR IN RCRD: CPT | Mod: CPTII,95,, | Performed by: FAMILY MEDICINE

## 2025-05-13 NOTE — PROGRESS NOTES
Subjective:       Patient ID: King Botello is a 40 y.o. female.    Chief Complaint: No chief complaint on file.    The patient location is:  [facility] visiting father in hospital  The chief complaint leading to consultation is:  Noticed a thyroid nodule was on her CT scan report from last year, preop, CTS    Visit type:  Audiovisual    Face to Face time with patient:  8  Twelve minutes of total time spent on the encounter, which includes face to face time and non-face to face time preparing to see the patient (eg, review of tests), Obtaining and/or reviewing separately obtained history, Documenting clinical information in the electronic or other health record, Independently interpreting results (not separately reported) and communicating results to the patient/family/caregiver, or Care coordination (not separately reported).         Each patient to whom he or she provides medical services by telemedicine is:  (1) informed of the relationship between the physician and patient and the respective role of any other health care provider with respect to management of the patient; and (2) notified that he or she may decline to receive medical services by telemedicine and may withdraw from such care at any time.    Notes:  Patient noticed an incidental thyroid nodule mentioned on a scan from January 2024.  No prior knowledge of thyroid problems and states finding not addressed to date.  No notable neck masses.  No difficulty with swallowing.  Recent and prior TSH levels are normal.    Reviewed recent laboratory.  Has not had chance to schedule with Neurology concerning brain fog, yet.          Review of Systems   Constitutional:  Negative for activity change and unexpected weight change.   HENT:  Negative for hearing loss, rhinorrhea and trouble swallowing.    Eyes:  Negative for discharge and visual disturbance.   Respiratory:  Negative for chest tightness and wheezing.    Cardiovascular:  Negative for chest  pain and palpitations.   Gastrointestinal:  Negative for blood in stool, constipation, diarrhea and vomiting.   Endocrine: Negative for polydipsia and polyuria.   Genitourinary:  Negative for difficulty urinating, dysuria, hematuria and menstrual problem.   Musculoskeletal:  Negative for arthralgias, joint swelling and neck pain.   Neurological:  Negative for weakness and headaches.   Psychiatric/Behavioral:  Negative for confusion and dysphoric mood.        Objective:      Physical Exam  Constitutional:       General: She is not in acute distress.     Appearance: She is well-developed.   Neurological:      Mental Status: She is alert and oriented to person, place, and time.   Psychiatric:         Speech: Speech normal.         Behavior: Behavior normal.         Assessment:       1. Thyroid nodule    2. Cognitive change        Plan:     Medication List with Changes/Refills   Current Medications    ALBUTEROL (PROVENTIL/VENTOLIN HFA) 90 MCG/ACTUATION INHALER    Inhale 2 puffs into the lungs every 6 (six) hours as needed for Wheezing or Shortness of Breath. Rescue    ASPIRIN (ECOTRIN) 81 MG EC TABLET    Take 1 tablet (81 mg total) by mouth once daily.    DIGOXIN (LANOXIN) 250 MCG TABLET    Take 1 tablet (250 mcg total) by mouth once daily.    DOFETILIDE (TIKOSYN) 250 MCG CAP    Take 1 capsule (250 mcg total) by mouth every 12 (twelve) hours.    HYDROCORTISONE (ANUSOL-HC) 25 MG SUPPOSITORY    Place 1 suppository (25 mg total) rectally 2 (two) times daily. for 24 days    METOPROLOL SUCCINATE (TOPROL-XL) 100 MG 24 HR TABLET    Take 1 tablet (100 mg total) by mouth 2 (two) times daily.    METOPROLOL TARTRATE (LOPRESSOR) 25 MG TABLET    Take 1 tablet (25 mg total) by mouth 2 (two) times daily as needed (Palpitations).    PROMETHAZINE (PHENERGAN) 12.5 MG TAB    Take 1 tablet (12.5 mg total) by mouth 3 (three) times daily as needed (nausea).    SEMAGLUTIDE, WEIGHT LOSS, (WEGOVY) 0.25 MG/0.5 ML PNBENI    Inject 0.25 mg into the  skin every 7 days.    SEMAGLUTIDE, WEIGHT LOSS, (WEGOVY) 0.5 MG/0.5 ML PNIJ    Inject 0.5 mg into the skin every 7 days.    SEMAGLUTIDE, WEIGHT LOSS, (WEGOVY) 1 MG/0.5 ML PNIJ    Inject 1 mg into the skin every 7 days.    SODIUM,POTASSIUM,MAG SULFATES (SUPREP BOWEL PREP KIT) 17.5-3.13-1.6 GRAM SOLR    Take 177 mLs by mouth once daily. Take as directed by provider office for 2 days    WARFARIN (COUMADIN) 4 MG TABLET    Take 1-2 tablets (4-8 mg total) by mouth Daily. As directed by the Coumadin Clinic     1. Thyroid nodule  Overview:  -incidentaloma on 1/4/2024 preop CTS service CT Chest    Orders:  -     US Soft Tissue Head Neck; Future; Expected date: 05/13/2025    2. Cognitive change      See meds, orders, follow up, routing and instructions sections of encounter and AVS. Discussed with patient and provided on AVS.    Step 1 check ultrasound.  Determine characteristic and if need for endocrinology consult or future surveillance.  Unable to determine much from the CT scan report other than size at 1 cm.    Lab Results   Component Value Date     12/19/2024    K 4.4 12/19/2024     12/19/2024    BUN 10 12/19/2024    CREATININE 0.7 12/19/2024    GLU 81 12/19/2024    HGBA1C 5.0 02/29/2024    MG 2.0 05/02/2024    AST 25 06/26/2024    ALT 11 06/26/2024    ALBUMIN 3.7 06/26/2024    PROT 7.4 06/26/2024    BILITOT 0.5 06/26/2024    CHOL 192 03/18/2025    HDL 35 (L) 03/18/2025    LDLCALC 123.6 03/18/2025    TRIG 167 (H) 03/18/2025    WBC 10.26 04/24/2025    HGB 13.0 04/24/2025    HGB 12.1 06/26/2024    HCT 41.7 04/24/2025    HCT 37.3 06/26/2024    HCT 20 (LL) 01/09/2024     04/24/2025     06/26/2024    TSH 0.401 04/17/2025    TSH 0.819 08/14/2023    BNP 66 12/19/2024    URICACID 5.0 04/15/2010

## 2025-05-13 NOTE — TELEPHONE ENCOUNTER
----- Message from Mayco Hansen MD sent at 5/13/2025  1:21 PM CDT -----  Please contact patient and schedule thyroid ultrasound

## 2025-05-15 ENCOUNTER — PATIENT MESSAGE (OUTPATIENT)
Dept: CARDIOLOGY | Facility: CLINIC | Age: 40
End: 2025-05-15
Payer: COMMERCIAL

## 2025-05-15 ENCOUNTER — ANTI-COAG VISIT (OUTPATIENT)
Dept: CARDIOLOGY | Facility: CLINIC | Age: 40
End: 2025-05-15
Payer: COMMERCIAL

## 2025-05-15 DIAGNOSIS — Z79.01 LONG TERM (CURRENT) USE OF ANTICOAGULANTS: Primary | ICD-10-CM

## 2025-05-15 DIAGNOSIS — Z95.2 H/O MITRAL VALVE REPLACEMENT WITH MECHANICAL VALVE: ICD-10-CM

## 2025-05-15 LAB — INR PPP: 2.4

## 2025-05-15 PROCEDURE — 93793 ANTICOAG MGMT PT WARFARIN: CPT | Mod: S$GLB,,,

## 2025-05-15 NOTE — PROGRESS NOTES
[facility]    05/15/2025    King Botello (40 y.o.) is followed by the [facility].      Assessment/Plan:    King Botello presents with a subtherapeutic  INR. Goal INR: 2.5-3.5    Lab Results   Component Value Date    INR 2.4 05/15/2025    INR 2.1 05/08/2025    INR 2.0 05/01/2025    PROTIME 27.1 (H) 04/24/2025    PROTIME 37.9 (H) 04/17/2025       Assessment of patient findings per MA/LPN and chart review:   The following significant findings were found:   none    Recommendation for patient's warfarin regimen:   Due to subtherapeutic INR, patient was instructed to take a booster dose today. Patient to also increase their weekly dose.    Recommended repeat INR in 1 week      Laine Glynn, PharmD, BCPS  Clinical Pharmacist - [facility]  Preferred Contact: Secure Messaging or In Basket Message

## 2025-05-16 ENCOUNTER — TELEPHONE (OUTPATIENT)
Dept: INTERNAL MEDICINE | Facility: CLINIC | Age: 40
End: 2025-05-16
Payer: COMMERCIAL

## 2025-05-16 ENCOUNTER — RESULTS FOLLOW-UP (OUTPATIENT)
Dept: INTERNAL MEDICINE | Facility: CLINIC | Age: 40
End: 2025-05-16

## 2025-05-16 ENCOUNTER — PATIENT MESSAGE (OUTPATIENT)
Dept: INTERNAL MEDICINE | Facility: CLINIC | Age: 40
End: 2025-05-16
Payer: COMMERCIAL

## 2025-05-16 DIAGNOSIS — E04.1 THYROID NODULE: Primary | ICD-10-CM

## 2025-05-16 LAB
OHS CV AF BURDEN PERCENT: 2.4
OHS CV DC REMOTE DEVICE TYPE: NORMAL
OHS CV ICD SHOCK: NO
OHS CV RV PACING PERCENT: 0.82 %

## 2025-05-16 NOTE — TELEPHONE ENCOUNTER
Please call patient and explain that test(s) show a couple of thyroid nodules.  One recommended a 1 year follow-up ultrasound.    Please see orders for thyroid ultrasound and please schedule in 1 year.     ALSO    I would like to refer to the endocrinology department for further evaluation and treatment. Please schedule.    Thank you.

## 2025-05-17 NOTE — TELEPHONE ENCOUNTER
Please call patient and explain that test(s) show thyroid nodules.    Please see orders for repeat and please schedule in 1 year.     ALSO    I would like to refer to the enodcrinolgy department for further evaluation and treatment. Please schedule.    Thank you.

## 2025-05-19 ENCOUNTER — TELEPHONE (OUTPATIENT)
Dept: INTERNAL MEDICINE | Facility: CLINIC | Age: 40
End: 2025-05-19
Payer: COMMERCIAL

## 2025-05-19 NOTE — TELEPHONE ENCOUNTER
----- Message from Mayco Hansen MD sent at 5/17/2025  8:22 AM CDT -----      ----- Message -----  From: Paresh, Rad Results In  Sent: 5/14/2025  10:37 AM CDT  To: Mayco Hansen MD

## 2025-05-20 NOTE — PROGRESS NOTES
Subjective:      Patient ID: King Botello is a 40 y.o. female.    Chief Complaint:  Thyroid Nodule    This is a MyChart video visit.    The patient location is: [place]  The chief complaint leading to consultation is: thyroid nodule  Visit type: Virtual visit with synchronous audio and video  Total time spent with patient: 17 minutes  Each patient to whom he or she provides medical services by telemedicine is:  (1) informed of the relationship between the physician and patient and the respective role of any other health care provider with respect to management of the patient; and (2) notified that he or she may decline to receive medical services by telemedicine and may withdraw from such care at any time.    History of Present Illness  King Botello is a 40 y.o. female with history significant for mitral valve stenosis s/p MVR, HF, complete heart block and is here for evaluation and management of thyroid nodule.  Previously seen by CRISTI Rehman DNP.  Last seen 01/2024 when patient had an MVR.  This is their first visit with me.      With regards to thyroid nodule:    Found: incidentally on CT chest during pre-op evaluation 01/2024- 1.0 cm nodule of the left thyroid lobe    Thyroid US: 05/2025  The thyroid is normal in size.  Right lobe of the thyroid measures 5.4 x 1.4 x 1.6 cm in left hepatic lobe measure 4.9 x 1.5 x 1.6 cm.     3 right thyroid nodules with middle pole 0.4 cm, almost completely solid, hypoechoic, ill-defined thyroid nodule of punctate echogenic foci (TR-5).  Two TR- 4 right thyroid nodule largest in the lower pole measure 0.5 cm.     Left middle pole mixed cystic and solid, isoechoic solid component thyroid nodule of internal septations measure 1.4 x 1 x 1.2 cm.     Cervical lymph nodes demonstrate normal morphology and size.     Impression:  Multinodular thyroid.     Right TI-RADS 4 and TIRADS 5 thyroid nodules which are too small to meet the ACR criteria for follow-up or FNA.  1.4 cm TIRADS  3 left complex thyroid cyst.  Follow-up ultrasound is recommended in 1 year.    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  Smoking: denies    Lab Results   Component Value Date    TSH 0.401 04/17/2025    FREET4 1.02 05/27/2015       Denies signs or symptoms of hyper or hypothyroidism    Denies weight changes  Denies  bowel changes  + cold intolerance (on blood thinners)  + hair thinning  + rough nails  Denies chest pain, palpitations or shortness of breath  Denies tremors     Review of Systems  As above    Objective:   Physical Exam  Virtual visit    There were no vitals taken for this visit.    There is no height or weight on file to calculate BMI.    Lab Review:   Lab Results   Component Value Date    HGBA1C 5.0 02/29/2024    HGBA1C 5.2 01/04/2024       Lab Results   Component Value Date    CHOL 192 03/18/2025    HDL 35 (L) 03/18/2025    LDLCALC 123.6 03/18/2025    TRIG 167 (H) 03/18/2025    CHOLHDL 18.2 (L) 03/18/2025     Lab Results   Component Value Date     12/19/2024    K 4.4 12/19/2024     12/19/2024    CO2 23 12/19/2024    GLU 81 12/19/2024    BUN 10 12/19/2024    CREATININE 0.7 12/19/2024    CALCIUM 9.3 12/19/2024    PROT 7.4 06/26/2024    ALBUMIN 3.7 06/26/2024    BILITOT 0.5 06/26/2024    ALKPHOS 57 06/26/2024    AST 25 06/26/2024    ALT 11 06/26/2024    ANIONGAP 7 (L) 12/19/2024    ESTGFRAFRICA >60.0 06/28/2022    EGFRNONAA >60.0 06/28/2022    TSH 0.401 04/17/2025     Vit D, 25-Hydroxy   Date Value Ref Range Status   12/31/2020 16 (L) 30 - 96 ng/mL Final     Comment:     Vitamin D deficiency.........<10 ng/mL                               Vitamin D insufficiency......10-29 ng/mL       Vitamin D sufficiency........> or equal to 30 ng/mL  Vitamin D toxicity............>100 ng/mL       Assessment and Plan     1. Thyroid nodule            Thyroid nodule   - 1.0 cm left thyroid nodule found incidentally on CT 01/2024   - Thyroid US 05/2025 significant for several small thyroid nodules on the right thyroid lobe ranging from 0.4-0.5 cm (TR 4-5) and one 1.4 cm left thyroid nodule (TR3)   - I have reviewed management options including observation, FNA or surgery.   - Will proceed with repeated thyroid US 05/2026.   - Encouraged thyroid guard use at work   - Patient to reach out if experiencing new or change in symptoms before repeat US is due and will order department thyroid US sooner.    - All of the patients questions were answered.      Follow up in about 1 year (around 5/21/2026).    Visit today included increased complexity associated with the care of the problems addressed and managing the longitudinal care of the patient due to the serious and/or complex managed problems.    Face to Face time with patient: 8 minutes  17 minutes of total time spent on the encounter, which includes face to face time and non-face to face time preparing to see the patient (eg, review of tests), Obtaining and/or reviewing separately obtained history, Documenting clinical information in the electronic or other health record, Independently interpreting results (not separately reported) and communicating results to the patient/family/caregiver, or Care coordination (not separately reported).    Donya Lemos PA-C

## 2025-05-21 ENCOUNTER — PATIENT MESSAGE (OUTPATIENT)
Dept: ELECTROPHYSIOLOGY | Facility: CLINIC | Age: 40
End: 2025-05-21
Payer: COMMERCIAL

## 2025-05-21 ENCOUNTER — OFFICE VISIT (OUTPATIENT)
Dept: ENDOCRINOLOGY | Facility: CLINIC | Age: 40
End: 2025-05-21
Payer: COMMERCIAL

## 2025-05-21 DIAGNOSIS — I48.4 ATYPICAL ATRIAL FLUTTER: Primary | ICD-10-CM

## 2025-05-21 DIAGNOSIS — I48.0 PAROXYSMAL ATRIAL FIBRILLATION: Primary | ICD-10-CM

## 2025-05-21 DIAGNOSIS — E04.1 THYROID NODULE: Primary | ICD-10-CM

## 2025-05-21 PROCEDURE — G2211 COMPLEX E/M VISIT ADD ON: HCPCS | Mod: 95,,,

## 2025-05-21 PROCEDURE — 98006 SYNCH AUDIO-VIDEO EST MOD 30: CPT | Mod: 95,,,

## 2025-05-21 NOTE — ASSESSMENT & PLAN NOTE
- 1.0 cm left thyroid nodule found incidentally on CT 01/2024   - Thyroid US 05/2025 significant for several small thyroid nodules on the right thyroid lobe ranging from 0.4-0.5 cm (TR 4-5) and one 1.4 cm left thyroid nodule (TR3)   - I have reviewed management options including observation, FNA or surgery.   - Will proceed with repeated thyroid US 05/2026.   - Encouraged thyroid guard use at work   - Patient to reach out if experiencing new or change in symptoms before repeat US is due and will order department thyroid US sooner.    - All of the patients questions were answered.

## 2025-05-22 ENCOUNTER — ANTI-COAG VISIT (OUTPATIENT)
Dept: CARDIOLOGY | Facility: CLINIC | Age: 40
End: 2025-05-22
Payer: COMMERCIAL

## 2025-05-22 DIAGNOSIS — Z79.01 LONG TERM (CURRENT) USE OF ANTICOAGULANTS: Primary | ICD-10-CM

## 2025-05-22 DIAGNOSIS — Z95.2 H/O MITRAL VALVE REPLACEMENT WITH MECHANICAL VALVE: ICD-10-CM

## 2025-05-22 LAB — INR PPP: 2.5

## 2025-05-22 PROCEDURE — 93793 ANTICOAG MGMT PT WARFARIN: CPT | Mod: S$GLB,,,

## 2025-05-22 NOTE — PROGRESS NOTES
[facility]    05/22/2025    King Botello (40 y.o.) is followed by the [facility].      Assessment/Plan:    King Botello presents with a therapeutic INR. Goal INR: 2.5-3.5    Lab Results   Component Value Date    INR 2.5 05/22/2025    INR 2.4 05/15/2025    INR 2.1 05/08/2025    PROTIME 27.1 (H) 04/24/2025    PROTIME 37.9 (H) 04/17/2025       Assessment of patient findings per MA/LPN and chart review:   The following significant findings were found:   none    Recommendation for patient's warfarin regimen:   No change was made to warfarin therapy during this visit and patient has been instructed to continue their current warfarin regimen.    Recommended repeat INR in 1 week      Laine Glynn, PharmD, BCPS  Clinical Pharmacist - [facility]  Preferred Contact: Secure Messaging or In Basket Message

## 2025-05-27 ENCOUNTER — HOSPITAL ENCOUNTER (OUTPATIENT)
Dept: RADIOLOGY | Facility: HOSPITAL | Age: 40
Discharge: HOME | End: 2025-05-27
Attending: FAMILY MEDICINE
Payer: COMMERCIAL

## 2025-05-27 ENCOUNTER — RESULTS FOLLOW-UP (OUTPATIENT)
Dept: INTERNAL MEDICINE | Facility: CLINIC | Age: 40
End: 2025-05-27

## 2025-05-27 ENCOUNTER — OFFICE VISIT (OUTPATIENT)
Dept: INTERNAL MEDICINE | Facility: CLINIC | Age: 40
End: 2025-05-27
Attending: FAMILY MEDICINE
Payer: COMMERCIAL

## 2025-05-27 VITALS
HEART RATE: 80 BPM | HEIGHT: 61 IN | OXYGEN SATURATION: 98 % | BODY MASS INDEX: 34.67 KG/M2 | WEIGHT: 183.63 LBS | DIASTOLIC BLOOD PRESSURE: 66 MMHG | TEMPERATURE: 98 F | SYSTOLIC BLOOD PRESSURE: 110 MMHG

## 2025-05-27 DIAGNOSIS — R05.2 SUBACUTE COUGH: Primary | ICD-10-CM

## 2025-05-27 DIAGNOSIS — Z95.2 H/O MITRAL VALVE REPLACEMENT WITH MECHANICAL VALVE: ICD-10-CM

## 2025-05-27 DIAGNOSIS — I48.0 PAROXYSMAL ATRIAL FIBRILLATION: ICD-10-CM

## 2025-05-27 DIAGNOSIS — I44.2 COMPLETE HEART BLOCK: ICD-10-CM

## 2025-05-27 DIAGNOSIS — Z79.01 LONG TERM (CURRENT) USE OF ANTICOAGULANTS: ICD-10-CM

## 2025-05-27 DIAGNOSIS — I50.32 CHRONIC DIASTOLIC HEART FAILURE: ICD-10-CM

## 2025-05-27 DIAGNOSIS — R05.2 SUBACUTE COUGH: ICD-10-CM

## 2025-05-27 DIAGNOSIS — Z95.0 PACEMAKER: ICD-10-CM

## 2025-05-27 PROCEDURE — 71046 X-RAY EXAM CHEST 2 VIEWS: CPT | Mod: TC

## 2025-05-27 PROCEDURE — 3074F SYST BP LT 130 MM HG: CPT | Mod: CPTII,S$GLB,, | Performed by: FAMILY MEDICINE

## 2025-05-27 PROCEDURE — 3008F BODY MASS INDEX DOCD: CPT | Mod: CPTII,S$GLB,, | Performed by: FAMILY MEDICINE

## 2025-05-27 PROCEDURE — 99213 OFFICE O/P EST LOW 20 MIN: CPT | Mod: S$GLB,,, | Performed by: FAMILY MEDICINE

## 2025-05-27 PROCEDURE — 3078F DIAST BP <80 MM HG: CPT | Mod: CPTII,S$GLB,, | Performed by: FAMILY MEDICINE

## 2025-05-27 PROCEDURE — 99999 PR PBB SHADOW E&M-EST. PATIENT-LVL IV: CPT | Mod: PBBFAC,,, | Performed by: FAMILY MEDICINE

## 2025-05-27 PROCEDURE — 71046 X-RAY EXAM CHEST 2 VIEWS: CPT | Mod: 26,,, | Performed by: RADIOLOGY

## 2025-05-27 PROCEDURE — 1160F RVW MEDS BY RX/DR IN RCRD: CPT | Mod: CPTII,S$GLB,, | Performed by: FAMILY MEDICINE

## 2025-05-27 PROCEDURE — 1159F MED LIST DOCD IN RCRD: CPT | Mod: CPTII,S$GLB,, | Performed by: FAMILY MEDICINE

## 2025-05-27 RX ORDER — PROMETHAZINE HYDROCHLORIDE AND DEXTROMETHORPHAN HYDROBROMIDE 6.25; 15 MG/5ML; MG/5ML
5-10 SYRUP ORAL 3 TIMES DAILY PRN
Qty: 118 ML | Refills: 0 | Status: SHIPPED | OUTPATIENT
Start: 2025-05-27

## 2025-05-27 NOTE — H&P (VIEW-ONLY)
Subjective:       Patient ID: King Botello is a 40 y.o. female.    Chief Complaint: Cough    Same day urgent care presents - Patient complains of lower respiratory congestion, cough, rhinorrhea that is not improving. Clear mucous has changed to darker and condition is not improving. No fever, chills or significant dyspnea. Sputum production. OTC meds not effective. Some fatigue and malaise noted. No sx to suggest cardiac decompensation.        Review of Systems   Constitutional:  Negative for appetite change, chills, diaphoresis, fatigue and fever.   HENT:  Negative for congestion, postnasal drip, rhinorrhea, sore throat and trouble swallowing.    Eyes:  Negative for visual disturbance.   Respiratory:  Positive for cough. Negative for choking, chest tightness, shortness of breath and wheezing.    Cardiovascular:  Negative for chest pain and leg swelling.   Gastrointestinal:  Negative for abdominal distention, abdominal pain, diarrhea, nausea and vomiting.   Genitourinary:  Negative for difficulty urinating and hematuria.   Musculoskeletal:  Negative for arthralgias and myalgias.   Skin:  Negative for rash.   Neurological:  Negative for weakness, light-headedness and headaches.   Hematological:  Does not bruise/bleed easily.   Psychiatric/Behavioral:  Negative for decreased concentration and dysphoric mood.        Objective:      Physical Exam  Vitals and nursing note reviewed.   Constitutional:       General: She is not in acute distress.     Appearance: She is well-developed. She is not diaphoretic.   HENT:      Head: Normocephalic.      Right Ear: Tympanic membrane, ear canal and external ear normal.      Left Ear: Tympanic membrane, ear canal and external ear normal.      Nose: Nose normal.      Mouth/Throat:      Pharynx: Oropharynx is clear. No oropharyngeal exudate.   Eyes:      General: No scleral icterus.        Right eye: No discharge.         Left eye: No discharge.      Conjunctiva/sclera:  Conjunctivae normal.   Neck:      Thyroid: No thyromegaly.   Cardiovascular:      Rate and Rhythm: Normal rate and regular rhythm.      Heart sounds: Normal heart sounds. No murmur heard.     No friction rub. No gallop.   Pulmonary:      Effort: Pulmonary effort is normal. No respiratory distress.      Breath sounds: Normal breath sounds. No wheezing or rales.   Chest:      Chest wall: No tenderness.   Abdominal:      Palpations: Abdomen is soft.      Tenderness: There is no abdominal tenderness.   Musculoskeletal:      Cervical back: Normal range of motion and neck supple.   Lymphadenopathy:      Cervical: No cervical adenopathy.   Skin:     General: Skin is warm and dry.      Findings: No rash.   Neurological:      Mental Status: She is alert and oriented to person, place, and time.         Assessment:       1. Subacute cough    2. Complete heart block    3. Pacemaker    4. Paroxysmal atrial fibrillation    5. Chronic diastolic heart failure    6. H/O mitral valve replacement with mechanical valve    7. Long term (current) use of anticoagulants        Plan:     Medication List with Changes/Refills   New Medications    PROMETHAZINE-DEXTROMETHORPHAN (PROMETHAZINE-DM) 6.25-15 MG/5 ML SYRP    Take 5-10 mLs by mouth 3 (three) times daily as needed.   Current Medications    ASPIRIN (ECOTRIN) 81 MG EC TABLET    Take 1 tablet (81 mg total) by mouth once daily.    DIGOXIN (LANOXIN) 250 MCG TABLET    Take 1 tablet (250 mcg total) by mouth once daily.    DOFETILIDE (TIKOSYN) 250 MCG CAP    Take 1 capsule (250 mcg total) by mouth every 12 (twelve) hours.    METOPROLOL SUCCINATE (TOPROL-XL) 100 MG 24 HR TABLET    Take 1 tablet (100 mg total) by mouth 2 (two) times daily.    METOPROLOL TARTRATE (LOPRESSOR) 25 MG TABLET    Take 1 tablet (25 mg total) by mouth 2 (two) times daily as needed (Palpitations).    PROMETHAZINE (PHENERGAN) 12.5 MG TAB    Take 1 tablet (12.5 mg total) by mouth 3 (three) times daily as needed (nausea).     SEMAGLUTIDE, WEIGHT LOSS, (WEGOVY) 0.25 MG/0.5 ML PNIJ    Inject 0.25 mg into the skin every 7 days.    SEMAGLUTIDE, WEIGHT LOSS, (WEGOVY) 0.5 MG/0.5 ML PNIJ    Inject 0.5 mg into the skin every 7 days.    SEMAGLUTIDE, WEIGHT LOSS, (WEGOVY) 1 MG/0.5 ML PNIJ    Inject 1 mg into the skin every 7 days.    SODIUM,POTASSIUM,MAG SULFATES (SUPREP BOWEL PREP KIT) 17.5-3.13-1.6 GRAM SOLR    Take 177 mLs by mouth once daily. Take as directed by provider office for 2 days    WARFARIN (COUMADIN) 4 MG TABLET    Take 1-2 tablets (4-8 mg total) by mouth Daily. As directed by the [facility]     1. Subacute cough  -     X-Ray Chest PA And Lateral; Future; Expected date: 05/27/2025  -     promethazine-dextromethorphan (PROMETHAZINE-DM) 6.25-15 mg/5 mL Syrp; Take 5-10 mLs by mouth 3 (three) times daily as needed.  Dispense: 118 mL; Refill: 0    2. Complete heart block  Overview:  -followed by cardiology and EPS  -s/p MVR with a 25mm [employer] standard mechanical prosthesis plus TVr 1/8/2024 complicated by complete heart block requiring PPM 1/17/2024 despite a robust junctional escape rhythm       3. Pacemaker  Overview:  -followed by EPS    -Medtronic dual-chamber pacemaker with left bundle branch area pacing (LBBAP) lead, implanted 1/17/2024 in setting of postop atrial flutter and complete heart block with junctional escape of ~50 bpm with persistent AV dissociation following mitral valve replacement with a mechanical mitral valve and a tricuspid valve annuloplasty on 1/10/2024       4. Paroxysmal atrial fibrillation  Overview:  -followed by EPS    -Medtronic dual-chamber pacemaker with left bundle branch area pacing (LBBAP) lead, implanted 1/17/2024 in setting of postop atrial flutter and complete heart block with junctional escape of ~50 bpm with persistent AV dissociation following mitral valve replacement with a mechanical mitral valve and a tricuspid valve annuloplasty on 1/10/2024       5. Chronic diastolic heart  failure  Overview:  -9208-4385 d/t MVR  -followed by cardiology  -s/p MVR with a 25mm [employer] standard mechanical prosthesis plus TVr 1/8/2024 complicated by complete heart block requiring PPM 1/17/2024 despite a robust junctional escape rhythm   -EF 55/70% 2/26/2024 echo      6. H/O mitral valve replacement with mechanical valve  Overview:  -hx MV repair 2003  -s/p MVR with a 25mm [employer] standard mechanical prosthesis plus TVr 1/8/2024 complicated by complete heart block requiring PPM 1/17/2024 despite a robust junctional escape rhythm  -long term AC  -followed by cardiology      7. Long term (current) use of anticoagulants  Overview:  -followed by cardiology  -s/p MVR with a 25mm [employer] standard mechanical prosthesis plus TVr 1/8/2024 complicated by complete heart block requiring PPM 1/17/2024 despite a robust junctional escape rhythm        See meds, orders, follow up, routing and instructions sections of encounter and AVS. Discussed with patient and provided on AVS.    Z pack category D interaction w/ heart meds. Trial phenergan. If not improved the consider augmentin. Call if still not improving in a fw days or certainly for worsening, or fever, etc.

## 2025-05-27 NOTE — PROGRESS NOTES
Subjective:       Patient ID: King Botello is a 40 y.o. female.    Chief Complaint: Cough    Same day urgent care presents - Patient complains of lower respiratory congestion, cough, rhinorrhea that is not improving. Clear mucous has changed to darker and condition is not improving. No fever, chills or significant dyspnea. Sputum production. OTC meds not effective. Some fatigue and malaise noted. No sx to suggest cardiac decompensation.        Review of Systems   Constitutional:  Negative for appetite change, chills, diaphoresis, fatigue and fever.   HENT:  Negative for congestion, postnasal drip, rhinorrhea, sore throat and trouble swallowing.    Eyes:  Negative for visual disturbance.   Respiratory:  Positive for cough. Negative for choking, chest tightness, shortness of breath and wheezing.    Cardiovascular:  Negative for chest pain and leg swelling.   Gastrointestinal:  Negative for abdominal distention, abdominal pain, diarrhea, nausea and vomiting.   Genitourinary:  Negative for difficulty urinating and hematuria.   Musculoskeletal:  Negative for arthralgias and myalgias.   Skin:  Negative for rash.   Neurological:  Negative for weakness, light-headedness and headaches.   Hematological:  Does not bruise/bleed easily.   Psychiatric/Behavioral:  Negative for decreased concentration and dysphoric mood.        Objective:      Physical Exam  Vitals and nursing note reviewed.   Constitutional:       General: She is not in acute distress.     Appearance: She is well-developed. She is not diaphoretic.   HENT:      Head: Normocephalic.      Right Ear: Tympanic membrane, ear canal and external ear normal.      Left Ear: Tympanic membrane, ear canal and external ear normal.      Nose: Nose normal.      Mouth/Throat:      Pharynx: Oropharynx is clear. No oropharyngeal exudate.   Eyes:      General: No scleral icterus.        Right eye: No discharge.         Left eye: No discharge.      Conjunctiva/sclera:  Conjunctivae normal.   Neck:      Thyroid: No thyromegaly.   Cardiovascular:      Rate and Rhythm: Normal rate and regular rhythm.      Heart sounds: Normal heart sounds. No murmur heard.     No friction rub. No gallop.   Pulmonary:      Effort: Pulmonary effort is normal. No respiratory distress.      Breath sounds: Normal breath sounds. No wheezing or rales.   Chest:      Chest wall: No tenderness.   Abdominal:      Palpations: Abdomen is soft.      Tenderness: There is no abdominal tenderness.   Musculoskeletal:      Cervical back: Normal range of motion and neck supple.   Lymphadenopathy:      Cervical: No cervical adenopathy.   Skin:     General: Skin is warm and dry.      Findings: No rash.   Neurological:      Mental Status: She is alert and oriented to person, place, and time.         Assessment:       1. Subacute cough    2. Complete heart block    3. Pacemaker    4. Paroxysmal atrial fibrillation    5. Chronic diastolic heart failure    6. H/O mitral valve replacement with mechanical valve    7. Long term (current) use of anticoagulants        Plan:     Medication List with Changes/Refills   New Medications    PROMETHAZINE-DEXTROMETHORPHAN (PROMETHAZINE-DM) 6.25-15 MG/5 ML SYRP    Take 5-10 mLs by mouth 3 (three) times daily as needed.   Current Medications    ASPIRIN (ECOTRIN) 81 MG EC TABLET    Take 1 tablet (81 mg total) by mouth once daily.    DIGOXIN (LANOXIN) 250 MCG TABLET    Take 1 tablet (250 mcg total) by mouth once daily.    DOFETILIDE (TIKOSYN) 250 MCG CAP    Take 1 capsule (250 mcg total) by mouth every 12 (twelve) hours.    METOPROLOL SUCCINATE (TOPROL-XL) 100 MG 24 HR TABLET    Take 1 tablet (100 mg total) by mouth 2 (two) times daily.    METOPROLOL TARTRATE (LOPRESSOR) 25 MG TABLET    Take 1 tablet (25 mg total) by mouth 2 (two) times daily as needed (Palpitations).    PROMETHAZINE (PHENERGAN) 12.5 MG TAB    Take 1 tablet (12.5 mg total) by mouth 3 (three) times daily as needed (nausea).     SEMAGLUTIDE, WEIGHT LOSS, (WEGOVY) 0.25 MG/0.5 ML PNIJ    Inject 0.25 mg into the skin every 7 days.    SEMAGLUTIDE, WEIGHT LOSS, (WEGOVY) 0.5 MG/0.5 ML PNIJ    Inject 0.5 mg into the skin every 7 days.    SEMAGLUTIDE, WEIGHT LOSS, (WEGOVY) 1 MG/0.5 ML PNIJ    Inject 1 mg into the skin every 7 days.    SODIUM,POTASSIUM,MAG SULFATES (SUPREP BOWEL PREP KIT) 17.5-3.13-1.6 GRAM SOLR    Take 177 mLs by mouth once daily. Take as directed by provider office for 2 days    WARFARIN (COUMADIN) 4 MG TABLET    Take 1-2 tablets (4-8 mg total) by mouth Daily. As directed by the [facility]madin Clinic     1. Subacute cough  -     X-Ray Chest PA And Lateral; Future; Expected date: 05/27/2025  -     promethazine-dextromethorphan (PROMETHAZINE-DM) 6.25-15 mg/5 mL Syrp; Take 5-10 mLs by mouth 3 (three) times daily as needed.  Dispense: 118 mL; Refill: 0    2. Complete heart block  Overview:  -followed by cardiology and EPS  -s/p MVR with a 25mm [employer] standard mechanical prosthesis plus TVr 1/8/2024 complicated by complete heart block requiring PPM 1/17/2024 despite a robust junctional escape rhythm       3. Pacemaker  Overview:  -followed by EPS    -Medtronic dual-chamber pacemaker with left bundle branch area pacing (LBBAP) lead, implanted 1/17/2024 in setting of postop atrial flutter and complete heart block with junctional escape of ~50 bpm with persistent AV dissociation following mitral valve replacement with a mechanical mitral valve and a tricuspid valve annuloplasty on 1/10/2024       4. Paroxysmal atrial fibrillation  Overview:  -followed by EPS    -Medtronic dual-chamber pacemaker with left bundle branch area pacing (LBBAP) lead, implanted 1/17/2024 in setting of postop atrial flutter and complete heart block with junctional escape of ~50 bpm with persistent AV dissociation following mitral valve replacement with a mechanical mitral valve and a tricuspid valve annuloplasty on 1/10/2024       5. Chronic diastolic heart  failure  Overview:  -4814-9536 d/t MVR  -followed by cardiology  -s/p MVR with a 25mm [employer] standard mechanical prosthesis plus TVr 1/8/2024 complicated by complete heart block requiring PPM 1/17/2024 despite a robust junctional escape rhythm   -EF 55/70% 2/26/2024 echo      6. H/O mitral valve replacement with mechanical valve  Overview:  -hx MV repair 2003  -s/p MVR with a 25mm [employer] standard mechanical prosthesis plus TVr 1/8/2024 complicated by complete heart block requiring PPM 1/17/2024 despite a robust junctional escape rhythm  -long term AC  -followed by cardiology      7. Long term (current) use of anticoagulants  Overview:  -followed by cardiology  -s/p MVR with a 25mm [employer]omed[employer] standard mechanical prosthesis plus TVr 1/8/2024 complicated by complete heart block requiring PPM 1/17/2024 despite a robust junctional escape rhythm        See meds, orders, follow up, routing and instructions sections of encounter and AVS. Discussed with patient and provided on AVS.    Z pack category D interaction w/ heart meds. Trial phenergan. If not improved the consider augmentin. Call if still not improving in a fw days or certainly for worsening, or fever, etc.

## 2025-05-29 ENCOUNTER — ANTI-COAG VISIT (OUTPATIENT)
Dept: CARDIOLOGY | Facility: CLINIC | Age: 40
End: 2025-05-29
Payer: COMMERCIAL

## 2025-05-29 DIAGNOSIS — Z95.2 H/O MITRAL VALVE REPLACEMENT WITH MECHANICAL VALVE: ICD-10-CM

## 2025-05-29 DIAGNOSIS — Z79.01 LONG TERM (CURRENT) USE OF ANTICOAGULANTS: Primary | ICD-10-CM

## 2025-05-29 LAB — INR PPP: 3

## 2025-05-29 PROCEDURE — 93793 ANTICOAG MGMT PT WARFARIN: CPT | Mod: S$GLB,,,

## 2025-05-29 NOTE — PROGRESS NOTES
[facility]    05/29/2025    King Botello (40 y.o.) is followed by the [facility].      Assessment/Plan:    King Botello presents with a therapeutic INR. Goal INR: 2.5-3.5    Lab Results   Component Value Date    INR 3.0 05/29/2025    INR 2.5 05/22/2025    INR 2.4 05/15/2025    PROTIME 27.1 (H) 04/24/2025    PROTIME 37.9 (H) 04/17/2025       Assessment of patient findings per MA/LPN and chart review:   The following significant findings were found:   None    Recommendation for patient's warfarin regimen:   No change was made to warfarin therapy during this visit and patient has been instructed to continue their current warfarin regimen.    Recommended repeat INR in 1 week      Gifty Lemos, PharmD  Preferred Contact: Secure Messaging or In Basket Message

## 2025-05-30 ENCOUNTER — PATIENT MESSAGE (OUTPATIENT)
Dept: HEMATOLOGY/ONCOLOGY | Facility: CLINIC | Age: 40
End: 2025-05-30
Payer: COMMERCIAL

## 2025-05-30 ENCOUNTER — DOCUMENTATION ONLY (OUTPATIENT)
Dept: HEMATOLOGY/ONCOLOGY | Facility: CLINIC | Age: 40
End: 2025-05-30
Payer: COMMERCIAL

## 2025-05-30 NOTE — Clinical Note
Hello,   Just wanted to bring to your awareness that King Botello  had pharmacogenomics (PGx) testing done through a  initiative [facility] is currently offering for select patients who are taking medications that may be impacted by genetic results.   As part of this , I have reviewed their genetic results and current medications to help provide insight into current medication optimization options, as well as future medication choices as appropriate.   For any future medications prescribed by any [facility] provider, if there is an interaction with the medication and the patient's genetic results, a clinical alert will fire for both providers and pharmacists. These interactions are reviewed by the PGx team monthly and updated as new data becomes available.   If you have any questions about the results or PGx at [facility] in general, please don't hesitate to reach out to me directly.   Michelle Hercules, PharmD Clinical Pharmacogenomics Pharmacist

## 2025-05-30 NOTE — PROGRESS NOTES
"Pharmacogenomics (PGx) Consult     Chief Complaint: King Botello  has undergone pharmacogenomic testing via self-enrollment in the [facility] PGx . Patient completed sample collection on 2025 and results were provided on 2025.     Test results: Pharmacogenomic results can be found in GENOMIC INDICATORS    Past Medical History:  -------------------------------------    Abnormal Pap smear    Colposcopy    Acute postoperative anemia due to expected blood loss    Atrial fibrillation and flutter    Bradycardia    Class 2 obesity due to excess calories with body mass index (BMI) of 35.0 to 35.9 in adult    Hypertension, essential    Junctional escape beats    Menarche    Age of onset 11    Missed ab    Mitral valve prolapse    Pre-existing essential hypertension during pregnancy, antepartum    Previous  delivery affecting pregnancy, antepartum    Primary hypertension    S/P suction D&C    Status post mitral valve annuloplasty    Thyroid nodule    Transient hyperglycemia post procedure        Allergies:   Review of patient's allergies indicates:   Allergen Reactions    Cinnamon Itching    Doxycycline      Abdomen pain severe        Medications: Current Medications[1]      Clinical Recommendations based on PGx    The patient is not currently on any medications that need adjustment based on pharmacogenomic results.     Of note, patient has two genes that affect breakdown of warfarin. As patient has been on warfarin for multiple years and is managed by Coumadin clinic and INR monitoring, these results have low clinical relevance and are limited to help guide initial dosing of warfarin.     -Patient has 1 PGx result that can impact future medication selection- please see "Clinically Actionable PGx Results" for full list of medication interactions    -For future antidepressant prescribing recommendations, use the "Antidepressants with PGx" Smart Set under Orders to view personalized " antidepressant recommendations based on the patients Pgx    Clinically Actionable PGx Results   *Note, actionable recommendations may change based on updates to existing PGx literature. For the most up to date medication recommendations based on patients' results, please view the GENOMIC INDICATORS module in Epic.      CYP2D6 Intermediate Metabolizer       Genomic results predict that this patient may metabolize CYP2D6 substrates at a rate that falls below average metabolic capacity. Thus, this patient may have an increased risk of adverse or poor response to medications metabolized by CYP2D6. To avoid these types of responses, dose adjustments or alternative therapeutic agents may be necessary when medications metabolized by CYP2D6 are being considered. For questions, call [phone] ([phone]) or order PGx Consult [[id number]].         Intermediate Metabolizer of Amitriptyline        This patient has a CYP2D6 genomic indicator which indicates reduced metabolism of tricyclic antidepressants such as amitriptyline and potential for side effects. Consider an alternative drug or reduction in recommended starting dose.     See [facility] clinical guidelines for more information.               Intermediate Metabolizer of Atomoxetine        This patient has a CYP2D6 genomic indicator which indicates reduced metabolism of atomoxetine. Initiate with usual starting dose and wait 2 weeks for clinical response/adverse effects before changing dose.     See [facility] clinical guidelines for more information.                Intermediate Metabolizer of Codeine        This patient has a CYP2D6 genomic indicator which indicates reduced metabolism of codeine and potential for insufficient pain relief when treated with codeine. Use normal doses of codeine and if no response, consider an alternative non-codeine opioid.     See [facility] clinical guidelines for more information.               Intermediate Metabolizer of Nortriptyline        This patient has  a CYP2D6 genomic indicator which indicates reduced metabolism of tricyclic antidepressants (TCAs) such as nortriptyline and increased probability of side effects. Consider an alternative drug or reduction in starting dose.     See [facility] clinical guidelines for more information.               Intermediate Metabolizer of Tamoxifen        This patient has a CYP2D6 genomic indicator which indicates lower endoxifen concentrations and higher risk of breast cancer recurrence. Consider an alternative hormonal therapy.     See [facility] clinical guidelines for more information.               Intermediate Metabolizer of Tramadol        This patient has a CYP2D6 genomic indicator which indicates reduced metabolism of tramadol and potential for insufficient pain relief when treated with tramadol. Use normal doses of tramadol and if no response, consider an alternative non-codeine opioid.     See [facility] clinical guidelines for more information.                  VKORC1 [id number] G/A       Genomic results predict that this patient may have increased sensitivity to warfarin and, thus, have an increased risk of adverse or poor response upon initiation of therapy. This risk may be further influenced by decreases in CYP2C9 function. To avoid risk of adverse or poor response, consider the functionality of both genes before initial dose determination or adjustment. Consider alternative therapeutic agents. Note: Some dosing algorithms for warfarin initiation also incorporate the CYP4F2 gene and CYP2C cluster variant. For questions, call [phone] ([phone]) or order PGx Consult [[id number]].        Consider Alternative Dosing of Warfarin        This patient has a CYP2C9, CYP4F2 and/or VKORC1 genomic indicator which indicates altered response to warfarin. Initial starting dose should be calculated based on validated published pharmacogenetic algorithms.     See [facility] clinical guidelines for more information.                     -If there is an  "interaction with any future medications and the patient's genetic results, a clinical alert will fire for both providers and pharmacists. These interactions are reviewed by the PGx team and updated as new data becomes available.      -PGx information can be accessed by patients within [facility]- results with "actionable result DETECTED" should be communicated to providers outside of [facility] as our clinical alerts will NOT fire    -If you have any additional questions, please don't hesitate to reach out to me or contact [phone] ([phone]).    Michelle Vo, PharmD  Clinical Pharmacogenomics Pharmacist                 [1]   aspirin (ECOTRIN) 81 MG EC tablet    digoxin (LANOXIN) 250 mcg tablet    dofetilide (TIKOSYN) 250 MCG Cap    metoprolol succinate (TOPROL-XL) 100 MG 24 hr tablet    metoprolol tartrate (LOPRESSOR) 25 MG tablet    promethazine (PHENERGAN) 12.5 MG Tab    promethazine-dextromethorphan (PROMETHAZINE-DM) 6.25-15 mg/5 mL Syrp    semaglutide, weight loss, (WEGOVY) 0.25 mg/0.5 mL PnIj    semaglutide, weight loss, (WEGOVY) 0.5 mg/0.5 mL PnIj    semaglutide, weight loss, (WEGOVY) 1 mg/0.5 mL PnIj    [START ON 6/14/2025] sodium,potassium,mag sulfates (SUPREP BOWEL PREP KIT) 17.5-3.13-1.6 gram SolR    warfarin (COUMADIN) 4 MG tablet    "

## 2025-06-05 ENCOUNTER — ANTI-COAG VISIT (OUTPATIENT)
Dept: CARDIOLOGY | Facility: CLINIC | Age: 40
End: 2025-06-05
Payer: COMMERCIAL

## 2025-06-05 DIAGNOSIS — Z79.01 LONG TERM (CURRENT) USE OF ANTICOAGULANTS: Primary | ICD-10-CM

## 2025-06-05 DIAGNOSIS — Z95.2 H/O MITRAL VALVE REPLACEMENT WITH MECHANICAL VALVE: ICD-10-CM

## 2025-06-05 LAB — INR PPP: 3.5

## 2025-06-09 ENCOUNTER — PATIENT MESSAGE (OUTPATIENT)
Dept: CARDIOLOGY | Facility: CLINIC | Age: 40
End: 2025-06-09

## 2025-06-09 ENCOUNTER — ANTI-COAG VISIT (OUTPATIENT)
Dept: CARDIOLOGY | Facility: CLINIC | Age: 40
End: 2025-06-09
Payer: COMMERCIAL

## 2025-06-09 DIAGNOSIS — Z79.01 LONG TERM (CURRENT) USE OF ANTICOAGULANTS: Primary | ICD-10-CM

## 2025-06-09 DIAGNOSIS — Z95.2 H/O MITRAL VALVE REPLACEMENT WITH MECHANICAL VALVE: ICD-10-CM

## 2025-06-09 LAB — INR PPP: 8

## 2025-06-09 PROCEDURE — 93793 ANTICOAG MGMT PT WARFARIN: CPT | Mod: S$GLB,,,

## 2025-06-09 RX ORDER — ENOXAPARIN SODIUM 100 MG/ML
1 INJECTION SUBCUTANEOUS EVERY 12 HOURS
Qty: 20 EACH | Refills: 0 | Status: SHIPPED | OUTPATIENT
Start: 2025-06-09

## 2025-06-09 NOTE — PROGRESS NOTES
[facility]    06/09/2025    King Botello (40 y.o.) is followed by the [facility].      Assessment/Plan:    King Boetllo presents with a supratherapeutic INR. Goal INR: 2.5-3.5    Lab Results   Component Value Date    INR 8.0 06/09/2025    INR 3.5 06/05/2025    INR 3.0 05/29/2025    PROTIME 27.1 (H) 04/24/2025    PROTIME 37.9 (H) 04/17/2025       Assessment of patient findings per MA/LPN and chart review:   The following significant findings were found:   Patient reports drinking a large amount of alcohol over the weekend  Reports not eating her typical 5-7 servings of greens during the week but instead is eating 1 serving/week  She is experiencing a chronic cough over the last 5 weeks  She is scheduled to have a colonoscopy on 6/16 and will need to hold warfarin for 5 days with a bridge    Recommendation for patient's warfarin regimen:   Pre-Procedure Instructions:    Last dose of warfarin: 6/8/25    Enoxaparin dose is: 80mg Q12H     Start enoxaparin injections: 6/13/25 in the morning    Last dose of enoxaparin: 6/15/25 in the morning    Post-Procedure Instructions:    Restart warfarin: 6/16/25 as per calendar or per operating physician    Restart enoxaparin: 6/17/25 in the morning    The patient has been instructed to call the Anticoagulation Clinic if given different recommendations post procedure.      Recommended repeat INR in 2 weeks      Laine Glynn, Fan, BCPS  Clinical Pharmacist - [facility]  Preferred Contact: Secure Messaging or In Basket Message

## 2025-06-10 ENCOUNTER — PATIENT MESSAGE (OUTPATIENT)
Dept: ELECTROPHYSIOLOGY | Facility: CLINIC | Age: 40
End: 2025-06-10
Payer: COMMERCIAL

## 2025-06-11 ENCOUNTER — PATIENT MESSAGE (OUTPATIENT)
Dept: INTERNAL MEDICINE | Facility: CLINIC | Age: 40
End: 2025-06-11
Payer: COMMERCIAL

## 2025-06-11 DIAGNOSIS — J32.9 SINUSITIS, UNSPECIFIED CHRONICITY, UNSPECIFIED LOCATION: Primary | ICD-10-CM

## 2025-06-11 RX ORDER — AMOXICILLIN AND CLAVULANATE POTASSIUM 875; 125 MG/1; MG/1
1 TABLET, FILM COATED ORAL EVERY 12 HOURS
Qty: 20 TABLET | Refills: 0 | Status: SHIPPED | OUTPATIENT
Start: 2025-06-11 | End: 2025-06-21

## 2025-06-11 NOTE — TELEPHONE ENCOUNTER
LOV with Mayco Hansen MD , 5/27/2025    Patient states she is still having a very productive cough, two weeks later, no fever. Patient states cough medicine does not help much and now her head hurts when she coughs.    Note dated 5/27/25:  1. Subacute cough  -     X-Ray Chest PA And Lateral; Future; Expected date: 05/27/2025  -     promethazine-dextromethorphan (PROMETHAZINE-DM) 6.25-15 mg/5 mL Syrp; Take 5-10 mLs by mouth 3 (three) times daily as needed.  Dispense: 118 mL; Refill: 0

## 2025-06-16 ENCOUNTER — HOSPITAL ENCOUNTER (OUTPATIENT)
Facility: HOSPITAL | Age: 40
Discharge: HOME | End: 2025-06-16
Attending: INTERNAL MEDICINE | Admitting: INTERNAL MEDICINE
Payer: COMMERCIAL

## 2025-06-16 ENCOUNTER — ANTI-COAG VISIT (OUTPATIENT)
Dept: CARDIOLOGY | Facility: CLINIC | Age: 40
End: 2025-06-16
Payer: COMMERCIAL

## 2025-06-16 ENCOUNTER — RESULTS FOLLOW-UP (OUTPATIENT)
Dept: INTERNAL MEDICINE | Facility: CLINIC | Age: 40
End: 2025-06-16

## 2025-06-16 ENCOUNTER — ANESTHESIA (OUTPATIENT)
Dept: ENDOSCOPY | Facility: HOSPITAL | Age: 40
End: 2025-06-16
Payer: COMMERCIAL

## 2025-06-16 ENCOUNTER — ANESTHESIA EVENT (OUTPATIENT)
Dept: ENDOSCOPY | Facility: HOSPITAL | Age: 40
End: 2025-06-16
Payer: COMMERCIAL

## 2025-06-16 VITALS
RESPIRATION RATE: 16 BRPM | OXYGEN SATURATION: 99 % | DIASTOLIC BLOOD PRESSURE: 75 MMHG | SYSTOLIC BLOOD PRESSURE: 119 MMHG | HEIGHT: 61 IN | BODY MASS INDEX: 33.99 KG/M2 | WEIGHT: 180 LBS | HEART RATE: 69 BPM | TEMPERATURE: 97 F

## 2025-06-16 DIAGNOSIS — K62.5 RECTAL BLEEDING: ICD-10-CM

## 2025-06-16 DIAGNOSIS — K92.2 LOWER GI BLEED: Primary | ICD-10-CM

## 2025-06-16 DIAGNOSIS — Z95.2 H/O MITRAL VALVE REPLACEMENT WITH MECHANICAL VALVE: ICD-10-CM

## 2025-06-16 DIAGNOSIS — Z79.01 LONG TERM (CURRENT) USE OF ANTICOAGULANTS: Primary | ICD-10-CM

## 2025-06-16 LAB
B-HCG UR QL: NEGATIVE
CTP QC/QA: YES
INR PPP: 1.1

## 2025-06-16 PROCEDURE — 81025 URINE PREGNANCY TEST: CPT | Performed by: INTERNAL MEDICINE

## 2025-06-16 PROCEDURE — 45378 DIAGNOSTIC COLONOSCOPY: CPT | Mod: ,,, | Performed by: INTERNAL MEDICINE

## 2025-06-16 PROCEDURE — 93793 ANTICOAG MGMT PT WARFARIN: CPT | Mod: S$GLB,,,

## 2025-06-16 PROCEDURE — 37000009 HC ANESTHESIA EA ADD 15 MINS: Performed by: INTERNAL MEDICINE

## 2025-06-16 PROCEDURE — 25000003 PHARM REV CODE 250: Performed by: INTERNAL MEDICINE

## 2025-06-16 PROCEDURE — 45378 DIAGNOSTIC COLONOSCOPY: CPT | Performed by: INTERNAL MEDICINE

## 2025-06-16 PROCEDURE — 37000008 HC ANESTHESIA 1ST 15 MINUTES: Performed by: INTERNAL MEDICINE

## 2025-06-16 PROCEDURE — 63600175 PHARM REV CODE 636 W HCPCS

## 2025-06-16 RX ORDER — LIDOCAINE HYDROCHLORIDE 20 MG/ML
INJECTION, SOLUTION EPIDURAL; INFILTRATION; INTRACAUDAL; PERINEURAL
Status: DISCONTINUED | OUTPATIENT
Start: 2025-06-16 | End: 2025-06-16

## 2025-06-16 RX ORDER — SODIUM CHLORIDE 9 MG/ML
INJECTION, SOLUTION INTRAVENOUS CONTINUOUS
Status: DISCONTINUED | OUTPATIENT
Start: 2025-06-16 | End: 2025-06-16 | Stop reason: HOSPADM

## 2025-06-16 RX ORDER — GLUCAGON 1 MG
1 KIT INJECTION
Status: DISCONTINUED | OUTPATIENT
Start: 2025-06-16 | End: 2025-06-16 | Stop reason: HOSPADM

## 2025-06-16 RX ORDER — SODIUM CHLORIDE 0.9 % (FLUSH) 0.9 %
10 SYRINGE (ML) INJECTION
Status: DISCONTINUED | OUTPATIENT
Start: 2025-06-16 | End: 2025-06-16 | Stop reason: HOSPADM

## 2025-06-16 RX ORDER — PROPOFOL 10 MG/ML
VIAL (ML) INTRAVENOUS
Status: DISCONTINUED | OUTPATIENT
Start: 2025-06-16 | End: 2025-06-16

## 2025-06-16 RX ADMIN — PROPOFOL 175 MCG/KG/MIN: 10 INJECTION, EMULSION INTRAVENOUS at 08:06

## 2025-06-16 RX ADMIN — PROPOFOL 60 MG: 10 INJECTION, EMULSION INTRAVENOUS at 08:06

## 2025-06-16 RX ADMIN — SODIUM CHLORIDE: 0.9 INJECTION, SOLUTION INTRAVENOUS at 08:06

## 2025-06-16 RX ADMIN — LIDOCAINE HYDROCHLORIDE 50 MG: 20 INJECTION, SOLUTION EPIDURAL; INFILTRATION; INTRACAUDAL; PERINEURAL at 08:06

## 2025-06-16 RX ADMIN — PROPOFOL 10 MG: 10 INJECTION, EMULSION INTRAVENOUS at 08:06

## 2025-06-16 NOTE — PROVATION PATIENT INSTRUCTIONS
Discharge Summary/Instructions after an Endoscopic Procedure  Patient Name: King Botello  Patient MRN: [id number]  Patient YOB: 1985 Monday, June 16, 2025  Ed Valencia MD  Dear patient,  As a result of recent federal legislation (The Federal Cures Act), you may   receive lab or pathology results from your procedure in your [facility]   account before your physician is able to contact you. Your physician or   their representative will relay the results to you with their   recommendations at their soonest availability.  Thank you,  RESTRICTIONS:  During your procedure today, you received medications for sedation.  These   medications may affect your judgment, balance and coordination.  Therefore,   for 24 hours, you have the following restrictions:   - DO NOT drive a car, operate machinery, make legal/financial decisions,   sign important papers or drink alcohol.    ACTIVITY:  Today: no heavy lifting, straining or running due to procedural   sedation/anesthesia.  The following day: return to full activity including work.  DIET:  Eat and drink normally unless instructed otherwise.     TREATMENT FOR COMMON SIDE EFFECTS:  - Mild abdominal pain, nausea, belching, bloating or excessive gas:  rest,   eat lightly and use a heating pad.  - Sore Throat: treat with throat lozenges and/or gargle with warm salt   water.  - Because air was used during the procedure, expelling large amounts of air   from your rectum or belching is normal.  - If a bowel prep was taken, you may not have a bowel movement for 1-3 days.    This is normal.  SYMPTOMS TO WATCH FOR AND REPORT TO YOUR PHYSICIAN:  1. Abdominal pain or bloating, other than gas cramps.  2. Chest pain.  3. Back pain.  4. Signs of infection such as: chills or fever occurring within 24 hours   after the procedure.  5. Rectal bleeding, which would show as bright red, maroon, or black stools.   (A tablespoon of blood from the rectum is not serious, especially if    hemorrhoids are present.)  6. Vomiting.  7. Weakness or dizziness.  GO DIRECTLY TO THE NEAREST EMERGENCY ROOM IF YOU HAVE ANY OF THE FOLLOWING:      Difficulty breathing              Chills and/or fever over 101 F   Persistent vomiting and/or vomiting blood   Severe abdominal pain   Severe chest pain   Black, tarry stools   Bleeding- more than one tablespoon   Any other symptom or condition that you feel may need urgent attention  Your doctor recommends these additional instructions:  If any biopsies were taken, your doctors clinic will contact you in 1 to 2   weeks with any results.  - Discharge patient to home.   - Repeat colonoscopy in 5 years for screening purposes.   - Resume Coumadin (warfarin) at prior dose today.  Refer to Coumadin Clinic   for further adjustment of therapy.   - The findings and recommendations were discussed with the designated   responsible adult.  For questions, problems or results please call your physician - Ed Valencia MD at Work:  [phone].  [facility], EMERGENCY ROOM PHONE NUMBER: [phone]  IF A COMPLICATION OR EMERGENCY SITUATION ARISES AND YOU ARE UNABLE TO REACH   YOUR PHYSICIAN - GO DIRECTLY TO THE EMERGENCY ROOM.  Ed Valencia MD  6/16/2025 8:41:16 AM  This report has been verified and signed electronically.  Dear patient,  As a result of recent federal legislation (The Federal Cures Act), you may   receive lab or pathology results from your procedure in your [facility]   account before your physician is able to contact you. Your physician or   their representative will relay the results to you with their   recommendations at their soonest availability.  Thank you,  PROVATION   Destruction After The Procedure: No

## 2025-06-16 NOTE — PROGRESS NOTES
Discharge instructions reviewed w/ pt and , verbalized understanding. Pt in NADN.No complaints at this time. Tolerated liquids w/ no issues. To be d/c'd home w/ .

## 2025-06-16 NOTE — ANESTHESIA POSTPROCEDURE EVALUATION
Anesthesia Post Evaluation    Patient: King Botello    Procedure(s) Performed: Procedure(s) (LRB):  COLONOSCOPY (N/A)    Final Anesthesia Type: general      Patient location during evaluation: [facility]  Patient participation: Yes- Able to Participate  Level of consciousness: awake and alert and oriented  Post-procedure vital signs: reviewed and stable  Pain management: adequate  Airway patency: patent    PONV status at discharge: No PONV  Anesthetic complications: no      Cardiovascular status: blood pressure returned to baseline  Respiratory status: unassisted and spontaneous ventilation  Hydration status: euvolemic  Follow-up not needed.              Vitals Value Taken Time   /75 06/16/25 09:17   Temp 36.1 °C (97 °F) 06/16/25 08:45   Pulse 70 06/16/25 09:19   Resp 23 06/16/25 09:19   SpO2 99 % 06/16/25 09:19   Vitals shown include unfiled device data.      No case tracking events are documented in the log.      Pain/Katerine Score: Katerine Score: 10 (6/16/2025  9:00 AM)

## 2025-06-16 NOTE — PROGRESS NOTES
[facility]    06/16/2025    King Botello (40 y.o.) is followed by the [facility].      Assessment/Plan:    King Botello presents with a subtherapeutic  INR. Goal INR: 2.5-3.5    Lab Results   Component Value Date    INR 1.1 06/16/2025    INR 8.0 06/09/2025    INR 3.5 06/05/2025    PROTIME 27.1 (H) 04/24/2025    PROTIME 37.9 (H) 04/17/2025       Assessment of patient findings per MA/LPN and chart review:   The following significant findings were found:   Patient started Amox-Clav 875-125mg q 12 hrs on 6/13/25  Diarrhea due to prep for colonoscopy today    Recommendation for patient's warfarin regimen:   No change was made to warfarin therapy during this visit and patient has been instructed to continue their current warfarin regimen.  No booster dose to be given as patient was very supratherapeutic before procedure with no clear indication, hesitant to give any higher of dose   Patient is to continue taking Lovenox until next INR on 6/19/25    Recommended repeat INR in 3 days      Laine Glynn, Fan, BCPS  Clinical Pharmacist - [facility]  Preferred Contact: Secure Messaging or In Basket Message

## 2025-06-16 NOTE — TRANSFER OF CARE
"Anesthesia Transfer of Care Note    Patient: King Botello    Procedure(s) Performed: Procedure(s) (LRB):  COLONOSCOPY (N/A)    Patient location: PACU    Anesthesia Type: general    Transport from OR: Transported from OR on 6-10 L/min O2 by face mask with adequate spontaneous ventilation    Post pain: adequate analgesia    Post assessment: no apparent anesthetic complications and tolerated procedure well    Post vital signs: stable    Level of consciousness: awake, alert and oriented    Nausea/Vomiting: no nausea/vomiting    Complications: none    Transfer of care protocol was followed      Last vitals: Visit Vitals  Pulse 79   Temp 36.7 °C (98.1 °F) (Oral)   Resp 16   Ht 5' 1" (1.549 m)   Wt 81.6 kg (180 lb)   SpO2 97%   Breastfeeding No   BMI 34.01 kg/m²     "

## 2025-06-16 NOTE — ANESTHESIA PREPROCEDURE EVALUATION
[facility]  Anesthesia Pre-Operative Evaluation     Patient Name: King Botello  YOB: 1985  MRN: [id number]  [facility]: [id number]       Admit Date: 2025   Admit Team: Networked reference to record PCT   Hospital Day: 1  Date of Procedure: 2025  Anesthesia: Choice Procedure: Procedure(s) (LRB):  COLONOSCOPY (N/A)  Pre-Operative Diagnosis: Rectal bleeding [K62.5]  Proceduralist:Surgeons and Role:     * Ed Valencia MD - Primary  Code Status: Prior   Advanced Directive: <no information>  Isolation Precautions: No active isolations  Capacity: Full capacity     SUBJECTIVE:   King Botello is a 40 y.o. female with below PMH presenting for above procedure(s).    Past Medical History:   Diagnosis Date    Abnormal Pap smear     Colposcopy    Acute postoperative anemia due to expected blood loss 2024    Atrial fibrillation and flutter 2024    Bradycardia 2020    Class 2 obesity due to excess calories with body mass index (BMI) of 35.0 to 35.9 in adult 2021    Hypertension, essential 2021    Junctional escape beats 2024    Menarche     Age of onset 11    Missed ab 2021    Mitral valve prolapse     Pre-existing essential hypertension during pregnancy, antepartum 2023    Previous  delivery affecting pregnancy, antepartum 2020    Primary hypertension 2021    S/P suction D&C 2021    Status post mitral valve annuloplasty 2017    Thyroid nodule 2025    Transient hyperglycemia post procedure 2024       0.9% NaCl   Intravenous Continuous         Hospital LOS: 0 days  ICU LOS: Patient does not have an ICU stay during this admission.    ALLERGIES:     Review of patient's allergies indicates:   Allergen Reactions    Cinnamon Itching    Doxycycline      Abdomen pain severe     LDA:     Lines/Drains/Airways       Peripheral Intravenous Line  Duration                  Peripheral IV -  Single Lumen 06/16/25 0741 20 G Posterior;Right Hand <1 day                   Anesthesia Evaluation     Patient summary reviewed    No history of anesthetic complications   Airway   Mallampati: II  TM distance: Normal  Neck ROM: Normal ROM  Dental    (+) Intact    Pulmonary    (-) COPD, asthma  Cardiovascular   Exercise tolerance: good  (+) pacemaker, hypertension, valvular problems/murmurs, dysrhythmias  (-) past MI, CAD, CABG/stent, angina, BLANK    Neuro/Psych    (-) seizures, TIA, CVA    GI/Hepatic/Renal    (-) GERD, liver disease, renal disease    Endo/Other    (-) diabetes mellitus  Abdominal                  MEDICATIONS:     Current Outpatient Medications on File Prior to Encounter   Medication Sig Dispense Refill Last Dose/Taking    digoxin (LANOXIN) 250 mcg tablet Take 1 tablet (250 mcg total) by mouth once daily. 90 tablet 3 6/15/2025    dofetilide (TIKOSYN) 250 MCG Cap Take 1 capsule (250 mcg total) by mouth every 12 (twelve) hours. 180 capsule 3 6/15/2025    metoprolol succinate (TOPROL-XL) 100 MG 24 hr tablet Take 1 tablet (100 mg total) by mouth 2 (two) times daily. 180 tablet 3 6/16/2025 Morning    aspirin (ECOTRIN) 81 MG EC tablet Take 1 tablet (81 mg total) by mouth once daily. 30 tablet 11     metoprolol tartrate (LOPRESSOR) 25 MG tablet Take 1 tablet (25 mg total) by mouth 2 (two) times daily as needed (Palpitations). 180 tablet 3     promethazine (PHENERGAN) 12.5 MG Tab Take 1 tablet (12.5 mg total) by mouth 3 (three) times daily as needed (nausea). 24 tablet 0     semaglutide, weight loss, (WEGOVY) 0.25 mg/0.5 mL PnIj Inject 0.25 mg into the skin every 7 days. 2 mL 0     semaglutide, weight loss, (WEGOVY) 0.5 mg/0.5 mL PnIj Inject 0.5 mg into the skin every 7 days. 2 mL 0     semaglutide, weight loss, (WEGOVY) 1 mg/0.5 mL PnIj Inject 1 mg into the skin every 7 days. 2 mL 0 6/4/2025    warfarin (COUMADIN) 4 MG tablet Take 1-2 tablets (4-8 mg total) by mouth Daily. As directed by the  "Coumadin Clinic 60 tablet 11 2025      Inpatient Medications:  Antibiotics (From admission, onward)      None          VTE Risk Mitigation (From admission, onward)      None              Current Medications[1]       History:   There are no hospital problems to display for this patient.    Surgical History:    has a past surgical history that includes  section; Mitral valve repair; Dilation and curettage of uterus using suction (N/A, 2021);  section with tubal ligation (Bilateral, 2023); Mitral valve replacement (N/A, 2024); valvuloplasty, tricuspid (N/A, 2024); A-V cardiac pacemaker insertion (N/A, 2024); Treatment of cardiac arrhythmia (N/A, 2024); Transesophageal echocardiography (N/A, 2024); Treatment of cardiac arrhythmia (N/A, 2024); and echocardiogram,transesophageal (N/A, 2024).   Social History:    reports being sexually active and has had partner(s) who are male. She reports using the following method of birth control/protection: None.  reports that she has never smoked. She has never used smokeless tobacco. She reports that she does not currently use alcohol. She reports that she does not use drugs.    Vitals:    25 0730   Pulse: 79   Resp: 16   Temp: 36.7 °C (98.1 °F)   TempSrc: Oral   SpO2: 97%   Weight: 81.6 kg (180 lb)   Height: 5' 1" (1.549 m)     Vital Signs Range (Last 24H):  Temp:  [36.7 °C (98.1 °F)]   Pulse:  [79]   Resp:  [16]   SpO2:  [97 %]     Body mass index is 34.01 kg/m².  Wt Readings from Last 4 Encounters:   25 81.6 kg (180 lb)   25 83.3 kg (183 lb 10.3 oz)   25 85.3 kg (188 lb)   25 85.6 kg (188 lb 11.4 oz)        Intake/Output - Last 3 Shifts       None          Lab Results   Component Value Date    WBC 10.26 2025    HGB 13.0 2025    HCT 41.7 2025     2025     2024    K 4.4 2024     2024    CREATININE 0.7 2024    BUN 10 " "12/19/2024    CO2 23 12/19/2024    GLU 81 12/19/2024    CALCIUM 9.3 12/19/2024    MG 2.0 05/02/2024    PHOS 3.6 01/18/2024    ALKPHOS 57 06/26/2024    ALT 11 06/26/2024    AST 25 06/26/2024    ALBUMIN 3.7 06/26/2024    INR 8.0 06/09/2025    PROTIME 27.1 (H) 04/24/2025    APTT 40.8 (H) 06/26/2024    HGBA1C 5.0 02/29/2024    CPK 94 03/30/2008    TROPONINI 0.089 (H) 01/29/2024    BNP 66 12/19/2024    HCGQUANT 32343 05/17/2021     Recent Results (from the past 12 hours)   POCT urine pregnancy    Collection Time: 06/16/25  7:42 AM   Result Value Ref Range    POC Preg Test, Ur Negative Negative     Acceptable Yes      No results for input(s): "WBC", "HGB", "HCT", "PLT", "NA", "K", "CREATININE", "GLU", "INR", "LACTATE", "5HIAAPLASMA", "5HIAAURINT", "5HIAA", "0BRSC07SF" in the last 168 hours.  No LMP recorded.    EKG:   Results for orders placed or performed during the hospital encounter of 06/26/24   EKG 12-lead    Collection Time: 06/26/24 10:06 AM   Result Value Ref Range    QRS Duration 76 ms    [facility] QTC Calculation 456 ms    Narrative    Test Reason : R04.0,    Vent. Rate : 071 BPM     Atrial Rate : 071 BPM     P-R Int : 238 ms          QRS Dur : 076 ms      QT Int : 420 ms       P-R-T Axes : 073 037 061 degrees     QTc Int : 456 ms    Sinus rhythm with 1st degree A-V block  Otherwise normal ECG  When compared with ECG of 11-JUN-2024 15:14,  No significant change was found  Confirmed by Christina Kern MD ([id number]) on 6/26/2024 12:35:47 PM    Referred By: [email or web address]   SELF           Confirmed By:Christina Kern MD     TTE:  Results for orders placed during the hospital encounter of 09/23/24    Echo    Interpretation Summary    Left Ventricle: The left ventricle is normal in size. Normal wall thickness. Normal wall motion. There is normal systolic function with a visually estimated ejection fraction of 55 - 70%. Ejection fraction by visual approximation is 60%. There is normal diastolic function.    Right " Ventricle: Normal right ventricular cavity size. Wall thickness is normal. Systolic function is normal.    Mitral Valve: There is a mechanical valve in the mitral position. The mean pressure gradient across the mitral valve is 5 mmHg at a heart rate of 67 bpm.    Tricuspid Valve: The tricuspid valve is repaired.    IVC/SVC: Normal venous pressure at 3 mmHg.    MINERVA:  Results for orders placed during the hospital encounter of 04/18/24    Transesophageal echo (MINERVA)    Interpretation Summary    Left Atrium: Left atrium is dilated. The left atrial appendage has a windsock morphology. Appendage velocity is reduced at less than 40 cm/sec. There is no thrombus in the left atrial appendage. Moderate SEC without any thrombus seen in LOBO. contrast was used.    Left Ventricle: There is normal systolic function with a visually estimated ejection fraction of 55 - 60%.    Right Ventricle: Pacemaker lead present in the ventricle.    Right Atrium: Right atrium is dilated. Lead present in the right atrium.    Mitral Valve: There is a mechanical valve in the mitral position. It is reported to be a 25 mm carbomedics valve. There is mild regurgitation. M gradient not obstained.    Tricuspid Valve: The tricuspid valve is repaired by annular ring(26 Medtronic band). Mean gradient is 2 mmHg at HR of 99bpm. .    Aorta: Aortic root is normal in size measuring 2.8 cm. Ascending aorta is normal measuring 2.4 cm.    1)  Mitral valve replacement with a 25 mm Carbomedics standard  2)  Tricuspid valve repair with a 26 mm Medtronic Contour annuloplasty band  3)  Reoperation      Pre-op Assessment    I have reviewed the Patient Summary Reports.    I have reviewed the NPO Status.   I have reviewed the Medications.     Review of Systems  Anesthesia Hx:  No problems with previous Anesthesia Denies Hx of Anesthetic complications              Denies Personal Hx of Anesthesia complications.                    Social:  Non-Smoker        Cardiovascular:  Exercise tolerance: good  Pacemaker Hypertension Valvular problems/Murmurs  Denies MI.  Denies CAD.    Denies CABG/stent. Dysrhythmias   Denies Angina.       Denies BLANK.   H/o MV repair 2003  -s/p MVR with a 25mm Carbomedics standard mechanical prosthesis plus TVr 1/8/2024 c/b complete heart block requiring PPM 1/17/2024 despite a robust junctional escape rhythm  Patient on beta blockers                    Hypertension     Disorder of Cardiac Rhythm, Atrial Fibrillation     Pulmonary:    Denies COPD.  Denies Asthma.                    Renal/:   Denies Chronic Renal Disease.                Hepatic/GI:      Denies GERD. Denies Liver Disease.   Taking GLP-1 Agonists Instructed to Hold for 7 Days No Reported GI Symptoms          Neurological:  Denies TIA.  Denies CVA.    Denies Seizures.                                Endocrine:  Denies Diabetes.         Obesity / BMI > 30      Physical Exam  General: Well nourished, Cooperative, Alert and Oriented    Airway:  Mallampati: II   Mouth Opening: Normal  TM Distance: Normal  Tongue: Normal  Neck ROM: Normal ROM    Dental:  Intact    Anesthesia Plan  Type of Anesthesia, risks & benefits discussed:    Anesthesia Type: Gen ETT, Gen Supraglottic Airway, Gen Natural Airway  Intra-op Monitoring Plan: Standard ASA Monitors  Induction:  IV  Informed Consent: Informed consent signed with the Patient and all parties understand the risks and agree with anesthesia plan.  All questions answered.   ASA Score: 3  Day of Surgery Review of History & Physical: H&P Update referred to the surgeon/provider.    Ready For Surgery From Anesthesia Perspective.   .               [1]  Current Facility-Administered Medications   Medication Dose Route Frequency Provider Last Rate Last Admin    0.9% NaCl infusion   Intravenous Continuous Ed Valencia MD

## 2025-06-17 ENCOUNTER — PATIENT MESSAGE (OUTPATIENT)
Dept: INTERNAL MEDICINE | Facility: CLINIC | Age: 40
End: 2025-06-17

## 2025-06-17 ENCOUNTER — OFFICE VISIT (OUTPATIENT)
Dept: INTERNAL MEDICINE | Facility: CLINIC | Age: 40
End: 2025-06-17
Payer: COMMERCIAL

## 2025-06-17 DIAGNOSIS — E66.811 OBESITY, CLASS I, BMI 30.0-34.9 (SEE ACTUAL BMI): Primary | ICD-10-CM

## 2025-06-17 PROCEDURE — 99499 UNLISTED E&M SERVICE: CPT | Mod: 95,,,

## 2025-06-17 RX ORDER — SEMAGLUTIDE 2.4 MG/.75ML
2.4 INJECTION, SOLUTION SUBCUTANEOUS
Qty: 3 ML | Refills: 1 | Status: ACTIVE | OUTPATIENT
Start: 2025-06-17

## 2025-06-17 RX ORDER — SEMAGLUTIDE 1.7 MG/.75ML
1.7 INJECTION, SOLUTION SUBCUTANEOUS
Qty: 3 ML | Refills: 0 | Status: ACTIVE | OUTPATIENT
Start: 2025-06-17

## 2025-06-17 NOTE — PROGRESS NOTES
Patient ID: King Botello is a 40 y.o. White female    Subjective  Chief Complaint: patient presents for medical weight loss management.    Co-morbidities: none    HPI: Patient started Wegovy with [facility] in March 2025 and is currently managed on Wegovy 1 mg. Pt has completed 2 doses of this strength.    Tolerance to current therapy:  Denies vomiting, diarrhea, constipation, abdominal pain  Endorses nausea randomly treated with phenergan    Weight loss history:  Starting weight:    3/21/2025   Recent Readings    Weight (lbs) 191 lb    BMI 36.09 BMI    Current weight:    6/17/2025   Recent Readings    Weight (lbs) 178.7 lb    BMI 33.76 BMI    % weight loss since GLP-1 initiation: 6.4 %    Objective  Lab Results   Component Value Date     12/19/2024     11/14/2024     10/24/2024     Lab Results   Component Value Date    K 4.4 12/19/2024    K 4.7 11/14/2024    K 3.7 10/24/2024     Lab Results   Component Value Date     12/19/2024     11/14/2024     10/24/2024     Lab Results   Component Value Date    CO2 23 12/19/2024    CO2 26 11/14/2024    CO2 28 10/24/2024     Lab Results   Component Value Date    BUN 10 12/19/2024    BUN 8 11/14/2024    BUN 9 10/24/2024     Lab Results   Component Value Date    GLU 81 12/19/2024     11/14/2024     10/24/2024     Lab Results   Component Value Date    CALCIUM 9.3 12/19/2024    CALCIUM 9.5 11/14/2024    CALCIUM 9.3 10/24/2024     Lab Results   Component Value Date    PROT 7.4 06/26/2024    PROT 6.3 04/30/2024    PROT 7.4 01/28/2024     Lab Results   Component Value Date    ALBUMIN 3.7 06/26/2024    ALBUMIN 3.5 04/30/2024    ALBUMIN 3.5 01/28/2024     Lab Results   Component Value Date    BILITOT 0.5 06/26/2024    BILITOT 0.3 04/30/2024    BILITOT 0.4 01/28/2024     Lab Results   Component Value Date    AST 25 06/26/2024    AST 15 04/30/2024    AST 21 01/28/2024     Lab Results   Component Value Date    ALT 11  06/26/2024    ALT 10 04/30/2024    ALT 9 (L) 01/28/2024     Lab Results   Component Value Date    ANIONGAP 7 (L) 12/19/2024    ANIONGAP 8 11/14/2024    ANIONGAP 8 10/24/2024     Lab Results   Component Value Date    CREATININE 0.7 12/19/2024    CREATININE 0.7 11/14/2024    CREATININE 0.6 10/24/2024     Lab Results   Component Value Date    EGFRNORACEVR >60.0 12/19/2024    EGFRNORACEVR >60.0 11/14/2024    EGFRNORACEVR >60.0 10/24/2024     Assessment/Plan  - Increase to Wegovy 1.7 mg x 4 weeks once completed with Wegovy 1 mg  - Then increase to Wegovy 2.4 mg SQ weekly  - RTC in 3 months for follow-up evaluation      Patient consented to pharmacist management via collaborative practice.

## 2025-06-18 ENCOUNTER — PATIENT MESSAGE (OUTPATIENT)
Dept: CARDIOLOGY | Facility: CLINIC | Age: 40
End: 2025-06-18
Payer: COMMERCIAL

## 2025-06-18 RX ORDER — AMOXICILLIN 500 MG/1
2000 TABLET, FILM COATED ORAL DAILY PRN
Qty: 12 TABLET | Refills: 2 | Status: SHIPPED | OUTPATIENT
Start: 2025-06-18

## 2025-06-18 NOTE — TELEPHONE ENCOUNTER
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 We discussed that this agent is likely not very helpful, and discussed possible cessation. She would like to monitor for now, but would be willing to stop this agent in the future in the event this medication is not modifying her burden of atrial fibrillation and atrial tachycardia.

## 2025-06-18 NOTE — TELEPHONE ENCOUNTER
King Botello to [facility] Staff (supporting Eugene Cordero MD)    6/18/25  9:17 AM  I have a dental cleaning coming up and I need the pre medication for it.  Also when am I due to see yall again for office visit and echo?      Thanks   King

## 2025-06-19 ENCOUNTER — ANTI-COAG VISIT (OUTPATIENT)
Dept: CARDIOLOGY | Facility: CLINIC | Age: 40
End: 2025-06-19
Payer: COMMERCIAL

## 2025-06-19 DIAGNOSIS — Z79.01 LONG TERM (CURRENT) USE OF ANTICOAGULANTS: Primary | ICD-10-CM

## 2025-06-19 DIAGNOSIS — Z95.2 H/O MITRAL VALVE REPLACEMENT WITH MECHANICAL VALVE: ICD-10-CM

## 2025-06-19 LAB — INR PPP: 1.2

## 2025-06-19 PROCEDURE — 93793 ANTICOAG MGMT PT WARFARIN: CPT | Mod: S$GLB,,,

## 2025-06-19 NOTE — PROGRESS NOTES
[facility]    06/19/2025    King Botello (40 y.o.) is followed by the [facility].      Assessment/Plan:    King Botello presents with a subtherapeutic  INR. Goal INR: 2.5-3.5    Lab Results   Component Value Date    INR 1.2 06/19/2025    INR 1.1 06/16/2025    INR 8.0 06/09/2025    PROTIME 27.1 (H) 04/24/2025    PROTIME 37.9 (H) 04/17/2025       Assessment of patient findings per MA/LPN and chart review:   The following significant findings were found:   Patient has not drank any alcohol or drank any protein drinks    Recommendation for patient's warfarin regimen:   Due to subtherapeutic INR, patient was instructed to take a booster dose today and tomorrow. Patient to resume their normal weekly dose.    Recommended repeat INR in 4 days      Laine Glynn PharmD, BCPS  Clinical Pharmacist - [facility]  Preferred Contact: Secure Messaging or In Basket Message

## 2025-06-23 ENCOUNTER — ANTI-COAG VISIT (OUTPATIENT)
Dept: CARDIOLOGY | Facility: CLINIC | Age: 40
End: 2025-06-23
Payer: COMMERCIAL

## 2025-06-23 DIAGNOSIS — Z79.01 LONG TERM (CURRENT) USE OF ANTICOAGULANTS: Primary | ICD-10-CM

## 2025-06-23 DIAGNOSIS — Z95.2 H/O MITRAL VALVE REPLACEMENT WITH MECHANICAL VALVE: ICD-10-CM

## 2025-06-23 LAB — INR PPP: 2.5

## 2025-06-23 PROCEDURE — 93793 ANTICOAG MGMT PT WARFARIN: CPT | Mod: S$GLB,,,

## 2025-06-23 NOTE — PROGRESS NOTES
[facility]    06/23/2025    King Botello (40 y.o.) is followed by the [facility].      Assessment/Plan:    King Botello presents with a therapeutic INR. Goal INR: 2.5-3.5    Lab Results   Component Value Date    INR 2.5 06/23/2025    INR 1.2 06/19/2025    INR 1.1 06/16/2025    PROTIME 27.1 (H) 04/24/2025    PROTIME 37.9 (H) 04/17/2025       Assessment of patient findings per MA/LPN and chart review:   The following significant findings were found:   none    Recommendation for patient's warfarin regimen:   No change was made to warfarin therapy during this visit and patient has been instructed to continue their current warfarin regimen.  May discontinue Lovenox at this time    Recommended repeat INR in 3 days      Laine Glynn, PharmD, BCPS  Clinical Pharmacist - [facility]  Preferred Contact: Secure Messaging or In Basket Message

## 2025-06-26 ENCOUNTER — ANTI-COAG VISIT (OUTPATIENT)
Dept: CARDIOLOGY | Facility: CLINIC | Age: 40
End: 2025-06-26
Payer: COMMERCIAL

## 2025-06-26 DIAGNOSIS — Z79.01 LONG TERM (CURRENT) USE OF ANTICOAGULANTS: Primary | ICD-10-CM

## 2025-06-26 DIAGNOSIS — Z95.2 H/O MITRAL VALVE REPLACEMENT WITH MECHANICAL VALVE: ICD-10-CM

## 2025-06-26 LAB — INR PPP: 2.8

## 2025-06-26 PROCEDURE — 93793 ANTICOAG MGMT PT WARFARIN: CPT | Mod: S$GLB,,,

## 2025-06-26 NOTE — PROGRESS NOTES
[facility]    06/26/2025    King Botello (40 y.o.) is followed by the [facility].      Assessment/Plan:    King Botello presents with a therapeutic INR. Goal INR: 2.5-3.5    Lab Results   Component Value Date    INR 2.8 06/26/2025    INR 2.5 06/23/2025    INR 1.2 06/19/2025    PROTIME 27.1 (H) 04/24/2025    PROTIME 37.9 (H) 04/17/2025       Assessment of patient findings per MA/LPN and chart review:   The following significant findings were found:   none    Recommendation for patient's warfarin regimen:   No change was made to warfarin therapy during this visit and patient has been instructed to continue their current warfarin regimen.    Recommended repeat INR in 1 week      Laine Glynn, PharmD, BCPS  Clinical Pharmacist - [facility]  Preferred Contact: Secure Messaging or In Basket Message

## 2025-07-03 ENCOUNTER — PATIENT MESSAGE (OUTPATIENT)
Dept: SURGERY | Facility: CLINIC | Age: 40
End: 2025-07-03
Payer: COMMERCIAL

## 2025-07-03 ENCOUNTER — ANTI-COAG VISIT (OUTPATIENT)
Dept: CARDIOLOGY | Facility: CLINIC | Age: 40
End: 2025-07-03
Payer: COMMERCIAL

## 2025-07-03 ENCOUNTER — PATIENT MESSAGE (OUTPATIENT)
Dept: CARDIOLOGY | Facility: CLINIC | Age: 40
End: 2025-07-03

## 2025-07-03 DIAGNOSIS — Z79.01 LONG TERM (CURRENT) USE OF ANTICOAGULANTS: Primary | ICD-10-CM

## 2025-07-03 DIAGNOSIS — Z95.2 H/O MITRAL VALVE REPLACEMENT WITH MECHANICAL VALVE: ICD-10-CM

## 2025-07-03 LAB — INR PPP: 4

## 2025-07-03 PROCEDURE — 93793 ANTICOAG MGMT PT WARFARIN: CPT | Mod: S$GLB,,,

## 2025-07-03 NOTE — PROGRESS NOTES
[facility]    2025 10:54 AM    Assessment/Plan:    Patient presents today with supratherapeutic INR.    Assessment of patient findings and chart review: INR elevated today. Pt confirms correct dosing. Reports having bright red blood w/ mucus in stool last 3 days. Bruises from lovenox injection slowly fading and no longer painful. Reports no longer eating veggies 5-7x/wk and now maybe once a week (marco antonio salad). Alcohol and premier shakes intake is still consistent.     Recommendation for patient's warfarin regimen: Lower dose today to 4mg then decrease maintenance dose    Recommend repeat INR in 1 week  _________________________________________________________________    King Botello (40 y.o.) is followed by the [facility].    Anticoagulation Summary  As of 7/3/2025      INR goal:  2.5-3.5   TTR:  48.5% (1.4 y)   INR used for dosin.0 (7/3/2025)   Warfarin maintenance plan:  6 mg (4 mg x 1.5) every Mon; 8 mg (4 mg x 2) all other days   Weekly warfarin total:  54 mg   Plan last modified:  Laine Glynn, PharmD (5/15/2025)   Next INR check:  --   Target end date:  --    Indications    Long term (current) use of anticoagulants [Z79.01]  H/O mitral valve replacement with mechanical valve [Z95.2]  Atrial fibrillation and flutter (Resolved) [I48.91  I48.92]                 Anticoagulation Episode Summary       INR check location:  Clinic Lab    Preferred lab:  --    Send INR reminders to:  [facility]IT[facility]    Comments:  Ricardo - tests on Thursday// [facility]           Anticoagulation Care Providers       Provider Role Specialty Phone number    Radha Hussein PA-C Referring Cardiothoracic Surgery [phone]    Eugene Cordero MD Responsible Cardiology [phone]

## 2025-07-10 ENCOUNTER — ANTI-COAG VISIT (OUTPATIENT)
Dept: CARDIOLOGY | Facility: CLINIC | Age: 40
End: 2025-07-10
Payer: COMMERCIAL

## 2025-07-10 DIAGNOSIS — Z79.01 LONG TERM (CURRENT) USE OF ANTICOAGULANTS: Primary | ICD-10-CM

## 2025-07-10 DIAGNOSIS — Z95.2 H/O MITRAL VALVE REPLACEMENT WITH MECHANICAL VALVE: ICD-10-CM

## 2025-07-10 LAB — INR PPP: 3.4

## 2025-07-10 PROCEDURE — 93793 ANTICOAG MGMT PT WARFARIN: CPT | Mod: S$GLB,,,

## 2025-07-10 NOTE — PROGRESS NOTES
[facility]    07/10/2025    King Botello (40 y.o.) is followed by the [facility].      Assessment/Plan:    King Botello presents with a therapeutic INR. Goal INR: 2.5-3.5    Lab Results   Component Value Date    INR 3.4 07/10/2025    INR 4.0 07/03/2025    INR 2.8 06/26/2025    PROTIME 27.1 (H) 04/24/2025    PROTIME 37.9 (H) 04/17/2025       Assessment of patient findings per MA/LPN and chart review:   The following significant findings were found:   none    Recommendation for patient's warfarin regimen:   No change was made to warfarin therapy during this visit and patient has been instructed to continue their current warfarin regimen.    Recommended repeat INR in 1 week      Laine Glynn, PharmD, BCPS  Clinical Pharmacist - [facility]  Preferred Contact: Secure Messaging or In Basket Message

## 2025-07-11 DIAGNOSIS — E66.811 OBESITY, CLASS I, BMI 30.0-34.9 (SEE ACTUAL BMI): ICD-10-CM

## 2025-07-11 RX ORDER — SEMAGLUTIDE 1.7 MG/.75ML
1.7 INJECTION, SOLUTION SUBCUTANEOUS
Qty: 3 ML | Refills: 0 | OUTPATIENT
Start: 2025-07-11

## 2025-07-14 DIAGNOSIS — I48.0 PAROXYSMAL ATRIAL FIBRILLATION: ICD-10-CM

## 2025-07-14 RX ORDER — DOFETILIDE 0.25 MG/1
250 CAPSULE ORAL EVERY 12 HOURS
Qty: 180 CAPSULE | Refills: 3 | Status: ACTIVE | OUTPATIENT
Start: 2025-07-14 | End: 2025-07-17

## 2025-07-16 DIAGNOSIS — J10.1 INFLUENZA A: ICD-10-CM

## 2025-07-16 RX ORDER — PROMETHAZINE HYDROCHLORIDE 12.5 MG/1
12.5 TABLET ORAL 3 TIMES DAILY PRN
Qty: 24 TABLET | Refills: 0 | Status: SHIPPED | OUTPATIENT
Start: 2025-07-16

## 2025-07-16 NOTE — TELEPHONE ENCOUNTER
No care due was identified.  [facility] Embedded Care Due Messages. Reference number: [id number].   7/16/2025 12:34:21 PM CDT

## 2025-07-16 NOTE — TELEPHONE ENCOUNTER
Refill Routing Note   Medication(s) are not appropriate for processing by [facility] for the following reason(s):        Outside of protocol    [facility] action(s):  Route               Appointments  past 12m or future 3m with PCP    Date Provider   Last Visit   5/27/2025 Mayco Hansen MD   Next Visit   Visit date not found Mayco Hansen MD   ED visits in past 90 days: 0        Note composed:1:15 PM 07/16/2025

## 2025-07-17 ENCOUNTER — CLINICAL SUPPORT (OUTPATIENT)
Dept: CARDIOLOGY | Facility: HOSPITAL | Age: 40
End: 2025-07-17
Attending: STUDENT IN AN ORGANIZED HEALTH CARE EDUCATION/TRAINING PROGRAM
Payer: COMMERCIAL

## 2025-07-17 ENCOUNTER — ANTI-COAG VISIT (OUTPATIENT)
Dept: CARDIOLOGY | Facility: CLINIC | Age: 40
End: 2025-07-17
Payer: COMMERCIAL

## 2025-07-17 ENCOUNTER — OFFICE VISIT (OUTPATIENT)
Dept: ELECTROPHYSIOLOGY | Facility: CLINIC | Age: 40
End: 2025-07-17
Payer: COMMERCIAL

## 2025-07-17 ENCOUNTER — CLINICAL SUPPORT (OUTPATIENT)
Dept: CARDIOLOGY | Facility: HOSPITAL | Age: 40
End: 2025-07-17
Payer: COMMERCIAL

## 2025-07-17 VITALS
BODY MASS INDEX: 33.38 KG/M2 | HEART RATE: 89 BPM | SYSTOLIC BLOOD PRESSURE: 108 MMHG | HEIGHT: 61 IN | DIASTOLIC BLOOD PRESSURE: 78 MMHG | WEIGHT: 176.81 LBS

## 2025-07-17 DIAGNOSIS — Z95.0 PACEMAKER: ICD-10-CM

## 2025-07-17 DIAGNOSIS — I48.4 ATYPICAL ATRIAL FLUTTER: ICD-10-CM

## 2025-07-17 DIAGNOSIS — I50.32 CHRONIC HEART FAILURE WITH PRESERVED EJECTION FRACTION: ICD-10-CM

## 2025-07-17 DIAGNOSIS — Z79.01 LONG TERM (CURRENT) USE OF ANTICOAGULANTS: Primary | ICD-10-CM

## 2025-07-17 DIAGNOSIS — Z51.81 ENCOUNTER FOR MONITORING COUMADIN THERAPY: ICD-10-CM

## 2025-07-17 DIAGNOSIS — Z95.2 H/O MITRAL VALVE REPLACEMENT WITH MECHANICAL VALVE: ICD-10-CM

## 2025-07-17 DIAGNOSIS — E66.811 CLASS 1 OBESITY WITH BODY MASS INDEX (BMI) OF 33.0 TO 33.9 IN ADULT, UNSPECIFIED OBESITY TYPE, UNSPECIFIED WHETHER SERIOUS COMORBIDITY PRESENT: ICD-10-CM

## 2025-07-17 DIAGNOSIS — Z79.01 ENCOUNTER FOR MONITORING COUMADIN THERAPY: ICD-10-CM

## 2025-07-17 DIAGNOSIS — I10 PRIMARY HYPERTENSION: ICD-10-CM

## 2025-07-17 DIAGNOSIS — Z51.81 ENCOUNTER FOR MONITORING DOFETILIDE THERAPY: ICD-10-CM

## 2025-07-17 DIAGNOSIS — I48.0 PAROXYSMAL ATRIAL FIBRILLATION: ICD-10-CM

## 2025-07-17 DIAGNOSIS — Z95.0 PRESENCE OF CARDIAC PACEMAKER: ICD-10-CM

## 2025-07-17 DIAGNOSIS — Z79.899 ENCOUNTER FOR MONITORING DOFETILIDE THERAPY: ICD-10-CM

## 2025-07-17 DIAGNOSIS — I34.2 NONRHEUMATIC MITRAL VALVE STENOSIS: ICD-10-CM

## 2025-07-17 DIAGNOSIS — I05.0 SEVERE MITRAL STENOSIS BY PRIOR ECHOCARDIOGRAM: ICD-10-CM

## 2025-07-17 DIAGNOSIS — I44.2 COMPLETE HEART BLOCK: ICD-10-CM

## 2025-07-17 DIAGNOSIS — I48.92 UNSPECIFIED ATRIAL FLUTTER: ICD-10-CM

## 2025-07-17 DIAGNOSIS — I49.49 JUNCTIONAL ESCAPE BEATS: ICD-10-CM

## 2025-07-17 DIAGNOSIS — I48.0 PAROXYSMAL ATRIAL FIBRILLATION: Primary | ICD-10-CM

## 2025-07-17 DIAGNOSIS — Z98.890 H/O TRICUSPID VALVE ANNULOPLASTY: ICD-10-CM

## 2025-07-17 DIAGNOSIS — Z79.01 LONG TERM (CURRENT) USE OF ANTICOAGULANTS: ICD-10-CM

## 2025-07-17 DIAGNOSIS — I50.32 CHRONIC DIASTOLIC HEART FAILURE: ICD-10-CM

## 2025-07-17 LAB
CTP QC/QA: YES
INR PPP: 3.4 (ref 2–3)
OHS QRS DURATION: 80 MS
OHS QTC CALCULATION: 456 MS
PROTHROMBIN TIME, POC: ABNORMAL (ref 2–3)

## 2025-07-17 PROCEDURE — 99999 PR PBB SHADOW E&M-EST. PATIENT-LVL III: CPT | Mod: PBBFAC,,, | Performed by: STUDENT IN AN ORGANIZED HEALTH CARE EDUCATION/TRAINING PROGRAM

## 2025-07-17 PROCEDURE — 93000 ELECTROCARDIOGRAM COMPLETE: CPT | Mod: S$GLB,,, | Performed by: INTERNAL MEDICINE

## 2025-07-17 PROCEDURE — 3008F BODY MASS INDEX DOCD: CPT | Mod: CPTII,S$GLB,, | Performed by: STUDENT IN AN ORGANIZED HEALTH CARE EDUCATION/TRAINING PROGRAM

## 2025-07-17 PROCEDURE — 93280 PM DEVICE PROGR EVAL DUAL: CPT

## 2025-07-17 PROCEDURE — 93793 ANTICOAG MGMT PT WARFARIN: CPT | Mod: S$GLB,,,

## 2025-07-17 PROCEDURE — 3074F SYST BP LT 130 MM HG: CPT | Mod: CPTII,S$GLB,, | Performed by: STUDENT IN AN ORGANIZED HEALTH CARE EDUCATION/TRAINING PROGRAM

## 2025-07-17 PROCEDURE — 85610 PROTHROMBIN TIME: CPT | Mod: QW,S$GLB,, | Performed by: STUDENT IN AN ORGANIZED HEALTH CARE EDUCATION/TRAINING PROGRAM

## 2025-07-17 PROCEDURE — 99215 OFFICE O/P EST HI 40 MIN: CPT | Mod: 25,S$GLB,, | Performed by: STUDENT IN AN ORGANIZED HEALTH CARE EDUCATION/TRAINING PROGRAM

## 2025-07-17 PROCEDURE — 93294 REM INTERROG EVL PM/LDLS PM: CPT | Mod: ,,, | Performed by: STUDENT IN AN ORGANIZED HEALTH CARE EDUCATION/TRAINING PROGRAM

## 2025-07-17 PROCEDURE — 3078F DIAST BP <80 MM HG: CPT | Mod: CPTII,S$GLB,, | Performed by: STUDENT IN AN ORGANIZED HEALTH CARE EDUCATION/TRAINING PROGRAM

## 2025-07-17 PROCEDURE — 93296 REM INTERROG EVL PM/IDS: CPT | Performed by: STUDENT IN AN ORGANIZED HEALTH CARE EDUCATION/TRAINING PROGRAM

## 2025-07-17 PROCEDURE — 93280 PM DEVICE PROGR EVAL DUAL: CPT | Mod: 26,,, | Performed by: STUDENT IN AN ORGANIZED HEALTH CARE EDUCATION/TRAINING PROGRAM

## 2025-07-17 RX ORDER — DOFETILIDE 0.25 MG/1
250 CAPSULE ORAL EVERY 12 HOURS
Qty: 180 CAPSULE | Refills: 3 | Status: SHIPPED | OUTPATIENT
Start: 2025-07-17 | End: 2026-07-17

## 2025-07-17 NOTE — PROGRESS NOTES
[facility]    07/17/2025 12:17 PM    Assessment/Plan:    Patient presents today with therapeutic INR.    Assessment of patient findings and chart review: none    Recommendation for patient's warfarin regimen: Continue current maintenance dose    Recommend repeat INR in 1 week  _________________________________________________________________    King Botello (40 y.o.) is followed by the [facility].    Anticoagulation Summary  As of 7/17/2025      INR goal:  2.5-3.5   TTR:  48.8% (1.5 y)   INR used for dosing:  3.4 (7/17/2025)   Warfarin maintenance plan:  6 mg (4 mg x 1.5) every Mon, Wed, Sat; 8 mg (4 mg x 2) all other days   Weekly warfarin total:  50 mg   Plan last modified:  Vinh Beltre, PharmD (7/3/2025)   Next INR check:  7/24/2025   Target end date:  --    Indications    Long term (current) use of anticoagulants [Z79.01]  H/O mitral valve replacement with mechanical valve [Z95.2]  Atrial fibrillation and flutter (Resolved) [I48.91  I48.92]                 Anticoagulation Episode Summary       INR check location:  Clinic Lab    Preferred lab:  --    Send INR reminders to:  [facility]ITOR    Comments:  rBittanys - tests on Thursday// [facility]           Anticoagulation Care Providers       Provider Role Specialty Phone number    Radha Hussein PA-C Referring Cardiothoracic Surgery [phone]    Eugene Cordero MD Responsible Cardiology [phone]              Laine Glynn, PharmD, [facility]  Clinical Pharmacist - [facility]  Phone: [phone] or Epic Secure Messaging

## 2025-07-17 NOTE — PROGRESS NOTES
Electrophysiology Clinic Note    Reason for follow-up patient visit: Ongoing evaluation and routine device surveillance of a Medtronic dual-chamber pacemaker with a left bundle branch area pacing (LBBAP) lead, implanted 2024 in the setting of postoperative atrial flutter and complete heart block with a junctional escape of ~50 bpm with persistent AV dissociation following a mitral valve replacement with a mechanical mitral valve and a tricuspid valve annuloplasty on 1/10/2024.    PRESENTING HISTORY:     History of Present Illness:  Ms. King Botello is a hermelindo 40-year-old woman who returns to clinic today for ongoing evaluation and routine device surveillance of a Medtronic dual-chamber pacemaker with a left bundle branch area pacing (LBBAP) lead, implanted 2024 in the setting of postoperative atrial flutter and complete heart block with a junctional escape of ~50 bpm with persistent AV dissociation following a mitral valve replacement with a mechanical mitral valve and a tricuspid valve annuloplasty on 1/10/2024. She has a past medical history significant for mitral valve prolapse with mitral insufficiency with a previous mitral valve annuloplasty in [place] in , subsequent severe mitral valve stenosis, s/p mitral valve replacement with a mechanical mitral valve and a tricuspid valve annuloplasty on 1/10/2024 with Dr. Liu, postoperative atrial flutter and complete heart block with a junctional escape of ~50 bpm with persistent AV dissociation following her surgery, s/p implantation of a Medtronic dual-chamber pacemaker with a left bundle branch area pacing (LBBAP) lead for physiologic pacing on 2024, hypertension, previous episodes of decompensated heart failure with preserved systolic function in the postpartum period with her most recent LVEF 55-70% following her  on 2023, and obesity.     Unfortunately, she continued to evidence atrial flutter following her surgery,  and underwent a successful MINERVA and cardioversion on 4/18/2024. She has remained on coumadin therapy. She returned to symptomatic atrial flutter intermittently on repeat device interrogations, and given her preserved systolic function and young age, we avoided amiodarone administration. She was admitted for dofetilide loading and is presently well-maintained in sinus rhythm on dofetilide 250mcg po BID with stable and acceptable QT/QTc intervals. Dr. Cordero subsequently added digoxin 250mcg po daily. She has continued to recover following her prior surgical procedure, and tells me that she is no longer experiencing episodes of sustained palpitations. Previously, she was able to tell when she was in atrial flutter with symptoms of shortness of breath, palpitations, mild exercise intolerance, and worsened fatigue. Since approximately October, 2024, she has remained in normal sinus rhythm. Her intrinsic conduction has returned, and she is now rarely paced from the right atrium, 3.5%, and very rarely paced from the right ventricle, 0.6%. Her overall burden of AT/AF remains significantly improved from her prior interrogations at 1.8% on device interrogation today. She is presently in normal sinus rhythm, atrially sensed, ventricularly sensed, with an intrinsic QRSd of 80ms on ECG assessment today.       In discussion with Ms. Botello today, she tells me that she is feeling overall quite well and significantly improved from our prior encounters. She and her family previously returned from [place], and she denies any limitation with walking for farther distances. She denies any episodes of dizziness, lightheadedness, syncope/presyncope, chest pain or chest discomfort, nausea or vomiting, orthopnea, or PND. She reports brief, intermittent palpitations that do not limit her functioning, lasting for about 3-4 hours during her events of paroxysmal atrial fibrillation, with none of these events reported since approximately  . She reports baseline shortness of breath and dyspnea with exertion that has remained stable and is improving following her prior surgery. She can climb one flight of stairs prior to needing to take a break and continues to attempt to increase her level of physical activity. 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     Review of Systems:  Review of Systems   Constitutional:  Negative for activity change.   HENT:  Negative for nasal congestion, nosebleeds, postnasal drip, rhinorrhea, sinus pressure/congestion, sneezing and sore throat.    Respiratory:  Positive for shortness of breath. Negative for apnea, cough, chest tightness and wheezing.    Cardiovascular:  Positive for palpitations. Negative for chest pain, leg swelling and claudication.   Gastrointestinal:  Negative for abdominal distention, abdominal pain, blood in stool, change in bowel habit, constipation, diarrhea, nausea and vomiting.   Genitourinary:  Negative for dysuria and hematuria.   Musculoskeletal:  Positive for arthralgias and back pain. Negative for gait problem.   Neurological:  Negative for dizziness, seizures, syncope, weakness, light-headedness, headaches and coordination difficulties.        PAST HISTORY:     Past Medical History:   Diagnosis Date    Abnormal Pap smear     Colposcopy    Acute postoperative anemia due to expected blood loss 2024    Atrial fibrillation and flutter 2024    Bradycardia 2020    Class 2 obesity due to excess calories with body mass index (BMI) of 35.0 to 35.9 in adult 2021    Hypertension, essential 2021    Junctional escape beats 2024    Menarche     Age of onset 11    Missed ab 2021    Mitral valve prolapse     Pre-existing essential hypertension during pregnancy, antepartum 2023    Previous  delivery affecting pregnancy, antepartum 2020    Primary hypertension 2021    S/P suction D&C 2021    Status post  mitral valve annuloplasty 2017    Thyroid nodule 2025    Transient hyperglycemia post procedure 2024       Past Surgical History:   Procedure Laterality Date    A-V CARDIAC PACEMAKER INSERTION N/A 2024    Procedure: INSERTION, CARDIAC PACEMAKER, DUAL CHAMBER;  Surgeon: CRISTI Moore MD;  Location: [facility] EP LAB;  Service: Cardiology;  Laterality: N/A;  CHB, dPPM with LBBP, MDT, ANES, [place], [place] [id number]     SECTION       SECTION WITH TUBAL LIGATION Bilateral 2023    Procedure:  SECTION, WITH TUBAL LIGATION;  Surgeon: Mane Moreno MD;  Location: [facility] [place] L&D;  Service: OB/GYN;  Laterality: Bilateral;    COLONOSCOPY N/A 2025    Procedure: COLONOSCOPY;  Surgeon: Ed Valencia MD;  Location: [facility] ENDO (2ND FLR);  Service: Endoscopy;  Laterality: N/A;  jm/potal/suprep/warfarin/pacemaker medtronix/jessica/pt wants a Monday  cleared by cardiology Dr Cordero-see E consult dated 25  ok to hold Coumadin 5 days w/bridge per Coumadin clinic-GT   precall attempted/lvm/mleone  6/10 precall complete - EH    DILATION AND CURETTAGE OF UTERUS USING SUCTION N/A 2021    Procedure: DILATION AND CURETTAGE, UTERUS, USING SUCTION;  Surgeon: Mane Moreno MD;  Location: [facility] [place] OR;  Service: OB/GYN;  Laterality: N/A;    ECHOCARDIOGRAM,TRANSESOPHAGEAL N/A 2024    Procedure: Transesophageal echo (MINERVA) intra-procedure log documentation;  Surgeon: Page George MD;  Location: [facility] EP LAB;  Service: Cardiology;  Laterality: N/A;  AF, MINERVA/DCCV, MAC, EH, 3prep *MDT D-C PPM in situ*    MITRAL VALVE REPAIR      MITRAL VALVE REPLACEMENT N/A 2024    Procedure: MITRAL VALVE REPLACEMENT, REDO STERNOTOMY;  Surgeon: Thierno Liu MD;  Location: [facility] OR 2ND FLR;  Service: Cardiothoracic;  Laterality: N/A;    TRANSESOPHAGEAL ECHOCARDIOGRAPHY N/A 2024    Procedure: ECHOCARDIOGRAM, TRANSESOPHAGEAL;  Surgeon: Jessee Ahumada MD;  Location: [facility] LAB;  Service:  Cardiology;  Laterality: N/A;    TREATMENT OF CARDIAC ARRHYTHMIA N/A 1/17/2024    Procedure: Cardioversion or Defibrillation;  Surgeon: CRISTI Moore MD;  Location: [facility] EP LAB;  Service: Cardiology;  Laterality: N/A;    TREATMENT OF CARDIAC ARRHYTHMIA N/A 4/18/2024    Procedure: Cardioversion or Defibrillation;  Surgeon: CRISTI Moore MD;  Location: [facility] EP LAB;  Service: Cardiology;  Laterality: N/A;  AF, MINERVA/DCCV, MAC, EH, 3prep *MDT D-C PPM in situ*    VALVULOPLASTY, TRICUSPID N/A 1/8/2024    Procedure: VALVULOPLASTY, TRICUSPID REPAIR;  Surgeon: Thierno Liu MD;  Location: [facility] OR [address] [place] [address];  Service: Cardiothoracic;  Laterality: N/A;       Family History:  Family History   Problem Relation Name Age of Onset    Heart disease Mother      Hypertension Mother      Hyperlipidemia Mother      Diabetes Mother      Cancer Mother      Hyperlipidemia Father      Hypertension Father      Breast cancer Paternal Aunt      Mastectomy Paternal Cousin      Colon cancer Neg Hx      Ovarian cancer Neg Hx         Social History:  She  reports that she has never smoked. She has never used smokeless tobacco. She reports that she does not currently use alcohol. She reports that she does not use drugs.      MEDICATIONS & ALLERGIES:     Review of patient's allergies indicates:   Allergen Reactions    Cinnamon Itching    Doxycycline      Abdomen pain severe       Current Outpatient Medications on File Prior to Visit   Medication Sig Dispense Refill    amoxicillin (AMOXIL) 500 MG Tab Take 4 tablets (2,000 mg total) by mouth daily as needed (for procedures). Take one hour before the procedure. 12 tablet 2    aspirin (ECOTRIN) 81 MG EC tablet Take 1 tablet (81 mg total) by mouth once daily. 30 tablet 11    digoxin (LANOXIN) 250 mcg tablet Take 1 tablet (250 mcg total) by mouth once daily. 90 tablet 3    dofetilide (TIKOSYN) 250 MCG Cap Take 1 capsule (250 mcg total) by mouth every 12 (twelve) hours. 180 capsule 3     "enoxaparin (LOVENOX) 80 mg/0.8 mL Syrg Inject 0.8 mLs (80 mg total) into the skin every 12 (twelve) hours. As directed by the Coumadin Clinic 20 each 0    metoprolol succinate (TOPROL-XL) 100 MG 24 hr tablet Take 1 tablet (100 mg total) by mouth 2 (two) times daily. 180 tablet 3    metoprolol tartrate (LOPRESSOR) 25 MG tablet Take 1 tablet (25 mg total) by mouth 2 (two) times daily as needed (Palpitations). 180 tablet 3    promethazine (PHENERGAN) 12.5 MG Tab Take 1 tablet (12.5 mg total) by mouth 3 (three) times daily as needed (nausea). 24 tablet 0    promethazine-dextromethorphan (PROMETHAZINE-DM) 6.25-15 mg/5 mL Syrp Take 5-10 mLs by mouth 3 (three) times daily as needed. 118 mL 0    semaglutide, weight loss, (WEGOVY) 1.7 mg/0.75 mL PnIj Inject 1.7 mg into the skin every 7 days. 3 mL 0    semaglutide, weight loss, (WEGOVY) 2.4 mg/0.75 mL PnIj Inject 2.4 mg into the skin every 7 days. 3 mL 1    warfarin (COUMADIN) 4 MG tablet Take 1-2 tablets (4-8 mg total) by mouth Daily. As directed by the Coumadin Clinic 60 tablet 11     No current facility-administered medications on file prior to visit.        OBJECTIVE:     Vital Signs:  /78 (Patient Position: Sitting)   Pulse 89   Ht 5' 1" (1.549 m)   Wt 80.2 kg (176 lb 12.9 oz)   BMI 33.41 kg/m²     Physical Exam:  Physical Exam  Constitutional:       General: She is not in acute distress.     Appearance: Normal appearance. She is obese. She is not ill-appearing or diaphoretic.      Comments: Well-appearing woman in NAD.   HENT:      Head: Normocephalic and atraumatic.      Nose: Nose normal.      Mouth/Throat:      Mouth: Mucous membranes are moist.      Pharynx: Oropharynx is clear.   Eyes:      Pupils: Pupils are equal, round, and reactive to light.   Cardiovascular:      Rate and Rhythm: Normal rate and regular rhythm.      Pulses: Normal pulses.      Heart sounds: Normal heart sounds. No murmur heard.     No friction rub. No gallop.      Comments: Crisp " mechanical valve sounds appreciated. Her left anterior chest wall incision remains in excellent repair, with her device freely mobile within the pocket with no evidence of device adhesion or erosion. Her sternotomy incision is in excellent repair.   Pulmonary:      Effort: Pulmonary effort is normal. No respiratory distress.      Breath sounds: Normal breath sounds. No wheezing, rhonchi or rales.   Chest:      Chest wall: No tenderness.   Abdominal:      General: There is no distension.      Palpations: Abdomen is soft.      Tenderness: There is no abdominal tenderness.   Musculoskeletal:         General: No swelling or tenderness.      Cervical back: Normal range of motion.      Right lower leg: No edema.      Left lower leg: No edema.   Skin:     General: Skin is warm and dry.      Findings: No erythema, lesion or rash.   Neurological:      General: No focal deficit present.      Mental Status: She is alert and oriented to person, place, and time. Mental status is at baseline.      Motor: No weakness.      Gait: Gait normal.   Psychiatric:         Mood and Affect: Mood normal.         Behavior: Behavior normal.        Laboratory Data:  Lab Results   Component Value Date    WBC 10.26 04/24/2025    HGB 13.0 04/24/2025    HCT 41.7 04/24/2025    MCV 86 04/24/2025     04/24/2025     Lab Results   Component Value Date    GLU 81 12/19/2024     12/19/2024    K 4.4 12/19/2024     12/19/2024    CO2 23 12/19/2024    BUN 10 12/19/2024    CREATININE 0.7 12/19/2024    CALCIUM 9.3 12/19/2024    MG 2.0 05/02/2024     Lab Results   Component Value Date    INR 3.4 07/10/2025    INR 4.0 07/03/2025    INR 2.8 06/26/2025    PROTIME 27.1 (H) 04/24/2025    PROTIME 37.9 (H) 04/17/2025       Pertinent Cardiac Data:  Personal interpretation of today's ECG: Atrially-sensed, ventricularly-sensed rhythm with sinus rhythm with first degree AV block, rate of 85 bpm,  ms, QRS 80 ms, QT/QTc 384/456 ms, nonspecific STT  changes.     Resting 2D Transthoracic Echocardiogram - 1/4/2024:    Left Ventricle: The left ventricle is normal in size. Normal wall thickness. Normal wall motion. There is normal systolic function with a visually estimated ejection fraction of 60 - 65%. There is normal diastolic function.    Right Ventricle: Normal right ventricular cavity size. Wall thickness is normal. Right ventricle wall motion  is normal. Systolic function is normal.    Left Atrium: Left atrium is severely dilated.    Right Atrium: Right atrium is mildly dilated.    Mitral Valve: There is severe bileaflet sclerosis. Severely thickened leaflets. Moderately calcified leaflets. Severely restricted motion. There is severe stenosis with MVA  1.16 and MG 13 mmHg at HR 84.. There is trace regurgitation.    Tricuspid Valve: There is mild regurgitation.    Pulmonary Artery: The estimated pulmonary artery systolic pressure is 28 mmHg.    IVC/SVC: Normal venous pressure at 3 mmHg.    Resting 2D Transthoracic Echocardiogram - 2/26/2024:    Left Ventricle: The left ventricle is normal in size. Normal wall thickness. Normal wall motion. There is normal systolic function with a visually estimated ejection fraction of 55 - 70%. Ejection fraction by visual approximation is 63%. Diastolic function cannot be reliably determined in the presence of mitral valve disease.    Right Ventricle: Normal right ventricular cavity size. Wall thickness is normal. Right ventricle wall motion  is normal. Systolic function is normal. Pacemaker lead present in the ventricle.    Right Atrium: Right atrium is mildly dilated. Lead present in the right atrium.    Mitral Valve: There is a mechanical valve in the mitral position. There is normal leaflet mobility. The mean pressure gradient across the mitral valve is 5 mmHg at a heart rate of 85  bpm; MVA 3. There is no significant regurgitation.    Tricuspid Valve: The tricuspid valve is repaired. The MG was 4 mmHg at PHR 85; TVA  2.57    IVC/SVC: Normal venous pressure at 3 mmHg.    MINERVA - 4/18/2024:    Left Atrium: Left atrium is dilated. The left atrial appendage has a windsock morphology. Appendage velocity is reduced at less than 40 cm/sec. There is no thrombus in the left atrial appendage. Moderate SEC without any thrombus seen in LOBO. contrast was used.    Left Ventricle: There is normal systolic function with a visually estimated ejection fraction of 55 - 60%.    Right Ventricle: Pacemaker lead present in the ventricle.    Right Atrium: Right atrium is dilated. Lead present in the right atrium.    Mitral Valve: There is a mechanical valve in the mitral position. It is reported to be a 25 mm carbomedics valve. There is mild regurgitation. M gradient not obtained.    Tricuspid Valve: The tricuspid valve is repaired by annular ring(26 Medtronic band). Mean gradient is 2 mmHg at HR of 99bpm. .    Aorta: Aortic root is normal in size measuring 2.8 cm. Ascending aorta is normal measuring 2.4 cm.  1)  Mitral valve replacement with a 25 mm Carbomedics standard  2)  Tricuspid valve repair with a 26 mm Medtronic Contour annuloplasty band   3)  Reoperation    Resting 2D Transthoracic Echocardiogram - 9/23/2024:    Left Ventricle: The left ventricle is normal in size. Normal wall thickness. Normal wall motion. There is normal systolic function with a visually estimated ejection fraction of 55 - 70%. Ejection fraction by visual approximation is 60%. There is normal diastolic function.    Right Ventricle: Normal right ventricular cavity size. Wall thickness is normal. Systolic function is normal.    Mitral Valve: There is a mechanical valve in the mitral position. The mean pressure gradient across the mitral valve is 5 mmHg at a heart rate of 67 bpm.    Tricuspid Valve: The tricuspid valve is repaired.    IVC/SVC: Normal venous pressure at 3 mmHg.    Personal interpretation of today's Device Interrogation: Personal review of her device interrogation  (Medtronic Savi S DR DUONG, implanted 1/17/2024) reveals adequate battery longevity with an estimated 13.7 years of battery life remaining. Her leads were interrogated with acceptable and stable lead parameters with monitoring at both high and low output and unipolar and bipolar settings on her LBBAP. She is currently AS-VS with underlying sinus rhythm with first-degree AV block at a rate of 85 bpm. She is RA paced 3.5 % and LBBAP paced 0.6%. There are no concerning AHR or VHR episodes noted. She has continued to have periods of paroxysmal atrial fibrillation and atrial flutter with an overall burden of AT/AF of 1.8%. Her longest recorded event of atrial fibrillation occurred on 5/13/2025 and lasted 1 hour and 43 minutes with excellent rate control. She has had considerable reduction in her events of atrial fibrillation and atrial flutter.       ASSESSMENT & PLAN:   Ms. King Botello is a hermelindo 40-year-old woman who returns to clinic today for ongoing evaluation and routine device surveillance of a Medtronic dual-chamber pacemaker with a left bundle branch area pacing (LBBAP) lead, implanted 1/17/2024 in the setting of postoperative atrial flutter and complete heart block with a junctional escape of ~50 bpm with persistent AV dissociation following a mitral valve replacement with a mechanical mitral valve and a tricuspid valve annuloplasty on 1/10/2024. She has a past medical history significant for mitral valve prolapse with mitral insufficiency with a previous mitral valve annuloplasty in [place] in 2003, subsequent severe mitral valve stenosis, s/p mitral valve replacement with a mechanical mitral valve and a tricuspid valve annuloplasty on 1/10/2024 with Dr. Liu, postoperative atrial flutter and complete heart block with a junctional escape of ~50 bpm with persistent AV dissociation following her surgery, s/p implantation of a Medtronic dual-chamber pacemaker with a left bundle branch area pacing  (LBBAP) lead for physiologic pacing on 2024, hypertension, previous episodes of decompensated heart failure with preserved systolic function in the postpartum period with her most recent LVEF 55-70% following her  on 2023, and obesity.     - She has a normally functioning dual-chamber pacemaker utilizing a LBBAP lead on device interrogation today. She remains in underlying normal sinus rhythm and is presently AS-VS. She has adequate battery longevity with an estimated 13.7 years of battery life remaining. Her leads were interrogated with acceptable and stable lead parameters with monitoring at both high and low output and unipolar and bipolar settings on her LBBAP. She is currently AS-VS with underlying sinus rhythm with first-degree AV block at a rate of 85 bpm. She is RA paced 3.5 % and LBBAP paced 0.6%. There are no concerning AHR or VHR episodes noted. She has continued to have periods of paroxysmal atrial fibrillation and atrial flutter with an overall burden of AT/AF of 1.8%. Her longest recorded event of atrial fibrillation occurred on 2025 and lasted 1 hour and 43 minutes with excellent rate control. She has had considerable reduction in her events of atrial fibrillation and atrial flutter. This has significantly improved while on dofetilide therapy. Her paced QRSd remains narrow at 96ms, with her intrinsic QRSd remaining 80ms. Her systolic function remains preserved, with no indication for device upgrade.   - Dr. Cordero had previously started digoxin 250mcg po daily. As her overall burden of atrial tachycardia and atrial fibrillation continues to decrease, we will discontinue this agent at this time.  - We continue to discussed the pathophysiology of atrial fibrillation; specifically, we discussed paroxysmal atrial fibrillation and the concept that some patients may experience paroxysms of atrial fibrillation interrupting periods of sinus rhythm. We discussed that even a relatively low  burden of atrial fibrillation continues to have an increased risk of CVA.   - She remains on metoprolol succinate 100mg po qAM and metoprolol tartrate 50mg po qPM with overall excellent rate control. We discussed consolidation to metoprolol succinate for ease of use; however, she would prefer to remain on her current regimen.   - She remains on dofetilide 250mcg po BID with significant improvement in her overall burden of atrial fibrillation. Her most recent echocardiogram was reviewed, with preserved systolic function, LVEF 55-70%. Her renal function remains preserved, with acceptable electrolytes and ECG parameters while on dofetilide therapy. A refill of this medication was provided today.  - She remains on coumadin and aspirin therapy in the setting of her mechanical mitral valve with no adverse bleeding events reported.   - Possible underlying drivers of atrial fibrillation were addressed at this appointment, including recommendations for maintenance of a healthy BMI - now a class I recommendation. Review of laboratory data revealed acceptable TSH at 0.401.   - She will continue to follow with her PCP, general cardiology team, and valve specialist for ongoing management of her comorbid conditions.       This patient will return to clinic with me in six months with continued dofetilide and metoprolol succinate therapy, with uninterrupted coumadin anticoagulation. We will continue remote transmissions with her next in-office interrogation in six months. All questions and concerns were addressed at this encounter. Total time spent in this patient encounter: 41 minutes.     Signing Physician:       NEMESIO Moore MD  Electrophysiology Attending

## 2025-07-18 LAB
OHS CV DC REMOTE DEVICE TYPE: NORMAL
OHS CV RV PACING PERCENT: 0.6 %

## 2025-07-22 DIAGNOSIS — I48.0 PAROXYSMAL ATRIAL FIBRILLATION: ICD-10-CM

## 2025-07-22 RX ORDER — DOFETILIDE 0.25 MG/1
250 CAPSULE ORAL EVERY 12 HOURS
Qty: 180 CAPSULE | Refills: 3 | OUTPATIENT
Start: 2025-07-22 | End: 2026-07-22

## 2025-07-23 ENCOUNTER — PATIENT MESSAGE (OUTPATIENT)
Dept: ELECTROPHYSIOLOGY | Facility: CLINIC | Age: 40
End: 2025-07-23
Payer: COMMERCIAL

## 2025-07-23 DIAGNOSIS — I48.0 PAROXYSMAL ATRIAL FIBRILLATION: ICD-10-CM

## 2025-07-23 DIAGNOSIS — E66.811 OBESITY, CLASS I, BMI 30.0-34.9 (SEE ACTUAL BMI): ICD-10-CM

## 2025-07-23 RX ORDER — SEMAGLUTIDE 1.7 MG/.75ML
1.7 INJECTION, SOLUTION SUBCUTANEOUS
Qty: 3 ML | Refills: 0 | Status: ACTIVE | OUTPATIENT
Start: 2025-07-23

## 2025-07-23 RX ORDER — DOFETILIDE 0.25 MG/1
250 CAPSULE ORAL EVERY 12 HOURS
Qty: 180 CAPSULE | Refills: 3 | Status: ACTIVE | OUTPATIENT
Start: 2025-07-23 | End: 2026-07-23

## 2025-07-23 NOTE — TELEPHONE ENCOUNTER
Spoke with pharmacist and was informed that the Tikosyn script from 7/17/25 was sent to print instead of being sent electronically. Requested a new electronic prescription to replace the printed one. Informed pharmacist a new Tikosyn Rx will be submitted electronically.

## 2025-07-23 NOTE — TELEPHONE ENCOUNTER
Pt confirmed general tolerability with Wegovy 1.7 mg but would like to continue at this dose for another month before continuing titrations. To avoid unnecessary delays in titration, sent order to [facility] for Wegovy 2.4 mg. Will continue with scheduled follow-up.

## 2025-07-24 ENCOUNTER — ANTI-COAG VISIT (OUTPATIENT)
Dept: CARDIOLOGY | Facility: CLINIC | Age: 40
End: 2025-07-24
Payer: COMMERCIAL

## 2025-07-24 DIAGNOSIS — Z79.01 LONG TERM (CURRENT) USE OF ANTICOAGULANTS: Primary | ICD-10-CM

## 2025-07-24 DIAGNOSIS — Z95.2 H/O MITRAL VALVE REPLACEMENT WITH MECHANICAL VALVE: ICD-10-CM

## 2025-07-24 LAB
INR PPP: 3.1
OHS CV AF BURDEN PERCENT: < 1
OHS CV DC REMOTE DEVICE TYPE: NORMAL
OHS CV RV PACING PERCENT: 0.34 %

## 2025-07-24 PROCEDURE — 93793 ANTICOAG MGMT PT WARFARIN: CPT | Mod: S$GLB,,,

## 2025-07-24 NOTE — PROGRESS NOTES
[facility]    07/24/2025 8:33 AM    Assessment/Plan:    Patient presents today with therapeutic INR.    Assessment of patient findings and chart review: none    Recommendation for patient's warfarin regimen: Continue current maintenance dose    Recommend repeat INR in 1 week  _________________________________________________________________    King Botello (40 y.o.) is followed by the [facility].    Anticoagulation Summary  As of 7/24/2025      INR goal:  2.5-3.5   TTR:  49.4% (1.5 y)   INR used for dosing:  3.1 (7/24/2025)   Warfarin maintenance plan:  6 mg (4 mg x 1.5) every Mon, Wed, Sat; 8 mg (4 mg x 2) all other days   Weekly warfarin total:  50 mg   Plan last modified:  Vinh Beltre, PharmD (7/3/2025)   Next INR check:  7/31/2025   Target end date:  --    Indications    Long term (current) use of anticoagulants [Z79.01]  H/O mitral valve replacement with mechanical valve [Z95.2]  Atrial fibrillation and flutter (Resolved) [I48.91  I48.92]                 Anticoagulation Episode Summary       INR check location:  Clinic Lab    Preferred lab:  --    Send INR reminders to:  [facility] COUMADIN HOME MONITOR    Comments:  [facility] - tests on Thursday// [facility] Internal Med Lab           Anticoagulation Care Providers       Provider Role Specialty Phone number    Radha Hussein PA-C Referring Cardiothoracic Surgery [phone]    Eugene Cordero MD Responsible Cardiology [phone]              Laine Glynn, PharmD, [facility]  Clinical Pharmacist - [facility]  Phone: [phone] or Epic Secure Messaging

## 2025-07-29 ENCOUNTER — PATIENT MESSAGE (OUTPATIENT)
Dept: OPTOMETRY | Facility: CLINIC | Age: 40
End: 2025-07-29
Payer: COMMERCIAL

## 2025-07-30 ENCOUNTER — OFFICE VISIT (OUTPATIENT)
Dept: OPTOMETRY | Facility: CLINIC | Age: 40
End: 2025-07-30
Payer: COMMERCIAL

## 2025-07-30 DIAGNOSIS — H00.022 HORDEOLUM INTERNUM OF RIGHT LOWER EYELID: Primary | ICD-10-CM

## 2025-07-30 PROCEDURE — 99999 PR PBB SHADOW E&M-EST. PATIENT-LVL III: CPT | Mod: PBBFAC,,, | Performed by: OPTOMETRIST

## 2025-07-30 PROCEDURE — 1159F MED LIST DOCD IN RCRD: CPT | Mod: CPTII,S$GLB,, | Performed by: OPTOMETRIST

## 2025-07-30 PROCEDURE — 92014 COMPRE OPH EXAM EST PT 1/>: CPT | Mod: S$GLB,,, | Performed by: OPTOMETRIST

## 2025-07-30 RX ORDER — NEOMYCIN SULFATE, POLYMYXIN B SULFATE, AND DEXAMETHASONE 3.5; 10000; 1 MG/G; [USP'U]/G; MG/G
OINTMENT OPHTHALMIC 3 TIMES DAILY
Qty: 3.5 EACH | Refills: 0 | Status: SHIPPED | OUTPATIENT
Start: 2025-07-30 | End: 2025-08-06

## 2025-07-30 NOTE — PROGRESS NOTES
HPI    CC: Pt states that she started feeling some pain around her OD orbital   area. Pt states that the pain she is having feels like someone punched her   in the eye and feels bruised. Pt states nothing hit her in OD or anything,   but has been a constant pain for about 2 days now. No flashes or floaters.   Vision seems stable. Pt states that OS feels just fine.     ANTHONY: 08/19/2024    (-) Changes in vision   (+) Pain, OD  (-) Irritation   (-) Itching   (-) Flashes  (-) Floaters  (-) Glasses wearer  (-) CL wearer  (-) Uses eye gtts    (-) Eye injury  (-) Eye surgery   (-)POHx  (-)FOHx    (-)DM         Last edited by Lacy Glez, OD on 7/30/2025  1:34 PM.            Assessment /Plan     For exam results, see Encounter Report.    Hordeolum internum of right lower eyelid    Other orders  -     neomycin-polymyxin-dexamethasone (DEXACINE) 3.5 mg/g-10,000 unit/g-0.1 % Oint; Place into the right eye 3 (three) times daily. for 7 days  Dispense: 3.5 each; Refill: 0      Educated pt on findings. Rx maxitrol deangelo for RLL TID for 7 day then d/c use. Recommend warm compresses with light massage over lesion/sore area for 10-15min TID. AT prn. Discussed importance of compresses, will aid in resolving lesion. Recommend warm compresses OU Qday to prevent styes in the future. If symptoms worsen or dont improve, RTC. Monitor.      Pt due for annual August 2025. Can schedule for refraction only since DFE completed today.       RTC in ~1 month for refraction or sooner if needed.

## 2025-07-31 ENCOUNTER — ANTI-COAG VISIT (OUTPATIENT)
Dept: CARDIOLOGY | Facility: CLINIC | Age: 40
End: 2025-07-31
Payer: COMMERCIAL

## 2025-07-31 ENCOUNTER — PATIENT MESSAGE (OUTPATIENT)
Dept: INTERNAL MEDICINE | Facility: CLINIC | Age: 40
End: 2025-07-31
Payer: COMMERCIAL

## 2025-07-31 DIAGNOSIS — Z79.01 LONG TERM (CURRENT) USE OF ANTICOAGULANTS: Primary | ICD-10-CM

## 2025-07-31 DIAGNOSIS — Z95.2 H/O MITRAL VALVE REPLACEMENT WITH MECHANICAL VALVE: ICD-10-CM

## 2025-07-31 LAB — INR PPP: 2.9

## 2025-07-31 PROCEDURE — 93793 ANTICOAG MGMT PT WARFARIN: CPT | Mod: S$GLB,,,

## 2025-07-31 NOTE — PROGRESS NOTES
[facility]    2025 11:04 AM    Assessment/Plan:    Patient presents today with therapeutic INR.    Assessment of patient findings and chart review: none    Recommendation for patient's warfarin regimen: Continue current maintenance dose    Recommend repeat INR in 1 week  _________________________________________________________________    King Botello (40 y.o.) is followed by the [facility].    Anticoagulation Summary  As of 2025      INR goal:  2.5-3.5   TTR:  50.1% (1.5 y)   INR used for dosin.9 (2025)   Warfarin maintenance plan:  6 mg (4 mg x 1.5) every Mon, Wed, Sat; 8 mg (4 mg x 2) all other days   Weekly warfarin total:  50 mg   Plan last modified:  Vinh Beltre, PharmD (7/3/2025)   Next INR check:  2025   Target end date:  --    Indications    Long term (current) use of anticoagulants [Z79.01]  H/O mitral valve replacement with mechanical valve [Z95.2]  Atrial fibrillation and flutter (Resolved) [I48.91  I48.92]                 Anticoagulation Episode Summary       INR check location:  Clinic Lab    Preferred lab:  --    Send INR reminders to:  [facility] COUMADIN HOME MONITOR    Comments:  [facility] - tests on Thursday// [facility] Internal Med Lab           Anticoagulation Care Providers       Provider Role Specialty Phone number    Radha Hussein PA-C Referring Cardiothoracic Surgery [phone]    Eugene Cordero MD Responsible Cardiology [phone]              Laine Glynn, PharmD, [facility]  Clinical Pharmacist - [facility]  Phone: [phone] or Epic Secure Messaging

## 2025-08-07 ENCOUNTER — ANTI-COAG VISIT (OUTPATIENT)
Dept: CARDIOLOGY | Facility: CLINIC | Age: 40
End: 2025-08-07
Payer: COMMERCIAL

## 2025-08-07 DIAGNOSIS — Z95.2 H/O MITRAL VALVE REPLACEMENT WITH MECHANICAL VALVE: ICD-10-CM

## 2025-08-07 DIAGNOSIS — Z79.01 LONG TERM (CURRENT) USE OF ANTICOAGULANTS: Primary | ICD-10-CM

## 2025-08-07 LAB — INR PPP: 3.1

## 2025-08-07 PROCEDURE — 93793 ANTICOAG MGMT PT WARFARIN: CPT | Mod: S$GLB,,,

## 2025-08-07 NOTE — PROGRESS NOTES
[facility]    08/07/2025 8:45 AM    Assessment/Plan:    Patient presents today with therapeutic INR.    Assessment of patient findings and chart review: none    Recommendation for patient's warfarin regimen: Continue current maintenance dose    Recommend repeat INR in 1 week  _________________________________________________________________    King Botello (40 y.o.) is followed by the [email or web address].    Anticoagulation Summary  As of 8/7/2025      INR goal:  2.5-3.5   TTR:  50.7% (1.5 y)   INR used for dosing:  3.1 (8/7/2025)   Warfarin maintenance plan:  6 mg (4 mg x 1.5) every Mon, Wed, Sat; 8 mg (4 mg x 2) all other days   Weekly warfarin total:  50 mg   Plan last modified:  Vinh Beltre, PharmD (7/3/2025)   Next INR check:  8/14/2025   Target end date:  --    Indications    Long term (current) use of anticoagulants [Z79.01]  H/O mitral valve replacement with mechanical valve [Z95.2]  Atrial fibrillation and flutter (Resolved) [I48.91  I48.92]                 Anticoagulation Episode Summary       INR check location:  Clinic Lab    Preferred lab:  --    Send INR reminders to:  [facility] COUMADIN HOME MONITOR    Comments:  [facility] - tests on Thursday// [facility] Internal Med Lab           Anticoagulation Care Providers       Provider Role Specialty Phone number    Radha Hussein PA-C Referring Cardiothoracic Surgery [phone]    Eugene Cordero MD Responsible Cardiology [phone]              Laine Glynn, PharmD, [facility]  Clinical Pharmacist - [facility]  Phone: [phone] or Epic Secure Messaging

## 2025-08-14 ENCOUNTER — ANTI-COAG VISIT (OUTPATIENT)
Dept: CARDIOLOGY | Facility: CLINIC | Age: 40
End: 2025-08-14
Payer: COMMERCIAL

## 2025-08-14 DIAGNOSIS — Z95.2 H/O MITRAL VALVE REPLACEMENT WITH MECHANICAL VALVE: ICD-10-CM

## 2025-08-14 DIAGNOSIS — Z79.01 LONG TERM (CURRENT) USE OF ANTICOAGULANTS: Primary | ICD-10-CM

## 2025-08-14 LAB — INR PPP: 2.8

## 2025-08-14 PROCEDURE — 93793 ANTICOAG MGMT PT WARFARIN: CPT | Mod: S$GLB,,,

## 2025-08-14 RX ORDER — ASPIRIN 81 MG/1
81 TABLET ORAL DAILY
Qty: 30 TABLET | Refills: 11 | Status: SHIPPED | OUTPATIENT
Start: 2025-08-14 | End: 2026-08-14

## 2025-08-21 ENCOUNTER — ANTI-COAG VISIT (OUTPATIENT)
Dept: CARDIOLOGY | Facility: CLINIC | Age: 40
End: 2025-08-21
Payer: COMMERCIAL

## 2025-08-21 DIAGNOSIS — Z79.01 LONG TERM (CURRENT) USE OF ANTICOAGULANTS: Primary | ICD-10-CM

## 2025-08-21 DIAGNOSIS — Z95.2 H/O MITRAL VALVE REPLACEMENT WITH MECHANICAL VALVE: ICD-10-CM

## 2025-08-21 LAB — INR PPP: 2.7

## 2025-08-21 PROCEDURE — 93793 ANTICOAG MGMT PT WARFARIN: CPT | Mod: S$GLB,,,

## 2025-08-24 ENCOUNTER — DOCUMENTATION ONLY (OUTPATIENT)
Dept: CARDIOLOGY | Facility: CLINIC | Age: 40
End: 2025-08-24
Payer: COMMERCIAL

## 2025-08-27 ENCOUNTER — OFFICE VISIT (OUTPATIENT)
Dept: OPTOMETRY | Facility: CLINIC | Age: 40
End: 2025-08-27
Payer: COMMERCIAL

## 2025-08-27 DIAGNOSIS — H52.7 REFRACTIVE ERROR: Primary | ICD-10-CM

## 2025-08-27 PROCEDURE — 99999 PR PBB SHADOW E&M-EST. PATIENT-LVL III: CPT | Mod: PBBFAC,,, | Performed by: OPTOMETRIST

## 2025-08-27 PROCEDURE — 99499 UNLISTED E&M SERVICE: CPT | Mod: S$GLB,,, | Performed by: OPTOMETRIST

## 2025-08-27 PROCEDURE — 92015 DETERMINE REFRACTIVE STATE: CPT | Mod: S$GLB,,, | Performed by: OPTOMETRIST

## 2025-08-28 ENCOUNTER — ANTI-COAG VISIT (OUTPATIENT)
Dept: CARDIOLOGY | Facility: CLINIC | Age: 40
End: 2025-08-28
Payer: COMMERCIAL

## 2025-08-28 DIAGNOSIS — Z95.2 H/O MITRAL VALVE REPLACEMENT WITH MECHANICAL VALVE: ICD-10-CM

## 2025-08-28 DIAGNOSIS — Z79.01 LONG TERM (CURRENT) USE OF ANTICOAGULANTS: Primary | ICD-10-CM

## 2025-08-28 LAB — INR PPP: 1.8

## 2025-08-28 PROCEDURE — 93793 ANTICOAG MGMT PT WARFARIN: CPT | Mod: S$GLB,,,

## 2025-09-04 ENCOUNTER — ANTI-COAG VISIT (OUTPATIENT)
Dept: CARDIOLOGY | Facility: CLINIC | Age: 40
End: 2025-09-04
Payer: COMMERCIAL

## 2025-09-04 DIAGNOSIS — Z79.01 LONG TERM (CURRENT) USE OF ANTICOAGULANTS: Primary | ICD-10-CM

## 2025-09-04 DIAGNOSIS — Z95.2 H/O MITRAL VALVE REPLACEMENT WITH MECHANICAL VALVE: ICD-10-CM

## 2025-09-04 LAB — INR PPP: 2.2

## 2025-09-04 PROCEDURE — 93793 ANTICOAG MGMT PT WARFARIN: CPT | Mod: S$GLB,,,

## (undated) DEVICE — TIP YANKAUERS BULB NO VENT

## (undated) DEVICE — FOGERTY SOFT JAW DISP 2/PK

## (undated) DEVICE — UNDERGLOVES BIOGEL PI SIZE 7.5

## (undated) DEVICE — BOWL STERILE LARGE 32OZ

## (undated) DEVICE — SUT 6 18IN STEEL MONO CCS

## (undated) DEVICE — GUIDEWIRE EMERALD 150CM PTFE

## (undated) DEVICE — SUT PROLENE 5-0 24 C-1 BL

## (undated) DEVICE — SOL 9P NACL IRR PIC IL

## (undated) DEVICE — KIT SAHARA DRAPE DRAW/LIFT

## (undated) DEVICE — VISIPAQUE CONTRAST 320MG/100ML

## (undated) DEVICE — DRAPE INCISE IOBAN 2 23X17IN

## (undated) DEVICE — SPONGE COTTON TRAY 4X4IN

## (undated) DEVICE — PAD DEFIB CADENCE ADULT R2

## (undated) DEVICE — SUT 2-0 VICRYL / CT-1

## (undated) DEVICE — VAC WOUND DISPOSABLE PREVENA

## (undated) DEVICE — Device

## (undated) DEVICE — LOOP VESSEL BLUE MAXI

## (undated) DEVICE — SUT PROLENE 3-0 SH DA 36 BL

## (undated) DEVICE — VACURETTE 10MM CURVED

## (undated) DEVICE — TRAY HEART OMC

## (undated) DEVICE — BLADE PEAK PLASMA

## (undated) DEVICE — SOL IRR NACL .9% 3000ML

## (undated) DEVICE — DRAIN CHANNEL ROUND 19FR

## (undated) DEVICE — DRAIN CHEST DRY SUCTION

## (undated) DEVICE — SUT VICRYL BR 1 GEN 27 CT-1

## (undated) DEVICE — ADHESIVE DERMABOND ADVANCED

## (undated) DEVICE — GAUZE SPONGE 4X4 12PLY

## (undated) DEVICE — DRAPE SLUSH WARMER WITH DISC

## (undated) DEVICE — TAPE SURG MEDIPORE 6X72IN

## (undated) DEVICE — KIT MICROINTRO 4F .018X40X7CM

## (undated) DEVICE — SYR 30CC LUER LOCK

## (undated) DEVICE — SHEATH SAFE ULTRA 7FR

## (undated) DEVICE — ELECTRODE REM PLYHSV RETURN 9

## (undated) DEVICE — DRESSING ADH ISLAND 3.6 X 14

## (undated) DEVICE — DRAPE CVMAX SPLIT ANES SCRN

## (undated) DEVICE — SUT 2/0 30IN ETHIBOND

## (undated) DEVICE — SUT SILK 2-0 SH 18IN BLACK

## (undated) DEVICE — SUT PROLENE 4-0 RB-1 BL MO

## (undated) DEVICE — NDL 22GA X1 1/2 REG BEVEL

## (undated) DEVICE — SUT VICRYL PLUS 3-0 FS1 27

## (undated) DEVICE — BRA POST SURGICAL WHT 48-50IN

## (undated) DEVICE — SLITTER UNIVERSAL

## (undated) DEVICE — PACK PACER PERMANENT OMC

## (undated) DEVICE — GLOVE BIOGEL SKINSENSE PI 7.0

## (undated) DEVICE — SUT ETHIBOND XTRA 2-0 SH-2

## (undated) DEVICE — DRESSING AQUACEL SACRAL 9 X 9

## (undated) DEVICE — SUT SILK BLK BR. 2 2-60

## (undated) DEVICE — CLOSURE SKIN STERI STRIP 1/2X4

## (undated) DEVICE — CANNULA RETROGRADE CARDIOPLEG

## (undated) DEVICE — DRESSING AQUACEL AG ADV 3.5X6

## (undated) DEVICE — CONTAINER SPECIMEN STRL 4OZ

## (undated) DEVICE — SLING SWATHE UNIVERSAL FOAM

## (undated) DEVICE — SUT PROLENE 4-0 SH BLU 36IN

## (undated) DEVICE — VACURETTE 9MM CURVED

## (undated) DEVICE — CATH ANGIO RIGHTSITE HIS 7X43

## (undated) DEVICE — BLADE SAW STERNAL 5/BX

## (undated) DEVICE — INSERTS STEALTH FIBRA SIZE 5

## (undated) DEVICE — GLOVE BIOGEL PI MICRO SZ 7.5

## (undated) DEVICE — CANNULA MULTIPLE PERFUSIONSET

## (undated) DEVICE — KIT URINARY CATH URINE METER

## (undated) DEVICE — HANDLE CURETTE W/TUBING